# Patient Record
Sex: MALE | Race: AMERICAN INDIAN OR ALASKA NATIVE | NOT HISPANIC OR LATINO | Employment: OTHER | URBAN - METROPOLITAN AREA
[De-identification: names, ages, dates, MRNs, and addresses within clinical notes are randomized per-mention and may not be internally consistent; named-entity substitution may affect disease eponyms.]

---

## 2022-09-05 ENCOUNTER — HOSPITAL ENCOUNTER (OUTPATIENT)
Facility: CLINIC | Age: 32
Setting detail: OBSERVATION
Discharge: NURSING, REHAB OR LONG-TERM CARE | End: 2023-04-07
Attending: EMERGENCY MEDICINE | Admitting: INTERNAL MEDICINE
Payer: MEDICARE

## 2022-09-05 DIAGNOSIS — R05.9 COUGH, UNSPECIFIED TYPE: ICD-10-CM

## 2022-09-05 DIAGNOSIS — E03.9 HYPOTHYROIDISM, UNSPECIFIED TYPE: ICD-10-CM

## 2022-09-05 DIAGNOSIS — B37.2 CANDIDIASIS OF SKIN: ICD-10-CM

## 2022-09-05 DIAGNOSIS — G82.20 PARAPLEGIA (H): Primary | ICD-10-CM

## 2022-09-05 DIAGNOSIS — R68.84 JAW PAIN: ICD-10-CM

## 2022-09-05 DIAGNOSIS — E78.5 HYPERLIPIDEMIA, UNSPECIFIED HYPERLIPIDEMIA TYPE: ICD-10-CM

## 2022-09-05 DIAGNOSIS — J45.909 MILD REACTIVE AIRWAYS DISEASE, UNSPECIFIED WHETHER PERSISTENT: ICD-10-CM

## 2022-09-05 DIAGNOSIS — G47.00 INSOMNIA, UNSPECIFIED TYPE: ICD-10-CM

## 2022-09-05 DIAGNOSIS — K59.00 CONSTIPATION, UNSPECIFIED CONSTIPATION TYPE: ICD-10-CM

## 2022-09-05 DIAGNOSIS — Z00.00 ENCOUNTER FOR PREVENTIVE CARE: ICD-10-CM

## 2022-09-05 DIAGNOSIS — F63.81 INTERMITTENT EXPLOSIVE DISORDER IN ADULT: ICD-10-CM

## 2022-09-05 DIAGNOSIS — E55.9 VITAMIN D DEFICIENCY: ICD-10-CM

## 2022-09-05 DIAGNOSIS — R52 MILD PAIN: ICD-10-CM

## 2022-09-05 DIAGNOSIS — E11.9 TYPE 2 DIABETES MELLITUS WITHOUT COMPLICATION, WITHOUT LONG-TERM CURRENT USE OF INSULIN (H): ICD-10-CM
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
## 2022-09-05 DIAGNOSIS — F41.9 ANXIETY: ICD-10-CM

## 2022-09-05 DIAGNOSIS — I10 BENIGN ESSENTIAL HYPERTENSION: ICD-10-CM

## 2022-09-05 DIAGNOSIS — L21.0 DANDRUFF IN ADULT: ICD-10-CM

## 2022-09-05 DIAGNOSIS — I63.9 CEREBROVASCULAR ACCIDENT (CVA), UNSPECIFIED MECHANISM (H): ICD-10-CM

## 2022-09-05 PROCEDURE — C9803 HOPD COVID-19 SPEC COLLECT: HCPCS

## 2022-09-05 PROCEDURE — G0378 HOSPITAL OBSERVATION PER HR: HCPCS

## 2022-09-05 PROCEDURE — U0005 INFEC AGEN DETEC AMPLI PROBE: HCPCS | Performed by: EMERGENCY MEDICINE

## 2022-09-05 PROCEDURE — 99285 EMERGENCY DEPT VISIT HI MDM: CPT | Mod: 25

## 2022-09-05 PROCEDURE — 99219 PR INITIAL OBSERVATION CARE,LEVEL II: CPT | Performed by: INTERNAL MEDICINE

## 2022-09-05 ASSESSMENT — ENCOUNTER SYMPTOMS
HEADACHES: 0
ARTHRALGIAS: 0
SHORTNESS OF BREATH: 0

## 2022-09-05 ASSESSMENT — ACTIVITIES OF DAILY LIVING (ADL)
ADLS_ACUITY_SCORE: 35
ADLS_ACUITY_SCORE: 35

## 2022-09-05 NOTE — LETTER
March 28, 2023    Re: Chris Gabriel    1. Patient has a diagnosis of pareplegia, ICD 10 G82.2, which requires repositioning of the body to alleviate pain and pressure sores, that cannot be accommodated in an ordinary bed   2. Patient requires frequent body repositioning every 2 hours based on diagnosis of lower body paraplegia and Traumatic Brain Injury, and is able to use the electronic controls     Patient's weight: 220 pounds   DME length of need: lifetime        LUIS Spangler [facility]

## 2022-09-06 LAB
GLUCOSE BLDC GLUCOMTR-MCNC: 143 MG/DL (ref 70–99)
SARS-COV-2 RNA RESP QL NAA+PROBE: NEGATIVE

## 2022-09-06 PROCEDURE — 99224 PR SUBSEQUENT OBSERVATION CARE,LEVEL I: CPT | Performed by: HOSPITALIST

## 2022-09-06 PROCEDURE — 250N000013 HC RX MED GY IP 250 OP 250 PS 637: Performed by: HOSPITALIST

## 2022-09-06 PROCEDURE — 82962 GLUCOSE BLOOD TEST: CPT

## 2022-09-06 PROCEDURE — G0378 HOSPITAL OBSERVATION PER HR: HCPCS

## 2022-09-06 RX ORDER — HYDROXYZINE PAMOATE 50 MG/1
50 CAPSULE ORAL 3 TIMES DAILY
Status: ON HOLD | COMMUNITY
End: 2023-04-06

## 2022-09-06 RX ORDER — QUETIAPINE FUMARATE 400 MG/1
400 TABLET, FILM COATED ORAL AT BEDTIME
Status: ON HOLD | COMMUNITY
End: 2023-04-06

## 2022-09-06 RX ORDER — DIVALPROEX SODIUM 250 MG/1
250 TABLET, DELAYED RELEASE ORAL EVERY MORNING
Status: DISCONTINUED | OUTPATIENT
Start: 2022-09-07 | End: 2022-12-17

## 2022-09-06 RX ORDER — CLONIDINE HYDROCHLORIDE 0.1 MG/1
0.1 TABLET ORAL 2 TIMES DAILY
Status: DISCONTINUED | OUTPATIENT
Start: 2022-09-06 | End: 2022-12-18

## 2022-09-06 RX ORDER — VENLAFAXINE HYDROCHLORIDE 75 MG/1
75 CAPSULE, EXTENDED RELEASE ORAL AT BEDTIME
Status: ON HOLD | COMMUNITY
End: 2023-04-06

## 2022-09-06 RX ORDER — VENLAFAXINE HYDROCHLORIDE 75 MG/1
75 CAPSULE, EXTENDED RELEASE ORAL AT BEDTIME
Status: DISCONTINUED | OUTPATIENT
Start: 2022-09-06 | End: 2023-04-07 | Stop reason: HOSPADM

## 2022-09-06 RX ORDER — METOPROLOL SUCCINATE 25 MG/1
25 TABLET, EXTENDED RELEASE ORAL DAILY
Status: DISCONTINUED | OUTPATIENT
Start: 2022-09-06 | End: 2023-01-24

## 2022-09-06 RX ORDER — DIVALPROEX SODIUM 500 MG/1
500 TABLET, DELAYED RELEASE ORAL EVERY MORNING
Status: ON HOLD | COMMUNITY
End: 2023-04-06

## 2022-09-06 RX ORDER — METOPROLOL SUCCINATE 25 MG/1
25 TABLET, EXTENDED RELEASE ORAL DAILY
Status: ON HOLD | COMMUNITY
End: 2023-04-06

## 2022-09-06 RX ORDER — ACETAMINOPHEN 650 MG/1
650 SUPPOSITORY RECTAL EVERY 6 HOURS PRN
Status: DISCONTINUED | OUTPATIENT
Start: 2022-09-06 | End: 2023-04-07 | Stop reason: HOSPADM

## 2022-09-06 RX ORDER — LORAZEPAM 2 MG/ML
.5-1 INJECTION INTRAMUSCULAR EVERY 4 HOURS PRN
Status: DISCONTINUED | OUTPATIENT
Start: 2022-09-06 | End: 2023-04-07 | Stop reason: HOSPADM

## 2022-09-06 RX ORDER — QUETIAPINE FUMARATE 200 MG/1
200 TABLET, FILM COATED ORAL 2 TIMES DAILY
Status: DISCONTINUED | OUTPATIENT
Start: 2022-09-06 | End: 2023-04-07 | Stop reason: HOSPADM

## 2022-09-06 RX ORDER — DIVALPROEX SODIUM 500 MG/1
500 TABLET, DELAYED RELEASE ORAL EVERY MORNING
Status: DISCONTINUED | OUTPATIENT
Start: 2022-09-07 | End: 2022-12-17

## 2022-09-06 RX ORDER — LEVOTHYROXINE SODIUM 25 UG/1
25 TABLET ORAL DAILY
Status: ON HOLD | COMMUNITY
End: 2023-04-06

## 2022-09-06 RX ORDER — NYSTATIN 100000 [USP'U]/G
POWDER TOPICAL 2 TIMES DAILY PRN
Status: ON HOLD | COMMUNITY
End: 2023-04-06

## 2022-09-06 RX ORDER — AMOXICILLIN 250 MG
1 CAPSULE ORAL DAILY PRN
Status: ON HOLD | COMMUNITY
End: 2023-04-06

## 2022-09-06 RX ORDER — HYDROXYZINE HYDROCHLORIDE 50 MG/1
50 TABLET, FILM COATED ORAL 3 TIMES DAILY
Status: DISCONTINUED | OUTPATIENT
Start: 2022-09-06 | End: 2023-04-07 | Stop reason: HOSPADM

## 2022-09-06 RX ORDER — GUAIFENESIN 600 MG/1
600 TABLET, EXTENDED RELEASE ORAL EVERY 12 HOURS PRN
Status: ON HOLD | COMMUNITY
End: 2023-04-06

## 2022-09-06 RX ORDER — FAMOTIDINE 20 MG
25 TABLET ORAL DAILY
Status: ON HOLD | COMMUNITY
End: 2023-04-06

## 2022-09-06 RX ORDER — VENLAFAXINE HYDROCHLORIDE 150 MG/1
150 CAPSULE, EXTENDED RELEASE ORAL AT BEDTIME
Status: ON HOLD | COMMUNITY
End: 2023-04-06

## 2022-09-06 RX ORDER — QUETIAPINE FUMARATE 200 MG/1
200 TABLET, FILM COATED ORAL 2 TIMES DAILY
Status: ON HOLD | COMMUNITY
End: 2023-04-06

## 2022-09-06 RX ORDER — DIVALPROEX SODIUM 500 MG/1
1000 TABLET, DELAYED RELEASE ORAL AT BEDTIME
Status: DISCONTINUED | OUTPATIENT
Start: 2022-09-06 | End: 2023-04-07 | Stop reason: HOSPADM

## 2022-09-06 RX ORDER — AMOXICILLIN 250 MG
2 CAPSULE ORAL 2 TIMES DAILY PRN
Status: DISCONTINUED | OUTPATIENT
Start: 2022-09-06 | End: 2022-10-03

## 2022-09-06 RX ORDER — ATORVASTATIN CALCIUM 20 MG/1
20 TABLET, FILM COATED ORAL DAILY
Status: ON HOLD | COMMUNITY
End: 2023-04-06

## 2022-09-06 RX ORDER — BACLOFEN 10 MG/1
10 TABLET ORAL 3 TIMES DAILY
Status: ON HOLD | COMMUNITY
End: 2023-04-06

## 2022-09-06 RX ORDER — MIRTAZAPINE 15 MG/1
15 TABLET, FILM COATED ORAL AT BEDTIME
Status: ON HOLD | COMMUNITY
End: 2023-04-06

## 2022-09-06 RX ORDER — IPRATROPIUM BROMIDE AND ALBUTEROL SULFATE 2.5; .5 MG/3ML; MG/3ML
1 SOLUTION RESPIRATORY (INHALATION) EVERY 4 HOURS PRN
Status: ON HOLD | COMMUNITY
End: 2023-04-06

## 2022-09-06 RX ORDER — POLYETHYLENE GLYCOL 3350 17 G/17G
17 POWDER, FOR SOLUTION ORAL DAILY PRN
Status: DISCONTINUED | OUTPATIENT
Start: 2022-09-06 | End: 2022-11-16

## 2022-09-06 RX ORDER — MELATONIN 10 MG
10 CAPSULE ORAL AT BEDTIME
Status: ON HOLD | COMMUNITY
End: 2023-04-06

## 2022-09-06 RX ORDER — DIVALPROEX SODIUM 500 MG/1
1000 TABLET, DELAYED RELEASE ORAL AT BEDTIME
Status: ON HOLD | COMMUNITY
End: 2023-04-06

## 2022-09-06 RX ORDER — AMOXICILLIN 250 MG
1 CAPSULE ORAL DAILY
Status: ON HOLD | COMMUNITY
End: 2023-04-06

## 2022-09-06 RX ORDER — NYSTATIN 100000 [USP'U]/G
POWDER TOPICAL PRN
Status: ON HOLD | COMMUNITY
End: 2023-04-06

## 2022-09-06 RX ORDER — BACLOFEN 10 MG/1
10 TABLET ORAL 3 TIMES DAILY
Status: DISCONTINUED | OUTPATIENT
Start: 2022-09-06 | End: 2023-04-07 | Stop reason: HOSPADM

## 2022-09-06 RX ORDER — GUAIFENESIN 600 MG/1
600 TABLET, EXTENDED RELEASE ORAL EVERY 12 HOURS PRN
Status: DISCONTINUED | OUTPATIENT
Start: 2022-09-06 | End: 2023-04-07 | Stop reason: HOSPADM

## 2022-09-06 RX ORDER — QUETIAPINE FUMARATE 200 MG/1
400 TABLET, FILM COATED ORAL AT BEDTIME
Status: DISCONTINUED | OUTPATIENT
Start: 2022-09-06 | End: 2023-04-07 | Stop reason: HOSPADM

## 2022-09-06 RX ORDER — VENLAFAXINE HYDROCHLORIDE 150 MG/1
150 CAPSULE, EXTENDED RELEASE ORAL AT BEDTIME
Status: DISCONTINUED | OUTPATIENT
Start: 2022-09-06 | End: 2023-04-07 | Stop reason: HOSPADM

## 2022-09-06 RX ORDER — OLANZAPINE 2.5 MG/1
2.5 TABLET, FILM COATED ORAL DAILY
Status: DISCONTINUED | OUTPATIENT
Start: 2022-09-06 | End: 2023-04-07 | Stop reason: HOSPADM

## 2022-09-06 RX ORDER — MIRTAZAPINE 15 MG/1
15 TABLET, FILM COATED ORAL AT BEDTIME
Status: DISCONTINUED | OUTPATIENT
Start: 2022-09-06 | End: 2023-04-07 | Stop reason: HOSPADM

## 2022-09-06 RX ORDER — ATORVASTATIN CALCIUM 20 MG/1
20 TABLET, FILM COATED ORAL DAILY
Status: DISCONTINUED | OUTPATIENT
Start: 2022-09-06 | End: 2023-04-07 | Stop reason: HOSPADM

## 2022-09-06 RX ORDER — ONDANSETRON 4 MG/1
4 TABLET, ORALLY DISINTEGRATING ORAL EVERY 6 HOURS PRN
Status: DISCONTINUED | OUTPATIENT
Start: 2022-09-06 | End: 2023-04-07 | Stop reason: HOSPADM

## 2022-09-06 RX ORDER — VITAMIN B COMPLEX
25 TABLET ORAL DAILY
Status: DISCONTINUED | OUTPATIENT
Start: 2022-09-06 | End: 2023-04-07 | Stop reason: HOSPADM

## 2022-09-06 RX ORDER — ACETAMINOPHEN 325 MG/1
650 TABLET ORAL EVERY 6 HOURS PRN
Status: DISCONTINUED | OUTPATIENT
Start: 2022-09-06 | End: 2023-04-07 | Stop reason: HOSPADM

## 2022-09-06 RX ORDER — DIVALPROEX SODIUM 250 MG/1
250 TABLET, DELAYED RELEASE ORAL EVERY MORNING
Status: ON HOLD | COMMUNITY
End: 2023-04-06

## 2022-09-06 RX ORDER — OLANZAPINE 2.5 MG/1
2.5 TABLET, FILM COATED ORAL
Status: ON HOLD | COMMUNITY
End: 2023-04-06

## 2022-09-06 RX ORDER — LEVOTHYROXINE SODIUM 25 UG/1
25 TABLET ORAL DAILY
Status: DISCONTINUED | OUTPATIENT
Start: 2022-09-06 | End: 2023-04-07 | Stop reason: HOSPADM

## 2022-09-06 RX ORDER — AMOXICILLIN 250 MG
1 CAPSULE ORAL DAILY
Status: DISCONTINUED | OUTPATIENT
Start: 2022-09-06 | End: 2022-12-23

## 2022-09-06 RX ORDER — ALBUTEROL SULFATE 90 UG/1
2 AEROSOL, METERED RESPIRATORY (INHALATION) EVERY 4 HOURS PRN
Status: ON HOLD | COMMUNITY
End: 2023-04-06

## 2022-09-06 RX ORDER — ACETAMINOPHEN 325 MG/1
650 TABLET ORAL EVERY 4 HOURS PRN
Status: DISCONTINUED | OUTPATIENT
Start: 2022-09-06 | End: 2022-09-06

## 2022-09-06 RX ORDER — IPRATROPIUM BROMIDE AND ALBUTEROL SULFATE 2.5; .5 MG/3ML; MG/3ML
1 SOLUTION RESPIRATORY (INHALATION) EVERY 4 HOURS PRN
Status: DISCONTINUED | OUTPATIENT
Start: 2022-09-06 | End: 2023-04-07 | Stop reason: HOSPADM

## 2022-09-06 RX ORDER — ONDANSETRON 2 MG/ML
4 INJECTION INTRAMUSCULAR; INTRAVENOUS EVERY 6 HOURS PRN
Status: DISCONTINUED | OUTPATIENT
Start: 2022-09-06 | End: 2023-04-07 | Stop reason: HOSPADM

## 2022-09-06 RX ORDER — ALBUTEROL SULFATE 90 UG/1
2 AEROSOL, METERED RESPIRATORY (INHALATION) EVERY 4 HOURS PRN
Status: DISCONTINUED | OUTPATIENT
Start: 2022-09-06 | End: 2023-04-07 | Stop reason: HOSPADM

## 2022-09-06 RX ORDER — AMOXICILLIN 250 MG
1 CAPSULE ORAL 2 TIMES DAILY PRN
Status: DISCONTINUED | OUTPATIENT
Start: 2022-09-06 | End: 2022-10-03

## 2022-09-06 RX ORDER — CLONIDINE HYDROCHLORIDE 0.1 MG/1
0.1 TABLET ORAL 2 TIMES DAILY
Status: ON HOLD | COMMUNITY
End: 2023-04-06

## 2022-09-06 RX ORDER — ASPIRIN 81 MG/1
81 TABLET ORAL DAILY
Status: DISCONTINUED | OUTPATIENT
Start: 2022-09-06 | End: 2023-04-07 | Stop reason: HOSPADM

## 2022-09-06 RX ORDER — ASPIRIN 81 MG/1
81 TABLET ORAL DAILY
Status: ON HOLD | COMMUNITY
End: 2023-04-06

## 2022-09-06 RX ORDER — ACETAMINOPHEN 325 MG/1
650 TABLET ORAL EVERY 4 HOURS PRN
Status: ON HOLD | COMMUNITY
End: 2023-04-06

## 2022-09-06 RX ORDER — PYRITHIONE ZINC 0.01 G/ML
SHAMPOO TOPICAL
Status: ON HOLD | COMMUNITY
End: 2023-04-06

## 2022-09-06 RX ADMIN — VENLAFAXINE HYDROCHLORIDE 75 MG: 150 CAPSULE, EXTENDED RELEASE ORAL at 22:06

## 2022-09-06 RX ADMIN — VENLAFAXINE HYDROCHLORIDE 150 MG: 150 CAPSULE, EXTENDED RELEASE ORAL at 22:06

## 2022-09-06 RX ADMIN — METFORMIN HYDROCHLORIDE 1000 MG: 500 TABLET ORAL at 18:17

## 2022-09-06 RX ADMIN — HYDROXYZINE HYDROCHLORIDE 50 MG: 50 TABLET, FILM COATED ORAL at 20:10

## 2022-09-06 RX ADMIN — METOPROLOL SUCCINATE 25 MG: 25 TABLET, EXTENDED RELEASE ORAL at 18:17

## 2022-09-06 RX ADMIN — MIRTAZAPINE 15 MG: 15 TABLET, FILM COATED ORAL at 22:06

## 2022-09-06 RX ADMIN — QUETIAPINE FUMARATE 400 MG: 200 TABLET ORAL at 22:07

## 2022-09-06 RX ADMIN — CLONIDINE HYDROCHLORIDE 0.1 MG: 0.1 TABLET ORAL at 20:10

## 2022-09-06 RX ADMIN — DIVALPROEX SODIUM 1000 MG: 500 TABLET, DELAYED RELEASE ORAL at 22:07

## 2022-09-06 RX ADMIN — SENNOSIDES AND DOCUSATE SODIUM 1 TABLET: 50; 8.6 TABLET ORAL at 18:17

## 2022-09-06 RX ADMIN — ATORVASTATIN CALCIUM 20 MG: 20 TABLET, FILM COATED ORAL at 20:10

## 2022-09-06 RX ADMIN — QUETIAPINE 200 MG: 200 TABLET, FILM COATED ORAL at 18:39

## 2022-09-06 RX ADMIN — LEVOTHYROXINE SODIUM 25 MCG: 0.03 TABLET ORAL at 18:17

## 2022-09-06 RX ADMIN — Medication 25 MCG: at 18:40

## 2022-09-06 RX ADMIN — ASPIRIN 81 MG CHEWABLE TABLET 81 MG: 81 TABLET CHEWABLE at 20:10

## 2022-09-06 RX ADMIN — OLANZAPINE 2.5 MG: 2.5 TABLET, FILM COATED ORAL at 18:39

## 2022-09-06 RX ADMIN — BACLOFEN 10 MG: 10 TABLET ORAL at 20:10

## 2022-09-06 ASSESSMENT — ACTIVITIES OF DAILY LIVING (ADL)
ADLS_ACUITY_SCORE: 35
ADLS_ACUITY_SCORE: 35
ADLS_ACUITY_SCORE: 39
ADLS_ACUITY_SCORE: 35
ADLS_ACUITY_SCORE: 35
ADLS_ACUITY_SCORE: 39
ADLS_ACUITY_SCORE: 35
ADLS_ACUITY_SCORE: 39
ADLS_ACUITY_SCORE: 35
ADLS_ACUITY_SCORE: 35
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
## 2022-09-06 NOTE — ED NOTES
Patient pleasant with this writer while rounding on the patient. Patient was also calm, cooperative, and pleasant per overnight RN.
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
## 2022-09-06 NOTE — PHARMACY-ADMISSION MEDICATION HISTORY
Admission medication history interview status for this patient is complete. See [facility] admission navigator for allergy information, prior to admission medications and immunization status.     Medication history interview done, indicate source(s):  none.  Medication history resources (including written lists, pill bottles, clinic record):List from [facility].  Pharmacy:      Changes made to PTA medication list:  Added: all.      Actions taken by pharmacist (provider contacted, etc):None     Additional medication history information:None    Medication reconciliation/reorder completed by provider prior to medication history?  N   (Y/N)     Prior to Admission medications    Medication Sig Last Dose Taking? Auth Provider Long Term End Date   acetaminophen (TYLENOL) 325 MG tablet Take 650 mg by mouth every 4 hours as needed for mild pain Unknown at Unknown time Yes Unknown, Entered By History     albuterol (PROAIR HFA/PROVENTIL HFA/VENTOLIN HFA) 108 (90 Base) MCG/ACT inhaler Inhale 2 puffs into the lungs every 4 hours as needed for shortness of breath / dyspnea or wheezing Unknown at Unknown time Yes Unknown, Entered By History Yes    aspirin 81 MG EC tablet Take 81 mg by mouth daily 9/5/2022 at AM Yes Unknown, Entered By History     atorvastatin (LIPITOR) 20 MG tablet Take 20 mg by mouth daily 9/5/2022 at PM Yes Unknown, Entered By History Yes    baclofen (LIORESAL) 10 MG tablet Take 10 mg by mouth 3 times daily 9/5/2022 at Unknown time Yes Unknown, Entered By History     cloNIDine (CATAPRES) 0.1 MG tablet Take 0.1 mg by mouth 2 times daily 9/5/2022 at AM Yes Unknown, Entered By History Yes    divalproex sodium delayed-release (DEPAKOTE) 250 MG DR tablet Take 250 mg by mouth every morning Take 250 mg with 500 mg for 750 mg dose. 9/5/2022 at AM Yes Unknown, Entered By History Yes    divalproex sodium delayed-release (DEPAKOTE) 500 MG DR tablet Take 500 mg by mouth every morning Take 250 mg with 500 mg for 750 mg dose.  9/5/2022 at AM Yes Unknown, Entered By History Yes    divalproex sodium delayed-release (DEPAKOTE) 500 MG DR tablet Take 1,000 mg by mouth At Bedtime Unknown at Unknown time Yes Unknown, Entered By History Yes    empagliflozin (JARDIANCE) 10 MG TABS tablet Take 10 mg by mouth daily 9/5/2022 at AM Yes Unknown, Entered By History     guaiFENesin (MUCINEX) 600 MG 12 hr tablet Take 600 mg by mouth every 12 hours as needed for congestion Unknown at Unknown time Yes Unknown, Entered By History     hydrOXYzine (VISTARIL) 50 MG capsule Take 50 mg by mouth 3 times daily Anxiety. 9/5/2022 at Unknown time Yes Unknown, Entered By History     ipratropium - albuterol 0.5 mg/2.5 mg/3 mL (DUONEB) 0.5-2.5 (3) MG/3ML neb solution Take 1 vial by nebulization every 4 hours as needed for shortness of breath / dyspnea or wheezing Unknown at Unknown time Yes Unknown, Entered By History Yes    levothyroxine (SYNTHROID/LEVOTHROID) 25 MCG tablet Take 25 mcg by mouth daily 9/5/2022 at AM Yes Unknown, Entered By History Yes    Melatonin 10 MG CAPS Take 10 mg by mouth At Bedtime 9/4/2022 at HS Yes Unknown, Entered By History     metFORMIN (GLUCOPHAGE) 500 MG tablet Take 1,000 mg by mouth 2 times daily (with meals) 9/5/2022 Yes Unknown, Entered By History Yes    metoprolol succinate ER (TOPROL XL) 25 MG 24 hr tablet Take 25 mg by mouth daily 9/5/2022 at AM Yes Unknown, Entered By History Yes    mirtazapine (REMERON) 15 MG tablet Take 15 mg by mouth At Bedtime 9/4/2022 at Unknown time Yes Unknown, Entered By History Yes    nystatin (MYCOSTATIN) 677595 UNIT/GM external powder Apply topically as needed (to groin area every diaper change.) 9/5/2022 at Unknown time Yes Unknown, Entered By History     nystatin (MYCOSTATIN) 142567 UNIT/GM external powder Apply topically 2 times daily as needed (for skin integrity) Unknown at Unknown time Yes Unknown, Entered By History     OLANZapine (ZYPREXA) 2.5 MG tablet Take 2.5 mg by mouth daily at 2 pm 9/5/2022  at 2 PM Yes Unknown, Entered By History Yes    pyrithione zinc (SELSUN BLUE DRY SCALP) 1 % external shampoo Apply topically twice a week Every Wed and Sat. 9/3/2022 at Unknown time Yes Unknown, Entered By History     QUEtiapine (SEROQUEL) 200 MG tablet Take 200 mg by mouth 2 times daily At 8 AM and 2 PM 9/5/2022 at Unknown time Yes Unknown, Entered By History Yes    QUEtiapine (SEROQUEL) 400 MG tablet Take 400 mg by mouth At Bedtime 9/4/2022 at HS Yes Unknown, Entered By History Yes    senna-docusate (SENOKOT-S/PERICOLACE) 8.6-50 MG tablet Take 1 tablet by mouth daily 9/5/2022 at AM Yes Unknown, Entered By History     senna-docusate (SENOKOT-S/PERICOLACE) 8.6-50 MG tablet Take 1 tablet by mouth daily as needed for constipation Unknown at Unknown time Yes Unknown, Entered By History     venlafaxine (EFFEXOR XR) 150 MG 24 hr capsule Take 150 mg by mouth At Bedtime Take 75 mg with 150 mg for total 225 mg dose.  Yes Unknown, Entered By History Yes    venlafaxine (EFFEXOR XR) 75 MG 24 hr capsule Take 75 mg by mouth At Bedtime Take 75 mg with 150 mg for total 225 mg dose. 9/4/2022 at Unknown time Yes Unknown, Entered By History Yes    Vitamin D, Cholecalciferol, 25 MCG (1000 UT) CAPS Take 25 mcg by mouth daily 9/5/2022 at AM Yes Unknown, Entered By History
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
## 2022-09-06 NOTE — PROGRESS NOTES
ROOM # 221     Living Situation (if not independent, order SW consult): Group Home  Facility name: N/A- here for placement  : Pt states mother, does not know number. No contacts in chart. Pt has  per SW.    Activity level at baseline: Bedrest  Activity level on admit: Bedrest    Who will be transporting you at discharge: TBD    Patient registered to observation; given Patient Bill of Rights; given the opportunity to ask questions about observation status and their plan of care.  Patient has been oriented to the observation room, bathroom and call light is in place.    Discussed discharge goals and expectations with patient/family.

## 2022-09-06 NOTE — ED NOTES
2 staff changed linen of patient. After linen change, set up meal tray for patient. Patient was pleasant and cooperative through the process.

## 2022-09-07 PROCEDURE — G0378 HOSPITAL OBSERVATION PER HR: HCPCS

## 2022-09-07 PROCEDURE — 99224 PR SUBSEQUENT OBSERVATION CARE,LEVEL I: CPT | Performed by: HOSPITALIST

## 2022-09-07 PROCEDURE — 250N000013 HC RX MED GY IP 250 OP 250 PS 637: Performed by: HOSPITALIST

## 2022-09-07 RX ADMIN — QUETIAPINE 200 MG: 200 TABLET, FILM COATED ORAL at 08:36

## 2022-09-07 RX ADMIN — BACLOFEN 10 MG: 10 TABLET ORAL at 21:40

## 2022-09-07 RX ADMIN — OLANZAPINE 2.5 MG: 2.5 TABLET, FILM COATED ORAL at 14:08

## 2022-09-07 RX ADMIN — ATORVASTATIN CALCIUM 20 MG: 20 TABLET, FILM COATED ORAL at 21:40

## 2022-09-07 RX ADMIN — DIVALPROEX SODIUM 1000 MG: 500 TABLET, DELAYED RELEASE ORAL at 21:40

## 2022-09-07 RX ADMIN — HYDROXYZINE HYDROCHLORIDE 50 MG: 50 TABLET, FILM COATED ORAL at 08:37

## 2022-09-07 RX ADMIN — LEVOTHYROXINE SODIUM 25 MCG: 0.03 TABLET ORAL at 08:36

## 2022-09-07 RX ADMIN — DIVALPROEX SODIUM 250 MG: 500 TABLET, DELAYED RELEASE ORAL at 08:36

## 2022-09-07 RX ADMIN — Medication 25 MCG: at 08:36

## 2022-09-07 RX ADMIN — QUETIAPINE 200 MG: 200 TABLET, FILM COATED ORAL at 14:08

## 2022-09-07 RX ADMIN — CLONIDINE HYDROCHLORIDE 0.1 MG: 0.1 TABLET ORAL at 08:37

## 2022-09-07 RX ADMIN — ASPIRIN 81 MG CHEWABLE TABLET 81 MG: 81 TABLET CHEWABLE at 08:37

## 2022-09-07 RX ADMIN — VENLAFAXINE HYDROCHLORIDE 150 MG: 150 CAPSULE, EXTENDED RELEASE ORAL at 21:39

## 2022-09-07 RX ADMIN — METOPROLOL SUCCINATE 25 MG: 25 TABLET, EXTENDED RELEASE ORAL at 08:35

## 2022-09-07 RX ADMIN — HYDROXYZINE HYDROCHLORIDE 50 MG: 50 TABLET, FILM COATED ORAL at 14:08

## 2022-09-07 RX ADMIN — CLONIDINE HYDROCHLORIDE 0.1 MG: 0.1 TABLET ORAL at 21:40

## 2022-09-07 RX ADMIN — METFORMIN HYDROCHLORIDE 1000 MG: 500 TABLET ORAL at 17:28

## 2022-09-07 RX ADMIN — DIVALPROEX SODIUM 500 MG: 500 TABLET, DELAYED RELEASE ORAL at 08:37

## 2022-09-07 RX ADMIN — BACLOFEN 10 MG: 10 TABLET ORAL at 08:35

## 2022-09-07 RX ADMIN — MIRTAZAPINE 15 MG: 15 TABLET, FILM COATED ORAL at 21:39

## 2022-09-07 RX ADMIN — HYDROXYZINE HYDROCHLORIDE 50 MG: 50 TABLET, FILM COATED ORAL at 21:40

## 2022-09-07 RX ADMIN — SENNOSIDES AND DOCUSATE SODIUM 1 TABLET: 50; 8.6 TABLET ORAL at 08:37

## 2022-09-07 RX ADMIN — BACLOFEN 10 MG: 10 TABLET ORAL at 14:08

## 2022-09-07 RX ADMIN — METFORMIN HYDROCHLORIDE 1000 MG: 500 TABLET ORAL at 08:35

## 2022-09-07 RX ADMIN — VENLAFAXINE HYDROCHLORIDE 75 MG: 150 CAPSULE, EXTENDED RELEASE ORAL at 21:40

## 2022-09-07 RX ADMIN — EMPAGLIFLOZIN 10 MG: 10 TABLET, FILM COATED ORAL at 08:36

## 2022-09-07 RX ADMIN — QUETIAPINE FUMARATE 400 MG: 200 TABLET ORAL at 21:40

## 2022-09-07 ASSESSMENT — ACTIVITIES OF DAILY LIVING (ADL)
ADLS_ACUITY_SCORE: 51
ADLS_ACUITY_SCORE: 51
ADLS_ACUITY_SCORE: 39
ADLS_ACUITY_SCORE: 51
ADLS_ACUITY_SCORE: 51
ADLS_ACUITY_SCORE: 39
ADLS_ACUITY_SCORE: 51
ADLS_ACUITY_SCORE: 51
ADLS_ACUITY_SCORE: 39
ADLS_ACUITY_SCORE: 51

## 2022-09-07 NOTE — PROGRESS NOTES
PRIMARY DIAGNOSIS: Placement  OUTPATIENT/OBSERVATION GOALS TO BE MET BEFORE DISCHARGE:  1. ADLs back to baseline: Yes    2. Activity and level of assistance: Bedrest    3. Pain status: Pain free.    4. Return to near baseline physical activity: Yes     Discharge Planner Nurse   Safe discharge environment identified: No  Barriers to discharge: Yes       Entered by: Florian Herman RN 09/06/2022 7:59 PM   VSS. Pt disoriented x 2. No PIV, OK per MD. External catheter patent. BM x 1. Calm, pleasant. Slow to speak at times. Discharge pending.   Please review provider order for any additional goals.   Nurse to notify provider when observation goals have been met and patient is ready for discharge.
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
## 2022-09-07 NOTE — PROGRESS NOTES
PRIMARY DIAGNOSIS: Placement  OUTPATIENT/OBSERVATION GOALS TO BE MET BEFORE DISCHARGE:  1. ADLs back to baseline: Yes    2. Activity and level of assistance: Bedrest    3. Pain status: Pain free.    4. Return to near baseline physical activity: Yes     VSS, on RA, Pt disoriented to time and place. Pt reports baseline numbness at times in lower extremities. No IV, OK with MD. Pt resting comfortably all night. Waiting for new placement at group home.   /79 (BP Location: Left arm)   Pulse 62   Temp 98  F (36.7  C) (Oral)   Resp 17   Wt 99.9 kg (220 lb 4.8 oz)   SpO2 93%
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
## 2022-09-07 NOTE — PROGRESS NOTES
PRIMARY DIAGNOSIS: Placement  OUTPATIENT/OBSERVATION GOALS TO BE MET BEFORE DISCHARGE:  1. ADLs back to baseline: Yes    2. Activity and level of assistance: Bedrest    3. Pain status: Pain free.    4. Return to near baseline physical activity: Yes     VSS, on RA, Pt disoriented to time and place. Pt reports baseline numbness at times in lower extremities. No IV, OK with MD. External catheter changed in evening. Pt resting. Waiting for placement.
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
## 2022-09-07 NOTE — PROGRESS NOTES
PRIMARY DIAGNOSIS: Placement  OUTPATIENT/OBSERVATION GOALS TO BE MET BEFORE DISCHARGE:  1. ADLs back to baseline: Yes    2. Activity and level of assistance: Bedrest    3. Pain status: Pain free.    4. Return to near baseline physical activity: Yes     VSS, on RA, Pt disoriented to time and place. Pt reports baseline numbness at times in lower extremities. No IV, OK with MD. External catheter changed in evening. Pt resting. Waiting for new placement at group home.   BP (!) 130/92 (BP Location: Right arm)   Pulse 88   Temp 98.1  F (36.7  C) (Oral)   Resp 18   Wt 99.9 kg (220 lb 4.8 oz)   SpO2 95%

## 2022-09-08 LAB
ANION GAP SERPL CALCULATED.3IONS-SCNC: 12 MMOL/L (ref 7–15)
BUN SERPL-MCNC: 9.3 MG/DL (ref 6–20)
CALCIUM SERPL-MCNC: 9.1 MG/DL (ref 8.6–10)
CHLORIDE SERPL-SCNC: 102 MMOL/L (ref 98–107)
CREAT SERPL-MCNC: 0.65 MG/DL (ref 0.67–1.17)
DEPRECATED HCO3 PLAS-SCNC: 26 MMOL/L (ref 22–29)
ERYTHROCYTE [DISTWIDTH] IN BLOOD BY AUTOMATED COUNT: 13.8 % (ref 10–15)
GFR SERPL CREATININE-BSD FRML MDRD: >90 ML/MIN/1.73M2
GLUCOSE BLDC GLUCOMTR-MCNC: 129 MG/DL (ref 70–99)
GLUCOSE BLDC GLUCOMTR-MCNC: 148 MG/DL (ref 70–99)
GLUCOSE SERPL-MCNC: 97 MG/DL (ref 70–99)
HBA1C MFR BLD: 6.3 %
HCT VFR BLD AUTO: 51.8 % (ref 40–53)
HGB BLD-MCNC: 16.5 G/DL (ref 13.3–17.7)
MCH RBC QN AUTO: 29.5 PG (ref 26.5–33)
MCHC RBC AUTO-ENTMCNC: 31.9 G/DL (ref 31.5–36.5)
MCV RBC AUTO: 93 FL (ref 78–100)
PLATELET # BLD AUTO: 176 10E3/UL (ref 150–450)
POTASSIUM SERPL-SCNC: 5 MMOL/L (ref 3.4–5.3)
RBC # BLD AUTO: 5.59 10E6/UL (ref 4.4–5.9)
SODIUM SERPL-SCNC: 140 MMOL/L (ref 136–145)
WBC # BLD AUTO: 6.2 10E3/UL (ref 4–11)

## 2022-09-08 PROCEDURE — 83036 HEMOGLOBIN GLYCOSYLATED A1C: CPT | Performed by: PHYSICIAN ASSISTANT

## 2022-09-08 PROCEDURE — 85014 HEMATOCRIT: CPT | Performed by: PHYSICIAN ASSISTANT

## 2022-09-08 PROCEDURE — 99207 PR APP CREDIT; MD BILLING SHARED VISIT: CPT | Performed by: PHYSICIAN ASSISTANT

## 2022-09-08 PROCEDURE — 80048 BASIC METABOLIC PNL TOTAL CA: CPT | Performed by: PHYSICIAN ASSISTANT

## 2022-09-08 PROCEDURE — 99225 PR SUBSEQUENT OBSERVATION CARE,LEVEL II: CPT | Performed by: HOSPITALIST

## 2022-09-08 PROCEDURE — G0378 HOSPITAL OBSERVATION PER HR: HCPCS

## 2022-09-08 PROCEDURE — 36415 COLL VENOUS BLD VENIPUNCTURE: CPT | Performed by: PHYSICIAN ASSISTANT

## 2022-09-08 PROCEDURE — 250N000013 HC RX MED GY IP 250 OP 250 PS 637: Performed by: HOSPITALIST

## 2022-09-08 PROCEDURE — 82962 GLUCOSE BLOOD TEST: CPT

## 2022-09-08 RX ORDER — DEXTROSE MONOHYDRATE 25 G/50ML
25-50 INJECTION, SOLUTION INTRAVENOUS
Status: DISCONTINUED | OUTPATIENT
Start: 2022-09-08 | End: 2022-11-25

## 2022-09-08 RX ORDER — NICOTINE POLACRILEX 4 MG
15-30 LOZENGE BUCCAL
Status: DISCONTINUED | OUTPATIENT
Start: 2022-09-08 | End: 2022-11-25

## 2022-09-08 RX ADMIN — QUETIAPINE 200 MG: 200 TABLET, FILM COATED ORAL at 08:42

## 2022-09-08 RX ADMIN — CLONIDINE HYDROCHLORIDE 0.1 MG: 0.1 TABLET ORAL at 08:42

## 2022-09-08 RX ADMIN — HYDROXYZINE HYDROCHLORIDE 50 MG: 50 TABLET, FILM COATED ORAL at 08:42

## 2022-09-08 RX ADMIN — METFORMIN HYDROCHLORIDE 1000 MG: 500 TABLET ORAL at 08:42

## 2022-09-08 RX ADMIN — HYDROXYZINE HYDROCHLORIDE 50 MG: 50 TABLET, FILM COATED ORAL at 19:52

## 2022-09-08 RX ADMIN — HYDROXYZINE HYDROCHLORIDE 50 MG: 50 TABLET, FILM COATED ORAL at 13:20

## 2022-09-08 RX ADMIN — VENLAFAXINE HYDROCHLORIDE 75 MG: 150 CAPSULE, EXTENDED RELEASE ORAL at 21:34

## 2022-09-08 RX ADMIN — MIRTAZAPINE 15 MG: 15 TABLET, FILM COATED ORAL at 21:27

## 2022-09-08 RX ADMIN — BACLOFEN 10 MG: 10 TABLET ORAL at 13:20

## 2022-09-08 RX ADMIN — BACLOFEN 10 MG: 10 TABLET ORAL at 19:52

## 2022-09-08 RX ADMIN — DIVALPROEX SODIUM 250 MG: 500 TABLET, DELAYED RELEASE ORAL at 08:49

## 2022-09-08 RX ADMIN — DIVALPROEX SODIUM 1000 MG: 500 TABLET, DELAYED RELEASE ORAL at 21:27

## 2022-09-08 RX ADMIN — EMPAGLIFLOZIN 10 MG: 10 TABLET, FILM COATED ORAL at 08:42

## 2022-09-08 RX ADMIN — CLONIDINE HYDROCHLORIDE 0.1 MG: 0.1 TABLET ORAL at 19:52

## 2022-09-08 RX ADMIN — OLANZAPINE 2.5 MG: 2.5 TABLET, FILM COATED ORAL at 13:20

## 2022-09-08 RX ADMIN — METOPROLOL SUCCINATE 25 MG: 25 TABLET, EXTENDED RELEASE ORAL at 08:42

## 2022-09-08 RX ADMIN — Medication 25 MCG: at 08:42

## 2022-09-08 RX ADMIN — METFORMIN HYDROCHLORIDE 1000 MG: 500 TABLET ORAL at 17:02

## 2022-09-08 RX ADMIN — LEVOTHYROXINE SODIUM 25 MCG: 0.03 TABLET ORAL at 08:43

## 2022-09-08 RX ADMIN — VENLAFAXINE HYDROCHLORIDE 150 MG: 150 CAPSULE, EXTENDED RELEASE ORAL at 21:34

## 2022-09-08 RX ADMIN — ATORVASTATIN CALCIUM 20 MG: 20 TABLET, FILM COATED ORAL at 19:52

## 2022-09-08 RX ADMIN — BACLOFEN 10 MG: 10 TABLET ORAL at 08:42

## 2022-09-08 RX ADMIN — ASPIRIN 81 MG CHEWABLE TABLET 81 MG: 81 TABLET CHEWABLE at 08:42

## 2022-09-08 RX ADMIN — QUETIAPINE FUMARATE 400 MG: 200 TABLET ORAL at 21:27

## 2022-09-08 RX ADMIN — QUETIAPINE 200 MG: 200 TABLET, FILM COATED ORAL at 13:20

## 2022-09-08 RX ADMIN — DIVALPROEX SODIUM 500 MG: 500 TABLET, DELAYED RELEASE ORAL at 08:48

## 2022-09-08 ASSESSMENT — ACTIVITIES OF DAILY LIVING (ADL)
ADLS_ACUITY_SCORE: 55
ADLS_ACUITY_SCORE: 53
ADLS_ACUITY_SCORE: 55
ADLS_ACUITY_SCORE: 53
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
## 2022-09-08 NOTE — PROGRESS NOTES
Care Management Follow Up    Expected Discharge Date: 09/16/2022     Concerns to be Addressed: Discharge planning      Patient plan of care discussed at interdisciplinary rounds: Yes    Anticipated Discharge Disposition:  Group Home    Additional Information:  SW sent email to patient's CM Cata ([email or web address]), to inquire about any updates on patient's funding for group home. Awaiting response. ANALY will continue to follow.     DANITA Obrien, [facility]   Inpatient Care Coordination  [facility]   [phone]
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
## 2022-09-08 NOTE — PLAN OF CARE
PRIMARY DIAGNOSIS: PLACEMENT  OUTPATIENT/OBSERVATION GOALS TO BE MET BEFORE DISCHARGE:  1. ADLs back to baseline: Yes    2. Activity and level of assistance: Up with maximum assist, paraplegic at baseline.    3. Pain status: Pain free.    4. Return to near baseline physical activity: Yes     Discharge Planner Nurse   Safe discharge environment identified: No  Barriers to discharge: Yes       Entered by: Chela Glover RN 09/08/2022 2:08 AM     Patient is Aox1 to himself, VSS, paraplegic at baseline, tolerating regular diet and oral medications, no IV access at this time, external catheter is on and patent, incontinent of bowel and bladder, q2 repositioning to prevent pressure injuries, no pain noted at this time, able to make needs known, will continue to monitor and provide cares.      Please review provider order for any additional goals.   Nurse to notify provider when observation goals have been met and patient is ready for discharge.
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
## 2022-09-09 LAB
GLUCOSE BLDC GLUCOMTR-MCNC: 102 MG/DL (ref 70–99)
GLUCOSE BLDC GLUCOMTR-MCNC: 105 MG/DL (ref 70–99)
GLUCOSE BLDC GLUCOMTR-MCNC: 122 MG/DL (ref 70–99)
GLUCOSE BLDC GLUCOMTR-MCNC: 84 MG/DL (ref 70–99)
GLUCOSE BLDC GLUCOMTR-MCNC: 98 MG/DL (ref 70–99)

## 2022-09-09 PROCEDURE — 99225 PR SUBSEQUENT OBSERVATION CARE,LEVEL II: CPT | Performed by: PHYSICIAN ASSISTANT

## 2022-09-09 PROCEDURE — 250N000013 HC RX MED GY IP 250 OP 250 PS 637: Performed by: HOSPITALIST

## 2022-09-09 PROCEDURE — 250N000013 HC RX MED GY IP 250 OP 250 PS 637: Performed by: PHYSICIAN ASSISTANT

## 2022-09-09 PROCEDURE — G0378 HOSPITAL OBSERVATION PER HR: HCPCS

## 2022-09-09 PROCEDURE — 82962 GLUCOSE BLOOD TEST: CPT

## 2022-09-09 RX ADMIN — METOPROLOL SUCCINATE 25 MG: 25 TABLET, EXTENDED RELEASE ORAL at 08:51

## 2022-09-09 RX ADMIN — CLONIDINE HYDROCHLORIDE 0.1 MG: 0.1 TABLET ORAL at 20:46

## 2022-09-09 RX ADMIN — ATORVASTATIN CALCIUM 20 MG: 20 TABLET, FILM COATED ORAL at 20:46

## 2022-09-09 RX ADMIN — METFORMIN HYDROCHLORIDE 1000 MG: 500 TABLET ORAL at 08:49

## 2022-09-09 RX ADMIN — QUETIAPINE 200 MG: 200 TABLET, FILM COATED ORAL at 14:58

## 2022-09-09 RX ADMIN — DIVALPROEX SODIUM 1000 MG: 500 TABLET, DELAYED RELEASE ORAL at 22:28

## 2022-09-09 RX ADMIN — MIRTAZAPINE 15 MG: 15 TABLET, FILM COATED ORAL at 22:29

## 2022-09-09 RX ADMIN — ASPIRIN 81 MG CHEWABLE TABLET 81 MG: 81 TABLET CHEWABLE at 08:51

## 2022-09-09 RX ADMIN — BACLOFEN 10 MG: 10 TABLET ORAL at 08:50

## 2022-09-09 RX ADMIN — HYDROXYZINE HYDROCHLORIDE 50 MG: 50 TABLET, FILM COATED ORAL at 20:46

## 2022-09-09 RX ADMIN — BACLOFEN 10 MG: 10 TABLET ORAL at 14:59

## 2022-09-09 RX ADMIN — BACLOFEN 10 MG: 10 TABLET ORAL at 20:46

## 2022-09-09 RX ADMIN — HYDROXYZINE HYDROCHLORIDE 50 MG: 50 TABLET, FILM COATED ORAL at 08:50

## 2022-09-09 RX ADMIN — Medication 10 MG: at 22:30

## 2022-09-09 RX ADMIN — DIVALPROEX SODIUM 250 MG: 500 TABLET, DELAYED RELEASE ORAL at 08:55

## 2022-09-09 RX ADMIN — OLANZAPINE 2.5 MG: 2.5 TABLET, FILM COATED ORAL at 14:59

## 2022-09-09 RX ADMIN — LEVOTHYROXINE SODIUM 25 MCG: 0.03 TABLET ORAL at 08:50

## 2022-09-09 RX ADMIN — HYDROXYZINE HYDROCHLORIDE 50 MG: 50 TABLET, FILM COATED ORAL at 14:58

## 2022-09-09 RX ADMIN — QUETIAPINE FUMARATE 400 MG: 200 TABLET ORAL at 22:29

## 2022-09-09 RX ADMIN — VENLAFAXINE HYDROCHLORIDE 75 MG: 150 CAPSULE, EXTENDED RELEASE ORAL at 22:32

## 2022-09-09 RX ADMIN — CLONIDINE HYDROCHLORIDE 0.1 MG: 0.1 TABLET ORAL at 08:49

## 2022-09-09 RX ADMIN — SENNOSIDES AND DOCUSATE SODIUM 1 TABLET: 50; 8.6 TABLET ORAL at 08:51

## 2022-09-09 RX ADMIN — QUETIAPINE 200 MG: 200 TABLET, FILM COATED ORAL at 08:51

## 2022-09-09 RX ADMIN — METFORMIN HYDROCHLORIDE 1000 MG: 500 TABLET ORAL at 18:18

## 2022-09-09 RX ADMIN — VENLAFAXINE HYDROCHLORIDE 150 MG: 150 CAPSULE, EXTENDED RELEASE ORAL at 22:28

## 2022-09-09 RX ADMIN — Medication 25 MCG: at 08:54

## 2022-09-09 RX ADMIN — EMPAGLIFLOZIN 10 MG: 10 TABLET, FILM COATED ORAL at 08:50

## 2022-09-09 RX ADMIN — DIVALPROEX SODIUM 500 MG: 500 TABLET, DELAYED RELEASE ORAL at 08:53

## 2022-09-09 ASSESSMENT — ACTIVITIES OF DAILY LIVING (ADL)
ADLS_ACUITY_SCORE: 53
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
## 2022-09-09 NOTE — PLAN OF CARE
PRIMARY DIAGNOSIS: Aggressive Behavior  OUTPATIENT/OBSERVATION GOALS TO BE MET BEFORE DISCHARGE:  1. ADLs back to baseline: Yes    2. Activity and level of assistance: Up with maximum assistance. Consider SW and/or PT evaluation. Paraplegic    3. Pain status: Pain free.    4. Return to near baseline physical activity: Yes     Discharge Planner Nurse   Safe discharge environment identified: No  Barriers to discharge: Yes       Entered by: Susan Teran RN 09/09/2022 3:03 AM     Pt A/O to self and place. Paraplegic. Total care. Assist x2. Turn and reposition. No PIV. Incontinent. Awaiting group home placement. Will continue to monitor.

## 2022-09-09 NOTE — PLAN OF CARE
PRIMARY DIAGNOSIS: Aggressive Behavior  OUTPATIENT/OBSERVATION GOALS TO BE MET BEFORE DISCHARGE:  1. ADLs back to baseline: Yes    2. Activity and level of assistance: Up with maximum assistance. Consider SW and/or PT evaluation. Paraplegic    3. Pain status: Pain free.    4. Return to near baseline physical activity: Yes     Discharge Planner Nurse   Safe discharge environment identified: No  Barriers to discharge: Yes       Entered by: Susan Teran RN 09/09/2022 2:43 AM     Please review provider order for any additional goals.   Nurse to notify provider when observation goals have been met and patient is ready for discharge.
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
## 2022-09-09 NOTE — PROGRESS NOTES
Care Management Follow Up    Expected Discharge Date: 09/16/2022     Concerns to be Addressed:  Discharge planning     Patient plan of care discussed at interdisciplinary rounds: Yes    Anticipated Discharge Disposition:  [facility], once [facility] funding in place.    Referrals Placed by CM/ANALY:  None at this time  Private pay costs discussed: Not applicable    Additional Information:  ANALY received email response from ROSIO Justin, stating that she has not yet received the updated rate sheet from the new provider. She reports that the new  provider is aware that Chris is currently hospitalized and that this should be expedited as soon as possible. She will reach out to the new provider today to follow up and get the completed rate sheet so she can start the process of sending it to the [facility]. No anticipated discharge date at this time. ANALY will continue to follow.     DANITA Obrien, [facility]   Inpatient Care Coordination  [facility]   [phone]

## 2022-09-10 LAB
GLUCOSE BLDC GLUCOMTR-MCNC: 105 MG/DL (ref 70–99)
GLUCOSE BLDC GLUCOMTR-MCNC: 130 MG/DL (ref 70–99)
GLUCOSE BLDC GLUCOMTR-MCNC: 145 MG/DL (ref 70–99)
GLUCOSE BLDC GLUCOMTR-MCNC: 87 MG/DL (ref 70–99)
GLUCOSE BLDC GLUCOMTR-MCNC: 94 MG/DL (ref 70–99)

## 2022-09-10 PROCEDURE — 250N000013 HC RX MED GY IP 250 OP 250 PS 637: Performed by: HOSPITALIST

## 2022-09-10 PROCEDURE — G0378 HOSPITAL OBSERVATION PER HR: HCPCS

## 2022-09-10 PROCEDURE — 99225 PR SUBSEQUENT OBSERVATION CARE,LEVEL II: CPT | Performed by: NURSE PRACTITIONER

## 2022-09-10 PROCEDURE — 82962 GLUCOSE BLOOD TEST: CPT

## 2022-09-10 PROCEDURE — 250N000013 HC RX MED GY IP 250 OP 250 PS 637: Performed by: PHYSICIAN ASSISTANT

## 2022-09-10 RX ADMIN — Medication 25 MCG: at 09:16

## 2022-09-10 RX ADMIN — OLANZAPINE 2.5 MG: 2.5 TABLET, FILM COATED ORAL at 15:18

## 2022-09-10 RX ADMIN — ASPIRIN 81 MG CHEWABLE TABLET 81 MG: 81 TABLET CHEWABLE at 09:14

## 2022-09-10 RX ADMIN — BACLOFEN 10 MG: 10 TABLET ORAL at 15:18

## 2022-09-10 RX ADMIN — ATORVASTATIN CALCIUM 20 MG: 20 TABLET, FILM COATED ORAL at 20:56

## 2022-09-10 RX ADMIN — CLONIDINE HYDROCHLORIDE 0.1 MG: 0.1 TABLET ORAL at 09:13

## 2022-09-10 RX ADMIN — DIVALPROEX SODIUM 500 MG: 500 TABLET, DELAYED RELEASE ORAL at 09:17

## 2022-09-10 RX ADMIN — DIVALPROEX SODIUM 250 MG: 500 TABLET, DELAYED RELEASE ORAL at 09:14

## 2022-09-10 RX ADMIN — QUETIAPINE FUMARATE 400 MG: 200 TABLET ORAL at 21:04

## 2022-09-10 RX ADMIN — EMPAGLIFLOZIN 10 MG: 10 TABLET, FILM COATED ORAL at 09:16

## 2022-09-10 RX ADMIN — VENLAFAXINE HYDROCHLORIDE 150 MG: 150 CAPSULE, EXTENDED RELEASE ORAL at 21:05

## 2022-09-10 RX ADMIN — CLONIDINE HYDROCHLORIDE 0.1 MG: 0.1 TABLET ORAL at 20:57

## 2022-09-10 RX ADMIN — METFORMIN HYDROCHLORIDE 1000 MG: 500 TABLET ORAL at 17:08

## 2022-09-10 RX ADMIN — HYDROXYZINE HYDROCHLORIDE 50 MG: 50 TABLET, FILM COATED ORAL at 09:15

## 2022-09-10 RX ADMIN — METOPROLOL SUCCINATE 25 MG: 25 TABLET, EXTENDED RELEASE ORAL at 09:15

## 2022-09-10 RX ADMIN — QUETIAPINE 200 MG: 200 TABLET, FILM COATED ORAL at 15:18

## 2022-09-10 RX ADMIN — METFORMIN HYDROCHLORIDE 1000 MG: 500 TABLET ORAL at 09:14

## 2022-09-10 RX ADMIN — BACLOFEN 10 MG: 10 TABLET ORAL at 20:56

## 2022-09-10 RX ADMIN — Medication 10 MG: at 21:04

## 2022-09-10 RX ADMIN — SENNOSIDES AND DOCUSATE SODIUM 1 TABLET: 50; 8.6 TABLET ORAL at 09:14

## 2022-09-10 RX ADMIN — BACLOFEN 10 MG: 10 TABLET ORAL at 09:16

## 2022-09-10 RX ADMIN — LEVOTHYROXINE SODIUM 25 MCG: 0.03 TABLET ORAL at 09:15

## 2022-09-10 RX ADMIN — QUETIAPINE 200 MG: 200 TABLET, FILM COATED ORAL at 09:16

## 2022-09-10 RX ADMIN — MIRTAZAPINE 15 MG: 15 TABLET, FILM COATED ORAL at 21:04

## 2022-09-10 RX ADMIN — HYDROXYZINE HYDROCHLORIDE 50 MG: 50 TABLET, FILM COATED ORAL at 20:56

## 2022-09-10 RX ADMIN — DIVALPROEX SODIUM 1000 MG: 500 TABLET, DELAYED RELEASE ORAL at 21:04

## 2022-09-10 RX ADMIN — HYDROXYZINE HYDROCHLORIDE 50 MG: 50 TABLET, FILM COATED ORAL at 15:18

## 2022-09-10 RX ADMIN — VENLAFAXINE HYDROCHLORIDE 75 MG: 150 CAPSULE, EXTENDED RELEASE ORAL at 21:04

## 2022-09-10 ASSESSMENT — ACTIVITIES OF DAILY LIVING (ADL)
ADLS_ACUITY_SCORE: 53
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
## 2022-09-10 NOTE — PROGRESS NOTES
PRIMARY DIAGNOSIS: Placement  OUTPATIENT/OBSERVATION GOALS TO BE MET BEFORE DISCHARGE:  1. ADLs back to baseline: Yes    2. Activity and level of assistance: ax2 w/ lift; baseline    3. Pain status: Pain free.    4. Return to near baseline physical activity: Yes     Discharge Planner Nurse   Safe discharge environment identified: No  Barriers to discharge: Yes       Entered by: Daiana Green RN 09/09/2022   VSS on RA. Pt is AxOx3, disoriented to time. Ax2 w/ lift, repositioned. Tolerating regular diet. No IV access. Incont of bowel and bladder. Pt calm and cooperative with this RN. SW/CM following for placement. Will continue to provide supportive cares.   Please review provider order for any additional goals.   Nurse to notify provider when observation goals have been met and patient is ready for discharge.
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
## 2022-09-11 LAB
GLUCOSE BLDC GLUCOMTR-MCNC: 102 MG/DL (ref 70–99)
GLUCOSE BLDC GLUCOMTR-MCNC: 129 MG/DL (ref 70–99)
GLUCOSE BLDC GLUCOMTR-MCNC: 131 MG/DL (ref 70–99)
GLUCOSE BLDC GLUCOMTR-MCNC: 151 MG/DL (ref 70–99)
GLUCOSE BLDC GLUCOMTR-MCNC: 251 MG/DL (ref 70–99)

## 2022-09-11 PROCEDURE — 82962 GLUCOSE BLOOD TEST: CPT

## 2022-09-11 PROCEDURE — 99225 PR SUBSEQUENT OBSERVATION CARE,LEVEL II: CPT | Performed by: NURSE PRACTITIONER

## 2022-09-11 PROCEDURE — 250N000012 HC RX MED GY IP 250 OP 636 PS 637: Performed by: PHYSICIAN ASSISTANT

## 2022-09-11 PROCEDURE — 96372 THER/PROPH/DIAG INJ SC/IM: CPT | Performed by: PHYSICIAN ASSISTANT

## 2022-09-11 PROCEDURE — G0378 HOSPITAL OBSERVATION PER HR: HCPCS

## 2022-09-11 PROCEDURE — 250N000013 HC RX MED GY IP 250 OP 250 PS 637: Performed by: PHYSICIAN ASSISTANT

## 2022-09-11 PROCEDURE — 250N000013 HC RX MED GY IP 250 OP 250 PS 637: Performed by: HOSPITALIST

## 2022-09-11 RX ADMIN — VENLAFAXINE HYDROCHLORIDE 75 MG: 150 CAPSULE, EXTENDED RELEASE ORAL at 21:06

## 2022-09-11 RX ADMIN — Medication 10 MG: at 21:07

## 2022-09-11 RX ADMIN — LEVOTHYROXINE SODIUM 25 MCG: 0.03 TABLET ORAL at 09:01

## 2022-09-11 RX ADMIN — DIVALPROEX SODIUM 250 MG: 500 TABLET, DELAYED RELEASE ORAL at 09:05

## 2022-09-11 RX ADMIN — BACLOFEN 10 MG: 10 TABLET ORAL at 09:04

## 2022-09-11 RX ADMIN — QUETIAPINE FUMARATE 400 MG: 200 TABLET ORAL at 21:06

## 2022-09-11 RX ADMIN — BACLOFEN 10 MG: 10 TABLET ORAL at 13:14

## 2022-09-11 RX ADMIN — QUETIAPINE 200 MG: 200 TABLET, FILM COATED ORAL at 13:14

## 2022-09-11 RX ADMIN — HYDROXYZINE HYDROCHLORIDE 50 MG: 50 TABLET, FILM COATED ORAL at 13:15

## 2022-09-11 RX ADMIN — Medication 25 MCG: at 09:01

## 2022-09-11 RX ADMIN — ATORVASTATIN CALCIUM 20 MG: 20 TABLET, FILM COATED ORAL at 21:05

## 2022-09-11 RX ADMIN — INSULIN ASPART 3 UNITS: 100 INJECTION, SOLUTION INTRAVENOUS; SUBCUTANEOUS at 09:06

## 2022-09-11 RX ADMIN — VENLAFAXINE HYDROCHLORIDE 150 MG: 150 CAPSULE, EXTENDED RELEASE ORAL at 21:07

## 2022-09-11 RX ADMIN — EMPAGLIFLOZIN 10 MG: 10 TABLET, FILM COATED ORAL at 09:02

## 2022-09-11 RX ADMIN — SENNOSIDES AND DOCUSATE SODIUM 1 TABLET: 50; 8.6 TABLET ORAL at 09:02

## 2022-09-11 RX ADMIN — METFORMIN HYDROCHLORIDE 1000 MG: 500 TABLET ORAL at 18:23

## 2022-09-11 RX ADMIN — METFORMIN HYDROCHLORIDE 1000 MG: 500 TABLET ORAL at 09:01

## 2022-09-11 RX ADMIN — DIVALPROEX SODIUM 1000 MG: 500 TABLET, DELAYED RELEASE ORAL at 21:07

## 2022-09-11 RX ADMIN — CLONIDINE HYDROCHLORIDE 0.1 MG: 0.1 TABLET ORAL at 09:03

## 2022-09-11 RX ADMIN — DIVALPROEX SODIUM 500 MG: 500 TABLET, DELAYED RELEASE ORAL at 09:04

## 2022-09-11 RX ADMIN — QUETIAPINE 200 MG: 200 TABLET, FILM COATED ORAL at 09:01

## 2022-09-11 RX ADMIN — OLANZAPINE 2.5 MG: 2.5 TABLET, FILM COATED ORAL at 13:14

## 2022-09-11 RX ADMIN — CLONIDINE HYDROCHLORIDE 0.1 MG: 0.1 TABLET ORAL at 21:05

## 2022-09-11 RX ADMIN — HYDROXYZINE HYDROCHLORIDE 50 MG: 50 TABLET, FILM COATED ORAL at 09:04

## 2022-09-11 RX ADMIN — METOPROLOL SUCCINATE 25 MG: 25 TABLET, EXTENDED RELEASE ORAL at 09:02

## 2022-09-11 RX ADMIN — HYDROXYZINE HYDROCHLORIDE 50 MG: 50 TABLET, FILM COATED ORAL at 21:06

## 2022-09-11 RX ADMIN — ASPIRIN 81 MG CHEWABLE TABLET 81 MG: 81 TABLET CHEWABLE at 09:00

## 2022-09-11 RX ADMIN — MIRTAZAPINE 15 MG: 15 TABLET, FILM COATED ORAL at 21:05

## 2022-09-11 RX ADMIN — BACLOFEN 10 MG: 10 TABLET ORAL at 21:06

## 2022-09-11 ASSESSMENT — ACTIVITIES OF DAILY LIVING (ADL)
ADLS_ACUITY_SCORE: 53

## 2022-09-11 NOTE — PLAN OF CARE
PRIMARY DIAGNOSIS: PLACEMENT   OUTPATIENT/OBSERVATION GOALS TO BE MET BEFORE DISCHARGE:  1. ADLs back to baseline: Yes    2. Activity and level of assistance: Up with maximum assistance. Consider SW and/or PT evaluation.     3. Pain status: Pain free.    4. Return to near baseline physical activity: Yes    Vitals are Temp: 97.5  F (36.4  C) Temp src: Oral BP: 125/82 Pulse: 77   Resp: 16 SpO2: 95 %.    Patient is Alert and Oriented x4. Denies any pain. Patient is Saline locked. Pt is a Regular diet. He is 2 assist with Lift. Will continue to monitor.     Discharge Planner Nurse   Safe discharge environment identified: Yes  Barriers to discharge: Yes       Entered by: Amber Glover RN 09/11/2022 2:14 AM     Please review provider order for any additional goals.   Nurse to notify provider when observation goals have been met and patient is ready for discharge.

## 2022-09-11 NOTE — CONSULTS
Care Management Follow Up    Expected Discharge Date: 09/19/2022     Concerns to be Addressed:  Discharge planning     Patient plan of care discussed at interdisciplinary rounds: Yes    Anticipated Discharge Disposition:  [facility]    Referrals Placed by CM/SW:  None at this time  Private pay costs discussed: Not applicable    Additional Information:  SW re-consulted for discharge planning. See previous SW notes 9/6, 9/8, 9/9. Awaiting [facility] funding for new GH placement, which is anticipated to be approximately 2 weeks. SW will continue to follow.     DANITA Obrien, [facility]   Inpatient Care Coordination  [facility]   [phone]

## 2022-09-11 NOTE — PLAN OF CARE
PRIMARY DIAGNOSIS: PLACEMENT   OUTPATIENT/OBSERVATION GOALS TO BE MET BEFORE DISCHARGE:  ADLs back to baseline: Yes    Activity and level of assistance: Up with maximum assistance. Consider SW and/or PT evaluation.     Pain status: Pain free.    Return to near baseline physical activity: Yes      Discharge Planner Nurse   Safe discharge environment identified: Yes  Barriers to discharge: Yes       Entered by: Amber Glover RN 09/11/2022 2:12 AM     Please review provider order for any additional goals.   Nurse to notify provider when observation goals have been met and patient is ready for discharge.

## 2022-09-11 NOTE — PLAN OF CARE
PRIMARY DIAGNOSIS: PLACEMENT   OUTPATIENT/OBSERVATION GOALS TO BE MET BEFORE DISCHARGE:  1. ADLs back to baseline: Yes, baseline lift assist     2. Activity and level of assistance: Baseline-chair fast      3. Pain status: Pain free.     4. Return to near baseline physical activity: Yes      /86 (BP Location: Left arm)   Pulse 86   Temp 97.4  F (36.3  C) (Oral)   Resp 20   Wt 99.9 kg (220 lb 4.8 oz)   SpO2 97%     Vitals stable. Pt confused, alert to self only. Bed alarm in place for increased safety. Paraplegic, pt reports he can feel sensation in all extremities. Very pleasant and calm. No agitated or aggressive behavior. Incontinent of bowel and bladder. Pt is Ax1 for turns and cares. Independently moving and shifting weight in bed. SW following for safe discharge plan. Pt very eager to leave. Will continue to provide supportive cares.
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
## 2022-09-11 NOTE — PLAN OF CARE
PRIMARY DIAGNOSIS: PLACEMENT   OUTPATIENT/OBSERVATION GOALS TO BE MET BEFORE DISCHARGE:  1. ADLs back to baseline: Yes    2. Activity and level of assistance: Up with maximum assistance. Consider SW and/or PT evaluation.     3. Pain status: Pain free.    4. Return to near baseline physical activity: Yes    Vitals are Temp: 97.5  F (36.4  C) Temp src: Oral BP: 125/82 Pulse: 77   Resp: 16 SpO2: 95 %.    Patient is Alert and Oriented x4. Denies any pain. Patient is Saline locked. Pt is a Regular diet. He is 2 assist with Lift. Will continue to monitor. Awaiting Placement. SW is following for discharge planing.     Discharge Planner Nurse   Safe discharge environment identified: Yes  Barriers to discharge: Yes       Entered by: Amber Glover RN 09/11/2022 5:31 AM     Please review provider order for any additional goals.   Nurse to notify provider when observation goals have been met and patient is ready for discharge.

## 2022-09-12 LAB
GLUCOSE BLDC GLUCOMTR-MCNC: 116 MG/DL (ref 70–99)
GLUCOSE BLDC GLUCOMTR-MCNC: 85 MG/DL (ref 70–99)
GLUCOSE BLDC GLUCOMTR-MCNC: 94 MG/DL (ref 70–99)
GLUCOSE BLDC GLUCOMTR-MCNC: 98 MG/DL (ref 70–99)

## 2022-09-12 PROCEDURE — 99225 PR SUBSEQUENT OBSERVATION CARE,LEVEL II: CPT | Performed by: PHYSICIAN ASSISTANT

## 2022-09-12 PROCEDURE — G0378 HOSPITAL OBSERVATION PER HR: HCPCS

## 2022-09-12 PROCEDURE — 82962 GLUCOSE BLOOD TEST: CPT

## 2022-09-12 PROCEDURE — 250N000013 HC RX MED GY IP 250 OP 250 PS 637: Performed by: PHYSICIAN ASSISTANT

## 2022-09-12 PROCEDURE — 250N000013 HC RX MED GY IP 250 OP 250 PS 637: Performed by: HOSPITALIST

## 2022-09-12 RX ADMIN — ASPIRIN 81 MG CHEWABLE TABLET 81 MG: 81 TABLET CHEWABLE at 08:16

## 2022-09-12 RX ADMIN — QUETIAPINE 200 MG: 200 TABLET, FILM COATED ORAL at 13:06

## 2022-09-12 RX ADMIN — QUETIAPINE FUMARATE 400 MG: 200 TABLET ORAL at 21:05

## 2022-09-12 RX ADMIN — DIVALPROEX SODIUM 250 MG: 500 TABLET, DELAYED RELEASE ORAL at 08:17

## 2022-09-12 RX ADMIN — DIVALPROEX SODIUM 500 MG: 500 TABLET, DELAYED RELEASE ORAL at 08:16

## 2022-09-12 RX ADMIN — METOPROLOL SUCCINATE 25 MG: 25 TABLET, EXTENDED RELEASE ORAL at 08:16

## 2022-09-12 RX ADMIN — BACLOFEN 10 MG: 10 TABLET ORAL at 13:06

## 2022-09-12 RX ADMIN — HYDROXYZINE HYDROCHLORIDE 50 MG: 50 TABLET, FILM COATED ORAL at 08:16

## 2022-09-12 RX ADMIN — Medication 10 MG: at 21:05

## 2022-09-12 RX ADMIN — BACLOFEN 10 MG: 10 TABLET ORAL at 18:43

## 2022-09-12 RX ADMIN — MIRTAZAPINE 15 MG: 15 TABLET, FILM COATED ORAL at 21:06

## 2022-09-12 RX ADMIN — HYDROXYZINE HYDROCHLORIDE 50 MG: 50 TABLET, FILM COATED ORAL at 13:06

## 2022-09-12 RX ADMIN — METFORMIN HYDROCHLORIDE 1000 MG: 500 TABLET ORAL at 16:30

## 2022-09-12 RX ADMIN — BACLOFEN 10 MG: 10 TABLET ORAL at 08:17

## 2022-09-12 RX ADMIN — ATORVASTATIN CALCIUM 20 MG: 20 TABLET, FILM COATED ORAL at 18:43

## 2022-09-12 RX ADMIN — CLONIDINE HYDROCHLORIDE 0.1 MG: 0.1 TABLET ORAL at 18:43

## 2022-09-12 RX ADMIN — QUETIAPINE 200 MG: 200 TABLET, FILM COATED ORAL at 08:16

## 2022-09-12 RX ADMIN — HYDROXYZINE HYDROCHLORIDE 50 MG: 50 TABLET, FILM COATED ORAL at 18:43

## 2022-09-12 RX ADMIN — VENLAFAXINE HYDROCHLORIDE 150 MG: 150 CAPSULE, EXTENDED RELEASE ORAL at 21:06

## 2022-09-12 RX ADMIN — METFORMIN HYDROCHLORIDE 1000 MG: 500 TABLET ORAL at 08:16

## 2022-09-12 RX ADMIN — SENNOSIDES AND DOCUSATE SODIUM 1 TABLET: 50; 8.6 TABLET ORAL at 08:17

## 2022-09-12 RX ADMIN — Medication 25 MCG: at 08:16

## 2022-09-12 RX ADMIN — CLONIDINE HYDROCHLORIDE 0.1 MG: 0.1 TABLET ORAL at 08:16

## 2022-09-12 RX ADMIN — LEVOTHYROXINE SODIUM 25 MCG: 0.03 TABLET ORAL at 08:17

## 2022-09-12 RX ADMIN — VENLAFAXINE HYDROCHLORIDE 75 MG: 150 CAPSULE, EXTENDED RELEASE ORAL at 21:05

## 2022-09-12 RX ADMIN — OLANZAPINE 2.5 MG: 2.5 TABLET, FILM COATED ORAL at 13:06

## 2022-09-12 RX ADMIN — DIVALPROEX SODIUM 1000 MG: 500 TABLET, DELAYED RELEASE ORAL at 21:05

## 2022-09-12 RX ADMIN — EMPAGLIFLOZIN 10 MG: 10 TABLET, FILM COATED ORAL at 08:16

## 2022-09-12 ASSESSMENT — ACTIVITIES OF DAILY LIVING (ADL)
ADLS_ACUITY_SCORE: 49
ADLS_ACUITY_SCORE: 53
ADLS_ACUITY_SCORE: 49

## 2022-09-12 NOTE — PLAN OF CARE
PRIMARY DIAGNOSIS: Placement   OUTPATIENT/OBSERVATION GOALS TO BE MET BEFORE DISCHARGE:  1. ADLs back to baseline: Yes    2. Activity and level of assistance: Up with maximum assistance. Consider SW and/or PT evaluation.     3. Pain status: Pain free.    4. Return to near baseline physical activity: Yes     Discharge Planner Nurse   Safe discharge environment identified: Yes  Barriers to discharge: Yes       Entered by: Daiana Huston RN 09/12/2022      Please review provider order for any additional goals.   Nurse to notify provider when observation goals have been met and patient is ready for discharge.    A/O x4, forgetful. VSS on RA. Total assist, bedrest. Tolerating regular diet. Lung sounds clear. Bowel sounds active. Incontinent of both bowels and bladder. LBM 9/11. Skin with blanchable redness to groin and heels. Pt on pulsate mattress with heels elevated. Pt often refuses to turn/reposition. Denies pain. Denies nausea. No IV access. Awaiting new GH placement for discharge, SW following.

## 2022-09-12 NOTE — PROGRESS NOTES
Care Management Follow Up    Expected Discharge Date: 09/19/2022     Concerns to be Addressed: Discharge planning    Patient plan of care discussed at interdisciplinary rounds: Yes    Anticipated Discharge Disposition:  [facility]    Patient/Family in Agreement with the Plan:  Yes    Referrals Placed by CM/SW:  None at this time  Private pay costs discussed: Not applicable    Additional Information:  ANALY sent follow up email to patient's county ROS[place] ([email or web address]) to follow up on if they were able to obtain rate sheet from new  provider and submit it to the [facility] for approval for funding. Awaiting response. ANALY will continue to follow.     DANITA Obrien, [facility]   Inpatient Care Coordination  [facility]   [phone]

## 2022-09-12 NOTE — PLAN OF CARE
PRIMARY DIAGNOSIS: Placement   OUTPATIENT/OBSERVATION GOALS TO BE MET BEFORE DISCHARGE:  1. ADLs back to baseline: Yes    2. Activity and level of assistance: Up with maximum assistance. Consider SW and/or PT evaluation.     3. Pain status: Pain free.    4. Return to near baseline physical activity: Yes    Vitals are Temp: 97.5  F (36.4  C) Temp src: Oral BP: 129/84 Pulse: 81   Resp: 18 SpO2: 95 %.    Patient is Alert to, Self, and Situation. Denies any pain with interview. Patient has no IV access. Pt is a Diabetic diet. He is 2 assist with Lift. Incontinent of bowel and bladder. Will Continue to monitor.      Discharge Planner Nurse   Safe discharge environment identified: Yes  Barriers to discharge: Yes       Entered by: Amber Glover RN 09/12/2022 2:57 AM     Please review provider order for any additional goals.   Nurse to notify provider when observation goals have been met and patient is ready for discharge.
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
## 2022-09-12 NOTE — PLAN OF CARE
PRIMARY DIAGNOSIS: Placement   OUTPATIENT/OBSERVATION GOALS TO BE MET BEFORE DISCHARGE:  1. ADLs back to baseline: Yes    2. Activity and level of assistance: Up with maximum assistance. Consider SW and/or PT evaluation.     3. Pain status: Pain free.    4. Return to near baseline physical activity: Yes     Discharge Planner Nurse   Safe discharge environment identified: Yes  Barriers to discharge: Yes       Entered by: Daiana Huston RN 09/12/2022      Please review provider order for any additional goals.   Nurse to notify provider when observation goals have been met and patient is ready for discharge.

## 2022-09-12 NOTE — PLAN OF CARE
PRIMARY DIAGNOSIS: Placement   OUTPATIENT/OBSERVATION GOALS TO BE MET BEFORE DISCHARGE:  ADLs back to baseline: Yes    Activity and level of assistance: Up with maximum assistance. Consider SW and/or PT evaluation.     Pain status: Pain free.    Return to near baseline physical activity: Yes    Vitals are Temp: 97.4  F (36.3  C) Temp src: Oral BP: 129/86 Pulse: 81   Resp: 18 SpO2: 95 %.    Patient is Alert to, Self, and Situation. Denies any pain with interview. Patient has no IV access. Pt is a Diabetic diet. He is 2 assist with Lift. Incontinent of bowel and bladder. Will Continue to monitor.      Discharge Planner Nurse   Safe discharge environment identified: Yes  Barriers to discharge: Yes       Entered by: Amber Glover RN 09/11/2022 9:19 PM     Please review provider order for any additional goals.   Nurse to notify provider when observation goals have been met and patient is ready for discharge.

## 2022-09-12 NOTE — PLAN OF CARE
PRIMARY DIAGNOSIS: Placement   OUTPATIENT/OBSERVATION GOALS TO BE MET BEFORE DISCHARGE:  1. ADLs back to baseline: Yes    2. Activity and level of assistance: Up with maximum assistance. Consider SW and/or PT evaluation.     3. Pain status: Pain free.    4. Return to near baseline physical activity: Yes    Vitals are Temp: 97  F (36.1  C) Temp src: Oral BP: 124/76 Pulse: 82   Resp: 18 SpO2: 96 %.    Patient is Alert to, Self, and Situation. Denies any pain with interview. Patient has no IV access. Pt is a Diabetic diet. He is 2 assist with Lift. Incontinent of bowel and bladder. Plan is to continue to monitor and provide supportive cares. Awaiting Placement and SW is following.      Discharge Planner Nurse   Safe discharge environment identified: Yes  Barriers to discharge: Yes       Entered by: Amber Glover RN 09/12/2022 5:41 AM     Please review provider order for any additional goals.   Nurse to notify provider when observation goals have been met and patient is ready for discharge.

## 2022-09-13 LAB
GLUCOSE BLDC GLUCOMTR-MCNC: 123 MG/DL (ref 70–99)
GLUCOSE BLDC GLUCOMTR-MCNC: 80 MG/DL (ref 70–99)
GLUCOSE BLDC GLUCOMTR-MCNC: 84 MG/DL (ref 70–99)
GLUCOSE BLDC GLUCOMTR-MCNC: 91 MG/DL (ref 70–99)
SARS-COV-2 RNA RESP QL NAA+PROBE: NEGATIVE

## 2022-09-13 PROCEDURE — 250N000013 HC RX MED GY IP 250 OP 250 PS 637: Performed by: PHYSICIAN ASSISTANT

## 2022-09-13 PROCEDURE — 82962 GLUCOSE BLOOD TEST: CPT | Mod: 91

## 2022-09-13 PROCEDURE — U0003 INFECTIOUS AGENT DETECTION BY NUCLEIC ACID (DNA OR RNA); SEVERE ACUTE RESPIRATORY SYNDROME CORONAVIRUS 2 (SARS-COV-2) (CORONAVIRUS DISEASE [COVID-19]), AMPLIFIED PROBE TECHNIQUE, MAKING USE OF HIGH THROUGHPUT TECHNOLOGIES AS DESCRIBED BY CMS-2020-01-R: HCPCS | Performed by: PHYSICIAN ASSISTANT

## 2022-09-13 PROCEDURE — G0378 HOSPITAL OBSERVATION PER HR: HCPCS

## 2022-09-13 PROCEDURE — 99225 PR SUBSEQUENT OBSERVATION CARE,LEVEL II: CPT | Performed by: PHYSICIAN ASSISTANT

## 2022-09-13 PROCEDURE — 250N000013 HC RX MED GY IP 250 OP 250 PS 637: Performed by: HOSPITALIST

## 2022-09-13 RX ADMIN — HYDROXYZINE HYDROCHLORIDE 50 MG: 50 TABLET, FILM COATED ORAL at 20:57

## 2022-09-13 RX ADMIN — VENLAFAXINE HYDROCHLORIDE 75 MG: 150 CAPSULE, EXTENDED RELEASE ORAL at 21:01

## 2022-09-13 RX ADMIN — EMPAGLIFLOZIN 10 MG: 10 TABLET, FILM COATED ORAL at 09:21

## 2022-09-13 RX ADMIN — QUETIAPINE 200 MG: 200 TABLET, FILM COATED ORAL at 15:07

## 2022-09-13 RX ADMIN — SENNOSIDES AND DOCUSATE SODIUM 1 TABLET: 50; 8.6 TABLET ORAL at 09:21

## 2022-09-13 RX ADMIN — METOPROLOL SUCCINATE 25 MG: 25 TABLET, EXTENDED RELEASE ORAL at 09:21

## 2022-09-13 RX ADMIN — BACLOFEN 10 MG: 10 TABLET ORAL at 20:57

## 2022-09-13 RX ADMIN — QUETIAPINE FUMARATE 400 MG: 200 TABLET ORAL at 21:01

## 2022-09-13 RX ADMIN — HYDROXYZINE HYDROCHLORIDE 50 MG: 50 TABLET, FILM COATED ORAL at 15:07

## 2022-09-13 RX ADMIN — BACLOFEN 10 MG: 10 TABLET ORAL at 15:07

## 2022-09-13 RX ADMIN — MIRTAZAPINE 15 MG: 15 TABLET, FILM COATED ORAL at 21:01

## 2022-09-13 RX ADMIN — BACLOFEN 10 MG: 10 TABLET ORAL at 09:21

## 2022-09-13 RX ADMIN — HYDROXYZINE HYDROCHLORIDE 50 MG: 50 TABLET, FILM COATED ORAL at 09:21

## 2022-09-13 RX ADMIN — Medication 10 MG: at 21:01

## 2022-09-13 RX ADMIN — METFORMIN HYDROCHLORIDE 1000 MG: 500 TABLET ORAL at 09:20

## 2022-09-13 RX ADMIN — LEVOTHYROXINE SODIUM 25 MCG: 0.03 TABLET ORAL at 09:21

## 2022-09-13 RX ADMIN — DIVALPROEX SODIUM 500 MG: 500 TABLET, DELAYED RELEASE ORAL at 09:20

## 2022-09-13 RX ADMIN — DIVALPROEX SODIUM 1000 MG: 500 TABLET, DELAYED RELEASE ORAL at 21:00

## 2022-09-13 RX ADMIN — Medication 25 MCG: at 09:20

## 2022-09-13 RX ADMIN — ASPIRIN 81 MG CHEWABLE TABLET 81 MG: 81 TABLET CHEWABLE at 09:20

## 2022-09-13 RX ADMIN — METFORMIN HYDROCHLORIDE 1000 MG: 500 TABLET ORAL at 18:32

## 2022-09-13 RX ADMIN — OLANZAPINE 2.5 MG: 2.5 TABLET, FILM COATED ORAL at 15:07

## 2022-09-13 RX ADMIN — CLONIDINE HYDROCHLORIDE 0.1 MG: 0.1 TABLET ORAL at 20:57

## 2022-09-13 RX ADMIN — DIVALPROEX SODIUM 250 MG: 500 TABLET, DELAYED RELEASE ORAL at 09:25

## 2022-09-13 RX ADMIN — QUETIAPINE 200 MG: 200 TABLET, FILM COATED ORAL at 09:21

## 2022-09-13 RX ADMIN — CLONIDINE HYDROCHLORIDE 0.1 MG: 0.1 TABLET ORAL at 09:21

## 2022-09-13 RX ADMIN — VENLAFAXINE HYDROCHLORIDE 150 MG: 150 CAPSULE, EXTENDED RELEASE ORAL at 21:01

## 2022-09-13 RX ADMIN — ATORVASTATIN CALCIUM 20 MG: 20 TABLET, FILM COATED ORAL at 20:57

## 2022-09-13 ASSESSMENT — ACTIVITIES OF DAILY LIVING (ADL)
ADLS_ACUITY_SCORE: 49
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
## 2022-09-13 NOTE — PLAN OF CARE
PRIMARY DIAGNOSIS: Placement  OUTPATIENT/OBSERVATION GOALS TO BE MET BEFORE DISCHARGE:  1. ADLs back to baseline: Yes    2. Activity and level of assistance: Lift, assist of 2    3. Pain status: Pain free.    4. Return to near baseline physical activity: Yes     Discharge Planner Nurse   Safe discharge environment identified: No  Barriers to discharge: Yes       Entered by: Lola Phillips RN 09/13/2022 6:42 PM     Please review provider order for any additional goals.   Nurse to notify provider when observation goals have been met and patient is ready for discharge.    Pt oriented to self. VSS. Denies pain. Denies nausea. Assist of 2 with lift. Incontinent x3 of urine. BS 84 and 80. COVID test negative today. Waiting for placement.

## 2022-09-13 NOTE — PLAN OF CARE
PRIMARY DIAGNOSIS: Placement  OUTPATIENT/OBSERVATION GOALS TO BE MET BEFORE DISCHARGE:  ADLs back to baseline: Yes    Activity and level of assistance: Lift, assist of 2    Pain status: Pain free.    Return to near baseline physical activity: Yes     Discharge Planner Nurse   Safe discharge environment identified: No  Barriers to discharge: Yes       Entered by: Lola Phillips RN 09/13/2022 3:40 PM     Please review provider order for any additional goals.   Nurse to notify provider when observation goals have been met and patient is ready for discharge.

## 2022-09-13 NOTE — PLAN OF CARE
PRIMARY DIAGNOSIS: Placement  OUTPATIENT/OBSERVATION GOALS TO BE MET BEFORE DISCHARGE:  ADLs back to baseline: Yes    Activity and level of assistance: Lift, assist of 2    Pain status: Pain free.    Return to near baseline physical activity: Yes     Discharge Planner Nurse   Safe discharge environment identified: No  Barriers to discharge: Yes       Entered by: Lola Phillips RN 09/13/2022 3:41 PM     Please review provider order for any additional goals.   Nurse to notify provider when observation goals have been met and patient is ready for discharge.

## 2022-09-13 NOTE — PROGRESS NOTES
Care Management Follow Up    Expected Discharge Date: 09/23/2022     Concerns to be Addressed:  Discharge planning     Patient plan of care discussed at interdisciplinary rounds: Yes    Anticipated Discharge Disposition:  [facility]    Referrals Placed by CM/SW:  None at this time  Private pay costs discussed: Not applicable    Additional Information:  SW received email from pt's [facility] CM [place]ata 9/12 stating that she had not yet received the 6790 rate sheet from 's [facility].     SW placed call to  Princess Pearce at [facility], [phone]. She reports that she had submitted the initial 6790 rate sheet to the [facility] but they had pushed back on the rate requested. She is revising the 6790 rate sheet and will return it to the  today or tomorrow to be resubmitted to the [facility] for review. She did note that she is requesting 1:1 staff and if the [facility] doesn't approve 1:1 staffing, they may not be able to meet pt's needs d/t Hx of behaviors.     ANALY will continue to follow.      DANITA Obrien, [facility]   Inpatient Care Coordination  [facility]   [phone]

## 2022-09-13 NOTE — PLAN OF CARE
PRIMARY DIAGNOSIS: Placement   OUTPATIENT/OBSERVATION GOALS TO BE MET BEFORE DISCHARGE:  1. ADLs back to baseline: Yes    2. Activity and level of assistance: Up with maximum assistance. Consider SW and/or PT evaluation.     3. Pain status: Pain free.    4. Return to near baseline physical activity: Yes     Discharge Planner Nurse   Safe discharge environment identified: Yes  Barriers to discharge: Yes       Entered by: Daiana Huston RN 09/12/2022      Please review provider order for any additional goals.   Nurse to notify provider when observation goals have been met and patient is ready for discharge.    A/O x4, forgetful. VSS on RA. Total assist, bedrest. Tolerating regular diet. Lung sounds clear. Bowel sounds active. Incontinent of both bowels and bladder. LBM on 9/11. Skin with blanchable redness to groin and heels. Pt on pulsate mattress with heels elevated. Pt often refuses to turn/reposition, education provided on importance of weight shifting. Denies pain. Denies nausea. No IV access. Awaiting new GH placement for discharge, SW following.

## 2022-09-13 NOTE — PLAN OF CARE
PRIMARY DIAGNOSIS: Placement   OUTPATIENT/OBSERVATION GOALS TO BE MET BEFORE DISCHARGE:  1. ADLs back to baseline: Yes    2. Activity and level of assistance: Up with maximum assistance.     3. Pain status: Pain free.    4. Return to near baseline physical activity: Yes     Discharge Planner Nurse   Safe discharge environment identified: Yes  Barriers to discharge: Yes       Please review provider order for any additional goals.   Nurse to notify provider when observation goals have been met and patient is ready for discharge.    Pt disoriented to time. VSS on RA. Total assist, bedrest. Tolerating regular diet. Lung sounds clear. Skin with blanchable redness to groin and heels. Pt on pulsate mattress with heels elevated. Pt often refuses to turn/reposition, education provided on importance of weight shifting. Denies pain. No IV access. Awaiting new GH placement for discharge, SW following.

## 2022-09-14 LAB
GLUCOSE BLDC GLUCOMTR-MCNC: 116 MG/DL (ref 70–99)
GLUCOSE BLDC GLUCOMTR-MCNC: 82 MG/DL (ref 70–99)
GLUCOSE BLDC GLUCOMTR-MCNC: 91 MG/DL (ref 70–99)
GLUCOSE BLDC GLUCOMTR-MCNC: 94 MG/DL (ref 70–99)

## 2022-09-14 PROCEDURE — 82962 GLUCOSE BLOOD TEST: CPT

## 2022-09-14 PROCEDURE — 250N000013 HC RX MED GY IP 250 OP 250 PS 637: Performed by: PHYSICIAN ASSISTANT

## 2022-09-14 PROCEDURE — G0378 HOSPITAL OBSERVATION PER HR: HCPCS

## 2022-09-14 PROCEDURE — 99225 PR SUBSEQUENT OBSERVATION CARE,LEVEL II: CPT | Performed by: PHYSICIAN ASSISTANT

## 2022-09-14 PROCEDURE — 250N000013 HC RX MED GY IP 250 OP 250 PS 637: Performed by: HOSPITALIST

## 2022-09-14 RX ADMIN — QUETIAPINE 200 MG: 200 TABLET, FILM COATED ORAL at 08:42

## 2022-09-14 RX ADMIN — MIRTAZAPINE 15 MG: 15 TABLET, FILM COATED ORAL at 21:42

## 2022-09-14 RX ADMIN — DIVALPROEX SODIUM 250 MG: 500 TABLET, DELAYED RELEASE ORAL at 08:43

## 2022-09-14 RX ADMIN — BACLOFEN 10 MG: 10 TABLET ORAL at 21:42

## 2022-09-14 RX ADMIN — HYDROXYZINE HYDROCHLORIDE 50 MG: 50 TABLET, FILM COATED ORAL at 14:01

## 2022-09-14 RX ADMIN — Medication 10 MG: at 21:42

## 2022-09-14 RX ADMIN — SENNOSIDES AND DOCUSATE SODIUM 1 TABLET: 50; 8.6 TABLET ORAL at 08:40

## 2022-09-14 RX ADMIN — BACLOFEN 10 MG: 10 TABLET ORAL at 14:00

## 2022-09-14 RX ADMIN — LEVOTHYROXINE SODIUM 25 MCG: 0.03 TABLET ORAL at 08:41

## 2022-09-14 RX ADMIN — Medication 25 MCG: at 08:43

## 2022-09-14 RX ADMIN — VENLAFAXINE HYDROCHLORIDE 75 MG: 150 CAPSULE, EXTENDED RELEASE ORAL at 21:41

## 2022-09-14 RX ADMIN — METFORMIN HYDROCHLORIDE 1000 MG: 500 TABLET ORAL at 08:40

## 2022-09-14 RX ADMIN — OLANZAPINE 2.5 MG: 2.5 TABLET, FILM COATED ORAL at 14:00

## 2022-09-14 RX ADMIN — VENLAFAXINE HYDROCHLORIDE 150 MG: 150 CAPSULE, EXTENDED RELEASE ORAL at 21:40

## 2022-09-14 RX ADMIN — ATORVASTATIN CALCIUM 20 MG: 20 TABLET, FILM COATED ORAL at 21:42

## 2022-09-14 RX ADMIN — METFORMIN HYDROCHLORIDE 1000 MG: 500 TABLET ORAL at 18:03

## 2022-09-14 RX ADMIN — QUETIAPINE FUMARATE 400 MG: 200 TABLET ORAL at 21:42

## 2022-09-14 RX ADMIN — HYDROXYZINE HYDROCHLORIDE 50 MG: 50 TABLET, FILM COATED ORAL at 21:42

## 2022-09-14 RX ADMIN — DIVALPROEX SODIUM 500 MG: 500 TABLET, DELAYED RELEASE ORAL at 08:40

## 2022-09-14 RX ADMIN — CLONIDINE HYDROCHLORIDE 0.1 MG: 0.1 TABLET ORAL at 21:42

## 2022-09-14 RX ADMIN — METOPROLOL SUCCINATE 25 MG: 25 TABLET, EXTENDED RELEASE ORAL at 08:40

## 2022-09-14 RX ADMIN — ASPIRIN 81 MG CHEWABLE TABLET 81 MG: 81 TABLET CHEWABLE at 08:41

## 2022-09-14 RX ADMIN — DIVALPROEX SODIUM 1000 MG: 500 TABLET, DELAYED RELEASE ORAL at 21:42

## 2022-09-14 RX ADMIN — BACLOFEN 10 MG: 10 TABLET ORAL at 08:40

## 2022-09-14 RX ADMIN — EMPAGLIFLOZIN 10 MG: 10 TABLET, FILM COATED ORAL at 08:43

## 2022-09-14 RX ADMIN — CLONIDINE HYDROCHLORIDE 0.1 MG: 0.1 TABLET ORAL at 08:41

## 2022-09-14 RX ADMIN — HYDROXYZINE HYDROCHLORIDE 50 MG: 50 TABLET, FILM COATED ORAL at 08:43

## 2022-09-14 RX ADMIN — QUETIAPINE 200 MG: 200 TABLET, FILM COATED ORAL at 14:01

## 2022-09-14 ASSESSMENT — ACTIVITIES OF DAILY LIVING (ADL)
ADLS_ACUITY_SCORE: 49
ADLS_ACUITY_SCORE: 53
ADLS_ACUITY_SCORE: 49
ADLS_ACUITY_SCORE: 53
ADLS_ACUITY_SCORE: 49
ADLS_ACUITY_SCORE: 46
ADLS_ACUITY_SCORE: 49
ADLS_ACUITY_SCORE: 49
ADLS_ACUITY_SCORE: 53
ADLS_ACUITY_SCORE: 53
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
## 2022-09-14 NOTE — PROGRESS NOTES
PRIMARY DIAGNOSIS: PLACEMENT   OUTPATIENT/OBSERVATION GOALS TO BE MET BEFORE DISCHARGE:  1. ADLs back to baseline: Yes    2. Activity and level of assistance: Baseline/ chairfast     3. Pain status: Pain free.    4. Return to near baseline physical activity: Yes     Discharge Planner Nurse   Safe discharge environment identified: No  Barriers to discharge: Yes       Entered by: Chantel Spencer RN 09/14/2022   During safety round was awake. BF was ordered for pt. Bed alarm on. Pt is two assist. Pt is incontinent of bladder/bowel. SW following placement.   /85 (BP Location: Right arm)   Pulse 87   Temp 97.8  F (36.6  C) (Oral)   Resp 18   Wt 99.9 kg (220 lb 4.8 oz)   SpO2 95%     Please review provider order for any additional goals.   Nurse to notify provider when observation goals have been met and patient is ready for discharge.
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
## 2022-09-14 NOTE — PROGRESS NOTES
PRIMARY DIAGNOSIS: PLACEMENT   OUTPATIENT/OBSERVATION GOALS TO BE MET BEFORE DISCHARGE:  1. ADLs back to baseline: Yes    2. Activity and level of assistance: Baseline/ chairfast     3. Pain status: Pain free.    4. Return to near baseline physical activity: Yes     Discharge Planner Nurse   Safe discharge environment identified: No  Barriers to discharge: Yes       Entered by: Chantel Spencer RN 09/14/2022   During safety round was awake. BF was ordered for pt. Bed alarm on. Pt is two assist. Pt is incontinent of bladder/bowel. SW following placement.   /87 (BP Location: Right arm)   Pulse 76   Temp 97.1  F (36.2  C) (Oral)   Resp 18   Wt 99.9 kg (220 lb 4.8 oz)   SpO2 94%     Please review provider order for any additional goals.   Nurse to notify provider when observation goals have been met and patient is ready for discharge.
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
## 2022-09-14 NOTE — PROGRESS NOTES
PRIMARY DIAGNOSIS: PLACEMENT   OUTPATIENT/OBSERVATION GOALS TO BE MET BEFORE DISCHARGE:  1. ADLs back to baseline: Yes    2. Activity and level of assistance: Baseline/ chairfast     3. Pain status: Pain free.    4. Return to near baseline physical activity: Yes     Discharge Planner Nurse   Safe discharge environment identified: No  Barriers to discharge: Yes       Entered by: Chantel Spencer RN 09/14/2022   During safety round was awake. BF was ordered for pt. Bed alarm on. Pt is two assist. Pt is incontinent of bladder/bowel. SW following placement.   /78   Pulse 72   Temp 97.4  F (36.3  C) (Oral)   Resp 18   Wt 99.9 kg (220 lb 4.8 oz)   SpO2 96%     Please review provider order for any additional goals.   Nurse to notify provider when observation goals have been met and patient is ready for discharge.

## 2022-09-15 LAB
GLUCOSE BLDC GLUCOMTR-MCNC: 126 MG/DL (ref 70–99)
GLUCOSE BLDC GLUCOMTR-MCNC: 84 MG/DL (ref 70–99)

## 2022-09-15 PROCEDURE — 82962 GLUCOSE BLOOD TEST: CPT

## 2022-09-15 PROCEDURE — 250N000013 HC RX MED GY IP 250 OP 250 PS 637: Performed by: PHYSICIAN ASSISTANT

## 2022-09-15 PROCEDURE — 250N000013 HC RX MED GY IP 250 OP 250 PS 637: Performed by: HOSPITALIST

## 2022-09-15 PROCEDURE — 99225 PR SUBSEQUENT OBSERVATION CARE,LEVEL II: CPT | Performed by: PHYSICIAN ASSISTANT

## 2022-09-15 PROCEDURE — G0378 HOSPITAL OBSERVATION PER HR: HCPCS

## 2022-09-15 RX ADMIN — DIVALPROEX SODIUM 1000 MG: 500 TABLET, DELAYED RELEASE ORAL at 21:09

## 2022-09-15 RX ADMIN — CLONIDINE HYDROCHLORIDE 0.1 MG: 0.1 TABLET ORAL at 21:09

## 2022-09-15 RX ADMIN — HYDROXYZINE HYDROCHLORIDE 50 MG: 50 TABLET, FILM COATED ORAL at 13:30

## 2022-09-15 RX ADMIN — QUETIAPINE 200 MG: 200 TABLET, FILM COATED ORAL at 08:57

## 2022-09-15 RX ADMIN — METOPROLOL SUCCINATE 25 MG: 25 TABLET, EXTENDED RELEASE ORAL at 08:55

## 2022-09-15 RX ADMIN — ATORVASTATIN CALCIUM 20 MG: 20 TABLET, FILM COATED ORAL at 21:08

## 2022-09-15 RX ADMIN — BACLOFEN 10 MG: 10 TABLET ORAL at 08:59

## 2022-09-15 RX ADMIN — VENLAFAXINE HYDROCHLORIDE 150 MG: 150 CAPSULE, EXTENDED RELEASE ORAL at 21:09

## 2022-09-15 RX ADMIN — DIVALPROEX SODIUM 500 MG: 500 TABLET, DELAYED RELEASE ORAL at 08:59

## 2022-09-15 RX ADMIN — SENNOSIDES AND DOCUSATE SODIUM 1 TABLET: 50; 8.6 TABLET ORAL at 08:57

## 2022-09-15 RX ADMIN — CLONIDINE HYDROCHLORIDE 0.1 MG: 0.1 TABLET ORAL at 08:55

## 2022-09-15 RX ADMIN — OLANZAPINE 2.5 MG: 2.5 TABLET, FILM COATED ORAL at 13:29

## 2022-09-15 RX ADMIN — BACLOFEN 10 MG: 10 TABLET ORAL at 13:29

## 2022-09-15 RX ADMIN — LEVOTHYROXINE SODIUM 25 MCG: 0.03 TABLET ORAL at 08:55

## 2022-09-15 RX ADMIN — HYDROXYZINE HYDROCHLORIDE 50 MG: 50 TABLET, FILM COATED ORAL at 08:57

## 2022-09-15 RX ADMIN — Medication 25 MCG: at 08:54

## 2022-09-15 RX ADMIN — METFORMIN HYDROCHLORIDE 1000 MG: 500 TABLET ORAL at 08:55

## 2022-09-15 RX ADMIN — DIVALPROEX SODIUM 250 MG: 500 TABLET, DELAYED RELEASE ORAL at 08:58

## 2022-09-15 RX ADMIN — EMPAGLIFLOZIN 10 MG: 10 TABLET, FILM COATED ORAL at 08:54

## 2022-09-15 RX ADMIN — QUETIAPINE 200 MG: 200 TABLET, FILM COATED ORAL at 13:29

## 2022-09-15 RX ADMIN — VENLAFAXINE HYDROCHLORIDE 75 MG: 150 CAPSULE, EXTENDED RELEASE ORAL at 21:08

## 2022-09-15 RX ADMIN — METFORMIN HYDROCHLORIDE 1000 MG: 500 TABLET ORAL at 18:01

## 2022-09-15 RX ADMIN — Medication 10 MG: at 21:08

## 2022-09-15 RX ADMIN — MIRTAZAPINE 15 MG: 15 TABLET, FILM COATED ORAL at 21:08

## 2022-09-15 RX ADMIN — ASPIRIN 81 MG CHEWABLE TABLET 81 MG: 81 TABLET CHEWABLE at 08:56

## 2022-09-15 RX ADMIN — HYDROXYZINE HYDROCHLORIDE 50 MG: 50 TABLET, FILM COATED ORAL at 21:09

## 2022-09-15 RX ADMIN — BACLOFEN 10 MG: 10 TABLET ORAL at 21:08

## 2022-09-15 RX ADMIN — QUETIAPINE FUMARATE 400 MG: 200 TABLET ORAL at 21:09

## 2022-09-15 ASSESSMENT — ACTIVITIES OF DAILY LIVING (ADL)
ADLS_ACUITY_SCORE: 53
ADLS_ACUITY_SCORE: 46

## 2022-09-15 NOTE — PROGRESS NOTES
PRIMARY DIAGNOSIS: PLACEMENT   OUTPATIENT/OBSERVATION GOALS TO BE MET BEFORE DISCHARGE:  1. ADLs back to baseline: Yes    2. Activity and level of assistance: Baseline/ chairfast     3. Pain status: Pain free.    4. Return to near baseline physical activity: Yes     Discharge Planner Nurse   Safe discharge environment identified: No  Barriers to discharge: Yes       Entered by: Chantel Spencer RN 09/15/2022   During safety round was awake. BF was ordered for pt. Bed alarm on. Pt is two assist. Pt is incontinent of bladder/bowel. SW following placement.   /88 (BP Location: Right arm, Patient Position: Semi-Toro's, Cuff Size: Adult Regular)   Pulse 81   Temp 97.4  F (36.3  C)   Resp 18   Wt 99.9 kg (220 lb 4.8 oz)   SpO2 96%      Please review provider order for any additional goals.   Nurse to notify provider when observation goals have been met and patient is ready for discharge.

## 2022-09-15 NOTE — PLAN OF CARE
PRIMARY DIAGNOSIS: Placement  OUTPATIENT/OBSERVATION GOALS TO BE MET BEFORE DISCHARGE:  ADLs back to baseline: Yes    Activity and level of assistance: Total care    Pain status: Pain free.    Return to near baseline physical activity: Yes     Discharge Planner Nurse   Safe discharge environment identified: No  Barriers to discharge: No       Entered by: Helene Oreilly RN 09/14/2022   Pt AO x4. VSS on RA. LS clear, BS active. Pt up with lift. No IV site. Denied pain. Tolerating regular diet. Pt refused to have compression devices in place, was informed of risks. SW working on securing placement.   BP (!) 132/93 (BP Location: Right arm)   Pulse 85   Temp 97.8  F (36.6  C) (Oral)   Resp 18   Wt 99.9 kg (220 lb 4.8 oz)   SpO2 95%    Please review provider order for any additional goals.   Nurse to notify provider when observation goals have been met and patient is ready for discharge.

## 2022-09-15 NOTE — PROGRESS NOTES
PRIMARY DIAGNOSIS: PLACEMENT   OUTPATIENT/OBSERVATION GOALS TO BE MET BEFORE DISCHARGE:  1. ADLs back to baseline: Yes    2. Activity and level of assistance: Baseline/ chairfast     3. Pain status: Pain free.    4. Return to near baseline physical activity: Yes     Discharge Planner Nurse   Safe discharge environment identified: No  Barriers to discharge: Yes       Entered by: Chantel Spencer RN 09/15/2022   During safety round was awake. Regular diet, BF was ordered for pt. Bed alarm on. Pt is two assist. Pt is incontinent of bladder/bowel. SW following placement.   /85   Pulse 79   Temp 98.1  F (36.7  C) (Oral)   Resp 18   Wt 99.9 kg (220 lb 4.8 oz)   SpO2 96%      Please review provider order for any additional goals.   Nurse to notify provider when observation goals have been met and patient is ready for discharge.

## 2022-09-15 NOTE — PLAN OF CARE
PRIMARY DIAGNOSIS: PLACEMENT  OUTPATIENT/OBSERVATION GOALS TO BE MET BEFORE DISCHARGE:  1. ADLs back to baseline: Yes    2. Activity and level of assistance: Up with maximum assistance/ Total Care. Consider SW     3. Pain status: Pain free.    4. Return to near baseline physical activity: Yes    Vitals are Temp: 98.3  F (36.8  C) Temp src: Oral BP: 138/89 Pulse: 65   Resp: 18 SpO2: 97 %.    Patient is Disorientated to, Time, and Situation. denying pain.  Patient has no IV access. Pt is a Diabetic diet. He is on bedrest. Requires assist of two with bed mobility. Plan is to continue to monitor and provide supportive cares. SW is following for placement.      Discharge Planner Nurse   Safe discharge environment identified: Yes  Barriers to discharge: Yes       Entered by: Amber Glover RN 09/15/2022 5:51 AM     Please review provider order for any additional goals.   Nurse to notify provider when observation goals have been met and patient is ready for discharge.

## 2022-09-15 NOTE — PLAN OF CARE
PRIMARY DIAGNOSIS: PLACEMENT  OUTPATIENT/OBSERVATION GOALS TO BE MET BEFORE DISCHARGE:  ADLs back to baseline: Yes    Activity and level of assistance: Up with maximum assistance/ Total Care. Consider SW     Pain status: Pain free.    Return to near baseline physical activity: Yes    Vitals are Temp: 97.3  F (36.3  C) Temp src: Oral BP: 126/79 Pulse: 76   Resp: 18 SpO2: 95 %.    Patient is Disorientated to, Time, and Situation. denying pain.  Patient has no IV access. Pt is a Diabetic diet. He is on bedrest. Requires assist of two with bed mobility. Plan is to continue to monitor and provide supportive cares.      Discharge Planner Nurse   Safe discharge environment identified: Yes  Barriers to discharge: Yes       Entered by: Amber Glover RN 09/15/2022 1:32 AM     Please review provider order for any additional goals.   Nurse to notify provider when observation goals have been met and patient is ready for discharge.

## 2022-09-15 NOTE — PROGRESS NOTES
PRIMARY DIAGNOSIS: PLACEMENT   OUTPATIENT/OBSERVATION GOALS TO BE MET BEFORE DISCHARGE:  1. ADLs back to baseline: Yes    2. Activity and level of assistance: Baseline/ chairfast     3. Pain status: Pain free.    4. Return to near baseline physical activity: Yes     Discharge Planner Nurse   Safe discharge environment identified: No  Barriers to discharge: Yes       Entered by: Chantel Spencer RN 09/15/2022   Patient is A&O x4. During safety round was awake. Regular diet, BF was ordered for pt. Bed alarm on. Pt is two assist. Pt is incontinent of bladder/bowel. No IV access. SW following placement.   /84 (BP Location: Right arm)   Pulse 79   Temp 97.7  F (36.5  C) (Oral)   Resp 16   Wt 99.9 kg (220 lb 4.8 oz)   SpO2 98%      Please review provider order for any additional goals.   Nurse to notify provider when observation goals have been met and patient is ready for discharge.

## 2022-09-16 LAB
GLUCOSE BLDC GLUCOMTR-MCNC: 111 MG/DL (ref 70–99)
GLUCOSE BLDC GLUCOMTR-MCNC: 165 MG/DL (ref 70–99)
GLUCOSE BLDC GLUCOMTR-MCNC: 77 MG/DL (ref 70–99)

## 2022-09-16 PROCEDURE — G0378 HOSPITAL OBSERVATION PER HR: HCPCS

## 2022-09-16 PROCEDURE — 250N000013 HC RX MED GY IP 250 OP 250 PS 637: Performed by: PHYSICIAN ASSISTANT

## 2022-09-16 PROCEDURE — 250N000013 HC RX MED GY IP 250 OP 250 PS 637: Performed by: HOSPITALIST

## 2022-09-16 PROCEDURE — 82962 GLUCOSE BLOOD TEST: CPT

## 2022-09-16 PROCEDURE — 99225 PR SUBSEQUENT OBSERVATION CARE,LEVEL II: CPT | Performed by: PHYSICIAN ASSISTANT

## 2022-09-16 RX ADMIN — QUETIAPINE 200 MG: 200 TABLET, FILM COATED ORAL at 07:59

## 2022-09-16 RX ADMIN — ATORVASTATIN CALCIUM 20 MG: 20 TABLET, FILM COATED ORAL at 21:12

## 2022-09-16 RX ADMIN — EMPAGLIFLOZIN 10 MG: 10 TABLET, FILM COATED ORAL at 08:00

## 2022-09-16 RX ADMIN — VENLAFAXINE HYDROCHLORIDE 150 MG: 150 CAPSULE, EXTENDED RELEASE ORAL at 21:12

## 2022-09-16 RX ADMIN — ASPIRIN 81 MG CHEWABLE TABLET 81 MG: 81 TABLET CHEWABLE at 08:00

## 2022-09-16 RX ADMIN — Medication 10 MG: at 21:13

## 2022-09-16 RX ADMIN — OLANZAPINE 2.5 MG: 2.5 TABLET, FILM COATED ORAL at 13:26

## 2022-09-16 RX ADMIN — QUETIAPINE FUMARATE 400 MG: 200 TABLET ORAL at 21:13

## 2022-09-16 RX ADMIN — HYDROXYZINE HYDROCHLORIDE 50 MG: 50 TABLET, FILM COATED ORAL at 21:12

## 2022-09-16 RX ADMIN — DIVALPROEX SODIUM 1000 MG: 500 TABLET, DELAYED RELEASE ORAL at 21:12

## 2022-09-16 RX ADMIN — METFORMIN HYDROCHLORIDE 1000 MG: 500 TABLET ORAL at 17:49

## 2022-09-16 RX ADMIN — BACLOFEN 10 MG: 10 TABLET ORAL at 21:12

## 2022-09-16 RX ADMIN — VENLAFAXINE HYDROCHLORIDE 75 MG: 150 CAPSULE, EXTENDED RELEASE ORAL at 21:11

## 2022-09-16 RX ADMIN — HYDROXYZINE HYDROCHLORIDE 50 MG: 50 TABLET, FILM COATED ORAL at 13:26

## 2022-09-16 RX ADMIN — LEVOTHYROXINE SODIUM 25 MCG: 0.03 TABLET ORAL at 07:59

## 2022-09-16 RX ADMIN — BACLOFEN 10 MG: 10 TABLET ORAL at 13:26

## 2022-09-16 RX ADMIN — METFORMIN HYDROCHLORIDE 1000 MG: 500 TABLET ORAL at 07:59

## 2022-09-16 RX ADMIN — MIRTAZAPINE 15 MG: 15 TABLET, FILM COATED ORAL at 21:12

## 2022-09-16 RX ADMIN — QUETIAPINE 200 MG: 200 TABLET, FILM COATED ORAL at 13:26

## 2022-09-16 RX ADMIN — Medication 25 MCG: at 08:00

## 2022-09-16 RX ADMIN — METOPROLOL SUCCINATE 25 MG: 25 TABLET, EXTENDED RELEASE ORAL at 07:59

## 2022-09-16 RX ADMIN — SENNOSIDES AND DOCUSATE SODIUM 1 TABLET: 50; 8.6 TABLET ORAL at 07:59

## 2022-09-16 RX ADMIN — BACLOFEN 10 MG: 10 TABLET ORAL at 08:00

## 2022-09-16 RX ADMIN — DIVALPROEX SODIUM 250 MG: 500 TABLET, DELAYED RELEASE ORAL at 08:00

## 2022-09-16 RX ADMIN — HYDROXYZINE HYDROCHLORIDE 50 MG: 50 TABLET, FILM COATED ORAL at 08:00

## 2022-09-16 RX ADMIN — DIVALPROEX SODIUM 500 MG: 500 TABLET, DELAYED RELEASE ORAL at 07:59

## 2022-09-16 RX ADMIN — CLONIDINE HYDROCHLORIDE 0.1 MG: 0.1 TABLET ORAL at 21:12

## 2022-09-16 RX ADMIN — CLONIDINE HYDROCHLORIDE 0.1 MG: 0.1 TABLET ORAL at 08:00

## 2022-09-16 ASSESSMENT — ACTIVITIES OF DAILY LIVING (ADL)
ADLS_ACUITY_SCORE: 53

## 2022-09-16 NOTE — PLAN OF CARE
PRIMARY DIAGNOSIS: Placement  OUTPATIENT/OBSERVATION GOALS TO BE MET BEFORE DISCHARGE:  ADLs back to baseline: Yes    Activity and level of assistance: Total care    Pain status: Pain free.    Return to near baseline physical activity: Yes     Discharge Planner Nurse   Safe discharge environment identified: No  Barriers to discharge: No       Entered by: Helene Oreilly RN 09/16/2022   Pt AO x4. VSS on RA. LS clear, BS active. Pt up with lift. No IV site. Denied pain. Tolerating regular diet. Pt refused to have boots in place, was informed of risks. SW working on securing placement.   /87 (BP Location: Right arm)   Pulse 75   Temp 97.6  F (36.4  C) (Oral)   Resp 18   Wt 99.9 kg (220 lb 4.8 oz)   SpO2 95%    Please review provider order for any additional goals.   Nurse to notify provider when observation goals have been met and patient is ready for discharge.
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
## 2022-09-16 NOTE — PROGRESS NOTES
Care Management Follow Up    Expected Discharge Date: 09/23/2022     Concerns to be Addressed: Discharge planning       Patient plan of care discussed at interdisciplinary rounds: Yes    Anticipated Discharge Disposition:  [facility]    Patient/Family in Agreement with the Plan:  Yes    Referrals Placed by CM/ANALY: None at this time  Private pay costs discussed: Not applicable    Additional Information:  ANALY emailed ROSIO Ivy and GH Manager Karena at [facility] to request an update on status of funding for GH placement. Awaiting response. ANALY will continue to follow.     1550: ANALY received email from  and ROSIO that GH manager has submitted the updated 6790 rate sheet to the [facility]. ROSIO confirmed that this was received, awaiting approval from [facility]. They are hoping to hear back next week.     DANITA Obrien, [facility]   Inpatient Care Coordination  [facility]   [phone]

## 2022-09-16 NOTE — PLAN OF CARE
PRIMARY DIAGNOSIS: GH Placement   OUTPATIENT/OBSERVATION GOALS TO BE MET BEFORE DISCHARGE:  1. ADLs back to baseline: Yes    2. Activity and level of assistance: Total Care at baseline.     3. Pain status: Pain free.    4. Return to near baseline physical activity: Yes     Discharge Planner Nurse   Safe discharge environment identified: No  Barriers to discharge: No       Entered by: Daiana Huston RN 09/16/2022      Please review provider order for any additional goals.   Nurse to notify provider when observation goals have been met and patient is ready for discharge.    A/O x4, flat affect. VSS on RA. Total assist, bedrest, on pulsate mattress.  Tolerating regular diet. Lung sounds clear. Bowel sounds active. Incontinent of both bowels and bladder. LBM 9/15. Skin with blanchable redness to groin, sacrum, and heels. Pt often refuses to turn/reposition, done as allowed. Denies pain. Denies nausea. No IV access. Awaiting new GH placement for discharge, SW following.

## 2022-09-16 NOTE — PLAN OF CARE
PRIMARY DIAGNOSIS: Placement  OUTPATIENT/OBSERVATION GOALS TO BE MET BEFORE DISCHARGE:  1. ADLs back to baseline: Yes    2. Activity and level of assistance: Total care    3. Pain status: Pain free.    4. Return to near baseline physical activity: Yes     Discharge Planner Nurse   Safe discharge environment identified: No  Barriers to discharge: No       Entered by: Daiana Huston RN 09/16/2022     Please review provider order for any additional goals.   Nurse to notify provider when observation goals have been met and patient is ready for discharge.

## 2022-09-17 LAB
GLUCOSE BLDC GLUCOMTR-MCNC: 102 MG/DL (ref 70–99)
GLUCOSE BLDC GLUCOMTR-MCNC: 106 MG/DL (ref 70–99)
GLUCOSE BLDC GLUCOMTR-MCNC: 85 MG/DL (ref 70–99)

## 2022-09-17 PROCEDURE — 250N000013 HC RX MED GY IP 250 OP 250 PS 637: Performed by: PHYSICIAN ASSISTANT

## 2022-09-17 PROCEDURE — G0378 HOSPITAL OBSERVATION PER HR: HCPCS

## 2022-09-17 PROCEDURE — 99225 PR SUBSEQUENT OBSERVATION CARE,LEVEL II: CPT | Performed by: PHYSICIAN ASSISTANT

## 2022-09-17 PROCEDURE — 82962 GLUCOSE BLOOD TEST: CPT

## 2022-09-17 PROCEDURE — 250N000013 HC RX MED GY IP 250 OP 250 PS 637: Performed by: HOSPITALIST

## 2022-09-17 RX ADMIN — DIVALPROEX SODIUM 1000 MG: 500 TABLET, DELAYED RELEASE ORAL at 21:02

## 2022-09-17 RX ADMIN — QUETIAPINE 200 MG: 200 TABLET, FILM COATED ORAL at 13:16

## 2022-09-17 RX ADMIN — EMPAGLIFLOZIN 10 MG: 10 TABLET, FILM COATED ORAL at 07:47

## 2022-09-17 RX ADMIN — ATORVASTATIN CALCIUM 20 MG: 20 TABLET, FILM COATED ORAL at 20:59

## 2022-09-17 RX ADMIN — SENNOSIDES AND DOCUSATE SODIUM 1 TABLET: 50; 8.6 TABLET ORAL at 07:47

## 2022-09-17 RX ADMIN — HYDROXYZINE HYDROCHLORIDE 50 MG: 50 TABLET, FILM COATED ORAL at 20:59

## 2022-09-17 RX ADMIN — Medication 25 MCG: at 07:47

## 2022-09-17 RX ADMIN — CLONIDINE HYDROCHLORIDE 0.1 MG: 0.1 TABLET ORAL at 07:47

## 2022-09-17 RX ADMIN — QUETIAPINE FUMARATE 400 MG: 200 TABLET ORAL at 21:09

## 2022-09-17 RX ADMIN — Medication 10 MG: at 21:01

## 2022-09-17 RX ADMIN — MIRTAZAPINE 15 MG: 15 TABLET, FILM COATED ORAL at 21:02

## 2022-09-17 RX ADMIN — METFORMIN HYDROCHLORIDE 1000 MG: 500 TABLET ORAL at 07:46

## 2022-09-17 RX ADMIN — METFORMIN HYDROCHLORIDE 1000 MG: 500 TABLET ORAL at 16:59

## 2022-09-17 RX ADMIN — LEVOTHYROXINE SODIUM 25 MCG: 0.03 TABLET ORAL at 07:47

## 2022-09-17 RX ADMIN — QUETIAPINE 200 MG: 200 TABLET, FILM COATED ORAL at 07:47

## 2022-09-17 RX ADMIN — HYDROXYZINE HYDROCHLORIDE 50 MG: 50 TABLET, FILM COATED ORAL at 07:47

## 2022-09-17 RX ADMIN — OLANZAPINE 2.5 MG: 2.5 TABLET, FILM COATED ORAL at 13:16

## 2022-09-17 RX ADMIN — CLONIDINE HYDROCHLORIDE 0.1 MG: 0.1 TABLET ORAL at 20:59

## 2022-09-17 RX ADMIN — ASPIRIN 81 MG CHEWABLE TABLET 81 MG: 81 TABLET CHEWABLE at 07:47

## 2022-09-17 RX ADMIN — DIVALPROEX SODIUM 500 MG: 500 TABLET, DELAYED RELEASE ORAL at 07:47

## 2022-09-17 RX ADMIN — HYDROXYZINE HYDROCHLORIDE 50 MG: 50 TABLET, FILM COATED ORAL at 13:16

## 2022-09-17 RX ADMIN — VENLAFAXINE HYDROCHLORIDE 150 MG: 150 CAPSULE, EXTENDED RELEASE ORAL at 21:01

## 2022-09-17 RX ADMIN — METOPROLOL SUCCINATE 25 MG: 25 TABLET, EXTENDED RELEASE ORAL at 07:47

## 2022-09-17 RX ADMIN — DIVALPROEX SODIUM 250 MG: 500 TABLET, DELAYED RELEASE ORAL at 07:46

## 2022-09-17 RX ADMIN — BACLOFEN 10 MG: 10 TABLET ORAL at 13:16

## 2022-09-17 RX ADMIN — VENLAFAXINE HYDROCHLORIDE 75 MG: 150 CAPSULE, EXTENDED RELEASE ORAL at 21:01

## 2022-09-17 RX ADMIN — BACLOFEN 10 MG: 10 TABLET ORAL at 07:47

## 2022-09-17 RX ADMIN — BACLOFEN 10 MG: 10 TABLET ORAL at 20:59

## 2022-09-17 ASSESSMENT — ACTIVITIES OF DAILY LIVING (ADL)
ADLS_ACUITY_SCORE: 53
ADLS_ACUITY_SCORE: 47
ADLS_ACUITY_SCORE: 53
ADLS_ACUITY_SCORE: 49
ADLS_ACUITY_SCORE: 53
ADLS_ACUITY_SCORE: 47
ADLS_ACUITY_SCORE: 53

## 2022-09-17 NOTE — PROGRESS NOTES
PRIMARY DIAGNOSIS: Placement  OUTPATIENT/OBSERVATION GOALS TO BE MET BEFORE DISCHARGE:  1. ADLs back to baseline: Yes    2. Activity and level of assistance: Total Care at baseline    3. Pain status: Pain free.    4. Return to near baseline physical activity: Yes     Discharge Planner Nurse   Safe discharge environment identified: No  Barriers to discharge: No       Entered by: Adan Cortez RN 09/16/2022      Please review provider order for any additional goals.   Nurse to notify provider when observation goals have been met and patient is ready for discharge.    A/Ox 3, flat affect, VSS on RA. Total cares, bedrest. On a pulsate mattress. Pt has blanchable redness on ankles/heels/groin and sacrum. Incontinent of bowel and bladder. LBM yesterday 9/15. Pt refuses to turn and reposition often. No pain. No IV access. Awaiting placement. SW following.

## 2022-09-17 NOTE — PLAN OF CARE
PRIMARY DIAGNOSIS: GH Placement   OUTPATIENT/OBSERVATION GOALS TO BE MET BEFORE DISCHARGE:  1. ADLs back to baseline: Yes    2. Activity and level of assistance: Total Care at baseline.     3. Pain status: Pain free.    4. Return to near baseline physical activity: Yes     Discharge Planner Nurse   Safe discharge environment identified: No  Barriers to discharge: No       Entered by: Daiana Huston RN 09/17/2022      Please review provider order for any additional goals.   Nurse to notify provider when observation goals have been met and patient is ready for discharge.    A/O x4, flat affect. VSS on RA. Total assist, bedrest, on pulsate mattress.  Tolerating regular diet. Lung sounds clear. Bowel sounds active. Incontinent of both bowels and bladder. LBM 9/15. Skin with blanchable redness to groin, sacrum, and heels. Pt often refuses to turn/reposition, done as allowed. Denies pain. Denies nausea. No IV access. Awaiting GH placement for discharge, SW following.

## 2022-09-17 NOTE — PROGRESS NOTES
PRIMARY DIAGNOSIS: Placement  OUTPATIENT/OBSERVATION GOALS TO BE MET BEFORE DISCHARGE:  1. ADLs back to baseline: Yes    2. Activity and level of assistance: Total Care at baseline    3. Pain status: Pain free.    4. Return to near baseline physical activity: Yes     Discharge Planner Nurse   Safe discharge environment identified: No  Barriers to discharge: No       Entered by: Adan Cortez RN 09/17/2022      Please review provider order for any additional goals.   Nurse to notify provider when observation goals have been met and patient is ready for discharge.    A/Ox 3, flat affect, VSS on RA. Total cares, bedrest. On a pulsate mattress. Pt has blanchable redness on ankles/heels/groin and sacrum, barrier cream applied to groin. Incontinent of bowel and bladder. LBM 9/15. Pt refusing to reposition often, able to reposition twice last night. No pain. No IV access. Awaiting placement. SW following.
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
## 2022-09-17 NOTE — PROGRESS NOTES
PRIMARY DIAGNOSIS: Placement  OUTPATIENT/OBSERVATION GOALS TO BE MET BEFORE DISCHARGE:  1. ADLs back to baseline: Yes    2. Activity and level of assistance: Total Care at baseline    3. Pain status: Pain free.    4. Return to near baseline physical activity: Yes     Discharge Planner Nurse   Safe discharge environment identified: No  Barriers to discharge: No       Entered by: Adan Cortez RN 09/17/2022      Please review provider order for any additional goals.   Nurse to notify provider when observation goals have been met and patient is ready for discharge.    A/Ox 3, flat affect, VSS on RA. Total cares, bedrest. On a pulsate mattress. Pt has blanchable redness on ankles/heels/groin and sacrum. Incontinent of bowel and bladder. LBM 9/15. Pt refusing to reposition often. No pain. No IV access. Awaiting placement. SW following.   BP (!) 133/90 (BP Location: Right arm)   Pulse 78   Temp 97.3  F (36.3  C) (Oral)   Resp 18   Wt 99.9 kg (220 lb 4.8 oz)   SpO2 96%

## 2022-09-18 LAB
GLUCOSE BLDC GLUCOMTR-MCNC: 109 MG/DL (ref 70–99)
GLUCOSE BLDC GLUCOMTR-MCNC: 88 MG/DL (ref 70–99)

## 2022-09-18 PROCEDURE — 250N000013 HC RX MED GY IP 250 OP 250 PS 637: Performed by: HOSPITALIST

## 2022-09-18 PROCEDURE — 82962 GLUCOSE BLOOD TEST: CPT

## 2022-09-18 PROCEDURE — 250N000013 HC RX MED GY IP 250 OP 250 PS 637: Performed by: PHYSICIAN ASSISTANT

## 2022-09-18 PROCEDURE — 99224 PR SUBSEQUENT OBSERVATION CARE,LEVEL I: CPT | Performed by: PHYSICIAN ASSISTANT

## 2022-09-18 PROCEDURE — 250N000013 HC RX MED GY IP 250 OP 250 PS 637: Performed by: INTERNAL MEDICINE

## 2022-09-18 PROCEDURE — G0378 HOSPITAL OBSERVATION PER HR: HCPCS

## 2022-09-18 RX ADMIN — QUETIAPINE 200 MG: 200 TABLET, FILM COATED ORAL at 13:16

## 2022-09-18 RX ADMIN — DIVALPROEX SODIUM 500 MG: 500 TABLET, DELAYED RELEASE ORAL at 08:17

## 2022-09-18 RX ADMIN — EMPAGLIFLOZIN 10 MG: 10 TABLET, FILM COATED ORAL at 08:18

## 2022-09-18 RX ADMIN — QUETIAPINE FUMARATE 400 MG: 200 TABLET ORAL at 21:02

## 2022-09-18 RX ADMIN — SENNOSIDES AND DOCUSATE SODIUM 2 TABLET: 50; 8.6 TABLET ORAL at 08:22

## 2022-09-18 RX ADMIN — LEVOTHYROXINE SODIUM 25 MCG: 0.03 TABLET ORAL at 08:17

## 2022-09-18 RX ADMIN — Medication 10 MG: at 21:02

## 2022-09-18 RX ADMIN — VENLAFAXINE HYDROCHLORIDE 150 MG: 150 CAPSULE, EXTENDED RELEASE ORAL at 21:02

## 2022-09-18 RX ADMIN — METFORMIN HYDROCHLORIDE 1000 MG: 500 TABLET ORAL at 08:17

## 2022-09-18 RX ADMIN — BACLOFEN 10 MG: 10 TABLET ORAL at 21:01

## 2022-09-18 RX ADMIN — VENLAFAXINE HYDROCHLORIDE 75 MG: 150 CAPSULE, EXTENDED RELEASE ORAL at 21:03

## 2022-09-18 RX ADMIN — SENNOSIDES AND DOCUSATE SODIUM 1 TABLET: 50; 8.6 TABLET ORAL at 08:17

## 2022-09-18 RX ADMIN — METFORMIN HYDROCHLORIDE 1000 MG: 500 TABLET ORAL at 16:30

## 2022-09-18 RX ADMIN — DIVALPROEX SODIUM 250 MG: 500 TABLET, DELAYED RELEASE ORAL at 08:18

## 2022-09-18 RX ADMIN — METOPROLOL SUCCINATE 25 MG: 25 TABLET, EXTENDED RELEASE ORAL at 08:17

## 2022-09-18 RX ADMIN — Medication 25 MCG: at 08:18

## 2022-09-18 RX ADMIN — CLONIDINE HYDROCHLORIDE 0.1 MG: 0.1 TABLET ORAL at 08:18

## 2022-09-18 RX ADMIN — CLONIDINE HYDROCHLORIDE 0.1 MG: 0.1 TABLET ORAL at 21:01

## 2022-09-18 RX ADMIN — OLANZAPINE 2.5 MG: 2.5 TABLET, FILM COATED ORAL at 13:16

## 2022-09-18 RX ADMIN — MIRTAZAPINE 15 MG: 15 TABLET, FILM COATED ORAL at 21:03

## 2022-09-18 RX ADMIN — BACLOFEN 10 MG: 10 TABLET ORAL at 08:18

## 2022-09-18 RX ADMIN — BACLOFEN 10 MG: 10 TABLET ORAL at 13:16

## 2022-09-18 RX ADMIN — QUETIAPINE 200 MG: 200 TABLET, FILM COATED ORAL at 08:17

## 2022-09-18 RX ADMIN — ATORVASTATIN CALCIUM 20 MG: 20 TABLET, FILM COATED ORAL at 21:01

## 2022-09-18 RX ADMIN — DIVALPROEX SODIUM 1000 MG: 500 TABLET, DELAYED RELEASE ORAL at 21:02

## 2022-09-18 RX ADMIN — HYDROXYZINE HYDROCHLORIDE 50 MG: 50 TABLET, FILM COATED ORAL at 13:16

## 2022-09-18 RX ADMIN — HYDROXYZINE HYDROCHLORIDE 50 MG: 50 TABLET, FILM COATED ORAL at 21:01

## 2022-09-18 RX ADMIN — HYDROXYZINE HYDROCHLORIDE 50 MG: 50 TABLET, FILM COATED ORAL at 08:17

## 2022-09-18 RX ADMIN — ASPIRIN 81 MG CHEWABLE TABLET 81 MG: 81 TABLET CHEWABLE at 08:18

## 2022-09-18 ASSESSMENT — ACTIVITIES OF DAILY LIVING (ADL)
ADLS_ACUITY_SCORE: 47
ADLS_ACUITY_SCORE: 47
ADLS_ACUITY_SCORE: 51
ADLS_ACUITY_SCORE: 47
ADLS_ACUITY_SCORE: 51
ADLS_ACUITY_SCORE: 47
ADLS_ACUITY_SCORE: 51
ADLS_ACUITY_SCORE: 47
ADLS_ACUITY_SCORE: 51
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
## 2022-09-18 NOTE — PLAN OF CARE
PRIMARY DIAGNOSIS: PLACEMENT   OUTPATIENT/OBSERVATION GOALS TO BE MET BEFORE DISCHARGE:  ADLs back to baseline: Yes    Activity and level of assistance: Up with maximum assistance. Consider SW and/or PT evaluation.     Pain status: Pain free.    Return to near baseline physical activity: Yes    Vitals are Temp: 97.4  F (36.3  C) Temp src: Oral BP: 134/86 Pulse: 83   Resp: 18 SpO2: 96 %.    Patient is Alert and Oriented x4 but forgetful. Flat affect. Calm and cooperative with cares. Denies any pain with interview. Patient has no IV access. Pt is a Regular diet. He is assist of two total care. Incontinent of bladder and bowel. Calls appropriately for all his needs. Will continue to monitor.      Discharge Planner Nurse   Safe discharge environment identified: Yes  Barriers to discharge: Yes       Entered by: Amber Glover RN 09/17/2022 10:29 PM    Please review provider order for any additional goals.   Nurse to notify provider when observation goals have been met and patient is ready for discharge.

## 2022-09-18 NOTE — PLAN OF CARE
PRIMARY DIAGNOSIS: PLACEMENT   OUTPATIENT/OBSERVATION GOALS TO BE MET BEFORE DISCHARGE:  1. ADLs back to baseline: Yes    2. Activity and level of assistance: Up with maximum assistance. Consider SW and/or PT evaluation.     3. Pain status: Pain free.    4. Return to near baseline physical activity: Yes    Vitals are Temp: 97.1  F (36.2  C) Temp src: Oral BP: 120/81 Pulse: 80   Resp: 16 SpO2: 94 %.    Patient is Alert and Oriented x4 but forgetful. Flat affect. Calm and cooperative with cares. Denies any pain with interview. Patient has no IV access. Pt is a Regular diet. He is assist of two total care. Incontinent of bladder and bowel. Calls appropriately for all his needs. Will continue to monitor.      Discharge Planner Nurse   Safe discharge environment identified: Yes  Barriers to discharge: Yes       Entered by: Amber Glover RN 09/18/2022 1:31 AM    Please review provider order for any additional goals.   Nurse to notify provider when observation goals have been met and patient is ready for discharge.

## 2022-09-18 NOTE — PLAN OF CARE
PRIMARY DIAGNOSIS: GH Placement   OUTPATIENT/OBSERVATION GOALS TO BE MET BEFORE DISCHARGE:  1. ADLs back to baseline: Yes    2. Activity and level of assistance: Total Care at baseline.     3. Pain status: Pain free.    4. Return to near baseline physical activity: Yes     Discharge Planner Nurse   Safe discharge environment identified: No  Barriers to discharge: No       Entered by: Daiana Huston RN 09/18/2022       A/O x4, flat affect. VSS on RA. Total assist, bedrest, on pulsate mattress.  Tolerating regular diet. Lung sounds clear. Bowel sounds active. Incontinent of both bowels and bladder. LBM 9/15, prn Senna given. Skin with blanchable redness to groin, sacrum, and ankles, skin barrier applied. Pt often refuses to turn/reposition, done as allowed. Denies pain. Denies nausea. No IV access. Awaiting GH placement for discharge, SW following.     Please review provider order for any additional goals.   Nurse to notify provider when observation goals have been met and patient is ready for discharge.

## 2022-09-18 NOTE — PLAN OF CARE
PRIMARY DIAGNOSIS: GH Placement   OUTPATIENT/OBSERVATION GOALS TO BE MET BEFORE DISCHARGE:  1. ADLs back to baseline: Yes    2. Activity and level of assistance: Total Care at baseline.     3. Pain status: Pain free.    4. Return to near baseline physical activity: Yes     Discharge Planner Nurse   Safe discharge environment identified: No  Barriers to discharge: No       Entered by: Daiana Huston RN 09/18/2022       A/O x4, flat affect. VSS on RA. Total assist, bedrest, on pulsate mattress.  Tolerating regular diet. Lung sounds clear. Bowel sounds active. Incontinent of both bowels and bladder. LBM 9/15, prn Senna given today. Skin with blanchable redness to groin, sacrum, and heels, skin barrier applied. Pt often refuses to turn/reposition, done as allowed. Denies pain. Denies nausea. No IV access. Awaiting GH placement for discharge, SW following.     Please review provider order for any additional goals.   Nurse to notify provider when observation goals have been met and patient is ready for discharge.

## 2022-09-18 NOTE — PLAN OF CARE
PRIMARY DIAGNOSIS: PLACEMENT   OUTPATIENT/OBSERVATION GOALS TO BE MET BEFORE DISCHARGE:  1. ADLs back to baseline: Yes    2. Activity and level of assistance: Up with maximum assistance. Consider SW and/or PT evaluation.     3. Pain status: Pain free.    4. Return to near baseline physical activity: Yes    Vitals are Temp: 97.2  F (36.2  C) Temp src: Oral BP: 123/84 Pulse: 73   Resp: 18 SpO2: 96 %.    Patient is Alert and Oriented x4 but forgetful. Flat affect. Calm and cooperative with cares. Denies any pain with interview. Patient has no IV access. Pt is a Regular diet. He is assist of two total care. Incontinent of bladder and bowel. Calls appropriately for all his needs. SW is following for placement.     Discharge Planner Nurse   Safe discharge environment identified: Yes  Barriers to discharge: Yes       Entered by: Amber Glover RN 09/18/2022 6:41 AM    Please review provider order for any additional goals.   Nurse to notify provider when observation goals have been met and patient is ready for discharge.

## 2022-09-19 LAB
GLUCOSE BLDC GLUCOMTR-MCNC: 115 MG/DL (ref 70–99)
GLUCOSE BLDC GLUCOMTR-MCNC: 90 MG/DL (ref 70–99)

## 2022-09-19 PROCEDURE — G0378 HOSPITAL OBSERVATION PER HR: HCPCS

## 2022-09-19 PROCEDURE — 250N000013 HC RX MED GY IP 250 OP 250 PS 637: Performed by: PHYSICIAN ASSISTANT

## 2022-09-19 PROCEDURE — 250N000013 HC RX MED GY IP 250 OP 250 PS 637: Performed by: INTERNAL MEDICINE

## 2022-09-19 PROCEDURE — 99225 PR SUBSEQUENT OBSERVATION CARE,LEVEL II: CPT | Performed by: PHYSICIAN ASSISTANT

## 2022-09-19 PROCEDURE — 250N000013 HC RX MED GY IP 250 OP 250 PS 637: Performed by: HOSPITALIST

## 2022-09-19 PROCEDURE — 82962 GLUCOSE BLOOD TEST: CPT

## 2022-09-19 RX ADMIN — BACLOFEN 10 MG: 10 TABLET ORAL at 08:13

## 2022-09-19 RX ADMIN — ASPIRIN 81 MG CHEWABLE TABLET 81 MG: 81 TABLET CHEWABLE at 08:13

## 2022-09-19 RX ADMIN — QUETIAPINE 200 MG: 200 TABLET, FILM COATED ORAL at 14:16

## 2022-09-19 RX ADMIN — SENNOSIDES AND DOCUSATE SODIUM 2 TABLET: 50; 8.6 TABLET ORAL at 08:12

## 2022-09-19 RX ADMIN — HYDROXYZINE HYDROCHLORIDE 50 MG: 50 TABLET, FILM COATED ORAL at 14:17

## 2022-09-19 RX ADMIN — DIVALPROEX SODIUM 500 MG: 500 TABLET, DELAYED RELEASE ORAL at 08:13

## 2022-09-19 RX ADMIN — CLONIDINE HYDROCHLORIDE 0.1 MG: 0.1 TABLET ORAL at 21:39

## 2022-09-19 RX ADMIN — METOPROLOL SUCCINATE 25 MG: 25 TABLET, EXTENDED RELEASE ORAL at 08:13

## 2022-09-19 RX ADMIN — HYDROXYZINE HYDROCHLORIDE 50 MG: 50 TABLET, FILM COATED ORAL at 08:13

## 2022-09-19 RX ADMIN — ATORVASTATIN CALCIUM 20 MG: 20 TABLET, FILM COATED ORAL at 21:40

## 2022-09-19 RX ADMIN — MIRTAZAPINE 15 MG: 15 TABLET, FILM COATED ORAL at 21:40

## 2022-09-19 RX ADMIN — QUETIAPINE FUMARATE 400 MG: 200 TABLET ORAL at 21:40

## 2022-09-19 RX ADMIN — Medication 25 MCG: at 08:13

## 2022-09-19 RX ADMIN — HYDROXYZINE HYDROCHLORIDE 50 MG: 50 TABLET, FILM COATED ORAL at 21:40

## 2022-09-19 RX ADMIN — METFORMIN HYDROCHLORIDE 1000 MG: 500 TABLET ORAL at 08:13

## 2022-09-19 RX ADMIN — BACLOFEN 10 MG: 10 TABLET ORAL at 14:17

## 2022-09-19 RX ADMIN — Medication 10 MG: at 21:40

## 2022-09-19 RX ADMIN — VENLAFAXINE HYDROCHLORIDE 150 MG: 150 CAPSULE, EXTENDED RELEASE ORAL at 21:39

## 2022-09-19 RX ADMIN — CLONIDINE HYDROCHLORIDE 0.1 MG: 0.1 TABLET ORAL at 08:13

## 2022-09-19 RX ADMIN — QUETIAPINE 200 MG: 200 TABLET, FILM COATED ORAL at 08:13

## 2022-09-19 RX ADMIN — POLYETHYLENE GLYCOL 3350 17 G: 17 POWDER, FOR SOLUTION ORAL at 08:12

## 2022-09-19 RX ADMIN — DIVALPROEX SODIUM 1000 MG: 500 TABLET, DELAYED RELEASE ORAL at 21:39

## 2022-09-19 RX ADMIN — METFORMIN HYDROCHLORIDE 1000 MG: 500 TABLET ORAL at 17:50

## 2022-09-19 RX ADMIN — LEVOTHYROXINE SODIUM 25 MCG: 0.03 TABLET ORAL at 08:13

## 2022-09-19 RX ADMIN — VENLAFAXINE HYDROCHLORIDE 75 MG: 150 CAPSULE, EXTENDED RELEASE ORAL at 21:42

## 2022-09-19 RX ADMIN — EMPAGLIFLOZIN 10 MG: 10 TABLET, FILM COATED ORAL at 08:13

## 2022-09-19 RX ADMIN — DIVALPROEX SODIUM 250 MG: 500 TABLET, DELAYED RELEASE ORAL at 08:13

## 2022-09-19 RX ADMIN — OLANZAPINE 2.5 MG: 2.5 TABLET, FILM COATED ORAL at 14:16

## 2022-09-19 RX ADMIN — BACLOFEN 10 MG: 10 TABLET ORAL at 21:40

## 2022-09-19 ASSESSMENT — ACTIVITIES OF DAILY LIVING (ADL)
ADLS_ACUITY_SCORE: 49
ADLS_ACUITY_SCORE: 47
ADLS_ACUITY_SCORE: 53
ADLS_ACUITY_SCORE: 47
ADLS_ACUITY_SCORE: 49
ADLS_ACUITY_SCORE: 53
ADLS_ACUITY_SCORE: 47
ADLS_ACUITY_SCORE: 53
ADLS_ACUITY_SCORE: 47

## 2022-09-19 NOTE — PLAN OF CARE
PRIMARY DIAGNOSIS: GH Placement   OUTPATIENT/OBSERVATION GOALS TO BE MET BEFORE DISCHARGE:  1. ADLs back to baseline: Yes    2. Activity and level of assistance: Total Care at baseline.     3. Pain status: Pain free.    4. Return to near baseline physical activity: Yes     Discharge Planner Nurse   Safe discharge environment identified: No  Barriers to discharge: No       Entered by: Daiana Huston RN 09/19/2022       A/O x4, flat affect, pleasant. VSS on RA. Total assist, on pulsate mattress, T/R Q2hrs.  Tolerating regular diet. Lung sounds clear. Bowel sounds active. Incontinent of both bowels and bladder. LBM 9/15, prn Senna and miralax given. Skin with blanchable redness to groin, sacrum, and ankles, skin barrier applied. Face is dry/flaky. Denies pain. Denies nausea. No IV access. Awaiting GH placement for discharge, SW following.     Please review provider order for any additional goals.   Nurse to notify provider when observation goals have been met and patient is ready for discharge.
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
## 2022-09-19 NOTE — PLAN OF CARE
PRIMARY DIAGNOSIS: PLACEMENT  OUTPATIENT/OBSERVATION GOALS TO BE MET BEFORE DISCHARGE:  ADLs back to baseline: Yes    Activity and level of assistance: total care with lift use for transfers.    Pain status: Pain free.    Return to near baseline physical activity: Yes      Vitals are Temp: 97.5  F (36.4  C) Temp src: Oral BP: 127/86 Pulse: 84   Resp: 18 SpO2: 94 %.    Patient is Alert and Oriented x4 but forgetful. Flat affect. Calm and cooperative with cares. Denies any pain with interview. Patient has no IV access. Pt is a Regular diet. He is assist of two total care. Incontinent of bladder and bowel. Calls appropriately for all his needs. SW is following for placement.          Discharge Planner Nurse   Safe discharge environment identified: Yes  Barriers to discharge: Yes       Entered by: Amber Glover RN 09/18/2022 11:02 PM     Please review provider order for any additional goals.   Nurse to notify provider when observation goals have been met and patient is ready for discharge.

## 2022-09-19 NOTE — PLAN OF CARE
PRIMARY DIAGNOSIS: PLACEMENT  OUTPATIENT/OBSERVATION GOALS TO BE MET BEFORE DISCHARGE:  1. ADLs back to baseline: Yes    2. Activity and level of assistance: total care with lift use for transfers.    3. Pain status: Pain free.    4. Return to near baseline physical activity: Yes      Vitals are Temp: 97.8  F (36.6  C) Temp src: Oral BP: 118/84 Pulse: 72   Resp: 16 SpO2: 95 %.    Patient is Alert and Oriented x4 but forgetful. Flat affect. Calm and cooperative with cares. Denies any pain with interview. Patient has no IV access. Pt is a Regular diet. He is assist of two total care, bedrest, on pulsate mattress. Incontinent of bladder and bowel. Calls appropriately for all his needs. SW is following for placement.          Discharge Planner Nurse   Safe discharge environment identified: Yes  Barriers to discharge: Yes       Entered by: Amber Glover RN 09/19/2022 4:38 AM     Please review provider order for any additional goals.   Nurse to notify provider when observation goals have been met and patient is ready for discharge.

## 2022-09-19 NOTE — PLAN OF CARE
PRIMARY DIAGNOSIS: PLACEMENT  OUTPATIENT/OBSERVATION GOALS TO BE MET BEFORE DISCHARGE:  1. ADLs back to baseline: Yes     2. Activity and level of assistance: total care with lift use for transfers.     3. Pain status: Pain free.     4. Return to near baseline physical activity: Yes        /86 (BP Location: Right arm)   Pulse 81   Temp 97.6  F (36.4  C) (Oral)   Resp 18   Wt 99.9 kg (220 lb 4.8 oz)   SpO2 95%      Patient is Alert and Oriented x4 but forgetful. Flat affect. Calm and cooperative with cares. Denies any pain with interview. Patient has no IV access. Pt is a Regular diet. He is assist of two total care, bedrest, on pulsate mattress. Incontinent of bladder and bowel. Calls appropriately for all his needs. SW is following for placement.

## 2022-09-19 NOTE — PLAN OF CARE
PRIMARY DIAGNOSIS: PLACEMENT  OUTPATIENT/OBSERVATION GOALS TO BE MET BEFORE DISCHARGE:  1. ADLs back to baseline: Yes    2. Activity and level of assistance: total care with lift use for transfers.    3. Pain status: Pain free.    4. Return to near baseline physical activity: Yes      Vitals are Temp: 97.8  F (36.6  C) Temp src: Oral BP: 118/84 Pulse: 72   Resp: 16 SpO2: 95 %.    Patient is Alert and Oriented x4 but forgetful. Flat affect. Calm and cooperative with cares. Denies any pain with interview. Patient has no IV access. Pt is a Regular diet. He is assist of two total care, bedrest, on pulsate mattress. Incontinent of bladder and bowel. Calls appropriately for all his needs. SW is following for placement.          Discharge Planner Nurse   Safe discharge environment identified: Yes  Barriers to discharge: Yes       Entered by: Amber Glover RN 09/19/2022 12:42 AM     Please review provider order for any additional goals.   Nurse to notify provider when observation goals have been met and patient is ready for discharge.

## 2022-09-20 LAB
GLUCOSE BLDC GLUCOMTR-MCNC: 100 MG/DL (ref 70–99)
GLUCOSE BLDC GLUCOMTR-MCNC: 120 MG/DL (ref 70–99)
GLUCOSE BLDC GLUCOMTR-MCNC: 97 MG/DL (ref 70–99)
SARS-COV-2 RNA RESP QL NAA+PROBE: NEGATIVE

## 2022-09-20 PROCEDURE — 250N000013 HC RX MED GY IP 250 OP 250 PS 637: Performed by: HOSPITALIST

## 2022-09-20 PROCEDURE — U0005 INFEC AGEN DETEC AMPLI PROBE: HCPCS | Performed by: PHYSICIAN ASSISTANT

## 2022-09-20 PROCEDURE — 99225 PR SUBSEQUENT OBSERVATION CARE,LEVEL II: CPT | Performed by: PHYSICIAN ASSISTANT

## 2022-09-20 PROCEDURE — 250N000013 HC RX MED GY IP 250 OP 250 PS 637: Performed by: PHYSICIAN ASSISTANT

## 2022-09-20 PROCEDURE — G0378 HOSPITAL OBSERVATION PER HR: HCPCS

## 2022-09-20 PROCEDURE — 82962 GLUCOSE BLOOD TEST: CPT | Mod: 91

## 2022-09-20 RX ORDER — CLOTRIMAZOLE 1 %
CREAM (GRAM) TOPICAL 2 TIMES DAILY
Status: DISCONTINUED | OUTPATIENT
Start: 2022-09-20 | End: 2023-04-07 | Stop reason: HOSPADM

## 2022-09-20 RX ADMIN — Medication 25 MCG: at 08:41

## 2022-09-20 RX ADMIN — LEVOTHYROXINE SODIUM 25 MCG: 0.03 TABLET ORAL at 08:41

## 2022-09-20 RX ADMIN — CLONIDINE HYDROCHLORIDE 0.1 MG: 0.1 TABLET ORAL at 08:39

## 2022-09-20 RX ADMIN — CLONIDINE HYDROCHLORIDE 0.1 MG: 0.1 TABLET ORAL at 20:49

## 2022-09-20 RX ADMIN — ATORVASTATIN CALCIUM 20 MG: 20 TABLET, FILM COATED ORAL at 20:49

## 2022-09-20 RX ADMIN — MIRTAZAPINE 15 MG: 15 TABLET, FILM COATED ORAL at 20:52

## 2022-09-20 RX ADMIN — BACLOFEN 10 MG: 10 TABLET ORAL at 13:53

## 2022-09-20 RX ADMIN — VENLAFAXINE HYDROCHLORIDE 75 MG: 150 CAPSULE, EXTENDED RELEASE ORAL at 20:51

## 2022-09-20 RX ADMIN — VENLAFAXINE HYDROCHLORIDE 150 MG: 150 CAPSULE, EXTENDED RELEASE ORAL at 20:52

## 2022-09-20 RX ADMIN — HYDROXYZINE HYDROCHLORIDE 50 MG: 50 TABLET, FILM COATED ORAL at 13:53

## 2022-09-20 RX ADMIN — BACLOFEN 10 MG: 10 TABLET ORAL at 20:49

## 2022-09-20 RX ADMIN — METFORMIN HYDROCHLORIDE 1000 MG: 500 TABLET ORAL at 17:48

## 2022-09-20 RX ADMIN — HYDROXYZINE HYDROCHLORIDE 50 MG: 50 TABLET, FILM COATED ORAL at 20:52

## 2022-09-20 RX ADMIN — QUETIAPINE FUMARATE 400 MG: 200 TABLET ORAL at 20:49

## 2022-09-20 RX ADMIN — EMPAGLIFLOZIN 10 MG: 10 TABLET, FILM COATED ORAL at 08:40

## 2022-09-20 RX ADMIN — CLOTRIMAZOLE: 0.01 CREAM TOPICAL at 20:48

## 2022-09-20 RX ADMIN — QUETIAPINE 200 MG: 200 TABLET, FILM COATED ORAL at 08:39

## 2022-09-20 RX ADMIN — OLANZAPINE 2.5 MG: 2.5 TABLET, FILM COATED ORAL at 13:53

## 2022-09-20 RX ADMIN — DIVALPROEX SODIUM 500 MG: 500 TABLET, DELAYED RELEASE ORAL at 08:42

## 2022-09-20 RX ADMIN — METOPROLOL SUCCINATE 25 MG: 25 TABLET, EXTENDED RELEASE ORAL at 08:41

## 2022-09-20 RX ADMIN — DIVALPROEX SODIUM 250 MG: 500 TABLET, DELAYED RELEASE ORAL at 08:42

## 2022-09-20 RX ADMIN — HYDROXYZINE HYDROCHLORIDE 50 MG: 50 TABLET, FILM COATED ORAL at 08:40

## 2022-09-20 RX ADMIN — DIVALPROEX SODIUM 1000 MG: 500 TABLET, DELAYED RELEASE ORAL at 20:51

## 2022-09-20 RX ADMIN — METFORMIN HYDROCHLORIDE 1000 MG: 500 TABLET ORAL at 08:38

## 2022-09-20 RX ADMIN — SENNOSIDES AND DOCUSATE SODIUM 1 TABLET: 50; 8.6 TABLET ORAL at 08:41

## 2022-09-20 RX ADMIN — QUETIAPINE 200 MG: 200 TABLET, FILM COATED ORAL at 13:53

## 2022-09-20 RX ADMIN — BACLOFEN 10 MG: 10 TABLET ORAL at 08:40

## 2022-09-20 RX ADMIN — ASPIRIN 81 MG CHEWABLE TABLET 81 MG: 81 TABLET CHEWABLE at 08:40

## 2022-09-20 RX ADMIN — Medication 10 MG: at 20:51

## 2022-09-20 ASSESSMENT — ACTIVITIES OF DAILY LIVING (ADL)
ADLS_ACUITY_SCORE: 49
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
## 2022-09-20 NOTE — PLAN OF CARE
PRIMARY DIAGNOSIS: Placement   OUTPATIENT/OBSERVATION GOALS TO BE MET BEFORE DISCHARGE:  1. ADLs back to baseline: Yes    2. Activity and level of assistance: Total dependant X 2 with a lift    3. Pain status: Pain free.    4. Return to near baseline physical activity: Yes     Discharge Planner Nurse   Safe discharge environment identified: No  Barriers to discharge: Yes   A & O X 4 with some forgetfulness. A X1 with cares. On regular diet. Incontinent of bowel and bladder.Call light appropriate.  Redness/ rash noted to face area.   is following . Awaiting for placement.      /77 (BP Location: Right arm)   Pulse 74   Temp 97.4  F (36.3  C) (Oral)   Resp 16    SpO2 93%       Entered by: Monica Elder RN 09/20/2022 04:16     Please review provider order for any additional goals.   Nurse to notify provider when observation goals have been met and patient is ready for discharge.

## 2022-09-20 NOTE — PLAN OF CARE
PRIMARY DIAGNOSIS: PLACEMENT  OUTPATIENT/OBSERVATION GOALS TO BE MET BEFORE DISCHARGE:  1. ADLs back to baseline: Yes     2. Activity and level of assistance: total care with lift use for transfers.     3. Pain status: Pain free.     4. Return to near baseline physical activity: Yes        /79 (BP Location: Right arm)   Pulse 83   Temp 97.6  F (36.4  C) (Oral)   Resp 18   Wt 99.9 kg (220 lb 4.8 oz)   SpO2 94%      Patient is Alert and Oriented x4 but forgetful. Flat affect. Calm and cooperative with cares. Denies any pain with interview. Patient has no IV access. Pt is a Regular diet. He is assist of two total care, bedrest, on pulsate mattress. Incontinent of bladder changed x 3 this shift. Calls appropriately for all his needs. Rash and redness noted on face, pt said it is normal for him, denies itching or discomfort with presence of rash. SW is following for placement. Continue to provide cares

## 2022-09-20 NOTE — PROGRESS NOTES
PRIMARY DIAGNOSIS: PLACEMENT   OUTPATIENT/OBSERVATION GOALS TO BE MET BEFORE DISCHARGE:  1. ADLs back to baseline: Yes    2. Activity and level of assistance: Baseline/ chairfast     3. Pain status: Pain free.    4. Return to near baseline physical activity: Yes     Discharge Planner Nurse   Safe discharge environment identified: No  Barriers to discharge: Yes       Entered by: Chantel Spencer RN 09/20/2022   Patient is A&O x4.VSS. During safety round was awake. Regular diet, BF was ordered for pt. Bed alarm on. Pt is two assist. Pt is incontinent of bladder/bowel. No IV access. SW following placement.   /84 (BP Location: Right arm)   Pulse 80   Temp 97.3  F (36.3  C) (Oral)   Resp 18   Wt 99.9 kg (220 lb 4.8 oz)   SpO2 93%      Please review provider order for any additional goals.   Nurse to notify provider when observation goals have been met and patient is ready for discharge.

## 2022-09-20 NOTE — PLAN OF CARE
PRIMARY DIAGNOSIS: Placement   OUTPATIENT/OBSERVATION GOALS TO BE MET BEFORE DISCHARGE:  ADLs back to baseline: Yes    Activity and level of assistance: Total dependant X 2 with a lift    Pain status: Pain free.    Return to near baseline physical activity: Yes     Discharge Planner Nurse   Safe discharge environment identified: No  Barriers to discharge: Yes       Entered by: Monica Elder RN 09/20/2022 00:50     Please review provider order for any additional goals.   Nurse to notify provider when observation goals have been met and patient is ready for discharge.

## 2022-09-20 NOTE — PROGRESS NOTES
PRIMARY DIAGNOSIS: PLACEMENT   OUTPATIENT/OBSERVATION GOALS TO BE MET BEFORE DISCHARGE:  1. ADLs back to baseline: Yes    2. Activity and level of assistance: Baseline/ chairfast     3. Pain status: Pain free.    4. Return to near baseline physical activity: Yes     Discharge Planner Nurse   Safe discharge environment identified: No  Barriers to discharge: Yes       Entered by: Chantel Spencer RN 09/20/2022   Patient is A&O x4. During safety round was awake. Regular diet, BF was ordered for pt. Bed alarm on. Pt is two assist. Pt is incontinent of bladder/bowel. No IV access. SW following placement.   /82   Pulse 76   Temp 97.3  F (36.3  C) (Oral)   Resp 18   Wt 99.9 kg (220 lb 4.8 oz)   SpO2 96%      Please review provider order for any additional goals.   Nurse to notify provider when observation goals have been met and patient is ready for discharge.

## 2022-09-20 NOTE — PROGRESS NOTES
Care Management Follow Up    Expected Discharge Date: 09/30/2022     Concerns to be Addressed:  Discharge planning     Patient plan of care discussed at interdisciplinary rounds: Yes    Anticipated Discharge Disposition:  [facility]    Referrals Placed by CM/ANALY:  None at this time  Private pay costs discussed: Not applicable    Additional Information:  ANALY received email from patient[place] stating that [facility] has requested that the  provider updated the 6790 rate sheet as it currently exceeds 24 hours of care/day. This has been communicated to  staff with request to revise. They are also requesting DHS form 5820. Pt will need to discharge in less than two weeks for his waiver to stay open. CM will update SW when there are any new updates. ANALY will continue to follow.    DANITA Obrien, [facility]   Inpatient Care Coordination  [facility]   [phone]

## 2022-09-20 NOTE — PROGRESS NOTES
PRIMARY DIAGNOSIS: PLACEMENT   OUTPATIENT/OBSERVATION GOALS TO BE MET BEFORE DISCHARGE:  1. ADLs back to baseline: Yes    2. Activity and level of assistance: Baseline/ chairfast     3. Pain status: Pain free.    4. Return to near baseline physical activity: Yes     Discharge Planner Nurse   Safe discharge environment identified: No  Barriers to discharge: Yes       Entered by: Chantel Spencer RN 09/20/2022   Patient is A&O x4.VSS. During safety round was awake. Regular diet, BF was ordered for pt. Bed alarm on. Pt is two assist. Pt is incontinent of bladder/bowel. Pt had redness/ pain on the groin provider notified.No IV access. SW following placement.   /88 (BP Location: Right arm)   Pulse 76   Temp 97.8  F (36.6  C) (Oral)   Resp 16   Wt 99.9 kg (220 lb 4.8 oz)   SpO2 94%      Please review provider order for any additional goals.   Nurse to notify provider when observation goals have been met and patient is ready for discharge.

## 2022-09-21 LAB — GLUCOSE BLDC GLUCOMTR-MCNC: 105 MG/DL (ref 70–99)

## 2022-09-21 PROCEDURE — 82962 GLUCOSE BLOOD TEST: CPT

## 2022-09-21 PROCEDURE — 99225 PR SUBSEQUENT OBSERVATION CARE,LEVEL II: CPT | Performed by: PHYSICIAN ASSISTANT

## 2022-09-21 PROCEDURE — 250N000013 HC RX MED GY IP 250 OP 250 PS 637: Performed by: PHYSICIAN ASSISTANT

## 2022-09-21 PROCEDURE — 250N000013 HC RX MED GY IP 250 OP 250 PS 637: Performed by: INTERNAL MEDICINE

## 2022-09-21 PROCEDURE — G0378 HOSPITAL OBSERVATION PER HR: HCPCS

## 2022-09-21 PROCEDURE — 250N000013 HC RX MED GY IP 250 OP 250 PS 637: Performed by: HOSPITALIST

## 2022-09-21 RX ADMIN — DIVALPROEX SODIUM 1000 MG: 500 TABLET, DELAYED RELEASE ORAL at 21:48

## 2022-09-21 RX ADMIN — VENLAFAXINE HYDROCHLORIDE 75 MG: 150 CAPSULE, EXTENDED RELEASE ORAL at 21:47

## 2022-09-21 RX ADMIN — VENLAFAXINE HYDROCHLORIDE 150 MG: 150 CAPSULE, EXTENDED RELEASE ORAL at 21:47

## 2022-09-21 RX ADMIN — CLOTRIMAZOLE: 0.01 CREAM TOPICAL at 21:52

## 2022-09-21 RX ADMIN — MIRTAZAPINE 15 MG: 15 TABLET, FILM COATED ORAL at 21:45

## 2022-09-21 RX ADMIN — ASPIRIN 81 MG CHEWABLE TABLET 81 MG: 81 TABLET CHEWABLE at 08:37

## 2022-09-21 RX ADMIN — HYDROXYZINE HYDROCHLORIDE 50 MG: 50 TABLET, FILM COATED ORAL at 13:44

## 2022-09-21 RX ADMIN — QUETIAPINE 200 MG: 200 TABLET, FILM COATED ORAL at 13:45

## 2022-09-21 RX ADMIN — METFORMIN HYDROCHLORIDE 1000 MG: 500 TABLET ORAL at 18:28

## 2022-09-21 RX ADMIN — BACLOFEN 10 MG: 10 TABLET ORAL at 08:38

## 2022-09-21 RX ADMIN — BACLOFEN 10 MG: 10 TABLET ORAL at 21:43

## 2022-09-21 RX ADMIN — BACLOFEN 10 MG: 10 TABLET ORAL at 13:45

## 2022-09-21 RX ADMIN — MIRTAZAPINE 15 MG: 15 TABLET, FILM COATED ORAL at 21:46

## 2022-09-21 RX ADMIN — Medication 10 MG: at 21:49

## 2022-09-21 RX ADMIN — METFORMIN HYDROCHLORIDE 1000 MG: 500 TABLET ORAL at 08:30

## 2022-09-21 RX ADMIN — Medication 25 MCG: at 08:37

## 2022-09-21 RX ADMIN — CLONIDINE HYDROCHLORIDE 0.1 MG: 0.1 TABLET ORAL at 08:37

## 2022-09-21 RX ADMIN — QUETIAPINE 200 MG: 200 TABLET, FILM COATED ORAL at 08:38

## 2022-09-21 RX ADMIN — CLONIDINE HYDROCHLORIDE 0.1 MG: 0.1 TABLET ORAL at 21:48

## 2022-09-21 RX ADMIN — HYDROXYZINE HYDROCHLORIDE 50 MG: 50 TABLET, FILM COATED ORAL at 21:46

## 2022-09-21 RX ADMIN — SENNOSIDES AND DOCUSATE SODIUM 2 TABLET: 50; 8.6 TABLET ORAL at 21:42

## 2022-09-21 RX ADMIN — HYDROXYZINE HYDROCHLORIDE 50 MG: 50 TABLET, FILM COATED ORAL at 08:38

## 2022-09-21 RX ADMIN — SENNOSIDES AND DOCUSATE SODIUM 1 TABLET: 50; 8.6 TABLET ORAL at 08:37

## 2022-09-21 RX ADMIN — METOPROLOL SUCCINATE 25 MG: 25 TABLET, EXTENDED RELEASE ORAL at 08:38

## 2022-09-21 RX ADMIN — ATORVASTATIN CALCIUM 20 MG: 20 TABLET, FILM COATED ORAL at 21:43

## 2022-09-21 RX ADMIN — CLOTRIMAZOLE: 0.01 CREAM TOPICAL at 08:30

## 2022-09-21 RX ADMIN — EMPAGLIFLOZIN 10 MG: 10 TABLET, FILM COATED ORAL at 08:39

## 2022-09-21 RX ADMIN — DIVALPROEX SODIUM 500 MG: 500 TABLET, DELAYED RELEASE ORAL at 08:38

## 2022-09-21 RX ADMIN — LEVOTHYROXINE SODIUM 25 MCG: 0.03 TABLET ORAL at 08:39

## 2022-09-21 RX ADMIN — DIVALPROEX SODIUM 250 MG: 500 TABLET, DELAYED RELEASE ORAL at 08:40

## 2022-09-21 RX ADMIN — OLANZAPINE 2.5 MG: 2.5 TABLET, FILM COATED ORAL at 13:45

## 2022-09-21 RX ADMIN — QUETIAPINE FUMARATE 400 MG: 200 TABLET ORAL at 21:48

## 2022-09-21 ASSESSMENT — ACTIVITIES OF DAILY LIVING (ADL)
ADLS_ACUITY_SCORE: 49
ADLS_ACUITY_SCORE: 53
ADLS_ACUITY_SCORE: 49
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
## 2022-09-21 NOTE — PROGRESS NOTES
PRIMARY DIAGNOSIS: PLACEMENT   OUTPATIENT/OBSERVATION GOALS TO BE MET BEFORE DISCHARGE:  1. ADLs back to baseline: Yes    2. Activity and level of assistance: Baseline/ chairfast     3. Pain status: Pain free.    4. Return to near baseline physical activity: Yes     Discharge Planner Nurse   Safe discharge environment identified: No  Barriers to discharge: Yes       Entered by: Chantel Spencer RN 09/21/2022   Patient is A&O x3.VSS. During safety round was awake. Regular diet, BF was ordered for pt. Bed alarm on. Pt is two assist. Pt is incontinent of bladder/bowel. Blue boot on for skin breakdown. No IV access. SW following placement.   /82 (BP Location: Right arm, Patient Position: Semi-Toro's, Cuff Size: Adult Regular)   Pulse 81   Temp 97.9  F (36.6  C) (Oral)   Resp 16   Wt 99.9 kg (220 lb 4.8 oz)   SpO2 94%      Please review provider order for any additional goals.   Nurse to notify provider when observation goals have been met and patient is ready for discharge.

## 2022-09-21 NOTE — PLAN OF CARE
PRIMARY DIAGNOSIS: Placement     OUTPATIENT/OBSERVATION GOALS TO BE MET BEFORE DISCHARGE:  1. ADLs back to baseline: Yes     2. Activity and level of assistance: Total Care at baseline.      3. Pain status: Pain free.     4. Return to near baseline physical activity: Yes          Discharge Planner Nurse   Safe discharge environment identified: No  Barriers to discharge: No       Entered by: Batool Carrasco RN      A/O x4, pleasant. Total assist. Tolerating regular diet.  Incontinent of both bowels and bladder. Denies pain. Pt resting/sleeping comfortably.    Will continue to monitor.     Please review provider order for any additional goals.   Nurse to notify provider when observation goals have been met and patient is ready for discharge.

## 2022-09-21 NOTE — PLAN OF CARE
PRIMARY DIAGNOSIS: Placement     OUTPATIENT/OBSERVATION GOALS TO BE MET BEFORE DISCHARGE:  ADLs back to baseline: Yes     Activity and level of assistance: Total Care at baseline.      Pain status: Pain free.     Return to near baseline physical activity: Yes          Discharge Planner Nurse   Safe discharge environment identified: No  Barriers to discharge: No       Entered by: Batool Carrasco RN      A/O x4, pleasant. Total assist. Tolerating regular diet.  Incontinent of both bowels and bladder. Skin with blanchable redness to groin, sacrum, and ankles. Anti fungal cream applied to left groin/scrotum area. Pt states the area is tender to touch. No open areas. Face is dry/flaky. Denies pain.      Please review provider order for any additional goals.   Nurse to notify provider when observation goals have been met and patient is ready for discharge.

## 2022-09-21 NOTE — PLAN OF CARE
PRIMARY DIAGNOSIS: Placement     OUTPATIENT/OBSERVATION GOALS TO BE MET BEFORE DISCHARGE:  Vital signs:  Temp: 97.9  F (36.6  C) Temp src: Oral BP: 124/78 Pulse: 75   Resp: 16 SpO2: 94 % O2 Device: None (Room air)     Weight: 99.9 kg (220 lb 4.8 oz)  There is no height or weight on file to calculate BMI.    1. ADLs back to baseline: Yes     2. Activity and level of assistance: Total Care at baseline.      3. Pain status: Pain free.     4. Return to near baseline physical activity: Yes          Discharge Planner Nurse   Safe discharge environment identified: No  Barriers to discharge: No       Entered by: Batool Carrasco RN      A/O x4, pleasant. Total assist. Tolerating regular diet.  Incontinent of both bowels and bladder. Denies pain. Pt resting/sleeping comfortably.    Will continue to monitor.     Please review provider order for any additional goals.   Nurse to notify provider when observation goals have been met and patient is ready for discharge.

## 2022-09-22 LAB
GLUCOSE BLDC GLUCOMTR-MCNC: 150 MG/DL (ref 70–99)
GLUCOSE BLDC GLUCOMTR-MCNC: 91 MG/DL (ref 70–99)

## 2022-09-22 PROCEDURE — 82962 GLUCOSE BLOOD TEST: CPT

## 2022-09-22 PROCEDURE — 250N000013 HC RX MED GY IP 250 OP 250 PS 637: Performed by: PHYSICIAN ASSISTANT

## 2022-09-22 PROCEDURE — 250N000013 HC RX MED GY IP 250 OP 250 PS 637: Performed by: HOSPITALIST

## 2022-09-22 PROCEDURE — G0378 HOSPITAL OBSERVATION PER HR: HCPCS

## 2022-09-22 PROCEDURE — 99225 PR SUBSEQUENT OBSERVATION CARE,LEVEL II: CPT | Performed by: NURSE PRACTITIONER

## 2022-09-22 RX ADMIN — METFORMIN HYDROCHLORIDE 1000 MG: 500 TABLET ORAL at 09:31

## 2022-09-22 RX ADMIN — METOPROLOL SUCCINATE 25 MG: 25 TABLET, EXTENDED RELEASE ORAL at 09:32

## 2022-09-22 RX ADMIN — HYDROXYZINE HYDROCHLORIDE 50 MG: 50 TABLET, FILM COATED ORAL at 20:31

## 2022-09-22 RX ADMIN — DIVALPROEX SODIUM 1000 MG: 500 TABLET, DELAYED RELEASE ORAL at 22:51

## 2022-09-22 RX ADMIN — METFORMIN HYDROCHLORIDE 1000 MG: 500 TABLET ORAL at 18:01

## 2022-09-22 RX ADMIN — QUETIAPINE 200 MG: 200 TABLET, FILM COATED ORAL at 09:29

## 2022-09-22 RX ADMIN — SENNOSIDES AND DOCUSATE SODIUM 1 TABLET: 50; 8.6 TABLET ORAL at 09:31

## 2022-09-22 RX ADMIN — ATORVASTATIN CALCIUM 20 MG: 20 TABLET, FILM COATED ORAL at 20:31

## 2022-09-22 RX ADMIN — BACLOFEN 10 MG: 10 TABLET ORAL at 15:09

## 2022-09-22 RX ADMIN — DIVALPROEX SODIUM 250 MG: 500 TABLET, DELAYED RELEASE ORAL at 09:35

## 2022-09-22 RX ADMIN — ASPIRIN 81 MG CHEWABLE TABLET 81 MG: 81 TABLET CHEWABLE at 09:30

## 2022-09-22 RX ADMIN — Medication 25 MCG: at 09:32

## 2022-09-22 RX ADMIN — VENLAFAXINE HYDROCHLORIDE 75 MG: 150 CAPSULE, EXTENDED RELEASE ORAL at 22:54

## 2022-09-22 RX ADMIN — OLANZAPINE 2.5 MG: 2.5 TABLET, FILM COATED ORAL at 15:09

## 2022-09-22 RX ADMIN — VENLAFAXINE HYDROCHLORIDE 150 MG: 150 CAPSULE, EXTENDED RELEASE ORAL at 22:51

## 2022-09-22 RX ADMIN — QUETIAPINE 200 MG: 200 TABLET, FILM COATED ORAL at 15:09

## 2022-09-22 RX ADMIN — HYDROXYZINE HYDROCHLORIDE 50 MG: 50 TABLET, FILM COATED ORAL at 15:09

## 2022-09-22 RX ADMIN — MIRTAZAPINE 15 MG: 15 TABLET, FILM COATED ORAL at 22:51

## 2022-09-22 RX ADMIN — EMPAGLIFLOZIN 10 MG: 10 TABLET, FILM COATED ORAL at 09:30

## 2022-09-22 RX ADMIN — HYDROXYZINE HYDROCHLORIDE 50 MG: 50 TABLET, FILM COATED ORAL at 09:32

## 2022-09-22 RX ADMIN — CLONIDINE HYDROCHLORIDE 0.1 MG: 0.1 TABLET ORAL at 09:31

## 2022-09-22 RX ADMIN — CLOTRIMAZOLE: 0.01 CREAM TOPICAL at 20:54

## 2022-09-22 RX ADMIN — LEVOTHYROXINE SODIUM 25 MCG: 0.03 TABLET ORAL at 09:29

## 2022-09-22 RX ADMIN — CLOTRIMAZOLE: 0.01 CREAM TOPICAL at 14:00

## 2022-09-22 RX ADMIN — BACLOFEN 10 MG: 10 TABLET ORAL at 20:31

## 2022-09-22 RX ADMIN — Medication 10 MG: at 22:50

## 2022-09-22 RX ADMIN — CLONIDINE HYDROCHLORIDE 0.1 MG: 0.1 TABLET ORAL at 20:31

## 2022-09-22 RX ADMIN — QUETIAPINE FUMARATE 400 MG: 200 TABLET ORAL at 22:50

## 2022-09-22 RX ADMIN — DIVALPROEX SODIUM 500 MG: 500 TABLET, DELAYED RELEASE ORAL at 09:30

## 2022-09-22 RX ADMIN — BACLOFEN 10 MG: 10 TABLET ORAL at 09:31

## 2022-09-22 ASSESSMENT — ACTIVITIES OF DAILY LIVING (ADL)
ADLS_ACUITY_SCORE: 47
ADLS_ACUITY_SCORE: 51
ADLS_ACUITY_SCORE: 47
ADLS_ACUITY_SCORE: 51
ADLS_ACUITY_SCORE: 47
ADLS_ACUITY_SCORE: 51
ADLS_ACUITY_SCORE: 47
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
## 2022-09-22 NOTE — PLAN OF CARE
PRIMARY DIAGNOSIS: Placement  OUTPATIENT/OBSERVATION GOALS TO BE MET BEFORE DISCHARGE:  ADLs back to baseline: Yes    Activity and level of assistance: Baseline lift.    Pain status: Pain free.    Return to near baseline physical activity: Yes     Discharge Planner Nurse   Safe discharge environment identified: No  Barriers to discharge: Yes       Entered by: Lola Phillips RN 09/22/2022 4:08 PM     Please review provider order for any additional goals.   Nurse to notify provider when observation goals have been met and patient is ready for discharge.

## 2022-09-22 NOTE — PLAN OF CARE
PRIMARY DIAGNOSIS: Placement   OUTPATIENT/OBSERVATION GOALS TO BE MET BEFORE DISCHARGE:  ADLs back to baseline: Yes    Activity and level of assistance: Up with maximum assistance. SW Following.     Pain status: Pain free.    Return to near baseline physical activity: Yes    Vitals are Temp: 98.7  F (37.1  C) Temp src: Axillary BP: (!) 132/94 Pulse: 85   Resp: 18 SpO2: 95 %.    Patient is Alert and Oriented x4 but forgetful. Flat affect. Calm and cooperative with cares. Denies any pain with interview. Patient has no IV access. Pt is a Regular diet. He is assist of two total care, bedrest, on pulsate mattress. Incontinent of bladder and bowel. Calls appropriately for all his needs. SW is following for placement.        Discharge Planner Nurse   Safe discharge environment identified: Yes  Barriers to discharge: Yes       Entered by: Amber Glover RN 09/22/2022 12:33 AM    Please review provider order for any additional goals.   Nurse to notify provider when observation goals have been met and patient is ready for discharge.

## 2022-09-22 NOTE — PLAN OF CARE
PRIMARY DIAGNOSIS: Placement   OUTPATIENT/OBSERVATION GOALS TO BE MET BEFORE DISCHARGE:  1. ADLs back to baseline: Yes    2. Activity and level of assistance: Up with maximum assistance. SW Following.     3. Pain status: Pain free.    4. Return to near baseline physical activity: Yes    Vitals are Temp: 98.7  F (37.1  C) Temp src: Axillary BP: (!) 132/94 Pulse: 85   Resp: 18 SpO2: 95 %.    Patient is Alert and Oriented x4 but forgetful. Flat affect. Calm and cooperative with cares. Denies any pain with interview. Patient has no IV access. Pt is a Regular diet. He is assist of two total care, bedrest, on pulsate mattress. Incontinent of bladder and bowel. Calls appropriately for all his needs. SW is following for placement.        Discharge Planner Nurse   Safe discharge environment identified: Yes  Barriers to discharge: Yes       Entered by: Amber Glover RN 09/22/2022 6:44 AM    Please review provider order for any additional goals.   Nurse to notify provider when observation goals have been met and patient is ready for discharge.

## 2022-09-22 NOTE — PLAN OF CARE
PRIMARY DIAGNOSIS: Placement  OUTPATIENT/OBSERVATION GOALS TO BE MET BEFORE DISCHARGE:  ADLs back to baseline: Yes    Activity and level of assistance: Baseline lift    Pain status: Pain free.    Return to near baseline physical activity: Yes     Discharge Planner Nurse   Safe discharge environment identified: No  Barriers to discharge: Yes       Entered by: Lola Phillips RN 09/22/2022 4:09 PM     Please review provider order for any additional goals.   Nurse to notify provider when observation goals have been met and patient is ready for discharge.        End of shift summary: Pt oriented to self. VSS. Denies pain. Bed bath done today. Groin area red. Voided in urinal today. No BM. Waiting for placement.

## 2022-09-22 NOTE — PLAN OF CARE
Goal Outcome Evaluation:      Vitals stable and afebrile. Ate well. Denies pain. Watching TV. Questioning where is his wheelchair and what is his mothers phone number. Not foulnd in chart. Charge nurse notified. Message left for  to follow up tomorrow and patient informed. Assisted with urinary incontinence. Last bm charted 9/15. Took senna 1 tablet this am. Will give 2 tablets tonight and notify next nurse should try next step in stool program, MOM or miralax tomorrow. Awaiting bed placement.

## 2022-09-22 NOTE — PROGRESS NOTES
Care Management Follow Up    Expected Discharge Date: 09/30/2022     Concerns to be Addressed:  Discharge planning     Patient plan of care discussed at interdisciplinary rounds: Yes    Anticipated Discharge Disposition:  prison    Patient/Family in Agreement with the Plan:  Yes    Referrals Placed by CM/SW:  None at this time  Private pay costs discussed: Not applicable    Additional Information:  SW notified by message from RN last evening that pt is wondering where his WC is, and requesting contact info for his mother. SW left  for previous [facility], [phone], inquiring if pt's WC is still at their home, and if they have pt's mothers contact info on file. Awaiting return call.     DANITA Obrien, [facility]   Inpatient Care Coordination  [facility]   [phone]

## 2022-09-23 LAB
GLUCOSE BLDC GLUCOMTR-MCNC: 106 MG/DL (ref 70–99)
GLUCOSE BLDC GLUCOMTR-MCNC: 107 MG/DL (ref 70–99)
GLUCOSE BLDC GLUCOMTR-MCNC: 90 MG/DL (ref 70–99)

## 2022-09-23 PROCEDURE — G0378 HOSPITAL OBSERVATION PER HR: HCPCS

## 2022-09-23 PROCEDURE — 99225 PR SUBSEQUENT OBSERVATION CARE,LEVEL II: CPT | Performed by: NURSE PRACTITIONER

## 2022-09-23 PROCEDURE — 82962 GLUCOSE BLOOD TEST: CPT

## 2022-09-23 PROCEDURE — 250N000013 HC RX MED GY IP 250 OP 250 PS 637: Performed by: PHYSICIAN ASSISTANT

## 2022-09-23 PROCEDURE — 250N000013 HC RX MED GY IP 250 OP 250 PS 637: Performed by: HOSPITALIST

## 2022-09-23 RX ADMIN — DIVALPROEX SODIUM 250 MG: 500 TABLET, DELAYED RELEASE ORAL at 07:39

## 2022-09-23 RX ADMIN — METFORMIN HYDROCHLORIDE 1000 MG: 500 TABLET ORAL at 07:38

## 2022-09-23 RX ADMIN — METOPROLOL SUCCINATE 25 MG: 25 TABLET, EXTENDED RELEASE ORAL at 07:38

## 2022-09-23 RX ADMIN — DIVALPROEX SODIUM 1000 MG: 500 TABLET, DELAYED RELEASE ORAL at 21:57

## 2022-09-23 RX ADMIN — CLOTRIMAZOLE: 0.01 CREAM TOPICAL at 20:51

## 2022-09-23 RX ADMIN — METFORMIN HYDROCHLORIDE 1000 MG: 500 TABLET ORAL at 17:20

## 2022-09-23 RX ADMIN — MIRTAZAPINE 15 MG: 15 TABLET, FILM COATED ORAL at 21:57

## 2022-09-23 RX ADMIN — ATORVASTATIN CALCIUM 20 MG: 20 TABLET, FILM COATED ORAL at 20:48

## 2022-09-23 RX ADMIN — ASPIRIN 81 MG CHEWABLE TABLET 81 MG: 81 TABLET CHEWABLE at 07:38

## 2022-09-23 RX ADMIN — VENLAFAXINE HYDROCHLORIDE 75 MG: 150 CAPSULE, EXTENDED RELEASE ORAL at 21:58

## 2022-09-23 RX ADMIN — BACLOFEN 10 MG: 10 TABLET ORAL at 13:19

## 2022-09-23 RX ADMIN — QUETIAPINE 200 MG: 200 TABLET, FILM COATED ORAL at 13:19

## 2022-09-23 RX ADMIN — HYDROXYZINE HYDROCHLORIDE 50 MG: 50 TABLET, FILM COATED ORAL at 07:38

## 2022-09-23 RX ADMIN — BACLOFEN 10 MG: 10 TABLET ORAL at 07:38

## 2022-09-23 RX ADMIN — VENLAFAXINE HYDROCHLORIDE 150 MG: 150 CAPSULE, EXTENDED RELEASE ORAL at 21:57

## 2022-09-23 RX ADMIN — OLANZAPINE 2.5 MG: 2.5 TABLET, FILM COATED ORAL at 13:18

## 2022-09-23 RX ADMIN — Medication 10 MG: at 21:57

## 2022-09-23 RX ADMIN — LEVOTHYROXINE SODIUM 25 MCG: 0.03 TABLET ORAL at 07:38

## 2022-09-23 RX ADMIN — CLONIDINE HYDROCHLORIDE 0.1 MG: 0.1 TABLET ORAL at 20:48

## 2022-09-23 RX ADMIN — DIVALPROEX SODIUM 500 MG: 500 TABLET, DELAYED RELEASE ORAL at 07:38

## 2022-09-23 RX ADMIN — HYDROXYZINE HYDROCHLORIDE 50 MG: 50 TABLET, FILM COATED ORAL at 20:47

## 2022-09-23 RX ADMIN — SENNOSIDES AND DOCUSATE SODIUM 1 TABLET: 50; 8.6 TABLET ORAL at 07:38

## 2022-09-23 RX ADMIN — Medication 25 MCG: at 07:38

## 2022-09-23 RX ADMIN — EMPAGLIFLOZIN 10 MG: 10 TABLET, FILM COATED ORAL at 07:38

## 2022-09-23 RX ADMIN — QUETIAPINE 200 MG: 200 TABLET, FILM COATED ORAL at 07:38

## 2022-09-23 RX ADMIN — QUETIAPINE FUMARATE 400 MG: 200 TABLET ORAL at 21:57

## 2022-09-23 RX ADMIN — BACLOFEN 10 MG: 10 TABLET ORAL at 20:48

## 2022-09-23 RX ADMIN — CLOTRIMAZOLE: 0.01 CREAM TOPICAL at 07:43

## 2022-09-23 RX ADMIN — HYDROXYZINE HYDROCHLORIDE 50 MG: 50 TABLET, FILM COATED ORAL at 13:18

## 2022-09-23 RX ADMIN — CLONIDINE HYDROCHLORIDE 0.1 MG: 0.1 TABLET ORAL at 07:42

## 2022-09-23 ASSESSMENT — ACTIVITIES OF DAILY LIVING (ADL)
ADLS_ACUITY_SCORE: 51
ADLS_ACUITY_SCORE: 47
ADLS_ACUITY_SCORE: 51
ADLS_ACUITY_SCORE: 47
ADLS_ACUITY_SCORE: 51

## 2022-09-23 NOTE — PLAN OF CARE
Care from [id number]  Inpatient Progress Note    /89 (BP Location: Right arm)   Pulse 66   Temp 97.5  F (36.4  C) (Axillary)   Resp 16   Wt 99.9 kg (220 lb 4.8 oz)   SpO2 100%     ORIENTATION: A&O to self only  VS: WDL  LS: Clear and equal bilaterally  GI: BS audible, abd distended/full. Pt denies tenderness.  : Voids spontaneously. Incontinent of B&B. Regular brief checks and changes. Pt calls appropriately    SKIN: Redness to groin area. Sched ointment. Barrier cream applied as needed.  ACTIVITY: A2 lift  PAIN: Denies at this time   PLAN: Awaiting placement

## 2022-09-23 NOTE — PROGRESS NOTES
PRIMARY DIAGNOSIS: PLACEMENT  OUTPATIENT/OBSERVATION GOALS TO BE MET BEFORE DISCHARGE:  1. ADLs back to baseline: Yes    2. Activity and level of assistance: Up with maximum assistance with a lift.     3. Pain status: Pain free.    4. Return to near baseline physical activity: Yes     Discharge Planner Nurse   Safe discharge environment identified: No  Barriers to discharge: Yes       Entered by: Sharmaine Bridges RN 09/22/2022 11:28 PM  Please review provider order for any additional goals.   Nurse to notify provider when observation goals have been met and patient is ready for discharge.

## 2022-09-23 NOTE — PLAN OF CARE
Care from [id number]  Inpatient Progress Note    BP (!) 128/90 (BP Location: Right arm)   Pulse 72   Temp 97.3  F (36.3  C) (Oral)   Resp 18   Wt 99.9 kg (220 lb 4.8 oz)   SpO2 100%     ORIENTATION: A&O to self only  VS: WDL  LS: Clear and equal bilaterally  GI: BS audible, abd distended/full. Pt denies tenderness.  : Voids spontaneously. Incontinent of B&B. Regular brief checks and changes. Pt calls appropriately    SKIN: Redness to groin area. Sched ointment. Barrier cream applied as needed.  ACTIVITY: A2 lift  PAIN: Denies at this time   PLAN: Awaiting placement
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
## 2022-09-23 NOTE — PROGRESS NOTES
Pt alert and oriented to self, VSS, on RA, denies pain, A x 2 with a lift, hair was cleaned. Pt is on regular diet, needs help ordering, was incontinent of B&B, the groin area there is some redness and has an order for ointment and also barrier cream was applied. A waiting placement.

## 2022-09-24 LAB
GLUCOSE BLDC GLUCOMTR-MCNC: 119 MG/DL (ref 70–99)
GLUCOSE BLDC GLUCOMTR-MCNC: 76 MG/DL (ref 70–99)
GLUCOSE BLDC GLUCOMTR-MCNC: 88 MG/DL (ref 70–99)

## 2022-09-24 PROCEDURE — 250N000013 HC RX MED GY IP 250 OP 250 PS 637: Performed by: PHYSICIAN ASSISTANT

## 2022-09-24 PROCEDURE — 82962 GLUCOSE BLOOD TEST: CPT | Mod: 91

## 2022-09-24 PROCEDURE — 99224 PR SUBSEQUENT OBSERVATION CARE,LEVEL I: CPT | Performed by: NURSE PRACTITIONER

## 2022-09-24 PROCEDURE — 250N000013 HC RX MED GY IP 250 OP 250 PS 637: Performed by: HOSPITALIST

## 2022-09-24 PROCEDURE — G0378 HOSPITAL OBSERVATION PER HR: HCPCS

## 2022-09-24 RX ADMIN — ASPIRIN 81 MG CHEWABLE TABLET 81 MG: 81 TABLET CHEWABLE at 07:55

## 2022-09-24 RX ADMIN — QUETIAPINE FUMARATE 400 MG: 200 TABLET ORAL at 21:04

## 2022-09-24 RX ADMIN — Medication 10 MG: at 21:04

## 2022-09-24 RX ADMIN — METFORMIN HYDROCHLORIDE 1000 MG: 500 TABLET ORAL at 07:54

## 2022-09-24 RX ADMIN — OLANZAPINE 2.5 MG: 2.5 TABLET, FILM COATED ORAL at 13:22

## 2022-09-24 RX ADMIN — BACLOFEN 10 MG: 10 TABLET ORAL at 07:54

## 2022-09-24 RX ADMIN — CLOTRIMAZOLE: 0.01 CREAM TOPICAL at 07:58

## 2022-09-24 RX ADMIN — VENLAFAXINE HYDROCHLORIDE 150 MG: 150 CAPSULE, EXTENDED RELEASE ORAL at 21:07

## 2022-09-24 RX ADMIN — Medication 25 MCG: at 07:56

## 2022-09-24 RX ADMIN — SENNOSIDES AND DOCUSATE SODIUM 1 TABLET: 50; 8.6 TABLET ORAL at 07:57

## 2022-09-24 RX ADMIN — MIRTAZAPINE 15 MG: 15 TABLET, FILM COATED ORAL at 21:06

## 2022-09-24 RX ADMIN — DIVALPROEX SODIUM 500 MG: 500 TABLET, DELAYED RELEASE ORAL at 07:57

## 2022-09-24 RX ADMIN — BACLOFEN 10 MG: 10 TABLET ORAL at 13:21

## 2022-09-24 RX ADMIN — METFORMIN HYDROCHLORIDE 1000 MG: 500 TABLET ORAL at 18:08

## 2022-09-24 RX ADMIN — LEVOTHYROXINE SODIUM 25 MCG: 0.03 TABLET ORAL at 07:56

## 2022-09-24 RX ADMIN — DIVALPROEX SODIUM 250 MG: 500 TABLET, DELAYED RELEASE ORAL at 07:55

## 2022-09-24 RX ADMIN — CLOTRIMAZOLE: 0.01 CREAM TOPICAL at 21:12

## 2022-09-24 RX ADMIN — DIVALPROEX SODIUM 1000 MG: 500 TABLET, DELAYED RELEASE ORAL at 21:06

## 2022-09-24 RX ADMIN — VENLAFAXINE HYDROCHLORIDE 75 MG: 150 CAPSULE, EXTENDED RELEASE ORAL at 21:06

## 2022-09-24 RX ADMIN — QUETIAPINE 200 MG: 200 TABLET, FILM COATED ORAL at 13:22

## 2022-09-24 RX ADMIN — ATORVASTATIN CALCIUM 20 MG: 20 TABLET, FILM COATED ORAL at 21:04

## 2022-09-24 RX ADMIN — METOPROLOL SUCCINATE 25 MG: 25 TABLET, EXTENDED RELEASE ORAL at 07:54

## 2022-09-24 RX ADMIN — EMPAGLIFLOZIN 10 MG: 10 TABLET, FILM COATED ORAL at 07:57

## 2022-09-24 RX ADMIN — HYDROXYZINE HYDROCHLORIDE 50 MG: 50 TABLET, FILM COATED ORAL at 21:05

## 2022-09-24 RX ADMIN — CLONIDINE HYDROCHLORIDE 0.1 MG: 0.1 TABLET ORAL at 07:56

## 2022-09-24 RX ADMIN — QUETIAPINE 200 MG: 200 TABLET, FILM COATED ORAL at 07:56

## 2022-09-24 RX ADMIN — BACLOFEN 10 MG: 10 TABLET ORAL at 21:05

## 2022-09-24 RX ADMIN — HYDROXYZINE HYDROCHLORIDE 50 MG: 50 TABLET, FILM COATED ORAL at 13:21

## 2022-09-24 RX ADMIN — CLONIDINE HYDROCHLORIDE 0.1 MG: 0.1 TABLET ORAL at 21:06

## 2022-09-24 RX ADMIN — HYDROXYZINE HYDROCHLORIDE 50 MG: 50 TABLET, FILM COATED ORAL at 07:57

## 2022-09-24 ASSESSMENT — ACTIVITIES OF DAILY LIVING (ADL)
ADLS_ACUITY_SCORE: 51
ADLS_ACUITY_SCORE: 47
ADLS_ACUITY_SCORE: 51
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
## 2022-09-24 NOTE — PROGRESS NOTES
"Pt called the desk to get changed. Pt cleaned and brief changed. Boosted him self in bed independently. Writer cover pt with blanket , he started repeating \"Fuck\" when asked what was wrong he said \"nothing Fuck\" . Asked him if there is anything I can do to make him more comfortable he said \"Leave the room Fuck\". Writer left the room and RN notified.  "

## 2022-09-25 LAB
GLUCOSE BLDC GLUCOMTR-MCNC: 105 MG/DL (ref 70–99)
GLUCOSE BLDC GLUCOMTR-MCNC: 105 MG/DL (ref 70–99)
GLUCOSE BLDC GLUCOMTR-MCNC: 81 MG/DL (ref 70–99)

## 2022-09-25 PROCEDURE — 82962 GLUCOSE BLOOD TEST: CPT

## 2022-09-25 PROCEDURE — 250N000013 HC RX MED GY IP 250 OP 250 PS 637: Performed by: HOSPITALIST

## 2022-09-25 PROCEDURE — G0378 HOSPITAL OBSERVATION PER HR: HCPCS

## 2022-09-25 PROCEDURE — 99207 PR NO BILLABLE SERVICE THIS VISIT: CPT | Performed by: NURSE PRACTITIONER

## 2022-09-25 PROCEDURE — 250N000013 HC RX MED GY IP 250 OP 250 PS 637: Performed by: PHYSICIAN ASSISTANT

## 2022-09-25 RX ADMIN — QUETIAPINE 200 MG: 200 TABLET, FILM COATED ORAL at 13:07

## 2022-09-25 RX ADMIN — OLANZAPINE 2.5 MG: 2.5 TABLET, FILM COATED ORAL at 13:07

## 2022-09-25 RX ADMIN — MIRTAZAPINE 15 MG: 15 TABLET, FILM COATED ORAL at 21:51

## 2022-09-25 RX ADMIN — VENLAFAXINE HYDROCHLORIDE 75 MG: 150 CAPSULE, EXTENDED RELEASE ORAL at 21:53

## 2022-09-25 RX ADMIN — QUETIAPINE FUMARATE 400 MG: 200 TABLET ORAL at 21:52

## 2022-09-25 RX ADMIN — EMPAGLIFLOZIN 10 MG: 10 TABLET, FILM COATED ORAL at 07:57

## 2022-09-25 RX ADMIN — LEVOTHYROXINE SODIUM 25 MCG: 0.03 TABLET ORAL at 07:57

## 2022-09-25 RX ADMIN — BACLOFEN 10 MG: 10 TABLET ORAL at 07:58

## 2022-09-25 RX ADMIN — DIVALPROEX SODIUM 1000 MG: 500 TABLET, DELAYED RELEASE ORAL at 21:52

## 2022-09-25 RX ADMIN — Medication 10 MG: at 21:52

## 2022-09-25 RX ADMIN — VENLAFAXINE HYDROCHLORIDE 150 MG: 150 CAPSULE, EXTENDED RELEASE ORAL at 21:53

## 2022-09-25 RX ADMIN — CLONIDINE HYDROCHLORIDE 0.1 MG: 0.1 TABLET ORAL at 07:57

## 2022-09-25 RX ADMIN — Medication 25 MCG: at 07:57

## 2022-09-25 RX ADMIN — QUETIAPINE 200 MG: 200 TABLET, FILM COATED ORAL at 07:57

## 2022-09-25 RX ADMIN — HYDROXYZINE HYDROCHLORIDE 50 MG: 50 TABLET, FILM COATED ORAL at 07:57

## 2022-09-25 RX ADMIN — METFORMIN HYDROCHLORIDE 1000 MG: 500 TABLET ORAL at 07:56

## 2022-09-25 RX ADMIN — ASPIRIN 81 MG CHEWABLE TABLET 81 MG: 81 TABLET CHEWABLE at 07:58

## 2022-09-25 RX ADMIN — CLOTRIMAZOLE: 0.01 CREAM TOPICAL at 12:03

## 2022-09-25 RX ADMIN — DIVALPROEX SODIUM 250 MG: 500 TABLET, DELAYED RELEASE ORAL at 07:57

## 2022-09-25 RX ADMIN — BACLOFEN 10 MG: 10 TABLET ORAL at 13:07

## 2022-09-25 RX ADMIN — SENNOSIDES AND DOCUSATE SODIUM 1 TABLET: 50; 8.6 TABLET ORAL at 07:57

## 2022-09-25 RX ADMIN — HYDROXYZINE HYDROCHLORIDE 50 MG: 50 TABLET, FILM COATED ORAL at 19:38

## 2022-09-25 RX ADMIN — METOPROLOL SUCCINATE 25 MG: 25 TABLET, EXTENDED RELEASE ORAL at 07:58

## 2022-09-25 RX ADMIN — METFORMIN HYDROCHLORIDE 1000 MG: 500 TABLET ORAL at 17:40

## 2022-09-25 RX ADMIN — DIVALPROEX SODIUM 500 MG: 500 TABLET, DELAYED RELEASE ORAL at 07:59

## 2022-09-25 RX ADMIN — ATORVASTATIN CALCIUM 20 MG: 20 TABLET, FILM COATED ORAL at 19:38

## 2022-09-25 RX ADMIN — CLOTRIMAZOLE: 0.01 CREAM TOPICAL at 19:40

## 2022-09-25 RX ADMIN — CLONIDINE HYDROCHLORIDE 0.1 MG: 0.1 TABLET ORAL at 19:38

## 2022-09-25 RX ADMIN — HYDROXYZINE HYDROCHLORIDE 50 MG: 50 TABLET, FILM COATED ORAL at 13:07

## 2022-09-25 RX ADMIN — BACLOFEN 10 MG: 10 TABLET ORAL at 19:38

## 2022-09-25 ASSESSMENT — ACTIVITIES OF DAILY LIVING (ADL)
ADLS_ACUITY_SCORE: 47
ADLS_ACUITY_SCORE: 51
ADLS_ACUITY_SCORE: 47
ADLS_ACUITY_SCORE: 51
ADLS_ACUITY_SCORE: 51
ADLS_ACUITY_SCORE: 47
ADLS_ACUITY_SCORE: 47
ADLS_ACUITY_SCORE: 51
ADLS_ACUITY_SCORE: 47
ADLS_ACUITY_SCORE: 51
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
## 2022-09-26 LAB
CREAT SERPL-MCNC: 0.64 MG/DL (ref 0.67–1.17)
GFR SERPL CREATININE-BSD FRML MDRD: >90 ML/MIN/1.73M2
GLUCOSE BLDC GLUCOMTR-MCNC: 137 MG/DL (ref 70–99)
GLUCOSE BLDC GLUCOMTR-MCNC: 148 MG/DL (ref 70–99)
GLUCOSE BLDC GLUCOMTR-MCNC: 84 MG/DL (ref 70–99)

## 2022-09-26 PROCEDURE — 82565 ASSAY OF CREATININE: CPT | Performed by: NURSE PRACTITIONER

## 2022-09-26 PROCEDURE — 82962 GLUCOSE BLOOD TEST: CPT

## 2022-09-26 PROCEDURE — 99225 PR SUBSEQUENT OBSERVATION CARE,LEVEL II: CPT | Performed by: INTERNAL MEDICINE

## 2022-09-26 PROCEDURE — G0378 HOSPITAL OBSERVATION PER HR: HCPCS

## 2022-09-26 PROCEDURE — 250N000013 HC RX MED GY IP 250 OP 250 PS 637: Performed by: HOSPITALIST

## 2022-09-26 PROCEDURE — 250N000013 HC RX MED GY IP 250 OP 250 PS 637: Performed by: PHYSICIAN ASSISTANT

## 2022-09-26 PROCEDURE — 36415 COLL VENOUS BLD VENIPUNCTURE: CPT | Performed by: NURSE PRACTITIONER

## 2022-09-26 PROCEDURE — 250N000013 HC RX MED GY IP 250 OP 250 PS 637: Performed by: INTERNAL MEDICINE

## 2022-09-26 RX ADMIN — VENLAFAXINE HYDROCHLORIDE 150 MG: 150 CAPSULE, EXTENDED RELEASE ORAL at 21:25

## 2022-09-26 RX ADMIN — HYDROXYZINE HYDROCHLORIDE 50 MG: 50 TABLET, FILM COATED ORAL at 14:20

## 2022-09-26 RX ADMIN — HYDROXYZINE HYDROCHLORIDE 50 MG: 50 TABLET, FILM COATED ORAL at 20:02

## 2022-09-26 RX ADMIN — CLONIDINE HYDROCHLORIDE 0.1 MG: 0.1 TABLET ORAL at 08:27

## 2022-09-26 RX ADMIN — QUETIAPINE 200 MG: 200 TABLET, FILM COATED ORAL at 14:20

## 2022-09-26 RX ADMIN — DIVALPROEX SODIUM 1000 MG: 500 TABLET, DELAYED RELEASE ORAL at 21:26

## 2022-09-26 RX ADMIN — EMPAGLIFLOZIN 10 MG: 10 TABLET, FILM COATED ORAL at 08:28

## 2022-09-26 RX ADMIN — QUETIAPINE FUMARATE 400 MG: 200 TABLET ORAL at 21:25

## 2022-09-26 RX ADMIN — METOPROLOL SUCCINATE 25 MG: 25 TABLET, EXTENDED RELEASE ORAL at 08:28

## 2022-09-26 RX ADMIN — METFORMIN HYDROCHLORIDE 1000 MG: 500 TABLET ORAL at 08:27

## 2022-09-26 RX ADMIN — ATORVASTATIN CALCIUM 20 MG: 20 TABLET, FILM COATED ORAL at 20:02

## 2022-09-26 RX ADMIN — OLANZAPINE 2.5 MG: 2.5 TABLET, FILM COATED ORAL at 14:20

## 2022-09-26 RX ADMIN — VENLAFAXINE HYDROCHLORIDE 75 MG: 150 CAPSULE, EXTENDED RELEASE ORAL at 21:27

## 2022-09-26 RX ADMIN — BACLOFEN 10 MG: 10 TABLET ORAL at 14:20

## 2022-09-26 RX ADMIN — SENNOSIDES AND DOCUSATE SODIUM 1 TABLET: 50; 8.6 TABLET ORAL at 08:28

## 2022-09-26 RX ADMIN — CLOTRIMAZOLE: 0.01 CREAM TOPICAL at 21:19

## 2022-09-26 RX ADMIN — MIRTAZAPINE 15 MG: 15 TABLET, FILM COATED ORAL at 21:27

## 2022-09-26 RX ADMIN — QUETIAPINE 200 MG: 200 TABLET, FILM COATED ORAL at 08:27

## 2022-09-26 RX ADMIN — LEVOTHYROXINE SODIUM 25 MCG: 0.03 TABLET ORAL at 08:28

## 2022-09-26 RX ADMIN — DIVALPROEX SODIUM 500 MG: 500 TABLET, DELAYED RELEASE ORAL at 08:28

## 2022-09-26 RX ADMIN — CLOTRIMAZOLE: 0.01 CREAM TOPICAL at 08:29

## 2022-09-26 RX ADMIN — CLONIDINE HYDROCHLORIDE 0.1 MG: 0.1 TABLET ORAL at 20:01

## 2022-09-26 RX ADMIN — Medication 25 MCG: at 08:28

## 2022-09-26 RX ADMIN — BACLOFEN 10 MG: 10 TABLET ORAL at 08:27

## 2022-09-26 RX ADMIN — HYDROXYZINE HYDROCHLORIDE 50 MG: 50 TABLET, FILM COATED ORAL at 08:28

## 2022-09-26 RX ADMIN — ASPIRIN 81 MG CHEWABLE TABLET 81 MG: 81 TABLET CHEWABLE at 08:27

## 2022-09-26 RX ADMIN — DIVALPROEX SODIUM 250 MG: 500 TABLET, DELAYED RELEASE ORAL at 08:28

## 2022-09-26 RX ADMIN — Medication 10 MG: at 21:26

## 2022-09-26 RX ADMIN — BACLOFEN 10 MG: 10 TABLET ORAL at 20:01

## 2022-09-26 RX ADMIN — METFORMIN HYDROCHLORIDE 1000 MG: 500 TABLET ORAL at 17:49

## 2022-09-26 ASSESSMENT — ACTIVITIES OF DAILY LIVING (ADL)
ADLS_ACUITY_SCORE: 49
ADLS_ACUITY_SCORE: 47
ADLS_ACUITY_SCORE: 49
ADLS_ACUITY_SCORE: 47
ADLS_ACUITY_SCORE: 47
ADLS_ACUITY_SCORE: 51
ADLS_ACUITY_SCORE: 47
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
## 2022-09-26 NOTE — PROGRESS NOTES
Care Management Follow Up    Expected Discharge Date: 09/30/2022     Concerns to be Addressed: Discharge planning      Patient plan of care discussed at interdisciplinary rounds: Yes    Anticipated Discharge Disposition:  [facility]     Patient/Family in Agreement with the Plan:  Yes    Additional Information:   emailed [facility] ROSIO Ivy and [facility] [place]  Albert to request update on rate sheet/funding and anticipated discharge date. Awaiting response.     DANITA Obrien, [facility]   Inpatient Care Coordination  [facility]   [phone];
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
## 2022-09-26 NOTE — PROGRESS NOTES
PRIMARY DIAGNOSIS: Placement Need OUTPATIENT/OBSERVATION GOALS TO BE MET BEFORE DISCHARGE:  1. ADLs back to baseline: Yes    2. Activity and level of assistance: Lift    3. Pain status: Pain free.    4. Return to near baseline physical activity: Yes     Discharge Planner Nurse   Safe discharge environment identified: No  Barriers to discharge: Yes    Pt alert and oriented. VSS. Afebrile. On RA. Tolerating diet well. Pending placement. SW following    Please review provider order for any additional goals.   Nurse to notify provider when observation goals have been met and patient is ready for discharge.
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
## 2022-09-27 LAB
GLUCOSE BLDC GLUCOMTR-MCNC: 120 MG/DL (ref 70–99)
GLUCOSE BLDC GLUCOMTR-MCNC: 86 MG/DL (ref 70–99)

## 2022-09-27 PROCEDURE — 82962 GLUCOSE BLOOD TEST: CPT

## 2022-09-27 PROCEDURE — G0378 HOSPITAL OBSERVATION PER HR: HCPCS

## 2022-09-27 PROCEDURE — 250N000013 HC RX MED GY IP 250 OP 250 PS 637: Performed by: PHYSICIAN ASSISTANT

## 2022-09-27 PROCEDURE — 250N000013 HC RX MED GY IP 250 OP 250 PS 637: Performed by: HOSPITALIST

## 2022-09-27 PROCEDURE — 99207 PR NO BILLABLE SERVICE THIS VISIT: CPT | Performed by: NURSE PRACTITIONER

## 2022-09-27 RX ADMIN — BACLOFEN 10 MG: 10 TABLET ORAL at 14:31

## 2022-09-27 RX ADMIN — QUETIAPINE FUMARATE 400 MG: 200 TABLET ORAL at 21:41

## 2022-09-27 RX ADMIN — ASPIRIN 81 MG CHEWABLE TABLET 81 MG: 81 TABLET CHEWABLE at 09:22

## 2022-09-27 RX ADMIN — DIVALPROEX SODIUM 250 MG: 500 TABLET, DELAYED RELEASE ORAL at 09:22

## 2022-09-27 RX ADMIN — BACLOFEN 10 MG: 10 TABLET ORAL at 09:22

## 2022-09-27 RX ADMIN — HYDROXYZINE HYDROCHLORIDE 50 MG: 50 TABLET, FILM COATED ORAL at 14:31

## 2022-09-27 RX ADMIN — EMPAGLIFLOZIN 10 MG: 10 TABLET, FILM COATED ORAL at 09:22

## 2022-09-27 RX ADMIN — MIRTAZAPINE 15 MG: 15 TABLET, FILM COATED ORAL at 21:41

## 2022-09-27 RX ADMIN — QUETIAPINE 200 MG: 200 TABLET, FILM COATED ORAL at 09:23

## 2022-09-27 RX ADMIN — OLANZAPINE 2.5 MG: 2.5 TABLET, FILM COATED ORAL at 14:31

## 2022-09-27 RX ADMIN — Medication 10 MG: at 21:41

## 2022-09-27 RX ADMIN — VENLAFAXINE HYDROCHLORIDE 150 MG: 150 CAPSULE, EXTENDED RELEASE ORAL at 21:44

## 2022-09-27 RX ADMIN — HYDROXYZINE HYDROCHLORIDE 50 MG: 50 TABLET, FILM COATED ORAL at 19:43

## 2022-09-27 RX ADMIN — LEVOTHYROXINE SODIUM 25 MCG: 0.03 TABLET ORAL at 09:23

## 2022-09-27 RX ADMIN — DIVALPROEX SODIUM 1000 MG: 500 TABLET, DELAYED RELEASE ORAL at 21:41

## 2022-09-27 RX ADMIN — ATORVASTATIN CALCIUM 20 MG: 20 TABLET, FILM COATED ORAL at 19:43

## 2022-09-27 RX ADMIN — SENNOSIDES AND DOCUSATE SODIUM 1 TABLET: 50; 8.6 TABLET ORAL at 09:23

## 2022-09-27 RX ADMIN — METOPROLOL SUCCINATE 25 MG: 25 TABLET, EXTENDED RELEASE ORAL at 09:22

## 2022-09-27 RX ADMIN — CLONIDINE HYDROCHLORIDE 0.1 MG: 0.1 TABLET ORAL at 09:22

## 2022-09-27 RX ADMIN — Medication 25 MCG: at 09:22

## 2022-09-27 RX ADMIN — CLONIDINE HYDROCHLORIDE 0.1 MG: 0.1 TABLET ORAL at 19:43

## 2022-09-27 RX ADMIN — CLOTRIMAZOLE: 0.01 CREAM TOPICAL at 19:49

## 2022-09-27 RX ADMIN — QUETIAPINE 200 MG: 200 TABLET, FILM COATED ORAL at 14:32

## 2022-09-27 RX ADMIN — METFORMIN HYDROCHLORIDE 1000 MG: 500 TABLET ORAL at 09:23

## 2022-09-27 RX ADMIN — DIVALPROEX SODIUM 500 MG: 500 TABLET, DELAYED RELEASE ORAL at 09:23

## 2022-09-27 RX ADMIN — VENLAFAXINE HYDROCHLORIDE 75 MG: 150 CAPSULE, EXTENDED RELEASE ORAL at 21:43

## 2022-09-27 RX ADMIN — BACLOFEN 10 MG: 10 TABLET ORAL at 19:43

## 2022-09-27 RX ADMIN — METFORMIN HYDROCHLORIDE 1000 MG: 500 TABLET ORAL at 17:32

## 2022-09-27 RX ADMIN — HYDROXYZINE HYDROCHLORIDE 50 MG: 50 TABLET, FILM COATED ORAL at 09:23

## 2022-09-27 ASSESSMENT — ACTIVITIES OF DAILY LIVING (ADL)
ADLS_ACUITY_SCORE: 49
ADLS_ACUITY_SCORE: 48
ADLS_ACUITY_SCORE: 49
ADLS_ACUITY_SCORE: 48
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
## 2022-09-27 NOTE — PLAN OF CARE
PRIMARY DIAGNOSIS: AWAITING PLACEMENT  OUTPATIENT/OBSERVATION GOALS TO BE MET BEFORE DISCHARGE:  ADLs back to baseline: Yes    Activity and level of assistance: A2, lift    Pain status: Pain free.    Return to near baseline physical activity: Yes     Discharge Planner Nurse   Safe discharge environment identified: No  Barriers to discharge: Yes       Entered by: Vero Franco RN 09/27/2022   A&O x3. VSS and on RA. Pt incontinent of b&b. Brief in place. Pt calls appropriately. Pt denies any pain or discomfort at this time. Awaiting placement, SW following. Will continue to provide supportive cares.   Please review provider order for any additional goals.   Nurse to notify provider when observation goals have been met and patient is ready for discharge.

## 2022-09-27 NOTE — PLAN OF CARE
PRIMARY DIAGNOSIS: MEDICALLY STABLE, PLACEMENT  OUTPATIENT/OBSERVATION GOALS TO BE MET BEFORE DISCHARGE:  1. ADLs back to baseline: Yes    2. Activity and level of assistance: Lift assist, bedfast - can assist with turns and upper body repositioning    3. Pain status: Pain free.    4. Return to near baseline physical activity: Yes     Discharge Planner Nurse   Safe discharge environment identified: No  Barriers to discharge: Yes, placement       Entered by: Abhijeet Hernandez RN 09/27/2022       Blanchable redness on bilateral heels, offloading boots placed back on. Pt refused PCDs at this time. Pt alert and oriented, flat affect. Cooperative with cares. Reports when he has been incontinent and needs changing. No assist needed with meals. Per SW, no discharge date and location determined at this time.   Please review provider order for any additional goals.   Nurse to notify provider when observation goals have been met and patient is ready for discharge.

## 2022-09-27 NOTE — PROGRESS NOTES
Care Management Follow Up    Expected Discharge Date: 09/30/2022     Concerns to be Addressed: Discharge planning       Patient plan of care discussed at interdisciplinary rounds: Yes    Anticipated Discharge Disposition:  [facility]  once funding is in place    Additional Information:  SW followed up with ROSIO Ivy (phone #: [phone]) and  Manager Karena (phone #: [phone]) via email and asked that they update SW via phone this week as this writer will not have access to email the remainder of the week. No updates on discharge date as of yet.     DANITA Obrien, [facility]   Inpatient Care Coordination  [facility]   [phone]

## 2022-09-27 NOTE — PLAN OF CARE
PRIMARY DIAGNOSIS: AWAITING PLACEMENT  OUTPATIENT/OBSERVATION GOALS TO BE MET BEFORE DISCHARGE:  1. ADLs back to baseline: Yes    2. Activity and level of assistance: A2, lift    3. Pain status: Pain free.    4. Return to near baseline physical activity: Yes     Discharge Planner Nurse   Safe discharge environment identified: No  Barriers to discharge: Yes       Entered by: Vero Franco RN 09/27/2022   A&O x3. VSS and on RA. Pt incontinent of b&b. Brief in place. Pt calls appropriately. Pt denies any pain or discomfort at this time. Awaiting placement, SW following. Will continue to provide supportive cares.   Please review provider order for any additional goals.   Nurse to notify provider when observation goals have been met and patient is ready for discharge.

## 2022-09-27 NOTE — PLAN OF CARE
PRIMARY DIAGNOSIS: MEDICALLY STABLE, PLACEMENT  Pt alert and oriented - lethargic/drowsy at times, not OOB - can assist with turns and upper body repositioning, tolerating PO meds and regular diet. Incontinent. No IV access. BG checks and vitals BID. VSS. Pt medically stable and awaiting placement. SW following.  OUTPATIENT/OBSERVATION GOALS TO BE MET BEFORE DISCHARGE:  1. ADLs back to baseline: Yes    2. Activity and level of assistance: Lift assist, bedfast - can assist with turns and upper body repositioning    3. Pain status: Pain free.    4. Return to near baseline physical activity: Yes     Discharge Planner Nurse   Safe discharge environment identified: No  Barriers to discharge: Yes, placement       Entered by: Adilson Alvarez RN 09/27/2022    Please review provider order for any additional goals.   Nurse to notify provider when observation goals have been met and patient is ready for discharge.

## 2022-09-28 LAB — GLUCOSE BLDC GLUCOMTR-MCNC: 110 MG/DL (ref 70–99)

## 2022-09-28 PROCEDURE — 99207 PR NO BILLABLE SERVICE THIS VISIT: CPT | Performed by: NURSE PRACTITIONER

## 2022-09-28 PROCEDURE — 250N000013 HC RX MED GY IP 250 OP 250 PS 637: Performed by: HOSPITALIST

## 2022-09-28 PROCEDURE — 250N000013 HC RX MED GY IP 250 OP 250 PS 637: Performed by: PHYSICIAN ASSISTANT

## 2022-09-28 PROCEDURE — 82962 GLUCOSE BLOOD TEST: CPT

## 2022-09-28 PROCEDURE — G0378 HOSPITAL OBSERVATION PER HR: HCPCS

## 2022-09-28 RX ADMIN — ASPIRIN 81 MG CHEWABLE TABLET 81 MG: 81 TABLET CHEWABLE at 09:49

## 2022-09-28 RX ADMIN — Medication 10 MG: at 21:09

## 2022-09-28 RX ADMIN — METFORMIN HYDROCHLORIDE 1000 MG: 500 TABLET ORAL at 09:47

## 2022-09-28 RX ADMIN — CLONIDINE HYDROCHLORIDE 0.1 MG: 0.1 TABLET ORAL at 21:08

## 2022-09-28 RX ADMIN — CLONIDINE HYDROCHLORIDE 0.1 MG: 0.1 TABLET ORAL at 09:49

## 2022-09-28 RX ADMIN — LEVOTHYROXINE SODIUM 25 MCG: 0.03 TABLET ORAL at 09:49

## 2022-09-28 RX ADMIN — OLANZAPINE 2.5 MG: 2.5 TABLET, FILM COATED ORAL at 15:45

## 2022-09-28 RX ADMIN — EMPAGLIFLOZIN 10 MG: 10 TABLET, FILM COATED ORAL at 09:48

## 2022-09-28 RX ADMIN — HYDROXYZINE HYDROCHLORIDE 50 MG: 50 TABLET, FILM COATED ORAL at 15:44

## 2022-09-28 RX ADMIN — SENNOSIDES AND DOCUSATE SODIUM 1 TABLET: 50; 8.6 TABLET ORAL at 09:48

## 2022-09-28 RX ADMIN — DIVALPROEX SODIUM 1000 MG: 500 TABLET, DELAYED RELEASE ORAL at 21:08

## 2022-09-28 RX ADMIN — DIVALPROEX SODIUM 250 MG: 500 TABLET, DELAYED RELEASE ORAL at 09:49

## 2022-09-28 RX ADMIN — ATORVASTATIN CALCIUM 20 MG: 20 TABLET, FILM COATED ORAL at 21:08

## 2022-09-28 RX ADMIN — Medication 25 MCG: at 09:49

## 2022-09-28 RX ADMIN — QUETIAPINE 200 MG: 200 TABLET, FILM COATED ORAL at 15:44

## 2022-09-28 RX ADMIN — QUETIAPINE FUMARATE 400 MG: 200 TABLET ORAL at 21:08

## 2022-09-28 RX ADMIN — QUETIAPINE 200 MG: 200 TABLET, FILM COATED ORAL at 09:48

## 2022-09-28 RX ADMIN — DIVALPROEX SODIUM 500 MG: 500 TABLET, DELAYED RELEASE ORAL at 09:47

## 2022-09-28 RX ADMIN — BACLOFEN 10 MG: 10 TABLET ORAL at 09:47

## 2022-09-28 RX ADMIN — HYDROXYZINE HYDROCHLORIDE 50 MG: 50 TABLET, FILM COATED ORAL at 21:08

## 2022-09-28 RX ADMIN — BACLOFEN 10 MG: 10 TABLET ORAL at 15:44

## 2022-09-28 RX ADMIN — CLOTRIMAZOLE: 0.01 CREAM TOPICAL at 21:17

## 2022-09-28 RX ADMIN — MIRTAZAPINE 15 MG: 15 TABLET, FILM COATED ORAL at 21:08

## 2022-09-28 RX ADMIN — BACLOFEN 10 MG: 10 TABLET ORAL at 21:08

## 2022-09-28 RX ADMIN — VENLAFAXINE HYDROCHLORIDE 75 MG: 150 CAPSULE, EXTENDED RELEASE ORAL at 21:08

## 2022-09-28 RX ADMIN — HYDROXYZINE HYDROCHLORIDE 50 MG: 50 TABLET, FILM COATED ORAL at 09:49

## 2022-09-28 RX ADMIN — METFORMIN HYDROCHLORIDE 1000 MG: 500 TABLET ORAL at 18:27

## 2022-09-28 RX ADMIN — VENLAFAXINE HYDROCHLORIDE 150 MG: 150 CAPSULE, EXTENDED RELEASE ORAL at 21:10

## 2022-09-28 RX ADMIN — METOPROLOL SUCCINATE 25 MG: 25 TABLET, EXTENDED RELEASE ORAL at 09:48

## 2022-09-28 ASSESSMENT — ACTIVITIES OF DAILY LIVING (ADL)
ADLS_ACUITY_SCORE: 49
ADLS_ACUITY_SCORE: 49
ADLS_ACUITY_SCORE: 53
ADLS_ACUITY_SCORE: 49
ADLS_ACUITY_SCORE: 49
ADLS_ACUITY_SCORE: 53
ADLS_ACUITY_SCORE: 53
ADLS_ACUITY_SCORE: 49
ADLS_ACUITY_SCORE: 53
ADLS_ACUITY_SCORE: 49

## 2022-09-28 NOTE — PLAN OF CARE
PRIMARY DIAGNOSIS: Placement  OUTPATIENT/OBSERVATION GOALS TO BE MET BEFORE DISCHARGE:  ADLs back to baseline: Yes    Activity and level of assistance: Lift with assistance. Able to turn    Pain status: None    Return to near baseline physical activity: Yes     Discharge Planner Nurse   Safe discharge environment identified: Yes  Barriers to discharge:  Yes       Entered by: Alda Victoria RN 09/27/2022 7:55 PM     Please review provider order for any additional goals.   Nurse to notify provider when observation goals have been met and patient is ready for discharge.Goal Outcome Evaluation:

## 2022-09-28 NOTE — PLAN OF CARE
Goal Outcome Evaluation:PRIMARY DIAGNOSIS: PLACEMENT  OUTPATIENT/OBSERVATION GOALS TO BE MET BEFORE DISCHARGE:  1. ADLs back to baseline: Yes    2. Activity and level of assistance: Lift, pt able to turn in bed.     3. Pain status:  None    4. Return to near baseline physical activity: Yes     Discharge Planner Nurse   Safe discharge environment identified: Yes  Barriers to discharge: No       Entered by: Sharmaine Yi RN 09/28/2022 1:05 PM      A&Ox4, has flat affect, VSS on RA. Baseline bed bound, able to assist with rolling well, turn & repo q2h. On pulsate mattress. Foam boots applied to bilateral feet. Incontinent of urine. Tolerating regular diet, denies nausea. Denies any pain during shift. No IV access. Pt medically stable, waiting for placement/[facility] funding. SW following.       Please review provider order for any additional goals.   Nurse to notify provider when observation goals have been met and patient is ready for discharge.

## 2022-09-28 NOTE — PLAN OF CARE
PRIMARY DIAGNOSIS: PLACEMENT  OUTPATIENT/OBSERVATION GOALS TO BE MET BEFORE DISCHARGE:  ADLs back to baseline: Yes    Activity and level of assistance: Able to turn in bed    Pain status: No    Return to near baseline physical activity: Yes     Discharge Planner Nurse   Safe discharge environment identified: Yes  Barriers to discharge: No       Entered by: Alda Victoria RN 09/28/2022 12:28 AM     Please review provider order for any additional goals.   Nurse to notify provider when observation goals have been met and patient is ready for discharge.Goal Outcome Evaluation:
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
## 2022-09-28 NOTE — PLAN OF CARE
Goal Outcome Evaluation:PRIMARY DIAGNOSIS: PLACEMENT  OUTPATIENT/OBSERVATION GOALS TO BE MET BEFORE DISCHARGE:  1. ADLs back to baseline: Yes    2. Activity and level of assistance: Lift, pt able to turn in bed.     3. Pain status:  None    4. Return to near baseline physical activity: Yes     Discharge Planner Nurse   Safe discharge environment identified: Yes  Barriers to discharge: No       Entered by: Alda Victoria RN 09/28/2022 1:48 AM     Pt alert and oriented, flat affect. Cooperative with cares. Incontinent of urine, able to inform staff.  No assist needed with meals. Pt refused heel boots and SCD's.  [facility] once [facility] is in [facility].     Please review provider order for any additional goals.   Nurse to notify provider when observation goals have been met and patient is ready for discharge.

## 2022-09-28 NOTE — PLAN OF CARE
Goal Outcome Evaluation:PRIMARY DIAGNOSIS: PLACEMENT  OUTPATIENT/OBSERVATION GOALS TO BE MET BEFORE DISCHARGE:  1. ADLs back to baseline: Yes    2. Activity and level of assistance: Lift, pt able to turn in bed.     3. Pain status:  None    4. Return to near baseline physical activity: Yes     Discharge Planner Nurse   Safe discharge environment identified: Yes  Barriers to discharge: No       Entered by: Sharmaine Yi RN 09/28/2022 3:06 PM      A&Ox4, has flat affect, VSS on RA. Baseline bed bound, able to assist with rolling well, turn & repo q2h. On pulsate mattress. Foam boots applied to bilateral feet. Incontinent of urine. Tolerating regular diet, denies nausea. Denies any pain during shift. No IV access. Pt medically stable, waiting for placement/[facility] funding. SW following.       Please review provider order for any additional goals.   Nurse to notify provider when observation goals have been met and patient is ready for discharge.

## 2022-09-29 LAB
GLUCOSE BLDC GLUCOMTR-MCNC: 116 MG/DL (ref 70–99)
GLUCOSE BLDC GLUCOMTR-MCNC: 94 MG/DL (ref 70–99)

## 2022-09-29 PROCEDURE — 82962 GLUCOSE BLOOD TEST: CPT

## 2022-09-29 PROCEDURE — 250N000013 HC RX MED GY IP 250 OP 250 PS 637: Performed by: PHYSICIAN ASSISTANT

## 2022-09-29 PROCEDURE — 250N000013 HC RX MED GY IP 250 OP 250 PS 637: Performed by: HOSPITALIST

## 2022-09-29 PROCEDURE — G0378 HOSPITAL OBSERVATION PER HR: HCPCS

## 2022-09-29 PROCEDURE — 99207 PR NO BILLABLE SERVICE THIS VISIT: CPT | Performed by: HOSPITALIST

## 2022-09-29 RX ADMIN — VENLAFAXINE HYDROCHLORIDE 150 MG: 150 CAPSULE, EXTENDED RELEASE ORAL at 21:09

## 2022-09-29 RX ADMIN — BACLOFEN 10 MG: 10 TABLET ORAL at 15:25

## 2022-09-29 RX ADMIN — SENNOSIDES AND DOCUSATE SODIUM 1 TABLET: 50; 8.6 TABLET ORAL at 10:21

## 2022-09-29 RX ADMIN — DIVALPROEX SODIUM 250 MG: 500 TABLET, DELAYED RELEASE ORAL at 10:21

## 2022-09-29 RX ADMIN — Medication 25 MCG: at 10:18

## 2022-09-29 RX ADMIN — METFORMIN HYDROCHLORIDE 1000 MG: 500 TABLET ORAL at 10:18

## 2022-09-29 RX ADMIN — QUETIAPINE 200 MG: 200 TABLET, FILM COATED ORAL at 10:18

## 2022-09-29 RX ADMIN — HYDROXYZINE HYDROCHLORIDE 50 MG: 50 TABLET, FILM COATED ORAL at 21:10

## 2022-09-29 RX ADMIN — QUETIAPINE 200 MG: 200 TABLET, FILM COATED ORAL at 15:25

## 2022-09-29 RX ADMIN — HYDROXYZINE HYDROCHLORIDE 50 MG: 50 TABLET, FILM COATED ORAL at 10:18

## 2022-09-29 RX ADMIN — METFORMIN HYDROCHLORIDE 1000 MG: 500 TABLET ORAL at 18:55

## 2022-09-29 RX ADMIN — CLOTRIMAZOLE: 0.01 CREAM TOPICAL at 10:23

## 2022-09-29 RX ADMIN — OLANZAPINE 2.5 MG: 2.5 TABLET, FILM COATED ORAL at 15:25

## 2022-09-29 RX ADMIN — QUETIAPINE FUMARATE 400 MG: 200 TABLET ORAL at 21:10

## 2022-09-29 RX ADMIN — ATORVASTATIN CALCIUM 20 MG: 20 TABLET, FILM COATED ORAL at 21:10

## 2022-09-29 RX ADMIN — CLONIDINE HYDROCHLORIDE 0.1 MG: 0.1 TABLET ORAL at 21:10

## 2022-09-29 RX ADMIN — DIVALPROEX SODIUM 500 MG: 500 TABLET, DELAYED RELEASE ORAL at 10:18

## 2022-09-29 RX ADMIN — ASPIRIN 81 MG CHEWABLE TABLET 81 MG: 81 TABLET CHEWABLE at 10:18

## 2022-09-29 RX ADMIN — Medication 10 MG: at 21:09

## 2022-09-29 RX ADMIN — DIVALPROEX SODIUM 1000 MG: 500 TABLET, DELAYED RELEASE ORAL at 21:10

## 2022-09-29 RX ADMIN — BACLOFEN 10 MG: 10 TABLET ORAL at 21:10

## 2022-09-29 RX ADMIN — BACLOFEN 10 MG: 10 TABLET ORAL at 10:18

## 2022-09-29 RX ADMIN — CLOTRIMAZOLE: 0.01 CREAM TOPICAL at 21:13

## 2022-09-29 RX ADMIN — HYDROXYZINE HYDROCHLORIDE 50 MG: 50 TABLET, FILM COATED ORAL at 15:25

## 2022-09-29 RX ADMIN — VENLAFAXINE HYDROCHLORIDE 75 MG: 150 CAPSULE, EXTENDED RELEASE ORAL at 21:10

## 2022-09-29 RX ADMIN — EMPAGLIFLOZIN 10 MG: 10 TABLET, FILM COATED ORAL at 10:19

## 2022-09-29 RX ADMIN — MIRTAZAPINE 15 MG: 15 TABLET, FILM COATED ORAL at 21:09

## 2022-09-29 RX ADMIN — CLONIDINE HYDROCHLORIDE 0.1 MG: 0.1 TABLET ORAL at 10:18

## 2022-09-29 RX ADMIN — METOPROLOL SUCCINATE 25 MG: 25 TABLET, EXTENDED RELEASE ORAL at 10:18

## 2022-09-29 RX ADMIN — LEVOTHYROXINE SODIUM 25 MCG: 0.03 TABLET ORAL at 10:18

## 2022-09-29 ASSESSMENT — ACTIVITIES OF DAILY LIVING (ADL)
ADLS_ACUITY_SCORE: 53
ADLS_ACUITY_SCORE: 55
ADLS_ACUITY_SCORE: 53
ADLS_ACUITY_SCORE: 55
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
## 2022-09-29 NOTE — PLAN OF CARE
PRIMARY DIAGNOSIS: SAFE DISCHARGE DISPOSITION   OUTPATIENT/OBSERVATION GOALS TO BE MET BEFORE DISCHARGE:  1. ADLs back to baseline: Yes    2. Activity and level of assistance: Up with maximum assistance. Patient is at baseline.     3. Pain status: Pain free.    4. Return to near baseline physical activity: Yes     Discharge Planner Nurse   Safe discharge environment identified: No  Barriers to discharge: Yes       Entered by: Laya Pedraza RN 09/29/2022      A&O to self, at times unable to answer place/time/situation correctly. Turned and repositioned Q2H as patient allows, patient refuses at times. Redness to perineal/perineal area, area cleansed, continues scheduled clotrimazole topical cream and barrier. BS active x4, tolerating regular diet, good appetite, passing flatus. Continues blood glucose checks. Voiding in adequate amt's. BM x1 today (9/29). Incontinent of bowel and bladder. Discharge pending on placement, SW/CM following.  Please review provider order for any additional goals.   Nurse to notify provider when observation goals have been met and patient is ready for discharge.

## 2022-09-29 NOTE — PLAN OF CARE
PRIMARY DIAGNOSIS: SAFE DISCHARGE DISPOSITION   OUTPATIENT/OBSERVATION GOALS TO BE MET BEFORE DISCHARGE:  ADLs back to baseline: Yes    Activity and level of assistance: Up with maximum assistance. Patient is at baseline.     Pain status: Pain free.    Return to near baseline physical activity: Yes     Discharge Planner Nurse   Safe discharge environment identified: No  Barriers to discharge: Yes       Entered by: Laya Pedraza RN 09/29/2022      A&O to self, at times unable to answer place/time/situation correctly. Turned and repositioned Q2H as patient allows, patient refuses at times. Redness to perineal/perineal area, area cleansed, continues scheduled clotrimazole topical cream and barrier. BS active x4, tolerating regular diet, good appetite, passing flatus. Continues blood glucose checks. Voiding in adequate amt's. Incontinent of bowel and bladder. Discharge pending on placement, SW/CM following.  Please review provider order for any additional goals.   Nurse to notify provider when observation goals have been met and patient is ready for discharge.

## 2022-09-29 NOTE — PROGRESS NOTES
Care Management Follow Up    Length of Stay (days): 0    Expected Discharge Date: 10/04/2022     Concerns to be Addressed:       Patient plan of care discussed at interdisciplinary rounds: Yes    Referrals Placed by CM/ANALY:    Private pay costs discussed: Not applicable    Additional Information:  Attempted to leave voice message with ROSIO Ivy [phone] requesting an update however her voice message box was full.      DANITA Hudson

## 2022-09-29 NOTE — PROGRESS NOTES
PRIMARY DIAGNOSIS: Placement  OUTPATIENT/OBSERVATION GOALS TO BE MET BEFORE DISCHARGE:  1. ADLs back to baseline: Yes    2. Activity and level of assistance: bed bound- lift    3. Pain status: Pain free.    4. Return to near baseline physical activity: Yes     Discharge Planner Nurse   Safe discharge environment identified: Yes  Barriers to discharge: Yes       Entered by: Adan Cortez RN 09/29/2022 Please review provider order for any additional goals.   Nurse to notify provider when observation goals have been met and patient is ready for discharge.    Pt alert, orientation varies, VSS, on RA. Tolerating regular diet. Pt denies pain. No IV in place, OK with provider. Incontinent of bowel and bladder. Repositioned as allowed, turned and shifted. Redness in groin area, lotrimin and barrier cream applied as needed, area kept dry. Plan to discharge to group home when funding is approved.

## 2022-09-29 NOTE — PROGRESS NOTES
PRIMARY DIAGNOSIS: Placement  OUTPATIENT/OBSERVATION GOALS TO BE MET BEFORE DISCHARGE:  1. ADLs back to baseline: Yes    2. Activity and level of assistance: bed bound- lift    3. Pain status: Pain free.    4. Return to near baseline physical activity: Yes     Discharge Planner Nurse   Safe discharge environment identified: Yes  Barriers to discharge: Yes       Entered by: Adan Cortez RN 09/28/2022 Please review provider order for any additional goals.   Nurse to notify provider when observation goals have been met and patient is ready for discharge.    Pt alert, orientation varies, VSS, on RA. Tolerating regular diet. Pt denies pain. No IV in place, OK with provider. Incontinent of bowel and bladder. Repositioned as allowed, refused repositioning this evening. Redness in groin area, lotrimin cream applied, area kept dry. Plan to discharge to group home when funding is approved.

## 2022-09-29 NOTE — PROGRESS NOTES
PRIMARY DIAGNOSIS: Placement  OUTPATIENT/OBSERVATION GOALS TO BE MET BEFORE DISCHARGE:  1. ADLs back to baseline: Yes    2. Activity and level of assistance: bed bound- lift    3. Pain status: Pain free.    4. Return to near baseline physical activity: Yes     Discharge Planner Nurse   Safe discharge environment identified: Yes  Barriers to discharge: Yes       Entered by: Adan Cortez RN 09/29/2022 Please review provider order for any additional goals.   Nurse to notify provider when observation goals have been met and patient is ready for discharge.    Pt alert, orientation varies, VSS, on RA. Tolerating regular diet. Pt denies pain. No IV in place, OK with provider. Incontinent of bowel and bladder. Repositioned as allowed, turned and shifted. Redness in groin area, lotrimin and barrier cream applied, area kept dry. Plan to discharge to group home when funding is approved.

## 2022-09-30 LAB
GLUCOSE BLDC GLUCOMTR-MCNC: 103 MG/DL (ref 70–99)
GLUCOSE BLDC GLUCOMTR-MCNC: 90 MG/DL (ref 70–99)

## 2022-09-30 PROCEDURE — 82962 GLUCOSE BLOOD TEST: CPT

## 2022-09-30 PROCEDURE — 250N000013 HC RX MED GY IP 250 OP 250 PS 637: Performed by: PHYSICIAN ASSISTANT

## 2022-09-30 PROCEDURE — 250N000013 HC RX MED GY IP 250 OP 250 PS 637: Performed by: HOSPITALIST

## 2022-09-30 PROCEDURE — 99225 PR SUBSEQUENT OBSERVATION CARE,LEVEL II: CPT | Performed by: PHYSICIAN ASSISTANT

## 2022-09-30 PROCEDURE — G0378 HOSPITAL OBSERVATION PER HR: HCPCS

## 2022-09-30 RX ADMIN — HYDROXYZINE HYDROCHLORIDE 50 MG: 50 TABLET, FILM COATED ORAL at 08:22

## 2022-09-30 RX ADMIN — ASPIRIN 81 MG CHEWABLE TABLET 81 MG: 81 TABLET CHEWABLE at 08:21

## 2022-09-30 RX ADMIN — EMPAGLIFLOZIN 10 MG: 10 TABLET, FILM COATED ORAL at 08:22

## 2022-09-30 RX ADMIN — METOPROLOL SUCCINATE 25 MG: 25 TABLET, EXTENDED RELEASE ORAL at 08:21

## 2022-09-30 RX ADMIN — BACLOFEN 10 MG: 10 TABLET ORAL at 19:51

## 2022-09-30 RX ADMIN — QUETIAPINE FUMARATE 400 MG: 200 TABLET ORAL at 21:51

## 2022-09-30 RX ADMIN — CLONIDINE HYDROCHLORIDE 0.1 MG: 0.1 TABLET ORAL at 19:51

## 2022-09-30 RX ADMIN — Medication 25 MCG: at 08:22

## 2022-09-30 RX ADMIN — BACLOFEN 10 MG: 10 TABLET ORAL at 13:42

## 2022-09-30 RX ADMIN — CLONIDINE HYDROCHLORIDE 0.1 MG: 0.1 TABLET ORAL at 08:21

## 2022-09-30 RX ADMIN — HYDROXYZINE HYDROCHLORIDE 50 MG: 50 TABLET, FILM COATED ORAL at 19:51

## 2022-09-30 RX ADMIN — CLOTRIMAZOLE: 0.01 CREAM TOPICAL at 19:56

## 2022-09-30 RX ADMIN — SENNOSIDES AND DOCUSATE SODIUM 1 TABLET: 50; 8.6 TABLET ORAL at 08:21

## 2022-09-30 RX ADMIN — METFORMIN HYDROCHLORIDE 1000 MG: 500 TABLET ORAL at 17:34

## 2022-09-30 RX ADMIN — ATORVASTATIN CALCIUM 20 MG: 20 TABLET, FILM COATED ORAL at 19:51

## 2022-09-30 RX ADMIN — QUETIAPINE 200 MG: 200 TABLET, FILM COATED ORAL at 13:41

## 2022-09-30 RX ADMIN — OLANZAPINE 2.5 MG: 2.5 TABLET, FILM COATED ORAL at 13:42

## 2022-09-30 RX ADMIN — METFORMIN HYDROCHLORIDE 1000 MG: 500 TABLET ORAL at 08:36

## 2022-09-30 RX ADMIN — MIRTAZAPINE 15 MG: 15 TABLET, FILM COATED ORAL at 21:51

## 2022-09-30 RX ADMIN — DIVALPROEX SODIUM 500 MG: 500 TABLET, DELAYED RELEASE ORAL at 08:21

## 2022-09-30 RX ADMIN — HYDROXYZINE HYDROCHLORIDE 50 MG: 50 TABLET, FILM COATED ORAL at 13:42

## 2022-09-30 RX ADMIN — LEVOTHYROXINE SODIUM 25 MCG: 0.03 TABLET ORAL at 08:21

## 2022-09-30 RX ADMIN — VENLAFAXINE HYDROCHLORIDE 225 MG: 150 CAPSULE, EXTENDED RELEASE ORAL at 21:51

## 2022-09-30 RX ADMIN — DIVALPROEX SODIUM 1000 MG: 500 TABLET, DELAYED RELEASE ORAL at 21:50

## 2022-09-30 RX ADMIN — Medication 10 MG: at 21:51

## 2022-09-30 RX ADMIN — BACLOFEN 10 MG: 10 TABLET ORAL at 08:21

## 2022-09-30 RX ADMIN — CLOTRIMAZOLE: 0.01 CREAM TOPICAL at 08:32

## 2022-09-30 RX ADMIN — DIVALPROEX SODIUM 250 MG: 500 TABLET, DELAYED RELEASE ORAL at 08:30

## 2022-09-30 RX ADMIN — QUETIAPINE 200 MG: 200 TABLET, FILM COATED ORAL at 08:22

## 2022-09-30 RX ADMIN — VENLAFAXINE HYDROCHLORIDE 150 MG: 150 CAPSULE, EXTENDED RELEASE ORAL at 21:53

## 2022-09-30 ASSESSMENT — ACTIVITIES OF DAILY LIVING (ADL)
ADLS_ACUITY_SCORE: 51
ADLS_ACUITY_SCORE: 55
ADLS_ACUITY_SCORE: 51
ADLS_ACUITY_SCORE: 55

## 2022-09-30 NOTE — PLAN OF CARE
PRIMARY DIAGNOSIS: safe discharge disposition  OUTPATIENT/OBSERVATION GOALS TO BE MET BEFORE DISCHARGE:  ADLs back to baseline: Yes    Activity and level of assistance: Up w/ max assist & lift/mechanical devices, this is patient's baseline.     Pain status: Pain free.    Return to near baseline physical activity: Yes     Discharge Planner Nurse   Safe discharge environment identified: No  Barriers to discharge: Yes       Entered by: Katheryn Duran RN 09/29/2022 10:32 PM  Pt A&O, confused to time & place at times.  Tolerating regular diet. Pt denies pain. No IV access. Turn q2hrs, as pt allows, incontinent bowel & bladder w/ red groin. Antifungal cream applied as ordered. Pt is medically cleared, awaiting funding to be approved.    Please review provider order for any additional goals.   Nurse to notify provider when observation goals have been met and patient is ready for discharge.

## 2022-09-30 NOTE — PLAN OF CARE
PRIMARY DIAGNOSIS: safe discharge disposition  OUTPATIENT/OBSERVATION GOALS TO BE MET BEFORE DISCHARGE:  ADLs back to baseline: Yes     Activity and level of assistance: Up w/ max assist & lift/mechanical devices, this is patient's baseline.      Pain status: Pain free.     Return to near baseline physical activity: Yes          Discharge Planner Nurse   Safe discharge environment identified: No  Barriers to discharge: Yes       Pt A&O, confused to time & place at times.Turn q2hrs, as pt allows, incontinent bowel & bladder w/ red groin. Antifungal cream applied as ordered. Pt is medically cleared, awaiting funding to be approved.    Please review provider order for any additional goals.   Nurse to notify provider when observation goals have been met and patient is ready for discharge.

## 2022-09-30 NOTE — PLAN OF CARE
Goal Outcome Evaluation:        Patient's After Visit Summary was reviewed with patient.   Patient verbalized understanding of After Visit Summary, recommended follow up and was given an opportunity to ask questions.   Discharge medications sent home with patient/family:  No   Discharged with self.

## 2022-09-30 NOTE — CARE PLAN
Pt resting comfortably, calm and appropriate to staff. Denies pain awaiting  Group home placement.

## 2022-09-30 NOTE — PLAN OF CARE
PRIMARY DIAGNOSIS: Placement  OUTPATIENT/OBSERVATION GOALS TO BE MET BEFORE DISCHARGE:  1. ADLs back to baseline: Yes    2. Activity and level of assistance: Bed bound    3. Pain status: Improved-controlled with oral pain medications.    4. Return to near baseline physical activity: Yes     Discharge Planner Nurse   Safe discharge environment identified: Yes  Barriers to discharge: Yes       Entered by: Laura Dover RN 09/30/2022 9:36 AM     Please review provider order for any additional goals.   Nurse to notify provider when observation goals have been met and patient is ready for discharge.Goal Outcome Evaluation:

## 2022-09-30 NOTE — PLAN OF CARE
PRIMARY DIAGNOSIS: safe discharge disposition  OUTPATIENT/OBSERVATION GOALS TO BE MET BEFORE DISCHARGE:  ADLs back to baseline: Yes     Activity and level of assistance: Up w/ max assist & lift/mechanical devices, this is patient's baseline.      Pain status: Pain free.     Return to near baseline physical activity: Yes          Discharge Planner Nurse   Safe discharge environment identified: No  Barriers to discharge: Yes       Turn q2hrs, as pt allows, incontinent bowel & bladder w/ red groin. Pt is medically cleared, awaiting funding to be approved.      Please review provider order for any additional goals.   Nurse to notify provider when observation goals have been met and patient is ready for discharge.
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
## 2022-10-01 LAB
GLUCOSE BLDC GLUCOMTR-MCNC: 105 MG/DL (ref 70–99)
GLUCOSE BLDC GLUCOMTR-MCNC: 124 MG/DL (ref 70–99)
GLUCOSE BLDC GLUCOMTR-MCNC: 97 MG/DL (ref 70–99)

## 2022-10-01 PROCEDURE — 82962 GLUCOSE BLOOD TEST: CPT

## 2022-10-01 PROCEDURE — 99225 PR SUBSEQUENT OBSERVATION CARE,LEVEL II: CPT | Performed by: NURSE PRACTITIONER

## 2022-10-01 PROCEDURE — 250N000013 HC RX MED GY IP 250 OP 250 PS 637: Performed by: HOSPITALIST

## 2022-10-01 PROCEDURE — 250N000013 HC RX MED GY IP 250 OP 250 PS 637: Performed by: PHYSICIAN ASSISTANT

## 2022-10-01 PROCEDURE — G0378 HOSPITAL OBSERVATION PER HR: HCPCS

## 2022-10-01 RX ADMIN — BACLOFEN 10 MG: 10 TABLET ORAL at 13:11

## 2022-10-01 RX ADMIN — BACLOFEN 10 MG: 10 TABLET ORAL at 08:28

## 2022-10-01 RX ADMIN — QUETIAPINE 200 MG: 200 TABLET, FILM COATED ORAL at 13:09

## 2022-10-01 RX ADMIN — HYDROXYZINE HYDROCHLORIDE 50 MG: 50 TABLET, FILM COATED ORAL at 13:09

## 2022-10-01 RX ADMIN — SENNOSIDES AND DOCUSATE SODIUM 1 TABLET: 50; 8.6 TABLET ORAL at 08:27

## 2022-10-01 RX ADMIN — METFORMIN HYDROCHLORIDE 1000 MG: 500 TABLET ORAL at 17:52

## 2022-10-01 RX ADMIN — CLOTRIMAZOLE: 0.01 CREAM TOPICAL at 10:39

## 2022-10-01 RX ADMIN — METFORMIN HYDROCHLORIDE 1000 MG: 500 TABLET ORAL at 08:27

## 2022-10-01 RX ADMIN — DIVALPROEX SODIUM 250 MG: 500 TABLET, DELAYED RELEASE ORAL at 08:29

## 2022-10-01 RX ADMIN — CLOTRIMAZOLE: 0.01 CREAM TOPICAL at 19:52

## 2022-10-01 RX ADMIN — EMPAGLIFLOZIN 10 MG: 10 TABLET, FILM COATED ORAL at 08:28

## 2022-10-01 RX ADMIN — HYDROXYZINE HYDROCHLORIDE 50 MG: 50 TABLET, FILM COATED ORAL at 08:27

## 2022-10-01 RX ADMIN — DIVALPROEX SODIUM 500 MG: 500 TABLET, DELAYED RELEASE ORAL at 08:29

## 2022-10-01 RX ADMIN — CLONIDINE HYDROCHLORIDE 0.1 MG: 0.1 TABLET ORAL at 19:51

## 2022-10-01 RX ADMIN — CLONIDINE HYDROCHLORIDE 0.1 MG: 0.1 TABLET ORAL at 08:27

## 2022-10-01 RX ADMIN — VENLAFAXINE HYDROCHLORIDE 150 MG: 150 CAPSULE, EXTENDED RELEASE ORAL at 21:50

## 2022-10-01 RX ADMIN — ATORVASTATIN CALCIUM 20 MG: 20 TABLET, FILM COATED ORAL at 19:51

## 2022-10-01 RX ADMIN — Medication 10 MG: at 21:51

## 2022-10-01 RX ADMIN — Medication 25 MCG: at 08:27

## 2022-10-01 RX ADMIN — OLANZAPINE 2.5 MG: 2.5 TABLET, FILM COATED ORAL at 13:09

## 2022-10-01 RX ADMIN — HYDROXYZINE HYDROCHLORIDE 50 MG: 50 TABLET, FILM COATED ORAL at 19:52

## 2022-10-01 RX ADMIN — QUETIAPINE 200 MG: 200 TABLET, FILM COATED ORAL at 10:40

## 2022-10-01 RX ADMIN — LEVOTHYROXINE SODIUM 25 MCG: 0.03 TABLET ORAL at 08:28

## 2022-10-01 RX ADMIN — BACLOFEN 10 MG: 10 TABLET ORAL at 19:51

## 2022-10-01 RX ADMIN — ASPIRIN 81 MG CHEWABLE TABLET 81 MG: 81 TABLET CHEWABLE at 08:27

## 2022-10-01 RX ADMIN — DIVALPROEX SODIUM 1000 MG: 500 TABLET, DELAYED RELEASE ORAL at 21:51

## 2022-10-01 RX ADMIN — METOPROLOL SUCCINATE 25 MG: 25 TABLET, EXTENDED RELEASE ORAL at 08:27

## 2022-10-01 RX ADMIN — MIRTAZAPINE 15 MG: 15 TABLET, FILM COATED ORAL at 21:51

## 2022-10-01 RX ADMIN — QUETIAPINE FUMARATE 400 MG: 200 TABLET ORAL at 21:51

## 2022-10-01 RX ADMIN — VENLAFAXINE HYDROCHLORIDE 75 MG: 150 CAPSULE, EXTENDED RELEASE ORAL at 21:50

## 2022-10-01 ASSESSMENT — ACTIVITIES OF DAILY LIVING (ADL)
ADLS_ACUITY_SCORE: 55
ADLS_ACUITY_SCORE: 51

## 2022-10-01 NOTE — PLAN OF CARE
PRIMARY DIAGNOSIS: Placement   OUTPATIENT/OBSERVATION GOALS TO BE MET BEFORE DISCHARGE:  1. ADLs back to baseline: Yes    2. Activity and level of assistance: A X 2 with a lift    3. Pain status: Denies pain    4. Return to near baseline physical activity: Yes     Discharge Planner Nurse   Safe discharge environment identified: Yes  Barriers to discharge: Yes   Patient observed to be sleeping comfortably in bed        Entered by: Monica Elder RN 10/01/2022 00:15     Please review provider order for any additional goals.   Nurse to notify provider when observation goals have been met and patient is ready for discharge.

## 2022-10-01 NOTE — PLAN OF CARE
PRIMARY DIAGNOSIS: Placement   OUTPATIENT/OBSERVATION GOALS TO BE MET BEFORE DISCHARGE:  ADLs back to baseline: Yes    Activity and level of assistance: A X 2 with a lift    Pain status: Denies pain    Return to near baseline physical activity: Yes     Discharge Planner Nurse   Safe discharge environment identified: Yes  Barriers to discharge: Yes       Entered by: Monica Elder RN 09/30/2022 20:50     Please review provider order for any additional goals.   Nurse to notify provider when observation goals have been met and patient is ready for discharge.

## 2022-10-01 NOTE — PLAN OF CARE
PRIMARY DIAGNOSIS: Placement   OUTPATIENT/OBSERVATION GOALS TO BE MET BEFORE DISCHARGE:  1. ADLs back to baseline: Yes    2. Activity and level of assistance: A X 2 with a lift    3. Pain status: Denies pain    4. Return to near baseline physical activity: Yes     Discharge Planner Nurse   Safe discharge environment identified: Yes  Barriers to discharge: Yes   Patient  A & O X 4. VSS-Stable. Patient is medically cleared for discharge. Awaiting for funds approval for group home replacement   /82   Pulse 82   Temp 97.7  F (36.5  C) (Oral)   Resp 16   Wt 99.9 kg (220 lb 4.8 oz)   SpO2 95%   Will continue to provide cares and support.       Entered by: Monica Elder RN 10/01/2022 5972     Please review provider order for any additional goals.   Nurse to notify provider when observation goals have been met and patient is ready for discharge.

## 2022-10-01 NOTE — PLAN OF CARE
PRIMARY DIAGNOSIS: Placement  OUTPATIENT/OBSERVATION GOALS TO BE MET BEFORE DISCHARGE:  1. ADLs back to baseline: Yes    2. Activity and level of assistance: Up with maximum assistance. Consider SW and/or PT evaluation.     3. Pain status: Pain free.    4. Return to near baseline physical activity: Yes     Discharge Planner Nurse   Safe discharge environment identified: No  Barriers to discharge: Yes       Entered by: Laura Dover RN 10/01/2022 9:52 AM     Please review provider order for any additional goals.   Nurse to notify provider when observation goals have been met and patient is ready for discharge.Goal Outcome Evaluation:
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
## 2022-10-01 NOTE — PLAN OF CARE
PRIMARY DIAGNOSIS: Placement  OUTPATIENT/OBSERVATION GOALS TO BE MET BEFORE DISCHARGE:  1. ADLs back to baseline: Yes    2. Activity and level of assistance: Up with maximum assistance. Consider SW and/or PT evaluation.     3. Pain status: Pain free.    4. Return to near baseline physical activity: Yes     Discharge Planner Nurse   Safe discharge environment identified: No  Barriers to discharge: Yes       Entered by: Laura Dover RN 10/01/2022 2:15 PM     Pt awaiting group home placement.

## 2022-10-02 LAB
GLUCOSE BLDC GLUCOMTR-MCNC: 89 MG/DL (ref 70–99)
GLUCOSE BLDC GLUCOMTR-MCNC: 94 MG/DL (ref 70–99)

## 2022-10-02 PROCEDURE — 99224 PR SUBSEQUENT OBSERVATION CARE,LEVEL I: CPT | Performed by: PHYSICIAN ASSISTANT

## 2022-10-02 PROCEDURE — 250N000013 HC RX MED GY IP 250 OP 250 PS 637: Performed by: HOSPITALIST

## 2022-10-02 PROCEDURE — 82962 GLUCOSE BLOOD TEST: CPT

## 2022-10-02 PROCEDURE — 250N000013 HC RX MED GY IP 250 OP 250 PS 637: Performed by: PHYSICIAN ASSISTANT

## 2022-10-02 PROCEDURE — G0378 HOSPITAL OBSERVATION PER HR: HCPCS

## 2022-10-02 RX ADMIN — BACLOFEN 10 MG: 10 TABLET ORAL at 09:14

## 2022-10-02 RX ADMIN — METFORMIN HYDROCHLORIDE 1000 MG: 500 TABLET ORAL at 17:57

## 2022-10-02 RX ADMIN — Medication 25 MCG: at 09:14

## 2022-10-02 RX ADMIN — CLOTRIMAZOLE: 0.01 CREAM TOPICAL at 20:52

## 2022-10-02 RX ADMIN — DIVALPROEX SODIUM 250 MG: 500 TABLET, DELAYED RELEASE ORAL at 09:28

## 2022-10-02 RX ADMIN — QUETIAPINE 200 MG: 200 TABLET, FILM COATED ORAL at 09:14

## 2022-10-02 RX ADMIN — QUETIAPINE 200 MG: 200 TABLET, FILM COATED ORAL at 14:39

## 2022-10-02 RX ADMIN — METOPROLOL SUCCINATE 25 MG: 25 TABLET, EXTENDED RELEASE ORAL at 09:14

## 2022-10-02 RX ADMIN — ASPIRIN 81 MG CHEWABLE TABLET 81 MG: 81 TABLET CHEWABLE at 09:15

## 2022-10-02 RX ADMIN — METFORMIN HYDROCHLORIDE 1000 MG: 500 TABLET ORAL at 09:26

## 2022-10-02 RX ADMIN — CLOTRIMAZOLE: 0.01 CREAM TOPICAL at 09:29

## 2022-10-02 RX ADMIN — Medication 10 MG: at 21:52

## 2022-10-02 RX ADMIN — OLANZAPINE 2.5 MG: 2.5 TABLET, FILM COATED ORAL at 14:39

## 2022-10-02 RX ADMIN — BACLOFEN 10 MG: 10 TABLET ORAL at 14:39

## 2022-10-02 RX ADMIN — DIVALPROEX SODIUM 500 MG: 500 TABLET, DELAYED RELEASE ORAL at 09:14

## 2022-10-02 RX ADMIN — VENLAFAXINE HYDROCHLORIDE 225 MG: 150 CAPSULE, EXTENDED RELEASE ORAL at 21:50

## 2022-10-02 RX ADMIN — HYDROXYZINE HYDROCHLORIDE 50 MG: 50 TABLET, FILM COATED ORAL at 20:53

## 2022-10-02 RX ADMIN — CLONIDINE HYDROCHLORIDE 0.1 MG: 0.1 TABLET ORAL at 09:31

## 2022-10-02 RX ADMIN — CLONIDINE HYDROCHLORIDE 0.1 MG: 0.1 TABLET ORAL at 20:53

## 2022-10-02 RX ADMIN — BACLOFEN 10 MG: 10 TABLET ORAL at 20:53

## 2022-10-02 RX ADMIN — HYDROXYZINE HYDROCHLORIDE 50 MG: 50 TABLET, FILM COATED ORAL at 14:39

## 2022-10-02 RX ADMIN — QUETIAPINE FUMARATE 400 MG: 200 TABLET ORAL at 21:52

## 2022-10-02 RX ADMIN — HYDROXYZINE HYDROCHLORIDE 50 MG: 50 TABLET, FILM COATED ORAL at 09:26

## 2022-10-02 RX ADMIN — SENNOSIDES AND DOCUSATE SODIUM 1 TABLET: 50; 8.6 TABLET ORAL at 09:26

## 2022-10-02 RX ADMIN — DIVALPROEX SODIUM 1000 MG: 500 TABLET, DELAYED RELEASE ORAL at 21:51

## 2022-10-02 RX ADMIN — EMPAGLIFLOZIN 10 MG: 10 TABLET, FILM COATED ORAL at 09:28

## 2022-10-02 RX ADMIN — MIRTAZAPINE 15 MG: 15 TABLET, FILM COATED ORAL at 21:51

## 2022-10-02 RX ADMIN — VENLAFAXINE HYDROCHLORIDE 75 MG: 150 CAPSULE, EXTENDED RELEASE ORAL at 21:56

## 2022-10-02 RX ADMIN — ATORVASTATIN CALCIUM 20 MG: 20 TABLET, FILM COATED ORAL at 20:53

## 2022-10-02 RX ADMIN — LEVOTHYROXINE SODIUM 25 MCG: 0.03 TABLET ORAL at 09:14

## 2022-10-02 ASSESSMENT — ACTIVITIES OF DAILY LIVING (ADL)
ADLS_ACUITY_SCORE: 51
ADLS_ACUITY_SCORE: 49
ADLS_ACUITY_SCORE: 51

## 2022-10-02 NOTE — PLAN OF CARE
PRIMARY DIAGNOSIS: Placement  OUTPATIENT/OBSERVATION GOALS TO BE MET BEFORE DISCHARGE:  1. ADLs back to baseline: Yes    2. Activity and level of assistance: Up with maximum assistance. Consider SW and/or PT evaluation.     3. Pain status: Pain free.    4. Return to near baseline physical activity: Yes     Discharge Planner Nurse   Safe discharge environment identified: No  Barriers to discharge: Yes       Entered by: Laura Dover RN 10/02/2022 2:00 PM     Pt awaiting group home placement.

## 2022-10-02 NOTE — PLAN OF CARE
PRIMARY DIAGNOSIS: Placement   OUTPATIENT/OBSERVATION GOALS TO BE MET BEFORE DISCHARGE:  1. ADLs back to baseline: Yes    2. Activity and level of assistance: A X 2 with a lift    3. Pain status: Denies pain    4. Return to near baseline physical activity: Yes     Discharge Planner Nurse   Safe discharge environment identified: Yes  Barriers to discharge: Yes   Patient  A & O X 4. VSS-Stable. Patient is medically cleared for discharge. Awaiting for funds approval for group home replacement   /80   Pulse 87   Temp 97.9  F (36.6  C) (Oral)   Resp 18    SpO2 94%   Will continue to provide cares and support.       Entered by: Monica Elder RN 10/02/2022 04:35     Please review provider order for any additional goals.   Nurse to notify provider when observation goals have been met and patient is ready for discharge.
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
## 2022-10-02 NOTE — PLAN OF CARE
PRIMARY DIAGNOSIS: Placement  OUTPATIENT/OBSERVATION GOALS TO BE MET BEFORE DISCHARGE:  1. ADLs back to baseline: Yes    2. Activity and level of assistance: Up with maximum assistance. Consider SW and/or PT evaluation.     3. Pain status: Pain free.    4. Return to near baseline physical activity: Yes     Discharge Planner Nurse   Safe discharge environment identified: No  Barriers to discharge: Yes       Entered by: Laura Dover RN 10/02/2022 4:01 PM     Pt awaiting group home placement.

## 2022-10-02 NOTE — PLAN OF CARE
PRIMARY DIAGNOSIS: Placement  OUTPATIENT/OBSERVATION GOALS TO BE MET BEFORE DISCHARGE:  1. ADLs back to baseline: Yes    2. Activity and level of assistance: Up with maximum assistance. Consider SW and/or PT evaluation.     3. Pain status: Pain free.    4. Return to near baseline physical activity: Yes     Discharge Planner Nurse   Safe discharge environment identified: No  Barriers to discharge: Yes       Entered by: Laura Dover RN 10/02/2022 10:13 AM     Pt awaiting group home placement.

## 2022-10-02 NOTE — PLAN OF CARE
PRIMARY DIAGNOSIS: Placement   OUTPATIENT/OBSERVATION GOALS TO BE MET BEFORE DISCHARGE:  1. ADLs back to baseline: Yes    2. Activity and level of assistance: A X 2 with a lift    3. Pain status: Pain free.    4. Return to near baseline physical activity: Yes     Discharge Planner Nurse   Safe discharge environment identified: Yes  Barriers to discharge: Yes   Patient observed sleeping comfortably in bed.       Entered by: Monica Elder RN 10/02/2022 0003     Please review provider order for any additional goals.   Nurse to notify provider when observation goals have been met and patient is ready for discharge.

## 2022-10-02 NOTE — PLAN OF CARE
PRIMARY DIAGNOSIS: Placement   OUTPATIENT/OBSERVATION GOALS TO BE MET BEFORE DISCHARGE:  ADLs back to baseline: Yes    Activity and level of assistance: A X 2 with a lift    Pain status: Pain free.    Return to near baseline physical activity: Yes     Discharge Planner Nurse   Safe discharge environment identified: Yes  Barriers to discharge: Yes       Entered by: Monica Elder RN 10/01/2022 19:55     Please review provider order for any additional goals.   Nurse to notify provider when observation goals have been met and patient is ready for discharge.

## 2022-10-03 LAB
GLUCOSE BLDC GLUCOMTR-MCNC: 102 MG/DL (ref 70–99)
GLUCOSE BLDC GLUCOMTR-MCNC: 143 MG/DL (ref 70–99)

## 2022-10-03 PROCEDURE — 250N000013 HC RX MED GY IP 250 OP 250 PS 637: Performed by: HOSPITALIST

## 2022-10-03 PROCEDURE — 99225 PR SUBSEQUENT OBSERVATION CARE,LEVEL II: CPT | Performed by: PHYSICIAN ASSISTANT

## 2022-10-03 PROCEDURE — G0378 HOSPITAL OBSERVATION PER HR: HCPCS

## 2022-10-03 PROCEDURE — 82962 GLUCOSE BLOOD TEST: CPT

## 2022-10-03 PROCEDURE — 250N000013 HC RX MED GY IP 250 OP 250 PS 637: Performed by: PHYSICIAN ASSISTANT

## 2022-10-03 RX ADMIN — DIVALPROEX SODIUM 500 MG: 500 TABLET, DELAYED RELEASE ORAL at 08:14

## 2022-10-03 RX ADMIN — DIVALPROEX SODIUM 1000 MG: 500 TABLET, DELAYED RELEASE ORAL at 21:26

## 2022-10-03 RX ADMIN — HYDROXYZINE HYDROCHLORIDE 50 MG: 50 TABLET, FILM COATED ORAL at 21:26

## 2022-10-03 RX ADMIN — EMPAGLIFLOZIN 10 MG: 10 TABLET, FILM COATED ORAL at 08:13

## 2022-10-03 RX ADMIN — BACLOFEN 10 MG: 10 TABLET ORAL at 08:13

## 2022-10-03 RX ADMIN — LEVOTHYROXINE SODIUM 25 MCG: 0.03 TABLET ORAL at 08:15

## 2022-10-03 RX ADMIN — CLONIDINE HYDROCHLORIDE 0.1 MG: 0.1 TABLET ORAL at 21:26

## 2022-10-03 RX ADMIN — VENLAFAXINE HYDROCHLORIDE 75 MG: 150 CAPSULE, EXTENDED RELEASE ORAL at 21:27

## 2022-10-03 RX ADMIN — METFORMIN HYDROCHLORIDE 1000 MG: 500 TABLET ORAL at 17:28

## 2022-10-03 RX ADMIN — BACLOFEN 10 MG: 10 TABLET ORAL at 17:30

## 2022-10-03 RX ADMIN — ATORVASTATIN CALCIUM 20 MG: 20 TABLET, FILM COATED ORAL at 21:25

## 2022-10-03 RX ADMIN — OLANZAPINE 2.5 MG: 2.5 TABLET, FILM COATED ORAL at 14:36

## 2022-10-03 RX ADMIN — MIRTAZAPINE 15 MG: 15 TABLET, FILM COATED ORAL at 21:25

## 2022-10-03 RX ADMIN — QUETIAPINE 200 MG: 200 TABLET, FILM COATED ORAL at 14:36

## 2022-10-03 RX ADMIN — CLOTRIMAZOLE: 0.01 CREAM TOPICAL at 21:26

## 2022-10-03 RX ADMIN — METOPROLOL SUCCINATE 25 MG: 25 TABLET, EXTENDED RELEASE ORAL at 08:15

## 2022-10-03 RX ADMIN — VENLAFAXINE HYDROCHLORIDE 150 MG: 150 CAPSULE, EXTENDED RELEASE ORAL at 21:26

## 2022-10-03 RX ADMIN — METFORMIN HYDROCHLORIDE 1000 MG: 500 TABLET ORAL at 08:15

## 2022-10-03 RX ADMIN — DIVALPROEX SODIUM 250 MG: 500 TABLET, DELAYED RELEASE ORAL at 08:14

## 2022-10-03 RX ADMIN — QUETIAPINE 200 MG: 200 TABLET, FILM COATED ORAL at 08:16

## 2022-10-03 RX ADMIN — Medication 10 MG: at 21:26

## 2022-10-03 RX ADMIN — ASPIRIN 81 MG CHEWABLE TABLET 81 MG: 81 TABLET CHEWABLE at 08:12

## 2022-10-03 RX ADMIN — Medication 25 MCG: at 08:17

## 2022-10-03 RX ADMIN — HYDROXYZINE HYDROCHLORIDE 50 MG: 50 TABLET, FILM COATED ORAL at 08:15

## 2022-10-03 RX ADMIN — HYDROXYZINE HYDROCHLORIDE 50 MG: 50 TABLET, FILM COATED ORAL at 14:36

## 2022-10-03 RX ADMIN — BACLOFEN 10 MG: 10 TABLET ORAL at 21:25

## 2022-10-03 RX ADMIN — QUETIAPINE FUMARATE 400 MG: 200 TABLET ORAL at 21:25

## 2022-10-03 RX ADMIN — CLONIDINE HYDROCHLORIDE 0.1 MG: 0.1 TABLET ORAL at 08:14

## 2022-10-03 RX ADMIN — SENNOSIDES AND DOCUSATE SODIUM 1 TABLET: 50; 8.6 TABLET ORAL at 08:13

## 2022-10-03 ASSESSMENT — ACTIVITIES OF DAILY LIVING (ADL)
ADLS_ACUITY_SCORE: 49
ADLS_ACUITY_SCORE: 48
ADLS_ACUITY_SCORE: 53
ADLS_ACUITY_SCORE: 47
ADLS_ACUITY_SCORE: 47
ADLS_ACUITY_SCORE: 49
ADLS_ACUITY_SCORE: 48
ADLS_ACUITY_SCORE: 53
ADLS_ACUITY_SCORE: 48
ADLS_ACUITY_SCORE: 49
ADLS_ACUITY_SCORE: 49
ADLS_ACUITY_SCORE: 46

## 2022-10-03 NOTE — PLAN OF CARE
Goal Outcome Evaluation:  PRIMARY DIAGNOSIS: Placement   OUTPATIENT/OBSERVATION GOALS TO BE MET BEFORE DISCHARGE:  ADLs back to baseline: Yes    Activity and level of assistance: A X 2 with a lift     Pain status: Pain free.    Return to near baseline physical activity: Yes     Discharge Planner Nurse   Safe discharge environment identified: No  Barriers to discharge: Yes       Entered by: Monica Elder RN 10/02/2022 20:39     Please review provider order for any additional goals.   Nurse to notify provider when observation goals have been met and patient is ready for discharge.

## 2022-10-03 NOTE — PLAN OF CARE
PRIMARY DIAGNOSIS:Placement  OUTPATIENT/OBSERVATION GOALS TO BE MET BEFORE DISCHARGE:  1. ADLs back to baseline: Yes    2. Activity and level of assistance: Up with maximum assistance. Consider SW and/or PT evaluation.     3. Pain status: Pain free.    4. Return to near baseline physical activity: Yes     Discharge Planner Nurse   Safe discharge environment identified: Yes  Barriers to discharge: Yes       Entered by: Laura Dover RN 10/03/2022 1:45 PM     Please review provider order for any additional goals.   Nurse to notify provider when observation goals have been met and patient is ready for discharge.Goal Outcome Evaluation:                       SCRIBE #1 NOTE: I, Indy Nunez, am scribing for, and in the presence of, Denis Hawkins MD. I have scribed the entire note.       History     Chief Complaint   Patient presents with    Fall     reports falling out of bed this morning and hitting head and nose on corner of a table. denies taking blood thinners; lac noted to forehead    Laceration     Review of patient's allergies indicates:  No Known Allergies      History of Present Illness     HPI    9/19/2020, 7:18 AM  History obtained from the patient      History of Present Illness: Emani Pearson is a 82 y.o. female patient with a PMHx of GERD, cancer, HLD, HTN, thyroid disease who presents to the Emergency Department for evaluation of laceration to forehead secondary to mechanical fall which onset suddenly just PTA. Pt states she tripped getting out of bed and hitting her forehead and nose on metal table. Pt states she takes baby aspirin. Symptoms are constant and moderate in severity. No mitigating or exacerbating factors reported. Associated sxs include not reported. Patient denies any dizziness, HA, fever, chills, neck pain/stiffness, back pain, extremity weakness/numbness, visual disturbance, lightheadedness, syncope, abd pain, n/v, and all other sxs at this time. No prior Tx included. Pt's last tetanus shot was in 2014. No further complaints or concerns at this time.       Arrival mode: Personal vehicle    PCP: Edith Mooney PA-C        Past Medical History:  Past Medical History:   Diagnosis Date    Cancer     GERD (gastroesophageal reflux disease)     Hyperlipidemia     Hypertension     Thyroid disease        Past Surgical History:  Past Surgical History:   Procedure Laterality Date    BREAST LUMPECTOMY      HYSTERECTOMY      THYROIDECTOMY, PARTIAL           Family History:  History reviewed. No pertinent family history.    Social History:  Social History     Tobacco Use    Smoking status: Never Smoker   Substance and Sexual Activity     Alcohol use: No    Drug use: No    Sexual activity: Unknown        Review of Systems     Review of Systems   Constitutional: Negative for chills and fever.   HENT: Negative for sore throat.    Eyes: Negative for visual disturbance.   Respiratory: Negative for shortness of breath.    Cardiovascular: Negative for chest pain.   Gastrointestinal: Negative for abdominal pain, nausea and vomiting.   Genitourinary: Negative for dysuria.   Musculoskeletal: Negative for back pain, neck pain and neck stiffness.   Skin: Negative for rash.   Neurological: Negative for dizziness, syncope, weakness, light-headedness, numbness and headaches.   Hematological: Does not bruise/bleed easily.   All other systems reviewed and are negative.       Physical Exam     Initial Vitals [09/19/20 0656]   BP Pulse Resp Temp SpO2   (!) 144/104 96 18 97.9 °F (36.6 °C) (!) 93 %      MAP       --          Physical Exam  Nursing Notes and Vital Signs Reviewed.  Constitutional: Patient is in no acute distress.   Head: Normocephalic. 4 cm laceration to forehead and hairline region.  5 cm laceration to forehead region. 2 cm superficial laceration to L eyelid and bridge of nose. 4 cm laceration to bridge of nose.Not actively bleeding. Edema to forehead region. Ecchymosis and edema to R periorbital region.   Eyes: PERRL. EOM intact. Conjunctivae are not pale. No scleral icterus.  ENT: Mucous membranes are moist. Oropharynx is clear and symmetric.    Neck: Supple. Full ROM. No lymphadenopathy.  Cardiovascular: Regular rate. Regular rhythm. No murmurs, rubs, or gallops. Distal pulses are 2+ and symmetric.  Pulmonary/Chest: No respiratory distress. Clear to auscultation bilaterally. No wheezing or rales.  Abdominal: Soft and non-distended.  There is no tenderness.  No rebound, guarding, or rigidity. Good bowel sounds.  Genitourinary: No CVA tenderness  Musculoskeletal: Moves all extremities. No obvious deformities. No edema. No calf tenderness.  Skin: Warm  and dry.  Neurological:  Alert, awake, and appropriate.  Normal speech.  No acute focal neurological deficits are appreciated. Motor strength 5/5 and symmetrical. GCS 15.  Psychiatric: Normal affect. Good eye contact. Appropriate in content.     ED Course   Lac Repair    Date/Time: 2020 9:59 AM  Performed by: Denis Hawkins MD  Authorized by: Denis Hawkins MD   Consent Done: Yes  Consent: Verbal consent obtained.  Risks and benefits: risks, benefits and alternatives were discussed  Consent given by: patient  Patient understanding: patient states understanding of the procedure being performed  Patient consent: the patient's understanding of the procedure matches consent given  Required items: required blood products, implants, devices, and special equipment available  Patient identity confirmed: , verbally with patient and name  Body area: head/neck  Location details: forehead  Laceration length: 4 cm  Foreign bodies: no foreign bodies  Tendon involvement: none  Nerve involvement: none  Vascular damage: no  Anesthesia: local infiltration    Anesthesia:  Local Anesthetic: lidocaine 1% with epinephrine  Patient sedated: no  Preparation: Patient was prepped and draped in the usual sterile fashion.  Irrigation solution: saline  Irrigation method: syringe  Amount of cleaning: standard  Skin closure: 4-0 nylon  Number of sutures: 8  Technique: simple (interrupted)  Patient tolerance: Patient tolerated the procedure well with no immediate complications    Lac Repair    Date/Time: 2020 10:04 AM  Performed by: Denis Hawkins MD  Authorized by: Denis Hawkins MD   Consent Done: Yes  Consent: Verbal consent obtained.  Consent given by: patient  Patient understanding: patient states understanding of the procedure being performed  Patient consent: the patient's understanding of the procedure matches consent given  Required items: required blood products, implants, devices, and special equipment available  Patient  identity confirmed: , verbally with patient and name  Body area: head/neck (bridge of nose)  Laceration length: 5 cm  Foreign bodies: no foreign bodies  Tendon involvement: none  Nerve involvement: none  Vascular damage: no  Anesthesia: local infiltration    Anesthesia:  Local Anesthetic: lidocaine 1% with epinephrine  Patient sedated: no  Preparation: Patient was prepped and draped in the usual sterile fashion.  Irrigation solution: saline  Irrigation method: syringe  Amount of cleaning: standard  Skin closure: 4-0 nylon  Number of sutures: 12  Technique: simple (interrupted)  Patient tolerance: Patient tolerated the procedure well with no immediate complications    Lac Repair    Date/Time: 2020 10:08 AM  Performed by: Denis Hawkins MD  Authorized by: Denis Hawkins MD   Consent Done: Yes  Consent: Verbal consent obtained.  Risks and benefits: risks, benefits and alternatives were discussed  Consent given by: patient  Patient understanding: patient states understanding of the procedure being performed  Required items: required blood products, implants, devices, and special equipment available  Patient identity confirmed: , verbally with patient and name  Body area: head/neck  Location details: left eyelid  Laceration length: 2 cm  Foreign bodies: no foreign bodies  Tendon involvement: none  Nerve involvement: none  Vascular damage: no  Patient sedated: no  Irrigation solution: saline  Irrigation method: syringe  Amount of cleaning: standard  Skin closure: glue  Patient tolerance: Patient tolerated the procedure well with no immediate complications    Lac Repair    Date/Time: 2020 10:10 AM  Performed by: Denis Hawkins MD  Authorized by: Denis Hawkins MD   Consent Done: Yes  Consent: Verbal consent obtained.  Risks and benefits: risks, benefits and alternatives were discussed  Consent given by: patient  Patient understanding: patient states understanding of the procedure being performed  Required  "items: required blood products, implants, devices, and special equipment available  Patient identity confirmed: , verbally with patient and name  Body area: head/neck  Location details: forehead  Laceration length: 4 cm  Foreign bodies: no foreign bodies  Tendon involvement: none  Nerve involvement: none  Vascular damage: no  Anesthesia: local infiltration    Anesthesia:  Local Anesthetic: lidocaine 1% with epinephrine  Patient sedated: no  Preparation: Patient was prepped and draped in the usual sterile fashion.  Irrigation solution: saline  Irrigation method: syringe  Amount of cleaning: standard  Debridement: none  Degree of undermining: none  Skin closure: 4-0 nylon  Number of sutures: 8  Technique: running (continous)  Patient tolerance: Patient tolerated the procedure well with no immediate complications        ED Vital Signs:  Vitals:    20 0656 20 0720 20 0730 20 0900   BP: (!) 144/104  (!) 143/63 (!) 142/67   Pulse: 96  70 63   Resp: 18  18 16   Temp: 97.9 °F (36.6 °C)      TempSrc: Oral      SpO2: (!) 93%  97% 98%   Weight:  70.3 kg (155 lb)     Height: 5' 1" (1.549 m)       20 1030   BP: (!) 121/58   Pulse: 71   Resp: 18   Temp:    TempSrc:    SpO2: 96%   Weight:    Height:          Imaging Results:  Imaging Results          CT Maxillofacial Without Contrast (Final result)  Result time 20 08:10:21    Final result by Feng Sandoval MD (20 08:10:21)                 Impression:      No acute fracture.  Frontal and right supraorbital scalp swelling.      Electronically signed by: Feng Sandoval MD  Date:    2020  Time:    08:10             Narrative:    EXAMINATION:  CT MAXILLOFACIAL WITHOUT CONTRAST    CLINICAL HISTORY:  Facial trauma with pain and swelling after a fall.    TECHNIQUE:  Standard axial and coronal facial bone CT performed.    All CT scans at this facility are performed  using dose modulation techniques as appropriate to performed exam " including the following:  automated exposure control; adjustment of mA and/or kV according to the patients size (this includes techniques or standardized protocols for targeted exams where dose is matched to indication/reason for exam: i.e. extremities or head);  iterative reconstruction technique.    COMPARISON:  None    FINDINGS:  There is prominent soft tissue swelling of the frontal scalp with right supraorbital swelling as well.    A frontal bone is intact.  The nasal septum is deviated.  The orbital floor is normal.    The mandible appears intact without evidence of acute fracture.    There is minor scattered ethmoid mucosal thickening.                               CT Head Without Contrast (Final result)  Result time 09/19/20 08:08:47    Final result by Feng Sandoval MD (09/19/20 08:08:47)                 Impression:      Frontal scalp swelling/hematoma.  No acute intracranial abnormality.      Electronically signed by: Feng Sandoval MD  Date:    09/19/2020  Time:    08:08             Narrative:    EXAMINATION:  CT HEAD WITHOUT CONTRAST    CLINICAL HISTORY:  Head injury after a fall with forehead scalp laceration.    TECHNIQUE:  Standard noncontrast CT of the brain.    All CT scans at this facility are performed  using dose modulation techniques as appropriate to performed exam including the following:  automated exposure control; adjustment of mA and/or kV according to the patients size (this includes techniques or standardized protocols for targeted exams where dose is matched to indication/reason for exam: i.e. extremities or head);  iterative reconstruction technique.    COMPARISON:  None.    FINDINGS:  The ventricles are moderately enlarged.  The extra-axial CSF spaces are prominent as well.  Intracranial vascular calcification is noted.    No acute intracranial abnormality is seen.    Frontal scalp swelling with hematoma is evident.                                        The Emergency  Provider reviewed the vital signs and test results, which are outlined above.     ED Discussion     10:14 AM: Reassessed pt at this time. Discussed with pt all pertinent ED information and results. Discussed pt dx and plan of tx. Gave pt all f/u and return to the ED instructions. All questions and concerns were addressed at this time. Pt expresses understanding of information and instructions, and is comfortable with plan to discharge. Pt is stable for discharge.    I discussed with patient and/or family/caretaker that evaluation in the ED does not suggest any emergent or life threatening medical conditions requiring immediate intervention beyond what was provided in the ED, and I believe patient is safe for discharge.  Regardless, an unremarkable evaluation in the ED does not preclude the development or presence of a serious of life threatening condition. As such, patient was instructed to return immediately for any worsening or change in current symptoms.         Medical Decision Making:   Clinical Tests:   Radiological Study: Ordered and Reviewed           ED Medication(s):  Medications   lidocaine-EPINEPHrine 1%-1:100,000 injection 1 mL (1 mL Subcutaneous Given by Other 9/19/20 0816)   neomycin-bacitracnZn-polymyxnB packet (1 each Topical (Top) Given 9/19/20 0817)   Tdap vaccine injection 0.5 mL (0.5 mLs Intramuscular Given 9/19/20 0816)       Discharge Medication List as of 9/19/2020 10:13 AM          Follow-up Information     Edith Mooney PA-C In 2 days.    Specialty: Cardiology  Contact information:  [address] [place] [address] [phone]             [facility] - .    Specialty: Emergency Medicine  Why: As needed, If symptoms worsen  Contact information:  [address] [address] [phone]                     Scribe Attestation:   Scribe #1: I performed the above scribed service and the documentation accurately describes the  services I performed. I attest to the accuracy of the note.     Attending:   Physician Attestation Statement for Scribe #1: I, Denis Hawkins MD, personally performed the services described in this documentation, as scribed by Indy Nunez, in my presence, and it is both accurate and complete.           Clinical Impression       ICD-10-CM ICD-9-CM   1. Fall, initial encounter  W19.XXXA E888.9   2. Facial laceration, initial encounter  S01.81XA 873.40   3. Closed head injury, initial encounter  S09.90XA 959.01       Disposition:   Disposition: Discharged  Condition: Stable         Denis Hawkins MD  09/19/20 6805

## 2022-10-03 NOTE — PLAN OF CARE
Goal Outcome Evaluation:  PRIMARY DIAGNOSIS: Placement   OUTPATIENT/OBSERVATION GOALS TO BE MET BEFORE DISCHARGE:  1. ADLs back to baseline: Yes    2. Activity and level of assistance: A X 2 with a lift     3. Pain status: Pain free.    4. Return to near baseline physical activity: Yes     Discharge Planner Nurse   Safe discharge environment identified: No  Barriers to discharge: Yes   Pulsate low air mattress on        Entered by: Monica Elder RN 10/03/2022 00:59     Please review provider order for any additional goals.   Nurse to notify provider when observation goals have been met and patient is ready for discharge.

## 2022-10-03 NOTE — PLAN OF CARE
PRIMARY DIAGNOSIS: Placement   OUTPATIENT/OBSERVATION GOALS TO BE MET BEFORE DISCHARGE:  1. ADLs back to baseline: Yes    2. Activity and level of assistance: A X 2 with a lift    3. Pain status: Denies pain    4. Return to near baseline physical activity: Yes     Discharge Planner Nurse   Safe discharge environment identified: Yes  Barriers to discharge: Yes   Patient  A & O X 4. Patient is medically cleared for discharge. Awaiting for funds approval for group home replacement   /79   Pulse 87   Temp 97.8  F (36.6  C) (Oral)   Resp 16    SpO2 95%   Will continue to provide cares and support.       Entered by: Monica Elder RN 10/03/2022 04:22     Please review provider order for any additional goals.   Nurse to notify provider when observation goals have been met and patient is ready for discharge.

## 2022-10-03 NOTE — PROGRESS NOTES
"Care Management Follow Up    Expected Discharge Date: 10/07/2022     Concerns to be Addressed:  Discharge planning     Patient plan of care discussed at interdisciplinary rounds: Yes    Anticipated Discharge Disposition:  [facility], once [facility] funding is in place    Additional Information:  SW received email 9/29 from fam Ivy stating \"The  provider is still working on getting the correct rate. [facility] did not approve the first 6790 because there were a lot of rates that did not make sense to them. I explained the issues to the provider and what corrections to make, still waiting.\" SW will continue to follow.     DANITA Obrien, [facility]   Inpatient Care Coordination  [facility]   [phone]        "
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
## 2022-10-03 NOTE — PLAN OF CARE
PRIMARY DIAGNOSIS:Placement  OUTPATIENT/OBSERVATION GOALS TO BE MET BEFORE DISCHARGE:  1. ADLs back to baseline: Yes    2. Activity and level of assistance: Up with maximum assistance. Consider SW and/or PT evaluation.     3. Pain status: Pain free.    4. Return to near baseline physical activity: Yes     Discharge Planner Nurse   Safe discharge environment identified: Yes  Barriers to discharge: Yes       Entered by: Laura Dover RN 10/03/2022 10:24 AM     Please review provider order for any additional goals.   Nurse to notify provider when observation goals have been met and patient is ready for discharge.Goal Outcome Evaluation:

## 2022-10-04 LAB
GLUCOSE BLDC GLUCOMTR-MCNC: 106 MG/DL (ref 70–99)
GLUCOSE BLDC GLUCOMTR-MCNC: 85 MG/DL (ref 70–99)

## 2022-10-04 PROCEDURE — 250N000013 HC RX MED GY IP 250 OP 250 PS 637: Performed by: PHYSICIAN ASSISTANT

## 2022-10-04 PROCEDURE — 99225 PR SUBSEQUENT OBSERVATION CARE,LEVEL II: CPT | Performed by: PHYSICIAN ASSISTANT

## 2022-10-04 PROCEDURE — 82962 GLUCOSE BLOOD TEST: CPT

## 2022-10-04 PROCEDURE — 250N000013 HC RX MED GY IP 250 OP 250 PS 637: Performed by: HOSPITALIST

## 2022-10-04 PROCEDURE — G0378 HOSPITAL OBSERVATION PER HR: HCPCS

## 2022-10-04 RX ADMIN — DIVALPROEX SODIUM 250 MG: 500 TABLET, DELAYED RELEASE ORAL at 09:45

## 2022-10-04 RX ADMIN — Medication 25 MCG: at 09:42

## 2022-10-04 RX ADMIN — SENNOSIDES AND DOCUSATE SODIUM 1 TABLET: 50; 8.6 TABLET ORAL at 09:45

## 2022-10-04 RX ADMIN — LEVOTHYROXINE SODIUM 25 MCG: 0.03 TABLET ORAL at 09:43

## 2022-10-04 RX ADMIN — QUETIAPINE FUMARATE 400 MG: 200 TABLET ORAL at 21:12

## 2022-10-04 RX ADMIN — BACLOFEN 10 MG: 10 TABLET ORAL at 21:10

## 2022-10-04 RX ADMIN — VENLAFAXINE HYDROCHLORIDE 150 MG: 150 CAPSULE, EXTENDED RELEASE ORAL at 21:13

## 2022-10-04 RX ADMIN — MIRTAZAPINE 15 MG: 15 TABLET, FILM COATED ORAL at 21:12

## 2022-10-04 RX ADMIN — CLONIDINE HYDROCHLORIDE 0.1 MG: 0.1 TABLET ORAL at 21:10

## 2022-10-04 RX ADMIN — METFORMIN HYDROCHLORIDE 1000 MG: 500 TABLET ORAL at 18:09

## 2022-10-04 RX ADMIN — ATORVASTATIN CALCIUM 20 MG: 20 TABLET, FILM COATED ORAL at 21:10

## 2022-10-04 RX ADMIN — QUETIAPINE 200 MG: 200 TABLET, FILM COATED ORAL at 09:43

## 2022-10-04 RX ADMIN — HYDROXYZINE HYDROCHLORIDE 50 MG: 50 TABLET, FILM COATED ORAL at 21:10

## 2022-10-04 RX ADMIN — CLOTRIMAZOLE: 0.01 CREAM TOPICAL at 21:36

## 2022-10-04 RX ADMIN — DIVALPROEX SODIUM 1000 MG: 500 TABLET, DELAYED RELEASE ORAL at 21:12

## 2022-10-04 RX ADMIN — EMPAGLIFLOZIN 10 MG: 10 TABLET, FILM COATED ORAL at 09:42

## 2022-10-04 RX ADMIN — OLANZAPINE 2.5 MG: 2.5 TABLET, FILM COATED ORAL at 13:56

## 2022-10-04 RX ADMIN — VENLAFAXINE HYDROCHLORIDE 75 MG: 150 CAPSULE, EXTENDED RELEASE ORAL at 21:13

## 2022-10-04 RX ADMIN — HYDROXYZINE HYDROCHLORIDE 50 MG: 50 TABLET, FILM COATED ORAL at 09:44

## 2022-10-04 RX ADMIN — BACLOFEN 10 MG: 10 TABLET ORAL at 09:43

## 2022-10-04 RX ADMIN — CLONIDINE HYDROCHLORIDE 0.1 MG: 0.1 TABLET ORAL at 09:43

## 2022-10-04 RX ADMIN — ASPIRIN 81 MG CHEWABLE TABLET 81 MG: 81 TABLET CHEWABLE at 09:42

## 2022-10-04 RX ADMIN — BACLOFEN 10 MG: 10 TABLET ORAL at 13:56

## 2022-10-04 RX ADMIN — HYDROXYZINE HYDROCHLORIDE 50 MG: 50 TABLET, FILM COATED ORAL at 13:56

## 2022-10-04 RX ADMIN — METFORMIN HYDROCHLORIDE 1000 MG: 500 TABLET ORAL at 09:42

## 2022-10-04 RX ADMIN — METOPROLOL SUCCINATE 25 MG: 25 TABLET, EXTENDED RELEASE ORAL at 09:44

## 2022-10-04 RX ADMIN — Medication 10 MG: at 21:12

## 2022-10-04 RX ADMIN — CLOTRIMAZOLE: 0.01 CREAM TOPICAL at 09:46

## 2022-10-04 RX ADMIN — DIVALPROEX SODIUM 500 MG: 500 TABLET, DELAYED RELEASE ORAL at 09:46

## 2022-10-04 RX ADMIN — QUETIAPINE 200 MG: 200 TABLET, FILM COATED ORAL at 13:56

## 2022-10-04 ASSESSMENT — ACTIVITIES OF DAILY LIVING (ADL)
ADLS_ACUITY_SCORE: 46
ADLS_ACUITY_SCORE: 46
ADLS_ACUITY_SCORE: 49
ADLS_ACUITY_SCORE: 50
ADLS_ACUITY_SCORE: 50
ADLS_ACUITY_SCORE: 49
ADLS_ACUITY_SCORE: 46
ADLS_ACUITY_SCORE: 49
ADLS_ACUITY_SCORE: 50

## 2022-10-04 NOTE — PLAN OF CARE
PRIMARY DIAGNOSIS: safe discharge disposition  OUTPATIENT/OBSERVATION GOALS TO BE MET BEFORE DISCHARGE:  ADLs back to baseline: Yes     Activity and level of assistance: Up w/ max assist & lift/mechanical devices, this is patient's baseline.      Pain status: Pain free.     Return to near baseline physical activity: Yes          Discharge Planner Nurse   Safe discharge environment identified: No  Barriers to discharge: Yes       Turn q2hrs, as pt allows, incontinent bowel & bladder w/ red groin. Pt is medically cleared, [facility] is accepting, awaiting funding to be approved.       Please review provider order for any additional goals.   Nurse to notify provider when observation goals have been met and patient is ready for discharge.

## 2022-10-04 NOTE — PROGRESS NOTES
PRIMARY DIAGNOSIS: SAFE DISCHARGE DISPOSITION  OUTPATIENT/OBSERVATION GOALS TO BE MET BEFORE DISCHARGE:  1. Pain Status: Pain free.    2. Return to near baseline physical activity: Yes    3. Cleared for discharge by consultants (if involved): Yes    Discharge Planner Nurse   Safe discharge environment identified: no  Barriers to discharge: Yes       Entered by: Nancy Maurice RN 10/04/2022 08:21am     Pt. A&O, REPO Q2 hours, this is base line for patient, pt. Has redness in groin area clotrimazole was applyed, ate 100% of his meal. Pt, Awaiting for placement.  /81 (BP Location: Right arm)   Pulse 76   Temp 97.8  F (36.6  C) (Oral)   Resp 18   Wt 99.9 kg (220 lb 4.8 oz)   SpO2 92%   Please review provider order for any additional goals.   Nurse to notify provider when observation goals have been met and patient is ready for discharge.

## 2022-10-04 NOTE — PLAN OF CARE
PRIMARY DIAGNOSIS: safe discharge disposition    OUTPATIENT/OBSERVATION GOALS TO BE MET BEFORE DISCHARGE:  ADLs back to baseline: Yes     Activity and level of assistance: turn q 2hrs as pt allows. this is patient's baseline.      Pain status: Pain free.     Return to near baseline physical activity: Yes          Discharge Planner Nurse   Safe discharge environment identified: No  Barriers to discharge: Yes     Incontinent bladder w/ red groin & scrotum. Pt is medically cleared, [facility] is accepting, awaiting funding to be approved.       Please review provider order for any additional goals.   Nurse to notify provider when observation goals have been met and patient is ready for discharge.    Noted pt does have red areas to lateral ankles, pt requests fluffy boots off at this time.

## 2022-10-04 NOTE — PLAN OF CARE
PRIMARY DIAGNOSIS: safe discharge disposition  OUTPATIENT/OBSERVATION GOALS TO BE MET BEFORE DISCHARGE:  1. ADLs back to baseline: Yes     2. Activity and level of assistance: Pt paraplegic- Assixt x2 with lift    3. Pain status: Pain free.    4. Return to near baseline physical activity: Yes     Discharge Planner Nurse   Safe discharge environment identified: Yes  Barriers to discharge: Yes       Entered by: Jackson Wilson RN 10/04/2022 6:51 PM    Please review provider order for any additional goals.   Nurse to notify provider when observation goals have been met and patient is ready for discharge.    Pt A&Ox4. VSS on RA. Denies pain. No IV access. Awaiting placement. Blood sugar checks twice a day. Continuing to monitor and provide cares.

## 2022-10-04 NOTE — PLAN OF CARE
PRIMARY DIAGNOSIS: safe discharge disposition  OUTPATIENT/OBSERVATION GOALS TO BE MET BEFORE DISCHARGE:  ADLs back to baseline: Yes     Activity and level of assistance: Up w/ max assist & lift/mechanical devices, turn q 2hrs as pt allows. this is patient's baseline.      Pain status: Pain free.     Return to near baseline physical activity: Yes          Discharge Planner Nurse   Safe discharge environment identified: No  Barriers to discharge: Yes    Incontinent bladder w/ red groin & scrotum. Pt is medically cleared, [facility] is accepting, awaiting funding to be approved.       Please review provider order for any additional goals.   Nurse to notify provider when observation goals have been met and patient is ready for discharge.
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
## 2022-10-04 NOTE — PLAN OF CARE
PRIMARY DIAGNOSIS: SAFE DISCHARGE DISPOSITION  OUTPATIENT/OBSERVATION GOALS TO BE MET BEFORE DISCHARGE:  1. Pain Status: Pain free.    2. Return to near baseline physical activity: Yes    3. Cleared for discharge by consultants (if involved): Yes    Discharge Planner Nurse   Safe discharge environment identified: No  Barriers to discharge: Yes       Entered by: Nancy Maurice RN 10/04/2022 1:14 PM   Pt. A&O, REPO Q2 hours, this is base line for patient, pt. Has redness in groin area clotrimazole was applyed, ate 100% of his meal. Pt, Awaiting for placement.  /81 (BP Location: Right arm)   Pulse 76   Temp 97.8  F (36.6  C) (Oral)   Resp 18   Wt 99.9 kg (220 lb 4.8 oz)   SpO2 92%    Please review provider order for any additional goals.   Nurse to notify provider when observation goals have been met and patient is ready for discharge.

## 2022-10-05 LAB
GLUCOSE BLDC GLUCOMTR-MCNC: 113 MG/DL (ref 70–99)
GLUCOSE BLDC GLUCOMTR-MCNC: 86 MG/DL (ref 70–99)

## 2022-10-05 PROCEDURE — 99225 PR SUBSEQUENT OBSERVATION CARE,LEVEL II: CPT | Performed by: PHYSICIAN ASSISTANT

## 2022-10-05 PROCEDURE — 250N000013 HC RX MED GY IP 250 OP 250 PS 637: Performed by: HOSPITALIST

## 2022-10-05 PROCEDURE — 82962 GLUCOSE BLOOD TEST: CPT

## 2022-10-05 PROCEDURE — 250N000013 HC RX MED GY IP 250 OP 250 PS 637: Performed by: PHYSICIAN ASSISTANT

## 2022-10-05 PROCEDURE — G0378 HOSPITAL OBSERVATION PER HR: HCPCS

## 2022-10-05 RX ADMIN — DIVALPROEX SODIUM 250 MG: 500 TABLET, DELAYED RELEASE ORAL at 09:32

## 2022-10-05 RX ADMIN — DIVALPROEX SODIUM 500 MG: 500 TABLET, DELAYED RELEASE ORAL at 09:37

## 2022-10-05 RX ADMIN — SENNOSIDES AND DOCUSATE SODIUM 1 TABLET: 50; 8.6 TABLET ORAL at 09:33

## 2022-10-05 RX ADMIN — MIRTAZAPINE 15 MG: 15 TABLET, FILM COATED ORAL at 21:04

## 2022-10-05 RX ADMIN — ASPIRIN 81 MG CHEWABLE TABLET 81 MG: 81 TABLET CHEWABLE at 09:32

## 2022-10-05 RX ADMIN — BACLOFEN 10 MG: 10 TABLET ORAL at 09:33

## 2022-10-05 RX ADMIN — QUETIAPINE FUMARATE 400 MG: 200 TABLET ORAL at 21:04

## 2022-10-05 RX ADMIN — CLOTRIMAZOLE: 0.01 CREAM TOPICAL at 21:05

## 2022-10-05 RX ADMIN — METOPROLOL SUCCINATE 25 MG: 25 TABLET, EXTENDED RELEASE ORAL at 09:32

## 2022-10-05 RX ADMIN — METFORMIN HYDROCHLORIDE 1000 MG: 500 TABLET ORAL at 09:32

## 2022-10-05 RX ADMIN — CLONIDINE HYDROCHLORIDE 0.1 MG: 0.1 TABLET ORAL at 09:33

## 2022-10-05 RX ADMIN — HYDROXYZINE HYDROCHLORIDE 50 MG: 50 TABLET, FILM COATED ORAL at 21:04

## 2022-10-05 RX ADMIN — VENLAFAXINE HYDROCHLORIDE 75 MG: 150 CAPSULE, EXTENDED RELEASE ORAL at 21:05

## 2022-10-05 RX ADMIN — METFORMIN HYDROCHLORIDE 1000 MG: 500 TABLET ORAL at 17:38

## 2022-10-05 RX ADMIN — VENLAFAXINE HYDROCHLORIDE 150 MG: 150 CAPSULE, EXTENDED RELEASE ORAL at 21:05

## 2022-10-05 RX ADMIN — Medication 25 MCG: at 09:32

## 2022-10-05 RX ADMIN — Medication 10 MG: at 21:04

## 2022-10-05 RX ADMIN — EMPAGLIFLOZIN 10 MG: 10 TABLET, FILM COATED ORAL at 09:32

## 2022-10-05 RX ADMIN — LEVOTHYROXINE SODIUM 25 MCG: 0.03 TABLET ORAL at 09:33

## 2022-10-05 RX ADMIN — BACLOFEN 10 MG: 10 TABLET ORAL at 14:02

## 2022-10-05 RX ADMIN — CLOTRIMAZOLE: 0.01 CREAM TOPICAL at 09:37

## 2022-10-05 RX ADMIN — QUETIAPINE 200 MG: 200 TABLET, FILM COATED ORAL at 14:03

## 2022-10-05 RX ADMIN — DIVALPROEX SODIUM 1000 MG: 500 TABLET, DELAYED RELEASE ORAL at 21:04

## 2022-10-05 RX ADMIN — CLONIDINE HYDROCHLORIDE 0.1 MG: 0.1 TABLET ORAL at 21:04

## 2022-10-05 RX ADMIN — HYDROXYZINE HYDROCHLORIDE 50 MG: 50 TABLET, FILM COATED ORAL at 09:33

## 2022-10-05 RX ADMIN — OLANZAPINE 2.5 MG: 2.5 TABLET, FILM COATED ORAL at 14:03

## 2022-10-05 RX ADMIN — BACLOFEN 10 MG: 10 TABLET ORAL at 21:05

## 2022-10-05 RX ADMIN — QUETIAPINE 200 MG: 200 TABLET, FILM COATED ORAL at 09:33

## 2022-10-05 RX ADMIN — HYDROXYZINE HYDROCHLORIDE 50 MG: 50 TABLET, FILM COATED ORAL at 14:02

## 2022-10-05 RX ADMIN — ATORVASTATIN CALCIUM 20 MG: 20 TABLET, FILM COATED ORAL at 21:05

## 2022-10-05 ASSESSMENT — ACTIVITIES OF DAILY LIVING (ADL)
ADLS_ACUITY_SCORE: 56
ADLS_ACUITY_SCORE: 50
ADLS_ACUITY_SCORE: 56
ADLS_ACUITY_SCORE: 50
ADLS_ACUITY_SCORE: 56
ADLS_ACUITY_SCORE: 50
ADLS_ACUITY_SCORE: 56
ADLS_ACUITY_SCORE: 50
ADLS_ACUITY_SCORE: 50
ADLS_ACUITY_SCORE: 56

## 2022-10-05 NOTE — PLAN OF CARE
PRIMARY DIAGNOSIS: PLACEMENT.  OUTPATIENT/OBSERVATION GOALS TO BE MET BEFORE DISCHARGE:  1. ADLs back to baseline: Yes    2. Activity and level of assistance: lift device    3. Pain status: Pain free.    4. Return to near baseline physical activity: Yes    Vitals are Temp: 97.2  F (36.2  C) Temp src: Oral BP: 123/85 Pulse: 72   Resp: 18 SpO2: 96 %.  Patient is Alert and Oriented x4. Pt is assist of 2 with a lift device.  Pt is a Regular diet.  Pt is denying pain.  Patient has no IV access.pt is medically cleared. Pt needs help to order meals.pt is total care. Pt has been accepted to [facility], waiting for [facility].will continue to monitor and provide cares.     Discharge Planner Nurse   Safe discharge environment identified: Yes  Barriers to discharge: Yes       Entered by: Tita Loja RN 10/05/2022      Please review provider order for any additional goals.   Nurse to notify provider when observation goals have been met and patient is ready for discharge.

## 2022-10-05 NOTE — PROGRESS NOTES
"Care Management Follow Up    Expected Discharge Date: 10/14/2022     Concerns to be Addressed: Discharge planning      Patient plan of care discussed at interdisciplinary rounds: Yes    Anticipated Discharge Disposition:  [facility]    Additional Information:  SW emailed pt's waiver CM Cata Justin, to inquire if she has received updated rate sheet from provider to submit to [facility] for review/approval, awaiting response. SW will continue to follow.     1530: Email response from CM:  \"I sent [facility] the rate sheet on Friday and they wanted an additional document from the provider and the [employer] assessment. I informed the [facility] representative that I requested the additional document from the provider and sent the [employer] assessment to them. The provider never sent me the additional document and I never heard back from [facility]. Chris wong waiver services have  as of yesterday (10/4) due to being out of the community for more than 30 days. I have informed [facility] of this again.\"    DANITA Obrien, [facility]   Inpatient Care Coordination  [facility]   [phone]        "

## 2022-10-05 NOTE — PLAN OF CARE
PRIMARY DIAGNOSIS:safe discharge disposition  OUTPATIENT/OBSERVATION GOALS TO BE MET BEFORE DISCHARGE:  1. ADLs back to baseline: Yes    2. Activity and level of assistance: Pt paraplegic- Assixt x2 with lift    3. Pain status: Pain free.    4.  Return to near baseline physical activity: Yes     Discharge Planner Nurse   Safe discharge environment identified: Yes  Barriers to discharge: Yes       Entered by: Joe Lebron RN 10/05/2022 5:19 AM    Blood pressure 111/83, pulse 74, temperature 98  F (36.7  C), temperature source Oral, resp. rate 18, weight 99.9 kg (220 lb 4.8 oz), SpO2 94 %.  Pt is alert to self. Pt denies pain or discomfort. VSS. Pt diaper was changed and repositioned during the shift. Pt has no IV access. Pt denies nausea and vomiting. Bed alarm in place and call light within reach. Will continue to monitor and assess pt.     Please review provider order for any additional goals.   Nurse to notify provider when observation goals have been met and patient is ready for discharge.

## 2022-10-05 NOTE — PLAN OF CARE
PRIMARY DIAGNOSIS: safe discharge disposition  OUTPATIENT/OBSERVATION GOALS TO BE MET BEFORE DISCHARGE:  1. ADLs back to baseline: Yes    2. Activity and level of assistance: Pt paraplegic- Assixt x2 with lift    2. Pain status: Pain free.    3. Return to near baseline physical activity: Yes     Discharge Planner Nurse   Safe discharge environment identified: Yes  Barriers to discharge: Yes       Entered by: Joe Lebron RN 10/05/2022 12:29 AM    /76 (BP Location: Right arm)   Pulse 79   Temp 98.1  F (36.7  C) (Oral)   Resp 16   Wt 99.9 kg (220 lb 4.8 oz)   SpO2 96%     Please review provider order for any additional goals.   Nurse to notify provider when observation goals have been met and patient is ready for discharge.
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
## 2022-10-05 NOTE — PLAN OF CARE
PRIMARY DIAGNOSIS: PLACEMENT.  OUTPATIENT/OBSERVATION GOALS TO BE MET BEFORE DISCHARGE:  ADLs back to baseline: Yes    Activity and level of assistance: lift device    Pain status: Pain free.    Return to near baseline physical activity: Yes    Vitals are Temp: 97.2  F (36.2  C) Temp src: Oral BP: 123/85 Pulse: 72   Resp: 18 SpO2: 96 %.  Patient is Alert and Oriented x4. Pt is assist of 2 with a lift device.  Pt is a Regular diet.  Pt is denying pain.  Patient has no IV access. Pt has been accepted to [facility] once [facility] funding is available.     Discharge Planner Nurse   Safe discharge environment identified: Yes  Barriers to discharge: Yes       Entered by: Tita Loja RN 10/05/2022      Please review provider order for any additional goals.   Nurse to notify provider when observation goals have been met and patient is ready for discharge.

## 2022-10-05 NOTE — PLAN OF CARE
PRIMARY DIAGNOSIS: PLACEMENT.  OUTPATIENT/OBSERVATION GOALS TO BE MET BEFORE DISCHARGE:  1. ADLs back to baseline: Yes    2. Activity and level of assistance: lift device    3. Pain status: Pain free.    4. Return to near baseline physical activity: Yes    Vitals are Temp: 97.2  F (36.2  C) Temp src: Oral BP: 123/85 Pulse: 72   Resp: 18 SpO2: 96 %.  Patient is Alert and Oriented x4. Pt is assist of 2 with a lift device.  Pt is a Regular diet.  Pt is denying pain.  Patient has no IV access.pt is medically cleared. Pt needs help to order meals.pt is total care. Pt has been accepted to [facility], waiting for [facility].will continue to monitor and provide cares.     Discharge Planner Nurse   Safe discharge environment identified: Yes  Barriers to discharge: Yes       Entered by: Tita Loja RN 10/05/2022      Please review provider order for any additional goals.   Nurse to notify provider when observation goals have been met and patient is ready for discharge.

## 2022-10-05 NOTE — UTILIZATION REVIEW
.Concurrent stay review; Secondary Review Determination    [facility]        Under the authority of the Utilization Management Committee, the utilization review process indicated a secondary review on the above patient.  The review outcome is based on review of the medical records, discussions with staff, and applying clinical experience noted on the date of the review.        (x) Observation/outpatient Status Appropriate - Concurrent stay review       RATIONALE FOR DETERMINATION:     Patient delayed discharge is related to disposition, there is no medical necessity for inpatient admission at the time of this review. If there is a change in patient status, please resend for review.    The information on this document is developed by the utilization review team in order for the business office to ensure compliance.  This only denotes the appropriateness of proper admission status and does not reflect the quality of care rendered.       The definitions of Inpatient Status and Observation Status used in making the determination above are those provided in the CMS Coverage Manual, Chapter 1 and Chapter 6, section 70.4.       Sincerely,    Manuel Saini MD

## 2022-10-06 LAB
GLUCOSE BLDC GLUCOMTR-MCNC: 115 MG/DL (ref 70–99)
GLUCOSE BLDC GLUCOMTR-MCNC: 99 MG/DL (ref 70–99)

## 2022-10-06 PROCEDURE — 99225 PR SUBSEQUENT OBSERVATION CARE,LEVEL II: CPT | Performed by: PHYSICIAN ASSISTANT

## 2022-10-06 PROCEDURE — 250N000013 HC RX MED GY IP 250 OP 250 PS 637: Performed by: PHYSICIAN ASSISTANT

## 2022-10-06 PROCEDURE — G0378 HOSPITAL OBSERVATION PER HR: HCPCS

## 2022-10-06 PROCEDURE — 82962 GLUCOSE BLOOD TEST: CPT | Mod: 91

## 2022-10-06 PROCEDURE — 250N000013 HC RX MED GY IP 250 OP 250 PS 637: Performed by: HOSPITALIST

## 2022-10-06 RX ADMIN — OLANZAPINE 2.5 MG: 2.5 TABLET, FILM COATED ORAL at 14:17

## 2022-10-06 RX ADMIN — DIVALPROEX SODIUM 250 MG: 500 TABLET, DELAYED RELEASE ORAL at 09:21

## 2022-10-06 RX ADMIN — QUETIAPINE FUMARATE 400 MG: 200 TABLET ORAL at 21:29

## 2022-10-06 RX ADMIN — HYDROXYZINE HYDROCHLORIDE 50 MG: 50 TABLET, FILM COATED ORAL at 20:08

## 2022-10-06 RX ADMIN — CLOTRIMAZOLE: 0.01 CREAM TOPICAL at 20:11

## 2022-10-06 RX ADMIN — DIVALPROEX SODIUM 1000 MG: 500 TABLET, DELAYED RELEASE ORAL at 21:29

## 2022-10-06 RX ADMIN — Medication 25 MCG: at 09:18

## 2022-10-06 RX ADMIN — SENNOSIDES AND DOCUSATE SODIUM 1 TABLET: 50; 8.6 TABLET ORAL at 09:18

## 2022-10-06 RX ADMIN — METFORMIN HYDROCHLORIDE 1000 MG: 500 TABLET ORAL at 20:07

## 2022-10-06 RX ADMIN — DIVALPROEX SODIUM 500 MG: 500 TABLET, DELAYED RELEASE ORAL at 09:16

## 2022-10-06 RX ADMIN — QUETIAPINE 200 MG: 200 TABLET, FILM COATED ORAL at 09:17

## 2022-10-06 RX ADMIN — VENLAFAXINE HYDROCHLORIDE 150 MG: 150 CAPSULE, EXTENDED RELEASE ORAL at 21:31

## 2022-10-06 RX ADMIN — ATORVASTATIN CALCIUM 20 MG: 20 TABLET, FILM COATED ORAL at 20:08

## 2022-10-06 RX ADMIN — BACLOFEN 10 MG: 10 TABLET ORAL at 14:17

## 2022-10-06 RX ADMIN — BACLOFEN 10 MG: 10 TABLET ORAL at 20:08

## 2022-10-06 RX ADMIN — CLOTRIMAZOLE: 0.01 CREAM TOPICAL at 09:23

## 2022-10-06 RX ADMIN — CLONIDINE HYDROCHLORIDE 0.1 MG: 0.1 TABLET ORAL at 09:17

## 2022-10-06 RX ADMIN — LEVOTHYROXINE SODIUM 25 MCG: 0.03 TABLET ORAL at 09:17

## 2022-10-06 RX ADMIN — VENLAFAXINE HYDROCHLORIDE 75 MG: 150 CAPSULE, EXTENDED RELEASE ORAL at 21:29

## 2022-10-06 RX ADMIN — Medication 10 MG: at 21:29

## 2022-10-06 RX ADMIN — EMPAGLIFLOZIN 10 MG: 10 TABLET, FILM COATED ORAL at 09:18

## 2022-10-06 RX ADMIN — MIRTAZAPINE 15 MG: 15 TABLET, FILM COATED ORAL at 21:30

## 2022-10-06 RX ADMIN — HYDROXYZINE HYDROCHLORIDE 50 MG: 50 TABLET, FILM COATED ORAL at 14:17

## 2022-10-06 RX ADMIN — HYDROXYZINE HYDROCHLORIDE 50 MG: 50 TABLET, FILM COATED ORAL at 09:17

## 2022-10-06 RX ADMIN — METFORMIN HYDROCHLORIDE 1000 MG: 500 TABLET ORAL at 09:17

## 2022-10-06 RX ADMIN — METOPROLOL SUCCINATE 25 MG: 25 TABLET, EXTENDED RELEASE ORAL at 09:17

## 2022-10-06 RX ADMIN — ASPIRIN 81 MG CHEWABLE TABLET 81 MG: 81 TABLET CHEWABLE at 09:18

## 2022-10-06 RX ADMIN — QUETIAPINE 200 MG: 200 TABLET, FILM COATED ORAL at 14:17

## 2022-10-06 RX ADMIN — BACLOFEN 10 MG: 10 TABLET ORAL at 09:18

## 2022-10-06 RX ADMIN — CLONIDINE HYDROCHLORIDE 0.1 MG: 0.1 TABLET ORAL at 20:08

## 2022-10-06 ASSESSMENT — ACTIVITIES OF DAILY LIVING (ADL)
ADLS_ACUITY_SCORE: 56

## 2022-10-06 NOTE — PLAN OF CARE
PRIMARY DIAGNOSIS: AWAITING PLACEMENT  OUTPATIENT/OBSERVATION GOALS TO BE MET BEFORE DISCHARGE:  ADLs back to baseline: Yes    Activity and level of assistance: A2 lift, paraplegic    Pain status: Pain free.    Return to near baseline physical activity: Yes     Discharge Planner Nurse   Safe discharge environment identified: Yes  Barriers to discharge: Yes       Entered by: Vero Franco RN 10/06/2022   A&O x4. VSS. Tolerating a regular diet. Pt denies any pain or discomfort at this time. Pt has been accepted to [facility], waiting for [facility]. Will cont to provide supportive cares.   Please review provider order for any additional goals.   Nurse to notify provider when observation goals have been met and patient is ready for discharge.

## 2022-10-06 NOTE — PLAN OF CARE
PRIMARY DIAGNOSIS: PLACEMENT  OUTPATIENT/OBSERVATION GOALS TO BE MET BEFORE DISCHARGE:  1. ADLs back to baseline: Yes    2. Activity and level of assistance: Lift assist    3. Pain status: Pain free.    4. Return to near baseline physical activity: Yes    Vitals are Temp: 97.4  F (36.3  C) Temp src: Oral BP: 124/85 Pulse: 85   Resp: 16 SpO2: 96 %.  Patient is Alert and Oriented x4. Pt is  2  person assist with Lift.  Pt is a Regular diet.  Pt is denying pain.  Patient is Saline locked. Pt has been medically cleared for discharge. Pt to discharge to [facility] once [facility] funding is available. Will cont monitor and provide cares.     Discharge Planner Nurse   Safe discharge environment identified: Yes  Barriers to discharge: Yes       Entered by: Tita Loja RN 10/06/2022      Please review provider order for any additional goals.   Nurse to notify provider when observation goals have been met and patient is ready for discharge.

## 2022-10-06 NOTE — PLAN OF CARE
PRIMARY DIAGNOSIS: Placement  OUTPATIENT/OBSERVATION GOALS TO BE MET BEFORE DISCHARGE:  ADLs back to baseline: Yes    Activity and level of assistance: Ax2 (paraplegic    Pain status: Pain free.    Return to near baseline physical activity: Yes     Discharge Planner Nurse   Safe discharge environment identified: No  Barriers to discharge: Yes            Please review provider order for any additional goals.   Nurse to notify provider when observation goals have been met and patient is ready for discharge.      Pt alert and oriented x4. Ax2 lift. Denies pain. Awaiting placement. Will continue to monitor.

## 2022-10-06 NOTE — PLAN OF CARE
PRIMARY DIAGNOSIS: PLACEMENT  OUTPATIENT/OBSERVATION GOALS TO BE MET BEFORE DISCHARGE:  1. ADLs back to baseline: Yes    2. Activity and level of assistance: Lift assist    3. Pain status: Pain free.    4. Return to near baseline physical activity: Yes    Vitals are Temp: 97.4  F (36.3  C) Temp src: Oral BP: 124/85 Pulse: 85   Resp: 16 SpO2: 96 %.  Patient is Alert and Oriented x4. Pt is  2  person assist with a  Lift device.  pt is incontinent of both BB. Pt needs repositioning.Pt is a Regular diet.  Pt is denying pain.  Patient is Saline locked. Vitals stable. Pt needs help to order for meals.Pt has been medically cleared for discharge. Pt to discharge to [facility] once [facility] funding is available. Will cont monitor and provide cares.     Discharge Planner Nurse   Safe discharge environment identified: Yes  Barriers to discharge: Yes       Entered by: Tita Loja RN 10/06/2022      Please review provider order for any additional goals.   Nurse to notify provider when observation goals have been met and patient is ready for discharge.

## 2022-10-06 NOTE — PLAN OF CARE
PRIMARY DIAGNOSIS: PLACEMENT  OUTPATIENT/OBSERVATION GOALS TO BE MET BEFORE DISCHARGE:  1. ADLs back to baseline: Yes    2. Activity and level of assistance: Lift assist    3. Pain status: Pain free.    4. Return to near baseline physical activity: Yes    Vitals are Temp: 97.4  F (36.3  C) Temp src: Oral BP: 124/85 Pulse: 85   Resp: 16 SpO2: 96 %.  Patient is Alert and Oriented x4. Pt is  2  person assist with Lift.  Pt is a Regular diet.  Pt is denying pain.  Patient is Saline locked. Pt has been medically cleared for discharge. Pt to discharge to [facility] once [facility] funding is available. Will cont monitor and provide cares.     Discharge Planner Nurse   Safe discharge environment identified: Yes  Barriers to discharge: Yes       Entered by: Tita Loja RN 10/06/2022      Please review provider order for any additional goals.   Nurse to notify provider when observation goals have been met and patient is ready for discharge.

## 2022-10-06 NOTE — PLAN OF CARE
PRIMARY DIAGNOSIS: AWAITING PLACEMENT  OUTPATIENT/OBSERVATION GOALS TO BE MET BEFORE DISCHARGE:  1. ADLs back to baseline: Yes    2. Activity and level of assistance: A2 lift, paraplegic    3. Pain status: Pain free.    4. Return to near baseline physical activity: Yes     Discharge Planner Nurse   Safe discharge environment identified: Yes  Barriers to discharge: Yes       Entered by: Vero Franco RN 10/06/2022   A&O x4. VSS. Tolerating a regular diet. Pt denies any pain or discomfort at this time. Pt has been accepted to [facility], waiting for [facility]. Will cont to provide supportive cares.   Please review provider order for any additional goals.   Nurse to notify provider when observation goals have been met and patient is ready for discharge.
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
## 2022-10-07 LAB
GLUCOSE BLDC GLUCOMTR-MCNC: 111 MG/DL (ref 70–99)
GLUCOSE BLDC GLUCOMTR-MCNC: 81 MG/DL (ref 70–99)

## 2022-10-07 PROCEDURE — 250N000013 HC RX MED GY IP 250 OP 250 PS 637: Performed by: HOSPITALIST

## 2022-10-07 PROCEDURE — 82962 GLUCOSE BLOOD TEST: CPT

## 2022-10-07 PROCEDURE — 99225 PR SUBSEQUENT OBSERVATION CARE,LEVEL II: CPT | Performed by: PHYSICIAN ASSISTANT

## 2022-10-07 PROCEDURE — 250N000013 HC RX MED GY IP 250 OP 250 PS 637: Performed by: PHYSICIAN ASSISTANT

## 2022-10-07 PROCEDURE — G0378 HOSPITAL OBSERVATION PER HR: HCPCS

## 2022-10-07 RX ADMIN — QUETIAPINE 200 MG: 200 TABLET, FILM COATED ORAL at 14:19

## 2022-10-07 RX ADMIN — HYDROXYZINE HYDROCHLORIDE 50 MG: 50 TABLET, FILM COATED ORAL at 08:24

## 2022-10-07 RX ADMIN — DIVALPROEX SODIUM 250 MG: 500 TABLET, DELAYED RELEASE ORAL at 08:23

## 2022-10-07 RX ADMIN — HYDROXYZINE HYDROCHLORIDE 50 MG: 50 TABLET, FILM COATED ORAL at 14:19

## 2022-10-07 RX ADMIN — Medication 10 MG: at 21:36

## 2022-10-07 RX ADMIN — SENNOSIDES AND DOCUSATE SODIUM 1 TABLET: 50; 8.6 TABLET ORAL at 08:24

## 2022-10-07 RX ADMIN — METOPROLOL SUCCINATE 25 MG: 25 TABLET, EXTENDED RELEASE ORAL at 08:24

## 2022-10-07 RX ADMIN — BACLOFEN 10 MG: 10 TABLET ORAL at 14:19

## 2022-10-07 RX ADMIN — METFORMIN HYDROCHLORIDE 1000 MG: 500 TABLET ORAL at 20:24

## 2022-10-07 RX ADMIN — METFORMIN HYDROCHLORIDE 1000 MG: 500 TABLET ORAL at 08:23

## 2022-10-07 RX ADMIN — CLOTRIMAZOLE: 0.01 CREAM TOPICAL at 21:37

## 2022-10-07 RX ADMIN — QUETIAPINE FUMARATE 400 MG: 200 TABLET ORAL at 21:36

## 2022-10-07 RX ADMIN — DIVALPROEX SODIUM 1000 MG: 500 TABLET, DELAYED RELEASE ORAL at 21:37

## 2022-10-07 RX ADMIN — QUETIAPINE 200 MG: 200 TABLET, FILM COATED ORAL at 08:24

## 2022-10-07 RX ADMIN — BACLOFEN 10 MG: 10 TABLET ORAL at 08:24

## 2022-10-07 RX ADMIN — CLONIDINE HYDROCHLORIDE 0.1 MG: 0.1 TABLET ORAL at 08:24

## 2022-10-07 RX ADMIN — VENLAFAXINE HYDROCHLORIDE 75 MG: 150 CAPSULE, EXTENDED RELEASE ORAL at 21:38

## 2022-10-07 RX ADMIN — BACLOFEN 10 MG: 10 TABLET ORAL at 20:24

## 2022-10-07 RX ADMIN — EMPAGLIFLOZIN 10 MG: 10 TABLET, FILM COATED ORAL at 08:23

## 2022-10-07 RX ADMIN — CLOTRIMAZOLE: 0.01 CREAM TOPICAL at 08:26

## 2022-10-07 RX ADMIN — ATORVASTATIN CALCIUM 20 MG: 20 TABLET, FILM COATED ORAL at 20:24

## 2022-10-07 RX ADMIN — ASPIRIN 81 MG CHEWABLE TABLET 81 MG: 81 TABLET CHEWABLE at 08:24

## 2022-10-07 RX ADMIN — Medication 25 MCG: at 08:23

## 2022-10-07 RX ADMIN — DIVALPROEX SODIUM 500 MG: 500 TABLET, DELAYED RELEASE ORAL at 08:23

## 2022-10-07 RX ADMIN — OLANZAPINE 2.5 MG: 2.5 TABLET, FILM COATED ORAL at 14:19

## 2022-10-07 RX ADMIN — VENLAFAXINE HYDROCHLORIDE 150 MG: 150 CAPSULE, EXTENDED RELEASE ORAL at 21:38

## 2022-10-07 RX ADMIN — LEVOTHYROXINE SODIUM 25 MCG: 0.03 TABLET ORAL at 08:24

## 2022-10-07 RX ADMIN — MIRTAZAPINE 15 MG: 15 TABLET, FILM COATED ORAL at 21:37

## 2022-10-07 RX ADMIN — CLONIDINE HYDROCHLORIDE 0.1 MG: 0.1 TABLET ORAL at 20:24

## 2022-10-07 RX ADMIN — HYDROXYZINE HYDROCHLORIDE 50 MG: 50 TABLET, FILM COATED ORAL at 20:24

## 2022-10-07 ASSESSMENT — ACTIVITIES OF DAILY LIVING (ADL)
ADLS_ACUITY_SCORE: 52
ADLS_ACUITY_SCORE: 48
ADLS_ACUITY_SCORE: 48
ADLS_ACUITY_SCORE: 52
ADLS_ACUITY_SCORE: 48
ADLS_ACUITY_SCORE: 48
ADLS_ACUITY_SCORE: 52
ADLS_ACUITY_SCORE: 48
ADLS_ACUITY_SCORE: 48
ADLS_ACUITY_SCORE: 56
ADLS_ACUITY_SCORE: 52
ADLS_ACUITY_SCORE: 52

## 2022-10-07 NOTE — PLAN OF CARE
PRIMARY DIAGNOSIS: Placement  OUTPATIENT/OBSERVATION GOALS TO BE MET BEFORE DISCHARGE:  1. ADLs back to baseline: Yes    2. Activity and level of assistance: Up with maximum assistance 2 with lift    3. Pain status: Pain free.    4. Return to near baseline physical activity: Yes     Discharge Planner Nurse   Safe discharge environment identified: No  Barriers to discharge: Yes       Entered by: Helene Oreilly RN 10/07/2022 4:08 AM  Pt AO x4. VSS on RA, LS clear, BS active. Pt up with A2 with lift device but not OOB this shift. Pt incontinent of bowel and bladder, calls appropriately when needing to be changed. Tolerating regular diet, needs assistance ordering meals. No PIV in place. Plan to discharge to  when place is secured, otherwise medically cleared.   BP (!) 128/91 (BP Location: Right arm)   Pulse 87   Temp 97.4  F (36.3  C) (Oral)   Resp 16   Wt 99.9 kg (220 lb 4.8 oz)   SpO2 96%   Please review provider order for any additional goals.   Nurse to notify provider when observation goals have been met and patient is ready for discharge.

## 2022-10-07 NOTE — PLAN OF CARE
PRIMARY DIAGNOSIS: Placement  OUTPATIENT/OBSERVATION GOALS TO BE MET BEFORE DISCHARGE:  ADLs back to baseline: Yes    Activity and level of assistance: Up with maximum assistance 2 with lift    Pain status: Pain free.    Return to near baseline physical activity: Yes     Discharge Planner Nurse   Safe discharge environment identified: No  Barriers to discharge: Yes       Entered by: Helene Oreilly RN 10/07/2022 4:13 AM  Pt AO x4. VSS on RA, LS clear, BS active. Pt up with A2 with lift device but not OOB. Pt incontinent of bowel and bladder, calls appropriately when needing assistance. Tolerating regular diet, needs assistance ordering meals. No PIV in place. Plan to discharge to  when place is secured, otherwise medically cleared.   BP (!) 128/91 (BP Location: Right arm)   Pulse 87   Temp 97.4  F (36.3  C) (Oral)   Resp 16   Wt 99.9 kg (220 lb 4.8 oz)   SpO2 96%   Please review provider order for any additional goals.   Nurse to notify provider when observation goals have been met and patient is ready for discharge.

## 2022-10-07 NOTE — PLAN OF CARE
PRIMARY DIAGNOSIS: PLACEMENT  OUTPATIENT/OBSERVATION GOALS TO BE MET BEFORE DISCHARGE:  1. ADLs back to baseline: Yes    2. Activity and level of assistance: Lift assist    3. Pain status: Pain free.    4. Return to near baseline physical activity: Yes    Vitals are Temp: 97.4  F (36.3  C) Temp src: Oral BP: (!) 128/91 Pulse: 87   Resp: 16 SpO2: 96 %.  Patient is Alert and Oriented x4. Pt is  2  person assist with a  Lift device.  pt is incontinent of both BB. Pt needs repositioning.Pt is a Regular diet.  Pt is denying pain.  Patient is Saline locked. Vitals stable. Pt needs help to order for meals.Pt has been medically cleared for discharge. Pt to discharge to [facility] once [facility] funding is available. Will cont monitor and provide cares.     Discharge Planner Nurse   Safe discharge environment identified: Yes  Barriers to discharge: Yes       Entered by: Tita Loja RN 10/06/2022      Please review provider order for any additional goals.   Nurse to notify provider when observation goals have been met and patient is ready for discharge.

## 2022-10-07 NOTE — PLAN OF CARE
PRIMARY DIAGNOSIS: Placement  OUTPATIENT/OBSERVATION GOALS TO BE MET BEFORE DISCHARGE:  1. ADLs back to baseline: Yes     2. Activity and level of assistance: Up with maximum assistance. Consider SW and/or PT evaluation.      3. Pain status: Pain free.     4. Return to near baseline physical activity: Yes          Discharge Planner Nurse   Safe discharge environment identified: No  Barriers to discharge: Yes       Entered by: Soraida De Jesus RN 10/07/2022 10:23 AM  Please review provider order for any additional goals.   Nurse to notify provider when observation goals have been met and patient is ready for discharge.
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
## 2022-10-07 NOTE — PLAN OF CARE
Patient's After Visit Summary was reviewed with patient.   Patient verbalized understanding of After Visit Summary, recommended follow up and was given an opportunity to ask questions.   Discharge medications sent home with patient/family: YES - given to  to give to facility  Discharged with transport tech

## 2022-10-08 LAB
GLUCOSE BLDC GLUCOMTR-MCNC: 118 MG/DL (ref 70–99)
GLUCOSE BLDC GLUCOMTR-MCNC: 81 MG/DL (ref 70–99)

## 2022-10-08 PROCEDURE — 99224 PR SUBSEQUENT OBSERVATION CARE,LEVEL I: CPT | Performed by: PHYSICIAN ASSISTANT

## 2022-10-08 PROCEDURE — 82962 GLUCOSE BLOOD TEST: CPT

## 2022-10-08 PROCEDURE — G0378 HOSPITAL OBSERVATION PER HR: HCPCS

## 2022-10-08 PROCEDURE — 250N000013 HC RX MED GY IP 250 OP 250 PS 637: Performed by: HOSPITALIST

## 2022-10-08 PROCEDURE — 250N000013 HC RX MED GY IP 250 OP 250 PS 637: Performed by: PHYSICIAN ASSISTANT

## 2022-10-08 RX ADMIN — DIVALPROEX SODIUM 250 MG: 500 TABLET, DELAYED RELEASE ORAL at 08:29

## 2022-10-08 RX ADMIN — DIVALPROEX SODIUM 1000 MG: 500 TABLET, DELAYED RELEASE ORAL at 20:59

## 2022-10-08 RX ADMIN — QUETIAPINE 200 MG: 200 TABLET, FILM COATED ORAL at 08:28

## 2022-10-08 RX ADMIN — MIRTAZAPINE 15 MG: 15 TABLET, FILM COATED ORAL at 20:57

## 2022-10-08 RX ADMIN — Medication 25 MCG: at 08:29

## 2022-10-08 RX ADMIN — CLONIDINE HYDROCHLORIDE 0.1 MG: 0.1 TABLET ORAL at 20:54

## 2022-10-08 RX ADMIN — METFORMIN HYDROCHLORIDE 1000 MG: 500 TABLET ORAL at 08:28

## 2022-10-08 RX ADMIN — BACLOFEN 10 MG: 10 TABLET ORAL at 13:45

## 2022-10-08 RX ADMIN — CLOTRIMAZOLE: 0.01 CREAM TOPICAL at 20:54

## 2022-10-08 RX ADMIN — ATORVASTATIN CALCIUM 20 MG: 20 TABLET, FILM COATED ORAL at 20:54

## 2022-10-08 RX ADMIN — BACLOFEN 10 MG: 10 TABLET ORAL at 20:54

## 2022-10-08 RX ADMIN — CLONIDINE HYDROCHLORIDE 0.1 MG: 0.1 TABLET ORAL at 08:29

## 2022-10-08 RX ADMIN — BACLOFEN 10 MG: 10 TABLET ORAL at 08:29

## 2022-10-08 RX ADMIN — QUETIAPINE 200 MG: 200 TABLET, FILM COATED ORAL at 13:45

## 2022-10-08 RX ADMIN — ASPIRIN 81 MG CHEWABLE TABLET 81 MG: 81 TABLET CHEWABLE at 08:29

## 2022-10-08 RX ADMIN — VENLAFAXINE HYDROCHLORIDE 150 MG: 150 CAPSULE, EXTENDED RELEASE ORAL at 20:57

## 2022-10-08 RX ADMIN — SENNOSIDES AND DOCUSATE SODIUM 1 TABLET: 50; 8.6 TABLET ORAL at 08:28

## 2022-10-08 RX ADMIN — HYDROXYZINE HYDROCHLORIDE 50 MG: 50 TABLET, FILM COATED ORAL at 20:54

## 2022-10-08 RX ADMIN — QUETIAPINE FUMARATE 400 MG: 200 TABLET ORAL at 20:55

## 2022-10-08 RX ADMIN — EMPAGLIFLOZIN 10 MG: 10 TABLET, FILM COATED ORAL at 08:29

## 2022-10-08 RX ADMIN — METFORMIN HYDROCHLORIDE 1000 MG: 500 TABLET ORAL at 17:06

## 2022-10-08 RX ADMIN — DIVALPROEX SODIUM 500 MG: 500 TABLET, DELAYED RELEASE ORAL at 08:28

## 2022-10-08 RX ADMIN — VENLAFAXINE HYDROCHLORIDE 75 MG: 150 CAPSULE, EXTENDED RELEASE ORAL at 21:03

## 2022-10-08 RX ADMIN — DIVALPROEX SODIUM 1000 MG: 500 TABLET, DELAYED RELEASE ORAL at 20:56

## 2022-10-08 RX ADMIN — METOPROLOL SUCCINATE 25 MG: 25 TABLET, EXTENDED RELEASE ORAL at 08:28

## 2022-10-08 RX ADMIN — CLOTRIMAZOLE: 0.01 CREAM TOPICAL at 08:29

## 2022-10-08 RX ADMIN — HYDROXYZINE HYDROCHLORIDE 50 MG: 50 TABLET, FILM COATED ORAL at 13:46

## 2022-10-08 RX ADMIN — HYDROXYZINE HYDROCHLORIDE 50 MG: 50 TABLET, FILM COATED ORAL at 08:29

## 2022-10-08 RX ADMIN — OLANZAPINE 2.5 MG: 2.5 TABLET, FILM COATED ORAL at 13:45

## 2022-10-08 RX ADMIN — LEVOTHYROXINE SODIUM 25 MCG: 0.03 TABLET ORAL at 08:30

## 2022-10-08 RX ADMIN — Medication 10 MG: at 20:55

## 2022-10-08 ASSESSMENT — ACTIVITIES OF DAILY LIVING (ADL)
ADLS_ACUITY_SCORE: 48
ADLS_ACUITY_SCORE: 50
ADLS_ACUITY_SCORE: 48
ADLS_ACUITY_SCORE: 48
ADLS_ACUITY_SCORE: 50
ADLS_ACUITY_SCORE: 48

## 2022-10-08 NOTE — PLAN OF CARE
PRIMARY DIAGNOSIS: PLACEMENT  OUTPATIENT/OBSERVATION GOALS TO BE MET BEFORE DISCHARGE:  ADLs back to baseline: Yes    Activity and level of assistance: A x 2    Pain status: Pain free.    Return to near baseline physical activity: Yes     Discharge Planner Nurse   Safe discharge environment identified: Yes  Barriers to discharge: No       Entered by: Preston Arango RN 10/08/2022 12:10 AM     Please review provider order for any additional goals.   Nurse to notify provider when observation goals have been met and patient is ready for discharge.Goal Outcome Evaluation:

## 2022-10-08 NOTE — PLAN OF CARE
PRIMARY DIAGNOSIS: PLACEMENT  OUTPATIENT/OBSERVATION GOALS TO BE MET BEFORE DISCHARGE:  1. ADLs back to baseline: Yes    2. Activity and level of assistance: A x 2    3. Pain status: Pain free.    4. Return to near baseline physical activity: Yes     Discharge Planner Nurse   Safe discharge environment identified: Yes  Barriers to discharge: No       Entered by: Preston Arango RN 10/08/2022 3:09 AM  Pt AO x4, A x 2 with lift device but not OOB this shift. Pt incontinent of bowel and bladder, calls appropriately when needing to be changed. Plan is discharge to [facility] once once all required documentation are submitted.  Please review provider order for any additional goals.   Nurse to notify provider when observation goals have been met and patient is ready for discharge.Goal Outcome Evaluation:

## 2022-10-08 NOTE — PLAN OF CARE
PRIMARY DIAGNOSIS: Placement    OUTPATIENT/OBSERVATION GOALS TO BE MET BEFORE DISCHARGE:  1. ADLs back to baseline: Yes     2. Activity and level of assistance: Up with maximum assistance. Consider SW and/or PT evaluation.      3. Pain status: Pain free.     4. Return to near baseline physical activity: Yes          Discharge Planner Nurse   Safe discharge environment identified: No  Barriers to discharge: Yes       Entered by: Susan Teran RN 10/07/2022 17:00 PM
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
## 2022-10-08 NOTE — PLAN OF CARE
PRIMARY DIAGNOSIS: Placement  OUTPATIENT/OBSERVATION GOALS TO BE MET BEFORE DISCHARGE:  ADLs back to baseline: Yes    Activity and level of assistance: Up with maximum assistance. Baseline    Pain status: Pain free.    Return to near baseline physical activity: Yes     Discharge Planner Nurse   Safe discharge environment identified: Yes  Barriers to discharge: Yes       Entered by: Soraida De Jesus RN 10/08/2022 3:13 PM     Please review provider order for any additional goals.   Nurse to notify provider when observation goals have been met and patient is ready for discharge.    Goal Outcome Evaluation:    Plan of Care Reviewed With: patient          A&O x 4. Denies pain. Up to chair for lunch today. No IV in place

## 2022-10-08 NOTE — PLAN OF CARE
PRIMARY DIAGNOSIS: Placement  OUTPATIENT/OBSERVATION GOALS TO BE MET BEFORE DISCHARGE:  1. ADLs back to baseline: Yes     2. Activity and level of assistance: Up with maximum assistance. Baseline. Paraplegia.     3. Pain status: Pain free.    4. Return to near baseline physical activity: Yes     Discharge Planner Nurse   Safe discharge environment identified: Yes  Barriers to discharge: Yes       Entered by: Hira Fraire RN 10/08/2022       Denies pain, SOB, N/V. No IV. A&Ox4.   Please review provider order for any additional goals.   Nurse to notify provider when observation goals have been met and patient is ready for discharge.

## 2022-10-09 LAB
GLUCOSE BLDC GLUCOMTR-MCNC: 124 MG/DL (ref 70–99)
GLUCOSE BLDC GLUCOMTR-MCNC: 82 MG/DL (ref 70–99)

## 2022-10-09 PROCEDURE — 250N000013 HC RX MED GY IP 250 OP 250 PS 637: Performed by: PHYSICIAN ASSISTANT

## 2022-10-09 PROCEDURE — 82962 GLUCOSE BLOOD TEST: CPT | Mod: 91

## 2022-10-09 PROCEDURE — G0378 HOSPITAL OBSERVATION PER HR: HCPCS

## 2022-10-09 PROCEDURE — 99224 PR SUBSEQUENT OBSERVATION CARE,LEVEL I: CPT | Performed by: PHYSICIAN ASSISTANT

## 2022-10-09 PROCEDURE — 250N000013 HC RX MED GY IP 250 OP 250 PS 637: Performed by: HOSPITALIST

## 2022-10-09 RX ADMIN — ASPIRIN 81 MG CHEWABLE TABLET 81 MG: 81 TABLET CHEWABLE at 08:09

## 2022-10-09 RX ADMIN — EMPAGLIFLOZIN 10 MG: 10 TABLET, FILM COATED ORAL at 08:09

## 2022-10-09 RX ADMIN — METOPROLOL SUCCINATE 25 MG: 25 TABLET, EXTENDED RELEASE ORAL at 08:10

## 2022-10-09 RX ADMIN — SENNOSIDES AND DOCUSATE SODIUM 1 TABLET: 50; 8.6 TABLET ORAL at 08:09

## 2022-10-09 RX ADMIN — HYDROXYZINE HYDROCHLORIDE 50 MG: 50 TABLET, FILM COATED ORAL at 13:10

## 2022-10-09 RX ADMIN — Medication 10 MG: at 21:28

## 2022-10-09 RX ADMIN — QUETIAPINE FUMARATE 400 MG: 200 TABLET ORAL at 21:28

## 2022-10-09 RX ADMIN — HYDROXYZINE HYDROCHLORIDE 50 MG: 50 TABLET, FILM COATED ORAL at 08:10

## 2022-10-09 RX ADMIN — OLANZAPINE 2.5 MG: 2.5 TABLET, FILM COATED ORAL at 13:10

## 2022-10-09 RX ADMIN — DIVALPROEX SODIUM 1000 MG: 500 TABLET, DELAYED RELEASE ORAL at 21:28

## 2022-10-09 RX ADMIN — CLOTRIMAZOLE: 0.01 CREAM TOPICAL at 20:16

## 2022-10-09 RX ADMIN — CLOTRIMAZOLE: 0.01 CREAM TOPICAL at 08:15

## 2022-10-09 RX ADMIN — VENLAFAXINE HYDROCHLORIDE 75 MG: 150 CAPSULE, EXTENDED RELEASE ORAL at 21:27

## 2022-10-09 RX ADMIN — Medication 25 MCG: at 08:10

## 2022-10-09 RX ADMIN — LEVOTHYROXINE SODIUM 25 MCG: 0.03 TABLET ORAL at 08:09

## 2022-10-09 RX ADMIN — BACLOFEN 10 MG: 10 TABLET ORAL at 13:10

## 2022-10-09 RX ADMIN — METFORMIN HYDROCHLORIDE 1000 MG: 500 TABLET ORAL at 17:39

## 2022-10-09 RX ADMIN — DIVALPROEX SODIUM 250 MG: 500 TABLET, DELAYED RELEASE ORAL at 08:09

## 2022-10-09 RX ADMIN — CLONIDINE HYDROCHLORIDE 0.1 MG: 0.1 TABLET ORAL at 08:09

## 2022-10-09 RX ADMIN — HYDROXYZINE HYDROCHLORIDE 50 MG: 50 TABLET, FILM COATED ORAL at 20:16

## 2022-10-09 RX ADMIN — DIVALPROEX SODIUM 500 MG: 500 TABLET, DELAYED RELEASE ORAL at 08:11

## 2022-10-09 RX ADMIN — MIRTAZAPINE 15 MG: 15 TABLET, FILM COATED ORAL at 21:28

## 2022-10-09 RX ADMIN — VENLAFAXINE HYDROCHLORIDE 150 MG: 150 CAPSULE, EXTENDED RELEASE ORAL at 21:27

## 2022-10-09 RX ADMIN — CLONIDINE HYDROCHLORIDE 0.1 MG: 0.1 TABLET ORAL at 20:16

## 2022-10-09 RX ADMIN — QUETIAPINE 200 MG: 200 TABLET, FILM COATED ORAL at 13:10

## 2022-10-09 RX ADMIN — BACLOFEN 10 MG: 10 TABLET ORAL at 08:10

## 2022-10-09 RX ADMIN — QUETIAPINE 200 MG: 200 TABLET, FILM COATED ORAL at 08:10

## 2022-10-09 RX ADMIN — METFORMIN HYDROCHLORIDE 1000 MG: 500 TABLET ORAL at 08:10

## 2022-10-09 RX ADMIN — ATORVASTATIN CALCIUM 20 MG: 20 TABLET, FILM COATED ORAL at 20:16

## 2022-10-09 RX ADMIN — BACLOFEN 10 MG: 10 TABLET ORAL at 20:16

## 2022-10-09 ASSESSMENT — ACTIVITIES OF DAILY LIVING (ADL)
ADLS_ACUITY_SCORE: 50
ADLS_ACUITY_SCORE: 51
ADLS_ACUITY_SCORE: 51
ADLS_ACUITY_SCORE: 50
ADLS_ACUITY_SCORE: 51
ADLS_ACUITY_SCORE: 50
ADLS_ACUITY_SCORE: 51
ADLS_ACUITY_SCORE: 51
ADLS_ACUITY_SCORE: 50
ADLS_ACUITY_SCORE: 51

## 2022-10-09 NOTE — PLAN OF CARE
PRIMARY DIAGNOSIS: Placement   OUTPATIENT/OBSERVATION GOALS TO BE MET BEFORE DISCHARGE:  1. ADLs back to baseline: Yes    2. Activity and level of assistance: A X 2 with a lift    3. Pain status: Denies pain    4. Return to near baseline physical activity: Yes     Discharge Planner Nurse   Safe discharge environment identified: Yes  Barriers to discharge: Yes   Patient  A & O X 4. Patient is medically cleared for discharge. Awaiting  for group home replacement    /85 (BP Location: Right arm)   Pulse 88   Temp 97.8  F (36.6  C) (Oral)   Resp 18   Wt 99.9 kg (220 lb 4.8 oz)   SpO2 94%   Will continue to provide cares and support.       Entered by: Monica Elder RN 10/09/2022 04:22     Please review provider order for any additional goals.   Nurse to notify provider when observation goals have been met and patient is ready for discharge.

## 2022-10-09 NOTE — PLAN OF CARE
PRIMARY DIAGNOSIS: Placement  OUTPATIENT/OBSERVATION GOALS TO BE MET BEFORE DISCHARGE:  1. ADLs back to baseline: Yes     2. Activity and level of assistance: Up with maximum assistance. Baseline. Paraplegia.     3. Pain status: Pain free.    4. Return to near baseline physical activity: Yes     Discharge Planner Nurse   Safe discharge environment identified: Yes  Barriers to discharge: Yes, placement       Entered by: Hira Fraire RN 10/09/2022       Denies pain, SOB, N/V. No IV. A&Ox4. Briefs changed x2. No BM this shift.   Please review provider order for any additional goals.   Nurse to notify provider when observation goals have been met and patient is ready for discharge.

## 2022-10-09 NOTE — PLAN OF CARE
PRIMARY DIAGNOSIS: Placement   OUTPATIENT/OBSERVATION GOALS TO BE MET BEFORE DISCHARGE:  ADLs back to baseline: Yes    Activity and level of assistance: A X 2 with lift.     Pain status: Pain free.    Return to near baseline physical activity: Yes     Discharge Planner Nurse   Safe discharge environment identified: No  Barriers to discharge: Yes       Entered by: Monica Elder RN 10/08/2022 20:29     Please review provider order for any additional goals.   Nurse to notify provider when observation goals have been met and patient is ready for discharge.

## 2022-10-09 NOTE — PLAN OF CARE
PRIMARY DIAGNOSIS: Placement  OUTPATIENT/OBSERVATION GOALS TO BE MET BEFORE DISCHARGE:  1. ADLs back to baseline: Yes     2. Activity and level of assistance: Up with maximum assistance. Baseline. Paraplegia.     3. Pain status: Pain free.    4. Return to near baseline physical activity: Yes     Discharge Planner Nurse   Safe discharge environment identified: Yes  Barriers to discharge: Yes       Entered by: Hira Fraire RN 10/09/2022       Denies pain, SOB, N/V. No IV. A&Ox4. Morning meds given.   Please review provider order for any additional goals.   Nurse to notify provider when observation goals have been met and patient is ready for discharge.

## 2022-10-09 NOTE — PLAN OF CARE
PRIMARY DIAGNOSIS: Placement   OUTPATIENT/OBSERVATION GOALS TO BE MET BEFORE DISCHARGE:  1. ADLs back to baseline: Yes    2. Activity and level of assistance: A X 2 with lift.     3. Pain status: Pain free.    4. Return to near baseline physical activity: Yes     Discharge Planner Nurse   Safe discharge environment identified: No  Barriers to discharge: Yes   Sleeping comfortably        Entered by: Monica Elder RN 10/09/2022 20:29     Please review provider order for any additional goals.   Nurse to notify provider when observation goals have been met and patient is ready for discharge.
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
## 2022-10-09 NOTE — PLAN OF CARE
PRIMARY DIAGNOSIS: Placement  OUTPATIENT/OBSERVATION GOALS TO BE MET BEFORE DISCHARGE:  1. ADLs back to baseline: Yes     2. Activity and level of assistance: Up with maximum assistance. Baseline. Paraplegia.     3. Pain status: Pain free.    4. Return to near baseline physical activity: Yes     Discharge Planner Nurse   Safe discharge environment identified: Yes  Barriers to discharge: Yes, placement       Entered by: Hira Fraire RN 10/09/2022       Denies pain, SOB, N/V. No IV. A&Ox4. Briefs changed x2. No BM this am.   Please review provider order for any additional goals.   Nurse to notify provider when observation goals have been met and patient is ready for discharge.

## 2022-10-10 LAB
GLUCOSE BLDC GLUCOMTR-MCNC: 114 MG/DL (ref 70–99)
GLUCOSE BLDC GLUCOMTR-MCNC: 117 MG/DL (ref 70–99)
GLUCOSE BLDC GLUCOMTR-MCNC: 125 MG/DL (ref 70–99)
GLUCOSE BLDC GLUCOMTR-MCNC: 84 MG/DL (ref 70–99)

## 2022-10-10 PROCEDURE — 99224 PR SUBSEQUENT OBSERVATION CARE,LEVEL I: CPT | Performed by: PHYSICIAN ASSISTANT

## 2022-10-10 PROCEDURE — 82962 GLUCOSE BLOOD TEST: CPT

## 2022-10-10 PROCEDURE — G0378 HOSPITAL OBSERVATION PER HR: HCPCS

## 2022-10-10 PROCEDURE — 250N000013 HC RX MED GY IP 250 OP 250 PS 637: Performed by: PHYSICIAN ASSISTANT

## 2022-10-10 PROCEDURE — 250N000013 HC RX MED GY IP 250 OP 250 PS 637: Performed by: HOSPITALIST

## 2022-10-10 RX ADMIN — EMPAGLIFLOZIN 10 MG: 10 TABLET, FILM COATED ORAL at 09:01

## 2022-10-10 RX ADMIN — DIVALPROEX SODIUM 250 MG: 500 TABLET, DELAYED RELEASE ORAL at 09:01

## 2022-10-10 RX ADMIN — DIVALPROEX SODIUM 500 MG: 500 TABLET, DELAYED RELEASE ORAL at 09:03

## 2022-10-10 RX ADMIN — LEVOTHYROXINE SODIUM 25 MCG: 0.03 TABLET ORAL at 09:03

## 2022-10-10 RX ADMIN — CLONIDINE HYDROCHLORIDE 0.1 MG: 0.1 TABLET ORAL at 09:03

## 2022-10-10 RX ADMIN — ASPIRIN 81 MG CHEWABLE TABLET 81 MG: 81 TABLET CHEWABLE at 08:59

## 2022-10-10 RX ADMIN — Medication 10 MG: at 21:17

## 2022-10-10 RX ADMIN — DIVALPROEX SODIUM 1000 MG: 500 TABLET, DELAYED RELEASE ORAL at 21:16

## 2022-10-10 RX ADMIN — HYDROXYZINE HYDROCHLORIDE 50 MG: 50 TABLET, FILM COATED ORAL at 09:01

## 2022-10-10 RX ADMIN — SENNOSIDES AND DOCUSATE SODIUM 1 TABLET: 50; 8.6 TABLET ORAL at 09:01

## 2022-10-10 RX ADMIN — ATORVASTATIN CALCIUM 20 MG: 20 TABLET, FILM COATED ORAL at 20:14

## 2022-10-10 RX ADMIN — QUETIAPINE 200 MG: 200 TABLET, FILM COATED ORAL at 09:00

## 2022-10-10 RX ADMIN — MIRTAZAPINE 15 MG: 15 TABLET, FILM COATED ORAL at 21:17

## 2022-10-10 RX ADMIN — METOPROLOL SUCCINATE 25 MG: 25 TABLET, EXTENDED RELEASE ORAL at 09:01

## 2022-10-10 RX ADMIN — CLOTRIMAZOLE: 0.01 CREAM TOPICAL at 20:13

## 2022-10-10 RX ADMIN — HYDROXYZINE HYDROCHLORIDE 50 MG: 50 TABLET, FILM COATED ORAL at 20:14

## 2022-10-10 RX ADMIN — HYDROXYZINE HYDROCHLORIDE 50 MG: 50 TABLET, FILM COATED ORAL at 15:24

## 2022-10-10 RX ADMIN — CLONIDINE HYDROCHLORIDE 0.1 MG: 0.1 TABLET ORAL at 20:14

## 2022-10-10 RX ADMIN — QUETIAPINE 200 MG: 200 TABLET, FILM COATED ORAL at 15:24

## 2022-10-10 RX ADMIN — BACLOFEN 10 MG: 10 TABLET ORAL at 20:14

## 2022-10-10 RX ADMIN — METFORMIN HYDROCHLORIDE 1000 MG: 500 TABLET ORAL at 18:07

## 2022-10-10 RX ADMIN — OLANZAPINE 2.5 MG: 2.5 TABLET, FILM COATED ORAL at 15:24

## 2022-10-10 RX ADMIN — BACLOFEN 10 MG: 10 TABLET ORAL at 15:23

## 2022-10-10 RX ADMIN — VENLAFAXINE HYDROCHLORIDE 75 MG: 150 CAPSULE, EXTENDED RELEASE ORAL at 21:17

## 2022-10-10 RX ADMIN — BACLOFEN 10 MG: 10 TABLET ORAL at 09:00

## 2022-10-10 RX ADMIN — Medication 25 MCG: at 08:59

## 2022-10-10 RX ADMIN — QUETIAPINE FUMARATE 400 MG: 200 TABLET ORAL at 21:17

## 2022-10-10 RX ADMIN — VENLAFAXINE HYDROCHLORIDE 150 MG: 150 CAPSULE, EXTENDED RELEASE ORAL at 21:15

## 2022-10-10 RX ADMIN — METFORMIN HYDROCHLORIDE 1000 MG: 500 TABLET ORAL at 09:00

## 2022-10-10 ASSESSMENT — ACTIVITIES OF DAILY LIVING (ADL)
ADLS_ACUITY_SCORE: 48
ADLS_ACUITY_SCORE: 50
ADLS_ACUITY_SCORE: 50
ADLS_ACUITY_SCORE: 48
ADLS_ACUITY_SCORE: 50

## 2022-10-10 NOTE — PLAN OF CARE
PRIMARY DIAGNOSIS: Placement   OUTPATIENT/OBSERVATION GOALS TO BE MET BEFORE DISCHARGE:  1. ADLs back to baseline: Yes    2. Activity and level of assistance: A X 2 with a lift    3. Pain status: Denies pain    4. Return to near baseline physical activity: Yes     Discharge Planner Nurse   Safe discharge environment identified: Yes  Barriers to discharge: Yes   Patient  A & O X 4. Patient is medically cleared for discharge.  is following .Awaiting  for group home replacement    BP (!) 130/94 (BP Location: Right arm)   Pulse 78   Temp 97.7  F (36.5  C) (Oral)   Resp 16   SpO2 96%        Entered by: Monica Elder RN 10/10/2022 03:25     Please review provider order for any additional goals.   Nurse to notify provider when observation goals have been met and patient is ready for discharge.
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
## 2022-10-10 NOTE — CARE PLAN
PRIMARY DIAGNOSIS:Placement  OUTPATIENT/OBSERVATION GOALS TO BE MET BEFORE DISCHARGE:  1. ADLs back to baseline: Yes    2. Activity and level of assistance: Up with maximum assistance. Consider SW and/or PT evaluation.     3. Pain status: Pain free.    4. Return to near baseline physical activity: Yes     Discharge Planner Nurse   Safe discharge environment identified: Yes  Barriers to discharge: Yes       Entered by: Laura Dover RN 10/10/2022 3:01 PM     Please review provider order for any additional goals.   Nurse to notify provider when observation goals have been met and patient is ready for discharge.

## 2022-10-10 NOTE — CARE PLAN
PRIMARY DIAGNOSIS:Placement  OUTPATIENT/OBSERVATION GOALS TO BE MET BEFORE DISCHARGE:  1. ADLs back to baseline: Yes    2. Activity and level of assistance: Up with maximum assistance. Consider SW and/or PT evaluation.     3. Pain status: Pain free.    4. Return to near baseline physical activity: Yes     Discharge Planner Nurse   Safe discharge environment identified: Yes  Barriers to discharge: Yes       Entered by: Laura Dover RN 10/10/2022 10:36 AM     Please review provider order for any additional goals.   Nurse to notify provider when observation goals have been met and patient is ready for discharge.

## 2022-10-10 NOTE — PLAN OF CARE
PRIMARY DIAGNOSIS: Placement   OUTPATIENT/OBSERVATION GOALS TO BE MET BEFORE DISCHARGE:  1. ADLs back to baseline: Yes    2. Activity and level of assistance: A X 2 with a lift     3. Pain status: Denies pain    4. Return to near baseline physical activity: Yes     Discharge Planner Nurse   Safe discharge environment identified: No  Barriers to discharge: Yes   Turn and repositioning maintained. . VSS-Stable          Entered by: Monica Elder RN 10/10/2022 00:09     Please review provider order for any additional goals.   Nurse to notify provider when observation goals have been met and patient is ready for discharge.

## 2022-10-10 NOTE — PLAN OF CARE
PRIMARY DIAGNOSIS: Placement   OUTPATIENT/OBSERVATION GOALS TO BE MET BEFORE DISCHARGE:  ADLs back to baseline: Yes    Activity and level of assistance: A X 2 with a lift     Pain status: Denies pain    Return to near baseline physical activity: Yes     Discharge Planner Nurse   Safe discharge environment identified: No  Barriers to discharge: Yes       Entered by: Monica Elder RN 10/09/2022 20:55     Please review provider order for any additional goals.   Nurse to notify provider when observation goals have been met and patient is ready for discharge.

## 2022-10-11 LAB
GLUCOSE BLDC GLUCOMTR-MCNC: 132 MG/DL (ref 70–99)
GLUCOSE BLDC GLUCOMTR-MCNC: 82 MG/DL (ref 70–99)

## 2022-10-11 PROCEDURE — 250N000013 HC RX MED GY IP 250 OP 250 PS 637: Performed by: HOSPITALIST

## 2022-10-11 PROCEDURE — 250N000013 HC RX MED GY IP 250 OP 250 PS 637: Performed by: PHYSICIAN ASSISTANT

## 2022-10-11 PROCEDURE — 99225 PR SUBSEQUENT OBSERVATION CARE,LEVEL II: CPT | Performed by: PHYSICIAN ASSISTANT

## 2022-10-11 PROCEDURE — G0378 HOSPITAL OBSERVATION PER HR: HCPCS

## 2022-10-11 PROCEDURE — 82962 GLUCOSE BLOOD TEST: CPT

## 2022-10-11 RX ORDER — KETOCONAZOLE 20 MG/G
CREAM TOPICAL 2 TIMES DAILY
Status: DISCONTINUED | OUTPATIENT
Start: 2022-10-11 | End: 2022-10-19

## 2022-10-11 RX ORDER — DESONIDE 0.5 MG/G
OINTMENT TOPICAL DAILY
Status: DISCONTINUED | OUTPATIENT
Start: 2022-10-11 | End: 2022-10-20

## 2022-10-11 RX ADMIN — VENLAFAXINE HYDROCHLORIDE 75 MG: 150 CAPSULE, EXTENDED RELEASE ORAL at 21:20

## 2022-10-11 RX ADMIN — QUETIAPINE FUMARATE 400 MG: 200 TABLET ORAL at 21:18

## 2022-10-11 RX ADMIN — OLANZAPINE 2.5 MG: 2.5 TABLET, FILM COATED ORAL at 16:09

## 2022-10-11 RX ADMIN — QUETIAPINE 200 MG: 200 TABLET, FILM COATED ORAL at 16:07

## 2022-10-11 RX ADMIN — DIVALPROEX SODIUM 500 MG: 500 TABLET, DELAYED RELEASE ORAL at 08:48

## 2022-10-11 RX ADMIN — CLOTRIMAZOLE: 0.01 CREAM TOPICAL at 21:29

## 2022-10-11 RX ADMIN — CLOTRIMAZOLE: 0.01 CREAM TOPICAL at 16:07

## 2022-10-11 RX ADMIN — CLONIDINE HYDROCHLORIDE 0.1 MG: 0.1 TABLET ORAL at 21:19

## 2022-10-11 RX ADMIN — KETOCONAZOLE: 20 CREAM TOPICAL at 21:29

## 2022-10-11 RX ADMIN — HYDROXYZINE HYDROCHLORIDE 50 MG: 50 TABLET, FILM COATED ORAL at 08:47

## 2022-10-11 RX ADMIN — METFORMIN HYDROCHLORIDE 1000 MG: 500 TABLET ORAL at 08:46

## 2022-10-11 RX ADMIN — MIRTAZAPINE 15 MG: 15 TABLET, FILM COATED ORAL at 21:20

## 2022-10-11 RX ADMIN — DESONIDE: 0.5 OINTMENT TOPICAL at 16:07

## 2022-10-11 RX ADMIN — Medication 10 MG: at 21:18

## 2022-10-11 RX ADMIN — METOPROLOL SUCCINATE 25 MG: 25 TABLET, EXTENDED RELEASE ORAL at 08:48

## 2022-10-11 RX ADMIN — CLOTRIMAZOLE: 0.01 CREAM TOPICAL at 08:51

## 2022-10-11 RX ADMIN — ATORVASTATIN CALCIUM 20 MG: 20 TABLET, FILM COATED ORAL at 21:19

## 2022-10-11 RX ADMIN — BACLOFEN 10 MG: 10 TABLET ORAL at 08:46

## 2022-10-11 RX ADMIN — BACLOFEN 10 MG: 10 TABLET ORAL at 21:20

## 2022-10-11 RX ADMIN — DIVALPROEX SODIUM 1000 MG: 500 TABLET, DELAYED RELEASE ORAL at 21:19

## 2022-10-11 RX ADMIN — HYDROXYZINE HYDROCHLORIDE 50 MG: 50 TABLET, FILM COATED ORAL at 21:18

## 2022-10-11 RX ADMIN — ASPIRIN 81 MG CHEWABLE TABLET 81 MG: 81 TABLET CHEWABLE at 08:46

## 2022-10-11 RX ADMIN — METFORMIN HYDROCHLORIDE 1000 MG: 500 TABLET ORAL at 17:38

## 2022-10-11 RX ADMIN — EMPAGLIFLOZIN 10 MG: 10 TABLET, FILM COATED ORAL at 08:46

## 2022-10-11 RX ADMIN — DIVALPROEX SODIUM 250 MG: 500 TABLET, DELAYED RELEASE ORAL at 08:47

## 2022-10-11 RX ADMIN — QUETIAPINE 200 MG: 200 TABLET, FILM COATED ORAL at 08:48

## 2022-10-11 RX ADMIN — KETOCONAZOLE: 20 CREAM TOPICAL at 13:22

## 2022-10-11 RX ADMIN — CLONIDINE HYDROCHLORIDE 0.1 MG: 0.1 TABLET ORAL at 08:48

## 2022-10-11 RX ADMIN — VENLAFAXINE HYDROCHLORIDE 150 MG: 150 CAPSULE, EXTENDED RELEASE ORAL at 21:21

## 2022-10-11 RX ADMIN — LEVOTHYROXINE SODIUM 25 MCG: 0.03 TABLET ORAL at 08:47

## 2022-10-11 RX ADMIN — Medication 25 MCG: at 08:47

## 2022-10-11 RX ADMIN — HYDROXYZINE HYDROCHLORIDE 50 MG: 50 TABLET, FILM COATED ORAL at 16:09

## 2022-10-11 RX ADMIN — SENNOSIDES AND DOCUSATE SODIUM 1 TABLET: 50; 8.6 TABLET ORAL at 08:48

## 2022-10-11 RX ADMIN — BACLOFEN 10 MG: 10 TABLET ORAL at 16:07

## 2022-10-11 ASSESSMENT — ACTIVITIES OF DAILY LIVING (ADL)
ADLS_ACUITY_SCORE: 46
ADLS_ACUITY_SCORE: 50
ADLS_ACUITY_SCORE: 46
ADLS_ACUITY_SCORE: 50
ADLS_ACUITY_SCORE: 48
ADLS_ACUITY_SCORE: 46
ADLS_ACUITY_SCORE: 50
ADLS_ACUITY_SCORE: 50
ADLS_ACUITY_SCORE: 46
ADLS_ACUITY_SCORE: 50

## 2022-10-11 NOTE — PLAN OF CARE
PRIMARY DIAGNOSIS: Placement   OUTPATIENT/OBSERVATION GOALS TO BE MET BEFORE DISCHARGE:  1. ADLs back to baseline: Yes    2. Activity and level of assistance: A X 2 with a lift    3. Pain status: Denies pain    4. Return to near baseline physical activity: Yes     Discharge Planner Nurse   Safe discharge environment identified: Yes  Barriers to discharge: Yes   Patient  A & O X 4. Patient is medically cleared for discharge. External catheter removed per patient request. is following .Awaiting  for group home replacement . Will continue to provide care and support.   /85 (BP Location: Right arm)   Pulse 78   Temp 98.2  F (36.8  C) (Oral)   Resp 16   Wt 99.9 kg (220 lb 4.8 oz)   SpO2 95%          Entered by: Monica Elder RN 10/11/2022 04.56     Please review provider order for any additional goals.   Nurse to notify provider when observation goals have been met and patient is ready for discharge.
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
## 2022-10-11 NOTE — PLAN OF CARE
PRIMARY DIAGNOSIS: Paraplegia, Borderline personality disorder, Pending placement.     OUTPATIENT/OBSERVATION GOALS TO BE MET BEFORE DISCHARGE  1. ADLs back to baseline: Ues    2. Tolerating PO medications: Yes    3. Return to near baseline physical activity: Yes, A2 with lift.     4. Cleared for discharge by consultants (if involved): Yes    Discharge Planner Nurse   Safe discharge environment identified: Yes  Barriers to discharge: Yes, just placement. Social work following.        Entered by: Jazmin Madera RN 10/11/2022     A&Ox4, denies pain, rash on face and scalp at the hair line, cream applied. Support staff gave shower this morning.     BP (!) 133/90 (BP Location: Right arm)   Pulse 88   Temp 97.2  F (36.2  C) (Oral)   Resp 16   Wt 99.9 kg (220 lb 4.8 oz)   SpO2 96%

## 2022-10-11 NOTE — PLAN OF CARE
PRIMARY DIAGNOSIS: Paraplegia, Borderline personality disorder, Pending placement.     OUTPATIENT/OBSERVATION GOALS TO BE MET BEFORE DISCHARGE  1. ADLs back to baseline: Ues    2. Tolerating PO medications: Yes    3. Return to near baseline physical activity: Yes, A2 with lift.     4. Cleared for discharge by consultants (if involved): Yes    Discharge Planner Nurse   Safe discharge environment identified: Yes  Barriers to discharge: Yes, just placement. Social work following.        Entered by: Jazmin Madera RN 10/11/2022

## 2022-10-11 NOTE — PROGRESS NOTES
Care Management Follow Up    Length of Stay (days): 0    Expected Discharge Date: 10/21/2022     Concerns to be Addressed:       Patient plan of care discussed at interdisciplinary rounds: Yes    Anticipated Discharge Disposition:       Anticipated Discharge Services:    Anticipated Discharge DME:      Patient/family educated on Medicare website which has current facility and service quality ratings:    Education Provided on the Discharge Plan:    Patient/Family in Agreement with the Plan:      Referrals Placed by CM/SW:    Private pay costs discussed: Not applicable    Additional Information:  Presented paperwork regarding pt's waiver ending. Pt had no question. Paperwork left in pt's room.       DANITA Hudson

## 2022-10-11 NOTE — PLAN OF CARE
PRIMARY DIAGNOSIS: Placement   OUTPATIENT/OBSERVATION GOALS TO BE MET BEFORE DISCHARGE:  1. ADLs back to baseline: Yes    2. Activity and level of assistance: A X 2 with a lift     3. Pain status: Denies pain    4. Return to near baseline physical activity: Yes     Discharge Planner Nurse   Safe discharge environment identified: No  Barriers to discharge: Yes   External catheter removed per patient request.        Entered by: Monica Elder RN 10/11/2022 00:25     Please review provider order for any additional goals.   Nurse to notify provider when observation goals have been met and patient is ready for discharge.

## 2022-10-11 NOTE — PLAN OF CARE
PRIMARY DIAGNOSIS: Paraplegia, Borderline personality disorder, Pending placement.     OUTPATIENT/OBSERVATION GOALS TO BE MET BEFORE DISCHARGE  1. ADLs back to baseline: Ues    2. Tolerating PO medications: Yes    3. Return to near baseline physical activity: Yes, A2 with lift.     4. Cleared for discharge by consultants (if involved): Yes    Discharge Planner Nurse   Safe discharge environment identified: Yes  Barriers to discharge: Yes, just placement. Social work following.        Entered by: Jazmin Madera RN 10/11/2022     A&Ox4, denies pain, rash on face and scalp at the hair line, cream applied. Support staff gave shower this morning.   /88 (BP Location: Right arm)   Pulse 78   Temp 97.9  F (36.6  C) (Oral)   Resp 16   Wt 99.9 kg (220 lb 4.8 oz)   SpO2 96%

## 2022-10-11 NOTE — PLAN OF CARE
PRIMARY DIAGNOSIS: Placement   OUTPATIENT/OBSERVATION GOALS TO BE MET BEFORE DISCHARGE:  1. ADLs back to baseline: Yes    2. Activity and level of assistance: A X 2 with a lift     3. Pain status: Denies pain    4. Return to near baseline physical activity: Yes     Discharge Planner Nurse   Safe discharge environment identified: No  Barriers to discharge: Yes       Entered by: Monica Elder RN 10/10/2022 2000     Please review provider order for any additional goals.   Nurse to notify provider when observation goals have been met and patient is ready for discharge.

## 2022-10-12 LAB
GLUCOSE BLDC GLUCOMTR-MCNC: 82 MG/DL (ref 70–99)
GLUCOSE BLDC GLUCOMTR-MCNC: 91 MG/DL (ref 70–99)

## 2022-10-12 PROCEDURE — 99225 PR SUBSEQUENT OBSERVATION CARE,LEVEL II: CPT | Performed by: PHYSICIAN ASSISTANT

## 2022-10-12 PROCEDURE — 250N000013 HC RX MED GY IP 250 OP 250 PS 637: Performed by: PHYSICIAN ASSISTANT

## 2022-10-12 PROCEDURE — 250N000013 HC RX MED GY IP 250 OP 250 PS 637: Performed by: HOSPITALIST

## 2022-10-12 PROCEDURE — G0378 HOSPITAL OBSERVATION PER HR: HCPCS

## 2022-10-12 PROCEDURE — 82962 GLUCOSE BLOOD TEST: CPT

## 2022-10-12 RX ADMIN — CLOTRIMAZOLE: 0.01 CREAM TOPICAL at 20:32

## 2022-10-12 RX ADMIN — HYDROXYZINE HYDROCHLORIDE 50 MG: 50 TABLET, FILM COATED ORAL at 13:28

## 2022-10-12 RX ADMIN — KETOCONAZOLE: 20 CREAM TOPICAL at 09:58

## 2022-10-12 RX ADMIN — DIVALPROEX SODIUM 500 MG: 500 TABLET, DELAYED RELEASE ORAL at 09:55

## 2022-10-12 RX ADMIN — CLONIDINE HYDROCHLORIDE 0.1 MG: 0.1 TABLET ORAL at 09:56

## 2022-10-12 RX ADMIN — DIVALPROEX SODIUM 250 MG: 500 TABLET, DELAYED RELEASE ORAL at 09:55

## 2022-10-12 RX ADMIN — DIVALPROEX SODIUM 1000 MG: 500 TABLET, DELAYED RELEASE ORAL at 20:28

## 2022-10-12 RX ADMIN — VENLAFAXINE HYDROCHLORIDE 150 MG: 150 CAPSULE, EXTENDED RELEASE ORAL at 20:26

## 2022-10-12 RX ADMIN — VENLAFAXINE HYDROCHLORIDE 75 MG: 150 CAPSULE, EXTENDED RELEASE ORAL at 20:26

## 2022-10-12 RX ADMIN — BACLOFEN 10 MG: 10 TABLET ORAL at 13:28

## 2022-10-12 RX ADMIN — QUETIAPINE 200 MG: 200 TABLET, FILM COATED ORAL at 13:28

## 2022-10-12 RX ADMIN — METFORMIN HYDROCHLORIDE 1000 MG: 500 TABLET ORAL at 17:18

## 2022-10-12 RX ADMIN — MIRTAZAPINE 15 MG: 15 TABLET, FILM COATED ORAL at 20:24

## 2022-10-12 RX ADMIN — Medication 25 MCG: at 09:56

## 2022-10-12 RX ADMIN — METOPROLOL SUCCINATE 25 MG: 25 TABLET, EXTENDED RELEASE ORAL at 09:56

## 2022-10-12 RX ADMIN — QUETIAPINE 200 MG: 200 TABLET, FILM COATED ORAL at 09:55

## 2022-10-12 RX ADMIN — Medication 10 MG: at 20:25

## 2022-10-12 RX ADMIN — HYDROXYZINE HYDROCHLORIDE 50 MG: 50 TABLET, FILM COATED ORAL at 20:25

## 2022-10-12 RX ADMIN — DESONIDE: 0.5 OINTMENT TOPICAL at 13:28

## 2022-10-12 RX ADMIN — ASPIRIN 81 MG CHEWABLE TABLET 81 MG: 81 TABLET CHEWABLE at 09:56

## 2022-10-12 RX ADMIN — BACLOFEN 10 MG: 10 TABLET ORAL at 20:25

## 2022-10-12 RX ADMIN — QUETIAPINE FUMARATE 400 MG: 200 TABLET ORAL at 20:27

## 2022-10-12 RX ADMIN — METFORMIN HYDROCHLORIDE 1000 MG: 500 TABLET ORAL at 09:55

## 2022-10-12 RX ADMIN — CLONIDINE HYDROCHLORIDE 0.1 MG: 0.1 TABLET ORAL at 20:23

## 2022-10-12 RX ADMIN — KETOCONAZOLE: 20 CREAM TOPICAL at 20:23

## 2022-10-12 RX ADMIN — BACLOFEN 10 MG: 10 TABLET ORAL at 09:55

## 2022-10-12 RX ADMIN — EMPAGLIFLOZIN 10 MG: 10 TABLET, FILM COATED ORAL at 09:56

## 2022-10-12 RX ADMIN — ATORVASTATIN CALCIUM 20 MG: 20 TABLET, FILM COATED ORAL at 20:23

## 2022-10-12 RX ADMIN — SENNOSIDES AND DOCUSATE SODIUM 1 TABLET: 50; 8.6 TABLET ORAL at 09:56

## 2022-10-12 RX ADMIN — CLOTRIMAZOLE: 0.01 CREAM TOPICAL at 09:59

## 2022-10-12 RX ADMIN — LEVOTHYROXINE SODIUM 25 MCG: 0.03 TABLET ORAL at 09:56

## 2022-10-12 RX ADMIN — OLANZAPINE 2.5 MG: 2.5 TABLET, FILM COATED ORAL at 13:28

## 2022-10-12 RX ADMIN — HYDROXYZINE HYDROCHLORIDE 50 MG: 50 TABLET, FILM COATED ORAL at 09:55

## 2022-10-12 ASSESSMENT — ACTIVITIES OF DAILY LIVING (ADL)
ADLS_ACUITY_SCORE: 46
ADLS_ACUITY_SCORE: 46
ADLS_ACUITY_SCORE: 52
ADLS_ACUITY_SCORE: 46
ADLS_ACUITY_SCORE: 48
ADLS_ACUITY_SCORE: 48
ADLS_ACUITY_SCORE: 46
ADLS_ACUITY_SCORE: 48
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
## 2022-10-12 NOTE — PLAN OF CARE
PRIMARY DIAGNOSIS: Paraplegia, Borderline personality disorder, Pending placement.     OUTPATIENT/OBSERVATION GOALS TO BE MET BEFORE DISCHARGE  1. ADLs back to baseline: Yes    2. Tolerating PO medications: Yes    3. Return to near baseline physical activity: Yes, A2 with lift.     4. Cleared for discharge by consultants (if involved): Yes    Discharge Planner Nurse   Safe discharge environment identified: Yes  Barriers to discharge: Yes, just placement. Social work following.        Entered by: Naomy Buckley RN 10/12/2022     A&Ox4, denies pain, rash on face and scalp at the hair line, cream applied     /84 (BP Location: Right arm)   Pulse 76   Temp 98  F (36.7  C) (Oral)   Resp 16   Wt 99.9 kg (220 lb 4.8 oz)   SpO2 94%

## 2022-10-12 NOTE — PLAN OF CARE
PRIMARY DIAGNOSIS: Placement  OUTPATIENT/OBSERVATION GOALS TO BE MET BEFORE DISCHARGE:  1. ADLs back to baseline: Yes    2. Activity and level of assistance: A2 with lift    3. Pain status: Pain free.    4. Return to near baseline physical activity: Yes     Discharge Planner Nurse   Safe discharge environment identified: No  Barriers to discharge: Yes, placement       Entered by: Sonia France RN 10/12/2022      Pt. A/O x4. VSS. Waiting for placement. Rash on face and scalp, cream applied. Will continue to provide supportive cares.    Please review provider order for any additional goals.   Nurse to notify provider when observation goals have been met and patient is ready for discharge.

## 2022-10-12 NOTE — PLAN OF CARE
PRIMARY DIAGNOSIS: Paraplegia, Borderline personality disorder, Pending placement.     OUTPATIENT/OBSERVATION GOALS TO BE MET BEFORE DISCHARGE  1. ADLs back to baseline: Yes    2. Tolerating PO medications: Yes    3. Return to near baseline physical activity: Yes, A2 with lift.     4. Cleared for discharge by consultants (if involved): Yes    Discharge Planner Nurse   Safe discharge environment identified: Yes  Barriers to discharge: Yes, just placement. Social work following.        Entered by: Naomy Buckley RN 10/11/2022     A&Ox4, denies pain, rash on face and scalp at the hair line, cream applied

## 2022-10-12 NOTE — PLAN OF CARE
PRIMARY DIAGNOSIS: Paraplegia, Borderline personality disorder, Pending placement.     OUTPATIENT/OBSERVATION GOALS TO BE MET BEFORE DISCHARGE  1. ADLs back to baseline: Yes    2. Tolerating PO medications: Yes    3. Return to near baseline physical activity: Yes, A2 with lift.     4. Cleared for discharge by consultants (if involved): Yes    Discharge Planner Nurse   Safe discharge environment identified: Yes  Barriers to discharge: Yes, just placement. Social work following.        Entered by: Naomy Buckley RN 10/12/2022     A&Ox4, denies pain, rash on face and scalp at the hair line, cream applied. He had a restful night

## 2022-10-12 NOTE — PLAN OF CARE
PRIMARY DIAGNOSIS: Placement  OUTPATIENT/OBSERVATION GOALS TO BE MET BEFORE DISCHARGE:  ADLs back to baseline: Yes    Activity and level of assistance: A2 with lift    Pain status: Pain free.    Return to near baseline physical activity: Yes     Discharge Planner Nurse   Safe discharge environment identified: No  Barriers to discharge: Yes, placement       Entered by: Sonia France RN 10/12/2022     Please review provider order for any additional goals.   Nurse to notify provider when observation goals have been met and patient is ready for discharge.

## 2022-10-13 LAB
GLUCOSE BLDC GLUCOMTR-MCNC: 111 MG/DL (ref 70–99)
GLUCOSE BLDC GLUCOMTR-MCNC: 136 MG/DL (ref 70–99)
GLUCOSE BLDC GLUCOMTR-MCNC: 92 MG/DL (ref 70–99)

## 2022-10-13 PROCEDURE — 82962 GLUCOSE BLOOD TEST: CPT

## 2022-10-13 PROCEDURE — 250N000013 HC RX MED GY IP 250 OP 250 PS 637: Performed by: HOSPITALIST

## 2022-10-13 PROCEDURE — 250N000013 HC RX MED GY IP 250 OP 250 PS 637: Performed by: PHYSICIAN ASSISTANT

## 2022-10-13 PROCEDURE — G0378 HOSPITAL OBSERVATION PER HR: HCPCS

## 2022-10-13 PROCEDURE — 99225 PR SUBSEQUENT OBSERVATION CARE,LEVEL II: CPT | Performed by: PHYSICIAN ASSISTANT

## 2022-10-13 RX ADMIN — DIVALPROEX SODIUM 1000 MG: 500 TABLET, DELAYED RELEASE ORAL at 22:23

## 2022-10-13 RX ADMIN — Medication 10 MG: at 22:29

## 2022-10-13 RX ADMIN — DIVALPROEX SODIUM 500 MG: 500 TABLET, DELAYED RELEASE ORAL at 09:21

## 2022-10-13 RX ADMIN — Medication 25 MCG: at 09:21

## 2022-10-13 RX ADMIN — DIVALPROEX SODIUM 250 MG: 500 TABLET, DELAYED RELEASE ORAL at 09:25

## 2022-10-13 RX ADMIN — SENNOSIDES AND DOCUSATE SODIUM 1 TABLET: 50; 8.6 TABLET ORAL at 09:22

## 2022-10-13 RX ADMIN — QUETIAPINE 200 MG: 200 TABLET, FILM COATED ORAL at 14:34

## 2022-10-13 RX ADMIN — LEVOTHYROXINE SODIUM 25 MCG: 0.03 TABLET ORAL at 09:21

## 2022-10-13 RX ADMIN — HYDROXYZINE HYDROCHLORIDE 50 MG: 50 TABLET, FILM COATED ORAL at 09:21

## 2022-10-13 RX ADMIN — HYDROXYZINE HYDROCHLORIDE 50 MG: 50 TABLET, FILM COATED ORAL at 22:24

## 2022-10-13 RX ADMIN — VENLAFAXINE HYDROCHLORIDE 150 MG: 150 CAPSULE, EXTENDED RELEASE ORAL at 22:24

## 2022-10-13 RX ADMIN — CLOTRIMAZOLE: 0.01 CREAM TOPICAL at 22:29

## 2022-10-13 RX ADMIN — METOPROLOL SUCCINATE 25 MG: 25 TABLET, EXTENDED RELEASE ORAL at 09:22

## 2022-10-13 RX ADMIN — HYDROXYZINE HYDROCHLORIDE 50 MG: 50 TABLET, FILM COATED ORAL at 14:34

## 2022-10-13 RX ADMIN — KETOCONAZOLE: 20 CREAM TOPICAL at 09:37

## 2022-10-13 RX ADMIN — QUETIAPINE FUMARATE 400 MG: 200 TABLET ORAL at 22:23

## 2022-10-13 RX ADMIN — BACLOFEN 10 MG: 10 TABLET ORAL at 09:21

## 2022-10-13 RX ADMIN — KETOCONAZOLE: 20 CREAM TOPICAL at 22:30

## 2022-10-13 RX ADMIN — CLOTRIMAZOLE: 0.01 CREAM TOPICAL at 09:39

## 2022-10-13 RX ADMIN — MIRTAZAPINE 15 MG: 15 TABLET, FILM COATED ORAL at 22:26

## 2022-10-13 RX ADMIN — METFORMIN HYDROCHLORIDE 1000 MG: 500 TABLET ORAL at 18:25

## 2022-10-13 RX ADMIN — METFORMIN HYDROCHLORIDE 1000 MG: 500 TABLET ORAL at 09:20

## 2022-10-13 RX ADMIN — ATORVASTATIN CALCIUM 20 MG: 20 TABLET, FILM COATED ORAL at 22:24

## 2022-10-13 RX ADMIN — DESONIDE: 0.5 OINTMENT TOPICAL at 14:35

## 2022-10-13 RX ADMIN — BACLOFEN 10 MG: 10 TABLET ORAL at 14:34

## 2022-10-13 RX ADMIN — OLANZAPINE 2.5 MG: 2.5 TABLET, FILM COATED ORAL at 14:34

## 2022-10-13 RX ADMIN — ASPIRIN 81 MG CHEWABLE TABLET 81 MG: 81 TABLET CHEWABLE at 09:22

## 2022-10-13 RX ADMIN — QUETIAPINE 200 MG: 200 TABLET, FILM COATED ORAL at 09:20

## 2022-10-13 RX ADMIN — VENLAFAXINE HYDROCHLORIDE 75 MG: 150 CAPSULE, EXTENDED RELEASE ORAL at 22:25

## 2022-10-13 RX ADMIN — CLONIDINE HYDROCHLORIDE 0.1 MG: 0.1 TABLET ORAL at 09:22

## 2022-10-13 RX ADMIN — CLONIDINE HYDROCHLORIDE 0.1 MG: 0.1 TABLET ORAL at 22:25

## 2022-10-13 RX ADMIN — BACLOFEN 10 MG: 10 TABLET ORAL at 22:25

## 2022-10-13 RX ADMIN — EMPAGLIFLOZIN 10 MG: 10 TABLET, FILM COATED ORAL at 09:22

## 2022-10-13 ASSESSMENT — ACTIVITIES OF DAILY LIVING (ADL)
ADLS_ACUITY_SCORE: 48
ADLS_ACUITY_SCORE: 48
ADLS_ACUITY_SCORE: 52
ADLS_ACUITY_SCORE: 48
ADLS_ACUITY_SCORE: 48
ADLS_ACUITY_SCORE: 50
ADLS_ACUITY_SCORE: 48
ADLS_ACUITY_SCORE: 47
ADLS_ACUITY_SCORE: 48
ADLS_ACUITY_SCORE: 50
ADLS_ACUITY_SCORE: 50
ADLS_ACUITY_SCORE: 48

## 2022-10-13 NOTE — PLAN OF CARE
PRIMARY DIAGNOSIS:PLACEMENT   OUTPATIENT/OBSERVATION GOALS TO BE MET BEFORE DISCHARGE:  ADLs back to baseline: Yes    Activity and level of assistance: lift device    Pain status: Pain free.    Return to near baseline physical activity: Yes    Vitals are Temp: 97.4  F (36.3  C) Temp src: Oral BP: 116/85 Pulse: 74   Resp: 18 SpO2: 95 %.  Patient is Alert and Oriented x4. Pt is on bedrest.  Pt is a Regular diet.  Pt is denying pain.  Patient has no IV access. Pt is medically cleared for discharge. SW following for safe disposition. Will cont to monitor and provide cares.     Discharge Planner Nurse   Safe discharge environment identified: Yes  Barriers to discharge: Yes       Entered by: Tita Loja RN 10/13/2022      Please review provider order for any additional goals.   Nurse to notify provider when observation goals have been met and patient is ready for discharge.

## 2022-10-13 NOTE — PLAN OF CARE
PRIMARY DIAGNOSIS:PLACEMENT   OUTPATIENT/OBSERVATION GOALS TO BE MET BEFORE DISCHARGE:  1. ADLs back to baseline: Yes    2. Activity and level of assistance: lift device    3. Pain status: Pain free.    4. Return to near baseline physical activity: Yes    Vitals are Temp: 97.4  F (36.3  C) Temp src: Oral BP: 116/85 Pulse: 74   Resp: 18 SpO2: 95 %.  Patient is Alert and Oriented x4. Pt is on bedrest.  Pt is a Regular diet.  Pt is denying pain.  Patient has no IV access. Pt is medically cleared for discharge. SW following for safe disposition. Will cont to monitor and provide cares.     Discharge Planner Nurse   Safe discharge environment identified: Yes  Barriers to discharge: Yes       Entered by: Tita Loja RN 10/13/2022      Please review provider order for any additional goals.   Nurse to notify provider when observation goals have been met and patient is ready for discharge.

## 2022-10-13 NOTE — PLAN OF CARE
"PRIMARY DIAGNOSIS: \"PLACMENT\"   OUTPATIENT/OBSERVATION GOALS TO BE MET BEFORE DISCHARGE:  1. Pain Status: Pain free.    2. Return to near baseline physical activity: Yes    3. Cleared for discharge by consultants (if involved): Yes    Discharge Planner Nurse   Safe discharge environment identified: No  Barriers to discharge: Yes       Entered by: Genevieve Ordoñez RN 10/13/2022 1:50 AM      Pt alert oriented X 3, lung sounds clear, abdominal sounds active X 4, last BM 10/11/22. Reposition as tolerated. Redness on scrotum, barrier cream applied.   Please review provider order for any additional goals.   Nurse to notify provider when observation goals have been met and patient is ready for discharge.Goal Outcome Evaluation:                        "
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
## 2022-10-13 NOTE — PLAN OF CARE
PRIMARY DIAGNOSIS: Placement  OUTPATIENT/OBSERVATION GOALS TO BE MET BEFORE DISCHARGE:  ADLs back to baseline: Yes    Activity and level of assistance: Ax2 with lift    Pain status: Pain free.    Return to near baseline physical activity: Yes     Discharge Planner Nurse   Safe discharge environment identified: No  Barriers to discharge: Yes       Entered by: Waleska Leon RN 10/12/2022 11:17 PM     Please review provider order for any additional goals.   Nurse to notify provider when observation goals have been met and patient is ready for discharge.    A&O, calm cooperative- pt able to make needs known. VSS, on RA, afebrile. Up Ax2 with lift. Rolls really well in bed. Incontinent, pt refusing male purewick pt calls when he needs to be changed. Good appetite- reason for not getting blood sugar before eating was pt was already eating. Denies pain. Pt up in chair late afternoon. Plan- SW following for placement.

## 2022-10-13 NOTE — PLAN OF CARE
PRIMARY DIAGNOSIS: Placement  OUTPATIENT/OBSERVATION GOALS TO BE MET BEFORE DISCHARGE:  ADLs back to baseline: Yes     Activity and level of assistance: Ax2 with lift     Pain status: Pain free.     Return to near baseline physical activity: Yes          Discharge Planner Nurse   Safe discharge environment identified: No  Barriers to discharge: Yes       Entered by: Waleska Leon RN 10/12/2022 11:17 PM  Please review provider order for any additional goals.   Nurse to notify provider when observation goals have been met and patient is ready for discharge.    Temp: 97.3  F (36.3  C) Temp src: Oral BP: 127/87 Pulse: 79   Resp: 16 SpO2: 94 % O2 Device: None (Room air)       A&O, calm cooperative- pt able to make needs known. Up Ax2 with lift. Rolls really well in bed. Incontinent, pt refusing male purewick pt calls when he needs to be changed. Good appetite- reason for not getting blood sugar before eating was pt was already eating. Denies pain. Pt up in chair late afternoon. Plan- SW following for placement.

## 2022-10-13 NOTE — PLAN OF CARE
"PRIMARY DIAGNOSIS: \"PLACMENT\"   OUTPATIENT/OBSERVATION GOALS TO BE MET BEFORE DISCHARGE:  1. Pain Status: Pain free.    2. Return to near baseline physical activity: Yes    3. Cleared for discharge by consultants (if involved): Yes    Discharge Planner Nurse   Safe discharge environment identified: No  Barriers to discharge: Yes       Entered by: Genevieve Ordoñez RN 10/13/2022 6:46 AM      Pt alert oriented X 3, lung sounds clear, abdominal sounds active X 4, last BM 10/11/22. Reposition per patient requested  as tolerated. Redness on scrotum, barrier cream applied.   Please review provider order for any additional goals.   Nurse to notify provider when observation goals have been met and patient is ready for discharge.Goal Outcome Evaluation:                        "

## 2022-10-14 LAB — GLUCOSE BLDC GLUCOMTR-MCNC: 120 MG/DL (ref 70–99)

## 2022-10-14 PROCEDURE — 82962 GLUCOSE BLOOD TEST: CPT

## 2022-10-14 PROCEDURE — G0378 HOSPITAL OBSERVATION PER HR: HCPCS

## 2022-10-14 PROCEDURE — 250N000013 HC RX MED GY IP 250 OP 250 PS 637: Performed by: HOSPITALIST

## 2022-10-14 PROCEDURE — 250N000013 HC RX MED GY IP 250 OP 250 PS 637: Performed by: PHYSICIAN ASSISTANT

## 2022-10-14 PROCEDURE — 99225 PR SUBSEQUENT OBSERVATION CARE,LEVEL II: CPT | Performed by: PHYSICIAN ASSISTANT

## 2022-10-14 RX ADMIN — EMPAGLIFLOZIN 10 MG: 10 TABLET, FILM COATED ORAL at 09:14

## 2022-10-14 RX ADMIN — METOPROLOL SUCCINATE 25 MG: 25 TABLET, EXTENDED RELEASE ORAL at 09:14

## 2022-10-14 RX ADMIN — CLOTRIMAZOLE: 0.01 CREAM TOPICAL at 09:19

## 2022-10-14 RX ADMIN — ASPIRIN 81 MG CHEWABLE TABLET 81 MG: 81 TABLET CHEWABLE at 09:14

## 2022-10-14 RX ADMIN — CLONIDINE HYDROCHLORIDE 0.1 MG: 0.1 TABLET ORAL at 21:51

## 2022-10-14 RX ADMIN — SENNOSIDES AND DOCUSATE SODIUM 1 TABLET: 50; 8.6 TABLET ORAL at 09:14

## 2022-10-14 RX ADMIN — MIRTAZAPINE 15 MG: 15 TABLET, FILM COATED ORAL at 21:52

## 2022-10-14 RX ADMIN — BACLOFEN 10 MG: 10 TABLET ORAL at 21:51

## 2022-10-14 RX ADMIN — OLANZAPINE 2.5 MG: 2.5 TABLET, FILM COATED ORAL at 14:02

## 2022-10-14 RX ADMIN — KETOCONAZOLE: 20 CREAM TOPICAL at 09:19

## 2022-10-14 RX ADMIN — DIVALPROEX SODIUM 500 MG: 500 TABLET, DELAYED RELEASE ORAL at 09:12

## 2022-10-14 RX ADMIN — HYDROXYZINE HYDROCHLORIDE 50 MG: 50 TABLET, FILM COATED ORAL at 09:12

## 2022-10-14 RX ADMIN — BACLOFEN 10 MG: 10 TABLET ORAL at 14:02

## 2022-10-14 RX ADMIN — Medication 10 MG: at 21:50

## 2022-10-14 RX ADMIN — QUETIAPINE 200 MG: 200 TABLET, FILM COATED ORAL at 14:01

## 2022-10-14 RX ADMIN — METFORMIN HYDROCHLORIDE 1000 MG: 500 TABLET ORAL at 18:19

## 2022-10-14 RX ADMIN — KETOCONAZOLE: 20 CREAM TOPICAL at 22:01

## 2022-10-14 RX ADMIN — METFORMIN HYDROCHLORIDE 1000 MG: 500 TABLET ORAL at 09:13

## 2022-10-14 RX ADMIN — CLOTRIMAZOLE: 0.01 CREAM TOPICAL at 22:03

## 2022-10-14 RX ADMIN — CLONIDINE HYDROCHLORIDE 0.1 MG: 0.1 TABLET ORAL at 09:13

## 2022-10-14 RX ADMIN — Medication 25 MCG: at 09:14

## 2022-10-14 RX ADMIN — DIVALPROEX SODIUM 1000 MG: 500 TABLET, DELAYED RELEASE ORAL at 21:50

## 2022-10-14 RX ADMIN — VENLAFAXINE HYDROCHLORIDE 150 MG: 150 CAPSULE, EXTENDED RELEASE ORAL at 21:50

## 2022-10-14 RX ADMIN — DIVALPROEX SODIUM 250 MG: 500 TABLET, DELAYED RELEASE ORAL at 09:14

## 2022-10-14 RX ADMIN — QUETIAPINE 200 MG: 200 TABLET, FILM COATED ORAL at 09:13

## 2022-10-14 RX ADMIN — VENLAFAXINE HYDROCHLORIDE 75 MG: 150 CAPSULE, EXTENDED RELEASE ORAL at 21:51

## 2022-10-14 RX ADMIN — BACLOFEN 10 MG: 10 TABLET ORAL at 09:14

## 2022-10-14 RX ADMIN — QUETIAPINE FUMARATE 400 MG: 200 TABLET ORAL at 21:51

## 2022-10-14 RX ADMIN — DESONIDE: 0.5 OINTMENT TOPICAL at 11:26

## 2022-10-14 RX ADMIN — LEVOTHYROXINE SODIUM 25 MCG: 0.03 TABLET ORAL at 09:14

## 2022-10-14 RX ADMIN — HYDROXYZINE HYDROCHLORIDE 50 MG: 50 TABLET, FILM COATED ORAL at 14:02

## 2022-10-14 RX ADMIN — ATORVASTATIN CALCIUM 20 MG: 20 TABLET, FILM COATED ORAL at 21:52

## 2022-10-14 RX ADMIN — HYDROXYZINE HYDROCHLORIDE 50 MG: 50 TABLET, FILM COATED ORAL at 21:51

## 2022-10-14 ASSESSMENT — ACTIVITIES OF DAILY LIVING (ADL)
ADLS_ACUITY_SCORE: 52
ADLS_ACUITY_SCORE: 54
ADLS_ACUITY_SCORE: 54
ADLS_ACUITY_SCORE: 52
ADLS_ACUITY_SCORE: 54

## 2022-10-14 NOTE — PLAN OF CARE
PRIMARY DIAGNOSIS: PLACEMENT  OUTPATIENT/OBSERVATION GOALS TO BE MET BEFORE DISCHARGE:  1. ADLs back to baseline: Yes    2. Activity and level of assistance: A X 2    3. Pain status: Pain free.    4. Return to near baseline physical activity: Yes     Discharge Planner Nurse   Safe discharge environment identified: Yes  Barriers to discharge: No       Entered by: Sumaya Mccrary RN 10/14/2022 9:51 AM    Pt A&Ox4. VSS on RA. Pt assist of 2 with lift device. Pt incontinent of bowel and bladder, calls appropriately. Pt medically cleared for discharge. SW following for safe disposition. Will continue to monitor.    BP (!) 147/96 (BP Location: Right arm)   Pulse 86   Temp 97.6  F (36.4  C) (Oral)   Resp 16   Wt 99.9 kg (220 lb 4.8 oz)   SpO2 97%     Please review provider order for any additional goals.   Nurse to notify provider when observation goals have been met and patient is ready for discharge.

## 2022-10-14 NOTE — PLAN OF CARE
PRIMARY DIAGNOSIS: PLACEMENT  OUTPATIENT/OBSERVATION GOALS TO BE MET BEFORE DISCHARGE:  ADLs back to baseline: Yes    Activity and level of assistance: A X 2    Pain status: Pain free.    Return to near baseline physical activity: Yes     Discharge Planner Nurse   Safe discharge environment identified: Yes  Barriers to discharge: No       Entered by: Preston Arango RN 10/13/2022 11:58 PM     Please review provider order for any additional goals.   Nurse to notify provider when observation goals have been met and patient is ready for discharge.Goal Outcome Evaluation:

## 2022-10-14 NOTE — PLAN OF CARE
PRIMARY DIAGNOSIS: Placement  OUTPATIENT/OBSERVATION GOALS TO BE MET BEFORE DISCHARGE:  ADLs back to baseline: Yes     Activity and level of assistance: Ax1-2 with lift     Pain status: Pain free.     Return to near baseline physical activity: Yes          Discharge Planner Nurse   Safe discharge environment identified: No  Barriers to discharge: Yes       Entered by: Waleska Leon RN 10/12/2022 11:17 PM  Please review provider order for any additional goals.   Nurse to notify provider when observation goals have been met and patient is ready for discharge.     Temp: 97.7  F (36.5  C) Temp src: Oral BP: (!) 134/90 Pulse: 89   Resp: 16 SpO2: 94 % O2 Device: None (Room air)       A&O, calm cooperative- pt able to make needs known. Up Ax2 with lift. Rolls really well in bed Ax1. Incontinent, pt refusing male purewick pt calls when he needs to be changed. Good appetite. Denies pain. Writer offered pt to get up in recliner chair for dinner, pt refused. Plan- SW following for placement.

## 2022-10-14 NOTE — PROGRESS NOTES
Care Management Follow Up    Expected Discharge Date: 10/21/2022     Concerns to be Addressed:  Discharge planning     Patient plan of care discussed at interdisciplinary rounds: Yes    Anticipated Discharge Disposition:  [facility] [place]  once funding in place and waiver re-opened    Additional Information:  Pt's waiver has closed due to being hospitalized for greater than 30 days. ROSIO had updated [facility] last Friday and requested an updated [employer] assessment to reopen waiver.     ANALY emailed  [facility] to inquire about re-assessment date, and if  has revised rate sheet and resubmitted to [facility] for approval. Awaiting response. ANALY will continue to follow.     1340: ANALY received email response from  that she has not yet heard back from [facility] re: next steps. She will follow up with them again today.     DANITA Obrien, [facility]   Inpatient Care Coordination  [facility]   [phone]

## 2022-10-14 NOTE — PLAN OF CARE
PRIMARY DIAGNOSIS: PLACEMENT  OUTPATIENT/OBSERVATION GOALS TO BE MET BEFORE DISCHARGE:  1. ADLs back to baseline: Yes    2. Activity and level of assistance: A x 2    3. Pain status: Pain free.    4. Return to near baseline physical activity: Yes     Discharge Planner Nurse   Safe discharge environment identified: Yes  Barriers to discharge: No       Entered by: Sumaya Mccrary RN 10/14/2022 11:47 AM    Pt A&Ox4. VSS on RA. Pt denies any pain at this time. Pt assist of 2 with lift device. Pt incontinent of bowel and bladder, calls appropriately. Pt medically cleared for discharge. SW following for safe disposition. Will continue to monitor.     BP (!) 131/98 (BP Location: Right arm)   Pulse 83   Temp 98.2  F (36.8  C) (Oral)   Resp 16   Wt 99.9 kg (220 lb 4.8 oz)   SpO2 95%      Please review provider order for any additional goals.   Nurse to notify provider when observation goals have been met and patient is ready for discharge.

## 2022-10-14 NOTE — PLAN OF CARE
PRIMARY DIAGNOSIS: PLACEMENT  OUTPATIENT/OBSERVATION GOALS TO BE MET BEFORE DISCHARGE:  1. ADLs back to baseline: Yes    2. Activity and level of assistance: A X 2    3. Pain status: Pain free.    4. Return to near baseline physical activity: Yes     Discharge Planner Nurse   Safe discharge environment identified: Yes  Barriers to discharge: No       Entered by: Preston Arango RN 10/14/2022 2:00 AM  Pt AO x4, A x 2 with lift device but not OOB this shift. Pt incontinent of bowel and bladder, calls appropriately, SW following with  safe disposition.  Please review provider order for any additional goals.   Nurse to notify provider when observation goals have been met and patient is ready for discharge.Goal Outcome Evaluation:
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
## 2022-10-14 NOTE — PLAN OF CARE
Goal Outcome Evaluation:       Vitals stable. Watching tv. Baseline very flat affect. Denies pain. Pleasant. Ate well. Discharge pending placement.

## 2022-10-15 LAB
GLUCOSE BLDC GLUCOMTR-MCNC: 197 MG/DL (ref 70–99)
GLUCOSE BLDC GLUCOMTR-MCNC: 79 MG/DL (ref 70–99)
GLUCOSE BLDC GLUCOMTR-MCNC: 91 MG/DL (ref 70–99)

## 2022-10-15 PROCEDURE — 250N000013 HC RX MED GY IP 250 OP 250 PS 637: Performed by: HOSPITALIST

## 2022-10-15 PROCEDURE — G0378 HOSPITAL OBSERVATION PER HR: HCPCS

## 2022-10-15 PROCEDURE — 250N000013 HC RX MED GY IP 250 OP 250 PS 637: Performed by: PHYSICIAN ASSISTANT

## 2022-10-15 PROCEDURE — 99225 PR SUBSEQUENT OBSERVATION CARE,LEVEL II: CPT | Performed by: PHYSICIAN ASSISTANT

## 2022-10-15 PROCEDURE — 82962 GLUCOSE BLOOD TEST: CPT | Mod: 91

## 2022-10-15 RX ADMIN — BACLOFEN 10 MG: 10 TABLET ORAL at 20:43

## 2022-10-15 RX ADMIN — METOPROLOL SUCCINATE 25 MG: 25 TABLET, EXTENDED RELEASE ORAL at 09:25

## 2022-10-15 RX ADMIN — METFORMIN HYDROCHLORIDE 1000 MG: 500 TABLET ORAL at 09:25

## 2022-10-15 RX ADMIN — DIVALPROEX SODIUM 250 MG: 500 TABLET, DELAYED RELEASE ORAL at 09:35

## 2022-10-15 RX ADMIN — QUETIAPINE 200 MG: 200 TABLET, FILM COATED ORAL at 15:00

## 2022-10-15 RX ADMIN — CLOTRIMAZOLE: 0.01 CREAM TOPICAL at 09:26

## 2022-10-15 RX ADMIN — QUETIAPINE 200 MG: 200 TABLET, FILM COATED ORAL at 09:26

## 2022-10-15 RX ADMIN — ASPIRIN 81 MG CHEWABLE TABLET 81 MG: 81 TABLET CHEWABLE at 09:26

## 2022-10-15 RX ADMIN — BACLOFEN 10 MG: 10 TABLET ORAL at 15:00

## 2022-10-15 RX ADMIN — METFORMIN HYDROCHLORIDE 1000 MG: 500 TABLET ORAL at 17:11

## 2022-10-15 RX ADMIN — QUETIAPINE FUMARATE 400 MG: 200 TABLET ORAL at 21:02

## 2022-10-15 RX ADMIN — DIVALPROEX SODIUM 1000 MG: 500 TABLET, DELAYED RELEASE ORAL at 21:02

## 2022-10-15 RX ADMIN — OLANZAPINE 2.5 MG: 2.5 TABLET, FILM COATED ORAL at 15:00

## 2022-10-15 RX ADMIN — CLONIDINE HYDROCHLORIDE 0.1 MG: 0.1 TABLET ORAL at 09:26

## 2022-10-15 RX ADMIN — LEVOTHYROXINE SODIUM 25 MCG: 0.03 TABLET ORAL at 09:24

## 2022-10-15 RX ADMIN — CLOTRIMAZOLE: 0.01 CREAM TOPICAL at 20:51

## 2022-10-15 RX ADMIN — Medication 25 MCG: at 09:25

## 2022-10-15 RX ADMIN — HYDROXYZINE HYDROCHLORIDE 50 MG: 50 TABLET, FILM COATED ORAL at 15:00

## 2022-10-15 RX ADMIN — VENLAFAXINE HYDROCHLORIDE 75 MG: 150 CAPSULE, EXTENDED RELEASE ORAL at 21:02

## 2022-10-15 RX ADMIN — MIRTAZAPINE 15 MG: 15 TABLET, FILM COATED ORAL at 21:02

## 2022-10-15 RX ADMIN — BACLOFEN 10 MG: 10 TABLET ORAL at 09:26

## 2022-10-15 RX ADMIN — VENLAFAXINE HYDROCHLORIDE 150 MG: 150 CAPSULE, EXTENDED RELEASE ORAL at 21:02

## 2022-10-15 RX ADMIN — KETOCONAZOLE: 20 CREAM TOPICAL at 09:26

## 2022-10-15 RX ADMIN — HYDROXYZINE HYDROCHLORIDE 50 MG: 50 TABLET, FILM COATED ORAL at 20:43

## 2022-10-15 RX ADMIN — KETOCONAZOLE: 20 CREAM TOPICAL at 20:53

## 2022-10-15 RX ADMIN — EMPAGLIFLOZIN 10 MG: 10 TABLET, FILM COATED ORAL at 09:25

## 2022-10-15 RX ADMIN — ATORVASTATIN CALCIUM 20 MG: 20 TABLET, FILM COATED ORAL at 20:43

## 2022-10-15 RX ADMIN — CLONIDINE HYDROCHLORIDE 0.1 MG: 0.1 TABLET ORAL at 20:43

## 2022-10-15 RX ADMIN — DIVALPROEX SODIUM 500 MG: 500 TABLET, DELAYED RELEASE ORAL at 09:26

## 2022-10-15 RX ADMIN — SENNOSIDES AND DOCUSATE SODIUM 1 TABLET: 50; 8.6 TABLET ORAL at 09:24

## 2022-10-15 RX ADMIN — Medication 10 MG: at 21:02

## 2022-10-15 RX ADMIN — HYDROXYZINE HYDROCHLORIDE 50 MG: 50 TABLET, FILM COATED ORAL at 09:24

## 2022-10-15 RX ADMIN — DESONIDE: 0.5 OINTMENT TOPICAL at 12:43

## 2022-10-15 ASSESSMENT — ACTIVITIES OF DAILY LIVING (ADL)
ADLS_ACUITY_SCORE: 50
ADLS_ACUITY_SCORE: 54
ADLS_ACUITY_SCORE: 50
ADLS_ACUITY_SCORE: 54
ADLS_ACUITY_SCORE: 50
ADLS_ACUITY_SCORE: 54

## 2022-10-15 NOTE — PLAN OF CARE
PRIMARY DIAGNOSIS: PLACEMENT   OUTPATIENT/OBSERVATION GOALS TO BE MET BEFORE DISCHARGE:  ADLs back to baseline:  total care     Activity and level of assistance: assist of x1-2. Lift assist    Pain status: Pain free.    Return to near baseline physical activity: Yes     Discharge Planner Nurse   Safe discharge environment identified: Yes  Barriers to discharge: Yes       Entered by: SARA DERAS RN 10/15/2022    Vital signs:  Temp: 97.4  F (36.3  C) Temp src: Oral BP: 123/89 Pulse: 75   Resp: 16 SpO2: 96 % O2 Device: None (Room air) Alert and oriented x4. Calm and cooperative. Makes needs known. Denies pain. Incontinent for bowel and bladder. BG this morning 79. Provider notified. No IV access. On regular diet. Good appetite. SW following with placement. Will continue to monitor and provider supportive care.     Please review provider order for any additional goals.   Nurse to notify provider when observation goals have been met and patient is ready for discharge.

## 2022-10-15 NOTE — PLAN OF CARE
PRIMARY DIAGNOSIS: AWAITING PLACEMENT  OUTPATIENT/OBSERVATION GOALS TO BE MET BEFORE DISCHARGE:  ADLs back to baseline: Yes    Activity and level of assistance: AX2, lift     Pain status: Pain free.    Return to near baseline physical activity: Yes     Discharge Planner Nurse   Safe discharge environment identified: Yes  Barriers to discharge: Yes       Entered by: Vero Franco RN 10/15/2022   A&O x4. VSS. Regular diet. A2 lift. Pt calls appropriately and makes needs known. Medically cleared for discharge, SW following for placement. Will continue to provide supportive cares.   Please review provider order for any additional goals.   Nurse to notify provider when observation goals have been met and patient is ready for discharge.
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
## 2022-10-15 NOTE — PLAN OF CARE
PRIMARY DIAGNOSIS: AWAITING PLACEMENT  OUTPATIENT/OBSERVATION GOALS TO BE MET BEFORE DISCHARGE:  1. ADLs back to baseline: Yes    2. Activity and level of assistance: AX2, lift     3. Pain status: Pain free.    4. Return to near baseline physical activity: Yes     Discharge Planner Nurse   Safe discharge environment identified: Yes  Barriers to discharge: Yes       Entered by: Vero Franco RN 10/15/2022   A&O x4. VSS. Regular diet. A2 lift. Pt calls appropriately and makes needs known. Medically cleared for discharge, SW following for placement. Will continue to provide supportive cares.   Please review provider order for any additional goals.   Nurse to notify provider when observation goals have been met and patient is ready for discharge.

## 2022-10-15 NOTE — PLAN OF CARE
PRIMARY DIAGNOSIS: Placement  OUTPATIENT/OBSERVATION GOALS TO BE MET BEFORE DISCHARGE:  ADLs back to baseline: Yes     Activity and level of assistance: Ax1-2 with lift     Pain status: Pain free.     Return to near baseline physical activity: Yes          Discharge Planner Nurse   Safe discharge environment identified: No  Barriers to discharge: Yes       Entered by: Waleska Leon RN 10/12/2022 11:17 PM  Please review provider order for any additional goals.   Nurse to notify provider when observation goals have been met and patient is ready for discharge.     Temp: 97.7  F (36.5  C) Temp src: Oral BP: (!) 137/92 Pulse: 79   Resp: 16 SpO2: 94 % O2 Device: None (Room air)           A&O, calm cooperative- pt able to make needs known. Up Ax2 with lift. Rolls really well in bed Ax1. Incontinent, pt refusing male purewick pt calls when he needs to be changed. Good appetite. Denies pain. Writer offered pt to get up in recliner chair for dinner, pt refused. x2 bms evening shift. Plan- SW following for placement.

## 2022-10-15 NOTE — PLAN OF CARE
PRIMARY DIAGNOSIS: PLACEMENT   OUTPATIENT/OBSERVATION GOALS TO BE MET BEFORE DISCHARGE:  1. Pain Status: Pain free.    2. Return to near baseline physical activity:  total care    3. Cleared for discharge by consultants (if involved): N/A    Discharge Planner Nurse   Safe discharge environment identified: Yes  Barriers to discharge: Yes       Entered by: SARA DERAS RN 10/15/2022    Vital signs:  Temp: 97.4  F (36.3  C) Temp src: Oral BP: 123/89 Pulse: 75   Resp: 16 SpO2: 96 % O2 Device: None (Room air)  Alert and oriented x4. Calm and cooperative. Flat affect. Makes needs known. Denies pain. Incontinent for bowel and bladder. BG this morning 79. Provider notified. Jardiance held. No IV access. On regular diet. Good appetite. SW following with placement. Will continue to monitor and provider supportive care.   No IV access. On regular diet. Good appetite. SW following with placement. Will continue to monitor and provider supportive care.        Please review provider order for any additional goals.   Nurse to notify provider when observation goals have been met and patient is ready for discharge.

## 2022-10-15 NOTE — PLAN OF CARE
PRIMARY DIAGNOSIS: PLACEMENT   OUTPATIENT/OBSERVATION GOALS TO BE MET BEFORE DISCHARGE:  1. Pain Status: Pain free.    2. Return to near baseline physical activity:  total care    3. Cleared for discharge by consultants (if involved): Yes    Discharge Planner Nurse   Safe discharge environment identified: Yes  Barriers to discharge: Yes       Entered by: SARA DERAS RN 10/15/2022 4:44 PM   Vital signs:  Temp: 97.4  F (36.3  C) Temp src: Oral BP: 123/89 Pulse: 75   Resp: 16 SpO2: 96 % O2 Device: None (Room air) Alert and oriented x4. Calm and cooperative. Flat affect. Makes needs known. Denies pain. Incontinent for bowel and bladder. BG this morning 79. Provider notified. Jardiance held. No IV access. On regular diet. Good appetite. SW following with placement. Will continue to monitor and provider supportive care.   No IV access. On regular diet. Good appetite. SW following with placement. Will continue to monitor and provider supportive care.          Please review provider order for any additional goals.   Nurse to notify provider when observation goals have been met and patient is ready for discharge.

## 2022-10-16 LAB
GLUCOSE BLDC GLUCOMTR-MCNC: 102 MG/DL (ref 70–99)
GLUCOSE BLDC GLUCOMTR-MCNC: 151 MG/DL (ref 70–99)

## 2022-10-16 PROCEDURE — G0378 HOSPITAL OBSERVATION PER HR: HCPCS

## 2022-10-16 PROCEDURE — 250N000013 HC RX MED GY IP 250 OP 250 PS 637: Performed by: PHYSICIAN ASSISTANT

## 2022-10-16 PROCEDURE — 250N000013 HC RX MED GY IP 250 OP 250 PS 637: Performed by: HOSPITALIST

## 2022-10-16 PROCEDURE — 99225 PR SUBSEQUENT OBSERVATION CARE,LEVEL II: CPT | Performed by: PHYSICIAN ASSISTANT

## 2022-10-16 PROCEDURE — 82962 GLUCOSE BLOOD TEST: CPT

## 2022-10-16 RX ADMIN — Medication 25 MCG: at 08:52

## 2022-10-16 RX ADMIN — DIVALPROEX SODIUM 500 MG: 500 TABLET, DELAYED RELEASE ORAL at 08:52

## 2022-10-16 RX ADMIN — DIVALPROEX SODIUM 1000 MG: 500 TABLET, DELAYED RELEASE ORAL at 22:00

## 2022-10-16 RX ADMIN — KETOCONAZOLE: 20 CREAM TOPICAL at 22:01

## 2022-10-16 RX ADMIN — CLOTRIMAZOLE: 0.01 CREAM TOPICAL at 08:51

## 2022-10-16 RX ADMIN — LEVOTHYROXINE SODIUM 25 MCG: 0.03 TABLET ORAL at 08:51

## 2022-10-16 RX ADMIN — DESONIDE: 0.5 OINTMENT TOPICAL at 12:00

## 2022-10-16 RX ADMIN — Medication 10 MG: at 22:01

## 2022-10-16 RX ADMIN — VENLAFAXINE HYDROCHLORIDE 150 MG: 150 CAPSULE, EXTENDED RELEASE ORAL at 22:00

## 2022-10-16 RX ADMIN — CLOTRIMAZOLE: 0.01 CREAM TOPICAL at 22:02

## 2022-10-16 RX ADMIN — METOPROLOL SUCCINATE 25 MG: 25 TABLET, EXTENDED RELEASE ORAL at 08:52

## 2022-10-16 RX ADMIN — BACLOFEN 10 MG: 10 TABLET ORAL at 13:48

## 2022-10-16 RX ADMIN — METFORMIN HYDROCHLORIDE 1000 MG: 500 TABLET ORAL at 08:51

## 2022-10-16 RX ADMIN — ASPIRIN 81 MG CHEWABLE TABLET 81 MG: 81 TABLET CHEWABLE at 08:52

## 2022-10-16 RX ADMIN — QUETIAPINE 200 MG: 200 TABLET, FILM COATED ORAL at 13:48

## 2022-10-16 RX ADMIN — BACLOFEN 10 MG: 10 TABLET ORAL at 08:52

## 2022-10-16 RX ADMIN — CLONIDINE HYDROCHLORIDE 0.1 MG: 0.1 TABLET ORAL at 22:00

## 2022-10-16 RX ADMIN — QUETIAPINE 200 MG: 200 TABLET, FILM COATED ORAL at 08:51

## 2022-10-16 RX ADMIN — CLONIDINE HYDROCHLORIDE 0.1 MG: 0.1 TABLET ORAL at 08:51

## 2022-10-16 RX ADMIN — QUETIAPINE FUMARATE 400 MG: 200 TABLET ORAL at 22:00

## 2022-10-16 RX ADMIN — ATORVASTATIN CALCIUM 20 MG: 20 TABLET, FILM COATED ORAL at 22:00

## 2022-10-16 RX ADMIN — SENNOSIDES AND DOCUSATE SODIUM 1 TABLET: 50; 8.6 TABLET ORAL at 08:52

## 2022-10-16 RX ADMIN — METFORMIN HYDROCHLORIDE 1000 MG: 500 TABLET ORAL at 17:47

## 2022-10-16 RX ADMIN — HYDROXYZINE HYDROCHLORIDE 50 MG: 50 TABLET, FILM COATED ORAL at 08:52

## 2022-10-16 RX ADMIN — OLANZAPINE 2.5 MG: 2.5 TABLET, FILM COATED ORAL at 13:48

## 2022-10-16 RX ADMIN — MIRTAZAPINE 15 MG: 15 TABLET, FILM COATED ORAL at 22:00

## 2022-10-16 RX ADMIN — DIVALPROEX SODIUM 250 MG: 500 TABLET, DELAYED RELEASE ORAL at 08:59

## 2022-10-16 RX ADMIN — KETOCONAZOLE: 20 CREAM TOPICAL at 08:51

## 2022-10-16 RX ADMIN — BACLOFEN 10 MG: 10 TABLET ORAL at 22:01

## 2022-10-16 RX ADMIN — HYDROXYZINE HYDROCHLORIDE 50 MG: 50 TABLET, FILM COATED ORAL at 22:00

## 2022-10-16 RX ADMIN — HYDROXYZINE HYDROCHLORIDE 50 MG: 50 TABLET, FILM COATED ORAL at 13:48

## 2022-10-16 RX ADMIN — VENLAFAXINE HYDROCHLORIDE 75 MG: 150 CAPSULE, EXTENDED RELEASE ORAL at 22:00

## 2022-10-16 ASSESSMENT — ACTIVITIES OF DAILY LIVING (ADL)
ADLS_ACUITY_SCORE: 50

## 2022-10-16 NOTE — PLAN OF CARE
PRIMARY DIAGNOSIS: PLACEMENT     OUTPATIENT/OBSERVATION GOALS TO BE MET BEFORE DISCHARGE:  1. Pain Status: Pain free.    2. Return to near baseline physical activity:  total care    3. Cleared for discharge by consultants (if involved): N/A    Discharge Planner Nurse   Safe discharge environment identified: Yes  Barriers to discharge: Yes       Entered by: Raquel Green 10/16/2022 6:50 PM    /77 (BP Location: Right arm)   Pulse 81   Temp 97.7  F (36.5  C) (Oral)   Resp 16   Wt 99.9 kg (220 lb 4.8 oz)   SpO2 95%       Alert and oriented x4. Calm and cooperative. Flat affect. Makes needs known. Denies pain. Incontinent for bowel and bladder. BG this evening 151. No IV access. On regular diet. Good appetite. SW following with placement. Will continue to monitor and provider supportive care.      Please review provider order for any additional goals.     Please review provider order for any additional goals.   Nurse to notify provider when observation goals have been met and patient is ready for discharge.
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
## 2022-10-16 NOTE — PLAN OF CARE
PRIMARY DIAGNOSIS: PLACEMENT   OUTPATIENT/OBSERVATION GOALS TO BE MET BEFORE DISCHARGE:  1. Pain Status: Pain free.    2. Return to near baseline physical activity:  total care    3. Cleared for discharge by consultants (if involved): N/A    Discharge Planner Nurse   Safe discharge environment identified: Yes  Barriers to discharge: Yes       Entered by: SARA DERAS RN 10/16/2022 12:27 PM   Vital signs:  Temp: 97.1  F (36.2  C) Temp src: Axillary BP: (!) 130/90 Pulse: 74   Resp: 15 SpO2: 95 % O2 Device: None (Room air) Alert and oriented x4. Calm and cooperative. Flat affect. Makes needs known. Denies pain. Incontinent for bowel and bladder. BG this morning 102. No IV access. On regular diet. Good appetite. SW following with placement. Will continue to monitor and provider supportive care.      Please review provider order for any additional goals.     Please review provider order for any additional goals.   Nurse to notify provider when observation goals have been met and patient is ready for discharge.

## 2022-10-16 NOTE — PLAN OF CARE
PRIMARY DIAGNOSIS: PLACEMENT   OUTPATIENT/OBSERVATION GOALS TO BE MET BEFORE DISCHARGE:  1. Pain Status: Pain free.    2. Return to near baseline physical activity:  total care    3. Cleared for discharge by consultants (if involved): N/A    Discharge Planner Nurse   Safe discharge environment identified: Yes  Barriers to discharge: Yes       Entered by: SARA DERAS RN 10/16/2022    Vital signs:  Temp: 97.1  F (36.2  C) Temp src: Axillary BP: (!) 130/90 Pulse: 74   Resp: 15 SpO2: 95 % O2 Device: None (Room air) Alert and oriented x4. Calm and cooperative. Flat affect. Makes needs known. Denies pain. Incontinent for bowel and bladder. BG this morning 102. No IV access. On regular diet. Good appetite. SW following with placement. Will continue to monitor and provider supportive care.      Please review provider order for any additional goals.   Nurse to notify provider when observation goals have been met and patient is ready for discharge.

## 2022-10-16 NOTE — PLAN OF CARE
PRIMARY DIAGNOSIS: PLACEMENT   OUTPATIENT/OBSERVATION GOALS TO BE MET BEFORE DISCHARGE:  1. Pain Status: Pain free.    2. Return to near baseline physical activity:  total care    3. Cleared for discharge by consultants (if involved): Yes    Discharge Planner Nurse   Safe discharge environment identified: Yes  Barriers to discharge: Yes       Entered by: Munira Martinez RN 10/15/2022         Alert and oriented x4. Able to make needs known. Incontinent of bowel and bladder. No IV access. SW following for placement. Will continue to monitor and provide cares.     Please review provider order for any additional goals.   Nurse to notify provider when observation goals have been met and patient is ready for discharge.

## 2022-10-16 NOTE — PLAN OF CARE
PRIMARY DIAGNOSIS: placement   OUTPATIENT/OBSERVATION GOALS TO BE MET BEFORE DISCHARGE:  ADLs back to baseline: Yes    Activity and level of assistance: para    Pain status: Pain free.    Return to near baseline physical activity: Yes     Discharge Planner Nurse   Safe discharge environment identified: No  Barriers to discharge:  placement        Entered by: Liban So RN 10/16/2022 6:31 AM     Please review provider order for any additional goals.   Nurse to notify provider when observation goals have been met and patient is ready for discharge.Goal Outcome Evaluation:       Patient appeared to have rested through NOC, deneis needs, will cont to monitor

## 2022-10-17 LAB
GLUCOSE BLDC GLUCOMTR-MCNC: 123 MG/DL (ref 70–99)
GLUCOSE BLDC GLUCOMTR-MCNC: 95 MG/DL (ref 70–99)

## 2022-10-17 PROCEDURE — G0378 HOSPITAL OBSERVATION PER HR: HCPCS

## 2022-10-17 PROCEDURE — 250N000013 HC RX MED GY IP 250 OP 250 PS 637: Performed by: PHYSICIAN ASSISTANT

## 2022-10-17 PROCEDURE — 99225 PR SUBSEQUENT OBSERVATION CARE,LEVEL II: CPT | Performed by: PHYSICIAN ASSISTANT

## 2022-10-17 PROCEDURE — 250N000013 HC RX MED GY IP 250 OP 250 PS 637: Performed by: HOSPITALIST

## 2022-10-17 PROCEDURE — 82962 GLUCOSE BLOOD TEST: CPT

## 2022-10-17 RX ADMIN — BACLOFEN 10 MG: 10 TABLET ORAL at 21:12

## 2022-10-17 RX ADMIN — CLOTRIMAZOLE: 0.01 CREAM TOPICAL at 09:22

## 2022-10-17 RX ADMIN — Medication 25 MCG: at 09:10

## 2022-10-17 RX ADMIN — METOPROLOL SUCCINATE 25 MG: 25 TABLET, EXTENDED RELEASE ORAL at 09:11

## 2022-10-17 RX ADMIN — CLONIDINE HYDROCHLORIDE 0.1 MG: 0.1 TABLET ORAL at 09:10

## 2022-10-17 RX ADMIN — VENLAFAXINE HYDROCHLORIDE 75 MG: 150 CAPSULE, EXTENDED RELEASE ORAL at 21:12

## 2022-10-17 RX ADMIN — QUETIAPINE 200 MG: 200 TABLET, FILM COATED ORAL at 14:30

## 2022-10-17 RX ADMIN — DIVALPROEX SODIUM 250 MG: 500 TABLET, DELAYED RELEASE ORAL at 09:11

## 2022-10-17 RX ADMIN — HYDROXYZINE HYDROCHLORIDE 50 MG: 50 TABLET, FILM COATED ORAL at 14:30

## 2022-10-17 RX ADMIN — OLANZAPINE 2.5 MG: 2.5 TABLET, FILM COATED ORAL at 14:30

## 2022-10-17 RX ADMIN — BACLOFEN 10 MG: 10 TABLET ORAL at 14:30

## 2022-10-17 RX ADMIN — METFORMIN HYDROCHLORIDE 1000 MG: 500 TABLET ORAL at 09:10

## 2022-10-17 RX ADMIN — CLOTRIMAZOLE: 0.01 CREAM TOPICAL at 21:22

## 2022-10-17 RX ADMIN — ASPIRIN 81 MG CHEWABLE TABLET 81 MG: 81 TABLET CHEWABLE at 09:10

## 2022-10-17 RX ADMIN — QUETIAPINE FUMARATE 400 MG: 200 TABLET ORAL at 21:12

## 2022-10-17 RX ADMIN — BACLOFEN 10 MG: 10 TABLET ORAL at 09:11

## 2022-10-17 RX ADMIN — DIVALPROEX SODIUM 1000 MG: 500 TABLET, DELAYED RELEASE ORAL at 21:10

## 2022-10-17 RX ADMIN — SENNOSIDES AND DOCUSATE SODIUM 1 TABLET: 50; 8.6 TABLET ORAL at 09:11

## 2022-10-17 RX ADMIN — CLONIDINE HYDROCHLORIDE 0.1 MG: 0.1 TABLET ORAL at 21:11

## 2022-10-17 RX ADMIN — QUETIAPINE 200 MG: 200 TABLET, FILM COATED ORAL at 09:11

## 2022-10-17 RX ADMIN — KETOCONAZOLE: 20 CREAM TOPICAL at 21:22

## 2022-10-17 RX ADMIN — HYDROXYZINE HYDROCHLORIDE 50 MG: 50 TABLET, FILM COATED ORAL at 21:12

## 2022-10-17 RX ADMIN — DIVALPROEX SODIUM 500 MG: 500 TABLET, DELAYED RELEASE ORAL at 09:12

## 2022-10-17 RX ADMIN — KETOCONAZOLE: 20 CREAM TOPICAL at 09:22

## 2022-10-17 RX ADMIN — LEVOTHYROXINE SODIUM 25 MCG: 0.03 TABLET ORAL at 09:11

## 2022-10-17 RX ADMIN — MIRTAZAPINE 15 MG: 15 TABLET, FILM COATED ORAL at 21:10

## 2022-10-17 RX ADMIN — METFORMIN HYDROCHLORIDE 1000 MG: 500 TABLET ORAL at 19:17

## 2022-10-17 RX ADMIN — HYDROXYZINE HYDROCHLORIDE 50 MG: 50 TABLET, FILM COATED ORAL at 09:10

## 2022-10-17 RX ADMIN — ATORVASTATIN CALCIUM 20 MG: 20 TABLET, FILM COATED ORAL at 21:10

## 2022-10-17 RX ADMIN — DESONIDE: 0.5 OINTMENT TOPICAL at 12:36

## 2022-10-17 RX ADMIN — VENLAFAXINE HYDROCHLORIDE 150 MG: 150 CAPSULE, EXTENDED RELEASE ORAL at 21:11

## 2022-10-17 RX ADMIN — Medication 10 MG: at 21:11

## 2022-10-17 ASSESSMENT — ACTIVITIES OF DAILY LIVING (ADL)
ADLS_ACUITY_SCORE: 50

## 2022-10-17 NOTE — PLAN OF CARE
PRIMARY DIAGNOSIS: placement  OUTPATIENT/OBSERVATION GOALS TO BE MET BEFORE DISCHARGE:  ADLs back to baseline: Yes    Activity and level of assistance: total care.   Pain status: Pain free.    Return to near baseline physical activity: Yes     Discharge Planner Nurse   Safe discharge environment identified: Yes  Barriers to discharge: Yes       Entered by: Katheryn Duran RN 10/17/2022 1:25 AM   Incontinent for bowel and bladder. No IV access. SW working on placement.       Please review provider order for any additional goals.   Nurse to notify provider when observation goals have been met and patient is ready for discharge.

## 2022-10-17 NOTE — PLAN OF CARE
PRIMARY DIAGNOSIS: Placement    OUTPATIENT/OBSERVATION GOALS TO BE MET BEFORE DISCHARGE:  1. ADLs back to baseline: Yes    2. Activity and level of assistance: Total care    3. Pain status: Pain free.    4. Return to near baseline physical activity: Yes     Discharge Planner Nurse   Safe discharge environment identified: Yes  Barriers to discharge: Yes       Entered by: Laura Velasquez RN 10/17/2022    Please review provider order for any additional goals.   Nurse to notify provider when observation goals have been met and patient is ready for discharge.    Patient alert and oriented x4. Flat effect. Vital signs stable, on room air. On regular diet, good appetite. Tolerates oral medication. Denies pain. Pt repositioned with pillows. Currently resting in bed, able to make needs known.

## 2022-10-17 NOTE — PLAN OF CARE
PRIMARY DIAGNOSIS: placement  OUTPATIENT/OBSERVATION GOALS TO BE MET BEFORE DISCHARGE:  ADLs back to baseline: Yes     Activity and level of assistance: total care.   Pain status: Pain free.     Return to near baseline physical activity: Yes          Discharge Planner Nurse   Safe discharge environment identified: Yes  Barriers to discharge: Yes         Has been resting through the night, incontinent of bladder, makes needs known.  No IV access. SW working on placement.       Please review provider order for any additional goals.   Nurse to notify provider when observation goals have been met and patient is ready for discharge.

## 2022-10-17 NOTE — PLAN OF CARE
PRIMARY DIAGNOSIS: Placement    OUTPATIENT/OBSERVATION GOALS TO BE MET BEFORE DISCHARGE:  1. ADLs back to baseline: Yes    2. Activity and level of assistance: Total care    3. Pain status: Pain free.    4. Return to near baseline physical activity: Yes     Discharge Planner Nurse   Safe discharge environment identified: Yes  Barriers to discharge: Yes       Entered by: Laura Velasquez RN 10/17/2022    Please review provider order for any additional goals.   Nurse to notify provider when observation goals have been met and patient is ready for discharge.    Patient alert and oriented x4. Flat effect. Vital signs stable, on room air. On regular diet, good appetite. Blood sugar- 95 this AM. Tolerates oral medication. Denies pain. Pt repositioned with pillows. Currently resting in bed, able to make needs known.

## 2022-10-17 NOTE — PLAN OF CARE
PRIMARY DIAGNOSIS: PLACEMENT     OUTPATIENT/OBSERVATION GOALS TO BE MET BEFORE DISCHARGE:  1. Pain Status: Pain free.    2. Return to near baseline physical activity:  total care    3. Cleared for discharge by consultants (if involved): N/A    Discharge Planner Nurse   Safe discharge environment identified: Yes  Barriers to discharge: Yes       Entered by: Raquel Green 10/16/2022 10:00 PM    /84 (BP Location: Right arm)   Pulse 81   Temp 97.7  F (36.5  C) (Oral)   Resp 16   Wt 99.9 kg (220 lb 4.8 oz)   SpO2 95%       Alert and oriented x4. Calm and cooperative. Flat affect. Makes needs known. Denies pain. Incontinent for bowel and bladder. BG this evening 151. No IV access. On regular diet. Good appetite. SW following with placement. Will continue to monitor and provider supportive care. No acute needs noted.        Please review provider order for any additional goals.     Please review provider order for any additional goals.   Nurse to notify provider when observation goals have been met and patient is ready for discharge.

## 2022-10-17 NOTE — PLAN OF CARE
PRIMARY DIAGNOSIS: Placement    OUTPATIENT/OBSERVATION GOALS TO BE MET BEFORE DISCHARGE:  1. ADLs back to baseline: Yes    2. Activity and level of assistance: Total care    3. Pain status: Pain free.    4. Return to near baseline physical activity: Yes     Discharge Planner Nurse   Safe discharge environment identified: Yes  Barriers to discharge: Yes       Entered by: Laura Velasquez RN 10/17/2022    Please review provider order for any additional goals.   Nurse to notify provider when observation goals have been met and patient is ready for discharge.    Patient alert and oriented x4. Flat effect. Vital signs stable, on room air. On regular diet, good appetite. Blood sugar- 123 this PM. Jardiance is held. Tolerates oral medication. Denies pain. Pt repositioned with pillows. Total care. Currently resting in bed, able to make needs known. Sw following, plan for placement.
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
## 2022-10-18 LAB — GLUCOSE BLDC GLUCOMTR-MCNC: 144 MG/DL (ref 70–99)

## 2022-10-18 PROCEDURE — 250N000013 HC RX MED GY IP 250 OP 250 PS 637: Performed by: HOSPITALIST

## 2022-10-18 PROCEDURE — G0378 HOSPITAL OBSERVATION PER HR: HCPCS

## 2022-10-18 PROCEDURE — 99225 PR SUBSEQUENT OBSERVATION CARE,LEVEL II: CPT | Performed by: PHYSICIAN ASSISTANT

## 2022-10-18 PROCEDURE — 82962 GLUCOSE BLOOD TEST: CPT

## 2022-10-18 PROCEDURE — 250N000013 HC RX MED GY IP 250 OP 250 PS 637: Performed by: PHYSICIAN ASSISTANT

## 2022-10-18 RX ADMIN — CLONIDINE HYDROCHLORIDE 0.1 MG: 0.1 TABLET ORAL at 19:12

## 2022-10-18 RX ADMIN — DESONIDE: 0.5 OINTMENT TOPICAL at 13:36

## 2022-10-18 RX ADMIN — HYDROXYZINE HYDROCHLORIDE 50 MG: 50 TABLET, FILM COATED ORAL at 13:36

## 2022-10-18 RX ADMIN — ASPIRIN 81 MG CHEWABLE TABLET 81 MG: 81 TABLET CHEWABLE at 08:45

## 2022-10-18 RX ADMIN — VENLAFAXINE HYDROCHLORIDE 75 MG: 150 CAPSULE, EXTENDED RELEASE ORAL at 21:37

## 2022-10-18 RX ADMIN — ATORVASTATIN CALCIUM 20 MG: 20 TABLET, FILM COATED ORAL at 19:12

## 2022-10-18 RX ADMIN — DIVALPROEX SODIUM 1000 MG: 500 TABLET, DELAYED RELEASE ORAL at 21:37

## 2022-10-18 RX ADMIN — HYDROXYZINE HYDROCHLORIDE 50 MG: 50 TABLET, FILM COATED ORAL at 08:44

## 2022-10-18 RX ADMIN — CLOTRIMAZOLE: 0.01 CREAM TOPICAL at 19:17

## 2022-10-18 RX ADMIN — OLANZAPINE 2.5 MG: 2.5 TABLET, FILM COATED ORAL at 13:36

## 2022-10-18 RX ADMIN — KETOCONAZOLE: 20 CREAM TOPICAL at 19:17

## 2022-10-18 RX ADMIN — SENNOSIDES AND DOCUSATE SODIUM 1 TABLET: 50; 8.6 TABLET ORAL at 08:44

## 2022-10-18 RX ADMIN — QUETIAPINE 200 MG: 200 TABLET, FILM COATED ORAL at 13:36

## 2022-10-18 RX ADMIN — VENLAFAXINE HYDROCHLORIDE 150 MG: 150 CAPSULE, EXTENDED RELEASE ORAL at 21:38

## 2022-10-18 RX ADMIN — LEVOTHYROXINE SODIUM 25 MCG: 0.03 TABLET ORAL at 08:44

## 2022-10-18 RX ADMIN — QUETIAPINE FUMARATE 400 MG: 200 TABLET ORAL at 21:37

## 2022-10-18 RX ADMIN — METOPROLOL SUCCINATE 25 MG: 25 TABLET, EXTENDED RELEASE ORAL at 08:44

## 2022-10-18 RX ADMIN — QUETIAPINE 200 MG: 200 TABLET, FILM COATED ORAL at 08:44

## 2022-10-18 RX ADMIN — METFORMIN HYDROCHLORIDE 1000 MG: 500 TABLET ORAL at 08:44

## 2022-10-18 RX ADMIN — BACLOFEN 10 MG: 10 TABLET ORAL at 13:36

## 2022-10-18 RX ADMIN — DIVALPROEX SODIUM 250 MG: 500 TABLET, DELAYED RELEASE ORAL at 08:44

## 2022-10-18 RX ADMIN — Medication 25 MCG: at 08:45

## 2022-10-18 RX ADMIN — KETOCONAZOLE: 20 CREAM TOPICAL at 08:50

## 2022-10-18 RX ADMIN — BACLOFEN 10 MG: 10 TABLET ORAL at 19:12

## 2022-10-18 RX ADMIN — CLONIDINE HYDROCHLORIDE 0.1 MG: 0.1 TABLET ORAL at 08:45

## 2022-10-18 RX ADMIN — MIRTAZAPINE 15 MG: 15 TABLET, FILM COATED ORAL at 21:37

## 2022-10-18 RX ADMIN — BACLOFEN 10 MG: 10 TABLET ORAL at 08:45

## 2022-10-18 RX ADMIN — METFORMIN HYDROCHLORIDE 1000 MG: 500 TABLET ORAL at 18:12

## 2022-10-18 RX ADMIN — Medication 10 MG: at 21:37

## 2022-10-18 RX ADMIN — HYDROXYZINE HYDROCHLORIDE 50 MG: 50 TABLET, FILM COATED ORAL at 19:12

## 2022-10-18 RX ADMIN — DIVALPROEX SODIUM 500 MG: 500 TABLET, DELAYED RELEASE ORAL at 08:45

## 2022-10-18 RX ADMIN — CLOTRIMAZOLE: 0.01 CREAM TOPICAL at 08:50

## 2022-10-18 ASSESSMENT — ACTIVITIES OF DAILY LIVING (ADL)
ADLS_ACUITY_SCORE: 50

## 2022-10-18 NOTE — PLAN OF CARE
PRIMARY DIAGNOSIS: Placement     OUTPATIENT/OBSERVATION GOALS TO BE MET BEFORE DISCHARGE:  ADLs back to baseline: Yes     Activity and level of assistance: Total care     Pain status: Pain free.     Return to near baseline physical activity: Yes          Discharge Planner Nurse   Safe discharge environment identified: Yes  Barriers to discharge: Yes        Please review provider order for any additional goals.   Nurse to notify provider when observation goals have been met and patient is ready for discharge.      pt is awaiting placement. A&O x4, has been resting throughout the night. Reposition prn w/ pillows. Incontinent of bladder, pt makes needs known.                Term birth of male

## 2022-10-18 NOTE — PLAN OF CARE
PRIMARY DIAGNOSIS: PLACEMENT  OUTPATIENT/OBSERVATION GOALS TO BE MET BEFORE DISCHARGE:  ADLs back to baseline: Yes    Activity and level of assistance: Total cares at baseline    Pain status: Pain free.    Return to near baseline physical activity: Yes     Discharge Planner Nurse   Safe discharge environment identified: No  Barriers to discharge: Yes       Entered by: Annalee Palomares RN 10/18/2022 9:53 AM    Pt A/O x4 and makes needs known. Total cares at baseline r/t paraplegic. Denies pain. No PIV in place, providers aware. Pulsate mattress on and functional. Boots on heels for preventative skin breakdown. Regular diet, good appetite. Medically stable, awaiting placement.  Vital signs:  Temp: 97.8  F (36.6  C) Temp src: Oral BP: 133/85 Pulse: 73   Resp: 18 SpO2: 94 % O2 Device: None (Room air)     Weight: 99.9 kg (220 lb 4.8 oz)  There is no height or weight on file to calculate BMI.    Please review provider order for any additional goals.   Nurse to notify provider when observation goals have been met and patient is ready for discharge.

## 2022-10-18 NOTE — PLAN OF CARE
PRIMARY DIAGNOSIS: Placement     OUTPATIENT/OBSERVATION GOALS TO BE MET BEFORE DISCHARGE:  1. ADLs back to baseline: Yes     2. Activity and level of assistance: Total care     3. Pain status: Pain free.     4. Return to near baseline physical activity: Yes          Discharge Planner Nurse   Safe discharge environment identified: Yes  Barriers to discharge: Yes        Please review provider order for any additional goals.   Nurse to notify provider when observation goals have been met and patient is ready for discharge.     Patient A&O x4 Flat affect. Denies pain. Repositioned w/ pillows & changed, large BM.
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
## 2022-10-18 NOTE — PLAN OF CARE
PRIMARY DIAGNOSIS: placement  OUTPATIENT/OBSERVATION GOALS TO BE MET BEFORE DISCHARGE:  1. ADLs back to baseline: Yes     2. Activity and level of assistance: total care.   3. Pain status: Pain free.     4. Return to near baseline physical activity: Yes          Discharge Planner Nurse   Safe discharge environment identified: Yes  Barriers to discharge: Yes    Incontinent for bowel and bladder. No IV access. SW working on placement.

## 2022-10-18 NOTE — PLAN OF CARE
PRIMARY DIAGNOSIS: PLACEMENT  OUTPATIENT/OBSERVATION GOALS TO BE MET BEFORE DISCHARGE:  1. ADLs back to baseline: Yes    2. Activity and level of assistance: Total cares at baseline    3. Pain status: Pain free.    4. Return to near baseline physical activity: Yes     Discharge Planner Nurse   Safe discharge environment identified: No  Barriers to discharge: Yes       Entered by: Annalee Palomares RN 10/18/2022 12:18 PM    Pt A/O x4 and makes needs known. Total cares at baseline r/t paraplegic. Denies pain. No PIV in place, providers aware. Pulsate mattress on and functional. Boots on heels for preventative skin breakdown. Regular diet, good appetite. Medically stable, awaiting placement.  Vital signs:  Temp: 97.8  F (36.6  C) Temp src: Oral BP: 133/85 Pulse: 73   Resp: 18 SpO2: 94 % O2 Device: None (Room air)     Weight: 99.9 kg (220 lb 4.8 oz)  There is no height or weight on file to calculate BMI.    Please review provider order for any additional goals.   Nurse to notify provider when observation goals have been met and patient is ready for discharge.

## 2022-10-19 LAB
GLUCOSE BLDC GLUCOMTR-MCNC: 113 MG/DL (ref 70–99)
GLUCOSE BLDC GLUCOMTR-MCNC: 123 MG/DL (ref 70–99)

## 2022-10-19 PROCEDURE — 250N000013 HC RX MED GY IP 250 OP 250 PS 637: Performed by: PHYSICIAN ASSISTANT

## 2022-10-19 PROCEDURE — G0378 HOSPITAL OBSERVATION PER HR: HCPCS

## 2022-10-19 PROCEDURE — 82962 GLUCOSE BLOOD TEST: CPT

## 2022-10-19 PROCEDURE — 99225 PR SUBSEQUENT OBSERVATION CARE,LEVEL II: CPT | Performed by: PHYSICIAN ASSISTANT

## 2022-10-19 PROCEDURE — 250N000013 HC RX MED GY IP 250 OP 250 PS 637: Performed by: HOSPITALIST

## 2022-10-19 RX ADMIN — CLOTRIMAZOLE: 0.01 CREAM TOPICAL at 08:56

## 2022-10-19 RX ADMIN — CLOTRIMAZOLE: 0.01 CREAM TOPICAL at 19:42

## 2022-10-19 RX ADMIN — Medication 25 MCG: at 08:55

## 2022-10-19 RX ADMIN — BACLOFEN 10 MG: 10 TABLET ORAL at 19:42

## 2022-10-19 RX ADMIN — ASPIRIN 81 MG CHEWABLE TABLET 81 MG: 81 TABLET CHEWABLE at 08:55

## 2022-10-19 RX ADMIN — QUETIAPINE FUMARATE 400 MG: 200 TABLET ORAL at 22:07

## 2022-10-19 RX ADMIN — METFORMIN HYDROCHLORIDE 1000 MG: 500 TABLET ORAL at 18:15

## 2022-10-19 RX ADMIN — SENNOSIDES AND DOCUSATE SODIUM 1 TABLET: 50; 8.6 TABLET ORAL at 08:55

## 2022-10-19 RX ADMIN — MIRTAZAPINE 15 MG: 15 TABLET, FILM COATED ORAL at 22:08

## 2022-10-19 RX ADMIN — CLONIDINE HYDROCHLORIDE 0.1 MG: 0.1 TABLET ORAL at 08:55

## 2022-10-19 RX ADMIN — DIVALPROEX SODIUM 250 MG: 500 TABLET, DELAYED RELEASE ORAL at 08:56

## 2022-10-19 RX ADMIN — QUETIAPINE 200 MG: 200 TABLET, FILM COATED ORAL at 08:55

## 2022-10-19 RX ADMIN — METFORMIN HYDROCHLORIDE 1000 MG: 500 TABLET ORAL at 08:54

## 2022-10-19 RX ADMIN — DIVALPROEX SODIUM 1000 MG: 500 TABLET, DELAYED RELEASE ORAL at 22:07

## 2022-10-19 RX ADMIN — LEVOTHYROXINE SODIUM 25 MCG: 0.03 TABLET ORAL at 08:54

## 2022-10-19 RX ADMIN — CLONIDINE HYDROCHLORIDE 0.1 MG: 0.1 TABLET ORAL at 19:42

## 2022-10-19 RX ADMIN — BACLOFEN 10 MG: 10 TABLET ORAL at 13:35

## 2022-10-19 RX ADMIN — HYDROXYZINE HYDROCHLORIDE 50 MG: 50 TABLET, FILM COATED ORAL at 13:35

## 2022-10-19 RX ADMIN — HYDROXYZINE HYDROCHLORIDE 50 MG: 50 TABLET, FILM COATED ORAL at 19:43

## 2022-10-19 RX ADMIN — VENLAFAXINE HYDROCHLORIDE 75 MG: 150 CAPSULE, EXTENDED RELEASE ORAL at 22:08

## 2022-10-19 RX ADMIN — DIVALPROEX SODIUM 500 MG: 500 TABLET, DELAYED RELEASE ORAL at 08:55

## 2022-10-19 RX ADMIN — DESONIDE: 0.5 OINTMENT TOPICAL at 13:35

## 2022-10-19 RX ADMIN — Medication 10 MG: at 22:07

## 2022-10-19 RX ADMIN — HYDROXYZINE HYDROCHLORIDE 50 MG: 50 TABLET, FILM COATED ORAL at 08:55

## 2022-10-19 RX ADMIN — METOPROLOL SUCCINATE 25 MG: 25 TABLET, EXTENDED RELEASE ORAL at 08:55

## 2022-10-19 RX ADMIN — VENLAFAXINE HYDROCHLORIDE 150 MG: 150 CAPSULE, EXTENDED RELEASE ORAL at 22:08

## 2022-10-19 RX ADMIN — OLANZAPINE 2.5 MG: 2.5 TABLET, FILM COATED ORAL at 13:35

## 2022-10-19 RX ADMIN — ATORVASTATIN CALCIUM 20 MG: 20 TABLET, FILM COATED ORAL at 19:43

## 2022-10-19 RX ADMIN — QUETIAPINE 200 MG: 200 TABLET, FILM COATED ORAL at 13:35

## 2022-10-19 RX ADMIN — BACLOFEN 10 MG: 10 TABLET ORAL at 08:55

## 2022-10-19 RX ADMIN — KETOCONAZOLE: 20 CREAM TOPICAL at 08:56

## 2022-10-19 ASSESSMENT — ACTIVITIES OF DAILY LIVING (ADL)
ADLS_ACUITY_SCORE: 50
ADLS_ACUITY_SCORE: 52
ADLS_ACUITY_SCORE: 50

## 2022-10-19 NOTE — PLAN OF CARE
PRIMARY DIAGNOSIS: PLACEMENT  OUTPATIENT/OBSERVATION GOALS TO BE MET BEFORE DISCHARGE:  ADLs back to baseline: Yes    Activity and level of assistance: Total cares at baseline    Pain status: Pain free.    Return to near baseline physical activity: Yes     Discharge Planner Nurse   Safe discharge environment identified: No, needs placement  Barriers to discharge: Yes       Entered by: Opal Garcia RN 10/19/2022 1:37 AM     Pt sleeping.  Please review provider order for any additional goals.   Nurse to notify provider when observation goals have been met and patient is ready for discharge.

## 2022-10-19 NOTE — PLAN OF CARE
PRIMARY DIAGNOSIS: PLACEMENT  OUTPATIENT/OBSERVATION GOALS TO BE MET BEFORE DISCHARGE:  1. ADLs back to baseline: Yes    2. Activity and level of assistance: Total cares at baseline    3. Pain status: Pain free.    4. Return to near baseline physical activity: Yes     Discharge Planner Nurse   Safe discharge environment identified: No  Barriers to discharge: Yes       Entered by: Annalee Palomares RN 10/18/2022 7:42 PM    Pt A/O x4 and makes needs known. Total cares at baseline r/t paraplegic. Denies pain. No PIV in place, providers aware. Pulsate mattress on and functional. Boots on heels for preventative skin breakdown. Regular diet, good appetite. Medically stable, awaiting placement.  Vital signs:  Temp: 98  F (36.7  C) Temp src: Oral BP: 129/70 Pulse: 79   Resp: 16 SpO2: 96 % O2 Device: None (Room air)     Weight: 99.9 kg (220 lb 4.8 oz)  There is no height or weight on file to calculate BMI.    Please review provider order for any additional goals.   Nurse to notify provider when observation goals have been met and patient is ready for discharge.
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
## 2022-10-19 NOTE — PLAN OF CARE
PRIMARY DIAGNOSIS: PLACEMENT  OUTPATIENT/OBSERVATION GOALS TO BE MET BEFORE DISCHARGE:  1. ADLs back to baseline: Yes    2. Activity and level of assistance: Total cares at baseline    3. Pain status: Pain free.    4. Return to near baseline physical activity: Yes     Discharge Planner Nurse   Safe discharge environment identified: No  Barriers to discharge: Yes       Entered by: Annalee Palomares RN 10/19/2022 12:20 PM    Pt A/O x4 and makes needs known. Total cares at baseline r/t paraplegic. Denies pain. No PIV in place, providers aware. Pulsate mattress on and functional. Boots on heels for preventative skin breakdown. Regular diet, good appetite. Ketoconazole discontinued per orders. Medically stable, awaiting placement.  Vital signs:  Temp: 97.5  F (36.4  C) Temp src: Oral BP: 119/88 Pulse: 71   Resp: 18 SpO2: 94 % O2 Device: None (Room air)     Weight: 99.9 kg (220 lb 4.8 oz)  There is no height or weight on file to calculate BMI.    Please review provider order for any additional goals.   Nurse to notify provider when observation goals have been met and patient is ready for discharge.

## 2022-10-19 NOTE — PLAN OF CARE
PRIMARY DIAGNOSIS: PLACEMENT  Patient alert and oriented, total cares/bed bound - deemed medically stable/at baseline. BG checks before breakfast and dinner, VS BID. Pulsate mattress and repositioning as patient will allow. No IV access, provider aware. Tolerating diet and PO medications. Calls appropriately. SW following, awaiting placement.  OUTPATIENT/OBSERVATION GOALS TO BE MET BEFORE DISCHARGE:  1. ADLs back to baseline: Yes    2. Activity and level of assistance: Total cares at baseline    3. Pain status: Pain free.    4. Return to near baseline physical activity: Yes     Discharge Planner Nurse   Safe discharge environment identified: No  Barriers to discharge: Yes       Entered by: Adilson Alvarez RN 10/19/2022      Please review provider order for any additional goals.   Nurse to notify provider when observation goals have been met and patient is ready for discharge.

## 2022-10-19 NOTE — PLAN OF CARE
PRIMARY DIAGNOSIS: PLACEMENT  OUTPATIENT/OBSERVATION GOALS TO BE MET BEFORE DISCHARGE:  1. ADLs back to baseline: Yes    2. Activity and level of assistance: Total cares at baseline    3. Pain status: Pain free.    4. Return to near baseline physical activity: Yes     Discharge Planner Nurse   Safe discharge environment identified: No  Barriers to discharge: Yes       Entered by: Annalee Palomares RN 10/19/2022 11:26 AM    Pt A/O x4 and makes needs known. Total cares at baseline r/t paraplegic. Denies pain. No PIV in place, providers aware. Pulsate mattress on and functional. Boots on heels for preventative skin breakdown. Regular diet, good appetite. Medically stable, awaiting placement.  Vital signs:  Temp: 97.5  F (36.4  C) Temp src: Oral BP: 119/88 Pulse: 71   Resp: 18 SpO2: 94 % O2 Device: None (Room air)     Weight: 99.9 kg (220 lb 4.8 oz)  There is no height or weight on file to calculate BMI.    Please review provider order for any additional goals.   Nurse to notify provider when observation goals have been met and patient is ready for discharge.

## 2022-10-19 NOTE — PLAN OF CARE
PRIMARY DIAGNOSIS: PLACEMENT  OUTPATIENT/OBSERVATION GOALS TO BE MET BEFORE DISCHARGE:  1. ADLs back to baseline: Yes    2. Activity and level of assistance: Total cares at baseline    3. Pain status: Pain free.    4. Return to near baseline physical activity: Yes     Discharge Planner Nurse   Safe discharge environment identified: No  Barriers to discharge: Yes       Entered by: Annalee Palomares RN 10/18/2022 8:51 PM    Pt A/O x4 and makes needs known. Total cares at baseline r/t paraplegic. Denies pain. No PIV in place, providers aware. Pulsate mattress on and functional. Boots on heels for preventative skin breakdown. Regular diet, good appetite. Medically stable, awaiting placement.  Vital signs:  Temp: 98  F (36.7  C) Temp src: Oral BP: 129/70 Pulse: 79   Resp: 16 SpO2: 96 % O2 Device: None (Room air)     Weight: 99.9 kg (220 lb 4.8 oz)  There is no height or weight on file to calculate BMI.    Please review provider order for any additional goals.   Nurse to notify provider when observation goals have been met and patient is ready for discharge.

## 2022-10-19 NOTE — PLAN OF CARE
PRIMARY DIAGNOSIS: PLACEMENT  OUTPATIENT/OBSERVATION GOALS TO BE MET BEFORE DISCHARGE:  1. ADLs back to baseline: Yes    2. Activity and level of assistance: Total cares at baseline    3. Pain status: Pain free.    4. Return to near baseline physical activity: Yes     Discharge Planner Nurse   Safe discharge environment identified: No, needs placement  Barriers to discharge: Yes       Entered by: Opal Garcia RN 10/19/2022 4:47 AM     Pt sleeping. No physical signs to indicate pt is in pain. Incontinent of urine. SW following for placement. Will monitor.  Please review provider order for any additional goals.   Nurse to notify provider when observation goals have been met and patient is ready for discharge.

## 2022-10-20 LAB
GLUCOSE BLDC GLUCOMTR-MCNC: 102 MG/DL (ref 70–99)
GLUCOSE BLDC GLUCOMTR-MCNC: 200 MG/DL (ref 70–99)

## 2022-10-20 PROCEDURE — 99224 PR SUBSEQUENT OBSERVATION CARE,LEVEL I: CPT | Performed by: PHYSICIAN ASSISTANT

## 2022-10-20 PROCEDURE — 82962 GLUCOSE BLOOD TEST: CPT

## 2022-10-20 PROCEDURE — 250N000013 HC RX MED GY IP 250 OP 250 PS 637: Performed by: HOSPITALIST

## 2022-10-20 PROCEDURE — 250N000013 HC RX MED GY IP 250 OP 250 PS 637: Performed by: PHYSICIAN ASSISTANT

## 2022-10-20 PROCEDURE — G0378 HOSPITAL OBSERVATION PER HR: HCPCS

## 2022-10-20 RX ADMIN — VENLAFAXINE HYDROCHLORIDE 75 MG: 150 CAPSULE, EXTENDED RELEASE ORAL at 21:44

## 2022-10-20 RX ADMIN — QUETIAPINE 200 MG: 200 TABLET, FILM COATED ORAL at 08:37

## 2022-10-20 RX ADMIN — VENLAFAXINE HYDROCHLORIDE 150 MG: 150 CAPSULE, EXTENDED RELEASE ORAL at 21:35

## 2022-10-20 RX ADMIN — ASPIRIN 81 MG CHEWABLE TABLET 81 MG: 81 TABLET CHEWABLE at 08:38

## 2022-10-20 RX ADMIN — HYDROXYZINE HYDROCHLORIDE 50 MG: 50 TABLET, FILM COATED ORAL at 21:35

## 2022-10-20 RX ADMIN — LEVOTHYROXINE SODIUM 25 MCG: 0.03 TABLET ORAL at 08:38

## 2022-10-20 RX ADMIN — Medication 25 MCG: at 08:38

## 2022-10-20 RX ADMIN — METFORMIN HYDROCHLORIDE 1000 MG: 500 TABLET ORAL at 08:37

## 2022-10-20 RX ADMIN — SENNOSIDES AND DOCUSATE SODIUM 1 TABLET: 50; 8.6 TABLET ORAL at 08:38

## 2022-10-20 RX ADMIN — MIRTAZAPINE 15 MG: 15 TABLET, FILM COATED ORAL at 21:37

## 2022-10-20 RX ADMIN — DIVALPROEX SODIUM 1000 MG: 500 TABLET, DELAYED RELEASE ORAL at 21:35

## 2022-10-20 RX ADMIN — BACLOFEN 10 MG: 10 TABLET ORAL at 14:19

## 2022-10-20 RX ADMIN — HYDROXYZINE HYDROCHLORIDE 50 MG: 50 TABLET, FILM COATED ORAL at 14:19

## 2022-10-20 RX ADMIN — BACLOFEN 10 MG: 10 TABLET ORAL at 08:38

## 2022-10-20 RX ADMIN — Medication 10 MG: at 21:37

## 2022-10-20 RX ADMIN — METFORMIN HYDROCHLORIDE 1000 MG: 500 TABLET ORAL at 18:36

## 2022-10-20 RX ADMIN — QUETIAPINE FUMARATE 400 MG: 200 TABLET ORAL at 21:37

## 2022-10-20 RX ADMIN — BACLOFEN 10 MG: 10 TABLET ORAL at 21:37

## 2022-10-20 RX ADMIN — CLONIDINE HYDROCHLORIDE 0.1 MG: 0.1 TABLET ORAL at 21:37

## 2022-10-20 RX ADMIN — CLOTRIMAZOLE: 0.01 CREAM TOPICAL at 21:45

## 2022-10-20 RX ADMIN — HYDROXYZINE HYDROCHLORIDE 50 MG: 50 TABLET, FILM COATED ORAL at 08:38

## 2022-10-20 RX ADMIN — METOPROLOL SUCCINATE 25 MG: 25 TABLET, EXTENDED RELEASE ORAL at 08:38

## 2022-10-20 RX ADMIN — CLOTRIMAZOLE: 0.01 CREAM TOPICAL at 08:37

## 2022-10-20 RX ADMIN — ATORVASTATIN CALCIUM 20 MG: 20 TABLET, FILM COATED ORAL at 21:37

## 2022-10-20 RX ADMIN — DIVALPROEX SODIUM 250 MG: 500 TABLET, DELAYED RELEASE ORAL at 08:38

## 2022-10-20 RX ADMIN — OLANZAPINE 2.5 MG: 2.5 TABLET, FILM COATED ORAL at 14:19

## 2022-10-20 RX ADMIN — CLONIDINE HYDROCHLORIDE 0.1 MG: 0.1 TABLET ORAL at 08:38

## 2022-10-20 RX ADMIN — DIVALPROEX SODIUM 500 MG: 500 TABLET, DELAYED RELEASE ORAL at 08:38

## 2022-10-20 RX ADMIN — QUETIAPINE 200 MG: 200 TABLET, FILM COATED ORAL at 14:19

## 2022-10-20 ASSESSMENT — ACTIVITIES OF DAILY LIVING (ADL)
ADLS_ACUITY_SCORE: 50
ADLS_ACUITY_SCORE: 52
ADLS_ACUITY_SCORE: 52
ADLS_ACUITY_SCORE: 50
ADLS_ACUITY_SCORE: 52
ADLS_ACUITY_SCORE: 50
ADLS_ACUITY_SCORE: 52
ADLS_ACUITY_SCORE: 52
ADLS_ACUITY_SCORE: 50
ADLS_ACUITY_SCORE: 52

## 2022-10-20 NOTE — PLAN OF CARE
PRIMARY DIAGNOSIS: Placement   OUTPATIENT/OBSERVATION GOALS TO BE MET BEFORE DISCHARGE:  ADLs back to baseline: Yes    Activity and level of assistance: Total care    Pain status: Pain free.    Return to near baseline physical activity: Yes     Discharge Planner Nurse   Safe discharge environment identified: No  Barriers to discharge: Yes       Entered by: Monica Elder RN 10/20/2022 1:02 AM     Please review provider order for any additional goals.   Nurse to notify provider when observation goals have been met and patient is ready for discharge.

## 2022-10-20 NOTE — PLAN OF CARE
PRIMARY DIAGNOSIS: PLACEMENT  OUTPATIENT/OBSERVATION GOALS TO BE MET BEFORE DISCHARGE:  1. ADLs back to baseline: Yes    2. Activity and level of assistance: Total cares at baseline    3. Pain status: Pain free.    4. Return to near baseline physical activity: Yes     Discharge Planner Nurse   Safe discharge environment identified: No  Barriers to discharge: Yes       Entered by: Annalee Palomares RN 10/20/2022 5:45 PM    Pt A/O x4 makes needs known. Total cares at baseline r/t paraplegic. Denies pain. No PIV in place, providers aware. Pulsate mattress on and functional. Regular diet, good appetite. Medically stable, awaiting placement.  Vital signs:  Temp: 97.7  F (36.5  C) Temp src: Oral BP: 130/81 Pulse: 63   Resp: 16 SpO2: 96 % O2 Device: None (Room air)     Weight: 99.9 kg (220 lb 4.8 oz)  There is no height or weight on file to calculate BMI.    Please review provider order for any additional goals.   Nurse to notify provider when observation goals have been met and patient is ready for discharge.

## 2022-10-20 NOTE — PLAN OF CARE
PRIMARY DIAGNOSIS: PLACEMENT  OUTPATIENT/OBSERVATION GOALS TO BE MET BEFORE DISCHARGE:  1. ADLs back to baseline: Yes    2. Activity and level of assistance: Total cares at baseline    3. Pain status: Pain free.    4. Return to near baseline physical activity: Yes     Discharge Planner Nurse   Safe discharge environment identified: No  Barriers to discharge: Yes       Entered by: Annalee Palomares RN 10/20/2022 5:44 PM    Pt A/O x3, disoriented to time but has a hx of forgetfulness. Makes needs known. Total cares at baseline r/t paraplegic. Denies pain. No PIV in place, providers aware. Pulsate mattress on and functional. Regular diet, good appetite. Medically stable, awaiting placement.  Vital signs:  Temp: 97.7  F (36.5  C) Temp src: Oral BP: 130/81 Pulse: 63   Resp: 16 SpO2: 96 % O2 Device: None (Room air)     Weight: 99.9 kg (220 lb 4.8 oz)  There is no height or weight on file to calculate BMI.    Please review provider order for any additional goals.   Nurse to notify provider when observation goals have been met and patient is ready for discharge.

## 2022-10-20 NOTE — PLAN OF CARE
PRIMARY DIAGNOSIS: Placement  OUTPATIENT/OBSERVATION GOALS TO BE MET BEFORE DISCHARGE:  ADLs back to baseline: Yes    Activity and level of assistance: total care    Pain status: Pain free.    Return to near baseline physical activity: Yes     Discharge Planner Nurse   Safe discharge environment identified: No  Barriers to discharge: Yes       Entered by: Emily Garcia RN 10/19/2022 8:57 PM    Patient waiting on placement, incontinent of urine, denies pain at this time, will continue to monitor       Please review provider order for any additional goals.   Nurse to notify provider when observation goals have been met and patient is ready for discharge.

## 2022-10-20 NOTE — PLAN OF CARE
PRIMARY DIAGNOSIS: Placement   OUTPATIENT/OBSERVATION GOALS TO BE MET BEFORE DISCHARGE:  1. ADLs back to baseline: Yes    2. Activity and level of assistance: Total care    3. Pain status: Pain free.    4. Return to near baseline physical activity: Yes     Discharge Planner Nurse   Safe discharge environment identified: No  Barriers to discharge: Yes   A & O X 4. Lung sound clear all lobes . Denies SOB, or pain. Bowel sound present X 4. Medically stable to discharge . On Pulsate mattress,  Turn and preposition maintained and per patient request. Calls appropriately. Incontinent of B & B. Awaiting for placement.  following .  Will continue to provide care and support.  /84 (BP Location: Right arm)   Pulse 76   Temp 97.5  F (36.4  C) (Oral)   Resp 18   SpO2 96%            Entered by: Monica Elder RN 10/20/2022 1:20 AM     Please review provider order for any additional goals.   Nurse to notify provider when observation goals have been met and patient is ready for discharge.

## 2022-10-20 NOTE — PLAN OF CARE
PRIMARY DIAGNOSIS: PLACEMENT  OUTPATIENT/OBSERVATION GOALS TO BE MET BEFORE DISCHARGE:  1. ADLs back to baseline: Yes    2. Activity and level of assistance: Total cares at baseline    3. Pain status: Pain free.    4. Return to near baseline physical activity: Yes     Discharge Planner Nurse   Safe discharge environment identified: No  Barriers to discharge: Yes       Entered by: Annalee Palomares RN 10/20/2022 10:45 AM    Pt A/O x3, disoriented to time but has a hx of forgetfulness. Makes needs known. Total cares at baseline r/t paraplegic. Denies pain. No PIV in place, providers aware. Pulsate mattress on and functional. Regular diet, good appetite. Medically stable, awaiting placement.  Vital signs:  Temp: 97.4  F (36.3  C) Temp src: Oral BP: (!) 161/132 Pulse: 73   Resp: 16 SpO2: 95 % O2 Device: None (Room air)     Weight: 99.9 kg (220 lb 4.8 oz)  There is no height or weight on file to calculate BMI.    Please review provider order for any additional goals.   Nurse to notify provider when observation goals have been met and patient is ready for discharge.

## 2022-10-21 LAB
GLUCOSE BLDC GLUCOMTR-MCNC: 117 MG/DL (ref 70–99)
GLUCOSE BLDC GLUCOMTR-MCNC: 160 MG/DL (ref 70–99)

## 2022-10-21 PROCEDURE — 82962 GLUCOSE BLOOD TEST: CPT

## 2022-10-21 PROCEDURE — 250N000013 HC RX MED GY IP 250 OP 250 PS 637: Performed by: PHYSICIAN ASSISTANT

## 2022-10-21 PROCEDURE — 250N000013 HC RX MED GY IP 250 OP 250 PS 637: Performed by: HOSPITALIST

## 2022-10-21 PROCEDURE — G0378 HOSPITAL OBSERVATION PER HR: HCPCS

## 2022-10-21 PROCEDURE — 99225 PR SUBSEQUENT OBSERVATION CARE,LEVEL II: CPT | Performed by: NURSE PRACTITIONER

## 2022-10-21 RX ADMIN — CLONIDINE HYDROCHLORIDE 0.1 MG: 0.1 TABLET ORAL at 08:59

## 2022-10-21 RX ADMIN — QUETIAPINE 200 MG: 200 TABLET, FILM COATED ORAL at 14:07

## 2022-10-21 RX ADMIN — Medication 25 MCG: at 08:59

## 2022-10-21 RX ADMIN — BACLOFEN 10 MG: 10 TABLET ORAL at 09:00

## 2022-10-21 RX ADMIN — METOPROLOL SUCCINATE 25 MG: 25 TABLET, EXTENDED RELEASE ORAL at 08:59

## 2022-10-21 RX ADMIN — METFORMIN HYDROCHLORIDE 1000 MG: 500 TABLET ORAL at 08:59

## 2022-10-21 RX ADMIN — LEVOTHYROXINE SODIUM 25 MCG: 0.03 TABLET ORAL at 09:00

## 2022-10-21 RX ADMIN — MIRTAZAPINE 15 MG: 15 TABLET, FILM COATED ORAL at 21:11

## 2022-10-21 RX ADMIN — ATORVASTATIN CALCIUM 20 MG: 20 TABLET, FILM COATED ORAL at 21:12

## 2022-10-21 RX ADMIN — BACLOFEN 10 MG: 10 TABLET ORAL at 14:07

## 2022-10-21 RX ADMIN — HYDROXYZINE HYDROCHLORIDE 50 MG: 50 TABLET, FILM COATED ORAL at 14:07

## 2022-10-21 RX ADMIN — HYDROXYZINE HYDROCHLORIDE 50 MG: 50 TABLET, FILM COATED ORAL at 21:12

## 2022-10-21 RX ADMIN — VENLAFAXINE HYDROCHLORIDE 150 MG: 150 CAPSULE, EXTENDED RELEASE ORAL at 21:10

## 2022-10-21 RX ADMIN — HYDROXYZINE HYDROCHLORIDE 50 MG: 50 TABLET, FILM COATED ORAL at 08:59

## 2022-10-21 RX ADMIN — DIVALPROEX SODIUM 250 MG: 500 TABLET, DELAYED RELEASE ORAL at 09:00

## 2022-10-21 RX ADMIN — METFORMIN HYDROCHLORIDE 1000 MG: 500 TABLET ORAL at 17:53

## 2022-10-21 RX ADMIN — QUETIAPINE FUMARATE 400 MG: 200 TABLET ORAL at 21:11

## 2022-10-21 RX ADMIN — QUETIAPINE 200 MG: 200 TABLET, FILM COATED ORAL at 09:00

## 2022-10-21 RX ADMIN — ASPIRIN 81 MG CHEWABLE TABLET 81 MG: 81 TABLET CHEWABLE at 08:59

## 2022-10-21 RX ADMIN — DIVALPROEX SODIUM 500 MG: 500 TABLET, DELAYED RELEASE ORAL at 08:59

## 2022-10-21 RX ADMIN — CLONIDINE HYDROCHLORIDE 0.1 MG: 0.1 TABLET ORAL at 21:12

## 2022-10-21 RX ADMIN — Medication 10 MG: at 21:12

## 2022-10-21 RX ADMIN — CLOTRIMAZOLE: 0.01 CREAM TOPICAL at 09:23

## 2022-10-21 RX ADMIN — BACLOFEN 10 MG: 10 TABLET ORAL at 21:11

## 2022-10-21 RX ADMIN — SENNOSIDES AND DOCUSATE SODIUM 1 TABLET: 50; 8.6 TABLET ORAL at 09:00

## 2022-10-21 RX ADMIN — OLANZAPINE 2.5 MG: 2.5 TABLET, FILM COATED ORAL at 14:06

## 2022-10-21 RX ADMIN — VENLAFAXINE HYDROCHLORIDE 75 MG: 150 CAPSULE, EXTENDED RELEASE ORAL at 21:11

## 2022-10-21 RX ADMIN — DIVALPROEX SODIUM 1000 MG: 500 TABLET, DELAYED RELEASE ORAL at 21:11

## 2022-10-21 ASSESSMENT — ACTIVITIES OF DAILY LIVING (ADL)
ADLS_ACUITY_SCORE: 50

## 2022-10-21 NOTE — PLAN OF CARE
PRIMARY DIAGNOSIS: PLACEMENT  OUTPATIENT/OBSERVATION GOALS TO BE MET BEFORE DISCHARGE:  ADLs back to baseline: Yes    Activity and level of assistance: Pt total cares, up with A2 with lift device    Pain status: Pain free.    Return to near baseline physical activity: Yes     Discharge Planner Nurse   Safe discharge environment identified: No  Barriers to discharge: Yes       Entered by: Helene Oreilly RN 10/21/2022   Pt AO x4. VSS on RA, LS clear, BS active. Pt speech slow with flat affect. No PIV in place, Pt tolerating regular diet. Denies pain, N/V. Will continue to monitor. Pt medically cleared for discharge pending placement.   /81 (BP Location: Right arm)   Pulse 63   Temp 97.7  F (36.5  C) (Oral)   Resp 16   Wt 99.9 kg (220 lb 4.8 oz)   SpO2 96%   Please review provider order for any additional goals.   Nurse to notify provider when observation goals have been met and patient is ready for discharge.
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
## 2022-10-21 NOTE — PLAN OF CARE
PRIMARY DIAGNOSIS: PLACEMENT  OUTPATIENT/OBSERVATION GOALS TO BE MET BEFORE DISCHARGE:  1. ADLs back to baseline: Yes    2. Activity and level of assistance: Pt total cares, up with A2 with lift device    3. Pain status: Pain free.    4. Return to near baseline physical activity: Yes     Discharge Planner Nurse   Safe discharge environment identified: No  Barriers to discharge: Yes       Entered by: Abhijeet Hernandez RN 10/21/2022   Pt AO x4. VSS on RA, LS clear, BS active. Pt speech slow with flat affect. Cooperative with cares, no aggressive behavior. No PIV in place, Pt tolerating regular diet. Denies pain. Pt medically cleared for discharge pending placement.     /89 (BP Location: Right arm)   Pulse 73   Temp 97.2  F (36.2  C) (Oral)   Resp 16   Wt 99.9 kg (220 lb 4.8 oz)   SpO2 93%   Please review provider order for any additional goals.   Nurse to notify provider when observation goals have been met and patient is ready for discharge.

## 2022-10-21 NOTE — PLAN OF CARE
PRIMARY DIAGNOSIS: PLACEMENT  OUTPATIENT/OBSERVATION GOALS TO BE MET BEFORE DISCHARGE:  ADLs back to baseline: Yes    Activity and level of assistance: Pt total cares, up with A2 with lift device    Pain status: Pain free.    Return to near baseline physical activity: Yes     Discharge Planner Nurse   Safe discharge environment identified: No  Barriers to discharge: Yes       Entered by: Helene Oreilly RN 10/20/2022 10:08 PM  Pt AO x4. VSS on RA, LS clear, BS active. Pt speech slow with flat affect. No PIV in place, Pt tolerating regular diet. Denies pain, N/V. Will continue to monitor.   /81 (BP Location: Right arm)   Pulse 63   Temp 97.7  F (36.5  C) (Oral)   Resp 16   Wt 99.9 kg (220 lb 4.8 oz)   SpO2 96%   Please review provider order for any additional goals.   Nurse to notify provider when observation goals have been met and patient is ready for discharge.

## 2022-10-21 NOTE — PLAN OF CARE
PRIMARY DIAGNOSIS: PLACEMENT  OUTPATIENT/OBSERVATION GOALS TO BE MET BEFORE DISCHARGE:  1. ADLs back to baseline: Yes    2. Activity and level of assistance: Pt total cares, up with A2 with lift device    3. Pain status: Pain free.    4. Return to near baseline physical activity: Yes     Discharge Planner Nurse   Safe discharge environment identified: No  Barriers to discharge: Yes       Entered by: Abhijeet Hernandez RN 10/21/2022   Pt AO x4. VSS on RA, LS clear, BS active. Pt speech slow with flat affect - some smiling when talking about TV. Cooperative with cares, no aggressive behavior. No PIV in place, Pt tolerating regular diet. Denies pain. Pt medically cleared for discharge pending placement.      /89 (BP Location: Right arm)   Pulse 73   Temp 97.2  F (36.2  C) (Oral)   Resp 16   Wt 99.9 kg (220 lb 4.8 oz)   SpO2 93%   Please review provider order for any additional goals.   Nurse to notify provider when observation goals have been met and patient is ready for discharge.

## 2022-10-22 LAB
GLUCOSE BLDC GLUCOMTR-MCNC: 109 MG/DL (ref 70–99)
GLUCOSE BLDC GLUCOMTR-MCNC: 148 MG/DL (ref 70–99)

## 2022-10-22 PROCEDURE — 99225 PR SUBSEQUENT OBSERVATION CARE,LEVEL II: CPT | Performed by: NURSE PRACTITIONER

## 2022-10-22 PROCEDURE — 82962 GLUCOSE BLOOD TEST: CPT

## 2022-10-22 PROCEDURE — G0378 HOSPITAL OBSERVATION PER HR: HCPCS

## 2022-10-22 PROCEDURE — 250N000013 HC RX MED GY IP 250 OP 250 PS 637: Performed by: HOSPITALIST

## 2022-10-22 PROCEDURE — 250N000013 HC RX MED GY IP 250 OP 250 PS 637: Performed by: PHYSICIAN ASSISTANT

## 2022-10-22 RX ADMIN — HYDROXYZINE HYDROCHLORIDE 50 MG: 50 TABLET, FILM COATED ORAL at 14:47

## 2022-10-22 RX ADMIN — CLONIDINE HYDROCHLORIDE 0.1 MG: 0.1 TABLET ORAL at 08:05

## 2022-10-22 RX ADMIN — BACLOFEN 10 MG: 10 TABLET ORAL at 20:37

## 2022-10-22 RX ADMIN — QUETIAPINE 200 MG: 200 TABLET, FILM COATED ORAL at 08:01

## 2022-10-22 RX ADMIN — BACLOFEN 10 MG: 10 TABLET ORAL at 14:47

## 2022-10-22 RX ADMIN — DIVALPROEX SODIUM 500 MG: 500 TABLET, DELAYED RELEASE ORAL at 08:03

## 2022-10-22 RX ADMIN — ASPIRIN 81 MG CHEWABLE TABLET 81 MG: 81 TABLET CHEWABLE at 08:03

## 2022-10-22 RX ADMIN — METFORMIN HYDROCHLORIDE 1000 MG: 500 TABLET ORAL at 08:03

## 2022-10-22 RX ADMIN — VENLAFAXINE HYDROCHLORIDE 75 MG: 150 CAPSULE, EXTENDED RELEASE ORAL at 21:13

## 2022-10-22 RX ADMIN — BACLOFEN 10 MG: 10 TABLET ORAL at 08:04

## 2022-10-22 RX ADMIN — HYDROXYZINE HYDROCHLORIDE 50 MG: 50 TABLET, FILM COATED ORAL at 08:04

## 2022-10-22 RX ADMIN — DIVALPROEX SODIUM 250 MG: 500 TABLET, DELAYED RELEASE ORAL at 08:04

## 2022-10-22 RX ADMIN — CLONIDINE HYDROCHLORIDE 0.1 MG: 0.1 TABLET ORAL at 20:37

## 2022-10-22 RX ADMIN — CLOTRIMAZOLE: 0.01 CREAM TOPICAL at 08:08

## 2022-10-22 RX ADMIN — METOPROLOL SUCCINATE 25 MG: 25 TABLET, EXTENDED RELEASE ORAL at 08:05

## 2022-10-22 RX ADMIN — VENLAFAXINE HYDROCHLORIDE 150 MG: 150 CAPSULE, EXTENDED RELEASE ORAL at 21:12

## 2022-10-22 RX ADMIN — DIVALPROEX SODIUM 1000 MG: 500 TABLET, DELAYED RELEASE ORAL at 21:11

## 2022-10-22 RX ADMIN — HYDROXYZINE HYDROCHLORIDE 50 MG: 50 TABLET, FILM COATED ORAL at 20:37

## 2022-10-22 RX ADMIN — SENNOSIDES AND DOCUSATE SODIUM 1 TABLET: 50; 8.6 TABLET ORAL at 08:04

## 2022-10-22 RX ADMIN — MIRTAZAPINE 15 MG: 15 TABLET, FILM COATED ORAL at 21:13

## 2022-10-22 RX ADMIN — ATORVASTATIN CALCIUM 20 MG: 20 TABLET, FILM COATED ORAL at 20:37

## 2022-10-22 RX ADMIN — METFORMIN HYDROCHLORIDE 1000 MG: 500 TABLET ORAL at 18:10

## 2022-10-22 RX ADMIN — Medication 25 MCG: at 08:04

## 2022-10-22 RX ADMIN — LEVOTHYROXINE SODIUM 25 MCG: 0.03 TABLET ORAL at 08:04

## 2022-10-22 RX ADMIN — OLANZAPINE 2.5 MG: 2.5 TABLET, FILM COATED ORAL at 14:47

## 2022-10-22 RX ADMIN — Medication 10 MG: at 21:12

## 2022-10-22 RX ADMIN — QUETIAPINE FUMARATE 400 MG: 200 TABLET ORAL at 21:12

## 2022-10-22 RX ADMIN — CLOTRIMAZOLE: 0.01 CREAM TOPICAL at 20:39

## 2022-10-22 RX ADMIN — QUETIAPINE 200 MG: 200 TABLET, FILM COATED ORAL at 14:47

## 2022-10-22 ASSESSMENT — ACTIVITIES OF DAILY LIVING (ADL)
ADLS_ACUITY_SCORE: 50
ADLS_ACUITY_SCORE: 52
ADLS_ACUITY_SCORE: 50
ADLS_ACUITY_SCORE: 52
ADLS_ACUITY_SCORE: 50

## 2022-10-22 NOTE — PLAN OF CARE
PRIMARY DIAGNOSIS: ACUTE PAIN  OUTPATIENT/OBSERVATION GOALS TO BE MET BEFORE DISCHARGE:  1. Pain Status: Pain free.    2. Return to near baseline physical activity: Yes    3. Cleared for discharge by consultants (if involved): No    Discharge Planner Nurse   Safe discharge environment identified: no  Barriers to discharge: yes       Entered by: Amber Villela RN 10/22/2022 3:03 AM     Please review provider order for any additional goals.   Nurse to notify provider when observation goals have been met and patient is ready for discharge.       PT is A&ox4,VSS pt denies any pain or discomfort not distress noted. Pt speech slow with flat affect pt cooperative with cares,  No behavior issue noted. Will cont to monitor and update prn.

## 2022-10-22 NOTE — PLAN OF CARE
PRIMARY DIAGNOSIS: PLACEMENT  OUTPATIENT/OBSERVATION GOALS TO BE MET BEFORE DISCHARGE:  1. ADLs back to baseline: Yes    2. Activity and level of assistance: Pt total cares, up with A2 with lift device - not up today    3. Pain status: Pain free.    4. Return to near baseline physical activity: Yes     Discharge Planner Nurse   Safe discharge environment identified: No  Barriers to discharge: Yes       Entered by: Abhijeet Hernandez RN 10/22/2022   Pt AO x4. VSS on RA, LS clear, BS active. Makes needs known. Pt speech slow with flat affect - some smiling when conversing. Cooperative with cares, no aggressive behavior. No PIV in place, Pt tolerating regular diet. Denies pain. Pt medically cleared for discharge pending placement.      /85 (BP Location: Right arm)   Pulse 72   Temp 97.6  F (36.4  C) (Oral)   Resp 16   Wt 99.9 kg (220 lb 4.8 oz)   SpO2 95%   Please review provider order for any additional goals.   Nurse to notify provider when observation goals have been met and patient is ready for discharge.

## 2022-10-22 NOTE — PLAN OF CARE
PRIMARY DIAGNOSIS: ACUTE PAIN  OUTPATIENT/OBSERVATION GOALS TO BE MET BEFORE DISCHARGE:  1. Pain Status: Pain free.    2. Return to near baseline physical activity: Yes    3. Cleared for discharge by consultants (if involved): No    Discharge Planner Nurse   Safe discharge environment identified: no  Barriers to discharge: yes       Entered by: Amber Villela RN 10/22/2022 4:20 AM     Please review provider order for any additional goals.   Nurse to notify provider when observation goals have been met and patient is ready for discharge.       PT is A&ox4,VSS pt denies any pain or discomfort not distress noted. Pt speech slow with flat affect pt cooperative with cares,  No behavior issue noted.  Pt  Medically clear for discharge, awaiting for replacement. Will cont to monitor and update prn.

## 2022-10-22 NOTE — PLAN OF CARE
PRIMARY DIAGNOSIS: ACUTE PAIN  OUTPATIENT/OBSERVATION GOALS TO BE MET BEFORE DISCHARGE:  1. Pain Status: Pain free.    2. Return to near baseline physical activity: Yes    3. Cleared for discharge by consultants (if involved): No    Discharge Planner Nurse   Safe discharge environment identified: no  Barriers to discharge: yes       Entered by: Amber Villela RN 10/22/2022 3:14 AM     Please review provider order for any additional goals.   Nurse to notify provider when observation goals have been met and patient is ready for discharge.       PT is A&ox4,VSS pt denies any pain or discomfort not distress noted. Pt speech slow with flat affect pt cooperative with cares,  No behavior issue noted.  Pt  Medically clear for discharge, awaiting for replacement. Will cont to monitor and update prn.
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
## 2022-10-23 LAB
GLUCOSE BLDC GLUCOMTR-MCNC: 111 MG/DL (ref 70–99)
GLUCOSE BLDC GLUCOMTR-MCNC: 207 MG/DL (ref 70–99)

## 2022-10-23 PROCEDURE — 250N000013 HC RX MED GY IP 250 OP 250 PS 637: Performed by: HOSPITALIST

## 2022-10-23 PROCEDURE — 82962 GLUCOSE BLOOD TEST: CPT

## 2022-10-23 PROCEDURE — 250N000013 HC RX MED GY IP 250 OP 250 PS 637: Performed by: PHYSICIAN ASSISTANT

## 2022-10-23 PROCEDURE — G0378 HOSPITAL OBSERVATION PER HR: HCPCS

## 2022-10-23 PROCEDURE — 99225 PR SUBSEQUENT OBSERVATION CARE,LEVEL II: CPT | Performed by: NURSE PRACTITIONER

## 2022-10-23 RX ADMIN — BACLOFEN 10 MG: 10 TABLET ORAL at 21:12

## 2022-10-23 RX ADMIN — CLOTRIMAZOLE: 0.01 CREAM TOPICAL at 21:18

## 2022-10-23 RX ADMIN — QUETIAPINE FUMARATE 400 MG: 200 TABLET ORAL at 21:11

## 2022-10-23 RX ADMIN — QUETIAPINE 200 MG: 200 TABLET, FILM COATED ORAL at 14:49

## 2022-10-23 RX ADMIN — Medication 10 MG: at 21:12

## 2022-10-23 RX ADMIN — ATORVASTATIN CALCIUM 20 MG: 20 TABLET, FILM COATED ORAL at 21:11

## 2022-10-23 RX ADMIN — BACLOFEN 10 MG: 10 TABLET ORAL at 14:49

## 2022-10-23 RX ADMIN — VENLAFAXINE HYDROCHLORIDE 75 MG: 150 CAPSULE, EXTENDED RELEASE ORAL at 21:13

## 2022-10-23 RX ADMIN — ASPIRIN 81 MG CHEWABLE TABLET 81 MG: 81 TABLET CHEWABLE at 07:47

## 2022-10-23 RX ADMIN — SENNOSIDES AND DOCUSATE SODIUM 1 TABLET: 50; 8.6 TABLET ORAL at 07:46

## 2022-10-23 RX ADMIN — Medication 25 MCG: at 07:46

## 2022-10-23 RX ADMIN — LEVOTHYROXINE SODIUM 25 MCG: 0.03 TABLET ORAL at 07:46

## 2022-10-23 RX ADMIN — VENLAFAXINE HYDROCHLORIDE 150 MG: 150 CAPSULE, EXTENDED RELEASE ORAL at 21:11

## 2022-10-23 RX ADMIN — CLONIDINE HYDROCHLORIDE 0.1 MG: 0.1 TABLET ORAL at 07:46

## 2022-10-23 RX ADMIN — OLANZAPINE 2.5 MG: 2.5 TABLET, FILM COATED ORAL at 14:49

## 2022-10-23 RX ADMIN — DIVALPROEX SODIUM 250 MG: 500 TABLET, DELAYED RELEASE ORAL at 07:46

## 2022-10-23 RX ADMIN — CLONIDINE HYDROCHLORIDE 0.1 MG: 0.1 TABLET ORAL at 21:12

## 2022-10-23 RX ADMIN — HYDROXYZINE HYDROCHLORIDE 50 MG: 50 TABLET, FILM COATED ORAL at 21:11

## 2022-10-23 RX ADMIN — MIRTAZAPINE 15 MG: 15 TABLET, FILM COATED ORAL at 21:12

## 2022-10-23 RX ADMIN — BACLOFEN 10 MG: 10 TABLET ORAL at 07:46

## 2022-10-23 RX ADMIN — METFORMIN HYDROCHLORIDE 1000 MG: 500 TABLET ORAL at 18:17

## 2022-10-23 RX ADMIN — HYDROXYZINE HYDROCHLORIDE 50 MG: 50 TABLET, FILM COATED ORAL at 07:46

## 2022-10-23 RX ADMIN — CLOTRIMAZOLE: 0.01 CREAM TOPICAL at 07:38

## 2022-10-23 RX ADMIN — QUETIAPINE 200 MG: 200 TABLET, FILM COATED ORAL at 07:45

## 2022-10-23 RX ADMIN — METFORMIN HYDROCHLORIDE 1000 MG: 500 TABLET ORAL at 07:46

## 2022-10-23 RX ADMIN — METOPROLOL SUCCINATE 25 MG: 25 TABLET, EXTENDED RELEASE ORAL at 07:46

## 2022-10-23 RX ADMIN — HYDROXYZINE HYDROCHLORIDE 50 MG: 50 TABLET, FILM COATED ORAL at 14:49

## 2022-10-23 RX ADMIN — DIVALPROEX SODIUM 1000 MG: 500 TABLET, DELAYED RELEASE ORAL at 21:12

## 2022-10-23 RX ADMIN — DIVALPROEX SODIUM 500 MG: 500 TABLET, DELAYED RELEASE ORAL at 07:47

## 2022-10-23 ASSESSMENT — ACTIVITIES OF DAILY LIVING (ADL)
ADLS_ACUITY_SCORE: 48
ADLS_ACUITY_SCORE: 48
ADLS_ACUITY_SCORE: 44
ADLS_ACUITY_SCORE: 50
ADLS_ACUITY_SCORE: 50
ADLS_ACUITY_SCORE: 48
ADLS_ACUITY_SCORE: 48
ADLS_ACUITY_SCORE: 50
ADLS_ACUITY_SCORE: 48
ADLS_ACUITY_SCORE: 44

## 2022-10-23 NOTE — PLAN OF CARE
PRIMARY DIAGNOSIS: PLACEMENT  OUTPATIENT/OBSERVATION GOALS TO BE MET BEFORE DISCHARGE:  1. ADLs back to baseline: Yes    2. Activity and level of assistance: Pt total cares, up with A2 with lift device - not up today    3. Pain status: Pain free.    4. Return to near baseline physical activity: Yes     Discharge Planner Nurse   Safe discharge environment identified: No  Barriers to discharge: Yes       Entered by: Abhijeet Hernandez RN 10/23/2022   Pt AO x4. VSS on RA, LS clear, BS active. LBM 10/19 per chart. Makes needs known. Pt speech slow with flat affect - some smiling when conversing. Cooperative with cares, no aggressive behavior. No PIV in place. Denies pain. Pt medically cleared for discharge pending placement.       BP (!) 130/90 (BP Location: Right arm)   Pulse 72   Temp 97.3  F (36.3  C) (Oral)   Resp 20   Wt 99.9 kg (220 lb 4.8 oz)   SpO2 93%   Please review provider order for any additional goals.   Nurse to notify provider when observation goals have been met and patient is ready for discharge.

## 2022-10-23 NOTE — PROGRESS NOTES
PRIMARY DIAGNOSIS: PLACEMENT  OUTPATIENT/OBSERVATION GOALS TO BE MET BEFORE DISCHARGE:  1. ADLs back to baseline: Yes    2. Activity and level of assistance: Total care, lift    3. Pain status: Pain free.    4. Return to near baseline physical activity: Yes     Discharge Planner Nurse   Safe discharge environment identified: No  Barriers to discharge: Yes       Entered by: Rupinder Rubi RN 10/23/2022 12:31 AM     Please review provider order for any additional goals.   Nurse to notify provider when observation goals have been met and patient is ready for discharge.

## 2022-10-23 NOTE — PROGRESS NOTES
PRIMARY DIAGNOSIS: PLACEMENT  OUTPATIENT/OBSERVATION GOALS TO BE MET BEFORE DISCHARGE:  1. ADLs back to baseline: Yes    2. Activity and level of assistance: Total care, lift    3. Pain status: Pain free.    4. Return to near baseline physical activity: Yes     Discharge Planner Nurse   Safe discharge environment identified: No  Barriers to discharge: Yes       Entered by: Rupinder Rubi RN 10/23/2022 4:17 AM     Pt is A&O, has slow speech and flat affect. Up with Ax2 and lift, in chair for meals. VS and BG BID (morning and at dinner). No PIV access. Pt denies pain. Awaiting placement. Will continue to monitor and provide supportive measures.     Please review provider order for any additional goals.   Nurse to notify provider when observation goals have been met and patient is ready for discharge.
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
## 2022-10-23 NOTE — PROGRESS NOTES
PRIMARY DIAGNOSIS: Placement  OUTPATIENT/OBSERVATION GOALS TO BE MET BEFORE DISCHARGE:  1. ADLs back to baseline: Yes    2. Activity and level of assistance: Total cares, lift transfer.    3. Pain status: Pain free.    4. Return to near baseline physical activity: Yes     Discharge Planner Nurse   Safe discharge environment identified: No  Barriers to discharge: Yes       Entered by: Florian Herman RN 10/22/2022 11:28 PM    VSS. Pt alert and oriented x 4, speech slow. Incontinent of urine x 2. No BM. Up in chair for dinner. Good appetite. Discharge pending.   Please review provider order for any additional goals.   Nurse to notify provider when observation goals have been met and patient is ready for discharge.

## 2022-10-23 NOTE — PLAN OF CARE
PRIMARY DIAGNOSIS: PLACEMENT  OUTPATIENT/OBSERVATION GOALS TO BE MET BEFORE DISCHARGE:  1. ADLs back to baseline: Yes    2. Activity and level of assistance: Pt total cares, up with A2 with lift device - not up today    3. Pain status: Pain free.    4. Return to near baseline physical activity: Yes     Discharge Planner Nurse   Safe discharge environment identified: No  Barriers to discharge: Yes       Entered by: Abhijeet Hernandez RN 10/23/2022   Pt AO x4. VSS on RA, LS clear, BS active. Makes needs known. Pt speech slow with flat affect - some smiling when conversing. Cooperative with cares, no aggressive behavior. No PIV in place. Denies pain. Pt medically cleared for discharge pending placement.       BP (!) 130/90 (BP Location: Right arm)   Pulse 72   Temp 97.3  F (36.3  C) (Oral)   Resp 20   Wt 99.9 kg (220 lb 4.8 oz)   SpO2 93%   Please review provider order for any additional goals.   Nurse to notify provider when observation goals have been met and patient is ready for discharge.

## 2022-10-24 LAB
GLUCOSE BLDC GLUCOMTR-MCNC: 106 MG/DL (ref 70–99)
GLUCOSE BLDC GLUCOMTR-MCNC: 86 MG/DL (ref 70–99)

## 2022-10-24 PROCEDURE — 250N000013 HC RX MED GY IP 250 OP 250 PS 637: Performed by: HOSPITALIST

## 2022-10-24 PROCEDURE — G0378 HOSPITAL OBSERVATION PER HR: HCPCS

## 2022-10-24 PROCEDURE — 82962 GLUCOSE BLOOD TEST: CPT

## 2022-10-24 PROCEDURE — 99224 PR SUBSEQUENT OBSERVATION CARE,LEVEL I: CPT | Performed by: PHYSICIAN ASSISTANT

## 2022-10-24 PROCEDURE — 250N000013 HC RX MED GY IP 250 OP 250 PS 637: Performed by: PHYSICIAN ASSISTANT

## 2022-10-24 RX ADMIN — BACLOFEN 10 MG: 10 TABLET ORAL at 20:42

## 2022-10-24 RX ADMIN — METOPROLOL SUCCINATE 25 MG: 25 TABLET, EXTENDED RELEASE ORAL at 09:15

## 2022-10-24 RX ADMIN — Medication 25 MCG: at 09:18

## 2022-10-24 RX ADMIN — DIVALPROEX SODIUM 500 MG: 500 TABLET, DELAYED RELEASE ORAL at 09:15

## 2022-10-24 RX ADMIN — MIRTAZAPINE 15 MG: 15 TABLET, FILM COATED ORAL at 21:47

## 2022-10-24 RX ADMIN — Medication 10 MG: at 21:46

## 2022-10-24 RX ADMIN — QUETIAPINE 200 MG: 200 TABLET, FILM COATED ORAL at 09:18

## 2022-10-24 RX ADMIN — CLONIDINE HYDROCHLORIDE 0.1 MG: 0.1 TABLET ORAL at 20:42

## 2022-10-24 RX ADMIN — EMPAGLIFLOZIN 10 MG: 10 TABLET, FILM COATED ORAL at 14:20

## 2022-10-24 RX ADMIN — VENLAFAXINE HYDROCHLORIDE 75 MG: 150 CAPSULE, EXTENDED RELEASE ORAL at 21:47

## 2022-10-24 RX ADMIN — DIVALPROEX SODIUM 250 MG: 500 TABLET, DELAYED RELEASE ORAL at 09:17

## 2022-10-24 RX ADMIN — QUETIAPINE FUMARATE 400 MG: 200 TABLET ORAL at 21:46

## 2022-10-24 RX ADMIN — CLONIDINE HYDROCHLORIDE 0.1 MG: 0.1 TABLET ORAL at 09:15

## 2022-10-24 RX ADMIN — CLOTRIMAZOLE: 0.01 CREAM TOPICAL at 20:42

## 2022-10-24 RX ADMIN — HYDROXYZINE HYDROCHLORIDE 50 MG: 50 TABLET, FILM COATED ORAL at 09:17

## 2022-10-24 RX ADMIN — LEVOTHYROXINE SODIUM 25 MCG: 0.03 TABLET ORAL at 09:17

## 2022-10-24 RX ADMIN — SENNOSIDES AND DOCUSATE SODIUM 1 TABLET: 50; 8.6 TABLET ORAL at 09:15

## 2022-10-24 RX ADMIN — HYDROXYZINE HYDROCHLORIDE 50 MG: 50 TABLET, FILM COATED ORAL at 20:42

## 2022-10-24 RX ADMIN — DIVALPROEX SODIUM 1000 MG: 500 TABLET, DELAYED RELEASE ORAL at 21:46

## 2022-10-24 RX ADMIN — METFORMIN HYDROCHLORIDE 1000 MG: 500 TABLET ORAL at 17:26

## 2022-10-24 RX ADMIN — ATORVASTATIN CALCIUM 20 MG: 20 TABLET, FILM COATED ORAL at 20:42

## 2022-10-24 RX ADMIN — METFORMIN HYDROCHLORIDE 1000 MG: 500 TABLET ORAL at 09:17

## 2022-10-24 RX ADMIN — ASPIRIN 81 MG CHEWABLE TABLET 81 MG: 81 TABLET CHEWABLE at 09:16

## 2022-10-24 RX ADMIN — HYDROXYZINE HYDROCHLORIDE 50 MG: 50 TABLET, FILM COATED ORAL at 14:20

## 2022-10-24 RX ADMIN — OLANZAPINE 2.5 MG: 2.5 TABLET, FILM COATED ORAL at 14:20

## 2022-10-24 RX ADMIN — VENLAFAXINE HYDROCHLORIDE 150 MG: 150 CAPSULE, EXTENDED RELEASE ORAL at 21:46

## 2022-10-24 RX ADMIN — BACLOFEN 10 MG: 10 TABLET ORAL at 14:20

## 2022-10-24 RX ADMIN — BACLOFEN 10 MG: 10 TABLET ORAL at 09:16

## 2022-10-24 RX ADMIN — QUETIAPINE 200 MG: 200 TABLET, FILM COATED ORAL at 14:20

## 2022-10-24 ASSESSMENT — ACTIVITIES OF DAILY LIVING (ADL)
ADLS_ACUITY_SCORE: 48
ADLS_ACUITY_SCORE: 48
ADLS_ACUITY_SCORE: 44
ADLS_ACUITY_SCORE: 44
ADLS_ACUITY_SCORE: 48
ADLS_ACUITY_SCORE: 44
ADLS_ACUITY_SCORE: 48
ADLS_ACUITY_SCORE: 48
ADLS_ACUITY_SCORE: 44
ADLS_ACUITY_SCORE: 48

## 2022-10-24 NOTE — PROGRESS NOTES
PRIMARY DIAGNOSIS: PLACEMENT  OUTPATIENT/OBSERVATION GOALS TO BE MET BEFORE DISCHARGE:  1. ADLs back to baseline: Yes    2. Activity and level of assistance: Total care, lift    3. Pain status: Pain free.    4. Return to near baseline physical activity: Yes     Discharge Planner Nurse   Safe discharge environment identified: No  Barriers to discharge: Yes       Entered by: Rupinder Rubi RN 10/24/2022 12:20 AM     Pt is A&O, has slow speech and flat affect. Up with Ax2 and lift, in chair for meals. VS and BG BID (morning and at dinner). No PIV access. Pt denies pain. Awaiting placement. Will continue to monitor and provide supportive measures.     Please review provider order for any additional goals.   Nurse to notify provider when observation goals have been met and patient is ready for discharge.
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
## 2022-10-24 NOTE — PROGRESS NOTES
"Care Management Follow Up      Expected Discharge Date: 11/30/2022     Concerns to be Addressed: Discharge planning      Patient plan of care discussed at interdisciplinary rounds: Yes    Anticipated Discharge Disposition:  [facility] once funding in place     Private pay costs discussed: Not applicable    Additional Information:  Patient's waiver has closed d/t being out of the community for over 30 days.     ANALY received email from [facility] Stating that \"Elzbieta Zarate at [facility] said the hospital  needs to call in a screen to the county where he resides.\" and then emailed saying  \"In the past, it has been the [facility] that handles all of that. But like I might have mentioned before, [place] has a different way of doing things.\"    ANALY emailed C[facility] and Elzbieta together in a email to follow up on [employer] assessment being requested to reopen patient's waiver. [facility] and [facility] are involved and it is unclear which [facility] should be completing this assessment.     In email, ANALY requested that [facility] to request that an expedited [employer] assessment be completed to reopen waiver, awaiting response.     ANALY will continue to follow.    DANITA Obrien, [facility]   Inpatient Care Coordination  [facility]   [phone]        "

## 2022-10-24 NOTE — PLAN OF CARE
PRIMARY DIAGNOSIS: Placement  OUTPATIENT/OBSERVATION GOALS TO BE MET BEFORE DISCHARGE:  1. ADLs back to baseline: Yes    2. Activity and level of assistance: Up with maximum assistance. Consider SW and/or PT evaluation.     3. Pain status: Pain free.    4. Return to near baseline physical activity: No     Discharge Planner Nurse   Safe discharge environment identified: Yes  Barriers to discharge: Yes       Entered by: Laura Dover RN 10/24/2022 9:44 AM     Please review provider order for any additional goals.   Nurse to notify provider when observation goals have been met and patient is ready for discharge.Goal Outcome Evaluation:   Pt still awaiting placement, waiver has  and now sw is working on getting it reopened. Pt had a good day, pts son recently moved to the area and come to visit.

## 2022-10-24 NOTE — PROGRESS NOTES
PRIMARY DIAGNOSIS: PLACEMENT  OUTPATIENT/OBSERVATION GOALS TO BE MET BEFORE DISCHARGE:  1. ADLs back to baseline: Yes    2. Activity and level of assistance: Total care, lift    3. Pain status: Pain free.    4. Return to near baseline physical activity: Yes     Discharge Planner Nurse   Safe discharge environment identified: No  Barriers to discharge: Yes       Entered by: Rupinder Rubi RN 10/24/2022 12:20 AM     BP (!) 130/90 (BP Location: Right arm)   Pulse 72   Temp 97.3  F (36.3  C) (Oral)   Resp 20   Wt 99.9 kg (220 lb 4.8 oz)   SpO2 93%   Please review provider order for any additional goals.   Nurse to notify provider when observation goals have been met and patient is ready for discharge.

## 2022-10-24 NOTE — PROGRESS NOTES
PRIMARY DIAGNOSIS: PLACEMENT  OUTPATIENT/OBSERVATION GOALS TO BE MET BEFORE DISCHARGE:  1. ADLs back to baseline: Yes    2. Activity and level of assistance: Total care, lift    3. Pain status: Pain free.    4. Return to near baseline physical activity: Yes     Discharge Planner Nurse   Safe discharge environment identified: No  Barriers to discharge: Yes       Entered by: Rupinder Rubi RN 10/23/2022 10:48 PM     Please review provider order for any additional goals.   Nurse to notify provider when observation goals have been met and patient is ready for discharge.

## 2022-10-25 LAB
GLUCOSE BLDC GLUCOMTR-MCNC: 113 MG/DL (ref 70–99)
GLUCOSE BLDC GLUCOMTR-MCNC: 94 MG/DL (ref 70–99)

## 2022-10-25 PROCEDURE — 250N000013 HC RX MED GY IP 250 OP 250 PS 637: Performed by: PHYSICIAN ASSISTANT

## 2022-10-25 PROCEDURE — 82962 GLUCOSE BLOOD TEST: CPT

## 2022-10-25 PROCEDURE — 99224 PR SUBSEQUENT OBSERVATION CARE,LEVEL I: CPT | Performed by: PHYSICIAN ASSISTANT

## 2022-10-25 PROCEDURE — G0378 HOSPITAL OBSERVATION PER HR: HCPCS

## 2022-10-25 PROCEDURE — 250N000013 HC RX MED GY IP 250 OP 250 PS 637: Performed by: HOSPITALIST

## 2022-10-25 RX ADMIN — BACLOFEN 10 MG: 10 TABLET ORAL at 21:25

## 2022-10-25 RX ADMIN — SENNOSIDES AND DOCUSATE SODIUM 1 TABLET: 50; 8.6 TABLET ORAL at 08:26

## 2022-10-25 RX ADMIN — LEVOTHYROXINE SODIUM 25 MCG: 0.03 TABLET ORAL at 08:25

## 2022-10-25 RX ADMIN — DIVALPROEX SODIUM 1000 MG: 500 TABLET, DELAYED RELEASE ORAL at 21:25

## 2022-10-25 RX ADMIN — DIVALPROEX SODIUM 250 MG: 500 TABLET, DELAYED RELEASE ORAL at 08:30

## 2022-10-25 RX ADMIN — OLANZAPINE 2.5 MG: 2.5 TABLET, FILM COATED ORAL at 14:56

## 2022-10-25 RX ADMIN — HYDROXYZINE HYDROCHLORIDE 50 MG: 50 TABLET, FILM COATED ORAL at 21:24

## 2022-10-25 RX ADMIN — METFORMIN HYDROCHLORIDE 1000 MG: 500 TABLET ORAL at 08:24

## 2022-10-25 RX ADMIN — CLOTRIMAZOLE: 0.01 CREAM TOPICAL at 21:33

## 2022-10-25 RX ADMIN — EMPAGLIFLOZIN 10 MG: 10 TABLET, FILM COATED ORAL at 08:26

## 2022-10-25 RX ADMIN — METOPROLOL SUCCINATE 25 MG: 25 TABLET, EXTENDED RELEASE ORAL at 08:25

## 2022-10-25 RX ADMIN — BACLOFEN 10 MG: 10 TABLET ORAL at 08:26

## 2022-10-25 RX ADMIN — QUETIAPINE 200 MG: 200 TABLET, FILM COATED ORAL at 14:56

## 2022-10-25 RX ADMIN — VENLAFAXINE HYDROCHLORIDE 150 MG: 150 CAPSULE, EXTENDED RELEASE ORAL at 21:21

## 2022-10-25 RX ADMIN — HYDROXYZINE HYDROCHLORIDE 50 MG: 50 TABLET, FILM COATED ORAL at 08:25

## 2022-10-25 RX ADMIN — Medication 10 MG: at 21:20

## 2022-10-25 RX ADMIN — HYDROXYZINE HYDROCHLORIDE 50 MG: 50 TABLET, FILM COATED ORAL at 14:56

## 2022-10-25 RX ADMIN — ATORVASTATIN CALCIUM 20 MG: 20 TABLET, FILM COATED ORAL at 21:24

## 2022-10-25 RX ADMIN — BACLOFEN 10 MG: 10 TABLET ORAL at 14:56

## 2022-10-25 RX ADMIN — METFORMIN HYDROCHLORIDE 1000 MG: 500 TABLET ORAL at 18:32

## 2022-10-25 RX ADMIN — CLONIDINE HYDROCHLORIDE 0.1 MG: 0.1 TABLET ORAL at 21:24

## 2022-10-25 RX ADMIN — ASPIRIN 81 MG CHEWABLE TABLET 81 MG: 81 TABLET CHEWABLE at 08:26

## 2022-10-25 RX ADMIN — Medication 25 MCG: at 08:25

## 2022-10-25 RX ADMIN — CLONIDINE HYDROCHLORIDE 0.1 MG: 0.1 TABLET ORAL at 08:26

## 2022-10-25 RX ADMIN — QUETIAPINE FUMARATE 400 MG: 200 TABLET ORAL at 21:21

## 2022-10-25 RX ADMIN — DIVALPROEX SODIUM 500 MG: 500 TABLET, DELAYED RELEASE ORAL at 08:25

## 2022-10-25 RX ADMIN — VENLAFAXINE HYDROCHLORIDE 75 MG: 150 CAPSULE, EXTENDED RELEASE ORAL at 21:36

## 2022-10-25 RX ADMIN — MIRTAZAPINE 15 MG: 15 TABLET, FILM COATED ORAL at 21:26

## 2022-10-25 RX ADMIN — QUETIAPINE 200 MG: 200 TABLET, FILM COATED ORAL at 08:26

## 2022-10-25 ASSESSMENT — ACTIVITIES OF DAILY LIVING (ADL)
ADLS_ACUITY_SCORE: 52
ADLS_ACUITY_SCORE: 48
ADLS_ACUITY_SCORE: 52
ADLS_ACUITY_SCORE: 48

## 2022-10-25 NOTE — PLAN OF CARE
"PRIMARY DIAGNOSIS: \"GENERIC\" NURSING  OUTPATIENT/OBSERVATION GOALS TO BE MET BEFORE DISCHARGE:  ADLs back to baseline: Yes    Activity and level of assistance: Up with maximum assistance. Consider SW and/or PT evaluation.     Pain status: Pain free.    Return to near baseline physical activity: Yes     Discharge Planner Nurse   Safe discharge environment identified: No; pending [facility].  Barriers to discharge: No       Entered by: Vanna Patel RN 10/25/2022 8:39 AM     Please review provider order for any additional goals.   Nurse to notify provider when observation goals have been met and patient is ready for discharge.Goal Outcome Evaluation:         Patient is Aox4, VSS, up with maximum assist at baseline, tolerating regular diet and oral medications, no IV access at this time, no pain noted at this time, waiting on placement, waiver has closed and has been requested to be reopened; placement [facility] once [facility] is approved, able to make needs known, will continue to monitor and provide cares.    Please review provider order for any additional goals.                "

## 2022-10-25 NOTE — PLAN OF CARE
PRIMARY DIAGNOSIS: PLACEMENT  OUTPATIENT/OBSERVATION GOALS TO BE MET BEFORE DISCHARGE:  1. ADLs back to baseline: Yes    2. Activity and level of assistance: Up with maximum assistance.Total care at baseline.     3. Pain status: Pain free.    4. Return to near baseline physical activity: Yes     Discharge Planner Nurse   Safe discharge environment identified: Yes  Barriers to discharge: Yes       Entered by: Chela Glover RN 10/25/2022 4:49 AM     Patient is Aox4, VSS, up with maximum assist at baseline, tolerating regular diet and oral medications, no IV access at this time, no pain noted at this time, waiting on placement, waiver has closed and has been requested to be reopened, able to make needs known, will continue to monitor and provide cares.       Please review provider order for any additional goals.   Nurse to notify provider when observation goals have been met and patient is ready for discharge.Goal Outcome Evaluation:

## 2022-10-25 NOTE — PLAN OF CARE
PRIMARY DIAGNOSIS: Placement  OUTPATIENT/OBSERVATION GOALS TO BE MET BEFORE DISCHARGE:  ADLs back to baseline: Yes    Activity and level of assistance: Up with maximum assistance; pts reported  baseline   Pain status: Pain free.    Return to near baseline physical activity: Yes     Discharge Planner Nurse   Safe discharge environment identified: Yes; pending waiver will [facility] to [facility]  Barriers to discharge: No       Entered by: Vanna Patel RN 10/25/2022 11:32 AM     Please review provider order for any additional goals.   Nurse to notify provider when observation goals have been met and patient is ready for discharge.Goal Outcome Evaluation:       Patient is Aox4, VSS, up with maximum assist at baseline, tolerating regular diet and oral medications, no IV access at this time, no pain noted at this time, waiting on placement, waiver has closed and has been requested to be reopened; placement at [facility] once waiver is approved, able to make needs known, will continue to monitor and provide cares.    Please review provider order for any additional goals.

## 2022-10-25 NOTE — PLAN OF CARE
PRIMARY DIAGNOSIS: PLACEMENT  OUTPATIENT/OBSERVATION GOALS TO BE MET BEFORE DISCHARGE:  1. ADLs back to baseline: Yes    2. Activity and level of assistance: Up with maximum assistance.Total care at baseline.     3. Pain status: Pain free.    4. Return to near baseline physical activity: Yes     Discharge Planner Nurse   Safe discharge environment identified: Yes  Barriers to discharge: Yes       Entered by: Chela Glover RN 10/25/2022 1:52 AM     Patient is Aox4, VSS, up with maximum assist at baseline, tolerating regular diet and oral medications, no IV access at this time, no pain noted at this time, waiting on placement, waiver has closed and has been requested to be reopened, able to make needs known, will continue to monitor and provide cares.       Please review provider order for any additional goals.   Nurse to notify provider when observation goals have been met and patient is ready for discharge.Goal Outcome Evaluation:

## 2022-10-25 NOTE — PROGRESS NOTES
PRIMARY DIAGNOSIS: Placement  OUTPATIENT/OBSERVATION GOALS TO BE MET BEFORE DISCHARGE:  1. ADLs back to baseline: Yes    2. Activity and level of assistance: Up with maximum assistance. Consider SW and/or PT evaluation.     3. Pain status: Pain free.    4. Return to near baseline physical activity: Yes     Discharge Planner Nurse   Safe discharge environment identified: Yes  Barriers to discharge: No       Entered by: Vanna Patel RN 10/25/2022 3:00 PM     Please review provider order for any additional goals.   Nurse to notify provider when observation goals have been met and patient is ready for discharge.       Patient is Aox4, VSS, up with maximum assist at baseline, tolerating regular diet and oral medications, no IV access at this time, no pain noted at this time, waiting on placement, waiver has closed and has been requested to be reopened; placement at [facility] once waiver is approved, able to make needs known, will continue to monitor and provide cares.
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
## 2022-10-25 NOTE — PLAN OF CARE
Goal Outcome Evaluation:       PRIMARY DIAGNOSIS: Placement  OUTPATIENT/OBSERVATION GOALS TO BE MET BEFORE DISCHARGE:  ADLs back to baseline: Yes    Activity and level of assistance: Up with maximum assistance. Consider SW and/or PT evaluation.     Pain status: Pain free.    Return to near baseline physical activity: Yes     Discharge Planner Nurse   Safe discharge environment identified: Yes; [facility], pending updated waiver  Barriers to discharge: No       Entered by: Vanna Patel RN 10/25/2022 5:02 PM     Please review provider order for any additional goals.   Nurse to notify provider when observation goals have been met and patient is ready for discharge.      Patient is Aox4, VSS, up with maximum assist at baseline, tolerating regular diet and oral medications, no IV access at this time, no pain noted at this time, waiting on placement, waiver has closed and has been requested to be reopened; placement at serving [facility] once waiver is approved, able to make needs known, will continue to monitor and provide cares.

## 2022-10-26 LAB — GLUCOSE BLDC GLUCOMTR-MCNC: 172 MG/DL (ref 70–99)

## 2022-10-26 PROCEDURE — 250N000013 HC RX MED GY IP 250 OP 250 PS 637: Performed by: PHYSICIAN ASSISTANT

## 2022-10-26 PROCEDURE — 250N000013 HC RX MED GY IP 250 OP 250 PS 637: Performed by: HOSPITALIST

## 2022-10-26 PROCEDURE — 99224 PR SUBSEQUENT OBSERVATION CARE,LEVEL I: CPT | Performed by: PHYSICIAN ASSISTANT

## 2022-10-26 PROCEDURE — G0378 HOSPITAL OBSERVATION PER HR: HCPCS

## 2022-10-26 PROCEDURE — 82962 GLUCOSE BLOOD TEST: CPT

## 2022-10-26 RX ADMIN — Medication 10 MG: at 21:12

## 2022-10-26 RX ADMIN — HYDROXYZINE HYDROCHLORIDE 50 MG: 50 TABLET, FILM COATED ORAL at 14:36

## 2022-10-26 RX ADMIN — VENLAFAXINE HYDROCHLORIDE 75 MG: 150 CAPSULE, EXTENDED RELEASE ORAL at 21:09

## 2022-10-26 RX ADMIN — Medication 25 MCG: at 08:44

## 2022-10-26 RX ADMIN — CLOTRIMAZOLE: 0.01 CREAM TOPICAL at 21:26

## 2022-10-26 RX ADMIN — ATORVASTATIN CALCIUM 20 MG: 20 TABLET, FILM COATED ORAL at 21:13

## 2022-10-26 RX ADMIN — SENNOSIDES AND DOCUSATE SODIUM 1 TABLET: 50; 8.6 TABLET ORAL at 08:44

## 2022-10-26 RX ADMIN — METOPROLOL SUCCINATE 25 MG: 25 TABLET, EXTENDED RELEASE ORAL at 08:44

## 2022-10-26 RX ADMIN — CLONIDINE HYDROCHLORIDE 0.1 MG: 0.1 TABLET ORAL at 21:11

## 2022-10-26 RX ADMIN — BACLOFEN 10 MG: 10 TABLET ORAL at 21:13

## 2022-10-26 RX ADMIN — VENLAFAXINE HYDROCHLORIDE 150 MG: 150 CAPSULE, EXTENDED RELEASE ORAL at 21:40

## 2022-10-26 RX ADMIN — DIVALPROEX SODIUM 1000 MG: 500 TABLET, DELAYED RELEASE ORAL at 21:10

## 2022-10-26 RX ADMIN — DIVALPROEX SODIUM 500 MG: 500 TABLET, DELAYED RELEASE ORAL at 14:38

## 2022-10-26 RX ADMIN — BACLOFEN 10 MG: 10 TABLET ORAL at 08:43

## 2022-10-26 RX ADMIN — CLONIDINE HYDROCHLORIDE 0.1 MG: 0.1 TABLET ORAL at 08:44

## 2022-10-26 RX ADMIN — HYDROXYZINE HYDROCHLORIDE 50 MG: 50 TABLET, FILM COATED ORAL at 08:45

## 2022-10-26 RX ADMIN — QUETIAPINE 200 MG: 200 TABLET, FILM COATED ORAL at 14:36

## 2022-10-26 RX ADMIN — LEVOTHYROXINE SODIUM 25 MCG: 0.03 TABLET ORAL at 08:44

## 2022-10-26 RX ADMIN — DIVALPROEX SODIUM 250 MG: 500 TABLET, DELAYED RELEASE ORAL at 08:43

## 2022-10-26 RX ADMIN — BACLOFEN 10 MG: 10 TABLET ORAL at 14:37

## 2022-10-26 RX ADMIN — QUETIAPINE 200 MG: 200 TABLET, FILM COATED ORAL at 08:45

## 2022-10-26 RX ADMIN — HYDROXYZINE HYDROCHLORIDE 50 MG: 50 TABLET, FILM COATED ORAL at 21:13

## 2022-10-26 RX ADMIN — METFORMIN HYDROCHLORIDE 1000 MG: 500 TABLET ORAL at 08:43

## 2022-10-26 RX ADMIN — QUETIAPINE FUMARATE 400 MG: 200 TABLET ORAL at 21:12

## 2022-10-26 RX ADMIN — MIRTAZAPINE 15 MG: 15 TABLET, FILM COATED ORAL at 21:13

## 2022-10-26 RX ADMIN — METFORMIN HYDROCHLORIDE 1000 MG: 500 TABLET ORAL at 17:15

## 2022-10-26 RX ADMIN — EMPAGLIFLOZIN 10 MG: 10 TABLET, FILM COATED ORAL at 08:45

## 2022-10-26 RX ADMIN — ASPIRIN 81 MG CHEWABLE TABLET 81 MG: 81 TABLET CHEWABLE at 08:43

## 2022-10-26 RX ADMIN — OLANZAPINE 2.5 MG: 2.5 TABLET, FILM COATED ORAL at 14:36

## 2022-10-26 ASSESSMENT — ACTIVITIES OF DAILY LIVING (ADL)
ADLS_ACUITY_SCORE: 56
ADLS_ACUITY_SCORE: 54
ADLS_ACUITY_SCORE: 56
ADLS_ACUITY_SCORE: 54
ADLS_ACUITY_SCORE: 56
ADLS_ACUITY_SCORE: 54
ADLS_ACUITY_SCORE: 56
ADLS_ACUITY_SCORE: 54
ADLS_ACUITY_SCORE: 54
ADLS_ACUITY_SCORE: 56
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
## 2022-10-26 NOTE — PLAN OF CARE
PRIMARY DIAGNOSIS: Placement  OUTPATIENT/OBSERVATION GOALS TO BE MET BEFORE DISCHARGE:  1. ADLs back to baseline: Yes    Activity and level of assistance: Up with maximum assistance.   2. Pain status: Pain free.    3. Return to near baseline physical activity: Yes     Discharge Planner Nurse   Safe discharge environment identified: Yes  Barriers to discharge: No       Entered by: Annalee Echevarria RN 10/26/2022 5:57 AM   Pt. A & O x 4. Denies pain. Awaiting placement.No IV access.Verbalizes no concerns.  Please review provider order for any additional goals.   Nurse to notify provider when observation goals have been met and patient is ready for discharge.Goal Outcome Evaluation:

## 2022-10-26 NOTE — PLAN OF CARE
PRIMARY DIAGNOSIS:PLACEMENT OUTPATIENT/OBSERVATION GOALS TO BE MET BEFORE DISCHARGE:  1. ADLs back to baseline: Yes    2. Activity and level of assistance: Up with maximum assistance. Consider SW and/or PT evaluation.     3. Pain status: Pain free.    4. Return to near baseline physical activity: Yes     Discharge Planner Nurse   Safe discharge environment identified: Yes; Planned discharge to [facility], pending updated waiver.  Barriers to discharge: No       Entered by: Annalee Echevarria RN 10/25/2022 10:23 PM   Pt. Alert & oriented x 4. VSS. Denies pain.Maximum assist for cares.Pt. has no IV access at this time.Verbalizes no concerns.  Please review provider order for any additional goals.   Nurse to notify provider when observation goals have been met and patient is ready for discharge.Goal Outcome Evaluation:

## 2022-10-26 NOTE — PLAN OF CARE
Goal Outcome Evaluation:             PRIMARY DIAGNOSIS: Placement  OUTPATIENT/OBSERVATION GOALS TO BE MET BEFORE DISCHARGE:  ADLs back to baseline: Yes    Activity and level of assistance: Up with maximum assistance. Baseline  Pain status: Pain free.    Return to near baseline physical activity: Yes     Discharge Planner Nurse   Safe discharge environment identified: Yes  Barriers to discharge: Yes; pending waiver from [facility]       Entered by: Vanna Patel RN 10/26/2022 1:51 PM     Please review provider order for any additional goals.   Nurse to notify provider when observation goals have been met and patient is ready for discharge.    Patient is Aox4, VSS, up with maximum assist at baseline, tolerating regular diet and oral medications, no IV access at this time, no pain noted at this time, waiting on placement, waiver has closed and has been requested to be reopened; placement at [facility] once waiver is approved, able to make needs known. Pt up to chair for 2 hours; bedding changed.  will continue to monitor and provide cares.

## 2022-10-26 NOTE — PLAN OF CARE
PRIMARY DIAGNOSIS: PLACEMENT  OUTPATIENT/OBSERVATION GOALS TO BE MET BEFORE DISCHARGE:  1. ADLs back to baseline: Yes    2. Activity and level of assistance: Up with maximum assistance. Consider SW and/or PT evaluation.     3. Pain status: Pain free.    4. Return to near baseline physical activity: Yes     Discharge Planner Nurse   Safe discharge environment identified: Yes  Barriers to discharge: No       Entered by: Annalee Echevarria RN 10/26/2022 2:58 AM   Pt. Currently asleep.Bed in low position. Call light within reach.      Please review provider order for any additional goals.   Nurse to notify provider when observation goals have been met and patient is ready for discharge.Goal Outcome Evaluation:

## 2022-10-26 NOTE — PLAN OF CARE
Goal Outcome Evaluation:         PRIMARY DIAGNOSIS: Placement  OUTPATIENT/OBSERVATION GOALS TO BE MET BEFORE DISCHARGE:  ADLs back to baseline: Yes    Activity and level of assistance: Up with maximum assistance. Consider SW and/or PT evaluation.     Pain status: Pain free.    Return to near baseline physical activity: Yes     Discharge Planner Nurse   Safe discharge environment identified: Yes; waiting for updated waiver.   Barriers to discharge: No       Entered by: Vanna Patel RN 10/26/2022 9:46 AM     Please review provider order for any additional goals.   Nurse to notify provider when observation goals have been met and patient is ready for discharge.         Patient is Aox4, VSS, up with maximum assist at baseline, tolerating regular diet and oral medications, no IV access at this time, no pain noted at this time, waiting on placement, waiver has closed and has been requested to be reopened; placement at [facility] once waiver is approved, able to make needs known, will continue to monitor and provide cares.

## 2022-10-26 NOTE — PLAN OF CARE
Goal Outcome Evaluation:    PRIMARY DIAGNOSIS: Placement  OUTPATIENT/OBSERVATION GOALS TO BE MET BEFORE DISCHARGE:  1. ADLs back to baseline: Yes    2. Activity and level of assistance: Up with maximum assistance. Pts baseline total assist with lift.      3. Pain status: Pain free.    4. Return to near baseline physical activity: Yes     Discharge Planner Nurse   Safe discharge environment identified: Yes  Barriers to discharge: Yes; waiting for updated waiver for placement.       Entered by: Vanna Patel RN 10/26/2022 3:48 PM     Please review provider order for any additional goals.   Nurse to notify provider when observation goals have been met and patient is ready for discharge.       Patient is Aox4, VSS, up with maximum assist at baseline, tolerating regular diet and oral medications, no IV access at this time, no pain noted at this time, waiting on placement, waiver has closed and has been requested to be reopened; [facility] once waiver is approved; Social works note stating 5-6 week wait, but marked as expedited. Able to make need known.Pts son at bedside for most of afternoon.

## 2022-10-26 NOTE — PROGRESS NOTES
"Care Management Follow Up    Expected Discharge Date: 11/30/2022     Concerns to be Addressed: Discharge planning      Patient plan of care discussed at interdisciplinary rounds: Yes    Anticipated Discharge Disposition:  [facility] once waiver reopened and funding is in place    Additional Information:  ANALY received email from :     \"My PM has informed me of a recent [facility] update where an individual does not need to reassessed until 121 days out of the community. So, for Chris wong case, no assessment is needed but a new LTC Screening Document is to reopen services. [facility] is the lead agency. Please view the DHS link updated October 6th AASD and DSD eList: Policy change about temporary admissions to certain facilities for 121 or fewer days (state.[email or web address]) and the following quote:  Lead agencies must close a person s waiver program when they are admitted to a residential treatment setting or institution for more than 29 days. To allow a person to return to or  restart  their previous waiver program within 121 days of an admission without an assessment (per the statutory amendment), DHS created an administrative task for lead agencies to complete in the long-term care (LTC) and developmental disabilities (DD) screening document (SDOC) subsystem in MMIS at discharge.     [facility]   [facility]\"       ANALY received additional follow up email from Elzbieta Zarate,  with [facility] within [facility] stating that the [employer] [place] needs to be called into [facility] as that is where pt currently [facility].    [phone], who confirms that [facility] will be completing the assessment to reopen waiver, and that the above information is not correct, pt will require a whole new [employer] assessment. Current wait time is 5-6 weeks but the case has been flagged as expedited. ANALY will receive call from "  to schedule assessment date/time.    ANALY sent follow up email to fam Ivy and ROSIO Ruff with [facility] to update.    DANITA Obrien, [facility]   Inpatient Care Coordination  [facility]   [phone]

## 2022-10-27 LAB — GLUCOSE BLDC GLUCOMTR-MCNC: 146 MG/DL (ref 70–99)

## 2022-10-27 PROCEDURE — 250N000013 HC RX MED GY IP 250 OP 250 PS 637: Performed by: HOSPITALIST

## 2022-10-27 PROCEDURE — G0378 HOSPITAL OBSERVATION PER HR: HCPCS

## 2022-10-27 PROCEDURE — 99225 PR SUBSEQUENT OBSERVATION CARE,LEVEL II: CPT | Performed by: PHYSICIAN ASSISTANT

## 2022-10-27 PROCEDURE — 250N000013 HC RX MED GY IP 250 OP 250 PS 637: Performed by: PHYSICIAN ASSISTANT

## 2022-10-27 PROCEDURE — 82962 GLUCOSE BLOOD TEST: CPT

## 2022-10-27 RX ADMIN — VENLAFAXINE HYDROCHLORIDE 150 MG: 150 CAPSULE, EXTENDED RELEASE ORAL at 21:43

## 2022-10-27 RX ADMIN — CLOTRIMAZOLE: 0.01 CREAM TOPICAL at 21:17

## 2022-10-27 RX ADMIN — CLONIDINE HYDROCHLORIDE 0.1 MG: 0.1 TABLET ORAL at 20:58

## 2022-10-27 RX ADMIN — EMPAGLIFLOZIN 10 MG: 10 TABLET, FILM COATED ORAL at 09:10

## 2022-10-27 RX ADMIN — BACLOFEN 10 MG: 10 TABLET ORAL at 09:11

## 2022-10-27 RX ADMIN — ASPIRIN 81 MG CHEWABLE TABLET 81 MG: 81 TABLET CHEWABLE at 09:11

## 2022-10-27 RX ADMIN — METFORMIN HYDROCHLORIDE 1000 MG: 500 TABLET ORAL at 09:10

## 2022-10-27 RX ADMIN — DIVALPROEX SODIUM 1000 MG: 500 TABLET, DELAYED RELEASE ORAL at 21:12

## 2022-10-27 RX ADMIN — BACLOFEN 10 MG: 10 TABLET ORAL at 20:58

## 2022-10-27 RX ADMIN — MIRTAZAPINE 15 MG: 15 TABLET, FILM COATED ORAL at 21:12

## 2022-10-27 RX ADMIN — Medication 25 MCG: at 09:10

## 2022-10-27 RX ADMIN — LEVOTHYROXINE SODIUM 25 MCG: 0.03 TABLET ORAL at 09:11

## 2022-10-27 RX ADMIN — HYDROXYZINE HYDROCHLORIDE 50 MG: 50 TABLET, FILM COATED ORAL at 20:58

## 2022-10-27 RX ADMIN — VENLAFAXINE HYDROCHLORIDE 75 MG: 150 CAPSULE, EXTENDED RELEASE ORAL at 21:12

## 2022-10-27 RX ADMIN — METFORMIN HYDROCHLORIDE 1000 MG: 500 TABLET ORAL at 17:09

## 2022-10-27 RX ADMIN — CLOTRIMAZOLE: 0.01 CREAM TOPICAL at 09:16

## 2022-10-27 RX ADMIN — BACLOFEN 10 MG: 10 TABLET ORAL at 13:51

## 2022-10-27 RX ADMIN — HYDROXYZINE HYDROCHLORIDE 50 MG: 50 TABLET, FILM COATED ORAL at 09:11

## 2022-10-27 RX ADMIN — METOPROLOL SUCCINATE 25 MG: 25 TABLET, EXTENDED RELEASE ORAL at 09:11

## 2022-10-27 RX ADMIN — Medication 10 MG: at 21:13

## 2022-10-27 RX ADMIN — DIVALPROEX SODIUM 500 MG: 500 TABLET, DELAYED RELEASE ORAL at 09:11

## 2022-10-27 RX ADMIN — ATORVASTATIN CALCIUM 20 MG: 20 TABLET, FILM COATED ORAL at 20:58

## 2022-10-27 RX ADMIN — QUETIAPINE FUMARATE 400 MG: 200 TABLET ORAL at 21:13

## 2022-10-27 RX ADMIN — DIVALPROEX SODIUM 250 MG: 500 TABLET, DELAYED RELEASE ORAL at 09:10

## 2022-10-27 RX ADMIN — QUETIAPINE 200 MG: 200 TABLET, FILM COATED ORAL at 09:11

## 2022-10-27 RX ADMIN — QUETIAPINE 200 MG: 200 TABLET, FILM COATED ORAL at 13:51

## 2022-10-27 RX ADMIN — SENNOSIDES AND DOCUSATE SODIUM 1 TABLET: 50; 8.6 TABLET ORAL at 09:11

## 2022-10-27 RX ADMIN — CLONIDINE HYDROCHLORIDE 0.1 MG: 0.1 TABLET ORAL at 09:11

## 2022-10-27 RX ADMIN — HYDROXYZINE HYDROCHLORIDE 50 MG: 50 TABLET, FILM COATED ORAL at 13:51

## 2022-10-27 RX ADMIN — OLANZAPINE 2.5 MG: 2.5 TABLET, FILM COATED ORAL at 13:51

## 2022-10-27 ASSESSMENT — ACTIVITIES OF DAILY LIVING (ADL)
ADLS_ACUITY_SCORE: 56
ADLS_ACUITY_SCORE: 60
ADLS_ACUITY_SCORE: 56
ADLS_ACUITY_SCORE: 60
ADLS_ACUITY_SCORE: 56

## 2022-10-27 NOTE — PLAN OF CARE
PRIMARY DIAGNOSIS: Placement  OUTPATIENT/OBSERVATION GOALS TO BE MET BEFORE DISCHARGE:  1. ADLs back to baseline: Yes    2. Activity and level of assistance: Maximum assist with lift.    3. Pain status: Pain free.    4. Return to near baseline physical activity: Yes     Discharge Planner Nurse   Safe discharge environment identified: Yes  Barriers to discharge: Yes       Entered by: Annalee Echevarria RN 10/27/2022 12:34 AM   Pt. Alert & oriented.Currently sound asleep.Plan of care continues.     Please review provider order for any additional goals.   Nurse to notify provider when observation goals have been met and patient is ready for discharge.Goal Outcome Evaluation:

## 2022-10-27 NOTE — PLAN OF CARE
PRIMARY DIAGNOSIS: Placement  OUTPATIENT/OBSERVATION GOALS TO BE MET BEFORE DISCHARGE:  1. ADLs back to baseline: Yes    Activity and level of assistance: Up with maximum assistance using lift  2. Pain status: Pain free.    3. Return to near baseline physical activity: Yes     Discharge Planner Nurse   Safe discharge environment identified: Yes  Barriers to discharge: Yes       Entered by: Annalee Echevarria RN 10/27/2022 4:19 AM   Pt. is A&O x 4, VSS, Denies pain.Up with maximum assistance at baseline with lift. Regular diet. No IV access at this time.Awaiting placement.Will continue with plan of care.  Please review provider order for any additional goals.   Nurse to notify provider when observation goals have been met and patient is ready for discharge.Goal Outcome Evaluation:

## 2022-10-27 NOTE — PLAN OF CARE
PRIMARY DIAGNOSIS: Placement  OUTPATIENT/OBSERVATION GOALS TO BE MET BEFORE DISCHARGE:  1. ADLs back to baseline: Yes    Activity and level of assistance: Up with maximum assistance. Total assist with lift.  2. Pain status: Pain free.    3. Return to near baseline physical activity: Yes     Discharge Planner Nurse   Safe discharge environment identified: Yes  Barriers to discharge: Yes       Entered by: Annalee Echevarria RN 10/26/2022 9:44 PM   Pt. A&O x 4. VSS. Maximum assist with lift. Denies pain.Frequent requests for incontinent cares.Currently listening to music. No concerns.  Please review provider order for any additional goals.   Nurse to notify provider when observation goals have been met and patient is ready for discharge.Goal Outcome Evaluation:

## 2022-10-27 NOTE — PLAN OF CARE
Patient is alert and oriented x4. Patient very pleasant today. No behaviors noted. Regular diet and tolerating. No IV access. Denies pian. Plan: Placement.
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
## 2022-10-27 NOTE — PROGRESS NOTES
"Care Management Follow Up    Expected Discharge Date: 11/30/2022     Concerns to be Addressed: Discharge planning      Patient plan of care discussed at interdisciplinary rounds: Yes    Anticipated Discharge Disposition:  Mariella Hands  once funding approved ([id number])    Additional Information:  ANALY received VM from Veronica with [facility] stating that pt does not need an updated [place]Choices assessment and that  should be able to do form to reopen waiver, [phone].    ANALY received email from Elzbieta from [facility]: \"I spoke with the case aide and she has submitted request to [facility] to reopen the waiver for this client without a new Mnchoices done. Based on the new DHS rules about waiver and being institutionalize, Chris should be eligible to just reopen. Lexy told me that it could take about a week to get this done and will let you know as soon as I hear something back from her. The rate has not been approved and [facility] has asked for a behavior plan to support the hours they are requesting. Cata will be his courtesy . I m just the one who approves the budget.\"    ANALY sent follow up email to  and [facility] to ask that they follow up with  to request updated rate sheet and continue to work towards discharge plan.     DANITA Obrien, [facility]   Inpatient Care Coordination  [facility]   [phone]        "

## 2022-10-28 LAB — GLUCOSE BLDC GLUCOMTR-MCNC: 106 MG/DL (ref 70–99)

## 2022-10-28 PROCEDURE — 99225 PR SUBSEQUENT OBSERVATION CARE,LEVEL II: CPT | Performed by: PHYSICIAN ASSISTANT

## 2022-10-28 PROCEDURE — 82962 GLUCOSE BLOOD TEST: CPT

## 2022-10-28 PROCEDURE — 250N000013 HC RX MED GY IP 250 OP 250 PS 637: Performed by: HOSPITALIST

## 2022-10-28 PROCEDURE — 250N000013 HC RX MED GY IP 250 OP 250 PS 637: Performed by: PHYSICIAN ASSISTANT

## 2022-10-28 PROCEDURE — G0378 HOSPITAL OBSERVATION PER HR: HCPCS

## 2022-10-28 RX ADMIN — VENLAFAXINE HYDROCHLORIDE 75 MG: 150 CAPSULE, EXTENDED RELEASE ORAL at 21:16

## 2022-10-28 RX ADMIN — METOPROLOL SUCCINATE 25 MG: 25 TABLET, EXTENDED RELEASE ORAL at 09:35

## 2022-10-28 RX ADMIN — METFORMIN HYDROCHLORIDE 1000 MG: 500 TABLET ORAL at 17:19

## 2022-10-28 RX ADMIN — BACLOFEN 10 MG: 10 TABLET ORAL at 14:38

## 2022-10-28 RX ADMIN — HYDROXYZINE HYDROCHLORIDE 50 MG: 50 TABLET, FILM COATED ORAL at 21:14

## 2022-10-28 RX ADMIN — ATORVASTATIN CALCIUM 20 MG: 20 TABLET, FILM COATED ORAL at 21:16

## 2022-10-28 RX ADMIN — QUETIAPINE 200 MG: 200 TABLET, FILM COATED ORAL at 09:35

## 2022-10-28 RX ADMIN — DIVALPROEX SODIUM 250 MG: 500 TABLET, DELAYED RELEASE ORAL at 09:34

## 2022-10-28 RX ADMIN — DIVALPROEX SODIUM 500 MG: 500 TABLET, DELAYED RELEASE ORAL at 09:34

## 2022-10-28 RX ADMIN — DIVALPROEX SODIUM 1000 MG: 500 TABLET, DELAYED RELEASE ORAL at 21:14

## 2022-10-28 RX ADMIN — EMPAGLIFLOZIN 10 MG: 10 TABLET, FILM COATED ORAL at 09:35

## 2022-10-28 RX ADMIN — HYDROXYZINE HYDROCHLORIDE 50 MG: 50 TABLET, FILM COATED ORAL at 09:35

## 2022-10-28 RX ADMIN — MIRTAZAPINE 15 MG: 15 TABLET, FILM COATED ORAL at 21:15

## 2022-10-28 RX ADMIN — Medication 10 MG: at 21:15

## 2022-10-28 RX ADMIN — CLONIDINE HYDROCHLORIDE 0.1 MG: 0.1 TABLET ORAL at 09:35

## 2022-10-28 RX ADMIN — VENLAFAXINE HYDROCHLORIDE 150 MG: 150 CAPSULE, EXTENDED RELEASE ORAL at 21:17

## 2022-10-28 RX ADMIN — OLANZAPINE 2.5 MG: 2.5 TABLET, FILM COATED ORAL at 14:38

## 2022-10-28 RX ADMIN — Medication 25 MCG: at 09:35

## 2022-10-28 RX ADMIN — CLOTRIMAZOLE: 0.01 CREAM TOPICAL at 10:38

## 2022-10-28 RX ADMIN — LEVOTHYROXINE SODIUM 25 MCG: 0.03 TABLET ORAL at 09:35

## 2022-10-28 RX ADMIN — CLOTRIMAZOLE: 0.01 CREAM TOPICAL at 21:30

## 2022-10-28 RX ADMIN — BACLOFEN 10 MG: 10 TABLET ORAL at 09:35

## 2022-10-28 RX ADMIN — BACLOFEN 10 MG: 10 TABLET ORAL at 21:15

## 2022-10-28 RX ADMIN — ASPIRIN 81 MG CHEWABLE TABLET 81 MG: 81 TABLET CHEWABLE at 09:35

## 2022-10-28 RX ADMIN — HYDROXYZINE HYDROCHLORIDE 50 MG: 50 TABLET, FILM COATED ORAL at 14:38

## 2022-10-28 RX ADMIN — SENNOSIDES AND DOCUSATE SODIUM 1 TABLET: 50; 8.6 TABLET ORAL at 09:35

## 2022-10-28 RX ADMIN — METFORMIN HYDROCHLORIDE 1000 MG: 500 TABLET ORAL at 09:35

## 2022-10-28 RX ADMIN — QUETIAPINE 200 MG: 200 TABLET, FILM COATED ORAL at 14:38

## 2022-10-28 RX ADMIN — CLONIDINE HYDROCHLORIDE 0.1 MG: 0.1 TABLET ORAL at 21:15

## 2022-10-28 RX ADMIN — QUETIAPINE FUMARATE 400 MG: 200 TABLET ORAL at 21:15

## 2022-10-28 ASSESSMENT — ACTIVITIES OF DAILY LIVING (ADL)
ADLS_ACUITY_SCORE: 60
ADLS_ACUITY_SCORE: 56
ADLS_ACUITY_SCORE: 60

## 2022-10-28 NOTE — PLAN OF CARE
PRIMARY DIAGNOSIS: PLACEMENT  OUTPATIENT/OBSERVATION GOALS TO BE MET BEFORE DISCHARGE:  1. ADLs back to baseline: Yes    2. Activity and level of assistance: Total care at baseline     3. Pain status: Pain free.    4. Return to near baseline physical activity: Yes     Discharge Planner Nurse   Safe discharge environment identified: Yes  Barriers to discharge: Yes       Entered by: Chela Glover RN 10/28/2022 12:57 AM     Patient is Aox4, VSS, total care at baseline, assist of 1 with cares in bed, tolerating regular diet and oral medications, no IV access at this time, no pain noted, waiting on placement and his waiver to be reopened, able to make his needs known, will continue to monitor and provide cares.      Please review provider order for any additional goals.   Nurse to notify provider when observation goals have been met and patient is ready for discharge.Goal Outcome Evaluation:
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
## 2022-10-28 NOTE — PLAN OF CARE
PRIMARY DIAGNOSIS: Social Concerns  OUTPATIENT/OBSERVATION GOALS TO BE MET BEFORE DISCHARGE:  1. ADLs back to baseline: Yes    2. Activity and level of assistance: Ax1 in bed, baseline    3. Pain status: Pain free.    4. Return to near baseline physical activity: Yes     Discharge Planner Nurse   Safe discharge environment identified: Yes - [facility]  Barriers to discharge: Yes - [facility] [place], safe discharge disposition       Entered by: Ceilna Adkins RN 10/28/2022         Please review provider order for any additional goals.   Nurse to notify provider when observation goals have been met and patient is ready for discharge.

## 2022-10-28 NOTE — PLAN OF CARE
PRIMARY DIAGNOSIS: PLACEMENT  OUTPATIENT/OBSERVATION GOALS TO BE MET BEFORE DISCHARGE:  1. ADLs back to baseline: Yes    2. Activity and level of assistance: Total care at baseline     3. Pain status: Pain free.    4. Return to near baseline physical activity: Yes     Discharge Planner Nurse   Safe discharge environment identified: Yes  Barriers to discharge: Yes       Entered by: Chela Glover RN 10/28/2022 4:44 AM     Patient is Aox4, VSS, total care at baseline, assist of 1 with cares in bed, tolerating regular diet and oral medications, no IV access at this time, no pain noted, waiting on placement and his waiver to be reopened, able to make his needs known, will continue to monitor and provide cares.      Please review provider order for any additional goals.   Nurse to notify provider when observation goals have been met and patient is ready for discharge.Goal Outcome Evaluation:

## 2022-10-29 LAB — GLUCOSE BLDC GLUCOMTR-MCNC: 83 MG/DL (ref 70–99)

## 2022-10-29 PROCEDURE — 250N000013 HC RX MED GY IP 250 OP 250 PS 637: Performed by: PHYSICIAN ASSISTANT

## 2022-10-29 PROCEDURE — G0378 HOSPITAL OBSERVATION PER HR: HCPCS

## 2022-10-29 PROCEDURE — 250N000013 HC RX MED GY IP 250 OP 250 PS 637: Performed by: HOSPITALIST

## 2022-10-29 PROCEDURE — 82962 GLUCOSE BLOOD TEST: CPT

## 2022-10-29 PROCEDURE — 99225 PR SUBSEQUENT OBSERVATION CARE,LEVEL II: CPT | Performed by: PHYSICIAN ASSISTANT

## 2022-10-29 RX ADMIN — METFORMIN HYDROCHLORIDE 1000 MG: 500 TABLET ORAL at 17:30

## 2022-10-29 RX ADMIN — QUETIAPINE 200 MG: 200 TABLET, FILM COATED ORAL at 14:03

## 2022-10-29 RX ADMIN — METOPROLOL SUCCINATE 25 MG: 25 TABLET, EXTENDED RELEASE ORAL at 09:05

## 2022-10-29 RX ADMIN — HYDROXYZINE HYDROCHLORIDE 50 MG: 50 TABLET, FILM COATED ORAL at 21:07

## 2022-10-29 RX ADMIN — HYDROXYZINE HYDROCHLORIDE 50 MG: 50 TABLET, FILM COATED ORAL at 14:03

## 2022-10-29 RX ADMIN — VENLAFAXINE HYDROCHLORIDE 150 MG: 150 CAPSULE, EXTENDED RELEASE ORAL at 21:08

## 2022-10-29 RX ADMIN — DIVALPROEX SODIUM 250 MG: 500 TABLET, DELAYED RELEASE ORAL at 09:05

## 2022-10-29 RX ADMIN — BACLOFEN 10 MG: 10 TABLET ORAL at 09:05

## 2022-10-29 RX ADMIN — DIVALPROEX SODIUM 500 MG: 500 TABLET, DELAYED RELEASE ORAL at 09:05

## 2022-10-29 RX ADMIN — MIRTAZAPINE 15 MG: 15 TABLET, FILM COATED ORAL at 21:07

## 2022-10-29 RX ADMIN — QUETIAPINE FUMARATE 400 MG: 200 TABLET ORAL at 21:06

## 2022-10-29 RX ADMIN — QUETIAPINE 200 MG: 200 TABLET, FILM COATED ORAL at 09:05

## 2022-10-29 RX ADMIN — BACLOFEN 10 MG: 10 TABLET ORAL at 21:07

## 2022-10-29 RX ADMIN — LEVOTHYROXINE SODIUM 25 MCG: 0.03 TABLET ORAL at 09:05

## 2022-10-29 RX ADMIN — HYDROXYZINE HYDROCHLORIDE 50 MG: 50 TABLET, FILM COATED ORAL at 09:05

## 2022-10-29 RX ADMIN — DIVALPROEX SODIUM 1000 MG: 500 TABLET, DELAYED RELEASE ORAL at 21:06

## 2022-10-29 RX ADMIN — EMPAGLIFLOZIN 10 MG: 10 TABLET, FILM COATED ORAL at 09:05

## 2022-10-29 RX ADMIN — METFORMIN HYDROCHLORIDE 1000 MG: 500 TABLET ORAL at 09:05

## 2022-10-29 RX ADMIN — Medication 25 MCG: at 09:05

## 2022-10-29 RX ADMIN — BACLOFEN 10 MG: 10 TABLET ORAL at 14:03

## 2022-10-29 RX ADMIN — CLONIDINE HYDROCHLORIDE 0.1 MG: 0.1 TABLET ORAL at 09:05

## 2022-10-29 RX ADMIN — CLOTRIMAZOLE: 0.01 CREAM TOPICAL at 09:06

## 2022-10-29 RX ADMIN — OLANZAPINE 2.5 MG: 2.5 TABLET, FILM COATED ORAL at 14:03

## 2022-10-29 RX ADMIN — ATORVASTATIN CALCIUM 20 MG: 20 TABLET, FILM COATED ORAL at 21:07

## 2022-10-29 RX ADMIN — VENLAFAXINE HYDROCHLORIDE 75 MG: 150 CAPSULE, EXTENDED RELEASE ORAL at 21:08

## 2022-10-29 RX ADMIN — ASPIRIN 81 MG CHEWABLE TABLET 81 MG: 81 TABLET CHEWABLE at 09:05

## 2022-10-29 RX ADMIN — Medication 10 MG: at 21:06

## 2022-10-29 RX ADMIN — CLONIDINE HYDROCHLORIDE 0.1 MG: 0.1 TABLET ORAL at 21:07

## 2022-10-29 ASSESSMENT — ACTIVITIES OF DAILY LIVING (ADL)
ADLS_ACUITY_SCORE: 60
ADLS_ACUITY_SCORE: 56
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
## 2022-10-29 NOTE — PLAN OF CARE
PRIMARY DIAGNOSIS: PLACEMENT  OUTPATIENT/OBSERVATION GOALS TO BE MET BEFORE DISCHARGE:  ADLs back to baseline: Yes    Activity and level of assistance: A X 2    Pain status: Pain free.    Return to near baseline physical activity: Yes     Discharge Planner Nurse   Safe discharge environment identified: Yes  Barriers to discharge: No       Entered by: Preston Arango RN 10/28/2022 9:26 PM     Please review provider order for any additional goals.   Nurse to notify provider when observation goals have been met and patient is ready for discharge.Goal Outcome Evaluation:

## 2022-10-29 NOTE — PLAN OF CARE
PRIMARY DIAGNOSIS: Social Concerns  OUTPATIENT/OBSERVATION GOALS TO BE MET BEFORE DISCHARGE:  1. ADLs back to baseline: Yes    2. Activity and level of assistance: Ax1 in bed, baseline    3. Pain status: Pain free.    4. Return to near baseline physical activity: Yes     Discharge Planner Nurse   Safe discharge environment identified: Yes - [facility]  Barriers to discharge: Yes - [facility], safe discharge disposition       Entered by: Celina Adkins RN 10/29/2022      Please review provider order for any additional goals.   Nurse to notify provider when observation goals have been met and patient is ready for discharge.

## 2022-10-29 NOTE — PLAN OF CARE
PRIMARY DIAGNOSIS: Social Concerns  OUTPATIENT/OBSERVATION GOALS TO BE MET BEFORE DISCHARGE:  1. ADLs back to baseline: Yes    2. Activity and level of assistance: Ax1 in bed, baseline    3. Pain status: Pain free.    4. Return to near baseline physical activity: Yes     Discharge Planner Nurse   Safe discharge environment identified: Yes - [facility]  Barriers to discharge: Yes - [facility], safe discharge disposition       Entered by: Celina Adkins RN 10/29/2022      Please review provider order for any additional goals.   Nurse to notify provider when observation goals have been met and patient is ready for discharge.

## 2022-10-29 NOTE — PLAN OF CARE
PRIMARY DIAGNOSIS: PLACEMENT  OUTPATIENT/OBSERVATION GOALS TO BE MET BEFORE DISCHARGE:  ADLs back to baseline: Yes    Activity and level of assistance: A X 2    Pain status: Pain free.    Return to near baseline physical activity: Yes     Discharge Planner Nurse   Safe discharge environment identified: Yes  Barriers to discharge: No       Entered by: Preston Arango RN 10/29/2022 2:16 AM     Please review provider order for any additional goals.   Nurse to notify provider when observation goals have been met and patient is ready for discharge.Goal Outcome Evaluation:

## 2022-10-29 NOTE — PLAN OF CARE
PRIMARY DIAGNOSIS: Social Concerns  OUTPATIENT/OBSERVATION GOALS TO BE MET BEFORE DISCHARGE:  1. ADLs back to baseline: Yes    2. Activity and level of assistance: Ax1 in bed, baseline    3. Pain status: Pain free.    4. Return to near baseline physical activity: Yes     Discharge Planner Nurse   Safe discharge environment identified: Yes -  [facility]  Barriers to discharge: Yes - [facility], safe discharge disposition       Entered by: Celina Adkins RN 10/29/2022         /83 (BP Location: Right arm)   Pulse 62   Temp 98.4  F (36.9  C) (Oral)   Resp 16   Wt 99.9 kg (220 lb 4.8 oz)   SpO2 96%     Able to make needs known. Denies pain. No new complaints today. Incontinent of bowel/bladder.     Please review provider order for any additional goals.   Nurse to notify provider when observation goals have been met and patient is ready for discharge.

## 2022-10-29 NOTE — PLAN OF CARE
PRIMARY DIAGNOSIS: Social Concerns  OUTPATIENT/OBSERVATION GOALS TO BE MET BEFORE DISCHARGE:  1. ADLs back to baseline: Yes    2. Activity and level of assistance: Ax1 in bed, baseline    3. Pain status: Pain free.    4. Return to near baseline physical activity: Yes     Discharge Planner Nurse   Safe discharge environment identified: Yes -  [facility]  Barriers to discharge: Yes - [facility], safe discharge disposition       Entered by: Celina Adkins RN 10/28/2022         /86 (BP Location: Right arm)   Pulse 83   Temp 98  F (36.7  C) (Oral)   Resp 18   Wt 99.9 kg (220 lb 4.8 oz)   SpO2 94%     Able to make needs known. Denies pain. No new complaints today. BM this shift; incontinent of bowel/bladder.     Please review provider order for any additional goals.   Nurse to notify provider when observation goals have been met and patient is ready for discharge.

## 2022-10-30 LAB
GLUCOSE BLDC GLUCOMTR-MCNC: 105 MG/DL (ref 70–99)
GLUCOSE BLDC GLUCOMTR-MCNC: 174 MG/DL (ref 70–99)

## 2022-10-30 PROCEDURE — 250N000013 HC RX MED GY IP 250 OP 250 PS 637: Performed by: PHYSICIAN ASSISTANT

## 2022-10-30 PROCEDURE — G0378 HOSPITAL OBSERVATION PER HR: HCPCS

## 2022-10-30 PROCEDURE — 82962 GLUCOSE BLOOD TEST: CPT

## 2022-10-30 PROCEDURE — 250N000013 HC RX MED GY IP 250 OP 250 PS 637: Performed by: HOSPITALIST

## 2022-10-30 PROCEDURE — 99225 PR SUBSEQUENT OBSERVATION CARE,LEVEL II: CPT | Performed by: PHYSICIAN ASSISTANT

## 2022-10-30 RX ADMIN — QUETIAPINE 200 MG: 200 TABLET, FILM COATED ORAL at 15:19

## 2022-10-30 RX ADMIN — DIVALPROEX SODIUM 1000 MG: 500 TABLET, DELAYED RELEASE ORAL at 21:13

## 2022-10-30 RX ADMIN — SENNOSIDES AND DOCUSATE SODIUM 1 TABLET: 50; 8.6 TABLET ORAL at 09:53

## 2022-10-30 RX ADMIN — BACLOFEN 10 MG: 10 TABLET ORAL at 21:14

## 2022-10-30 RX ADMIN — ASPIRIN 81 MG CHEWABLE TABLET 81 MG: 81 TABLET CHEWABLE at 09:53

## 2022-10-30 RX ADMIN — CLONIDINE HYDROCHLORIDE 0.1 MG: 0.1 TABLET ORAL at 09:54

## 2022-10-30 RX ADMIN — METFORMIN HYDROCHLORIDE 1000 MG: 500 TABLET ORAL at 09:53

## 2022-10-30 RX ADMIN — Medication 25 MCG: at 09:54

## 2022-10-30 RX ADMIN — HYDROXYZINE HYDROCHLORIDE 50 MG: 50 TABLET, FILM COATED ORAL at 21:13

## 2022-10-30 RX ADMIN — ATORVASTATIN CALCIUM 20 MG: 20 TABLET, FILM COATED ORAL at 21:14

## 2022-10-30 RX ADMIN — CLONIDINE HYDROCHLORIDE 0.1 MG: 0.1 TABLET ORAL at 21:14

## 2022-10-30 RX ADMIN — BACLOFEN 10 MG: 10 TABLET ORAL at 15:19

## 2022-10-30 RX ADMIN — Medication 10 MG: at 21:13

## 2022-10-30 RX ADMIN — VENLAFAXINE HYDROCHLORIDE 150 MG: 150 CAPSULE, EXTENDED RELEASE ORAL at 21:14

## 2022-10-30 RX ADMIN — OLANZAPINE 2.5 MG: 2.5 TABLET, FILM COATED ORAL at 15:19

## 2022-10-30 RX ADMIN — HYDROXYZINE HYDROCHLORIDE 50 MG: 50 TABLET, FILM COATED ORAL at 15:19

## 2022-10-30 RX ADMIN — BACLOFEN 10 MG: 10 TABLET ORAL at 09:54

## 2022-10-30 RX ADMIN — METOPROLOL SUCCINATE 25 MG: 25 TABLET, EXTENDED RELEASE ORAL at 09:53

## 2022-10-30 RX ADMIN — EMPAGLIFLOZIN 10 MG: 10 TABLET, FILM COATED ORAL at 09:54

## 2022-10-30 RX ADMIN — LEVOTHYROXINE SODIUM 25 MCG: 0.03 TABLET ORAL at 09:54

## 2022-10-30 RX ADMIN — METFORMIN HYDROCHLORIDE 1000 MG: 500 TABLET ORAL at 17:17

## 2022-10-30 RX ADMIN — QUETIAPINE FUMARATE 400 MG: 200 TABLET ORAL at 21:14

## 2022-10-30 RX ADMIN — QUETIAPINE 200 MG: 200 TABLET, FILM COATED ORAL at 09:53

## 2022-10-30 RX ADMIN — VENLAFAXINE HYDROCHLORIDE 75 MG: 150 CAPSULE, EXTENDED RELEASE ORAL at 21:14

## 2022-10-30 RX ADMIN — MIRTAZAPINE 15 MG: 15 TABLET, FILM COATED ORAL at 21:13

## 2022-10-30 RX ADMIN — DIVALPROEX SODIUM 250 MG: 500 TABLET, DELAYED RELEASE ORAL at 09:53

## 2022-10-30 RX ADMIN — CLOTRIMAZOLE: 0.01 CREAM TOPICAL at 21:15

## 2022-10-30 RX ADMIN — CLOTRIMAZOLE: 0.01 CREAM TOPICAL at 10:30

## 2022-10-30 RX ADMIN — HYDROXYZINE HYDROCHLORIDE 50 MG: 50 TABLET, FILM COATED ORAL at 09:53

## 2022-10-30 RX ADMIN — DIVALPROEX SODIUM 500 MG: 500 TABLET, DELAYED RELEASE ORAL at 09:55

## 2022-10-30 ASSESSMENT — ACTIVITIES OF DAILY LIVING (ADL)
ADLS_ACUITY_SCORE: 60
ADLS_ACUITY_SCORE: 56
ADLS_ACUITY_SCORE: 60
ADLS_ACUITY_SCORE: 60
ADLS_ACUITY_SCORE: 56
ADLS_ACUITY_SCORE: 60
ADLS_ACUITY_SCORE: 56

## 2022-10-30 NOTE — PLAN OF CARE
PRIMARY DIAGNOSIS: Social Concerns  OUTPATIENT/OBSERVATION GOALS TO BE MET BEFORE DISCHARGE:  1. ADLs back to baseline: Yes    2. Activity and level of assistance: Ax1 in bed, lift assist to chair, baseline    3. Pain status: Pain free.    4. Return to near baseline physical activity: Yes     Discharge Planner Nurse   Safe discharge environment identified: Yes -  [facility]  Barriers to discharge: Yes - [facility], safe discharge disposition       Entered by: Celina Adkins RN 10/30/2022         /86 (BP Location: Right arm)   Pulse 83   Temp 97.6  F (36.4  C) (Oral)   Resp 16   Wt 99.9 kg (220 lb 4.8 oz)   SpO2 100%     Able to make needs known. Denies pain. Redness in perineum appears improved. No new complaints today. Up in chair for lunch.     Please review provider order for any additional goals.   Nurse to notify provider when observation goals have been met and patient is ready for discharge.

## 2022-10-30 NOTE — PLAN OF CARE
PRIMARY DIAGNOSIS: PLACEMENT  OUTPATIENT/OBSERVATION GOALS TO BE MET BEFORE DISCHARGE:  ADLs back to baseline: Yes    Activity and level of assistance: A X 2    Pain status: Pain free.    Return to near baseline physical activity: Yes     Discharge Planner Nurse   Safe discharge environment identified: Yes  Barriers to discharge: No       Entered by: Preston Arango RN 10/29/2022 8:50 PM     Please review provider order for any additional goals.   Nurse to notify provider when observation goals have been met and patient is ready for discharge.Goal Outcome Evaluation:

## 2022-10-30 NOTE — PLAN OF CARE
PRIMARY DIAGNOSIS: PLACEMENT  OUTPATIENT/OBSERVATION GOALS TO BE MET BEFORE DISCHARGE:  1. ADLs back to baseline: Yes    2. Activity and level of assistance: A X 2    3. Pain status: Pain free.    4. Return to near baseline physical activity: Yes     Discharge Planner Nurse   Safe discharge environment identified: Yes  Barriers to discharge: No       Entered by: Preston Arango RN 10/30/2022 3:53 AM  Pt AO x4, A x 2 with lift device but not OOB this shift. Pt incontinent of bowel and bladder, calls appropriately, SW following with  safe disposition  Please review provider order for any additional goals.   Nurse to notify provider when observation goals have been met and patient is ready for discharge.Goal Outcome Evaluation:

## 2022-10-30 NOTE — PLAN OF CARE
PRIMARY DIAGNOSIS: Social Concerns  OUTPATIENT/OBSERVATION GOALS TO BE MET BEFORE DISCHARGE:  1. ADLs back to baseline: Yes    2. Activity and level of assistance: Ax1 in bed, lift assist to chair, baseline    3. Pain status: Pain free.    4. Return to near baseline physical activity: Yes     Discharge Planner Nurse   Safe discharge environment identified: Yes -  [facility]  Barriers to discharge: Yes - [facility], safe discharge disposition       Entered by: Celina Adkins RN 10/30/2022         /86 (BP Location: Right arm)   Pulse 83   Temp 97.6  F (36.4  C) (Oral)   Resp 16   Wt 99.9 kg (220 lb 4.8 oz)   SpO2 100%     Able to make needs known. Denies pain. Redness in perineum appears improved. No new complaints today.     Please review provider order for any additional goals.   Nurse to notify provider when observation goals have been met and patient is ready for discharge.

## 2022-10-30 NOTE — PLAN OF CARE
PRIMARY DIAGNOSIS: PLACEMENT  OUTPATIENT/OBSERVATION GOALS TO BE MET BEFORE DISCHARGE:  ADLs back to baseline: Yes    Activity and level of assistance: A X 2    Pain status: Pain free.    Return to near baseline physical activity: Yes     Discharge Planner Nurse   Safe discharge environment identified: Yes  Barriers to discharge: No       Entered by: Preston Arango RN 10/30/2022 12:53 AM     Please review provider order for any additional goals.   Nurse to notify provider when observation goals have been met and patient is ready for discharge.Goal Outcome Evaluation:

## 2022-10-30 NOTE — PLAN OF CARE
PRIMARY DIAGNOSIS: Social Concerns  OUTPATIENT/OBSERVATION GOALS TO BE MET BEFORE DISCHARGE:  1. ADLs back to baseline: Yes    2. Activity and level of assistance: Ax1 in bed, lift assist to chair, baseline    3. Pain status: Pain free.    4. Return to near baseline physical activity: Yes     Discharge Planner Nurse   Safe discharge environment identified: Yes -  [facility]  Barriers to discharge: Yes - [facility], safe discharge disposition       Entered by: Celina Adkins RN 10/30/2022         /86 (BP Location: Right arm)   Pulse 83   Temp 97.6  F (36.4  C) (Oral)   Resp 16   Wt 99.9 kg (220 lb 4.8 oz)   SpO2 100%     Able to make needs known. Denies pain. Redness in perineum appears improved. No new complaints today.     Please review provider order for any additional goals.   Nurse to notify provider when observation goals have been met and patient is ready for discharge.
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
## 2022-10-31 LAB
GLUCOSE BLDC GLUCOMTR-MCNC: 113 MG/DL (ref 70–99)
SARS-COV-2 RNA RESP QL NAA+PROBE: NEGATIVE

## 2022-10-31 PROCEDURE — 250N000013 HC RX MED GY IP 250 OP 250 PS 637: Performed by: PHYSICIAN ASSISTANT

## 2022-10-31 PROCEDURE — G0378 HOSPITAL OBSERVATION PER HR: HCPCS

## 2022-10-31 PROCEDURE — 99225 PR SUBSEQUENT OBSERVATION CARE,LEVEL II: CPT | Performed by: PHYSICIAN ASSISTANT

## 2022-10-31 PROCEDURE — U0003 INFECTIOUS AGENT DETECTION BY NUCLEIC ACID (DNA OR RNA); SEVERE ACUTE RESPIRATORY SYNDROME CORONAVIRUS 2 (SARS-COV-2) (CORONAVIRUS DISEASE [COVID-19]), AMPLIFIED PROBE TECHNIQUE, MAKING USE OF HIGH THROUGHPUT TECHNOLOGIES AS DESCRIBED BY CMS-2020-01-R: HCPCS | Performed by: PHYSICIAN ASSISTANT

## 2022-10-31 PROCEDURE — 99207 PR NO BILLABLE SERVICE THIS VISIT: CPT | Performed by: HOSPITALIST

## 2022-10-31 PROCEDURE — 82962 GLUCOSE BLOOD TEST: CPT

## 2022-10-31 PROCEDURE — 250N000013 HC RX MED GY IP 250 OP 250 PS 637: Performed by: HOSPITALIST

## 2022-10-31 RX ADMIN — CLOTRIMAZOLE: 0.01 CREAM TOPICAL at 21:11

## 2022-10-31 RX ADMIN — ASPIRIN 81 MG CHEWABLE TABLET 81 MG: 81 TABLET CHEWABLE at 08:28

## 2022-10-31 RX ADMIN — OLANZAPINE 2.5 MG: 2.5 TABLET, FILM COATED ORAL at 14:17

## 2022-10-31 RX ADMIN — Medication 10 MG: at 21:09

## 2022-10-31 RX ADMIN — SENNOSIDES AND DOCUSATE SODIUM 1 TABLET: 50; 8.6 TABLET ORAL at 08:28

## 2022-10-31 RX ADMIN — Medication 25 MCG: at 08:28

## 2022-10-31 RX ADMIN — MIRTAZAPINE 15 MG: 15 TABLET, FILM COATED ORAL at 21:08

## 2022-10-31 RX ADMIN — BACLOFEN 10 MG: 10 TABLET ORAL at 14:09

## 2022-10-31 RX ADMIN — METFORMIN HYDROCHLORIDE 1000 MG: 500 TABLET ORAL at 18:12

## 2022-10-31 RX ADMIN — DIVALPROEX SODIUM 500 MG: 500 TABLET, DELAYED RELEASE ORAL at 08:28

## 2022-10-31 RX ADMIN — QUETIAPINE FUMARATE 400 MG: 200 TABLET ORAL at 21:08

## 2022-10-31 RX ADMIN — ATORVASTATIN CALCIUM 20 MG: 20 TABLET, FILM COATED ORAL at 21:08

## 2022-10-31 RX ADMIN — BACLOFEN 10 MG: 10 TABLET ORAL at 08:28

## 2022-10-31 RX ADMIN — LEVOTHYROXINE SODIUM 25 MCG: 0.03 TABLET ORAL at 08:28

## 2022-10-31 RX ADMIN — BACLOFEN 10 MG: 10 TABLET ORAL at 21:08

## 2022-10-31 RX ADMIN — VENLAFAXINE HYDROCHLORIDE 75 MG: 150 CAPSULE, EXTENDED RELEASE ORAL at 21:09

## 2022-10-31 RX ADMIN — VENLAFAXINE HYDROCHLORIDE 150 MG: 150 CAPSULE, EXTENDED RELEASE ORAL at 21:08

## 2022-10-31 RX ADMIN — HYDROXYZINE HYDROCHLORIDE 50 MG: 50 TABLET, FILM COATED ORAL at 21:08

## 2022-10-31 RX ADMIN — QUETIAPINE 200 MG: 200 TABLET, FILM COATED ORAL at 14:11

## 2022-10-31 RX ADMIN — EMPAGLIFLOZIN 10 MG: 10 TABLET, FILM COATED ORAL at 08:28

## 2022-10-31 RX ADMIN — DIVALPROEX SODIUM 250 MG: 500 TABLET, DELAYED RELEASE ORAL at 08:32

## 2022-10-31 RX ADMIN — METOPROLOL SUCCINATE 25 MG: 25 TABLET, EXTENDED RELEASE ORAL at 08:28

## 2022-10-31 RX ADMIN — DIVALPROEX SODIUM 1000 MG: 500 TABLET, DELAYED RELEASE ORAL at 21:05

## 2022-10-31 RX ADMIN — QUETIAPINE 200 MG: 200 TABLET, FILM COATED ORAL at 08:28

## 2022-10-31 RX ADMIN — CLONIDINE HYDROCHLORIDE 0.1 MG: 0.1 TABLET ORAL at 21:09

## 2022-10-31 RX ADMIN — HYDROXYZINE HYDROCHLORIDE 50 MG: 50 TABLET, FILM COATED ORAL at 14:07

## 2022-10-31 RX ADMIN — METFORMIN HYDROCHLORIDE 1000 MG: 500 TABLET ORAL at 08:28

## 2022-10-31 RX ADMIN — CLOTRIMAZOLE: 0.01 CREAM TOPICAL at 08:31

## 2022-10-31 RX ADMIN — HYDROXYZINE HYDROCHLORIDE 50 MG: 50 TABLET, FILM COATED ORAL at 08:27

## 2022-10-31 RX ADMIN — CLONIDINE HYDROCHLORIDE 0.1 MG: 0.1 TABLET ORAL at 08:28

## 2022-10-31 ASSESSMENT — ACTIVITIES OF DAILY LIVING (ADL)
ADLS_ACUITY_SCORE: 52
ADLS_ACUITY_SCORE: 56
ADLS_ACUITY_SCORE: 52
ADLS_ACUITY_SCORE: 56
ADLS_ACUITY_SCORE: 52
ADLS_ACUITY_SCORE: 56
ADLS_ACUITY_SCORE: 56
ADLS_ACUITY_SCORE: 52

## 2022-10-31 NOTE — PLAN OF CARE
PRIMARY DIAGNOSIS: PLACEMENT  OUTPATIENT/OBSERVATION GOALS TO BE MET BEFORE DISCHARGE:  ADLs back to baseline: Yes    Activity and level of assistance: A X 2    Pain status: Pain free.    Return to near baseline physical activity: Yes     Discharge Planner Nurse   Safe discharge environment identified: Yes  Barriers to discharge: No       Entered by: Pretson Arango RN 10/30/2022 9:19 PM     Please review provider order for any additional goals.   Nurse to notify provider when observation goals have been met and patient is ready for discharge.Goal Outcome Evaluation:

## 2022-10-31 NOTE — PLAN OF CARE
"  PRIMARY DIAGNOSIS: \"Placement\"  OUTPATIENT/OBSERVATION GOALS TO BE MET BEFORE DISCHARGE:  ADLs back to baseline: Yes    Activity and level of assistance: parapalegic    Pain status: Pain free.    Return to near baseline physical activity: Yes     Discharge Planner Nurse   Safe discharge environment identified: No  Barriers to discharge: Yes       Entered by: Sai Estrella RN 10/31/2022 0800    Pt Alert, VSS, waiting on placement   Please review provider order for any additional goals.   Nurse to notify provider when observation goals have been met and patient is ready for discharge.  "

## 2022-10-31 NOTE — PLAN OF CARE
"PRIMARY DIAGNOSIS: \"GENERIC\" NURSING  OUTPATIENT/OBSERVATION GOALS TO BE MET BEFORE DISCHARGE:  ADLs back to baseline: Yes    Activity and level of assistance: parapalegic    Pain status: Pain free.    Return to near baseline physical activity: Yes     Discharge Planner Nurse   Safe discharge environment identified: Yes  Barriers to discharge: Yes       Entered by: Sai Estrella RN 10/31/2022 1200   Pt A/O, VSS, Pt parapalegic, covid swab completed and sent to lab. Waiting for Placement.   Please review provider order for any additional goals.   Nurse to notify provider when observation goals have been met and patient is ready for discharge.    "
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
## 2022-10-31 NOTE — PLAN OF CARE
PRIMARY DIAGNOSIS: PLACEMENT  OUTPATIENT/OBSERVATION GOALS TO BE MET BEFORE DISCHARGE:  1. ADLs back to baseline: Yes    2. Activity and level of assistance: A X2    3. Pain status: Pain free.    4. Return to near baseline physical activity: Yes     Discharge Planner Nurse   Safe discharge environment identified: Yes  Barriers to discharge: No       Entered by: Preston Arango RN 10/31/2022 5:19 AM  Pt AO x4, A x 2 with lift device but not OOB this shift. Pt incontinent of bowel and bladder, calls appropriately, SW following with  safe disposition  Please review provider order for any additional goals.   Nurse to notify provider when observation goals have been met and patient is ready for discharge.Goal Outcome Evaluation:

## 2022-10-31 NOTE — PLAN OF CARE
PRIMARY DIAGNOSIS: PLACEMENT  OUTPATIENT/OBSERVATION GOALS TO BE MET BEFORE DISCHARGE:  ADLs back to baseline: Yes    Activity and level of assistance: A X 1    Pain status: Pain free.    Return to near baseline physical activity: Yes     Discharge Planner Nurse   Safe discharge environment identified: Yes  Barriers to discharge: No       Entered by: Preston Arango RN 10/31/2022 1:56 AM     Please review provider order for any additional goals.   Nurse to notify provider when observation goals have been met and patient is ready for discharge.Goal Outcome Evaluation:

## 2022-11-01 LAB — GLUCOSE BLDC GLUCOMTR-MCNC: 109 MG/DL (ref 70–99)

## 2022-11-01 PROCEDURE — 250N000013 HC RX MED GY IP 250 OP 250 PS 637: Performed by: HOSPITALIST

## 2022-11-01 PROCEDURE — 82962 GLUCOSE BLOOD TEST: CPT

## 2022-11-01 PROCEDURE — G0378 HOSPITAL OBSERVATION PER HR: HCPCS

## 2022-11-01 PROCEDURE — 250N000013 HC RX MED GY IP 250 OP 250 PS 637: Performed by: PHYSICIAN ASSISTANT

## 2022-11-01 PROCEDURE — 99224 PR SUBSEQUENT OBSERVATION CARE,LEVEL I: CPT | Performed by: PHYSICIAN ASSISTANT

## 2022-11-01 RX ADMIN — DIVALPROEX SODIUM 500 MG: 500 TABLET, DELAYED RELEASE ORAL at 08:37

## 2022-11-01 RX ADMIN — ASPIRIN 81 MG CHEWABLE TABLET 81 MG: 81 TABLET CHEWABLE at 08:36

## 2022-11-01 RX ADMIN — CLOTRIMAZOLE: 0.01 CREAM TOPICAL at 08:41

## 2022-11-01 RX ADMIN — HYDROXYZINE HYDROCHLORIDE 50 MG: 50 TABLET, FILM COATED ORAL at 08:37

## 2022-11-01 RX ADMIN — DIVALPROEX SODIUM 1000 MG: 500 TABLET, DELAYED RELEASE ORAL at 21:15

## 2022-11-01 RX ADMIN — QUETIAPINE FUMARATE 400 MG: 200 TABLET ORAL at 21:13

## 2022-11-01 RX ADMIN — QUETIAPINE 200 MG: 200 TABLET, FILM COATED ORAL at 08:37

## 2022-11-01 RX ADMIN — ATORVASTATIN CALCIUM 20 MG: 20 TABLET, FILM COATED ORAL at 21:15

## 2022-11-01 RX ADMIN — METFORMIN HYDROCHLORIDE 1000 MG: 500 TABLET ORAL at 08:36

## 2022-11-01 RX ADMIN — BACLOFEN 10 MG: 10 TABLET ORAL at 21:14

## 2022-11-01 RX ADMIN — LEVOTHYROXINE SODIUM 25 MCG: 0.03 TABLET ORAL at 08:37

## 2022-11-01 RX ADMIN — CLONIDINE HYDROCHLORIDE 0.1 MG: 0.1 TABLET ORAL at 08:37

## 2022-11-01 RX ADMIN — BACLOFEN 10 MG: 10 TABLET ORAL at 08:38

## 2022-11-01 RX ADMIN — VENLAFAXINE HYDROCHLORIDE 150 MG: 150 CAPSULE, EXTENDED RELEASE ORAL at 21:14

## 2022-11-01 RX ADMIN — Medication 10 MG: at 21:13

## 2022-11-01 RX ADMIN — METOPROLOL SUCCINATE 25 MG: 25 TABLET, EXTENDED RELEASE ORAL at 08:37

## 2022-11-01 RX ADMIN — VENLAFAXINE HYDROCHLORIDE 75 MG: 150 CAPSULE, EXTENDED RELEASE ORAL at 21:14

## 2022-11-01 RX ADMIN — HYDROXYZINE HYDROCHLORIDE 50 MG: 50 TABLET, FILM COATED ORAL at 21:15

## 2022-11-01 RX ADMIN — Medication 25 MCG: at 08:37

## 2022-11-01 RX ADMIN — METFORMIN HYDROCHLORIDE 1000 MG: 500 TABLET ORAL at 17:13

## 2022-11-01 RX ADMIN — SENNOSIDES AND DOCUSATE SODIUM 1 TABLET: 50; 8.6 TABLET ORAL at 08:36

## 2022-11-01 RX ADMIN — BACLOFEN 10 MG: 10 TABLET ORAL at 13:05

## 2022-11-01 RX ADMIN — MIRTAZAPINE 15 MG: 15 TABLET, FILM COATED ORAL at 21:15

## 2022-11-01 RX ADMIN — DIVALPROEX SODIUM 250 MG: 500 TABLET, DELAYED RELEASE ORAL at 08:38

## 2022-11-01 RX ADMIN — OLANZAPINE 2.5 MG: 2.5 TABLET, FILM COATED ORAL at 13:05

## 2022-11-01 RX ADMIN — EMPAGLIFLOZIN 10 MG: 10 TABLET, FILM COATED ORAL at 08:37

## 2022-11-01 RX ADMIN — HYDROXYZINE HYDROCHLORIDE 50 MG: 50 TABLET, FILM COATED ORAL at 13:05

## 2022-11-01 RX ADMIN — QUETIAPINE 200 MG: 200 TABLET, FILM COATED ORAL at 13:05

## 2022-11-01 RX ADMIN — CLONIDINE HYDROCHLORIDE 0.1 MG: 0.1 TABLET ORAL at 21:15

## 2022-11-01 ASSESSMENT — ACTIVITIES OF DAILY LIVING (ADL)
ADLS_ACUITY_SCORE: 52
ADLS_ACUITY_SCORE: 56
ADLS_ACUITY_SCORE: 52
ADLS_ACUITY_SCORE: 56
ADLS_ACUITY_SCORE: 52

## 2022-11-01 NOTE — PLAN OF CARE
PRIMARY DIAGNOSIS: PLACEMENT   OUTPATIENT/OBSERVATION GOALS TO BE MET BEFORE DISCHARGE:  1. ADLs back to baseline: Yes    2. Activity and level of assistance: AX1-2 lift    3. Pain status: Pain free.    4. Return to near baseline physical activity: Yes     Discharge Planner Nurse   Safe discharge environment identified: Yes  Barriers to discharge: Yes       Entered by: Jackson Wilson RN 10/31/2022 7:05 PM  Pt A&Ox4. VSS on RA. Denies pain. Pt is medically ready for discharge- SW following for placement.   Please review provider order for any additional goals.   Nurse to notify provider when observation goals have been met and patient is ready for discharge.

## 2022-11-01 NOTE — PLAN OF CARE
PRIMARY DIAGNOSIS: PLACEMENT  OUTPATIENT/OBSERVATION GOALS TO BE MET BEFORE DISCHARGE:  1. ADLs back to baseline: Yes    2. Activity and level of assistance: A X 2    3. Pain status: Pain free.    4. Return to near baseline physical activity: Yes     Discharge Planner Nurse   Safe discharge environment identified: Yes  Barriers to discharge: No       Entered by: Waleska Jin RN 11/01/2022 4:23 PM      Pt is able to call on his own when needing assistance. Patient passed on to support staff today that he knows when he has to pee, urinal was placed at bedside to help support more independence. SW is falling for placement.     BP (!) 144/89 (BP Location: Right arm)   Pulse 102   Temp 97.2  F (36.2  C) (Oral)   Resp 17   Wt 99.9 kg (220 lb 4.8 oz)   SpO2 93%     Please review provider order for any additional goals.   Nurse to notify provider when observation goals have been met and patient is ready for discharge.Goal Outcome Evaluation:

## 2022-11-01 NOTE — PLAN OF CARE
PRIMARY DIAGNOSIS: PLACEMENT  OUTPATIENT/OBSERVATION GOALS TO BE MET BEFORE DISCHARGE:  1. ADLs back to baseline: Yes    2. Activity and level of assistance: A X 2    3. Pain status: Pain free.    4. Return to near baseline physical activity: Yes     Discharge Planner Nurse   Safe discharge environment identified: Yes  Barriers to discharge: No       Entered by: Waleska Jin RN 11/01/2022 9:45 AM      Pt is AOx4, VSS, LCTA, BS active. He is an assist of 2 with a lift device, doesn't want to go into chair until it is around lunch time. Incontienent of bowel and bladder, he is able to call appropriately, SW is following.     BP (!) 133/94 (BP Location: Right arm, Patient Position: Supine, Cuff Size: Adult Regular)   Pulse 78   Temp 98.2  F (36.8  C) (Oral)   Resp 17   Wt 99.9 kg (220 lb 4.8 oz)   SpO2 95%     Please review provider order for any additional goals.   Nurse to notify provider when observation goals have been met and patient is ready for discharge.Goal Outcome Evaluation:

## 2022-11-01 NOTE — PLAN OF CARE
PRIMARY DIAGNOSIS: PLACEMENT  OUTPATIENT/OBSERVATION GOALS TO BE MET BEFORE DISCHARGE:  1. ADLs back to baseline: Yes    2. Activity and level of assistance: A X 2    3. Pain status: Pain free.    4. Return to near baseline physical activity: Yes     Discharge Planner Nurse   Safe discharge environment identified: Yes  Barriers to discharge: No       Entered by: Preston Arango RN 11/01/2022 4:37 AM   Pt AO x4, A x 2 with lift device but not OOB this shift. Pt incontinent of bowel and bladder, calls appropriately, SW following with  safe disposition  Please review provider order for any additional goals.   Nurse to notify provider when observation goals have been met and patient is ready for discharge.Goal Outcome Evaluation:

## 2022-11-01 NOTE — PLAN OF CARE
PRIMARY DIAGNOSIS: PLACEMENT  OUTPATIENT/OBSERVATION GOALS TO BE MET BEFORE DISCHARGE:  ADLs back to baseline: Yes    Activity and level of assistance: A X 2    Pain status: Pain free.    Return to near baseline physical activity: Yes     Discharge Planner Nurse   Safe discharge environment identified: Yes  Barriers to discharge: No       Entered by: Preston Arango RN 11/01/2022 12:58 AM     Please review provider order for any additional goals.   Nurse to notify provider when observation goals have been met and patient is ready for discharge.Goal Outcome Evaluation:
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
## 2022-11-01 NOTE — PLAN OF CARE
PRIMARY DIAGNOSIS: PLACEMENT  OUTPATIENT/OBSERVATION GOALS TO BE MET BEFORE DISCHARGE:  1. ADLs back to baseline: Yes    2. Activity and level of assistance: A X 2    3. Pain status: Pain free.    4. Return to near baseline physical activity: Yes     Discharge Planner Nurse   Safe discharge environment identified: Yes  Barriers to discharge: No       Entered by: Waleska Jin RN 11/01/2022 11:15 AM      Pt is able to call on his own when needing assistance. Patient passed on to support staff today that he knows when he has to pee, urinal was placed at bedside to help support more independence. SW is falling for placement.     BP (!) 133/94 (BP Location: Right arm, Patient Position: Supine, Cuff Size: Adult Regular)   Pulse 78   Temp 98.2  F (36.8  C) (Oral)   Resp 17   Wt 99.9 kg (220 lb 4.8 oz)   SpO2 95%     Please review provider order for any additional goals.   Nurse to notify provider when observation goals have been met and patient is ready for discharge.Goal Outcome Evaluation:

## 2022-11-02 LAB — GLUCOSE BLDC GLUCOMTR-MCNC: 107 MG/DL (ref 70–99)

## 2022-11-02 PROCEDURE — 250N000013 HC RX MED GY IP 250 OP 250 PS 637: Performed by: PHYSICIAN ASSISTANT

## 2022-11-02 PROCEDURE — 82962 GLUCOSE BLOOD TEST: CPT

## 2022-11-02 PROCEDURE — 250N000013 HC RX MED GY IP 250 OP 250 PS 637: Performed by: HOSPITALIST

## 2022-11-02 PROCEDURE — G0378 HOSPITAL OBSERVATION PER HR: HCPCS

## 2022-11-02 PROCEDURE — 99224 PR SUBSEQUENT OBSERVATION CARE,LEVEL I: CPT | Performed by: PHYSICIAN ASSISTANT

## 2022-11-02 RX ADMIN — METFORMIN HYDROCHLORIDE 1000 MG: 500 TABLET ORAL at 17:44

## 2022-11-02 RX ADMIN — CLOTRIMAZOLE: 0.01 CREAM TOPICAL at 08:15

## 2022-11-02 RX ADMIN — EMPAGLIFLOZIN 10 MG: 10 TABLET, FILM COATED ORAL at 08:12

## 2022-11-02 RX ADMIN — MIRTAZAPINE 15 MG: 15 TABLET, FILM COATED ORAL at 21:15

## 2022-11-02 RX ADMIN — LEVOTHYROXINE SODIUM 25 MCG: 0.03 TABLET ORAL at 08:12

## 2022-11-02 RX ADMIN — BACLOFEN 10 MG: 10 TABLET ORAL at 08:11

## 2022-11-02 RX ADMIN — HYDROXYZINE HYDROCHLORIDE 50 MG: 50 TABLET, FILM COATED ORAL at 20:40

## 2022-11-02 RX ADMIN — QUETIAPINE 200 MG: 200 TABLET, FILM COATED ORAL at 13:31

## 2022-11-02 RX ADMIN — HYDROXYZINE HYDROCHLORIDE 50 MG: 50 TABLET, FILM COATED ORAL at 08:12

## 2022-11-02 RX ADMIN — Medication 10 MG: at 21:15

## 2022-11-02 RX ADMIN — CLONIDINE HYDROCHLORIDE 0.1 MG: 0.1 TABLET ORAL at 08:12

## 2022-11-02 RX ADMIN — ATORVASTATIN CALCIUM 20 MG: 20 TABLET, FILM COATED ORAL at 20:40

## 2022-11-02 RX ADMIN — OLANZAPINE 2.5 MG: 2.5 TABLET, FILM COATED ORAL at 13:31

## 2022-11-02 RX ADMIN — VENLAFAXINE HYDROCHLORIDE 75 MG: 150 CAPSULE, EXTENDED RELEASE ORAL at 21:13

## 2022-11-02 RX ADMIN — ASPIRIN 81 MG CHEWABLE TABLET 81 MG: 81 TABLET CHEWABLE at 08:12

## 2022-11-02 RX ADMIN — Medication 25 MCG: at 08:11

## 2022-11-02 RX ADMIN — BACLOFEN 10 MG: 10 TABLET ORAL at 20:40

## 2022-11-02 RX ADMIN — QUETIAPINE FUMARATE 400 MG: 200 TABLET ORAL at 21:15

## 2022-11-02 RX ADMIN — METOPROLOL SUCCINATE 25 MG: 25 TABLET, EXTENDED RELEASE ORAL at 08:12

## 2022-11-02 RX ADMIN — DIVALPROEX SODIUM 250 MG: 500 TABLET, DELAYED RELEASE ORAL at 08:15

## 2022-11-02 RX ADMIN — HYDROXYZINE HYDROCHLORIDE 50 MG: 50 TABLET, FILM COATED ORAL at 13:31

## 2022-11-02 RX ADMIN — CLOTRIMAZOLE: 0.01 CREAM TOPICAL at 20:40

## 2022-11-02 RX ADMIN — DIVALPROEX SODIUM 500 MG: 500 TABLET, DELAYED RELEASE ORAL at 08:11

## 2022-11-02 RX ADMIN — CLONIDINE HYDROCHLORIDE 0.1 MG: 0.1 TABLET ORAL at 20:40

## 2022-11-02 RX ADMIN — DIVALPROEX SODIUM 1000 MG: 500 TABLET, DELAYED RELEASE ORAL at 21:14

## 2022-11-02 RX ADMIN — QUETIAPINE 200 MG: 200 TABLET, FILM COATED ORAL at 08:11

## 2022-11-02 RX ADMIN — SENNOSIDES AND DOCUSATE SODIUM 1 TABLET: 50; 8.6 TABLET ORAL at 08:12

## 2022-11-02 RX ADMIN — METFORMIN HYDROCHLORIDE 1000 MG: 500 TABLET ORAL at 08:11

## 2022-11-02 RX ADMIN — VENLAFAXINE HYDROCHLORIDE 150 MG: 150 CAPSULE, EXTENDED RELEASE ORAL at 21:14

## 2022-11-02 RX ADMIN — BACLOFEN 10 MG: 10 TABLET ORAL at 13:31

## 2022-11-02 ASSESSMENT — ACTIVITIES OF DAILY LIVING (ADL)
ADLS_ACUITY_SCORE: 56
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
## 2022-11-02 NOTE — PLAN OF CARE
Patient alert and oriented x4. Patient has been very friendly and cooperative. Patient will let you know when he needs to be changed (incontinent of urine). Denies pain. Ate 100% of his dinner. Patient calls appropriately. Plan: Placement
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
## 2022-11-02 NOTE — PROGRESS NOTES
Care Management Follow Up    Expected Discharge Date: 11/11/2022     Concerns to be Addressed: Discharge planning      Patient plan of care discussed at interdisciplinary rounds: Yes    Anticipated Discharge Disposition:  [facility] once funding is in place    Referrals Placed by CM/SW:  Skilled nursing facility  Private pay costs discussed: Not applicable    Additional Information:  ANALY emailed ANALY Agarwal care connector/ through [facility] and [facility], to request update on if paperwork was completed for patient's [facility] waiver to stay open, and if all documents have been submitted to her for approval from [facility]. Awaiting response. ANALY will continue to follow.     DANITA Obrien, [facility]   Inpatient Care Coordination  [facility]   [phone]
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
## 2022-11-03 LAB
GLUCOSE BLDC GLUCOMTR-MCNC: 121 MG/DL (ref 70–99)
GLUCOSE BLDC GLUCOMTR-MCNC: 165 MG/DL (ref 70–99)

## 2022-11-03 PROCEDURE — 82962 GLUCOSE BLOOD TEST: CPT

## 2022-11-03 PROCEDURE — 250N000013 HC RX MED GY IP 250 OP 250 PS 637: Performed by: HOSPITALIST

## 2022-11-03 PROCEDURE — 250N000013 HC RX MED GY IP 250 OP 250 PS 637: Performed by: PHYSICIAN ASSISTANT

## 2022-11-03 PROCEDURE — 99225 PR SUBSEQUENT OBSERVATION CARE,LEVEL II: CPT | Performed by: PHYSICIAN ASSISTANT

## 2022-11-03 PROCEDURE — G0378 HOSPITAL OBSERVATION PER HR: HCPCS

## 2022-11-03 RX ADMIN — DIVALPROEX SODIUM 500 MG: 500 TABLET, DELAYED RELEASE ORAL at 08:00

## 2022-11-03 RX ADMIN — HYDROXYZINE HYDROCHLORIDE 50 MG: 50 TABLET, FILM COATED ORAL at 13:23

## 2022-11-03 RX ADMIN — EMPAGLIFLOZIN 10 MG: 10 TABLET, FILM COATED ORAL at 08:01

## 2022-11-03 RX ADMIN — OLANZAPINE 2.5 MG: 2.5 TABLET, FILM COATED ORAL at 13:23

## 2022-11-03 RX ADMIN — Medication 25 MCG: at 08:01

## 2022-11-03 RX ADMIN — ATORVASTATIN CALCIUM 20 MG: 20 TABLET, FILM COATED ORAL at 21:16

## 2022-11-03 RX ADMIN — DIVALPROEX SODIUM 250 MG: 500 TABLET, DELAYED RELEASE ORAL at 08:01

## 2022-11-03 RX ADMIN — QUETIAPINE 200 MG: 200 TABLET, FILM COATED ORAL at 13:23

## 2022-11-03 RX ADMIN — CLOTRIMAZOLE: 0.01 CREAM TOPICAL at 21:24

## 2022-11-03 RX ADMIN — QUETIAPINE 200 MG: 200 TABLET, FILM COATED ORAL at 08:01

## 2022-11-03 RX ADMIN — QUETIAPINE FUMARATE 400 MG: 200 TABLET ORAL at 21:16

## 2022-11-03 RX ADMIN — HYDROXYZINE HYDROCHLORIDE 50 MG: 50 TABLET, FILM COATED ORAL at 08:01

## 2022-11-03 RX ADMIN — LEVOTHYROXINE SODIUM 25 MCG: 0.03 TABLET ORAL at 08:01

## 2022-11-03 RX ADMIN — HYDROXYZINE HYDROCHLORIDE 50 MG: 50 TABLET, FILM COATED ORAL at 21:16

## 2022-11-03 RX ADMIN — BACLOFEN 10 MG: 10 TABLET ORAL at 13:23

## 2022-11-03 RX ADMIN — METFORMIN HYDROCHLORIDE 1000 MG: 500 TABLET ORAL at 08:01

## 2022-11-03 RX ADMIN — BACLOFEN 10 MG: 10 TABLET ORAL at 21:16

## 2022-11-03 RX ADMIN — ASPIRIN 81 MG CHEWABLE TABLET 81 MG: 81 TABLET CHEWABLE at 08:01

## 2022-11-03 RX ADMIN — VENLAFAXINE HYDROCHLORIDE 150 MG: 150 CAPSULE, EXTENDED RELEASE ORAL at 21:17

## 2022-11-03 RX ADMIN — VENLAFAXINE HYDROCHLORIDE 75 MG: 150 CAPSULE, EXTENDED RELEASE ORAL at 21:17

## 2022-11-03 RX ADMIN — METOPROLOL SUCCINATE 25 MG: 25 TABLET, EXTENDED RELEASE ORAL at 08:01

## 2022-11-03 RX ADMIN — CLONIDINE HYDROCHLORIDE 0.1 MG: 0.1 TABLET ORAL at 21:16

## 2022-11-03 RX ADMIN — BACLOFEN 10 MG: 10 TABLET ORAL at 08:01

## 2022-11-03 RX ADMIN — CLONIDINE HYDROCHLORIDE 0.1 MG: 0.1 TABLET ORAL at 08:01

## 2022-11-03 RX ADMIN — Medication 10 MG: at 21:16

## 2022-11-03 RX ADMIN — SENNOSIDES AND DOCUSATE SODIUM 1 TABLET: 50; 8.6 TABLET ORAL at 08:01

## 2022-11-03 RX ADMIN — CLOTRIMAZOLE: 0.01 CREAM TOPICAL at 08:01

## 2022-11-03 RX ADMIN — DIVALPROEX SODIUM 1000 MG: 500 TABLET, DELAYED RELEASE ORAL at 21:16

## 2022-11-03 RX ADMIN — METFORMIN HYDROCHLORIDE 1000 MG: 500 TABLET ORAL at 17:07

## 2022-11-03 RX ADMIN — MIRTAZAPINE 15 MG: 15 TABLET, FILM COATED ORAL at 21:16

## 2022-11-03 ASSESSMENT — ACTIVITIES OF DAILY LIVING (ADL)
ADLS_ACUITY_SCORE: 56

## 2022-11-03 NOTE — PLAN OF CARE
PRIMARY DIAGNOSIS: PLACEMENT  OUTPATIENT/OBSERVATION GOALS TO BE MET BEFORE DISCHARGE:  ADLs back to baseline: Yes    Activity and level of assistance: A X 2    Pain status: Pain free.    Return to near baseline physical activity: Yes     Discharge Planner Nurse   Safe discharge environment identified: Yes  Barriers to discharge: No       Entered by: Preston Arango RN 11/03/2022 12:09 AM     Please review provider order for any additional goals.   Nurse to notify provider when observation goals have been met and patient is ready for discharge.Goal Outcome Evaluation:

## 2022-11-03 NOTE — PLAN OF CARE
PRIMARY DIAGNOSIS: OLACEMENT  OUTPATIENT/OBSERVATION GOALS TO BE MET BEFORE DISCHARGE:  1. ADLs back to baseline: Yes    2. Activity and level of assistance: A X 2    3. Pain status: Pain free.    4. Return to near baseline physical activity: Yes     Discharge Planner Nurse   Safe discharge environment identified: Yes  Barriers to discharge: No       Entered by: Preston Arango RN 11/03/2022 12:38 AM    Pt AO x4, A x 2 with lift device . Pt is  incontinent of bowel and bladder, calls appropriately, SW following with  safe disposition  Please review provider order for any additional goals.   Nurse to notify provider when observation goals have been met and patient is ready for discharge.Goal Outcome Evaluation:

## 2022-11-03 NOTE — PLAN OF CARE
PRIMARY DIAGNOSIS: PLACEMENT  OUTPATIENT/OBSERVATION GOALS TO BE MET BEFORE DISCHARGE:  ADLs back to baseline: Yes    Activity and level of assistance: A X 2    Pain status: Pain free.    Return to near baseline physical activity: Yes     Discharge Planner Nurse   Safe discharge environment identified: Yes  Barriers to discharge: No       Entered by: Preston Arango RN 11/02/2022 8:48 PM     Please review provider order for any additional goals.   Nurse to notify provider when observation goals have been met and patient is ready for discharge.Goal Outcome Evaluation:

## 2022-11-03 NOTE — PROGRESS NOTES
PRIMARY DIAGNOSIS: Placement   OUTPATIENT/OBSERVATION GOALS TO BE MET BEFORE DISCHARGE:  1. ADLs back to baseline: Yes    2. Activity and level of assistance: A2 lift     3. Pain status: Pain free.    4. Return to near baseline physical activity: Yes     Discharge Planner Nurse   Safe discharge environment identified: Yes  Barriers to discharge: Yes       Entered by: Jazmin Madera RN 11/03/2022 Please review provider order for any additional goals.       A&Ox4. No IV access. A2 with lift. Needs assistance ordering, tolerating regular diet. Incontinent of bladder and bowel. Denies pain.

## 2022-11-03 NOTE — PROGRESS NOTES
"Care Management follow up Note    Discharge Date: 11/11/2022     Discharge Disposition: [facility] once funding in place    Additional Information:  ANALY received email from [facility] ROSIO Ruff ([phone]) whose role is to approve the rate sheet on service agreement for group home to be reimbursed. She stated that she is still waiting to hear back from the case aide to see if paperwork has been completed for waiver to stay home. Additionally, she states that she has been going back and forth with the group home as the county is not in agreeance that they need to approve funding for a 24/7 1:1 which is what  is requesting. [facility] is not able to justify approving this funding and [facility] has not provided documentation proving the need for this level of care. If  can prove that he requires this level of care they can approve it. [facility] reports that the [facility] has increased their requirements and limitations on what the [facility] can approve.    Email states \"We have no problem approving a budget for the level of care that his needed to meet his needs.There is just no justification that has been provided that he needs 24/7- 1:1 care. If the behaviors increase and they can provide us the documentation that they need more 1:1 staffing time then we have no problem approving it.\"     ANALY placed call to  at [facility], [place], [phone]. VM left requesting return call to discuss proposed rate sheet she has submitted and reiterate that a 24/7 1:1 is not needed. ANALY asked that she return call to  and also update rate sheet with appropriate staffing level needs and resubmit to [facility] for approval. ANALY will continue to follow.     DANITA Obrien, [facility]   Inpatient Care Coordination  [facility]   [phone]      "
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
## 2022-11-04 LAB
GLUCOSE BLDC GLUCOMTR-MCNC: 110 MG/DL (ref 70–99)
GLUCOSE BLDC GLUCOMTR-MCNC: 138 MG/DL (ref 70–99)

## 2022-11-04 PROCEDURE — G0378 HOSPITAL OBSERVATION PER HR: HCPCS

## 2022-11-04 PROCEDURE — 250N000013 HC RX MED GY IP 250 OP 250 PS 637: Performed by: PHYSICIAN ASSISTANT

## 2022-11-04 PROCEDURE — 82962 GLUCOSE BLOOD TEST: CPT

## 2022-11-04 PROCEDURE — 99225 PR SUBSEQUENT OBSERVATION CARE,LEVEL II: CPT | Performed by: PHYSICIAN ASSISTANT

## 2022-11-04 PROCEDURE — 250N000013 HC RX MED GY IP 250 OP 250 PS 637: Performed by: HOSPITALIST

## 2022-11-04 RX ADMIN — METOPROLOL SUCCINATE 25 MG: 25 TABLET, EXTENDED RELEASE ORAL at 07:50

## 2022-11-04 RX ADMIN — MIRTAZAPINE 15 MG: 15 TABLET, FILM COATED ORAL at 21:25

## 2022-11-04 RX ADMIN — METFORMIN HYDROCHLORIDE 1000 MG: 500 TABLET ORAL at 07:49

## 2022-11-04 RX ADMIN — Medication 25 MCG: at 07:51

## 2022-11-04 RX ADMIN — CLONIDINE HYDROCHLORIDE 0.1 MG: 0.1 TABLET ORAL at 21:26

## 2022-11-04 RX ADMIN — METFORMIN HYDROCHLORIDE 1000 MG: 500 TABLET ORAL at 17:13

## 2022-11-04 RX ADMIN — ATORVASTATIN CALCIUM 20 MG: 20 TABLET, FILM COATED ORAL at 21:26

## 2022-11-04 RX ADMIN — HYDROXYZINE HYDROCHLORIDE 50 MG: 50 TABLET, FILM COATED ORAL at 13:27

## 2022-11-04 RX ADMIN — VENLAFAXINE HYDROCHLORIDE 75 MG: 150 CAPSULE, EXTENDED RELEASE ORAL at 21:36

## 2022-11-04 RX ADMIN — VENLAFAXINE HYDROCHLORIDE 150 MG: 150 CAPSULE, EXTENDED RELEASE ORAL at 21:32

## 2022-11-04 RX ADMIN — DIVALPROEX SODIUM 1000 MG: 500 TABLET, DELAYED RELEASE ORAL at 21:27

## 2022-11-04 RX ADMIN — CLOTRIMAZOLE: 0.01 CREAM TOPICAL at 07:51

## 2022-11-04 RX ADMIN — OLANZAPINE 2.5 MG: 2.5 TABLET, FILM COATED ORAL at 13:28

## 2022-11-04 RX ADMIN — QUETIAPINE 200 MG: 200 TABLET, FILM COATED ORAL at 07:50

## 2022-11-04 RX ADMIN — QUETIAPINE FUMARATE 400 MG: 200 TABLET ORAL at 21:29

## 2022-11-04 RX ADMIN — HYDROXYZINE HYDROCHLORIDE 50 MG: 50 TABLET, FILM COATED ORAL at 07:50

## 2022-11-04 RX ADMIN — EMPAGLIFLOZIN 10 MG: 10 TABLET, FILM COATED ORAL at 07:50

## 2022-11-04 RX ADMIN — Medication 10 MG: at 21:26

## 2022-11-04 RX ADMIN — ASPIRIN 81 MG CHEWABLE TABLET 81 MG: 81 TABLET CHEWABLE at 07:50

## 2022-11-04 RX ADMIN — BACLOFEN 10 MG: 10 TABLET ORAL at 21:25

## 2022-11-04 RX ADMIN — DIVALPROEX SODIUM 250 MG: 500 TABLET, DELAYED RELEASE ORAL at 07:51

## 2022-11-04 RX ADMIN — BACLOFEN 10 MG: 10 TABLET ORAL at 13:28

## 2022-11-04 RX ADMIN — BACLOFEN 10 MG: 10 TABLET ORAL at 07:50

## 2022-11-04 RX ADMIN — LEVOTHYROXINE SODIUM 25 MCG: 0.03 TABLET ORAL at 07:50

## 2022-11-04 RX ADMIN — CLONIDINE HYDROCHLORIDE 0.1 MG: 0.1 TABLET ORAL at 07:50

## 2022-11-04 RX ADMIN — CLOTRIMAZOLE: 0.01 CREAM TOPICAL at 21:38

## 2022-11-04 RX ADMIN — SENNOSIDES AND DOCUSATE SODIUM 1 TABLET: 50; 8.6 TABLET ORAL at 07:50

## 2022-11-04 RX ADMIN — DIVALPROEX SODIUM 500 MG: 500 TABLET, DELAYED RELEASE ORAL at 07:51

## 2022-11-04 RX ADMIN — HYDROXYZINE HYDROCHLORIDE 50 MG: 50 TABLET, FILM COATED ORAL at 21:25

## 2022-11-04 RX ADMIN — QUETIAPINE 200 MG: 200 TABLET, FILM COATED ORAL at 13:28

## 2022-11-04 ASSESSMENT — ACTIVITIES OF DAILY LIVING (ADL)
ADLS_ACUITY_SCORE: 56
ADLS_ACUITY_SCORE: 60
ADLS_ACUITY_SCORE: 56

## 2022-11-04 NOTE — PLAN OF CARE
PRIMARY DIAGNOSIS: FIGHT AT [facility] HOME - WAITING FOR PLACEMENT  OUTPATIENT/OBSERVATION GOALS TO BE MET BEFORE DISCHARGE:  ADLs back to baseline: Yes    Activity and level of assistance: x2 lift     Pain status: Pain free.    Return to near baseline physical activity: Yes     Discharge Planner Nurse   Safe discharge environment identified: No  Barriers to discharge: Yes       Entered by: Dania Galdamez RN 11/04/2022 9:12 AM     Please review provider order for any additional goals. Nurse to notify provider when observation goals have been met and patient is ready for discharge.    Up with a lift. Paraplegic. Glucose checks before breakfast and dinner. Incontinent.

## 2022-11-04 NOTE — PLAN OF CARE
PRIMARY DIAGNOSIS: FIGHT AT [facility] HOME - WAITING FOR PLACEMENT  OUTPATIENT/OBSERVATION GOALS TO BE MET BEFORE DISCHARGE:  1. ADLs back to baseline: Yes    2. Activity and level of assistance: x2 lift     3. Pain status: Pain free.    4. Return to near baseline physical activity: Yes     Discharge Planner Nurse   Safe discharge environment identified: No  Barriers to discharge: Yes       Entered by: Dania Galdamez RN 11/04/2022 12:00 PM     Please review provider order for any additional goals. Nurse to notify provider when observation goals have been met and patient is ready for discharge.    Up with a lift. Paraplegic. Glucose checks before breakfast and dinner. Incontinent.

## 2022-11-04 NOTE — PLAN OF CARE
PRIMARY DIAGNOSIS: FIGHT AT [facility] HOME - WAITING FOR PLACEMENT  OUTPATIENT/OBSERVATION GOALS TO BE MET BEFORE DISCHARGE:  1. ADLs back to baseline: Yes    2. Activity and level of assistance: x2 lift     3. Pain status: Pain free.    4. Return to near baseline physical activity: Yes     Discharge Planner Nurse   Safe discharge environment identified: No  Barriers to discharge: Yes       Entered by: Dania Galdamez RN 11/04/2022 4:20 PM     Please review provider order for any additional goals. Nurse to notify provider when observation goals have been met and patient is ready for discharge.    Up with a lift. Paraplegic. Glucose checks before breakfast and dinner. Incontinent.

## 2022-11-04 NOTE — PLAN OF CARE
PRIMARY DIAGNOSIS: PLACEMENT  OUTPATIENT/OBSERVATION GOALS TO BE MET BEFORE DISCHARGE:  ADLs back to baseline: Yes    Activity and level of assistance: Pt total cares, up with A2 with lift device    Pain status: Pain free.    Return to near baseline physical activity: Yes     Discharge Planner Nurse   Safe discharge environment identified: No  Barriers to discharge: Yes       Entered by: Helene Oreilly RN 11/03/2022   Pt AO x4. VSS on RA, LS clear, BS active. Pt speech slow with flat affect. No PIV in place, Pt tolerating regular diet. Denies pain,.. Will continue to monitor. Pt medically cleared for discharge pending placement.   /85 (BP Location: Right arm)   Pulse 90   Temp 97.7  F (36.5  C) (Oral)   Resp 16   Wt 99.9 kg (220 lb 4.8 oz)   SpO2 94%   Please review provider order for any additional goals.   Nurse to notify provider when observation goals have been met and patient is ready for discharge.
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
## 2022-11-04 NOTE — PLAN OF CARE
PRIMARY DIAGNOSIS: PLACEMENT  OUTPATIENT/OBSERVATION GOALS TO BE MET BEFORE DISCHARGE:  ADLs back to baseline: Yes    Activity and level of assistance: Pt total cares, up with A2 with lift device    Pain status: Pain free.    Return to near baseline physical activity: Yes     Discharge Planner Nurse   Safe discharge environment identified: No  Barriers to discharge: Yes       Entered by: Helene Oreilly RN 11/04/2022   Pt AO x4. VSS on RA, LS clear, BS active. Pt speech slow with flat affect. No PIV in place, Pt tolerating regular diet. Denies pain. Will continue to monitor. Pt medically cleared for discharge pending placement.   Please review provider order for any additional goals.   Nurse to notify provider when observation goals have been met and patient is ready for discharge.

## 2022-11-04 NOTE — PLAN OF CARE
PRIMARY DIAGNOSIS: PLACEMENT  OUTPATIENT/OBSERVATION GOALS TO BE MET BEFORE DISCHARGE:  ADLs back to baseline: Yes    Activity and level of assistance: Pt total cares, up with A2 with lift device    Pain status: Pain free.    Return to near baseline physical activity: Yes     Discharge Planner Nurse   Safe discharge environment identified: No  Barriers to discharge: Yes       Entered by: Helene Oreilly RN 11/04/2022   Pt AO x4. VSS on RA, LS clear, BS active. Pt speech slow with flat affect. No PIV in place, Pt tolerating regular diet. Denies pain. Will continue to monitor. Pt medically cleared for discharge pending placement awaiting funding.   Please review provider order for any additional goals.   Nurse to notify provider when observation goals have been met and patient is ready for discharge.

## 2022-11-05 LAB
GLUCOSE BLDC GLUCOMTR-MCNC: 114 MG/DL (ref 70–99)
GLUCOSE BLDC GLUCOMTR-MCNC: 117 MG/DL (ref 70–99)
GLUCOSE BLDC GLUCOMTR-MCNC: 135 MG/DL (ref 70–99)

## 2022-11-05 PROCEDURE — 250N000013 HC RX MED GY IP 250 OP 250 PS 637: Performed by: HOSPITALIST

## 2022-11-05 PROCEDURE — 250N000013 HC RX MED GY IP 250 OP 250 PS 637: Performed by: PHYSICIAN ASSISTANT

## 2022-11-05 PROCEDURE — 99225 PR SUBSEQUENT OBSERVATION CARE,LEVEL II: CPT | Performed by: INTERNAL MEDICINE

## 2022-11-05 PROCEDURE — G0378 HOSPITAL OBSERVATION PER HR: HCPCS

## 2022-11-05 PROCEDURE — 82962 GLUCOSE BLOOD TEST: CPT

## 2022-11-05 RX ADMIN — OLANZAPINE 2.5 MG: 2.5 TABLET, FILM COATED ORAL at 14:26

## 2022-11-05 RX ADMIN — HYDROXYZINE HYDROCHLORIDE 50 MG: 50 TABLET, FILM COATED ORAL at 21:06

## 2022-11-05 RX ADMIN — CLOTRIMAZOLE: 0.01 CREAM TOPICAL at 21:08

## 2022-11-05 RX ADMIN — QUETIAPINE 200 MG: 200 TABLET, FILM COATED ORAL at 10:00

## 2022-11-05 RX ADMIN — BACLOFEN 10 MG: 10 TABLET ORAL at 14:26

## 2022-11-05 RX ADMIN — VENLAFAXINE HYDROCHLORIDE 150 MG: 150 CAPSULE, EXTENDED RELEASE ORAL at 21:08

## 2022-11-05 RX ADMIN — DIVALPROEX SODIUM 1000 MG: 500 TABLET, DELAYED RELEASE ORAL at 21:06

## 2022-11-05 RX ADMIN — BACLOFEN 10 MG: 10 TABLET ORAL at 21:06

## 2022-11-05 RX ADMIN — METOPROLOL SUCCINATE 25 MG: 25 TABLET, EXTENDED RELEASE ORAL at 10:00

## 2022-11-05 RX ADMIN — LEVOTHYROXINE SODIUM 25 MCG: 0.03 TABLET ORAL at 10:01

## 2022-11-05 RX ADMIN — METFORMIN HYDROCHLORIDE 1000 MG: 500 TABLET ORAL at 09:59

## 2022-11-05 RX ADMIN — BACLOFEN 10 MG: 10 TABLET ORAL at 09:59

## 2022-11-05 RX ADMIN — ASPIRIN 81 MG CHEWABLE TABLET 81 MG: 81 TABLET CHEWABLE at 10:00

## 2022-11-05 RX ADMIN — VENLAFAXINE HYDROCHLORIDE 75 MG: 150 CAPSULE, EXTENDED RELEASE ORAL at 21:07

## 2022-11-05 RX ADMIN — EMPAGLIFLOZIN 10 MG: 10 TABLET, FILM COATED ORAL at 09:59

## 2022-11-05 RX ADMIN — Medication 25 MCG: at 10:01

## 2022-11-05 RX ADMIN — DIVALPROEX SODIUM 500 MG: 500 TABLET, DELAYED RELEASE ORAL at 10:00

## 2022-11-05 RX ADMIN — MIRTAZAPINE 15 MG: 15 TABLET, FILM COATED ORAL at 21:07

## 2022-11-05 RX ADMIN — ATORVASTATIN CALCIUM 20 MG: 20 TABLET, FILM COATED ORAL at 21:07

## 2022-11-05 RX ADMIN — SENNOSIDES AND DOCUSATE SODIUM 1 TABLET: 50; 8.6 TABLET ORAL at 09:59

## 2022-11-05 RX ADMIN — DIVALPROEX SODIUM 250 MG: 500 TABLET, DELAYED RELEASE ORAL at 10:00

## 2022-11-05 RX ADMIN — HYDROXYZINE HYDROCHLORIDE 50 MG: 50 TABLET, FILM COATED ORAL at 14:26

## 2022-11-05 RX ADMIN — CLONIDINE HYDROCHLORIDE 0.1 MG: 0.1 TABLET ORAL at 09:59

## 2022-11-05 RX ADMIN — CLONIDINE HYDROCHLORIDE 0.1 MG: 0.1 TABLET ORAL at 21:06

## 2022-11-05 RX ADMIN — CLOTRIMAZOLE: 0.01 CREAM TOPICAL at 12:43

## 2022-11-05 RX ADMIN — HYDROXYZINE HYDROCHLORIDE 50 MG: 50 TABLET, FILM COATED ORAL at 10:00

## 2022-11-05 RX ADMIN — QUETIAPINE 200 MG: 200 TABLET, FILM COATED ORAL at 14:26

## 2022-11-05 RX ADMIN — QUETIAPINE FUMARATE 400 MG: 200 TABLET ORAL at 21:07

## 2022-11-05 RX ADMIN — METFORMIN HYDROCHLORIDE 1000 MG: 500 TABLET ORAL at 18:12

## 2022-11-05 RX ADMIN — Medication 10 MG: at 21:06

## 2022-11-05 ASSESSMENT — ACTIVITIES OF DAILY LIVING (ADL)
ADLS_ACUITY_SCORE: 54
ADLS_ACUITY_SCORE: 54
ADLS_ACUITY_SCORE: 56
ADLS_ACUITY_SCORE: 56
ADLS_ACUITY_SCORE: 54
ADLS_ACUITY_SCORE: 54
ADLS_ACUITY_SCORE: 56
ADLS_ACUITY_SCORE: 56
ADLS_ACUITY_SCORE: 54
ADLS_ACUITY_SCORE: 56

## 2022-11-05 NOTE — PLAN OF CARE
PRIMARY DIAGNOSIS: Placement   OUTPATIENT/OBSERVATION GOALS TO BE MET BEFORE DISCHARGE:  1. ADLs back to baseline: Yes    2. Activity and level of assistance: Up with maximum assistance.baseline    3. Pain status: Pain free.    4. Return to near baseline physical activity: Yes     Discharge Planner Nurse   Safe discharge environment identified: Yes  Barriers to discharge: Yes, funding per  note        Entered by: Tamiko Goldstein RN 11/05/2022 12:49 PM     Please review provider order for any additional goals.   Nurse to notify provider when observation goals have been met and patient is ready for discharge.    A&Ox4, lift, tolerating diet, heart sounds- WNL, lungs- clear, bowels- active, BM x2, incontinent bowel/bladder, denies pain, at baseline for mobility

## 2022-11-05 NOTE — PLAN OF CARE
PRIMARY DIAGNOSIS: AWAITING PLACEMENT  OUTPATIENT/OBSERVATION GOALS TO BE MET BEFORE DISCHARGE:  1. ADLs back to baseline: Yes    2. Activity and level of assistance: Assist x 2 with Lift    3. Pain status: Pain free.    4. Return to near baseline physical activity: Yes     Discharge Planner Nurse   Safe discharge environment identified: Yes  Barriers to discharge: Yes       Entered by: Annalee Echevarria RN 11/04/2022 10:17 PM   A&Ox4.VSS. Pt. Resting comfortably in bed. Verbalizes no concerns.  Please review provider order for any additional goals.   Nurse to notify provider when observation goals have been met and patient is ready for discharge.Goal Outcome Evaluation:

## 2022-11-05 NOTE — PLAN OF CARE
PRIMARY DIAGNOSIS:AWAITING PLACEMENT  OUTPATIENT/OBSERVATION GOALS TO BE MET BEFORE DISCHARGE:  1. ADLs back to baseline: Yes    2. Activity and level of assistance: Assist x 2 with lift    3. Pain status: Pain free.    4. Return to near baseline physical activity: Yes     Discharge Planner Nurse   Safe discharge environment identified: Yes  Barriers to discharge: Yes       Entered by: Annalee Echevarria RN 11/05/2022 12:41 AM   Alert & oriented x 4. Sound asleep     Please review provider order for any additional goals.   Nurse to notify provider when observation goals have been met and patient is ready for discharge.Goal Outcome Evaluation:

## 2022-11-05 NOTE — PLAN OF CARE
PRIMARY DIAGNOSIS: PLACEMENT   OUTPATIENT/OBSERVATION GOALS TO BE MET BEFORE DISCHARGE:  1. Pain Status: Pain free.    2. Return to near baseline physical activity:  total care    3. Cleared for discharge by consultants (if involved): Yes    Discharge Planner Nurse   Safe discharge environment identified: Yes  Barriers to discharge: Yes       Entered by: SARA DERAS RN 11/05/2022 4:28 PM   Vital signs:  Temp: 97.4  F (36.3  C) Temp src: Oral BP: 122/84 Pulse: 78   Resp: 17 SpO2: 94 % O2 Device: None (Room air) Alert and oriented x4. Able to make needs known. Denies pain/ discomfort. Total care with lift assist at baseline. Awaiting for placement. SW following. Will continue to monitor.    Please review provider order for any additional goals.   Nurse to notify provider when observation goals have been met and patient is ready for discharge.
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
## 2022-11-05 NOTE — PLAN OF CARE
PRIMARY DIAGNOSIS: AWAITING PLACEMENT  OUTPATIENT/OBSERVATION GOALS TO BE MET BEFORE DISCHARGE:  1. ADLs back to baseline: Yes    2. Activity and level of assistance: Assist x2 with lift    3. Pain status: Pain free.    4. Return to near baseline physical activity: Yes     Discharge Planner Nurse   Safe discharge environment identified: Yes  Barriers to discharge: Yes       Entered by: Annalee Echevarria RN 11/05/2022 4:13 AM   A &O x4. Slept through the night with intermittent calls for assistance. Denies pain.. No IV access.Assist of 2 with lift. On BS checks AC & HS.Awaiting placement.  Please review provider order for any additional goals.   Nurse to notify provider when observation goals have been met and patient is ready for discharge.Goal Outcome Evaluation:

## 2022-11-05 NOTE — PLAN OF CARE
PRIMARY DIAGNOSIS: Placement   OUTPATIENT/OBSERVATION GOALS TO BE MET BEFORE DISCHARGE:  ADLs back to baseline: Yes    Activity and level of assistance: Up with maximum assistance.baseline    Pain status: Pain free.    Return to near baseline physical activity: Yes     Discharge Planner Nurse   Safe discharge environment identified: Yes  Barriers to discharge: Yes, funding per  note        Entered by: Tamiko Goldstein RN 11/05/2022 12:34 PM     Please review provider order for any additional goals.   Nurse to notify provider when observation goals have been met and patient is ready for discharge.    A&Ox4, lift, tolerating diet, denies pain, at baseline for mobility

## 2022-11-06 LAB
GLUCOSE BLDC GLUCOMTR-MCNC: 100 MG/DL (ref 70–99)
GLUCOSE BLDC GLUCOMTR-MCNC: 123 MG/DL (ref 70–99)
GLUCOSE BLDC GLUCOMTR-MCNC: 99 MG/DL (ref 70–99)

## 2022-11-06 PROCEDURE — 82962 GLUCOSE BLOOD TEST: CPT

## 2022-11-06 PROCEDURE — 250N000013 HC RX MED GY IP 250 OP 250 PS 637: Performed by: HOSPITALIST

## 2022-11-06 PROCEDURE — 99225 PR SUBSEQUENT OBSERVATION CARE,LEVEL II: CPT | Performed by: INTERNAL MEDICINE

## 2022-11-06 PROCEDURE — 250N000013 HC RX MED GY IP 250 OP 250 PS 637: Performed by: PHYSICIAN ASSISTANT

## 2022-11-06 PROCEDURE — G0378 HOSPITAL OBSERVATION PER HR: HCPCS

## 2022-11-06 RX ADMIN — HYDROXYZINE HYDROCHLORIDE 50 MG: 50 TABLET, FILM COATED ORAL at 14:29

## 2022-11-06 RX ADMIN — Medication 10 MG: at 21:19

## 2022-11-06 RX ADMIN — ASPIRIN 81 MG CHEWABLE TABLET 81 MG: 81 TABLET CHEWABLE at 08:53

## 2022-11-06 RX ADMIN — DIVALPROEX SODIUM 1000 MG: 500 TABLET, DELAYED RELEASE ORAL at 21:18

## 2022-11-06 RX ADMIN — DIVALPROEX SODIUM 250 MG: 500 TABLET, DELAYED RELEASE ORAL at 09:02

## 2022-11-06 RX ADMIN — MIRTAZAPINE 15 MG: 15 TABLET, FILM COATED ORAL at 21:20

## 2022-11-06 RX ADMIN — ATORVASTATIN CALCIUM 20 MG: 20 TABLET, FILM COATED ORAL at 21:19

## 2022-11-06 RX ADMIN — METOPROLOL SUCCINATE 25 MG: 25 TABLET, EXTENDED RELEASE ORAL at 08:53

## 2022-11-06 RX ADMIN — METFORMIN HYDROCHLORIDE 1000 MG: 500 TABLET ORAL at 17:27

## 2022-11-06 RX ADMIN — BACLOFEN 10 MG: 10 TABLET ORAL at 21:19

## 2022-11-06 RX ADMIN — EMPAGLIFLOZIN 10 MG: 10 TABLET, FILM COATED ORAL at 08:54

## 2022-11-06 RX ADMIN — LEVOTHYROXINE SODIUM 25 MCG: 0.03 TABLET ORAL at 08:52

## 2022-11-06 RX ADMIN — HYDROXYZINE HYDROCHLORIDE 50 MG: 50 TABLET, FILM COATED ORAL at 21:19

## 2022-11-06 RX ADMIN — BACLOFEN 10 MG: 10 TABLET ORAL at 14:29

## 2022-11-06 RX ADMIN — QUETIAPINE FUMARATE 400 MG: 200 TABLET ORAL at 21:19

## 2022-11-06 RX ADMIN — BACLOFEN 10 MG: 10 TABLET ORAL at 08:54

## 2022-11-06 RX ADMIN — VENLAFAXINE HYDROCHLORIDE 75 MG: 150 CAPSULE, EXTENDED RELEASE ORAL at 21:19

## 2022-11-06 RX ADMIN — HYDROXYZINE HYDROCHLORIDE 50 MG: 50 TABLET, FILM COATED ORAL at 08:52

## 2022-11-06 RX ADMIN — CLOTRIMAZOLE: 0.01 CREAM TOPICAL at 08:52

## 2022-11-06 RX ADMIN — OLANZAPINE 2.5 MG: 2.5 TABLET, FILM COATED ORAL at 14:29

## 2022-11-06 RX ADMIN — Medication 25 MCG: at 08:53

## 2022-11-06 RX ADMIN — CLONIDINE HYDROCHLORIDE 0.1 MG: 0.1 TABLET ORAL at 21:19

## 2022-11-06 RX ADMIN — SENNOSIDES AND DOCUSATE SODIUM 1 TABLET: 50; 8.6 TABLET ORAL at 08:53

## 2022-11-06 RX ADMIN — METFORMIN HYDROCHLORIDE 1000 MG: 500 TABLET ORAL at 08:53

## 2022-11-06 RX ADMIN — QUETIAPINE 200 MG: 200 TABLET, FILM COATED ORAL at 08:52

## 2022-11-06 RX ADMIN — DIVALPROEX SODIUM 500 MG: 500 TABLET, DELAYED RELEASE ORAL at 08:53

## 2022-11-06 RX ADMIN — VENLAFAXINE HYDROCHLORIDE 150 MG: 150 CAPSULE, EXTENDED RELEASE ORAL at 21:20

## 2022-11-06 RX ADMIN — CLONIDINE HYDROCHLORIDE 0.1 MG: 0.1 TABLET ORAL at 08:54

## 2022-11-06 RX ADMIN — QUETIAPINE 200 MG: 200 TABLET, FILM COATED ORAL at 14:29

## 2022-11-06 ASSESSMENT — ACTIVITIES OF DAILY LIVING (ADL)
ADLS_ACUITY_SCORE: 56

## 2022-11-06 NOTE — PLAN OF CARE
PRIMARY DIAGNOSIS: PLACEMENT   OUTPATIENT/OBSERVATION GOALS TO BE MET BEFORE DISCHARGE:  ADLs back to baseline: Yes    Activity and level of assistance: total care    Pain status: Pain free.    Return to near baseline physical activity:  total care at baseline      Discharge Planner Nurse   Safe discharge environment identified: Yes  Barriers to discharge: Yes       Entered by: SARA DERAS RN 11/06/2022    Vital signs:  Temp: 97.5  F (36.4  C) Temp src: Oral BP: 120/81 Pulse: 73   Resp: 16 SpO2: 94 % O2 Device: None (Room air) Alert and oriented x4. Calm and cooperative. Denies pain. Incontinent for bowel and bladder. Medically clear. Waiting for placement to . SW following with discharge process. Will continue to monitor.         Please review provider order for any additional goals.   Nurse to notify provider when observation goals have been met and patient is ready for discharge.

## 2022-11-06 NOTE — PLAN OF CARE
PRIMARY DIAGNOSIS: PLACEMENT   OUTPATIENT/OBSERVATION GOALS TO BE MET BEFORE DISCHARGE:  ADLs back to baseline: Yes    Activity and level of assistance: total care    Pain status: Pain free.    Return to near baseline physical activity:  total care at baseline     Discharge Planner Nurse   Safe discharge environment identified: Yes  Barriers to discharge: Yes       Entered by: SARA DERAS RN 11/06/2022 12:48 PM    Alert and oriented x4. Calm and cooperative. Denies pain. Incontinent for bowel and bladder. Medically clear. Waiting for placement to . SW following with discharge process. Will continue to monitor.       Please review provider order for any additional goals.   Nurse to notify provider when observation goals have been met and patient is ready for discharge.

## 2022-11-06 NOTE — PLAN OF CARE
PRIMARY DIAGNOSIS: placement  OUTPATIENT/OBSERVATION GOALS TO BE MET BEFORE DISCHARGE:  ADLs back to baseline: Yes     Activity and level of assistance: reposition prn, as pt allows.     Pain status: Pain free.     Return to near baseline physical activity: Yes          Discharge Planner Nurse   Safe discharge environment identified: No  Barriers to discharge: Yes          Pt A&O, makes needs known. Awaiting placement, SW assisting w/ discharge.     Please review provider order for any additional goals.   Nurse to notify provider when observation goals have been met and patient is ready for discharge.

## 2022-11-06 NOTE — PLAN OF CARE
PRIMARY DIAGNOSIS: PLACEMENT   OUTPATIENT/OBSERVATION GOALS TO BE MET BEFORE DISCHARGE:  ADLs back to baseline: Yes    Activity and level of assistance: total care    Pain status: Pain free.    Return to near baseline physical activity:  total care at baseline     Discharge Planner Nurse   Safe discharge environment identified: Yes  Barriers to discharge: Yes       Entered by: SARA DERAS RN 11/06/2022    Alert and oriented x4. Calm and cooperative. Denies pain. Incontinent for bowel and bladder. On regular diet. Good appetite. BG 99 & 100. Medically clear. Waiting for placement to . SW following with discharge process. Will continue to monitor.  Please review provider order for any additional goals.   Nurse to notify provider when observation goals have been met and patient is ready for discharge.

## 2022-11-06 NOTE — PROGRESS NOTES
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     Status: Normal   Result Value Ref Range    GLUCOSE BY METER POCT 99 70 - 99 mg/dL

## 2022-11-06 NOTE — PLAN OF CARE
PRIMARY DIAGNOSIS: placement  OUTPATIENT/OBSERVATION GOALS TO BE MET BEFORE DISCHARGE:  ADLs back to baseline: Yes    Activity and level of assistance: Up with maximum assistance.     Pain status: Pain free.    Return to near baseline physical activity: Yes     Discharge Planner Nurse   Safe discharge environment identified: No  Barriers to discharge: Yes       Entered by: Katheryn Duran RN 11/05/2022 10:09 PM   Pt A&O, makes needs known. Awaiting placement, SW assisting w/ discharge.   Please review provider order for any additional goals.   Nurse to notify provider when observation goals have been met and patient is ready for discharge.

## 2022-11-06 NOTE — PLAN OF CARE
PRIMARY DIAGNOSIS: placement  OUTPATIENT/OBSERVATION GOALS TO BE MET BEFORE DISCHARGE:  ADLs back to baseline: Yes     Activity and level of assistance: reposition prn, as pt allows.     Pain status: Pain free.     Return to near baseline physical activity: Yes          Discharge Planner Nurse   Safe discharge environment identified: No  Barriers to discharge: Yes          Pt A&O, makes needs known. Frequent dry cough overnight. Awaiting placement, SW assisting w/ discharge.      Please review provider order for any additional goals.   Nurse to notify provider when observation goals have been met and patient is ready for discharge.

## 2022-11-07 LAB
GLUCOSE BLDC GLUCOMTR-MCNC: 85 MG/DL (ref 70–99)
GLUCOSE BLDC GLUCOMTR-MCNC: 98 MG/DL (ref 70–99)

## 2022-11-07 PROCEDURE — 250N000013 HC RX MED GY IP 250 OP 250 PS 637: Performed by: HOSPITALIST

## 2022-11-07 PROCEDURE — 250N000013 HC RX MED GY IP 250 OP 250 PS 637: Performed by: PHYSICIAN ASSISTANT

## 2022-11-07 PROCEDURE — G0378 HOSPITAL OBSERVATION PER HR: HCPCS

## 2022-11-07 PROCEDURE — 82962 GLUCOSE BLOOD TEST: CPT

## 2022-11-07 PROCEDURE — 99225 PR SUBSEQUENT OBSERVATION CARE,LEVEL II: CPT | Performed by: HOSPITALIST

## 2022-11-07 RX ADMIN — QUETIAPINE 200 MG: 200 TABLET, FILM COATED ORAL at 13:28

## 2022-11-07 RX ADMIN — HYDROXYZINE HYDROCHLORIDE 50 MG: 50 TABLET, FILM COATED ORAL at 21:12

## 2022-11-07 RX ADMIN — METOPROLOL SUCCINATE 25 MG: 25 TABLET, EXTENDED RELEASE ORAL at 08:46

## 2022-11-07 RX ADMIN — BACLOFEN 10 MG: 10 TABLET ORAL at 08:47

## 2022-11-07 RX ADMIN — DIVALPROEX SODIUM 250 MG: 500 TABLET, DELAYED RELEASE ORAL at 08:46

## 2022-11-07 RX ADMIN — EMPAGLIFLOZIN 10 MG: 10 TABLET, FILM COATED ORAL at 08:46

## 2022-11-07 RX ADMIN — CLOTRIMAZOLE: 0.01 CREAM TOPICAL at 10:32

## 2022-11-07 RX ADMIN — DIVALPROEX SODIUM 1000 MG: 500 TABLET, DELAYED RELEASE ORAL at 21:12

## 2022-11-07 RX ADMIN — QUETIAPINE FUMARATE 400 MG: 200 TABLET ORAL at 21:13

## 2022-11-07 RX ADMIN — Medication 10 MG: at 21:13

## 2022-11-07 RX ADMIN — VENLAFAXINE HYDROCHLORIDE 150 MG: 150 CAPSULE, EXTENDED RELEASE ORAL at 21:13

## 2022-11-07 RX ADMIN — LEVOTHYROXINE SODIUM 25 MCG: 0.03 TABLET ORAL at 08:47

## 2022-11-07 RX ADMIN — HYDROXYZINE HYDROCHLORIDE 50 MG: 50 TABLET, FILM COATED ORAL at 08:48

## 2022-11-07 RX ADMIN — OLANZAPINE 2.5 MG: 2.5 TABLET, FILM COATED ORAL at 13:28

## 2022-11-07 RX ADMIN — Medication 25 MCG: at 08:47

## 2022-11-07 RX ADMIN — BACLOFEN 10 MG: 10 TABLET ORAL at 21:12

## 2022-11-07 RX ADMIN — SENNOSIDES AND DOCUSATE SODIUM 1 TABLET: 50; 8.6 TABLET ORAL at 08:46

## 2022-11-07 RX ADMIN — QUETIAPINE 200 MG: 200 TABLET, FILM COATED ORAL at 08:47

## 2022-11-07 RX ADMIN — ASPIRIN 81 MG CHEWABLE TABLET 81 MG: 81 TABLET CHEWABLE at 08:47

## 2022-11-07 RX ADMIN — DIVALPROEX SODIUM 500 MG: 500 TABLET, DELAYED RELEASE ORAL at 08:46

## 2022-11-07 RX ADMIN — ATORVASTATIN CALCIUM 20 MG: 20 TABLET, FILM COATED ORAL at 21:12

## 2022-11-07 RX ADMIN — CLONIDINE HYDROCHLORIDE 0.1 MG: 0.1 TABLET ORAL at 08:46

## 2022-11-07 RX ADMIN — BACLOFEN 10 MG: 10 TABLET ORAL at 13:28

## 2022-11-07 RX ADMIN — METFORMIN HYDROCHLORIDE 1000 MG: 500 TABLET ORAL at 08:46

## 2022-11-07 RX ADMIN — METFORMIN HYDROCHLORIDE 1000 MG: 500 TABLET ORAL at 17:46

## 2022-11-07 RX ADMIN — CLONIDINE HYDROCHLORIDE 0.1 MG: 0.1 TABLET ORAL at 21:12

## 2022-11-07 RX ADMIN — HYDROXYZINE HYDROCHLORIDE 50 MG: 50 TABLET, FILM COATED ORAL at 13:28

## 2022-11-07 RX ADMIN — CLOTRIMAZOLE: 0.01 CREAM TOPICAL at 21:14

## 2022-11-07 RX ADMIN — MIRTAZAPINE 15 MG: 15 TABLET, FILM COATED ORAL at 21:12

## 2022-11-07 RX ADMIN — VENLAFAXINE HYDROCHLORIDE 75 MG: 150 CAPSULE, EXTENDED RELEASE ORAL at 21:14

## 2022-11-07 ASSESSMENT — ACTIVITIES OF DAILY LIVING (ADL)
ADLS_ACUITY_SCORE: 56

## 2022-11-07 NOTE — PROGRESS NOTES
PRIMARY DIAGNOSIS: Pending placement.  OUTPATIENT/OBSERVATION GOALS TO BE MET BEFORE DISCHARGE:  1. ALS back to baseline: Yes    2. Activity and level of assistance: Up with maximum assistance. Consider SW and/or PT evaluation.     3. Pain status: Pain free.    4. Return to near baseline physical activity: Yes     Discharge Planner Nurse   Safe discharge environment identified: No  Barriers to discharge: Yes       Entered by: Jazmin Madera RN 11/07/2022     A&Ox4, flat affect, cooperative. Paraplegic. Check and change. Redness on bilateral ankles, Mepliex in place. From group home, pending placement.

## 2022-11-07 NOTE — PROGRESS NOTES
PRIMARY DIAGNOSIS: Pending placement.  OUTPATIENT/OBSERVATION GOALS TO BE MET BEFORE DISCHARGE:  1. ALS back to baseline: Yes    2. Activity and level of assistance: Up with maximum assistance. Consider SW and/or PT evaluation.     3. Pain status: Pain free.    4. Return to near baseline physical activity: Yes     Discharge Planner Nurse   Safe discharge environment identified: No  Barriers to discharge: Yes       Entered by: Jazmin Madrea RN 11/07/2022     A&Ox4, flat affect, cooperative. Paraplegic. Check and change. Redness on bilateral ankles, Mepilex in place. From group home, pending placement.

## 2022-11-07 NOTE — PROGRESS NOTES
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 70 - 99 mg/dL   Glucose by meter     Status: Normal   Result Value Ref Range    GLUCOSE BY METER POCT 99 70 - 99 mg/dL   Glucose by meter     Status: Abnormal   Result Value Ref Range    GLUCOSE BY METER POCT 100 (H) 70 - 99 mg/dL   Glucose by meter     Status: Abnormal   Result Value Ref Range    GLUCOSE BY METER POCT 123 (H) 70 - 99 mg/dL   Glucose by meter     Status: Normal   Result Value Ref Range    GLUCOSE BY METER POCT 85 70 - 99 mg/dL

## 2022-11-07 NOTE — PLAN OF CARE
PRIMARY DIAGNOSIS: placement  OUTPATIENT/OBSERVATION GOALS TO BE MET BEFORE DISCHARGE:  1. ADLs back to baseline: Yes     2. Activity and level of assistance: Up with maximum assistance.      3. Pain status: Pain free.     4. Return to near baseline physical activity: Yes          Discharge Planner Nurse   Safe discharge environment identified: No  Barriers to discharge: Yes         Pt A&O, makes needs known. Awaiting placement, SW assisting w/ discharge.     Please review provider order for any additional goals.   Nurse to notify provider when observation goals have been met and patient is ready for discharge.

## 2022-11-07 NOTE — PLAN OF CARE
PRIMARY DIAGNOSIS: placement  OUTPATIENT/OBSERVATION GOALS TO BE MET BEFORE DISCHARGE:  ADLs back to baseline: Yes     Activity and level of assistance: Up with maximum assistance, turn as pt allows.      Pain status: Pain free.     Return to near baseline physical activity: Yes          Discharge Planner Nurse   Safe discharge environment identified: No  Barriers to discharge: Yes         Pt A&O, makes needs known. Awaiting placement, SW assisting w/ discharge.      Please review provider order for any additional goals.   Nurse to notify provider when observation goals have been met and patient is ready for discharge.

## 2022-11-07 NOTE — PLAN OF CARE
PRIMARY DIAGNOSIS: placement  OUTPATIENT/OBSERVATION GOALS TO BE MET BEFORE DISCHARGE:  1. ADLs back to baseline: Yes    2. Activity and level of assistance: Up with maximum assistance.     3. Pain status: Pain free.    4. Return to near baseline physical activity: Yes     Discharge Planner Nurse   Safe discharge environment identified: No  Barriers to discharge: Yes       Entered by: Katheryn Duran RN 11/06/2022 8:14 PM   Pt A&O, makes needs known. Awaiting placement, SW assisting w/ discharge.   Please review provider order for any additional goals.   Nurse to notify provider when observation goals have been met and patient is ready for discharge.

## 2022-11-07 NOTE — PROGRESS NOTES
PRIMARY DIAGNOSIS: Pending placement.  OUTPATIENT/OBSERVATION GOALS TO BE MET BEFORE DISCHARGE:  1. ALS back to baseline: Yes    2. Activity and level of assistance: Up with maximum assistance. Consider SW and/or PT evaluation.     3. Pain status: Pain free.    4. Return to near baseline physical activity: Yes     Discharge Planner Nurse   Safe discharge environment identified: No  Barriers to discharge: Yes       Entered by: Jazmin Madera RN 11/07/2022     A&Ox4, flat affect, cooperative. Paraplegic. Check and change. Redness on bilateral ankles, Mepilex in place. From group home, pending placement.

## 2022-11-08 LAB
GLUCOSE BLDC GLUCOMTR-MCNC: 90 MG/DL (ref 70–99)
GLUCOSE BLDC GLUCOMTR-MCNC: 91 MG/DL (ref 70–99)

## 2022-11-08 PROCEDURE — 250N000013 HC RX MED GY IP 250 OP 250 PS 637: Performed by: HOSPITALIST

## 2022-11-08 PROCEDURE — G0378 HOSPITAL OBSERVATION PER HR: HCPCS

## 2022-11-08 PROCEDURE — 82962 GLUCOSE BLOOD TEST: CPT | Mod: 91

## 2022-11-08 PROCEDURE — 250N000013 HC RX MED GY IP 250 OP 250 PS 637: Performed by: PHYSICIAN ASSISTANT

## 2022-11-08 PROCEDURE — 99225 PR SUBSEQUENT OBSERVATION CARE,LEVEL II: CPT | Performed by: HOSPITALIST

## 2022-11-08 RX ADMIN — BACLOFEN 10 MG: 10 TABLET ORAL at 14:25

## 2022-11-08 RX ADMIN — METFORMIN HYDROCHLORIDE 1000 MG: 500 TABLET ORAL at 08:52

## 2022-11-08 RX ADMIN — VENLAFAXINE HYDROCHLORIDE 75 MG: 150 CAPSULE, EXTENDED RELEASE ORAL at 20:35

## 2022-11-08 RX ADMIN — OLANZAPINE 2.5 MG: 2.5 TABLET, FILM COATED ORAL at 14:25

## 2022-11-08 RX ADMIN — SENNOSIDES AND DOCUSATE SODIUM 1 TABLET: 50; 8.6 TABLET ORAL at 08:53

## 2022-11-08 RX ADMIN — CLONIDINE HYDROCHLORIDE 0.1 MG: 0.1 TABLET ORAL at 08:52

## 2022-11-08 RX ADMIN — BACLOFEN 10 MG: 10 TABLET ORAL at 08:53

## 2022-11-08 RX ADMIN — QUETIAPINE FUMARATE 400 MG: 200 TABLET ORAL at 20:36

## 2022-11-08 RX ADMIN — QUETIAPINE 200 MG: 200 TABLET, FILM COATED ORAL at 08:52

## 2022-11-08 RX ADMIN — HYDROXYZINE HYDROCHLORIDE 50 MG: 50 TABLET, FILM COATED ORAL at 14:25

## 2022-11-08 RX ADMIN — DIVALPROEX SODIUM 250 MG: 500 TABLET, DELAYED RELEASE ORAL at 08:53

## 2022-11-08 RX ADMIN — ASPIRIN 81 MG CHEWABLE TABLET 81 MG: 81 TABLET CHEWABLE at 08:53

## 2022-11-08 RX ADMIN — METOPROLOL SUCCINATE 25 MG: 25 TABLET, EXTENDED RELEASE ORAL at 08:53

## 2022-11-08 RX ADMIN — VENLAFAXINE HYDROCHLORIDE 150 MG: 150 CAPSULE, EXTENDED RELEASE ORAL at 20:37

## 2022-11-08 RX ADMIN — CLOTRIMAZOLE: 0.01 CREAM TOPICAL at 10:33

## 2022-11-08 RX ADMIN — ATORVASTATIN CALCIUM 20 MG: 20 TABLET, FILM COATED ORAL at 20:36

## 2022-11-08 RX ADMIN — DIVALPROEX SODIUM 1000 MG: 500 TABLET, DELAYED RELEASE ORAL at 20:36

## 2022-11-08 RX ADMIN — LEVOTHYROXINE SODIUM 25 MCG: 0.03 TABLET ORAL at 08:53

## 2022-11-08 RX ADMIN — EMPAGLIFLOZIN 10 MG: 10 TABLET, FILM COATED ORAL at 08:53

## 2022-11-08 RX ADMIN — DIVALPROEX SODIUM 500 MG: 500 TABLET, DELAYED RELEASE ORAL at 08:53

## 2022-11-08 RX ADMIN — METFORMIN HYDROCHLORIDE 1000 MG: 500 TABLET ORAL at 17:09

## 2022-11-08 RX ADMIN — HYDROXYZINE HYDROCHLORIDE 50 MG: 50 TABLET, FILM COATED ORAL at 08:53

## 2022-11-08 RX ADMIN — BACLOFEN 10 MG: 10 TABLET ORAL at 20:37

## 2022-11-08 RX ADMIN — HYDROXYZINE HYDROCHLORIDE 50 MG: 50 TABLET, FILM COATED ORAL at 20:36

## 2022-11-08 RX ADMIN — MIRTAZAPINE 15 MG: 15 TABLET, FILM COATED ORAL at 20:36

## 2022-11-08 RX ADMIN — QUETIAPINE 200 MG: 200 TABLET, FILM COATED ORAL at 14:25

## 2022-11-08 RX ADMIN — CLONIDINE HYDROCHLORIDE 0.1 MG: 0.1 TABLET ORAL at 20:36

## 2022-11-08 RX ADMIN — Medication 10 MG: at 20:35

## 2022-11-08 RX ADMIN — Medication 25 MCG: at 08:53

## 2022-11-08 ASSESSMENT — ACTIVITIES OF DAILY LIVING (ADL)
ADLS_ACUITY_SCORE: 56

## 2022-11-08 NOTE — PLAN OF CARE
PRIMARY DIAGNOSIS: PLACEMENT  OUTPATIENT/OBSERVATION GOALS TO BE MET BEFORE DISCHARGE:  1. ADLs back to baseline: Yes    2. Activity and level of assistance: Up with maximum assistance.     3. Pain status: Pain free.    4. Return to near baseline physical activity: Yes     Discharge Planner Nurse   Safe discharge environment identified: No  Barriers to discharge: Yes       Entered by: Opal Garcia RN 11/07/2022 10:27 PM     Vitals are Temp: 97.6  F (36.4  C) Temp src: Oral BP: 118/84 Pulse: 89   Resp: 16 SpO2: 96 %.  Patient is alert self, place, and situation. They are 2 assist with bed mobility.  Pt is a Regular diet.  They are denying pain. Incontinent of urine, changed x1. SW following for placement. Will continue to monitor and provide supportive cares.      Please review provider order for any additional goals.   Nurse to notify provider when observation goals have been met and patient is ready for discharge.

## 2022-11-08 NOTE — PLAN OF CARE
PRIMARY DIAGNOSIS: PLACEMENT  OUTPATIENT/OBSERVATION GOALS TO BE MET BEFORE DISCHARGE:  1. ADLs back to baseline: Yes    2. Activity and level of assistance: Up with maximum assistance.     3. Pain status: Pain free.    4. Return to near baseline physical activity: Yes     Discharge Planner Nurse   Safe discharge environment identified: No  Barriers to discharge: Yes       Entered by: Opal Garcia RN 11/08/2022 4:37 AM     Vitals are Temp: 97.6  F (36.4  C) Temp src: Oral BP: 118/84 Pulse: 89   Resp: 16 SpO2: 96 %.    Pt sleeping.    Please review provider order for any additional goals.   Nurse to notify provider when observation goals have been met and patient is ready for discharge.

## 2022-11-08 NOTE — PROGRESS NOTES
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   Glucose by meter     Status: Normal   Result Value Ref Range    GLUCOSE BY METER POCT 85 70 - 99 mg/dL   Glucose by meter     Status: Normal   Result Value Ref Range    GLUCOSE BY METER POCT 98 70 - 99 mg/dL   Glucose by meter     Status: Normal   Result Value Ref Range    GLUCOSE BY METER POCT 91 70 - 99 mg/dL

## 2022-11-08 NOTE — PROGRESS NOTES
PRIMARY DIAGNOSIS: Pending placement.  OUTPATIENT/OBSERVATION GOALS TO BE MET BEFORE DISCHARGE:  1. ALS back to baseline: Yes    2. Activity and level of assistance: Up with maximum assistance. Consider SW and/or PT evaluation.     3. Pain status: Pain free.    4. Return to near baseline physical activity: Yes     Discharge Planner Nurse   Safe discharge environment identified: No  Barriers to discharge: Yes       A&Ox4, flat affect, cooperative. Paraplegic. Check and change. Tolerating diet. Call light appropriate. From group home, pending placement.

## 2022-11-08 NOTE — PROGRESS NOTES
PRIMARY DIAGNOSIS: Pending placement.  OUTPATIENT/OBSERVATION GOALS TO BE MET BEFORE DISCHARGE:  1. ALS back to baseline: Yes    2. Activity and level of assistance: Up with maximum assistance. Consider SW and/or PT evaluation.     3. Pain status: Pain free.    4. Return to near baseline physical activity: Yes     Discharge Planner Nurse   Safe discharge environment identified: No  Barriers to discharge: Yes       A&Ox4, flat affect, cooperative. Paraplegic. Check and change. From group home, pending placement.

## 2022-11-08 NOTE — PLAN OF CARE
PRIMARY DIAGNOSIS: PLACEMENT  OUTPATIENT/OBSERVATION GOALS TO BE MET BEFORE DISCHARGE:  1. ADLs back to baseline: Yes    2. Activity and level of assistance: Up with maximum assistance.     3. Pain status: Pain free.    4. Return to near baseline physical activity: Yes     Discharge Planner Nurse   Safe discharge environment identified: No  Barriers to discharge: Yes       Entered by: Opal Garcia RN 11/08/2022 4:42 AM     Vitals are Temp: 97.6  F (36.4  C) Temp src: Oral BP: 118/84 Pulse: 89   Resp: 16 SpO2: 96 %.    Pt sleeping. SW following for placement.    Please review provider order for any additional goals.   Nurse to notify provider when observation goals have been met and patient is ready for discharge.

## 2022-11-09 LAB
GLUCOSE BLDC GLUCOMTR-MCNC: 172 MG/DL (ref 70–99)
GLUCOSE BLDC GLUCOMTR-MCNC: 69 MG/DL (ref 70–99)
GLUCOSE BLDC GLUCOMTR-MCNC: 93 MG/DL (ref 70–99)
GLUCOSE BLDC GLUCOMTR-MCNC: 97 MG/DL (ref 70–99)

## 2022-11-09 PROCEDURE — 250N000013 HC RX MED GY IP 250 OP 250 PS 637: Performed by: PHYSICIAN ASSISTANT

## 2022-11-09 PROCEDURE — 250N000013 HC RX MED GY IP 250 OP 250 PS 637: Performed by: HOSPITALIST

## 2022-11-09 PROCEDURE — G0378 HOSPITAL OBSERVATION PER HR: HCPCS

## 2022-11-09 PROCEDURE — 82962 GLUCOSE BLOOD TEST: CPT

## 2022-11-09 PROCEDURE — 99225 PR SUBSEQUENT OBSERVATION CARE,LEVEL II: CPT | Performed by: HOSPITALIST

## 2022-11-09 RX ADMIN — OLANZAPINE 2.5 MG: 2.5 TABLET, FILM COATED ORAL at 14:53

## 2022-11-09 RX ADMIN — HYDROXYZINE HYDROCHLORIDE 50 MG: 50 TABLET, FILM COATED ORAL at 20:18

## 2022-11-09 RX ADMIN — QUETIAPINE FUMARATE 400 MG: 200 TABLET ORAL at 20:17

## 2022-11-09 RX ADMIN — METFORMIN HYDROCHLORIDE 1000 MG: 500 TABLET ORAL at 18:54

## 2022-11-09 RX ADMIN — DIVALPROEX SODIUM 250 MG: 500 TABLET, DELAYED RELEASE ORAL at 09:48

## 2022-11-09 RX ADMIN — Medication 25 MCG: at 09:44

## 2022-11-09 RX ADMIN — BACLOFEN 10 MG: 10 TABLET ORAL at 20:18

## 2022-11-09 RX ADMIN — LEVOTHYROXINE SODIUM 25 MCG: 0.03 TABLET ORAL at 09:43

## 2022-11-09 RX ADMIN — CLOTRIMAZOLE: 0.01 CREAM TOPICAL at 11:09

## 2022-11-09 RX ADMIN — SENNOSIDES AND DOCUSATE SODIUM 1 TABLET: 50; 8.6 TABLET ORAL at 09:44

## 2022-11-09 RX ADMIN — ATORVASTATIN CALCIUM 20 MG: 20 TABLET, FILM COATED ORAL at 20:18

## 2022-11-09 RX ADMIN — EMPAGLIFLOZIN 10 MG: 10 TABLET, FILM COATED ORAL at 09:44

## 2022-11-09 RX ADMIN — CLONIDINE HYDROCHLORIDE 0.1 MG: 0.1 TABLET ORAL at 20:18

## 2022-11-09 RX ADMIN — DIVALPROEX SODIUM 1000 MG: 500 TABLET, DELAYED RELEASE ORAL at 20:18

## 2022-11-09 RX ADMIN — BACLOFEN 10 MG: 10 TABLET ORAL at 14:53

## 2022-11-09 RX ADMIN — METOPROLOL SUCCINATE 25 MG: 25 TABLET, EXTENDED RELEASE ORAL at 09:44

## 2022-11-09 RX ADMIN — QUETIAPINE 200 MG: 200 TABLET, FILM COATED ORAL at 14:54

## 2022-11-09 RX ADMIN — ASPIRIN 81 MG CHEWABLE TABLET 81 MG: 81 TABLET CHEWABLE at 09:44

## 2022-11-09 RX ADMIN — MIRTAZAPINE 15 MG: 15 TABLET, FILM COATED ORAL at 20:16

## 2022-11-09 RX ADMIN — QUETIAPINE 200 MG: 200 TABLET, FILM COATED ORAL at 09:43

## 2022-11-09 RX ADMIN — METFORMIN HYDROCHLORIDE 1000 MG: 500 TABLET ORAL at 09:43

## 2022-11-09 RX ADMIN — CLOTRIMAZOLE: 0.01 CREAM TOPICAL at 20:21

## 2022-11-09 RX ADMIN — BACLOFEN 10 MG: 10 TABLET ORAL at 09:43

## 2022-11-09 RX ADMIN — VENLAFAXINE HYDROCHLORIDE 150 MG: 150 CAPSULE, EXTENDED RELEASE ORAL at 20:17

## 2022-11-09 RX ADMIN — CLONIDINE HYDROCHLORIDE 0.1 MG: 0.1 TABLET ORAL at 09:44

## 2022-11-09 RX ADMIN — HYDROXYZINE HYDROCHLORIDE 50 MG: 50 TABLET, FILM COATED ORAL at 14:53

## 2022-11-09 RX ADMIN — VENLAFAXINE HYDROCHLORIDE 75 MG: 150 CAPSULE, EXTENDED RELEASE ORAL at 20:18

## 2022-11-09 RX ADMIN — Medication 10 MG: at 20:16

## 2022-11-09 RX ADMIN — DIVALPROEX SODIUM 500 MG: 500 TABLET, DELAYED RELEASE ORAL at 09:45

## 2022-11-09 RX ADMIN — HYDROXYZINE HYDROCHLORIDE 50 MG: 50 TABLET, FILM COATED ORAL at 09:43

## 2022-11-09 ASSESSMENT — ACTIVITIES OF DAILY LIVING (ADL)
ADLS_ACUITY_SCORE: 54
ADLS_ACUITY_SCORE: 56
ADLS_ACUITY_SCORE: 54
ADLS_ACUITY_SCORE: 56

## 2022-11-09 NOTE — PROGRESS NOTES
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 POCT 135 (H) 70 - 99 mg/dL   Glucose by meter     Status: Normal   Result Value Ref Range    GLUCOSE BY METER POCT 99 70 - 99 mg/dL   Glucose by meter     Status: Abnormal   Result Value Ref Range    GLUCOSE BY METER POCT 100 (H) 70 - 99 mg/dL   Glucose by meter     Status: Abnormal   Result Value Ref Range    GLUCOSE BY METER POCT 123 (H) 70 - 99 mg/dL   Glucose by meter     Status: Normal   Result Value Ref Range    GLUCOSE BY METER POCT 85 70 - 99 mg/dL   Glucose by meter     Status: Normal   Result Value Ref Range    GLUCOSE BY METER POCT 98 70 - 99 mg/dL   Glucose by meter     Status: Normal   Result Value Ref Range    GLUCOSE BY METER POCT 91 70 - 99 mg/dL   Glucose by meter     Status: Normal   Result Value Ref Range    GLUCOSE BY METER POCT 90 70 - 99 mg/dL   Glucose by meter     Status: Normal   Result Value Ref Range    GLUCOSE BY METER POCT 93 70 - 99 mg/dL

## 2022-11-09 NOTE — PLAN OF CARE
PRIMARY DIAGNOSIS: PLACEMENT  OUTPATIENT/OBSERVATION GOALS TO BE MET BEFORE DISCHARGE:  1. ADLs back to baseline: Yes    2. Activity and level of assistance: Up with maximum assistance.     3. Pain status: Pain free.    4. Return to near baseline physical activity: Yes     Discharge Planner Nurse   Safe discharge environment identified: No  Barriers to discharge: Yes       Entered by: Naomy Buckley RN 11/09/2022 5:00 AM     Vitals are Temp: 97.6  F (36.4  C) Temp src: Oral BP: 129/83 Pulse: 84   Resp: 16 SpO2: 97 %.    Pt is alert and awake. Able to make his needs known. Took his medications without any issues. Denies pain. SW following for placement.    Please review provider order for any additional goals.   Nurse to notify provider when observation goals have been met and patient is ready for discharge.

## 2022-11-09 NOTE — PLAN OF CARE
PRIMARY DIAGNOSIS: PLACEMENT  OUTPATIENT/OBSERVATION GOALS TO BE MET BEFORE DISCHARGE:  1. ADLs back to baseline: Yes    2. Activity and level of assistance: Lift assist.    3. Pain status: Pain free.    4. Return to near baseline physical activity: Yes       Vitals are Temp: 97.3  F (36.3  C) Temp src: Oral BP: 135/89 Pulse: 75   Resp: 18 SpO2: 95 %.  Patient is Alert and Oriented x4. They are on bedrest.  Pt is a Regular diet.  They are denying pain.  Patient has no IV access.pt medically ready for discharge. SW following for safe disposition.     Discharge Planner Nurse   Safe discharge environment identified: Yes  Barriers to discharge: No       Entered by: Tita Loja RN 11/09/2022      Please review provider order for any additional goals.   Nurse to notify provider when observation goals have been met and patient is ready for discharge.

## 2022-11-09 NOTE — PLAN OF CARE
PRIMARY DIAGNOSIS: PLACEMENT  OUTPATIENT/OBSERVATION GOALS TO BE MET BEFORE DISCHARGE:  ADLs back to baseline: Yes    Activity and level of assistance: Lift assist.    Pain status: Pain free.    Return to near baseline physical activity: Yes     Discharge Planner Nurse   Safe discharge environment identified: Yes  Barriers to discharge: No       Entered by: Tita Loja RN 11/09/2022      Please review provider order for any additional goals.   Nurse to notify provider when observation goals have been met and patient is ready for discharge.

## 2022-11-09 NOTE — PLAN OF CARE
PRIMARY DIAGNOSIS: PLACEMENT  OUTPATIENT/OBSERVATION GOALS TO BE MET BEFORE DISCHARGE:  1. ADLs back to baseline: Yes    2. Activity and level of assistance: Up with maximum assistance.     3. Pain status: Pain free.    4. Return to near baseline physical activity: Yes     Discharge Planner Nurse   Safe discharge environment identified: No  Barriers to discharge: Yes       Entered by: Naomy Buckley RN 11/08/2022 9:37 PM     Vitals are Temp: 97.6  F (36.4  C) Temp src: Oral BP: 129/83 Pulse: 84   Resp: 16 SpO2: 97 %.    Pt is alert and awake. Able to make his needs known. Took his medications without any issues. Denies pain. SW following for placement.    Please review provider order for any additional goals.   Nurse to notify provider when observation goals have been met and patient is ready for discharge.

## 2022-11-09 NOTE — PLAN OF CARE
PRIMARY DIAGNOSIS: PLACEMENT  OUTPATIENT/OBSERVATION GOALS TO BE MET BEFORE DISCHARGE:  1. ADLs back to baseline: Yes    2. Activity and level of assistance: Up with maximum assistance.     3. Pain status: Pain free.    4. Return to near baseline physical activity: Yes     Discharge Planner Nurse   Safe discharge environment identified: No  Barriers to discharge: Yes       Entered by: Naomy Buckley RN 11/09/2022 12:22 AM     Vitals are Temp: 97.6  F (36.4  C) Temp src: Oral BP: 129/83 Pulse: 84   Resp: 16 SpO2: 97 %.    Pt is alert and awake. Able to make his needs known. Took his medications without any issues. Denies pain. SW following for placement.    Please review provider order for any additional goals.   Nurse to notify provider when observation goals have been met and patient is ready for discharge.

## 2022-11-10 LAB
GLUCOSE BLDC GLUCOMTR-MCNC: 111 MG/DL (ref 70–99)
GLUCOSE BLDC GLUCOMTR-MCNC: 124 MG/DL (ref 70–99)
GLUCOSE BLDC GLUCOMTR-MCNC: 70 MG/DL (ref 70–99)
GLUCOSE BLDC GLUCOMTR-MCNC: 79 MG/DL (ref 70–99)
GLUCOSE BLDC GLUCOMTR-MCNC: 95 MG/DL (ref 70–99)

## 2022-11-10 PROCEDURE — 99207 PR NO BILLABLE SERVICE THIS VISIT: CPT | Performed by: HOSPITALIST

## 2022-11-10 PROCEDURE — G0378 HOSPITAL OBSERVATION PER HR: HCPCS

## 2022-11-10 PROCEDURE — 250N000013 HC RX MED GY IP 250 OP 250 PS 637: Performed by: HOSPITALIST

## 2022-11-10 PROCEDURE — 82962 GLUCOSE BLOOD TEST: CPT

## 2022-11-10 PROCEDURE — 250N000013 HC RX MED GY IP 250 OP 250 PS 637: Performed by: PHYSICIAN ASSISTANT

## 2022-11-10 RX ADMIN — DIVALPROEX SODIUM 250 MG: 500 TABLET, DELAYED RELEASE ORAL at 08:36

## 2022-11-10 RX ADMIN — SENNOSIDES AND DOCUSATE SODIUM 1 TABLET: 50; 8.6 TABLET ORAL at 08:35

## 2022-11-10 RX ADMIN — METFORMIN HYDROCHLORIDE 1000 MG: 500 TABLET ORAL at 08:35

## 2022-11-10 RX ADMIN — DEXTROSE 15 G: 15 GEL ORAL at 06:34

## 2022-11-10 RX ADMIN — QUETIAPINE 200 MG: 200 TABLET, FILM COATED ORAL at 08:36

## 2022-11-10 RX ADMIN — HYDROXYZINE HYDROCHLORIDE 50 MG: 50 TABLET, FILM COATED ORAL at 14:12

## 2022-11-10 RX ADMIN — HYDROXYZINE HYDROCHLORIDE 50 MG: 50 TABLET, FILM COATED ORAL at 21:08

## 2022-11-10 RX ADMIN — Medication 25 MCG: at 08:35

## 2022-11-10 RX ADMIN — DIVALPROEX SODIUM 500 MG: 500 TABLET, DELAYED RELEASE ORAL at 08:37

## 2022-11-10 RX ADMIN — OLANZAPINE 2.5 MG: 2.5 TABLET, FILM COATED ORAL at 14:12

## 2022-11-10 RX ADMIN — VENLAFAXINE HYDROCHLORIDE 75 MG: 150 CAPSULE, EXTENDED RELEASE ORAL at 21:09

## 2022-11-10 RX ADMIN — BACLOFEN 10 MG: 10 TABLET ORAL at 14:12

## 2022-11-10 RX ADMIN — METOPROLOL SUCCINATE 25 MG: 25 TABLET, EXTENDED RELEASE ORAL at 08:35

## 2022-11-10 RX ADMIN — CLONIDINE HYDROCHLORIDE 0.1 MG: 0.1 TABLET ORAL at 08:35

## 2022-11-10 RX ADMIN — EMPAGLIFLOZIN 10 MG: 10 TABLET, FILM COATED ORAL at 08:37

## 2022-11-10 RX ADMIN — MIRTAZAPINE 15 MG: 15 TABLET, FILM COATED ORAL at 21:09

## 2022-11-10 RX ADMIN — Medication 10 MG: at 21:09

## 2022-11-10 RX ADMIN — CLONIDINE HYDROCHLORIDE 0.1 MG: 0.1 TABLET ORAL at 21:09

## 2022-11-10 RX ADMIN — HYDROXYZINE HYDROCHLORIDE 50 MG: 50 TABLET, FILM COATED ORAL at 08:35

## 2022-11-10 RX ADMIN — BACLOFEN 10 MG: 10 TABLET ORAL at 21:08

## 2022-11-10 RX ADMIN — BACLOFEN 10 MG: 10 TABLET ORAL at 08:36

## 2022-11-10 RX ADMIN — DIVALPROEX SODIUM 1000 MG: 500 TABLET, DELAYED RELEASE ORAL at 21:08

## 2022-11-10 RX ADMIN — ATORVASTATIN CALCIUM 20 MG: 20 TABLET, FILM COATED ORAL at 21:08

## 2022-11-10 RX ADMIN — CLOTRIMAZOLE: 0.01 CREAM TOPICAL at 21:09

## 2022-11-10 RX ADMIN — ASPIRIN 81 MG CHEWABLE TABLET 81 MG: 81 TABLET CHEWABLE at 08:35

## 2022-11-10 RX ADMIN — VENLAFAXINE HYDROCHLORIDE 150 MG: 150 CAPSULE, EXTENDED RELEASE ORAL at 21:08

## 2022-11-10 RX ADMIN — CLOTRIMAZOLE: 0.01 CREAM TOPICAL at 08:41

## 2022-11-10 RX ADMIN — QUETIAPINE FUMARATE 400 MG: 200 TABLET ORAL at 21:08

## 2022-11-10 RX ADMIN — LEVOTHYROXINE SODIUM 25 MCG: 0.03 TABLET ORAL at 08:35

## 2022-11-10 RX ADMIN — METFORMIN HYDROCHLORIDE 1000 MG: 500 TABLET ORAL at 18:13

## 2022-11-10 RX ADMIN — QUETIAPINE 200 MG: 200 TABLET, FILM COATED ORAL at 14:12

## 2022-11-10 ASSESSMENT — ACTIVITIES OF DAILY LIVING (ADL)
ADLS_ACUITY_SCORE: 54

## 2022-11-10 NOTE — PLAN OF CARE
PRIMARY DIAGNOSIS: PLACEMENT  OUTPATIENT/OBSERVATION GOALS TO BE MET BEFORE DISCHARGE:  1. ADLs back to baseline: Yes    2. Activity and level of assistance: Up with maximum assistance.     3. Pain status: Pain free.    4. Return to near baseline physical activity: Yes     Discharge Planner Nurse   Safe discharge environment identified: No  Barriers to discharge: Yes       Entered by: Naomy Buckley RN 11/10/2022 4:39 AM     Vitals are Temp: 98  F (36.7  C) Temp src: Oral BP: (!) 126/90 Pulse: 88   Resp: 20 SpO2: 96 %.    Pt is alert and awake. Able to make his needs known. Took his medications without any issues. Denies pain. Pt agreeable to having incontinence care before HS. Rechecked . Able to change position independent. SW following for placement.    Please review provider order for any additional goals.   Nurse to notify provider when observation goals have been met and patient is ready for discharge.

## 2022-11-10 NOTE — PLAN OF CARE
PRIMARY DIAGNOSIS: PLACEMENT  OUTPATIENT/OBSERVATION GOALS TO BE MET BEFORE DISCHARGE:  1. ADLs back to baseline: Yes    2. Activity and level of assistance: Up with maximum assistance.     3. Pain status: Pain free.    4. Return to near baseline physical activity: Yes     Discharge Planner Nurse   Safe discharge environment identified: No  Barriers to discharge: Yes       Entered by: Naomy Buckley RN 11/10/2022 12:46 AM     Vitals are Temp: 98  F (36.7  C) Temp src: Oral BP: (!) 126/90 Pulse: 88   Resp: 20 SpO2: 96 %.    Pt is alert and awake. Able to make his needs known. Took his medications without any issues. Denies pain. Pt agreeable to having incontinence care before HS. Rechecked . Able to change position independent. SW following for placement.    Please review provider order for any additional goals.   Nurse to notify provider when observation goals have been met and patient is ready for discharge.

## 2022-11-10 NOTE — PROGRESS NOTES
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 - 99 mg/dL   Glucose by meter     Status: Normal   Result Value Ref Range    GLUCOSE BY METER POCT 98 70 - 99 mg/dL   Glucose by meter     Status: Normal   Result Value Ref Range    GLUCOSE BY METER POCT 91 70 - 99 mg/dL   Glucose by meter     Status: Normal   Result Value Ref Range    GLUCOSE BY METER POCT 90 70 - 99 mg/dL   Glucose by meter     Status: Normal   Result Value Ref Range    GLUCOSE BY METER POCT 93 70 - 99 mg/dL   Glucose by meter     Status: Abnormal   Result Value Ref Range    GLUCOSE BY METER POCT 69 (L) 70 - 99 mg/dL   Glucose by meter     Status: Normal   Result Value Ref Range    GLUCOSE BY METER POCT 97 70 - 99 mg/dL   Glucose by meter     Status: Abnormal   Result Value Ref Range    GLUCOSE BY METER POCT 172 (H) 70 - 99 mg/dL   Glucose by meter     Status: Normal   Result Value Ref Range    GLUCOSE BY METER POCT 70 70 - 99 mg/dL   Glucose by meter     Status: Normal   Result Value Ref Range    GLUCOSE BY METER POCT 79 70 - 99 mg/dL   Glucose by meter     Status: Abnormal   Result Value Ref Range    GLUCOSE BY METER POCT 124 (H) 70 -  99 mg/dL   Glucose by meter     Status: Abnormal   Result Value Ref Range    GLUCOSE BY METER POCT 111 (H) 70 - 99 mg/dL

## 2022-11-10 NOTE — PLAN OF CARE
PRIMARY DIAGNOSIS: Placement  OUTPATIENT/OBSERVATION GOALS TO BE MET BEFORE DISCHARGE:  1. ADLs back to baseline: Yes    2. Activity and level of assistance: Up with maximum assistance. Consider SW and/or PT evaluation.     3. Pain status: Pain free.    4. Return to near baseline physical activity: Yes     Discharge Planner Nurse   Safe discharge environment identified: No  Barriers to discharge: Yes       Entered by: Hira Fraire RN 11/10/2022   A/O x4. VSS on RA. Assist of 1, not OOB. Tolerating regular diet. Lung sounds clear. Bowel sounds active. Passing flatus. Incontinent of BM and bladder. Briefs changed x1. Adequate urine output. Skin dry, flaky. Denies pain. Denies nausea.     Please review provider order for any additional goals.   Nurse to notify provider when observation goals have been met and patient is ready for discharge.

## 2022-11-10 NOTE — PROGRESS NOTES
Pt BG was 70 at 0600. He is alert and asymptomatic.Was given 8oz of apple juice and re checked at 0630 was 79. Pt was given 15g of glucose gel at 0630, recheck at 0645 was 124.

## 2022-11-10 NOTE — PLAN OF CARE
PRIMARY DIAGNOSIS: PLACEMENT  OUTPATIENT/OBSERVATION GOALS TO BE MET BEFORE DISCHARGE:  1. ADLs back to baseline: Yes    2. Activity and level of assistance: Up with maximum assistance.     3. Pain status: Pain free.    4. Return to near baseline physical activity: Yes     Discharge Planner Nurse   Safe discharge environment identified: No  Barriers to discharge: Yes       Entered by: Naomy Buckley RN 11/09/2022 10:08 PM     Vitals are Temp: 98  F (36.7  C) Temp src: Oral BP: (!) 126/90 Pulse: 88   Resp: 20 SpO2: 96 %.    Pt is alert and awake. Able to make his needs known. Took his medications without any issues. Denies pain. Pt agreeable to having incontinence care before HS. Rechecked . Able to change position independent. SW following for placement.    Please review provider order for any additional goals.   Nurse to notify provider when observation goals have been met and patient is ready for discharge.

## 2022-11-10 NOTE — PROVIDER NOTIFICATION
Notified by NST that Pt's BG WAS 69. Gave orange juice. Recheck was BG 97. Given more orange juice. Will Recheck in 15 min.

## 2022-11-10 NOTE — PLAN OF CARE
PRIMARY DIAGNOSIS: Placement  OUTPATIENT/OBSERVATION GOALS TO BE MET BEFORE DISCHARGE:  1. ADLs back to baseline: Yes    2. Activity and level of assistance: Up with maximum assistance. Consider SW and/or PT evaluation.     3. Pain status: Pain free.    4. Return to near baseline physical activity: Yes     Discharge Planner Nurse   Safe discharge environment identified: No  Barriers to discharge: Yes       Entered by: Hira Fraire RN 11/10/2022   A/O x4. VSS on RA. Assist of 1, not OOB. Tolerating regular diet. Lung sounds clear. Bowel sounds active. Passing flatus. Incontinent of BM and bladder. Adequate urine output. Skin dry, flaky. Denies pain. Denies nausea.     Please review provider order for any additional goals.   Nurse to notify provider when observation goals have been met and patient is ready for discharge.

## 2022-11-11 LAB
GLUCOSE BLDC GLUCOMTR-MCNC: 80 MG/DL (ref 70–99)
GLUCOSE BLDC GLUCOMTR-MCNC: 82 MG/DL (ref 70–99)

## 2022-11-11 PROCEDURE — G0378 HOSPITAL OBSERVATION PER HR: HCPCS

## 2022-11-11 PROCEDURE — 250N000013 HC RX MED GY IP 250 OP 250 PS 637: Performed by: HOSPITALIST

## 2022-11-11 PROCEDURE — 99224 PR SUBSEQUENT OBSERVATION CARE,LEVEL I: CPT | Performed by: PHYSICIAN ASSISTANT

## 2022-11-11 PROCEDURE — 82962 GLUCOSE BLOOD TEST: CPT | Mod: 91

## 2022-11-11 PROCEDURE — 250N000013 HC RX MED GY IP 250 OP 250 PS 637: Performed by: PHYSICIAN ASSISTANT

## 2022-11-11 RX ADMIN — HYDROXYZINE HYDROCHLORIDE 50 MG: 50 TABLET, FILM COATED ORAL at 19:56

## 2022-11-11 RX ADMIN — QUETIAPINE FUMARATE 400 MG: 200 TABLET ORAL at 21:49

## 2022-11-11 RX ADMIN — HYDROXYZINE HYDROCHLORIDE 50 MG: 50 TABLET, FILM COATED ORAL at 09:27

## 2022-11-11 RX ADMIN — DIVALPROEX SODIUM 1000 MG: 500 TABLET, DELAYED RELEASE ORAL at 21:49

## 2022-11-11 RX ADMIN — Medication 25 MCG: at 09:29

## 2022-11-11 RX ADMIN — QUETIAPINE 200 MG: 200 TABLET, FILM COATED ORAL at 16:03

## 2022-11-11 RX ADMIN — CLONIDINE HYDROCHLORIDE 0.1 MG: 0.1 TABLET ORAL at 19:56

## 2022-11-11 RX ADMIN — METOPROLOL SUCCINATE 25 MG: 25 TABLET, EXTENDED RELEASE ORAL at 09:29

## 2022-11-11 RX ADMIN — BACLOFEN 10 MG: 10 TABLET ORAL at 09:28

## 2022-11-11 RX ADMIN — VENLAFAXINE HYDROCHLORIDE 75 MG: 150 CAPSULE, EXTENDED RELEASE ORAL at 21:48

## 2022-11-11 RX ADMIN — BACLOFEN 10 MG: 10 TABLET ORAL at 19:56

## 2022-11-11 RX ADMIN — ATORVASTATIN CALCIUM 20 MG: 20 TABLET, FILM COATED ORAL at 19:56

## 2022-11-11 RX ADMIN — Medication 10 MG: at 21:48

## 2022-11-11 RX ADMIN — METFORMIN HYDROCHLORIDE 1000 MG: 500 TABLET ORAL at 18:41

## 2022-11-11 RX ADMIN — CLOTRIMAZOLE: 0.01 CREAM TOPICAL at 16:05

## 2022-11-11 RX ADMIN — CLOTRIMAZOLE: 0.01 CREAM TOPICAL at 20:02

## 2022-11-11 RX ADMIN — ASPIRIN 81 MG CHEWABLE TABLET 81 MG: 81 TABLET CHEWABLE at 09:29

## 2022-11-11 RX ADMIN — VENLAFAXINE HYDROCHLORIDE 150 MG: 150 CAPSULE, EXTENDED RELEASE ORAL at 21:48

## 2022-11-11 RX ADMIN — BACLOFEN 10 MG: 10 TABLET ORAL at 16:03

## 2022-11-11 RX ADMIN — SENNOSIDES AND DOCUSATE SODIUM 1 TABLET: 50; 8.6 TABLET ORAL at 09:29

## 2022-11-11 RX ADMIN — DIVALPROEX SODIUM 500 MG: 500 TABLET, DELAYED RELEASE ORAL at 09:49

## 2022-11-11 RX ADMIN — DIVALPROEX SODIUM 750 MG: 500 TABLET, DELAYED RELEASE ORAL at 09:29

## 2022-11-11 RX ADMIN — QUETIAPINE 200 MG: 200 TABLET, FILM COATED ORAL at 09:28

## 2022-11-11 RX ADMIN — CLONIDINE HYDROCHLORIDE 0.1 MG: 0.1 TABLET ORAL at 09:28

## 2022-11-11 RX ADMIN — HYDROXYZINE HYDROCHLORIDE 50 MG: 50 TABLET, FILM COATED ORAL at 16:03

## 2022-11-11 RX ADMIN — OLANZAPINE 2.5 MG: 2.5 TABLET, FILM COATED ORAL at 16:03

## 2022-11-11 RX ADMIN — LEVOTHYROXINE SODIUM 25 MCG: 0.03 TABLET ORAL at 09:28

## 2022-11-11 RX ADMIN — MIRTAZAPINE 15 MG: 15 TABLET, FILM COATED ORAL at 21:50

## 2022-11-11 ASSESSMENT — ACTIVITIES OF DAILY LIVING (ADL)
ADLS_ACUITY_SCORE: 54

## 2022-11-11 NOTE — PLAN OF CARE
PRIMARY DIAGNOSIS: Placement  OUTPATIENT/OBSERVATION GOALS TO BE MET BEFORE DISCHARGE:  1. ADLs back to baseline: Yes    2. Activity and level of assistance: Up with maximum assistance. Lift    3. Pain status: Pain free.    4. Return to near baseline physical activity: Yes     Discharge Planner Nurse   Safe discharge environment identified: No  Barriers to discharge: Yes         Pt is A&O to self and place. Forgetful of time. Ax1 with hygiene cares, turns well on sides. Reposition/turned. Denies pain, SOB, or CP. Pt is total cares, is able to make needs known. Incontinent of B&B. Reg diet, needs assistance with ordering food. No IV access. SW involved, waiting for placement.     Please review provider order for any additional goals.   Nurse to notify provider when observation goals have been met and patient is ready for discharge.

## 2022-11-11 NOTE — PLAN OF CARE
PRIMARY DIAGNOSIS: PLACEMENT  OUTPATIENT/OBSERVATION GOALS TO BE MET BEFORE DISCHARGE:  1. ADLs back to baseline: Yes    2. Activity and level of assistance: Up with maximum assistance. Lift    3. Pain status: Pain free.    4. Return to near baseline physical activity: Yes     Discharge Planner Nurse   Safe discharge environment identified: No  Barriers to discharge: Yes       Entered by: Kerri Mccloud RN 11/11/2022 4:16 AM  A/O disoriented to time; Ax: 1 w/ lift; RA; regular diet; denies pain; pt is total cares will notify staff of needs, no IV access. Pt sleeping/slept through the night. Pt waiting for placement, SW assisting with discharge.  Please review provider order for any additional goals.   Nurse to notify provider when observation goals have been met and patient is ready for discharge.

## 2022-11-11 NOTE — PLAN OF CARE
PRIMARY DIAGNOSIS: Placement  OUTPATIENT/OBSERVATION GOALS TO BE MET BEFORE DISCHARGE:  1. ADLs back to baseline: Yes    2. Activity and level of assistance: Up with maximum assistance. Lift    3. Pain status: Pain free.    4. Return to near baseline physical activity: Yes     Discharge Planner Nurse   Safe discharge environment identified: No  Barriers to discharge: Yes     A&O to self and place. VSS on RA, denying pain. Held antidiabetic medications as BG is 82, informed provider, verbal order to hold. Turn/repo. Call light within reach, able to make needs known. Awaiting placement.     Please review provider order for any additional goals.   Nurse to notify provider when observation goals have been met and patient is ready for discharge.

## 2022-11-11 NOTE — PLAN OF CARE
PRIMARY DIAGNOSIS: Placement  OUTPATIENT/OBSERVATION GOALS TO BE MET BEFORE DISCHARGE:  1. ADLs back to baseline: Yes    2. Activity and level of assistance: Up with maximum assistance. Total cares, lift     3. Pain status: Pain free.    4. Return to near baseline physical activity: Yes     Discharge Planner Nurse   Safe discharge environment identified: No  Barriers to discharge: Yes     Pt alert and orientated. Calm and cooperative. VSS on RA. Denying pain. Incontinent of B&B, reports last BM was yesterday. Ax1 with incontinent cares as pt does well with turns. Tolerating reg diet, needs help ordering food. Encouraging fluids. SW consulted with assisting with placement.       Please review provider order for any additional goals.   Nurse to notify provider when observation goals have been met and patient is ready for discharge.
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
## 2022-11-11 NOTE — PLAN OF CARE
PRIMARY DIAGNOSIS: PLACEMENT  OUTPATIENT/OBSERVATION GOALS TO BE MET BEFORE DISCHARGE:  1. ADLs back to baseline: Yes    2. Activity and level of assistance: Up with maximum assistance. Lift    3. Pain status: Pain free.    4. Return to near baseline physical activity: Yes     Discharge Planner Nurse   Safe discharge environment identified: No  Barriers to discharge: Yes       Entered by: Kerri Mccloud RN 11/11/2022 2:36 AM  PT A/O disoriented to time; Ax: 1 w/ lift; RA; regular diet; denies pain; pt is total cares will notify staff of needs, no IV access. Awaiting placement, SW assisting with discharge.  Please review provider order for any additional goals.   Nurse to notify provider when observation goals have been met and patient is ready for discharge.

## 2022-11-12 LAB
GLUCOSE BLDC GLUCOMTR-MCNC: 106 MG/DL (ref 70–99)
GLUCOSE BLDC GLUCOMTR-MCNC: 86 MG/DL (ref 70–99)

## 2022-11-12 PROCEDURE — 99224 PR SUBSEQUENT OBSERVATION CARE,LEVEL I: CPT | Performed by: PHYSICIAN ASSISTANT

## 2022-11-12 PROCEDURE — 250N000013 HC RX MED GY IP 250 OP 250 PS 637: Performed by: HOSPITALIST

## 2022-11-12 PROCEDURE — 250N000013 HC RX MED GY IP 250 OP 250 PS 637: Performed by: PHYSICIAN ASSISTANT

## 2022-11-12 PROCEDURE — 82962 GLUCOSE BLOOD TEST: CPT

## 2022-11-12 PROCEDURE — G0378 HOSPITAL OBSERVATION PER HR: HCPCS

## 2022-11-12 RX ORDER — KETOCONAZOLE 20 MG/G
CREAM TOPICAL 2 TIMES DAILY
Status: DISCONTINUED | OUTPATIENT
Start: 2022-11-12 | End: 2022-12-19

## 2022-11-12 RX ADMIN — METFORMIN HYDROCHLORIDE 1000 MG: 500 TABLET ORAL at 09:15

## 2022-11-12 RX ADMIN — HYDROXYZINE HYDROCHLORIDE 50 MG: 50 TABLET, FILM COATED ORAL at 09:15

## 2022-11-12 RX ADMIN — SENNOSIDES AND DOCUSATE SODIUM 1 TABLET: 50; 8.6 TABLET ORAL at 09:15

## 2022-11-12 RX ADMIN — METFORMIN HYDROCHLORIDE 1000 MG: 500 TABLET ORAL at 17:25

## 2022-11-12 RX ADMIN — KETOCONAZOLE: 20 CREAM TOPICAL at 13:52

## 2022-11-12 RX ADMIN — QUETIAPINE 200 MG: 200 TABLET, FILM COATED ORAL at 13:52

## 2022-11-12 RX ADMIN — MIRTAZAPINE 15 MG: 15 TABLET, FILM COATED ORAL at 21:50

## 2022-11-12 RX ADMIN — QUETIAPINE FUMARATE 400 MG: 200 TABLET ORAL at 21:49

## 2022-11-12 RX ADMIN — QUETIAPINE 200 MG: 200 TABLET, FILM COATED ORAL at 09:15

## 2022-11-12 RX ADMIN — Medication 10 MG: at 21:49

## 2022-11-12 RX ADMIN — VENLAFAXINE HYDROCHLORIDE 150 MG: 150 CAPSULE, EXTENDED RELEASE ORAL at 21:50

## 2022-11-12 RX ADMIN — BACLOFEN 10 MG: 10 TABLET ORAL at 13:52

## 2022-11-12 RX ADMIN — HYDROXYZINE HYDROCHLORIDE 50 MG: 50 TABLET, FILM COATED ORAL at 19:38

## 2022-11-12 RX ADMIN — BACLOFEN 10 MG: 10 TABLET ORAL at 19:38

## 2022-11-12 RX ADMIN — DIVALPROEX SODIUM 1000 MG: 500 TABLET, DELAYED RELEASE ORAL at 21:49

## 2022-11-12 RX ADMIN — LEVOTHYROXINE SODIUM 25 MCG: 0.03 TABLET ORAL at 09:15

## 2022-11-12 RX ADMIN — CLOTRIMAZOLE: 0.01 CREAM TOPICAL at 15:31

## 2022-11-12 RX ADMIN — HYDROXYZINE HYDROCHLORIDE 50 MG: 50 TABLET, FILM COATED ORAL at 13:52

## 2022-11-12 RX ADMIN — VENLAFAXINE HYDROCHLORIDE 75 MG: 150 CAPSULE, EXTENDED RELEASE ORAL at 21:50

## 2022-11-12 RX ADMIN — ASPIRIN 81 MG CHEWABLE TABLET 81 MG: 81 TABLET CHEWABLE at 09:14

## 2022-11-12 RX ADMIN — Medication 25 MCG: at 09:15

## 2022-11-12 RX ADMIN — DIVALPROEX SODIUM 750 MG: 500 TABLET, DELAYED RELEASE ORAL at 09:16

## 2022-11-12 RX ADMIN — ATORVASTATIN CALCIUM 20 MG: 20 TABLET, FILM COATED ORAL at 19:38

## 2022-11-12 RX ADMIN — CLOTRIMAZOLE: 0.01 CREAM TOPICAL at 19:39

## 2022-11-12 RX ADMIN — DIVALPROEX SODIUM 250 MG: 500 TABLET, DELAYED RELEASE ORAL at 09:21

## 2022-11-12 RX ADMIN — OLANZAPINE 2.5 MG: 2.5 TABLET, FILM COATED ORAL at 13:52

## 2022-11-12 RX ADMIN — CLONIDINE HYDROCHLORIDE 0.1 MG: 0.1 TABLET ORAL at 19:38

## 2022-11-12 RX ADMIN — BACLOFEN 10 MG: 10 TABLET ORAL at 09:15

## 2022-11-12 RX ADMIN — KETOCONAZOLE: 20 CREAM TOPICAL at 19:39

## 2022-11-12 RX ADMIN — CLONIDINE HYDROCHLORIDE 0.1 MG: 0.1 TABLET ORAL at 09:15

## 2022-11-12 RX ADMIN — METOPROLOL SUCCINATE 25 MG: 25 TABLET, EXTENDED RELEASE ORAL at 09:14

## 2022-11-12 ASSESSMENT — ACTIVITIES OF DAILY LIVING (ADL)
ADLS_ACUITY_SCORE: 54
ADLS_ACUITY_SCORE: 56
ADLS_ACUITY_SCORE: 54
ADLS_ACUITY_SCORE: 58
ADLS_ACUITY_SCORE: 54
ADLS_ACUITY_SCORE: 54
ADLS_ACUITY_SCORE: 58

## 2022-11-12 NOTE — PROGRESS NOTES
PRIMARY DIAGNOSIS:Placement  OUTPATIENT/OBSERVATION GOALS TO BE MET BEFORE DISCHARGE:  1. ADLs back to baseline: Yes    Activity and level of assistance: Up with maximum assistance. Turn as pt allows  2. Pain status: Pain free.    3. Return to near baseline physical activity: Yes     Discharge Planner Nurse   Safe discharge environment identified: No  Barriers to discharge: Yes       Entered by: Alda Victoria RN 11/12/2022       Pt is A&O, able to make needs known. Pt is total care Ax1 with hygiene, able to turn on sides. Reg diet. No IV access. Awaiting placement, SW involved.   Please review provider order for any additional goals.   Nurse to notify provider when observation goals have been met and patient is ready for discharge.

## 2022-11-12 NOTE — PLAN OF CARE
PRIMARY DIAGNOSIS: Placement  OUTPATIENT/OBSERVATION GOALS TO BE MET BEFORE DISCHARGE:  1. ADLs back to baseline: Yes    Activity and level of assistance: Up with maximum assistance. Lift    2. Pain status: Pain free.    3. Return to near baseline physical activity: Yes     Discharge Planner Nurse   Safe discharge environment identified: No  Barriers to discharge: Yes       Entered by: Shasha Sutton RN 2022    Pt A&O, calm and pleasant, on RA, BP elevated, given scheduled BP med. B, gave Metformin held Jardiance. Denying pain, tolerating a reg diet, needs helps ordering food. Incontinent of B&B. Total care, Ax1 with hygiene care, able to turn on sides. No IV access. Awaiting placement, SW involved.     Please review provider order for any additional goals.   Nurse to notify provider when observation goals have been met and patient is ready for discharge.
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
## 2022-11-12 NOTE — PLAN OF CARE
PRIMARY DIAGNOSIS: Placement  OUTPATIENT/OBSERVATION GOALS TO BE MET BEFORE DISCHARGE:  1. ADLs back to baseline: Yes    2. Activity and level of assistance: Up with maximum assistance. Lift     3. Pain status: Pain free.    4. Return to near baseline physical activity: Yes     Discharge Planner Nurse   Safe discharge environment identified: No  Barriers to discharge: Yes       Entered by: Shasha Sutton RN 11/12/2022    Pt is A&O, calm and pleasant, on RA. Denying pain or SOB. Total cares, Ax2 with transfers, Ax1 with hygiene cares; Hygiene care completed. Was able to sit in chair with bed alarm on. Tolerating reg diet. Incontinent of B&B. New orders for cream for facial rash. SW involved for placement.       Please review provider order for any additional goals.   Nurse to notify provider when observation goals have been met and patient is ready for discharge.

## 2022-11-13 LAB
GLUCOSE BLDC GLUCOMTR-MCNC: 120 MG/DL (ref 70–99)
GLUCOSE BLDC GLUCOMTR-MCNC: 125 MG/DL (ref 70–99)
GLUCOSE BLDC GLUCOMTR-MCNC: 201 MG/DL (ref 70–99)
GLUCOSE BLDC GLUCOMTR-MCNC: 66 MG/DL (ref 70–99)

## 2022-11-13 PROCEDURE — 250N000013 HC RX MED GY IP 250 OP 250 PS 637: Performed by: PHYSICIAN ASSISTANT

## 2022-11-13 PROCEDURE — 250N000013 HC RX MED GY IP 250 OP 250 PS 637: Performed by: NURSE PRACTITIONER

## 2022-11-13 PROCEDURE — 99226 PR SUBSEQUENT OBSERVATION CARE,LEVEL III: CPT | Performed by: NURSE PRACTITIONER

## 2022-11-13 PROCEDURE — 250N000013 HC RX MED GY IP 250 OP 250 PS 637: Performed by: HOSPITALIST

## 2022-11-13 PROCEDURE — 82962 GLUCOSE BLOOD TEST: CPT

## 2022-11-13 PROCEDURE — G0378 HOSPITAL OBSERVATION PER HR: HCPCS

## 2022-11-13 RX ADMIN — CLONIDINE HYDROCHLORIDE 0.1 MG: 0.1 TABLET ORAL at 20:21

## 2022-11-13 RX ADMIN — QUETIAPINE 200 MG: 200 TABLET, FILM COATED ORAL at 15:29

## 2022-11-13 RX ADMIN — CLONIDINE HYDROCHLORIDE 0.1 MG: 0.1 TABLET ORAL at 09:54

## 2022-11-13 RX ADMIN — BACLOFEN 10 MG: 10 TABLET ORAL at 09:55

## 2022-11-13 RX ADMIN — METOPROLOL SUCCINATE 25 MG: 25 TABLET, EXTENDED RELEASE ORAL at 09:54

## 2022-11-13 RX ADMIN — BACLOFEN 10 MG: 10 TABLET ORAL at 20:21

## 2022-11-13 RX ADMIN — DIVALPROEX SODIUM 1000 MG: 500 TABLET, DELAYED RELEASE ORAL at 22:17

## 2022-11-13 RX ADMIN — HYDROXYZINE HYDROCHLORIDE 50 MG: 50 TABLET, FILM COATED ORAL at 15:29

## 2022-11-13 RX ADMIN — VENLAFAXINE HYDROCHLORIDE 150 MG: 150 CAPSULE, EXTENDED RELEASE ORAL at 22:18

## 2022-11-13 RX ADMIN — DEXTROSE 15 G: 15 GEL ORAL at 08:45

## 2022-11-13 RX ADMIN — KETOCONAZOLE: 20 CREAM TOPICAL at 20:22

## 2022-11-13 RX ADMIN — HYDROXYZINE HYDROCHLORIDE 50 MG: 50 TABLET, FILM COATED ORAL at 09:55

## 2022-11-13 RX ADMIN — MIRTAZAPINE 15 MG: 15 TABLET, FILM COATED ORAL at 22:17

## 2022-11-13 RX ADMIN — SENNOSIDES AND DOCUSATE SODIUM 1 TABLET: 50; 8.6 TABLET ORAL at 09:55

## 2022-11-13 RX ADMIN — HYDROXYZINE HYDROCHLORIDE 50 MG: 50 TABLET, FILM COATED ORAL at 20:21

## 2022-11-13 RX ADMIN — CLOTRIMAZOLE: 0.01 CREAM TOPICAL at 10:03

## 2022-11-13 RX ADMIN — DIVALPROEX SODIUM 500 MG: 500 TABLET, DELAYED RELEASE ORAL at 10:02

## 2022-11-13 RX ADMIN — Medication 10 MG: at 22:17

## 2022-11-13 RX ADMIN — QUETIAPINE FUMARATE 400 MG: 200 TABLET ORAL at 22:17

## 2022-11-13 RX ADMIN — OLANZAPINE 2.5 MG: 2.5 TABLET, FILM COATED ORAL at 15:29

## 2022-11-13 RX ADMIN — Medication 25 MCG: at 09:55

## 2022-11-13 RX ADMIN — QUETIAPINE 200 MG: 200 TABLET, FILM COATED ORAL at 09:55

## 2022-11-13 RX ADMIN — DIVALPROEX SODIUM 250 MG: 500 TABLET, DELAYED RELEASE ORAL at 09:55

## 2022-11-13 RX ADMIN — LEVOTHYROXINE SODIUM 25 MCG: 0.03 TABLET ORAL at 09:55

## 2022-11-13 RX ADMIN — BACLOFEN 10 MG: 10 TABLET ORAL at 15:29

## 2022-11-13 RX ADMIN — KETOCONAZOLE: 20 CREAM TOPICAL at 10:03

## 2022-11-13 RX ADMIN — CLOTRIMAZOLE: 0.01 CREAM TOPICAL at 20:22

## 2022-11-13 RX ADMIN — ATORVASTATIN CALCIUM 20 MG: 20 TABLET, FILM COATED ORAL at 20:21

## 2022-11-13 RX ADMIN — ASPIRIN 81 MG CHEWABLE TABLET 81 MG: 81 TABLET CHEWABLE at 09:55

## 2022-11-13 RX ADMIN — VENLAFAXINE HYDROCHLORIDE 75 MG: 150 CAPSULE, EXTENDED RELEASE ORAL at 22:17

## 2022-11-13 ASSESSMENT — ACTIVITIES OF DAILY LIVING (ADL)
ADLS_ACUITY_SCORE: 54

## 2022-11-13 NOTE — PLAN OF CARE
PRIMARY DIAGNOSIS: Placement  OUTPATIENT/OBSERVATION GOALS TO BE MET BEFORE DISCHARGE:  1. ADLs back to baseline: Yes    Activity and level of assistance: Up with maximum assistance. Lift    2. Pain status: Pain free.    3. Return to near baseline physical activity: Yes     Discharge Planner Nurse   Safe discharge environment identified: No  Barriers to discharge: Yes     Entered by: Shasha Sutton RN 2022    BP (!) 142/92 (BP Location: Right arm)   Pulse 79   Temp 97.7  F (36.5  C) (Oral)   Resp 17   Wt 99.9 kg (220 lb 4.8 oz)   SpO2 95%     Pt is A&O, VSS on RA. B. Calm and pleasant. Denying pain, tolerating reg diet, needs assistance ordering foods. Turned/repo, pillows in use. Incontinent of B&B. Total cares, Ax1 with hygiene cares, able to turn to sides. Cream applied to rash on face. No IV Access, SW involved, awaiting placement. Able to make needs known, call light within reach.    Please review provider order for any additional goals. Nurse to notify provider when observation goals have been met and patient is ready for discharge.
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
## 2022-11-13 NOTE — PROGRESS NOTES
PRIMARY DIAGNOSIS: Placement  OUTPATIENT/OBSERVATION GOALS TO BE MET BEFORE DISCHARGE:  1. ADLs back to baseline: Yes    2. Activity and level of assistance: Up with maximum assistance. Consider SW and/or PT evaluation.     3. Pain status: Pain free.    4. Return to near baseline physical activity: Yes     Discharge Planner Nurse   Safe discharge environment identified: No  Barriers to discharge: Yes       Entered by: Alda Victoria RN 11/12/2022      /80 (BP Location: Right arm)   Pulse 85   Temp 98  F (36.7  C) (Oral)   Resp 16   Wt 99.9 kg (220 lb 4.8 oz)   SpO2 95%   Pt A&0.  Pt calm and resting comfortably in bed. Denying pain. Pt repositioned.  No IV access. Ketoconazole Cream applied to rash on face.  Clotrimazole cream applied to groin. Call light within reach.  Pt resting comfortably in bed. Awaiting placement.   Please review provider order for any additional goals.   Nurse to notify provider when observation goals have been met and patient is ready for discharge.

## 2022-11-13 NOTE — PROGRESS NOTES
PRIMARY DIAGNOSIS: Placement  OUTPATIENT/OBSERVATION GOALS TO BE MET BEFORE DISCHARGE:  1. ADLs back to baseline: Yes    2. Activity and level of assistance: Up with maximum assistance. Consider SW and/or PT evaluation.     3. Pain status: Pain free.    4. Return to near baseline physical activity: Yes     Discharge Planner Nurse   Safe discharge environment identified: No  Barriers to discharge: Yes       Entered by: Alda Victoria RN 11/12/2022     /80 (BP Location: Right arm)   Pulse 85   Temp 98  F (36.7  C) (Oral)   Resp 16   Wt 99.9 kg (220 lb 4.8 oz)   SpO2 95%   Pt A&0.  Pt calm and resting comfortably in bed. Denying pain. Pt repositioned.  No IV access. Ketoconazole Cream applied to rash on face.  Clotrimazole cream applied to groin. Call light within reach.  Awaiting placement.   Please review provider order for any additional goals.   Nurse to notify provider when observation goals have been met and patient is ready for discharge.

## 2022-11-13 NOTE — PROGRESS NOTES
PRIMARY DIAGNOSIS: Placement  OUTPATIENT/OBSERVATION GOALS TO BE MET BEFORE DISCHARGE:  1. ADLs back to baseline: Yes    Activity and level of assistance: Up with maximum assistance.     2. Pain status: Pain free.    3. Return to near baseline physical activity: Yes     Discharge Planner Nurse   Safe discharge environment identified: No  Barriers to discharge: Yes       Entered by: Alda Victoria RN 11/13/2022      /80 (BP Location: Right arm)   Pulse 85   Temp 98  F (36.7  C) (Oral)   Resp 16   Wt 99.9 kg (220 lb 4.8 oz)   SpO2 95%   Pt A&0. VSS on RA. Denying pain.  No IV access. Ketoconazole Cream applied to rash on face.  Clotrimazole cream applied to groin. Call light within reach.  Pt resting comfortably in bed. Awaiting placement.   Please review provider order for any additional goals.   Nurse to notify provider when observation goals have been met and patient is ready for discharge.

## 2022-11-14 LAB
GLUCOSE BLDC GLUCOMTR-MCNC: 148 MG/DL (ref 70–99)
GLUCOSE BLDC GLUCOMTR-MCNC: 222 MG/DL (ref 70–99)

## 2022-11-14 PROCEDURE — 250N000013 HC RX MED GY IP 250 OP 250 PS 637: Performed by: NURSE PRACTITIONER

## 2022-11-14 PROCEDURE — 250N000013 HC RX MED GY IP 250 OP 250 PS 637: Performed by: PHYSICIAN ASSISTANT

## 2022-11-14 PROCEDURE — 250N000013 HC RX MED GY IP 250 OP 250 PS 637: Performed by: HOSPITALIST

## 2022-11-14 PROCEDURE — 82962 GLUCOSE BLOOD TEST: CPT

## 2022-11-14 PROCEDURE — G0378 HOSPITAL OBSERVATION PER HR: HCPCS

## 2022-11-14 PROCEDURE — 99224 PR SUBSEQUENT OBSERVATION CARE,LEVEL I: CPT | Performed by: PHYSICIAN ASSISTANT

## 2022-11-14 RX ADMIN — CLONIDINE HYDROCHLORIDE 0.1 MG: 0.1 TABLET ORAL at 09:45

## 2022-11-14 RX ADMIN — MIRTAZAPINE 15 MG: 15 TABLET, FILM COATED ORAL at 21:37

## 2022-11-14 RX ADMIN — CLONIDINE HYDROCHLORIDE 0.1 MG: 0.1 TABLET ORAL at 21:37

## 2022-11-14 RX ADMIN — Medication 10 MG: at 21:37

## 2022-11-14 RX ADMIN — BACLOFEN 10 MG: 10 TABLET ORAL at 09:46

## 2022-11-14 RX ADMIN — KETOCONAZOLE: 20 CREAM TOPICAL at 09:51

## 2022-11-14 RX ADMIN — BACLOFEN 10 MG: 10 TABLET ORAL at 14:26

## 2022-11-14 RX ADMIN — BACLOFEN 10 MG: 10 TABLET ORAL at 21:35

## 2022-11-14 RX ADMIN — ATORVASTATIN CALCIUM 20 MG: 20 TABLET, FILM COATED ORAL at 21:38

## 2022-11-14 RX ADMIN — ASPIRIN 81 MG CHEWABLE TABLET 81 MG: 81 TABLET CHEWABLE at 09:45

## 2022-11-14 RX ADMIN — METOPROLOL SUCCINATE 25 MG: 25 TABLET, EXTENDED RELEASE ORAL at 09:46

## 2022-11-14 RX ADMIN — QUETIAPINE 200 MG: 200 TABLET, FILM COATED ORAL at 09:44

## 2022-11-14 RX ADMIN — CLOTRIMAZOLE: 0.01 CREAM TOPICAL at 09:49

## 2022-11-14 RX ADMIN — Medication 25 MCG: at 09:45

## 2022-11-14 RX ADMIN — QUETIAPINE 200 MG: 200 TABLET, FILM COATED ORAL at 14:27

## 2022-11-14 RX ADMIN — VENLAFAXINE HYDROCHLORIDE 150 MG: 150 CAPSULE, EXTENDED RELEASE ORAL at 21:35

## 2022-11-14 RX ADMIN — OLANZAPINE 2.5 MG: 2.5 TABLET, FILM COATED ORAL at 14:27

## 2022-11-14 RX ADMIN — DIVALPROEX SODIUM 250 MG: 500 TABLET, DELAYED RELEASE ORAL at 09:47

## 2022-11-14 RX ADMIN — SENNOSIDES AND DOCUSATE SODIUM 1 TABLET: 50; 8.6 TABLET ORAL at 09:46

## 2022-11-14 RX ADMIN — HYDROXYZINE HYDROCHLORIDE 50 MG: 50 TABLET, FILM COATED ORAL at 09:46

## 2022-11-14 RX ADMIN — HYDROXYZINE HYDROCHLORIDE 50 MG: 50 TABLET, FILM COATED ORAL at 21:37

## 2022-11-14 RX ADMIN — QUETIAPINE FUMARATE 400 MG: 200 TABLET ORAL at 21:37

## 2022-11-14 RX ADMIN — HYDROXYZINE HYDROCHLORIDE 50 MG: 50 TABLET, FILM COATED ORAL at 14:27

## 2022-11-14 RX ADMIN — DIVALPROEX SODIUM 500 MG: 500 TABLET, DELAYED RELEASE ORAL at 09:46

## 2022-11-14 RX ADMIN — LEVOTHYROXINE SODIUM 25 MCG: 0.03 TABLET ORAL at 09:45

## 2022-11-14 RX ADMIN — VENLAFAXINE HYDROCHLORIDE 75 MG: 150 CAPSULE, EXTENDED RELEASE ORAL at 21:39

## 2022-11-14 RX ADMIN — DIVALPROEX SODIUM 1000 MG: 500 TABLET, DELAYED RELEASE ORAL at 21:35

## 2022-11-14 ASSESSMENT — ACTIVITIES OF DAILY LIVING (ADL)
ADLS_ACUITY_SCORE: 54

## 2022-11-14 NOTE — PROGRESS NOTES
PRIMARY DIAGNOSIS: Placement  OUTPATIENT/OBSERVATION GOALS TO BE MET BEFORE DISCHARGE:  1. ADLs back to baseline: Yes    2. Activity and level of assistance: Up with maximum assistance. Lift  3. Pain status: Pain free.    4. Return to near baseline physical activity: Yes     Discharge Planner Nurse   Safe discharge environment identified: No  Barriers to discharge: Yes       Entered by: Alda Victoria RN 11/13/2022     BP (!) 148/98 (BP Location: Right arm)   Pulse 84   Temp 97.6  F (36.4  C) (Oral)   Resp 16   Wt 99.9 kg (220 lb 4.8 oz)   SpO2 96%      Pt A&Ox 3. Disoriented to time. Pt is calm and pleasant.  VSS on RA. Pt denies any pain. No IV access. Reg diet. Pt incontinent of bladder, changed and repositioned. Teeth brushed.  Pt resting comfortably in bed. SW following placement. Will continue to monitor.   Please review provider order for any additional goals.   Nurse to notify provider when observation goals have been met and patient is ready for discharge.

## 2022-11-14 NOTE — PLAN OF CARE
"PRIMARY DIAGNOSIS: \"Placment\"   OUTPATIENT/OBSERVATION GOALS TO BE MET BEFORE DISCHARGE:  1. ADLs back to baseline: Yes, bed rest per baseline.     2. Activity and level of assistance: Total care with all ADL's    3. Pain status: Pain free.    4. Return to near baseline physical activity: Yes     Discharge Planner Nurse   Safe discharge environment identified: No  Barriers to discharge: No       Entered by: Genevieve Ordoñez RN 11/14/2022 12:07 PM      Pt resting in bed, check and change per Pt request and as needed. Waiting for placement. Pt BS reading after eating lunch is 222. Pt ate 100 % for  Lunch. Assist with reposition per Pt request.    Please review provider order for any additional goals.   Nurse to notify provider when observation goals have been met and patient is ready for discharge.    "

## 2022-11-14 NOTE — PROGRESS NOTES
PRIMARY DIAGNOSIS: Placement  OUTPATIENT/OBSERVATION GOALS TO BE MET BEFORE DISCHARGE:  1. ADLs back to baseline: Yes    2. Activity and level of assistance: Up with maximum assistance. Lift  3. Pain status: Pain free.    4. Return to near baseline physical activity: Yes     Discharge Planner Nurse   Safe discharge environment identified: No  Barriers to discharge: Yes       Entered by: Alda Victoria RN 11/14/2022     BP (!) 148/98 (BP Location: Right arm)   Pulse 84   Temp 97.6  F (36.4  C) (Oral)   Resp 16   Wt 99.9 kg (220 lb 4.8 oz)   SpO2 96%     Pt A&Ox 3. Disoriented to time. Pt is calm and pleasant.  VSS on RA. Pt denies any pain. No IV access. Reg diet. Pt resting peacefully. SW following placement. Will continue to monitor.   Please review provider order for any additional goals.   Nurse to notify provider when observation goals have been met and patient is ready for discharge.

## 2022-11-14 NOTE — PLAN OF CARE
"PRIMARY DIAGNOSIS: \"Placment\"   OUTPATIENT/OBSERVATION GOALS TO BE MET BEFORE DISCHARGE:  1. ADLs back to baseline: Yes, bed rest per baseline.     2. Activity and level of assistance: Total care with all ADL's    3. Pain status: Pain free.    4. Return to near baseline physical activity: Yes     Discharge Planner Nurse   Safe discharge environment identified: No  Barriers to discharge: No       Entered by: Genevieve Ordoñez RN 11/14/2022 10:35 AM      Pt A&O X 3, Pt cooperative with all needs. Pt denies pain. No access. Regular diet. VSS in RA. Pt incontinent with bowel and bladder. Placement pending.   Please review provider order for any additional goals.   Nurse to notify provider when observation goals have been met and patient is ready for discharge.    "
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
## 2022-11-14 NOTE — PLAN OF CARE
PRIMARY DIAGNOSIS: Placement  OUTPATIENT/OBSERVATION GOALS TO BE MET BEFORE DISCHARGE:  1. ADLs back to baseline: Yes    2. Activity and level of assistance: Up with maximum assistance. Lift    3. Pain status: Pain free.    4. Return to near baseline physical activity: Yes     Discharge Planner Nurse   Safe discharge environment identified: No  Barriers to discharge: Yes       Entered by: Shasha Sutton RN 2022     Pt A&Ox3, disoriented to time. VSS on RA. B, given Glucose gel 15 mg per order, additional juice given. Rechecked B. Informed provider. Denying pain, repo/turned. Incontinent of bladder, no BM. No IV access. SW following for placement.     Please review provider order for any additional goals. Nurse to notify provider when observation goals have been met and patient is ready for discharge.

## 2022-11-14 NOTE — PLAN OF CARE
PRIMARY DIAGNOSIS: Placement    OUTPATIENT/OBSERVATION GOALS TO BE MET BEFORE DISCHARGE:  1. ADLs back to baseline: Yes     2. Activity and level of assistance: Up with maximum assistance. Lift     3. Pain status: Pain free.     4. Return to near baseline physical activity: Yes          Discharge Planner Nurse   Safe discharge environment identified: No  Barriers to discharge: Yes    Pt A&Ox3, disoriented to time. VSS on RA. Denying pain, repo/turned. Incontinent of bladder, no BM. No IV access. Tolerating a reg diet, needs assistance with ordering. Resting comfortably in bed. Ax1 with hygiene cares, turns well. SW following for placement.      Please review provider order for any additional goals. Nurse to notify provider when observation goals have been met and patient is ready for discharge.

## 2022-11-14 NOTE — PLAN OF CARE
PRIMARY DIAGNOSIS: Placement    OUTPATIENT/OBSERVATION GOALS TO BE MET BEFORE DISCHARGE:  1. ADLs back to baseline: Yes     2. Activity and level of assistance: Up with maximum assistance. Lift     3. Pain status: Pain free.     4. Return to near baseline physical activity: Yes          Discharge Planner Nurse   Safe discharge environment identified: No  Barriers to discharge: Yes    Pt A&Ox3, disoriented to time. VSS on RA. Denying pain, repo/turned. Incontinent of bladder, no BM. No IV access. Tolerating a reg diet, needs assistance with ordering. Resting comfortably in bed. SW following for placement.      Please review provider order for any additional goals. Nurse to notify provider when observation goals have been met and patient is ready for discharge.

## 2022-11-14 NOTE — PROGRESS NOTES
PRIMARY DIAGNOSIS: Placement  OUTPATIENT/OBSERVATION GOALS TO BE MET BEFORE DISCHARGE:  1. ADLs back to baseline: Yes    2. Activity and level of assistance: Up with maximum assistance. Lift  3. Pain status: Pain free.    4. Return to near baseline physical activity: Yes     Discharge Planner Nurse   Safe discharge environment identified: No  Barriers to discharge: Yes       Entered by: Alda Victoria RN 11/14/2022     BP (!) 148/98 (BP Location: Right arm)   Pulse 84   Temp 97.6  F (36.4  C) (Oral)   Resp 16   Wt 99.9 kg (220 lb 4.8 oz)   SpO2 96%     Pt A&Ox 3. Disoriented to time. Pt is calm and pleasant.  VSS on RA. Pt denies any pain. No IV access. Reg diet. Pt resting peacefully. Pt incontinent of B/B. Pt repositioned.  SW following placement. Will continue to monitor.   Please review provider order for any additional goals.   Nurse to notify provider when observation goals have been met and patient is ready for discharge.

## 2022-11-15 LAB
GLUCOSE BLDC GLUCOMTR-MCNC: 111 MG/DL (ref 70–99)
GLUCOSE BLDC GLUCOMTR-MCNC: 144 MG/DL (ref 70–99)
HBA1C MFR BLD: 5.7 %

## 2022-11-15 PROCEDURE — G0378 HOSPITAL OBSERVATION PER HR: HCPCS

## 2022-11-15 PROCEDURE — 36415 COLL VENOUS BLD VENIPUNCTURE: CPT | Performed by: PHYSICIAN ASSISTANT

## 2022-11-15 PROCEDURE — 250N000013 HC RX MED GY IP 250 OP 250 PS 637: Performed by: PHYSICIAN ASSISTANT

## 2022-11-15 PROCEDURE — 250N000013 HC RX MED GY IP 250 OP 250 PS 637: Performed by: HOSPITALIST

## 2022-11-15 PROCEDURE — 250N000013 HC RX MED GY IP 250 OP 250 PS 637: Performed by: NURSE PRACTITIONER

## 2022-11-15 PROCEDURE — 83036 HEMOGLOBIN GLYCOSYLATED A1C: CPT | Performed by: PHYSICIAN ASSISTANT

## 2022-11-15 PROCEDURE — 99225 PR SUBSEQUENT OBSERVATION CARE,LEVEL II: CPT | Performed by: PHYSICIAN ASSISTANT

## 2022-11-15 PROCEDURE — 82962 GLUCOSE BLOOD TEST: CPT | Mod: 91

## 2022-11-15 RX ADMIN — DIVALPROEX SODIUM 1000 MG: 500 TABLET, DELAYED RELEASE ORAL at 21:45

## 2022-11-15 RX ADMIN — HYDROXYZINE HYDROCHLORIDE 50 MG: 50 TABLET, FILM COATED ORAL at 21:48

## 2022-11-15 RX ADMIN — HYDROXYZINE HYDROCHLORIDE 50 MG: 50 TABLET, FILM COATED ORAL at 08:21

## 2022-11-15 RX ADMIN — KETOCONAZOLE: 20 CREAM TOPICAL at 08:25

## 2022-11-15 RX ADMIN — CLOTRIMAZOLE: 0.01 CREAM TOPICAL at 08:25

## 2022-11-15 RX ADMIN — METOPROLOL SUCCINATE 25 MG: 25 TABLET, EXTENDED RELEASE ORAL at 08:20

## 2022-11-15 RX ADMIN — Medication 10 MG: at 21:45

## 2022-11-15 RX ADMIN — LEVOTHYROXINE SODIUM 25 MCG: 0.03 TABLET ORAL at 08:21

## 2022-11-15 RX ADMIN — Medication 25 MCG: at 08:21

## 2022-11-15 RX ADMIN — HYDROXYZINE HYDROCHLORIDE 50 MG: 50 TABLET, FILM COATED ORAL at 15:12

## 2022-11-15 RX ADMIN — SENNOSIDES AND DOCUSATE SODIUM 1 TABLET: 50; 8.6 TABLET ORAL at 08:21

## 2022-11-15 RX ADMIN — QUETIAPINE 200 MG: 200 TABLET, FILM COATED ORAL at 08:21

## 2022-11-15 RX ADMIN — OLANZAPINE 2.5 MG: 2.5 TABLET, FILM COATED ORAL at 15:12

## 2022-11-15 RX ADMIN — DIVALPROEX SODIUM 250 MG: 500 TABLET, DELAYED RELEASE ORAL at 08:21

## 2022-11-15 RX ADMIN — VENLAFAXINE HYDROCHLORIDE 150 MG: 150 CAPSULE, EXTENDED RELEASE ORAL at 21:46

## 2022-11-15 RX ADMIN — ASPIRIN 81 MG CHEWABLE TABLET 81 MG: 81 TABLET CHEWABLE at 08:21

## 2022-11-15 RX ADMIN — VENLAFAXINE HYDROCHLORIDE 75 MG: 150 CAPSULE, EXTENDED RELEASE ORAL at 21:47

## 2022-11-15 RX ADMIN — BACLOFEN 10 MG: 10 TABLET ORAL at 15:12

## 2022-11-15 RX ADMIN — QUETIAPINE 200 MG: 200 TABLET, FILM COATED ORAL at 15:12

## 2022-11-15 RX ADMIN — METFORMIN HYDROCHLORIDE 500 MG: 500 TABLET ORAL at 18:06

## 2022-11-15 RX ADMIN — QUETIAPINE FUMARATE 400 MG: 200 TABLET ORAL at 21:46

## 2022-11-15 RX ADMIN — BACLOFEN 10 MG: 10 TABLET ORAL at 21:47

## 2022-11-15 RX ADMIN — CLONIDINE HYDROCHLORIDE 0.1 MG: 0.1 TABLET ORAL at 08:20

## 2022-11-15 RX ADMIN — ATORVASTATIN CALCIUM 20 MG: 20 TABLET, FILM COATED ORAL at 21:47

## 2022-11-15 RX ADMIN — CLONIDINE HYDROCHLORIDE 0.1 MG: 0.1 TABLET ORAL at 21:47

## 2022-11-15 RX ADMIN — BACLOFEN 10 MG: 10 TABLET ORAL at 08:21

## 2022-11-15 RX ADMIN — DIVALPROEX SODIUM 500 MG: 500 TABLET, DELAYED RELEASE ORAL at 08:21

## 2022-11-15 RX ADMIN — MIRTAZAPINE 15 MG: 15 TABLET, FILM COATED ORAL at 21:47

## 2022-11-15 ASSESSMENT — ACTIVITIES OF DAILY LIVING (ADL)
ADLS_ACUITY_SCORE: 56
ADLS_ACUITY_SCORE: 54
ADLS_ACUITY_SCORE: 54
ADLS_ACUITY_SCORE: 56
ADLS_ACUITY_SCORE: 56
ADLS_ACUITY_SCORE: 54
ADLS_ACUITY_SCORE: 56
ADLS_ACUITY_SCORE: 56
ADLS_ACUITY_SCORE: 54
ADLS_ACUITY_SCORE: 56
ADLS_ACUITY_SCORE: 54
ADLS_ACUITY_SCORE: 54

## 2022-11-15 NOTE — PLAN OF CARE
PRIMARY DIAGNOSIS: PLACEMENT  OUTPATIENT/OBSERVATION GOALS TO BE MET BEFORE DISCHARGE:  1. ADLs back to baseline: Yes    2. Activity and level of assistance: Bedbound at baseline    3. Pain status: Pain free.    4. Return to near baseline physical activity: Yes     Discharge Planner Nurse   Safe discharge environment identified: No  Barriers to discharge: Yes       Entered by: Annalee Palomares RN 11/15/2022 2:33 PM    Pt A/O x3, disoriented to time. Bed bound at baseline. Denies pain. Cream applied to face and groin per orders. Pt medically stable, awaiting placement.  Vital signs:  Temp: 97.7  F (36.5  C) Temp src: Oral BP: (!) 142/99 Pulse: 79   Resp: 18 SpO2: 95 % O2 Device: None (Room air)     Weight: 99.9 kg (220 lb 4.8 oz)  There is no height or weight on file to calculate BMI.    Please review provider order for any additional goals.   Nurse to notify provider when observation goals have been met and patient is ready for discharge.
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
## 2022-11-15 NOTE — PROGRESS NOTES
"Care Management Follow Up    Expected Discharge Date: 12/30/2022     Concerns to be Addressed:  Discharge planning     Patient plan of care discussed at interdisciplinary rounds: Yes    Anticipated Discharge Disposition:  [facility]    Patient/Family in Agreement with the Plan:  Yes    Referrals Placed by CM/SW:  Group Home  Private pay costs discussed: Not applicable    Additional Information:  ANALY received email from Elzbieta with [facility] stating that \"It seems that the provider that was found is not going to work due what they are claiming in staff time. So should probably be looking at other options for Chris and relocation services could do this for him.\"    ANALY placed call to  Kristi NOE through [facility] for [place], [phone], to inquire about relocation referral process. She states that information is needed on when/where/who completed pt's last [employer] assessment as they have to complete the referral.     ANALY emailed Elzbieta and cc'ed LTC ROSIO Ivy through [facility] to see if they have information on most recent [employer] was completed and 's contact information so ANALY can follow up with them to make referral. Elzbieta replied stating she believes it was 3/5/22 by [facility]. ANALY left  for Cata AVELAR, [phone], to get more specific information from most recent [employer] assessment.     ANALY will continue to follow.     DANITA Obrien, [facility]   Inpatient Care Coordination  [facility]   [phone]        "

## 2022-11-15 NOTE — PLAN OF CARE
"PRIMARY DIAGNOSIS: \"Placment\"   OUTPATIENT/OBSERVATION GOALS TO BE MET BEFORE DISCHARGE:  1. ADLs back to baseline: Yes, bed rest per baseline.     2. Activity and level of assistance: Total care with all ADL's    3. Pain status: Pain free.    4. Return to near baseline physical activity: Yes     Discharge Planner Nurse   Safe discharge environment identified: No  Barriers to discharge: No       Entered by: Genevieve Ordoñez RN 11/14/2022 7:00 PM      Pt resting in bed, check and change per Pt request and as needed. Waiting for placement. Pt BS reading after eating lunch is 222. Pt ate 100 % for  Lunch. Assist with reposition per Pt request. No concern at this time.     Please review provider order for any additional goals.   Nurse to notify provider when observation goals have been met and patient is ready for discharge.    "

## 2022-11-15 NOTE — PLAN OF CARE
PRIMARY DIAGNOSIS: PLACEMENT  OUTPATIENT/OBSERVATION GOALS TO BE MET BEFORE DISCHARGE:  1. ADLs back to baseline: Yes    2. Activity and level of assistance: Bedbound at baseline    3. Pain status: Pain free.    4. Return to near baseline physical activity: Yes     Discharge Planner Nurse   Safe discharge environment identified: No  Barriers to discharge: Yes       Entered by: Annalee Palomares RN 11/15/2022 2:32 PM    Pt A/O x3, disoriented to time. Bed bound at baseline. Denies pain. Cream applied to face and groin per orders. Pt medically stable, awaiting placement.  Vital signs:  Temp: 97.7  F (36.5  C) Temp src: Oral BP: (!) 142/99 Pulse: 79   Resp: 18 SpO2: 95 % O2 Device: None (Room air)     Weight: 99.9 kg (220 lb 4.8 oz)  There is no height or weight on file to calculate BMI.    Please review provider order for any additional goals.   Nurse to notify provider when observation goals have been met and patient is ready for discharge.

## 2022-11-15 NOTE — PLAN OF CARE
PRIMARY DIAGNOSIS: Placement  OUTPATIENT/OBSERVATION GOALS TO BE MET BEFORE DISCHARGE:  1. ADLs back to baseline: Yes, bed rest is baseline    2. Activity and level of assistance: total cares.     3. Pain status: Pain free.    4. Return to near baseline physical activity: Yes     Discharge Planner Nurse   Safe discharge environment identified: No  Barriers to discharge: No       Entered by: Kerri Mccloud RN 11/15/2022 6:43 AM   Pt A/Ox person and time, Ax: total care, No IV access, RA, Regular diet, BS checks. Provided pt incontinent care: New brief applied. Pt slept through the night. Pt waiting for placement.  Please review provider order for any additional goals.   Nurse to notify provider when observation goals have been met and patient is ready for discharge.

## 2022-11-15 NOTE — PLAN OF CARE
"PRIMARY DIAGNOSIS: \"Placment\"   OUTPATIENT/OBSERVATION GOALS TO BE MET BEFORE DISCHARGE:  1. ADLs back to baseline: Yes, bed rest per baseline.     2. Activity and level of assistance: Total care with all ADL's    3. Pain status: Pain free.    4. Return to near baseline physical activity: Yes     Discharge Planner Nurse   Safe discharge environment identified: No  Barriers to discharge: No       Entered by: Genevieve Ordoñez RN 11/14/2022 8:17 PM      Pt resting in bed, check and change per Pt request and as needed. Waiting for placement. Pt BS reading for supper is 148. Pt ate 100 % supper. No IV, Pt requested HS, provide snack per request. Continue to be incontinent with bowel and bladder. No BM at this shift.  Abdominals soft and non distended.     Please review provider order for any additional goals.   Nurse to notify provider when observation goals have been met and patient is ready for discharge.    "

## 2022-11-15 NOTE — PLAN OF CARE
PRIMARY DIAGNOSIS: Placement  OUTPATIENT/OBSERVATION GOALS TO BE MET BEFORE DISCHARGE:  1. ADLs back to baseline: Yes, bed rest is baseline    2. Activity and level of assistance: total cares.     3. Pain status: Pain free.    4. Return to near baseline physical activity: Yes     Discharge Planner Nurse   Safe discharge environment identified: No  Barriers to discharge: No       Entered by: Kerri Mccloud RN 11/15/2022 4:03 AM   Pt A/Ox person and time, Ax: total care, No IV access, RA, Regular diet, BS checks. Snack was provided per pt's request. Pt waiting for placement.  Please review provider order for any additional goals.   Nurse to notify provider when observation goals have been met and patient is ready for discharge.

## 2022-11-16 LAB
GLUCOSE BLDC GLUCOMTR-MCNC: 116 MG/DL (ref 70–99)
GLUCOSE BLDC GLUCOMTR-MCNC: 127 MG/DL (ref 70–99)
GLUCOSE BLDC GLUCOMTR-MCNC: 177 MG/DL (ref 70–99)

## 2022-11-16 PROCEDURE — 250N000013 HC RX MED GY IP 250 OP 250 PS 637: Performed by: PHYSICIAN ASSISTANT

## 2022-11-16 PROCEDURE — 250N000013 HC RX MED GY IP 250 OP 250 PS 637: Performed by: HOSPITALIST

## 2022-11-16 PROCEDURE — 82962 GLUCOSE BLOOD TEST: CPT | Mod: 91

## 2022-11-16 PROCEDURE — G0378 HOSPITAL OBSERVATION PER HR: HCPCS

## 2022-11-16 PROCEDURE — 99224 PR SUBSEQUENT OBSERVATION CARE,LEVEL I: CPT | Performed by: PHYSICIAN ASSISTANT

## 2022-11-16 PROCEDURE — 250N000013 HC RX MED GY IP 250 OP 250 PS 637: Performed by: NURSE PRACTITIONER

## 2022-11-16 RX ORDER — BENZOCAINE/MENTHOL 6 MG-10 MG
LOZENGE MUCOUS MEMBRANE 2 TIMES DAILY
Status: DISCONTINUED | OUTPATIENT
Start: 2022-11-16 | End: 2023-04-07 | Stop reason: HOSPADM

## 2022-11-16 RX ORDER — POLYETHYLENE GLYCOL 3350 17 G/17G
17 POWDER, FOR SOLUTION ORAL 2 TIMES DAILY
Status: DISCONTINUED | OUTPATIENT
Start: 2022-11-16 | End: 2022-12-23

## 2022-11-16 RX ADMIN — KETOCONAZOLE: 20 CREAM TOPICAL at 09:35

## 2022-11-16 RX ADMIN — CLOTRIMAZOLE: 0.01 CREAM TOPICAL at 09:34

## 2022-11-16 RX ADMIN — METFORMIN HYDROCHLORIDE 500 MG: 500 TABLET ORAL at 17:37

## 2022-11-16 RX ADMIN — CLONIDINE HYDROCHLORIDE 0.1 MG: 0.1 TABLET ORAL at 22:11

## 2022-11-16 RX ADMIN — POLYETHYLENE GLYCOL 3350 17 G: 17 POWDER, FOR SOLUTION ORAL at 15:53

## 2022-11-16 RX ADMIN — METOPROLOL SUCCINATE 25 MG: 25 TABLET, EXTENDED RELEASE ORAL at 09:28

## 2022-11-16 RX ADMIN — DIVALPROEX SODIUM 250 MG: 500 TABLET, DELAYED RELEASE ORAL at 09:29

## 2022-11-16 RX ADMIN — DIVALPROEX SODIUM 1000 MG: 500 TABLET, DELAYED RELEASE ORAL at 22:10

## 2022-11-16 RX ADMIN — DIVALPROEX SODIUM 500 MG: 500 TABLET, DELAYED RELEASE ORAL at 09:30

## 2022-11-16 RX ADMIN — QUETIAPINE FUMARATE 400 MG: 200 TABLET ORAL at 22:11

## 2022-11-16 RX ADMIN — HYDROXYZINE HYDROCHLORIDE 50 MG: 50 TABLET, FILM COATED ORAL at 09:28

## 2022-11-16 RX ADMIN — ASPIRIN 81 MG CHEWABLE TABLET 81 MG: 81 TABLET CHEWABLE at 09:28

## 2022-11-16 RX ADMIN — ATORVASTATIN CALCIUM 20 MG: 20 TABLET, FILM COATED ORAL at 22:11

## 2022-11-16 RX ADMIN — HYDROXYZINE HYDROCHLORIDE 50 MG: 50 TABLET, FILM COATED ORAL at 14:05

## 2022-11-16 RX ADMIN — HYDROXYZINE HYDROCHLORIDE 50 MG: 50 TABLET, FILM COATED ORAL at 22:11

## 2022-11-16 RX ADMIN — VENLAFAXINE HYDROCHLORIDE 75 MG: 150 CAPSULE, EXTENDED RELEASE ORAL at 22:10

## 2022-11-16 RX ADMIN — QUETIAPINE 200 MG: 200 TABLET, FILM COATED ORAL at 14:05

## 2022-11-16 RX ADMIN — Medication 10 MG: at 22:11

## 2022-11-16 RX ADMIN — LEVOTHYROXINE SODIUM 25 MCG: 0.03 TABLET ORAL at 09:28

## 2022-11-16 RX ADMIN — BACLOFEN 10 MG: 10 TABLET ORAL at 09:28

## 2022-11-16 RX ADMIN — VENLAFAXINE HYDROCHLORIDE 150 MG: 150 CAPSULE, EXTENDED RELEASE ORAL at 22:11

## 2022-11-16 RX ADMIN — POLYETHYLENE GLYCOL 3350 17 G: 17 POWDER, FOR SOLUTION ORAL at 22:10

## 2022-11-16 RX ADMIN — SENNOSIDES AND DOCUSATE SODIUM 1 TABLET: 50; 8.6 TABLET ORAL at 09:28

## 2022-11-16 RX ADMIN — OLANZAPINE 2.5 MG: 2.5 TABLET, FILM COATED ORAL at 14:05

## 2022-11-16 RX ADMIN — Medication 25 MCG: at 09:28

## 2022-11-16 RX ADMIN — QUETIAPINE 200 MG: 200 TABLET, FILM COATED ORAL at 09:28

## 2022-11-16 RX ADMIN — BACLOFEN 10 MG: 10 TABLET ORAL at 22:11

## 2022-11-16 RX ADMIN — KETOCONAZOLE: 20 CREAM TOPICAL at 22:16

## 2022-11-16 RX ADMIN — BACLOFEN 10 MG: 10 TABLET ORAL at 14:05

## 2022-11-16 RX ADMIN — CLONIDINE HYDROCHLORIDE 0.1 MG: 0.1 TABLET ORAL at 09:42

## 2022-11-16 RX ADMIN — MIRTAZAPINE 15 MG: 15 TABLET, FILM COATED ORAL at 22:11

## 2022-11-16 RX ADMIN — CLOTRIMAZOLE: 0.01 CREAM TOPICAL at 22:17

## 2022-11-16 ASSESSMENT — ACTIVITIES OF DAILY LIVING (ADL)
ADLS_ACUITY_SCORE: 56

## 2022-11-16 NOTE — PLAN OF CARE
"PRIMARY DIAGNOSIS: \"Placement\"   OUTPATIENT/OBSERVATION GOALS TO BE MET BEFORE DISCHARGE:  1. ADLs back to baseline: Yes    2. Activity and level of assistance:  Assist X 2 with lift.     3. Pain status: Pain free.    4. Return to near baseline physical activity: Yes     Discharge Planner Nurse   Safe discharge environment identified: No  Barriers to discharge: No       Entered by: Genevieve Orodñez RN 11/16/2022 3:06 PM      Pt had very hard stool ( hard lumps) that resulted slight rectal bleeding.       Please review provider order for any additional goals.   Nurse to notify provider when observation goals have been met and patient is ready for discharge.    "

## 2022-11-16 NOTE — PLAN OF CARE
PRIMARY DIAGNOSIS: PLACEMENT  OUTPATIENT/OBSERVATION GOALS TO BE MET BEFORE DISCHARGE:  1. ADLs back to baseline: Yes    2. Activity and level of assistance: Bedbound at baseline    3. Pain status: Pain free.    4. Return to near baseline physical activity: Yes     Discharge Planner Nurse   Safe discharge environment identified: No  Barriers to discharge: Yes       Entered by: Annalee Palomares RN 11/15/2022 6:52 PM    Pt A/O x3, disoriented to time. Bed bound at baseline. Denies pain. Cream applied to face and groin per orders. Pt medically stable, awaiting placement.  Vital signs:  Temp: 97.7  F (36.5  C) Temp src: Oral BP: (!) 142/99 Pulse: 79   Resp: 18 SpO2: 95 % O2 Device: None (Room air)     Weight: 99.9 kg (220 lb 4.8 oz)  There is no height or weight on file to calculate BMI.    Please review provider order for any additional goals.   Nurse to notify provider when observation goals have been met and patient is ready for discharge.

## 2022-11-16 NOTE — PLAN OF CARE
PRIMARY DIAGNOSIS: Placement  OUTPATIENT/OBSERVATION GOALS TO BE MET BEFORE DISCHARGE:  1. ADLs back to baseline: Yes    2. Activity and level of assistance: Bedbound at baseline. Ax2 lift.     3. Pain status: Pain free.    4. Return to near baseline physical activity: Yes     Discharge Planner Nurse   Safe discharge environment identified: No  Barriers to discharge: Yes       Entered by: Opal Jarrell RN 11/16/2022 5:34 AM      Vitals are Temp: 97.8  F (36.6  C) Temp src: Oral BP: (!) 130/91 Pulse: 81   Resp: 16 SpO2: 96 %.  Patient is Alert and Oriented x4. Bedbound at baseline.  Pt is a Regular diet.  Denying pain.  Patient has no IV access. Medically stable. Awaiting placement.    Please review provider order for any additional goals.   Nurse to notify provider when observation goals have been met and patient is ready for discharge.

## 2022-11-16 NOTE — PLAN OF CARE
"PRIMARY DIAGNOSIS: \"Placement\"   OUTPATIENT/OBSERVATION GOALS TO BE MET BEFORE DISCHARGE:  1. ADLs back to baseline: Yes    2. Activity and level of assistance:  Assist X 2 with lift.     3. Pain status: Pain free.    4. Return to near baseline physical activity: Yes     Discharge Planner Nurse   Safe discharge environment identified: No  Barriers to discharge: No       Entered by: Genevieve Ordoñez RN 11/16/2022 5:11 PM      Pt continue to be reposition and check in change in bed. Pt had large second BM at this shift. Schedule stool softer in use.        Please review provider order for any additional goals.   Nurse to notify provider when observation goals have been met and patient is ready for discharge.    "

## 2022-11-16 NOTE — PROGRESS NOTES
Care Management Follow Up    Expected Discharge Date: 12/30/2022     Concerns to be Addressed: Discharge planning      Patient plan of care discussed at interdisciplinary rounds: Yes    Anticipated Discharge Disposition: Group Home once placement found     Patient/family educated on Medicare website which has current facility and service quality ratings:  Yes  Education Provided on the Discharge Plan:  Yes  Patient/Family in Agreement with the Plan:  Yes    Referrals Placed by CM/SW:  Group Home  Private pay costs discussed: Not applicable    Additional Information:  SW placed call to [facility] to request [employer] assessment, [phone], spoke with intake. SW will receive a call back to schedule this assessment. Once completed, a referral can be made for relocation services.     SW received a call from Sue at [facility] (pt's previous [facility]), [phone]. They will follow up with pt re: the belongings that they have been storing for him.     SW will continue to follow.     1145: SW received call from supervisor Veronica NUNEZ at [facility], [phone]. She is going to look for next steps needed for [employer] assessment and relocation services referral and return call to .    DANITA Obrien, [facility]   Inpatient Care Coordination  [facility]   [phone]

## 2022-11-16 NOTE — PLAN OF CARE
PRIMARY DIAGNOSIS: Placement  OUTPATIENT/OBSERVATION GOALS TO BE MET BEFORE DISCHARGE:  1. ADLs back to baseline: Yes    2. Activity and level of assistance: Bedbound at baseline. Ax2 lift.     3. Pain status: Pain free.    4. Return to near baseline physical activity: Yes     Discharge Planner Nurse   Safe discharge environment identified: No  Barriers to discharge: Yes       Entered by: Opal Jarrell RN 11/16/2022 12:35 AM      Vitals are Temp: 97.8  F (36.6  C) Temp src: Oral BP: (!) 130/91 Pulse: 81   Resp: 16 SpO2: 96 %.  Patient is Alert and Oriented x4. Bedbound at baseline.  Pt is a Regular diet.  Denying pain.  Patient has no IV access. Medically stable. Awaiting placement.    Please review provider order for any additional goals.   Nurse to notify provider when observation goals have been met and patient is ready for discharge.

## 2022-11-16 NOTE — PLAN OF CARE
PRIMARY DIAGNOSIS: Placement  OUTPATIENT/OBSERVATION GOALS TO BE MET BEFORE DISCHARGE:  1. ADLs back to baseline: Yes    2. Activity and level of assistance: Bedbound at baseline. Ax2 lift.     3. Pain status: Pain free.    4. Return to near baseline physical activity: Yes     Discharge Planner Nurse   Safe discharge environment identified: No  Barriers to discharge: Yes       Entered by: Opal Jarrell RN 11/15/2022 8:36 PM      Vitals are Temp: 97.8  F (36.6  C) Temp src: Oral BP: (!) 130/91 Pulse: 81   Resp: 16 SpO2: 96 %.  Patient is Alert and Oriented x4. Bedbound at baseline.  Pt is a Regular diet.  They are denying pain.  Patient has no IV access. Medically stable. Awaiting placement.    Please review provider order for any additional goals.   Nurse to notify provider when observation goals have been met and patient is ready for discharge.

## 2022-11-16 NOTE — PLAN OF CARE
"PRIMARY DIAGNOSIS: \"Placement\"   OUTPATIENT/OBSERVATION GOALS TO BE MET BEFORE DISCHARGE:  1. ADLs back to baseline: Yes    2. Activity and level of assistance:  Assist X 2 with lift.     3. Pain status: Pain free.    4. Return to near baseline physical activity: Yes     Discharge Planner Nurse   Safe discharge environment identified: No  Barriers to discharge: No       Entered by: Genevieve Ordoñez RN 11/16/2022 10:04 AM      Pt alert and oriented X 3, able to communicate clearly. Pt ate breakfast two tray this morning. . B&B incontinent, check and change as needed. Reposition per Pt request.     Please review provider order for any additional goals.   Nurse to notify provider when observation goals have been met and patient is ready for discharge.    "
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
## 2022-11-17 LAB
GLUCOSE BLDC GLUCOMTR-MCNC: 120 MG/DL (ref 70–99)
GLUCOSE BLDC GLUCOMTR-MCNC: 143 MG/DL (ref 70–99)

## 2022-11-17 PROCEDURE — G0378 HOSPITAL OBSERVATION PER HR: HCPCS

## 2022-11-17 PROCEDURE — 250N000013 HC RX MED GY IP 250 OP 250 PS 637: Performed by: HOSPITALIST

## 2022-11-17 PROCEDURE — 99207 PR NO CHARGE LOS: CPT | Performed by: NURSE PRACTITIONER

## 2022-11-17 PROCEDURE — 250N000013 HC RX MED GY IP 250 OP 250 PS 637: Performed by: NURSE PRACTITIONER

## 2022-11-17 PROCEDURE — 250N000013 HC RX MED GY IP 250 OP 250 PS 637: Performed by: PHYSICIAN ASSISTANT

## 2022-11-17 PROCEDURE — 82962 GLUCOSE BLOOD TEST: CPT

## 2022-11-17 RX ADMIN — DIVALPROEX SODIUM 1000 MG: 500 TABLET, DELAYED RELEASE ORAL at 21:26

## 2022-11-17 RX ADMIN — QUETIAPINE 200 MG: 200 TABLET, FILM COATED ORAL at 08:57

## 2022-11-17 RX ADMIN — QUETIAPINE FUMARATE 400 MG: 200 TABLET ORAL at 21:26

## 2022-11-17 RX ADMIN — DIVALPROEX SODIUM 500 MG: 500 TABLET, DELAYED RELEASE ORAL at 08:57

## 2022-11-17 RX ADMIN — LEVOTHYROXINE SODIUM 25 MCG: 0.03 TABLET ORAL at 08:57

## 2022-11-17 RX ADMIN — POLYETHYLENE GLYCOL 3350 17 G: 17 POWDER, FOR SOLUTION ORAL at 21:26

## 2022-11-17 RX ADMIN — VENLAFAXINE HYDROCHLORIDE 75 MG: 150 CAPSULE, EXTENDED RELEASE ORAL at 21:26

## 2022-11-17 RX ADMIN — METOPROLOL SUCCINATE 25 MG: 25 TABLET, EXTENDED RELEASE ORAL at 08:56

## 2022-11-17 RX ADMIN — OLANZAPINE 2.5 MG: 2.5 TABLET, FILM COATED ORAL at 14:10

## 2022-11-17 RX ADMIN — DIVALPROEX SODIUM 250 MG: 500 TABLET, DELAYED RELEASE ORAL at 08:56

## 2022-11-17 RX ADMIN — CLOTRIMAZOLE: 0.01 CREAM TOPICAL at 21:28

## 2022-11-17 RX ADMIN — ATORVASTATIN CALCIUM 20 MG: 20 TABLET, FILM COATED ORAL at 21:26

## 2022-11-17 RX ADMIN — HYDROXYZINE HYDROCHLORIDE 50 MG: 50 TABLET, FILM COATED ORAL at 21:26

## 2022-11-17 RX ADMIN — Medication 10 MG: at 21:26

## 2022-11-17 RX ADMIN — KETOCONAZOLE: 20 CREAM TOPICAL at 08:57

## 2022-11-17 RX ADMIN — QUETIAPINE 200 MG: 200 TABLET, FILM COATED ORAL at 14:10

## 2022-11-17 RX ADMIN — POLYETHYLENE GLYCOL 3350 17 G: 17 POWDER, FOR SOLUTION ORAL at 08:56

## 2022-11-17 RX ADMIN — SENNOSIDES AND DOCUSATE SODIUM 1 TABLET: 50; 8.6 TABLET ORAL at 08:57

## 2022-11-17 RX ADMIN — MIRTAZAPINE 15 MG: 15 TABLET, FILM COATED ORAL at 21:27

## 2022-11-17 RX ADMIN — KETOCONAZOLE: 20 CREAM TOPICAL at 21:32

## 2022-11-17 RX ADMIN — CLOTRIMAZOLE: 0.01 CREAM TOPICAL at 08:57

## 2022-11-17 RX ADMIN — ASPIRIN 81 MG CHEWABLE TABLET 81 MG: 81 TABLET CHEWABLE at 08:57

## 2022-11-17 RX ADMIN — CLONIDINE HYDROCHLORIDE 0.1 MG: 0.1 TABLET ORAL at 21:26

## 2022-11-17 RX ADMIN — CLONIDINE HYDROCHLORIDE 0.1 MG: 0.1 TABLET ORAL at 08:56

## 2022-11-17 RX ADMIN — BACLOFEN 10 MG: 10 TABLET ORAL at 14:10

## 2022-11-17 RX ADMIN — BACLOFEN 10 MG: 10 TABLET ORAL at 21:27

## 2022-11-17 RX ADMIN — Medication 25 MCG: at 08:57

## 2022-11-17 RX ADMIN — VENLAFAXINE HYDROCHLORIDE 150 MG: 150 CAPSULE, EXTENDED RELEASE ORAL at 21:27

## 2022-11-17 RX ADMIN — METFORMIN HYDROCHLORIDE 500 MG: 500 TABLET ORAL at 16:56

## 2022-11-17 RX ADMIN — HYDROXYZINE HYDROCHLORIDE 50 MG: 50 TABLET, FILM COATED ORAL at 14:10

## 2022-11-17 RX ADMIN — BACLOFEN 10 MG: 10 TABLET ORAL at 08:57

## 2022-11-17 RX ADMIN — HYDROXYZINE HYDROCHLORIDE 50 MG: 50 TABLET, FILM COATED ORAL at 08:57

## 2022-11-17 ASSESSMENT — ACTIVITIES OF DAILY LIVING (ADL)
ADLS_ACUITY_SCORE: 54

## 2022-11-17 NOTE — PLAN OF CARE
PRIMARY DIAGNOSIS: PLACEMENT  OUTPATIENT/OBSERVATION GOALS TO BE MET BEFORE DISCHARGE:  1. ADLs back to baseline: Yes    2. Activity and level of assistance: Bedbound at baseline    3. Pain status: Pain free.    4. Return to near baseline physical activity: Yes     Discharge Planner Nurse   Safe discharge environment identified: No  Barriers to discharge: Yes       Entered by: Annalee Palomares RN 11/17/2022 5:36 PM    Pt A/O x3, disoriented to time. Bed bound at baseline. Denies pain. Cream applied to face and groin per orders. Pt medically stable, awaiting placement.  Vital signs:  Temp: 97.4  F (36.3  C) Temp src: Oral BP: 120/84 Pulse: 74   Resp: 17 SpO2: 95 % O2 Device: None (Room air)     Weight: 99.9 kg (220 lb 4.8 oz)  There is no height or weight on file to calculate BMI.    Please review provider order for any additional goals.   Nurse to notify provider when observation goals have been met and patient is ready for discharge.

## 2022-11-17 NOTE — PLAN OF CARE
PRIMARY DIAGNOSIS: placement  OUTPATIENT/OBSERVATION GOALS TO BE MET BEFORE DISCHARGE:  ADLs back to baseline: Yes    Activity and level of assistance: assist x1-2 w/ lift     Pain status: Pain free.    Return to near baseline physical activity: Yes     Discharge Planner Nurse   Safe discharge environment identified: No  Barriers to discharge: Yes       Entered by: Katheryn Duran RN 11/17/2022 3:43 AM   Pt medically cleared, needing placement, CSW assisting w/ discharge.   Please review provider order for any additional goals.   Nurse to notify provider when observation goals have been met and patient is ready for discharge.
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
## 2022-11-17 NOTE — PLAN OF CARE
PRIMARY DIAGNOSIS: PLACEMENT  OUTPATIENT/OBSERVATION GOALS TO BE MET BEFORE DISCHARGE:  1. ADLs back to baseline: Yes    2. Activity and level of assistance: Bedbound at baseline    3. Pain status: Pain free.    4. Return to near baseline physical activity: Yes     Discharge Planner Nurse   Safe discharge environment identified: No  Barriers to discharge: Yes       Entered by: Annalee Palomares RN 11/17/2022 10:05 AM    Pt A/O x3, disoriented to time. Bed bound at baseline. Denies pain. Cream applied to face and groin per orders. Pt medically stable, awaiting placement.  Vital signs:  Temp: 98.2  F (36.8  C) Temp src: Oral BP: (!) 139/96 Pulse: 73   Resp: 18 SpO2: 93 % O2 Device: None (Room air)     Weight: 99.9 kg (220 lb 4.8 oz)  There is no height or weight on file to calculate BMI.    Please review provider order for any additional goals.   Nurse to notify provider when observation goals have been met and patient is ready for discharge.

## 2022-11-17 NOTE — PLAN OF CARE
PRIMARY DIAGNOSIS: PLACEMENT  OUTPATIENT/OBSERVATION GOALS TO BE MET BEFORE DISCHARGE:  1. ADLs back to baseline: Yes    2. Activity and level of assistance: Bedbound at baseline    3. Pain status: Pain free.    4. Return to near baseline physical activity: Yes     Discharge Planner Nurse   Safe discharge environment identified: No  Barriers to discharge: Yes       Entered by: Annalee Palomares RN 11/17/2022 12:50 PM    Pt A/O x3, disoriented to time. Bed bound at baseline. Denies pain. Cream applied to face and groin per orders. Pt medically stable, awaiting placement.  Vital signs:  Temp: 98.3  F (36.8  C) Temp src: Oral BP: (!) 127/91 Pulse: 81   Resp: 18 SpO2: 95 % O2 Device: None (Room air)     Weight: 99.9 kg (220 lb 4.8 oz)  There is no height or weight on file to calculate BMI.    Please review provider order for any additional goals.   Nurse to notify provider when observation goals have been met and patient is ready for discharge.

## 2022-11-17 NOTE — PROGRESS NOTES
Care Management Follow Up    Expected Discharge Date: 12/30/2022     Concerns to be Addressed: Discharge planning      Patient plan of care discussed at interdisciplinary rounds: Yes    Anticipated Discharge Disposition:  Group Home    Additional Information:  SW left  for [facility] Veronica NUNEZ, [phone], to follow up on [employer] assessment update and referral for relocation services. Requested return call. SW will continue to follow.     DANITA Obrien, [facility]   Inpatient Care Coordination  [facility]   [phone]

## 2022-11-17 NOTE — PLAN OF CARE
PRIMARY DIAGNOSIS: placement  OUTPATIENT/OBSERVATION GOALS TO BE MET BEFORE DISCHARGE:  ADLs back to baseline: Yes     Activity and level of assistance: assist x1-2 w/ lift      Pain status: Pain free.     Return to near baseline physical activity: Yes          Discharge Planner Nurse   Safe discharge environment identified: No  Barriers to discharge: Yes         Pt medically cleared, needing placement, CSW assisting w/ discharge. pt is able to make needs known overnight, turn & change/incontinence care prn.     Please review provider order for any additional goals.   Nurse to notify provider when observation goals have been met and patient is ready for discharge.

## 2022-11-18 LAB — GLUCOSE BLDC GLUCOMTR-MCNC: 122 MG/DL (ref 70–99)

## 2022-11-18 PROCEDURE — 250N000013 HC RX MED GY IP 250 OP 250 PS 637: Performed by: PHYSICIAN ASSISTANT

## 2022-11-18 PROCEDURE — 250N000013 HC RX MED GY IP 250 OP 250 PS 637: Performed by: HOSPITALIST

## 2022-11-18 PROCEDURE — 250N000013 HC RX MED GY IP 250 OP 250 PS 637: Performed by: NURSE PRACTITIONER

## 2022-11-18 PROCEDURE — G0378 HOSPITAL OBSERVATION PER HR: HCPCS

## 2022-11-18 PROCEDURE — 82962 GLUCOSE BLOOD TEST: CPT

## 2022-11-18 RX ADMIN — CLONIDINE HYDROCHLORIDE 0.1 MG: 0.1 TABLET ORAL at 09:25

## 2022-11-18 RX ADMIN — BACLOFEN 10 MG: 10 TABLET ORAL at 14:43

## 2022-11-18 RX ADMIN — POLYETHYLENE GLYCOL 3350 17 G: 17 POWDER, FOR SOLUTION ORAL at 09:28

## 2022-11-18 RX ADMIN — BACLOFEN 10 MG: 10 TABLET ORAL at 21:07

## 2022-11-18 RX ADMIN — DIVALPROEX SODIUM 250 MG: 500 TABLET, DELAYED RELEASE ORAL at 09:25

## 2022-11-18 RX ADMIN — Medication 10 MG: at 21:07

## 2022-11-18 RX ADMIN — Medication 25 MCG: at 09:24

## 2022-11-18 RX ADMIN — ASPIRIN 81 MG CHEWABLE TABLET 81 MG: 81 TABLET CHEWABLE at 09:24

## 2022-11-18 RX ADMIN — DIVALPROEX SODIUM 500 MG: 500 TABLET, DELAYED RELEASE ORAL at 09:24

## 2022-11-18 RX ADMIN — CLONIDINE HYDROCHLORIDE 0.1 MG: 0.1 TABLET ORAL at 21:08

## 2022-11-18 RX ADMIN — CLOTRIMAZOLE: 0.01 CREAM TOPICAL at 21:14

## 2022-11-18 RX ADMIN — HYDROXYZINE HYDROCHLORIDE 50 MG: 50 TABLET, FILM COATED ORAL at 09:24

## 2022-11-18 RX ADMIN — ATORVASTATIN CALCIUM 20 MG: 20 TABLET, FILM COATED ORAL at 21:07

## 2022-11-18 RX ADMIN — POLYETHYLENE GLYCOL 3350 17 G: 17 POWDER, FOR SOLUTION ORAL at 21:08

## 2022-11-18 RX ADMIN — BACLOFEN 10 MG: 10 TABLET ORAL at 09:25

## 2022-11-18 RX ADMIN — DIVALPROEX SODIUM 1000 MG: 500 TABLET, DELAYED RELEASE ORAL at 21:06

## 2022-11-18 RX ADMIN — KETOCONAZOLE: 20 CREAM TOPICAL at 21:15

## 2022-11-18 RX ADMIN — SENNOSIDES AND DOCUSATE SODIUM 1 TABLET: 50; 8.6 TABLET ORAL at 09:24

## 2022-11-18 RX ADMIN — METFORMIN HYDROCHLORIDE 500 MG: 500 TABLET ORAL at 16:47

## 2022-11-18 RX ADMIN — HYDROXYZINE HYDROCHLORIDE 50 MG: 50 TABLET, FILM COATED ORAL at 14:43

## 2022-11-18 RX ADMIN — QUETIAPINE 200 MG: 200 TABLET, FILM COATED ORAL at 14:43

## 2022-11-18 RX ADMIN — QUETIAPINE FUMARATE 400 MG: 200 TABLET ORAL at 21:06

## 2022-11-18 RX ADMIN — QUETIAPINE 200 MG: 200 TABLET, FILM COATED ORAL at 09:24

## 2022-11-18 RX ADMIN — METOPROLOL SUCCINATE 25 MG: 25 TABLET, EXTENDED RELEASE ORAL at 09:24

## 2022-11-18 RX ADMIN — LEVOTHYROXINE SODIUM 25 MCG: 0.03 TABLET ORAL at 09:24

## 2022-11-18 RX ADMIN — HYDROXYZINE HYDROCHLORIDE 50 MG: 50 TABLET, FILM COATED ORAL at 21:07

## 2022-11-18 RX ADMIN — VENLAFAXINE HYDROCHLORIDE 75 MG: 150 CAPSULE, EXTENDED RELEASE ORAL at 21:07

## 2022-11-18 RX ADMIN — MIRTAZAPINE 15 MG: 15 TABLET, FILM COATED ORAL at 21:07

## 2022-11-18 RX ADMIN — OLANZAPINE 2.5 MG: 2.5 TABLET, FILM COATED ORAL at 14:43

## 2022-11-18 RX ADMIN — VENLAFAXINE HYDROCHLORIDE 150 MG: 150 CAPSULE, EXTENDED RELEASE ORAL at 21:08

## 2022-11-18 ASSESSMENT — ACTIVITIES OF DAILY LIVING (ADL)
ADLS_ACUITY_SCORE: 54
ADLS_ACUITY_SCORE: 52
ADLS_ACUITY_SCORE: 54
ADLS_ACUITY_SCORE: 52
ADLS_ACUITY_SCORE: 54
ADLS_ACUITY_SCORE: 52

## 2022-11-18 NOTE — PLAN OF CARE
PRIMARY DIAGNOSIS: placement  OUTPATIENT/OBSERVATION GOALS TO BE MET BEFORE DISCHARGE:  ADLs back to baseline: Yes    Activity and level of assistance: assist x1-2/lift    Pain status: Pain free.    Return to near baseline physical activity: Yes     Discharge Planner Nurse   Safe discharge environment identified: No  Barriers to discharge: Yes       Entered by: Katheryn Duran RN 11/18/2022 12:30 AM   Pt medically cleared, needing placement, CSW assisting w/ discharge. pt is able to make needs known, turn & change/incontinence care prn  Please review provider order for any additional goals.   Nurse to notify provider when observation goals have been met and patient is ready for discharge.
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
## 2022-11-18 NOTE — PLAN OF CARE
PRIMARY DIAGNOSIS: placement  OUTPATIENT/OBSERVATION GOALS TO BE MET BEFORE DISCHARGE:  1. ADLs back to baseline: Yes     2. Activity and level of assistance: assist x1-2/lift     3. Pain status: Pain free.     4. Return to near baseline physical activity: Yes          Discharge Planner Nurse   Safe discharge environment identified: No  Barriers to discharge: Yes           Medically cleared. ANALY following for safe dispo planning.

## 2022-11-18 NOTE — PROGRESS NOTES
PRIMARY DIAGNOSIS: Placement   OUTPATIENT/OBSERVATION GOALS TO BE MET BEFORE DISCHARGE:  1. ADLs back to baseline: Yes    2. Activity and level of assistance: Assist 2 with lift     3. Pain status: Pain free.    4. Return to near baseline physical activity: Yes     Discharge Planner Nurse   Safe discharge environment identified: No  Barriers to discharge: Yes       Entered by: Jazmin Madera RN 11/18/2022 5:13 PM     Denies pain, call light appropriate. Tolerating diet. No IV access. Turn and repositioned.

## 2022-11-18 NOTE — PLAN OF CARE
PRIMARY DIAGNOSIS: placement  OUTPATIENT/OBSERVATION GOALS TO BE MET BEFORE DISCHARGE:  ADLs back to baseline: Yes     Activity and level of assistance: assist x1-2/lift     Pain status: Pain free.     Return to near baseline physical activity: Yes          Discharge Planner Nurse   Safe discharge environment identified: No  Barriers to discharge: Yes            Pt medically cleared, needing placement, CSW assisting w/ discharge. pt is able to make needs known, turn & change/incontinence care prn  Please review provider order for any additional goals.   Nurse to notify provider when observation goals have been met and patient is ready for discharge.

## 2022-11-18 NOTE — CARE PLAN
PRIMARY DIAGNOSIS: placement  OUTPATIENT/OBSERVATION GOALS TO BE MET BEFORE DISCHARGE:  1. ADLs back to baseline: Yes     2. Activity and level of assistance: assist x1-2/lift     3. Pain status: Pain free.     4. Return to near baseline physical activity: Yes          Discharge Planner Nurse   Safe discharge environment identified: No  Barriers to discharge: Yes           Pt medically cleared, needing placement, CSW assisting w/ discharge. pt is able to make needs known, turn & change/incontinence care prn  Please review provider order for any additional goals.   Nurse to notify provider when observation goals have been met and patient is ready for discharge.

## 2022-11-19 LAB — GLUCOSE BLDC GLUCOMTR-MCNC: 208 MG/DL (ref 70–99)

## 2022-11-19 PROCEDURE — 250N000013 HC RX MED GY IP 250 OP 250 PS 637: Performed by: PHYSICIAN ASSISTANT

## 2022-11-19 PROCEDURE — 250N000013 HC RX MED GY IP 250 OP 250 PS 637: Performed by: HOSPITALIST

## 2022-11-19 PROCEDURE — 250N000013 HC RX MED GY IP 250 OP 250 PS 637: Performed by: NURSE PRACTITIONER

## 2022-11-19 PROCEDURE — 99226 PR SUBSEQUENT OBSERVATION CARE,LEVEL III: CPT | Performed by: NURSE PRACTITIONER

## 2022-11-19 PROCEDURE — G0378 HOSPITAL OBSERVATION PER HR: HCPCS

## 2022-11-19 PROCEDURE — 82962 GLUCOSE BLOOD TEST: CPT

## 2022-11-19 RX ADMIN — CLONIDINE HYDROCHLORIDE 0.1 MG: 0.1 TABLET ORAL at 20:49

## 2022-11-19 RX ADMIN — ATORVASTATIN CALCIUM 20 MG: 20 TABLET, FILM COATED ORAL at 20:49

## 2022-11-19 RX ADMIN — HYDROXYZINE HYDROCHLORIDE 50 MG: 50 TABLET, FILM COATED ORAL at 20:49

## 2022-11-19 RX ADMIN — DIVALPROEX SODIUM 500 MG: 500 TABLET, DELAYED RELEASE ORAL at 10:10

## 2022-11-19 RX ADMIN — METFORMIN HYDROCHLORIDE 500 MG: 500 TABLET ORAL at 16:46

## 2022-11-19 RX ADMIN — BACLOFEN 10 MG: 10 TABLET ORAL at 20:48

## 2022-11-19 RX ADMIN — VENLAFAXINE HYDROCHLORIDE 150 MG: 150 CAPSULE, EXTENDED RELEASE ORAL at 20:48

## 2022-11-19 RX ADMIN — DIVALPROEX SODIUM 250 MG: 500 TABLET, DELAYED RELEASE ORAL at 10:11

## 2022-11-19 RX ADMIN — MIRTAZAPINE 15 MG: 15 TABLET, FILM COATED ORAL at 20:48

## 2022-11-19 RX ADMIN — ASPIRIN 81 MG CHEWABLE TABLET 81 MG: 81 TABLET CHEWABLE at 10:11

## 2022-11-19 RX ADMIN — LEVOTHYROXINE SODIUM 25 MCG: 0.03 TABLET ORAL at 10:10

## 2022-11-19 RX ADMIN — CLOTRIMAZOLE: 0.01 CREAM TOPICAL at 20:50

## 2022-11-19 RX ADMIN — HYDROCORTISONE: 1 CREAM TOPICAL at 20:50

## 2022-11-19 RX ADMIN — POLYETHYLENE GLYCOL 3350 17 G: 17 POWDER, FOR SOLUTION ORAL at 20:48

## 2022-11-19 RX ADMIN — POLYETHYLENE GLYCOL 3350 17 G: 17 POWDER, FOR SOLUTION ORAL at 10:10

## 2022-11-19 RX ADMIN — KETOCONAZOLE: 20 CREAM TOPICAL at 10:11

## 2022-11-19 RX ADMIN — HYDROXYZINE HYDROCHLORIDE 50 MG: 50 TABLET, FILM COATED ORAL at 10:10

## 2022-11-19 RX ADMIN — SENNOSIDES AND DOCUSATE SODIUM 1 TABLET: 50; 8.6 TABLET ORAL at 10:10

## 2022-11-19 RX ADMIN — HYDROCORTISONE: 1 CREAM TOPICAL at 10:11

## 2022-11-19 RX ADMIN — KETOCONAZOLE: 20 CREAM TOPICAL at 20:50

## 2022-11-19 RX ADMIN — HYDROXYZINE HYDROCHLORIDE 50 MG: 50 TABLET, FILM COATED ORAL at 15:16

## 2022-11-19 RX ADMIN — Medication 10 MG: at 20:48

## 2022-11-19 RX ADMIN — VENLAFAXINE HYDROCHLORIDE 75 MG: 150 CAPSULE, EXTENDED RELEASE ORAL at 20:50

## 2022-11-19 RX ADMIN — QUETIAPINE 200 MG: 200 TABLET, FILM COATED ORAL at 10:11

## 2022-11-19 RX ADMIN — BACLOFEN 10 MG: 10 TABLET ORAL at 15:16

## 2022-11-19 RX ADMIN — OLANZAPINE 2.5 MG: 2.5 TABLET, FILM COATED ORAL at 15:16

## 2022-11-19 RX ADMIN — BACLOFEN 10 MG: 10 TABLET ORAL at 10:11

## 2022-11-19 RX ADMIN — Medication 25 MCG: at 10:10

## 2022-11-19 RX ADMIN — DIVALPROEX SODIUM 1000 MG: 500 TABLET, DELAYED RELEASE ORAL at 20:49

## 2022-11-19 RX ADMIN — METOPROLOL SUCCINATE 25 MG: 25 TABLET, EXTENDED RELEASE ORAL at 10:10

## 2022-11-19 RX ADMIN — CLOTRIMAZOLE: 0.01 CREAM TOPICAL at 10:11

## 2022-11-19 RX ADMIN — QUETIAPINE FUMARATE 400 MG: 200 TABLET ORAL at 20:48

## 2022-11-19 RX ADMIN — CLONIDINE HYDROCHLORIDE 0.1 MG: 0.1 TABLET ORAL at 10:11

## 2022-11-19 RX ADMIN — QUETIAPINE 200 MG: 200 TABLET, FILM COATED ORAL at 15:16

## 2022-11-19 ASSESSMENT — ACTIVITIES OF DAILY LIVING (ADL)
ADLS_ACUITY_SCORE: 52
ADLS_ACUITY_SCORE: 52
ADLS_ACUITY_SCORE: 51
ADLS_ACUITY_SCORE: 52
ADLS_ACUITY_SCORE: 51
ADLS_ACUITY_SCORE: 52
ADLS_ACUITY_SCORE: 51
ADLS_ACUITY_SCORE: 52
ADLS_ACUITY_SCORE: 51

## 2022-11-19 NOTE — PLAN OF CARE
PRIMARY DIAGNOSIS: placement  OUTPATIENT/OBSERVATION GOALS TO BE MET BEFORE DISCHARGE:  1. ADLs back to baseline: Yes     2. Activity and level of assistance: assist x1-2/lift     3. Pain status: Pain free.     4. Return to near baseline physical activity: Yes          Discharge Planner Nurse   Safe discharge environment identified: No  Barriers to discharge: Yes           Medically cleared for discharge. SW following for safe dispo planning. Pt calm and cooperative this shift. Turn/repo q2. Incontinence cares. Tolerating PO-appetite good. Hair cut this shift and shampooed. Up to chair in afternoon.

## 2022-11-19 NOTE — PLAN OF CARE
PRIMARY DIAGNOSIS: placement  OUTPATIENT/OBSERVATION GOALS TO BE MET BEFORE DISCHARGE:  1. ADLs back to baseline: Yes     2. Activity and level of assistance: assist x1-2/lift, can reposition himself  independently in bed     3. Pain status: Pain free.     4. Return to near baseline physical activity: Yes          Discharge Planner Nurse   Safe discharge environment identified: No  Barriers to discharge: Yes        Medically cleared for discharge. SW following for safe dispo planning. Pt calm and cooperative this shift. Turn/repo q2. Incontinence cares. Tolerating PO-appetite good.
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
## 2022-11-19 NOTE — PLAN OF CARE
PRIMARY DIAGNOSIS: placement  OUTPATIENT/OBSERVATION GOALS TO BE MET BEFORE DISCHARGE:  1. ADLs back to baseline: Yes     2. Activity and level of assistance: assist x1-2/lift     3. Pain status: Pain free.     4. Return to near baseline physical activity: Yes          Discharge Planner Nurse   Safe discharge environment identified: No  Barriers to discharge: Yes           Medically cleared for discharge. SW following for safe dispo planning. Pt calm and cooperative this shift.

## 2022-11-20 LAB — GLUCOSE BLDC GLUCOMTR-MCNC: 202 MG/DL (ref 70–99)

## 2022-11-20 PROCEDURE — 250N000013 HC RX MED GY IP 250 OP 250 PS 637: Performed by: PHYSICIAN ASSISTANT

## 2022-11-20 PROCEDURE — 250N000013 HC RX MED GY IP 250 OP 250 PS 637: Performed by: NURSE PRACTITIONER

## 2022-11-20 PROCEDURE — G0378 HOSPITAL OBSERVATION PER HR: HCPCS

## 2022-11-20 PROCEDURE — 99225 PR SUBSEQUENT OBSERVATION CARE,LEVEL II: CPT | Performed by: NURSE PRACTITIONER

## 2022-11-20 PROCEDURE — 82962 GLUCOSE BLOOD TEST: CPT

## 2022-11-20 PROCEDURE — 250N000013 HC RX MED GY IP 250 OP 250 PS 637: Performed by: HOSPITALIST

## 2022-11-20 RX ADMIN — CLOTRIMAZOLE: 0.01 CREAM TOPICAL at 09:42

## 2022-11-20 RX ADMIN — METOPROLOL SUCCINATE 25 MG: 25 TABLET, EXTENDED RELEASE ORAL at 09:41

## 2022-11-20 RX ADMIN — QUETIAPINE 200 MG: 200 TABLET, FILM COATED ORAL at 14:22

## 2022-11-20 RX ADMIN — QUETIAPINE FUMARATE 400 MG: 200 TABLET ORAL at 21:05

## 2022-11-20 RX ADMIN — KETOCONAZOLE: 20 CREAM TOPICAL at 09:43

## 2022-11-20 RX ADMIN — KETOCONAZOLE: 20 CREAM TOPICAL at 21:07

## 2022-11-20 RX ADMIN — METFORMIN HYDROCHLORIDE 500 MG: 500 TABLET ORAL at 17:15

## 2022-11-20 RX ADMIN — CLONIDINE HYDROCHLORIDE 0.1 MG: 0.1 TABLET ORAL at 21:06

## 2022-11-20 RX ADMIN — POLYETHYLENE GLYCOL 3350 17 G: 17 POWDER, FOR SOLUTION ORAL at 21:02

## 2022-11-20 RX ADMIN — HYDROXYZINE HYDROCHLORIDE 50 MG: 50 TABLET, FILM COATED ORAL at 14:22

## 2022-11-20 RX ADMIN — Medication 25 MCG: at 09:41

## 2022-11-20 RX ADMIN — BACLOFEN 10 MG: 10 TABLET ORAL at 21:06

## 2022-11-20 RX ADMIN — POLYETHYLENE GLYCOL 3350 17 G: 17 POWDER, FOR SOLUTION ORAL at 09:41

## 2022-11-20 RX ADMIN — VENLAFAXINE HYDROCHLORIDE 75 MG: 150 CAPSULE, EXTENDED RELEASE ORAL at 21:05

## 2022-11-20 RX ADMIN — DIVALPROEX SODIUM 500 MG: 500 TABLET, DELAYED RELEASE ORAL at 09:42

## 2022-11-20 RX ADMIN — ASPIRIN 81 MG CHEWABLE TABLET 81 MG: 81 TABLET CHEWABLE at 09:42

## 2022-11-20 RX ADMIN — Medication 10 MG: at 21:06

## 2022-11-20 RX ADMIN — HYDROXYZINE HYDROCHLORIDE 50 MG: 50 TABLET, FILM COATED ORAL at 09:42

## 2022-11-20 RX ADMIN — CLOTRIMAZOLE: 0.01 CREAM TOPICAL at 21:07

## 2022-11-20 RX ADMIN — DIVALPROEX SODIUM 1000 MG: 500 TABLET, DELAYED RELEASE ORAL at 21:03

## 2022-11-20 RX ADMIN — SENNOSIDES AND DOCUSATE SODIUM 1 TABLET: 50; 8.6 TABLET ORAL at 09:42

## 2022-11-20 RX ADMIN — LEVOTHYROXINE SODIUM 25 MCG: 0.03 TABLET ORAL at 09:41

## 2022-11-20 RX ADMIN — ATORVASTATIN CALCIUM 20 MG: 20 TABLET, FILM COATED ORAL at 21:06

## 2022-11-20 RX ADMIN — HYDROCORTISONE: 1 CREAM TOPICAL at 21:07

## 2022-11-20 RX ADMIN — QUETIAPINE 200 MG: 200 TABLET, FILM COATED ORAL at 09:41

## 2022-11-20 RX ADMIN — VENLAFAXINE HYDROCHLORIDE 150 MG: 150 CAPSULE, EXTENDED RELEASE ORAL at 21:05

## 2022-11-20 RX ADMIN — MIRTAZAPINE 15 MG: 15 TABLET, FILM COATED ORAL at 21:05

## 2022-11-20 RX ADMIN — DIVALPROEX SODIUM 250 MG: 500 TABLET, DELAYED RELEASE ORAL at 09:42

## 2022-11-20 RX ADMIN — BACLOFEN 10 MG: 10 TABLET ORAL at 09:41

## 2022-11-20 RX ADMIN — CLONIDINE HYDROCHLORIDE 0.1 MG: 0.1 TABLET ORAL at 09:42

## 2022-11-20 RX ADMIN — BACLOFEN 10 MG: 10 TABLET ORAL at 14:22

## 2022-11-20 RX ADMIN — HYDROXYZINE HYDROCHLORIDE 50 MG: 50 TABLET, FILM COATED ORAL at 21:06

## 2022-11-20 RX ADMIN — OLANZAPINE 2.5 MG: 2.5 TABLET, FILM COATED ORAL at 14:22

## 2022-11-20 ASSESSMENT — ACTIVITIES OF DAILY LIVING (ADL)
ADLS_ACUITY_SCORE: 51
ADLS_ACUITY_SCORE: 55
ADLS_ACUITY_SCORE: 51
ADLS_ACUITY_SCORE: 55
ADLS_ACUITY_SCORE: 51
ADLS_ACUITY_SCORE: 55
ADLS_ACUITY_SCORE: 55
ADLS_ACUITY_SCORE: 51
ADLS_ACUITY_SCORE: 55
ADLS_ACUITY_SCORE: 55
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
## 2022-11-20 NOTE — PLAN OF CARE
PRIMARY DIAGNOSIS: placement  OUTPATIENT/OBSERVATION GOALS TO BE MET BEFORE DISCHARGE:  1. ADLs back to baseline: Yes     2. Activity and level of assistance: assist x1-2/lift     3. Pain status: Pain free.     4. Return to near baseline physical activity: Yes          Discharge Planner Nurse   Safe discharge environment identified: No  Barriers to discharge: Yes           Medically cleared for discharge. SW following for safe dispo planning. Pt calm and cooperative this shift. Turn/repo q2. Incontinence cares. Tolerating PO-appetite good.

## 2022-11-20 NOTE — PLAN OF CARE
PRIMARY DIAGNOSIS: placement  OUTPATIENT/OBSERVATION GOALS TO BE MET BEFORE DISCHARGE:  1. ADLs back to baseline: Yes     2. Activity and level of assistance: assist x1-2/lift     3. Pain status: Pain free.     4. Return to near baseline physical activity: Yes          Discharge Planner Nurse   Safe discharge environment identified: No  Barriers to discharge: Yes           Medically cleared for discharge. SW following for safe dispo planning.

## 2022-11-20 NOTE — PLAN OF CARE
PRIMARY DIAGNOSIS: placement  OUTPATIENT/OBSERVATION GOALS TO BE MET BEFORE DISCHARGE:  1. ADLs back to baseline: Yes     2. Activity and level of assistance: assist x1-2/lift, can reposition himself  independently in bed     3. Pain status: Pain free.     4. Return to near baseline physical activity: Yes          Discharge Planner Nurse   Safe discharge environment identified: No  Barriers to discharge: Yes           Medically cleared for discharge. SW following for safe dispo planning. Pt calm and cooperative this shift. Incontinence cares.

## 2022-11-21 LAB — GLUCOSE BLDC GLUCOMTR-MCNC: 239 MG/DL (ref 70–99)

## 2022-11-21 PROCEDURE — 250N000013 HC RX MED GY IP 250 OP 250 PS 637: Performed by: HOSPITALIST

## 2022-11-21 PROCEDURE — G0378 HOSPITAL OBSERVATION PER HR: HCPCS

## 2022-11-21 PROCEDURE — 82962 GLUCOSE BLOOD TEST: CPT

## 2022-11-21 PROCEDURE — 250N000013 HC RX MED GY IP 250 OP 250 PS 637: Performed by: PHYSICIAN ASSISTANT

## 2022-11-21 PROCEDURE — 99225 PR SUBSEQUENT OBSERVATION CARE,LEVEL II: CPT

## 2022-11-21 PROCEDURE — 250N000013 HC RX MED GY IP 250 OP 250 PS 637: Performed by: NURSE PRACTITIONER

## 2022-11-21 RX ADMIN — VENLAFAXINE HYDROCHLORIDE 75 MG: 150 CAPSULE, EXTENDED RELEASE ORAL at 21:08

## 2022-11-21 RX ADMIN — BACLOFEN 10 MG: 10 TABLET ORAL at 21:08

## 2022-11-21 RX ADMIN — ASPIRIN 81 MG CHEWABLE TABLET 81 MG: 81 TABLET CHEWABLE at 08:29

## 2022-11-21 RX ADMIN — Medication 10 MG: at 21:07

## 2022-11-21 RX ADMIN — LEVOTHYROXINE SODIUM 25 MCG: 0.03 TABLET ORAL at 08:29

## 2022-11-21 RX ADMIN — CLONIDINE HYDROCHLORIDE 0.1 MG: 0.1 TABLET ORAL at 21:08

## 2022-11-21 RX ADMIN — OLANZAPINE 2.5 MG: 2.5 TABLET, FILM COATED ORAL at 15:05

## 2022-11-21 RX ADMIN — ATORVASTATIN CALCIUM 20 MG: 20 TABLET, FILM COATED ORAL at 21:08

## 2022-11-21 RX ADMIN — HYDROXYZINE HYDROCHLORIDE 50 MG: 50 TABLET, FILM COATED ORAL at 08:29

## 2022-11-21 RX ADMIN — POLYETHYLENE GLYCOL 3350 17 G: 17 POWDER, FOR SOLUTION ORAL at 08:30

## 2022-11-21 RX ADMIN — VENLAFAXINE HYDROCHLORIDE 150 MG: 150 CAPSULE, EXTENDED RELEASE ORAL at 21:09

## 2022-11-21 RX ADMIN — KETOCONAZOLE: 20 CREAM TOPICAL at 21:16

## 2022-11-21 RX ADMIN — POLYETHYLENE GLYCOL 3350 17 G: 17 POWDER, FOR SOLUTION ORAL at 21:07

## 2022-11-21 RX ADMIN — Medication 25 MCG: at 08:29

## 2022-11-21 RX ADMIN — DIVALPROEX SODIUM 500 MG: 500 TABLET, DELAYED RELEASE ORAL at 08:29

## 2022-11-21 RX ADMIN — DIVALPROEX SODIUM 1000 MG: 500 TABLET, DELAYED RELEASE ORAL at 21:08

## 2022-11-21 RX ADMIN — CLOTRIMAZOLE: 0.01 CREAM TOPICAL at 21:16

## 2022-11-21 RX ADMIN — MIRTAZAPINE 15 MG: 15 TABLET, FILM COATED ORAL at 21:08

## 2022-11-21 RX ADMIN — METFORMIN HYDROCHLORIDE 500 MG: 500 TABLET ORAL at 18:01

## 2022-11-21 RX ADMIN — CLOTRIMAZOLE: 0.01 CREAM TOPICAL at 08:30

## 2022-11-21 RX ADMIN — QUETIAPINE FUMARATE 400 MG: 200 TABLET ORAL at 21:08

## 2022-11-21 RX ADMIN — HYDROXYZINE HYDROCHLORIDE 50 MG: 50 TABLET, FILM COATED ORAL at 15:05

## 2022-11-21 RX ADMIN — CLONIDINE HYDROCHLORIDE 0.1 MG: 0.1 TABLET ORAL at 08:29

## 2022-11-21 RX ADMIN — QUETIAPINE 200 MG: 200 TABLET, FILM COATED ORAL at 08:28

## 2022-11-21 RX ADMIN — BACLOFEN 10 MG: 10 TABLET ORAL at 08:29

## 2022-11-21 RX ADMIN — METOPROLOL SUCCINATE 25 MG: 25 TABLET, EXTENDED RELEASE ORAL at 08:29

## 2022-11-21 RX ADMIN — HYDROXYZINE HYDROCHLORIDE 50 MG: 50 TABLET, FILM COATED ORAL at 21:13

## 2022-11-21 RX ADMIN — BACLOFEN 10 MG: 10 TABLET ORAL at 15:05

## 2022-11-21 RX ADMIN — SENNOSIDES AND DOCUSATE SODIUM 1 TABLET: 50; 8.6 TABLET ORAL at 08:29

## 2022-11-21 RX ADMIN — KETOCONAZOLE: 20 CREAM TOPICAL at 08:30

## 2022-11-21 RX ADMIN — QUETIAPINE 200 MG: 200 TABLET, FILM COATED ORAL at 15:05

## 2022-11-21 RX ADMIN — DIVALPROEX SODIUM 250 MG: 500 TABLET, DELAYED RELEASE ORAL at 08:29

## 2022-11-21 ASSESSMENT — ACTIVITIES OF DAILY LIVING (ADL)
ADLS_ACUITY_SCORE: 51
ADLS_ACUITY_SCORE: 55
ADLS_ACUITY_SCORE: 51
ADLS_ACUITY_SCORE: 51
ADLS_ACUITY_SCORE: 55
ADLS_ACUITY_SCORE: 51
ADLS_ACUITY_SCORE: 55
ADLS_ACUITY_SCORE: 51
ADLS_ACUITY_SCORE: 55
ADLS_ACUITY_SCORE: 51

## 2022-11-21 NOTE — PLAN OF CARE
Goal Outcome Evaluation:  Denies pain. Watching tv and playing game on electronic device. Calm at this time. Took meds whole at hs. Calls for assistance when incontinent.
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
## 2022-11-21 NOTE — PLAN OF CARE
PRIMARY DIAGNOSIS: placement  OUTPATIENT/OBSERVATION GOALS TO BE MET BEFORE DISCHARGE:  1. ADLs back to baseline: Yes     2. Activity and level of assistance: assist x1-2/lift     3. Pain status: Pain free.     4. Return to near baseline physical activity: Yes          Discharge Planner Nurse   Safe discharge environment identified: No  Barriers to discharge: Yes           Medically cleared for discharge. SW following for safe dispo planning. Tolerating PO. Calm and cooperative. Turn/repo q 2. Calls when needs to be changed. Continue to monitor and provide supportive cares.

## 2022-11-22 LAB
GLUCOSE BLDC GLUCOMTR-MCNC: 138 MG/DL (ref 70–99)
GLUCOSE BLDC GLUCOMTR-MCNC: 166 MG/DL (ref 70–99)

## 2022-11-22 PROCEDURE — 250N000013 HC RX MED GY IP 250 OP 250 PS 637: Performed by: NURSE PRACTITIONER

## 2022-11-22 PROCEDURE — 250N000013 HC RX MED GY IP 250 OP 250 PS 637: Performed by: HOSPITALIST

## 2022-11-22 PROCEDURE — G0378 HOSPITAL OBSERVATION PER HR: HCPCS

## 2022-11-22 PROCEDURE — 99225 PR SUBSEQUENT OBSERVATION CARE,LEVEL II: CPT

## 2022-11-22 PROCEDURE — 82962 GLUCOSE BLOOD TEST: CPT

## 2022-11-22 PROCEDURE — 250N000013 HC RX MED GY IP 250 OP 250 PS 637: Performed by: PHYSICIAN ASSISTANT

## 2022-11-22 RX ADMIN — POLYETHYLENE GLYCOL 3350 17 G: 17 POWDER, FOR SOLUTION ORAL at 09:51

## 2022-11-22 RX ADMIN — KETOCONAZOLE: 20 CREAM TOPICAL at 10:55

## 2022-11-22 RX ADMIN — METOPROLOL SUCCINATE 25 MG: 25 TABLET, EXTENDED RELEASE ORAL at 09:52

## 2022-11-22 RX ADMIN — METFORMIN HYDROCHLORIDE 500 MG: 500 TABLET ORAL at 16:56

## 2022-11-22 RX ADMIN — DIVALPROEX SODIUM 250 MG: 500 TABLET, DELAYED RELEASE ORAL at 09:52

## 2022-11-22 RX ADMIN — BACLOFEN 10 MG: 10 TABLET ORAL at 09:52

## 2022-11-22 RX ADMIN — OLANZAPINE 2.5 MG: 2.5 TABLET, FILM COATED ORAL at 13:59

## 2022-11-22 RX ADMIN — HYDROCORTISONE: 1 CREAM TOPICAL at 20:20

## 2022-11-22 RX ADMIN — VENLAFAXINE HYDROCHLORIDE 150 MG: 150 CAPSULE, EXTENDED RELEASE ORAL at 22:11

## 2022-11-22 RX ADMIN — HYDROCORTISONE: 1 CREAM TOPICAL at 10:55

## 2022-11-22 RX ADMIN — CLONIDINE HYDROCHLORIDE 0.1 MG: 0.1 TABLET ORAL at 20:15

## 2022-11-22 RX ADMIN — DIVALPROEX SODIUM 500 MG: 500 TABLET, DELAYED RELEASE ORAL at 09:56

## 2022-11-22 RX ADMIN — QUETIAPINE 200 MG: 200 TABLET, FILM COATED ORAL at 09:53

## 2022-11-22 RX ADMIN — HYDROXYZINE HYDROCHLORIDE 50 MG: 50 TABLET, FILM COATED ORAL at 20:15

## 2022-11-22 RX ADMIN — DIVALPROEX SODIUM 1000 MG: 500 TABLET, DELAYED RELEASE ORAL at 22:12

## 2022-11-22 RX ADMIN — QUETIAPINE 200 MG: 200 TABLET, FILM COATED ORAL at 13:59

## 2022-11-22 RX ADMIN — HYDROXYZINE HYDROCHLORIDE 50 MG: 50 TABLET, FILM COATED ORAL at 13:59

## 2022-11-22 RX ADMIN — MIRTAZAPINE 15 MG: 15 TABLET, FILM COATED ORAL at 22:03

## 2022-11-22 RX ADMIN — ASPIRIN 81 MG CHEWABLE TABLET 81 MG: 81 TABLET CHEWABLE at 09:53

## 2022-11-22 RX ADMIN — CLOTRIMAZOLE: 0.01 CREAM TOPICAL at 10:56

## 2022-11-22 RX ADMIN — Medication 10 MG: at 22:11

## 2022-11-22 RX ADMIN — KETOCONAZOLE: 20 CREAM TOPICAL at 20:20

## 2022-11-22 RX ADMIN — SENNOSIDES AND DOCUSATE SODIUM 1 TABLET: 50; 8.6 TABLET ORAL at 09:53

## 2022-11-22 RX ADMIN — QUETIAPINE FUMARATE 400 MG: 200 TABLET ORAL at 22:06

## 2022-11-22 RX ADMIN — VENLAFAXINE HYDROCHLORIDE 75 MG: 150 CAPSULE, EXTENDED RELEASE ORAL at 22:03

## 2022-11-22 RX ADMIN — POLYETHYLENE GLYCOL 3350 17 G: 17 POWDER, FOR SOLUTION ORAL at 20:15

## 2022-11-22 RX ADMIN — BACLOFEN 10 MG: 10 TABLET ORAL at 13:59

## 2022-11-22 RX ADMIN — ATORVASTATIN CALCIUM 20 MG: 20 TABLET, FILM COATED ORAL at 20:15

## 2022-11-22 RX ADMIN — CLONIDINE HYDROCHLORIDE 0.1 MG: 0.1 TABLET ORAL at 09:52

## 2022-11-22 RX ADMIN — HYDROXYZINE HYDROCHLORIDE 50 MG: 50 TABLET, FILM COATED ORAL at 09:52

## 2022-11-22 RX ADMIN — Medication 25 MCG: at 09:53

## 2022-11-22 RX ADMIN — BACLOFEN 10 MG: 10 TABLET ORAL at 20:15

## 2022-11-22 RX ADMIN — CLOTRIMAZOLE: 0.01 CREAM TOPICAL at 20:20

## 2022-11-22 RX ADMIN — LEVOTHYROXINE SODIUM 25 MCG: 0.03 TABLET ORAL at 09:52

## 2022-11-22 ASSESSMENT — ACTIVITIES OF DAILY LIVING (ADL)
ADLS_ACUITY_SCORE: 55
ADLS_ACUITY_SCORE: 55
ADLS_ACUITY_SCORE: 54
ADLS_ACUITY_SCORE: 54
ADLS_ACUITY_SCORE: 58
ADLS_ACUITY_SCORE: 55
ADLS_ACUITY_SCORE: 54

## 2022-11-22 NOTE — PLAN OF CARE
PRIMARY DIAGNOSIS: Placement  OUTPATIENT/OBSERVATION GOALS TO BE MET BEFORE DISCHARGE:  ADLs back to baseline: Yes    Activity and level of assistance: Up with maximum assistance.    Pain status: Pain free.    Return to near baseline physical activity: Yes     Discharge Planner Nurse   Safe discharge environment identified: No  Barriers to discharge: Yes       Entered by: Emily Garcia RN 11/22/2022 12:11 AM    Patient is resting comfortably in bed, VSS, waiting on placement, SW following          Please review provider order for any additional goals.   Nurse to notify provider when observation goals have been met and patient is ready for discharge.

## 2022-11-22 NOTE — PLAN OF CARE
PRIMARY DIAGNOSIS: PLACEMENT   OUTPATIENT/OBSERVATION GOALS TO BE MET BEFORE DISCHARGE:  ADLs back to baseline: Yes    Activity and level of assistance: Lift assist    Pain status: Pain free.    Return to near baseline physical activity: No     Discharge Planner Nurse   Safe discharge environment identified: Yes  Barriers to discharge: Yes       Entered by: Tita Loja RN 11/22/2022      Please review provider order for any additional goals.   Nurse to notify provider when observation goals have been met and patient is ready for discharge.

## 2022-11-22 NOTE — PROGRESS NOTES
Care Management Follow Up    Expected Discharge Date: 12/30/2022     Concerns to be Addressed: Discharge planning      Patient plan of care discussed at interdisciplinary rounds: Yes    Anticipated Discharge Disposition:  Group Home once placement found    Additional Information:  Analy emailed pt's ROSIO Ivy through Jose Wu requesting confirmation and follow up that she is working on needed paperwork for relocation services to be started. 'ed SW supervisor as well as [facility].     ANALY also called Jose Novoa, [phone], to request a call back from supervisor.     ANALY will continue to follow.     1520: ANALY received email from CM that she has received paperwork needing to be completed, and is working with [facility] to submit this so relocation services can be started.     DANITA Obrien, [facility]   Inpatient Care Coordination  [facility]   [phone]

## 2022-11-22 NOTE — PLAN OF CARE
PRIMARY DIAGNOSIS: PLACEMENT   OUTPATIENT/OBSERVATION GOALS TO BE MET BEFORE DISCHARGE:  1. ADLs back to baseline: Yes    2. Activity and level of assistance: Lift assist    3. Pain status: Pain free.    4. Return to near baseline physical   activity: Yes       Vitals are     BP: 126/84 Pulse: 75   Resp: 18 SpO2: 94 %.  Patient is Alert and Oriented x4. Pt is  2 person  assist with Lift device.  Pt is on a Regular diet.  Pt is  denying pain.  Patient has no IV access. Will cont to monitor.  Discharge Planner Nurse   Safe discharge environment identified: Yes  Barriers to discharge: Yes       Entered by: Tita Loja RN 11/22/2022      Please review provider order for any additional goals.   Nurse to notify provider when observation goals have been met and patient is ready for discharge.

## 2022-11-22 NOTE — PLAN OF CARE
PRIMARY DIAGNOSIS: Placement  OUTPATIENT/OBSERVATION GOALS TO BE MET BEFORE DISCHARGE:  ADLs back to baseline: Yes    Activity and level of assistance: assist x 2    Pain status: Pain free.    Return to near baseline physical activity: Yes     Discharge Planner Nurse   Safe discharge environment identified: No  Barriers to discharge: Yes       Entered by: Emily Gracia RN 11/22/2022 4:27 AM      Patient is resting comfortably in bed, denies pain, VSS, SW following for placement            Please review provider order for any additional goals.   Nurse to notify provider when observation goals have been met and patient is ready for discharge.
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
## 2022-11-22 NOTE — PLAN OF CARE
PRIMARY DIAGNOSIS: PLACEMENT   OUTPATIENT/OBSERVATION GOALS TO BE MET BEFORE DISCHARGE:  1. ADLs back to baseline: Yes    2. Activity and level of assistance: Lift assist    3. Pain status: Pain free.    4. Return to near baseline physical   activity: Yes       Vitals are     BP: 126/84 Pulse: 75   Resp: 18 SpO2: 94 %.  Patient is Alert and Oriented x4. Pt is  2 person  assist with Lift device.  Pt is on a Regular diet.  Pt is  denying pain.  Patient has no IV access. SW following for safe disposition.Will cont to monitor and provide cares.  Discharge Planner Nurse   Safe discharge environment identified: Yes  Barriers to discharge: Yes       Entered by: Tita Loja RN 11/22/2022      Please review provider order for any additional goals.   Nurse to notify provider when observation goals have been met and patient is ready for discharge.

## 2022-11-22 NOTE — PLAN OF CARE
PRIMARY DIAGNOSIS: PLACEMENT  OUTPATIENT/OBSERVATION GOALS TO BE MET BEFORE DISCHARGE:  1. ADLs back to baseline: Yes    2. Activity and level of assistance: AX1-2 with lift    3. Pain status: Pain free.    4. Return to near baseline physical activity: Yes     Discharge Planner Nurse   Safe discharge environment identified: No  Barriers to discharge: Yes       Entered by: Jackson Wilson RN 11/21/2022 11:46 PM  A&Ox4. Stable on RA. Denies pain. Waiting placement   Please review provider order for any additional goals.   Nurse to notify provider when observation goals have been met and patient is ready for discharge.

## 2022-11-23 LAB
GLUCOSE BLDC GLUCOMTR-MCNC: 237 MG/DL (ref 70–99)
GLUCOSE BLDC GLUCOMTR-MCNC: 274 MG/DL (ref 70–99)

## 2022-11-23 PROCEDURE — 250N000013 HC RX MED GY IP 250 OP 250 PS 637: Performed by: NURSE PRACTITIONER

## 2022-11-23 PROCEDURE — 250N000013 HC RX MED GY IP 250 OP 250 PS 637

## 2022-11-23 PROCEDURE — 250N000013 HC RX MED GY IP 250 OP 250 PS 637: Performed by: HOSPITALIST

## 2022-11-23 PROCEDURE — G0378 HOSPITAL OBSERVATION PER HR: HCPCS

## 2022-11-23 PROCEDURE — 99225 PR SUBSEQUENT OBSERVATION CARE,LEVEL II: CPT

## 2022-11-23 PROCEDURE — 250N000013 HC RX MED GY IP 250 OP 250 PS 637: Performed by: PHYSICIAN ASSISTANT

## 2022-11-23 PROCEDURE — 82962 GLUCOSE BLOOD TEST: CPT

## 2022-11-23 RX ADMIN — CLONIDINE HYDROCHLORIDE 0.1 MG: 0.1 TABLET ORAL at 09:22

## 2022-11-23 RX ADMIN — ATORVASTATIN CALCIUM 20 MG: 20 TABLET, FILM COATED ORAL at 20:07

## 2022-11-23 RX ADMIN — HYDROXYZINE HYDROCHLORIDE 50 MG: 50 TABLET, FILM COATED ORAL at 20:07

## 2022-11-23 RX ADMIN — KETOCONAZOLE: 20 CREAM TOPICAL at 20:08

## 2022-11-23 RX ADMIN — BACLOFEN 10 MG: 10 TABLET ORAL at 13:40

## 2022-11-23 RX ADMIN — VENLAFAXINE HYDROCHLORIDE 150 MG: 150 CAPSULE, EXTENDED RELEASE ORAL at 22:04

## 2022-11-23 RX ADMIN — Medication 10 MG: at 22:02

## 2022-11-23 RX ADMIN — DIVALPROEX SODIUM 1000 MG: 500 TABLET, DELAYED RELEASE ORAL at 22:02

## 2022-11-23 RX ADMIN — Medication 25 MCG: at 09:22

## 2022-11-23 RX ADMIN — KETOCONAZOLE: 20 CREAM TOPICAL at 11:49

## 2022-11-23 RX ADMIN — METOPROLOL SUCCINATE 25 MG: 25 TABLET, EXTENDED RELEASE ORAL at 09:23

## 2022-11-23 RX ADMIN — VENLAFAXINE HYDROCHLORIDE 75 MG: 150 CAPSULE, EXTENDED RELEASE ORAL at 22:04

## 2022-11-23 RX ADMIN — HYDROXYZINE HYDROCHLORIDE 50 MG: 50 TABLET, FILM COATED ORAL at 13:40

## 2022-11-23 RX ADMIN — POLYETHYLENE GLYCOL 3350 17 G: 17 POWDER, FOR SOLUTION ORAL at 20:07

## 2022-11-23 RX ADMIN — POLYETHYLENE GLYCOL 3350 17 G: 17 POWDER, FOR SOLUTION ORAL at 09:23

## 2022-11-23 RX ADMIN — OLANZAPINE 2.5 MG: 2.5 TABLET, FILM COATED ORAL at 13:40

## 2022-11-23 RX ADMIN — MIRTAZAPINE 15 MG: 15 TABLET, FILM COATED ORAL at 22:02

## 2022-11-23 RX ADMIN — SENNOSIDES AND DOCUSATE SODIUM 1 TABLET: 50; 8.6 TABLET ORAL at 09:22

## 2022-11-23 RX ADMIN — DIVALPROEX SODIUM 250 MG: 500 TABLET, DELAYED RELEASE ORAL at 09:23

## 2022-11-23 RX ADMIN — BACLOFEN 10 MG: 10 TABLET ORAL at 09:22

## 2022-11-23 RX ADMIN — DIVALPROEX SODIUM 500 MG: 500 TABLET, DELAYED RELEASE ORAL at 09:22

## 2022-11-23 RX ADMIN — LEVOTHYROXINE SODIUM 25 MCG: 0.03 TABLET ORAL at 09:22

## 2022-11-23 RX ADMIN — METFORMIN HYDROCHLORIDE 850 MG: 850 TABLET, FILM COATED ORAL at 16:15

## 2022-11-23 RX ADMIN — BACLOFEN 10 MG: 10 TABLET ORAL at 20:07

## 2022-11-23 RX ADMIN — QUETIAPINE FUMARATE 400 MG: 200 TABLET ORAL at 22:02

## 2022-11-23 RX ADMIN — QUETIAPINE 200 MG: 200 TABLET, FILM COATED ORAL at 13:39

## 2022-11-23 RX ADMIN — QUETIAPINE 200 MG: 200 TABLET, FILM COATED ORAL at 09:22

## 2022-11-23 RX ADMIN — CLONIDINE HYDROCHLORIDE 0.1 MG: 0.1 TABLET ORAL at 20:06

## 2022-11-23 RX ADMIN — HYDROXYZINE HYDROCHLORIDE 50 MG: 50 TABLET, FILM COATED ORAL at 09:21

## 2022-11-23 RX ADMIN — CLOTRIMAZOLE: 0.01 CREAM TOPICAL at 20:09

## 2022-11-23 RX ADMIN — CLOTRIMAZOLE: 0.01 CREAM TOPICAL at 11:49

## 2022-11-23 RX ADMIN — HYDROCORTISONE: 1 CREAM TOPICAL at 11:48

## 2022-11-23 RX ADMIN — HYDROCORTISONE: 1 CREAM TOPICAL at 20:09

## 2022-11-23 RX ADMIN — ASPIRIN 81 MG CHEWABLE TABLET 81 MG: 81 TABLET CHEWABLE at 09:22

## 2022-11-23 ASSESSMENT — ACTIVITIES OF DAILY LIVING (ADL)
ADLS_ACUITY_SCORE: 54

## 2022-11-23 NOTE — PLAN OF CARE
PRIMARY DIAGNOSIS: PLACEMENT   OBSERVATION GOALS TO BE MET BEFORE DISCHARGE:  1. ADLs back to baseline: Yes    2. Activity and level of assistance: Lift assist    3. Pain status: Pain free.    4. Return to near baseline physical   activity: Yes          5.  Safe discharge environment identified: Yes         Barriers to discharge: Yes       /84 (BP Location: Right arm)   Pulse 75   Temp 97.5  F (36.4  C) (Axillary)   Resp 18   Wt 99.9 kg (220 lb 4.8 oz)   SpO2 94%     Patient is alert and oriented x4 with slow speech. Lung sounds CTA, Bowel sounds active x4.  Pt is assist of  2 person with lift device.Pt is on a Regular diet with great appetite.  Patient denies pain,took all his meds with no difficulty.  Pt. Voiding spontaneously. No bowel movement this evening. Pt. Awaiting for placement. Will continue to monitor.     Entered by: Sendy Paz RN 11/22/2022   Please review provider order for any additional goals.   Nurse to notify provider when observation goals have been met and patient is ready for discharge.

## 2022-11-23 NOTE — PLAN OF CARE
PRIMARY DIAGNOSIS: PLACEMENT   OBSERVATION GOALS TO BE MET BEFORE DISCHARGE:  1. ADLs back to baseline: Yes    2. Activity and level of assistance: Lift assist    3. Pain status: Pain free.    4. Return to near baseline physical   activity: Yes       /84 (BP Location: Right arm)   Pulse 75   Temp 97.5  F (36.4  C) (Axillary)   Resp 18   Wt 99.9 kg (220 lb 4.8 oz)   SpO2 94%     Patient is alert and oriented x4. Pt is  2 person  assist with Lift device.Pt is on a Regular diet.  Pt is  denies pain.    Discharge Planner Nurse   Safe discharge environment identified: Yes  Barriers to discharge: Yes       Entered by: Sendy Paz RN 11/22/2022      Please review provider order for any additional goals.   Nurse to notify provider when observation goals have been met and patient is ready for discharge.

## 2022-11-23 NOTE — PROGRESS NOTES
PRIMARY DIAGNOSIS: Placement   OUTPATIENT/OBSERVATION GOALS TO BE MET BEFORE DISCHARGE:  1. ADLs back to baseline: Yes    2. Activity and level of assistance: Up with standby assistance.    3. Pain status: Pain free.    4. Return to near baseline physical activity: Yes     Discharge Planner Nurse   Safe discharge environment identified: No  Barriers to discharge: Yes, placement     BP (!) 124/90 (BP Location: Right arm)   Pulse 76   Temp 98  F (36.7  C) (Axillary)   Resp 19   Wt 99.9 kg (220 lb 4.8 oz)   SpO2 97%       A&Ox4, denies pain. Check/change and reposition q2h.  237, MD aware.

## 2022-11-23 NOTE — PLAN OF CARE
Goal Outcome Evaluation:    PRIMARY DIAGNOSIS: Placement   OUTPATIENT/OBSERVATION GOALS TO BE MET BEFORE DISCHARGE:  1. ADLs back to baseline: Yes    2. Activity and level of assistance: Up with maximum assistance.     3. Pain status: Denies pain    4. Return to near baseline physical activity: Yes     Discharge Planner Nurse   Safe discharge environment identified: No  Barriers to discharge: Yes   A & O X 4. Lung sound clear all lobes . Denies SOB, or pain. Bowel sound present X 4. Medically stable to discharge . On Pulsate mattress,  Turn and reposition maintained and per patient request. Calls appropriately. Incontinent of B & B. Awaiting for placement.  following  /84 (BP Location: Right arm)   Pulse 75   Temp 97.5  F (36.4  C) (Axillary)   Resp 18   Wt 99.9 kg (220 lb 4.8 oz)   SpO2 94%        Entered by: Monica Elder RN 11/23/2022 03:30     Please review provider order for any additional goals.   Nurse to notify provider when observation goals have been met and patient is ready for discharge.
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
## 2022-11-23 NOTE — PROGRESS NOTES
PRIMARY DIAGNOSIS: Placement   OUTPATIENT/OBSERVATION GOALS TO BE MET BEFORE DISCHARGE:  1. ADLs back to baseline: Yes    2. Activity and level of assistance: Up with standby assistance.    3. Pain status: Pain free.    4. Return to near baseline physical activity: Yes     Discharge Planner Nurse   Safe discharge environment identified: No  Barriers to discharge: Yes, placement     BP (!) 124/90 (BP Location: Right arm)   Pulse 76   Temp 98  F (36.7  C) (Axillary)   Resp 19   Wt 99.9 kg (220 lb 4.8 oz)   SpO2 97%       A&Ox4, denies pain. Check/change and reposition q2h.

## 2022-11-23 NOTE — PLAN OF CARE
Goal Outcome Evaluation:    PRIMARY DIAGNOSIS: Placement   OUTPATIENT/OBSERVATION GOALS TO BE MET BEFORE DISCHARGE:  ADLs back to baseline: Yes    Activity and level of assistance: Up with maximum assistance.     Pain status: Denies pain    Return to near baseline physical activity: Yes     Discharge Planner Nurse   Safe discharge environment identified: No  Barriers to discharge: Yes       Entered by: Monica Elder RN 11/23/2022 00:00     Please review provider order for any additional goals.   Nurse to notify provider when observation goals have been met and patient is ready for discharge.

## 2022-11-24 LAB
GLUCOSE BLDC GLUCOMTR-MCNC: 250 MG/DL (ref 70–99)
GLUCOSE BLDC GLUCOMTR-MCNC: 280 MG/DL (ref 70–99)
GLUCOSE BLDC GLUCOMTR-MCNC: 318 MG/DL (ref 70–99)

## 2022-11-24 PROCEDURE — 82962 GLUCOSE BLOOD TEST: CPT

## 2022-11-24 PROCEDURE — 250N000013 HC RX MED GY IP 250 OP 250 PS 637: Performed by: NURSE PRACTITIONER

## 2022-11-24 PROCEDURE — 250N000013 HC RX MED GY IP 250 OP 250 PS 637: Performed by: HOSPITALIST

## 2022-11-24 PROCEDURE — 99207 PR NO CHARGE LOS: CPT | Performed by: NURSE PRACTITIONER

## 2022-11-24 PROCEDURE — 250N000013 HC RX MED GY IP 250 OP 250 PS 637: Performed by: INTERNAL MEDICINE

## 2022-11-24 PROCEDURE — 250N000012 HC RX MED GY IP 250 OP 636 PS 637: Performed by: NURSE PRACTITIONER

## 2022-11-24 PROCEDURE — 250N000013 HC RX MED GY IP 250 OP 250 PS 637: Performed by: PHYSICIAN ASSISTANT

## 2022-11-24 PROCEDURE — 250N000013 HC RX MED GY IP 250 OP 250 PS 637

## 2022-11-24 PROCEDURE — 96372 THER/PROPH/DIAG INJ SC/IM: CPT | Performed by: NURSE PRACTITIONER

## 2022-11-24 PROCEDURE — G0378 HOSPITAL OBSERVATION PER HR: HCPCS

## 2022-11-24 RX ORDER — DEXTROSE MONOHYDRATE 25 G/50ML
25-50 INJECTION, SOLUTION INTRAVENOUS
Status: DISCONTINUED | OUTPATIENT
Start: 2022-11-24 | End: 2023-01-16

## 2022-11-24 RX ORDER — NICOTINE POLACRILEX 4 MG
15-30 LOZENGE BUCCAL
Status: DISCONTINUED | OUTPATIENT
Start: 2022-11-24 | End: 2023-01-16

## 2022-11-24 RX ADMIN — INSULIN ASPART 3 UNITS: 100 INJECTION, SOLUTION INTRAVENOUS; SUBCUTANEOUS at 17:30

## 2022-11-24 RX ADMIN — KETOCONAZOLE: 20 CREAM TOPICAL at 09:27

## 2022-11-24 RX ADMIN — HYDROXYZINE HYDROCHLORIDE 50 MG: 50 TABLET, FILM COATED ORAL at 21:02

## 2022-11-24 RX ADMIN — HYDROCORTISONE: 1 CREAM TOPICAL at 21:07

## 2022-11-24 RX ADMIN — HYDROXYZINE HYDROCHLORIDE 50 MG: 50 TABLET, FILM COATED ORAL at 09:15

## 2022-11-24 RX ADMIN — OLANZAPINE 2.5 MG: 2.5 TABLET, FILM COATED ORAL at 14:45

## 2022-11-24 RX ADMIN — POLYETHYLENE GLYCOL 3350 17 G: 17 POWDER, FOR SOLUTION ORAL at 09:15

## 2022-11-24 RX ADMIN — VENLAFAXINE HYDROCHLORIDE 150 MG: 150 CAPSULE, EXTENDED RELEASE ORAL at 21:03

## 2022-11-24 RX ADMIN — Medication 25 MCG: at 09:14

## 2022-11-24 RX ADMIN — METFORMIN HYDROCHLORIDE 850 MG: 850 TABLET, FILM COATED ORAL at 16:22

## 2022-11-24 RX ADMIN — POLYETHYLENE GLYCOL 3350 17 G: 17 POWDER, FOR SOLUTION ORAL at 21:06

## 2022-11-24 RX ADMIN — HYDROCORTISONE: 1 CREAM TOPICAL at 09:26

## 2022-11-24 RX ADMIN — MIRTAZAPINE 15 MG: 15 TABLET, FILM COATED ORAL at 21:02

## 2022-11-24 RX ADMIN — CLONIDINE HYDROCHLORIDE 0.1 MG: 0.1 TABLET ORAL at 09:14

## 2022-11-24 RX ADMIN — BACLOFEN 10 MG: 10 TABLET ORAL at 21:01

## 2022-11-24 RX ADMIN — ASPIRIN 81 MG CHEWABLE TABLET 81 MG: 81 TABLET CHEWABLE at 09:26

## 2022-11-24 RX ADMIN — SENNOSIDES AND DOCUSATE SODIUM 1 TABLET: 50; 8.6 TABLET ORAL at 09:15

## 2022-11-24 RX ADMIN — CLONIDINE HYDROCHLORIDE 0.1 MG: 0.1 TABLET ORAL at 21:02

## 2022-11-24 RX ADMIN — BACLOFEN 10 MG: 10 TABLET ORAL at 14:45

## 2022-11-24 RX ADMIN — CLOTRIMAZOLE: 0.01 CREAM TOPICAL at 21:06

## 2022-11-24 RX ADMIN — QUETIAPINE FUMARATE 400 MG: 200 TABLET ORAL at 21:01

## 2022-11-24 RX ADMIN — DIVALPROEX SODIUM 1000 MG: 500 TABLET, DELAYED RELEASE ORAL at 21:02

## 2022-11-24 RX ADMIN — KETOCONAZOLE: 20 CREAM TOPICAL at 21:06

## 2022-11-24 RX ADMIN — METOPROLOL SUCCINATE 25 MG: 25 TABLET, EXTENDED RELEASE ORAL at 09:15

## 2022-11-24 RX ADMIN — DIVALPROEX SODIUM 250 MG: 500 TABLET, DELAYED RELEASE ORAL at 09:17

## 2022-11-24 RX ADMIN — HYDROXYZINE HYDROCHLORIDE 50 MG: 50 TABLET, FILM COATED ORAL at 14:45

## 2022-11-24 RX ADMIN — VENLAFAXINE HYDROCHLORIDE 75 MG: 150 CAPSULE, EXTENDED RELEASE ORAL at 21:03

## 2022-11-24 RX ADMIN — LEVOTHYROXINE SODIUM 25 MCG: 0.03 TABLET ORAL at 09:15

## 2022-11-24 RX ADMIN — CLOTRIMAZOLE: 0.01 CREAM TOPICAL at 09:19

## 2022-11-24 RX ADMIN — ACETAMINOPHEN 650 MG: 325 TABLET, FILM COATED ORAL at 09:14

## 2022-11-24 RX ADMIN — QUETIAPINE 200 MG: 200 TABLET, FILM COATED ORAL at 09:15

## 2022-11-24 RX ADMIN — ATORVASTATIN CALCIUM 20 MG: 20 TABLET, FILM COATED ORAL at 21:02

## 2022-11-24 RX ADMIN — QUETIAPINE 200 MG: 200 TABLET, FILM COATED ORAL at 14:46

## 2022-11-24 RX ADMIN — BACLOFEN 10 MG: 10 TABLET ORAL at 09:15

## 2022-11-24 RX ADMIN — DIVALPROEX SODIUM 500 MG: 500 TABLET, DELAYED RELEASE ORAL at 09:16

## 2022-11-24 RX ADMIN — Medication 10 MG: at 21:02

## 2022-11-24 ASSESSMENT — ACTIVITIES OF DAILY LIVING (ADL)
ADLS_ACUITY_SCORE: 54
ADLS_ACUITY_SCORE: 56
ADLS_ACUITY_SCORE: 54
ADLS_ACUITY_SCORE: 56
ADLS_ACUITY_SCORE: 54
ADLS_ACUITY_SCORE: 56
ADLS_ACUITY_SCORE: 54
ADLS_ACUITY_SCORE: 56
ADLS_ACUITY_SCORE: 56
ADLS_ACUITY_SCORE: 54

## 2022-11-24 NOTE — PLAN OF CARE
"PRIMARY DIAGNOSIS: \"Placment\"  OUTPATIENT/OBSERVATION GOALS TO BE MET BEFORE DISCHARGE:  1. ADLs back to baseline: Yes    2. Activity and level of assistance: Total care per baseline     3. Pain status: Pain free.    4. Return to near baseline physical activity: Yes     Discharge Planner Nurse   Safe discharge environment identified: No  Barriers to discharge: No       Entered by: Genevieve Ordoñez RN 11/24/2022 4:28 PM      Pt alert and oriented X 4. Denies pain. Able to communicate needs clearly. Check and change as tolerated and per Pt request. Redness on check cleared. VSS on RA. SW following for placement. No concern noted at this time.   Please review provider order for any additional goals.   Nurse to notify provider when observation goals have been met and patient is ready for discharge.    "
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
## 2022-11-24 NOTE — PLAN OF CARE
"PRIMARY DIAGNOSIS: \"Placment\"  OUTPATIENT/OBSERVATION GOALS TO BE MET BEFORE DISCHARGE:  1. ADLs back to baseline: Yes    2. Activity and level of assistance: Total care per baseline     3. Pain status: Pain free.    4. Return to near baseline physical activity: Yes     Discharge Planner Nurse   Safe discharge environment identified: No  Barriers to discharge: No       Entered by: Genevieve Ordoñez RN 11/24/2022 9:30 AM      Pt alert and oriented X 4. Denies pain. Able to communicate needs clearly. Check and chance as tolerated and per Pt request. Redness on check cleared. VSS on RA. SW following for placement. BS this morning 280.   Please review provider order for any additional goals.   Nurse to notify provider when observation goals have been met and patient is ready for discharge.    "

## 2022-11-24 NOTE — PROGRESS NOTES
PRIMARY DIAGNOSIS: Placement  OUTPATIENT/OBSERVATION GOALS TO BE MET BEFORE DISCHARGE:  1. ADLs back to baseline: Yes    Activity and level of assistance: Up with maximum assistance/ lift.  2. Pain status: Pain free.    3. Return to near baseline physical activity: Yes     Discharge Planner Nurse   Safe discharge environment identified: No  Barriers to discharge: Yes       Entered by: Alda Victoria RN 11/24/2022       /78 (BP Location: Right arm)   Pulse 77   Temp 98.4  F (36.9  C) (Axillary)   Resp 20   Wt 99.9 kg (220 lb 4.8 oz)   SpO2 96%     Pt A&O. Calm and cooperative. VSS on RA.  Pt resting comfortably in bed. Will continue to monitor.  SW following for placement.      Please review provider order for any additional goals.   Nurse to notify provider when observation goals have been met and patient is ready for discharge.

## 2022-11-24 NOTE — PLAN OF CARE
"PRIMARY DIAGNOSIS: \"Placment\"  OUTPATIENT/OBSERVATION GOALS TO BE MET BEFORE DISCHARGE:  1. ADLs back to baseline: Yes    2. Activity and level of assistance: Total care per baseline     3. Pain status: Pain free.    4. Return to near baseline physical activity: Yes     Discharge Planner Nurse   Safe discharge environment identified: No  Barriers to discharge: No       Entered by: Genevieve Ordoñez RN 11/24/2022 2:21 PM      Pt alert and oriented X 4. Denies pain. Able to communicate needs clearly. Check and chance as tolerated and per Pt request. Redness on check cleared. VSS on RA. SW following for placement.   Please review provider order for any additional goals.   Nurse to notify provider when observation goals have been met and patient is ready for discharge.    "

## 2022-11-24 NOTE — PROGRESS NOTES
PRIMARY DIAGNOSIS: Placement  OUTPATIENT/OBSERVATION GOALS TO BE MET BEFORE DISCHARGE:  1. ADLs back to baseline: Yes    Activity and level of assistance: Up with maximum assistance/ lift.  2. Pain status: Pain free.    3. Return to near baseline physical activity: Yes     Discharge Planner Nurse   Safe discharge environment identified: No  Barriers to discharge: Yes       Entered by: Alda Victoria RN 11/24/2022     Pt A&O. Calm and cooperative. Pt resting comfortably in bed. Will continue to monitor.   Please review provider order for any additional goals.   Nurse to notify provider when observation goals have been met and patient is ready for discharge.

## 2022-11-25 LAB
GLUCOSE BLDC GLUCOMTR-MCNC: 166 MG/DL (ref 70–99)
GLUCOSE BLDC GLUCOMTR-MCNC: 199 MG/DL (ref 70–99)
GLUCOSE BLDC GLUCOMTR-MCNC: 251 MG/DL (ref 70–99)
GLUCOSE BLDC GLUCOMTR-MCNC: 329 MG/DL (ref 70–99)

## 2022-11-25 PROCEDURE — 250N000013 HC RX MED GY IP 250 OP 250 PS 637: Performed by: PHYSICIAN ASSISTANT

## 2022-11-25 PROCEDURE — 250N000013 HC RX MED GY IP 250 OP 250 PS 637: Performed by: NURSE PRACTITIONER

## 2022-11-25 PROCEDURE — 99207 PR NO CHARGE LOS: CPT | Performed by: NURSE PRACTITIONER

## 2022-11-25 PROCEDURE — 250N000013 HC RX MED GY IP 250 OP 250 PS 637: Performed by: HOSPITALIST

## 2022-11-25 PROCEDURE — 250N000013 HC RX MED GY IP 250 OP 250 PS 637

## 2022-11-25 PROCEDURE — G0378 HOSPITAL OBSERVATION PER HR: HCPCS

## 2022-11-25 PROCEDURE — 82962 GLUCOSE BLOOD TEST: CPT

## 2022-11-25 RX ADMIN — QUETIAPINE 200 MG: 200 TABLET, FILM COATED ORAL at 14:33

## 2022-11-25 RX ADMIN — QUETIAPINE FUMARATE 400 MG: 200 TABLET ORAL at 21:30

## 2022-11-25 RX ADMIN — LEVOTHYROXINE SODIUM 25 MCG: 0.03 TABLET ORAL at 07:49

## 2022-11-25 RX ADMIN — HYDROCORTISONE: 1 CREAM TOPICAL at 07:52

## 2022-11-25 RX ADMIN — METOPROLOL SUCCINATE 25 MG: 25 TABLET, EXTENDED RELEASE ORAL at 07:49

## 2022-11-25 RX ADMIN — HYDROXYZINE HYDROCHLORIDE 50 MG: 50 TABLET, FILM COATED ORAL at 14:33

## 2022-11-25 RX ADMIN — CLOTRIMAZOLE: 0.01 CREAM TOPICAL at 21:34

## 2022-11-25 RX ADMIN — DIVALPROEX SODIUM 500 MG: 500 TABLET, DELAYED RELEASE ORAL at 07:48

## 2022-11-25 RX ADMIN — POLYETHYLENE GLYCOL 3350 17 G: 17 POWDER, FOR SOLUTION ORAL at 07:49

## 2022-11-25 RX ADMIN — ATORVASTATIN CALCIUM 20 MG: 20 TABLET, FILM COATED ORAL at 21:32

## 2022-11-25 RX ADMIN — DIVALPROEX SODIUM 250 MG: 500 TABLET, DELAYED RELEASE ORAL at 07:49

## 2022-11-25 RX ADMIN — KETOCONAZOLE: 20 CREAM TOPICAL at 21:34

## 2022-11-25 RX ADMIN — KETOCONAZOLE: 20 CREAM TOPICAL at 07:52

## 2022-11-25 RX ADMIN — INSULIN ASPART 2 UNITS: 100 INJECTION, SOLUTION INTRAVENOUS; SUBCUTANEOUS at 17:16

## 2022-11-25 RX ADMIN — HYDROXYZINE HYDROCHLORIDE 50 MG: 50 TABLET, FILM COATED ORAL at 07:49

## 2022-11-25 RX ADMIN — VENLAFAXINE HYDROCHLORIDE 75 MG: 150 CAPSULE, EXTENDED RELEASE ORAL at 21:32

## 2022-11-25 RX ADMIN — DIVALPROEX SODIUM 1000 MG: 500 TABLET, DELAYED RELEASE ORAL at 21:32

## 2022-11-25 RX ADMIN — MIRTAZAPINE 15 MG: 15 TABLET, FILM COATED ORAL at 21:31

## 2022-11-25 RX ADMIN — HYDROXYZINE HYDROCHLORIDE 50 MG: 50 TABLET, FILM COATED ORAL at 21:32

## 2022-11-25 RX ADMIN — INSULIN ASPART 3 UNITS: 100 INJECTION, SOLUTION INTRAVENOUS; SUBCUTANEOUS at 07:57

## 2022-11-25 RX ADMIN — POLYETHYLENE GLYCOL 3350 17 G: 17 POWDER, FOR SOLUTION ORAL at 21:29

## 2022-11-25 RX ADMIN — CLOTRIMAZOLE: 0.01 CREAM TOPICAL at 07:52

## 2022-11-25 RX ADMIN — CLONIDINE HYDROCHLORIDE 0.1 MG: 0.1 TABLET ORAL at 21:31

## 2022-11-25 RX ADMIN — Medication 25 MCG: at 07:49

## 2022-11-25 RX ADMIN — ASPIRIN 81 MG CHEWABLE TABLET 81 MG: 81 TABLET CHEWABLE at 07:49

## 2022-11-25 RX ADMIN — OLANZAPINE 2.5 MG: 2.5 TABLET, FILM COATED ORAL at 14:33

## 2022-11-25 RX ADMIN — BACLOFEN 10 MG: 10 TABLET ORAL at 14:33

## 2022-11-25 RX ADMIN — BACLOFEN 10 MG: 10 TABLET ORAL at 21:32

## 2022-11-25 RX ADMIN — CLONIDINE HYDROCHLORIDE 0.1 MG: 0.1 TABLET ORAL at 07:49

## 2022-11-25 RX ADMIN — SENNOSIDES AND DOCUSATE SODIUM 1 TABLET: 50; 8.6 TABLET ORAL at 07:49

## 2022-11-25 RX ADMIN — EMPAGLIFLOZIN 10 MG: 10 TABLET, FILM COATED ORAL at 12:47

## 2022-11-25 RX ADMIN — VENLAFAXINE HYDROCHLORIDE 150 MG: 150 CAPSULE, EXTENDED RELEASE ORAL at 21:31

## 2022-11-25 RX ADMIN — Medication 10 MG: at 21:32

## 2022-11-25 RX ADMIN — METFORMIN HYDROCHLORIDE 850 MG: 850 TABLET, FILM COATED ORAL at 18:51

## 2022-11-25 RX ADMIN — INSULIN ASPART 5 UNITS: 100 INJECTION, SOLUTION INTRAVENOUS; SUBCUTANEOUS at 12:50

## 2022-11-25 RX ADMIN — BACLOFEN 10 MG: 10 TABLET ORAL at 07:49

## 2022-11-25 RX ADMIN — QUETIAPINE 200 MG: 200 TABLET, FILM COATED ORAL at 07:49

## 2022-11-25 RX ADMIN — HYDROCORTISONE: 1 CREAM TOPICAL at 21:33

## 2022-11-25 ASSESSMENT — ACTIVITIES OF DAILY LIVING (ADL)
ADLS_ACUITY_SCORE: 56

## 2022-11-25 NOTE — PLAN OF CARE
"PRIMARY DIAGNOSIS: \"Placment\"  OUTPATIENT/OBSERVATION GOALS TO BE MET BEFORE DISCHARGE:  1. ADLs back to baseline: Yes    2. Activity and level of assistance: Total care per baseline     3. Pain status: Pain free.    4. Return to near baseline physical activity: Yes     Discharge Planner Nurse   Safe discharge environment identified: No  Barriers to discharge: No       Entered by: Genevieve Ordoñez RN 11/25/2022 7:58 AM      Pt alert and oriented X 4. Denies pain. BS this morning 251. Insuline given per order. Able to communicate needs clearly. Check and change per Pt request. Redness on check cleared. VSS on RA. SW following for placement.   Please review provider order for any additional goals.   Nurse to notify provider when observation goals have been met and patient is ready for discharge.    "

## 2022-11-25 NOTE — PROGRESS NOTES
PRIMARY DIAGNOSIS: Placement   OUTPATIENT/OBSERVATION GOALS TO BE MET BEFORE DISCHARGE:  1. ADLs back to baseline: Yes    2. Activity and level of assistance:Total care   3. Pain status: Pain free.     4. Return to near baseline physical activity: Yes     Discharge Planner Nurse   Safe discharge environment identified: No  Barriers to discharge: No       Entered by: Alda Victoria RN 11/24/2022     Pt is alert and cooperative. VSS on RA. Pt resting in bed comfortably.  Will continue to monitor.   Please review provider order for any additional goals.   Nurse to notify provider when observation goals have been met and patient is ready for discharge.

## 2022-11-25 NOTE — PROGRESS NOTES
PRIMARY DIAGNOSIS: Placement   OUTPATIENT/OBSERVATION GOALS TO BE MET BEFORE DISCHARGE:  1. ADLs back to baseline: Yes    2. Activity and level of assistance:Total care   3. Pain status: Pain free.     4. Return to near baseline physical activity: Yes     Discharge Planner Nurse   Safe discharge environment identified: No  Barriers to discharge: No       Entered by: Alda Victoria RN 11/24/2022     BP (!) 140/85 (BP Location: Right arm)   Pulse 84   Temp 98.2  F (36.8  C) (Oral)   Resp 16   Wt 99.9 kg (220 lb 4.8 oz)   SpO2 94%   Pt is alert and cooperative. VSS on RA. Pt resting in bed comfortably.  Will continue to monitor. Pt tolerated PO medications.  . 3 units of Insulin given.  Please review provider order for any additional goals.   Nurse to notify provider when observation goals have been met and patient is ready for discharge.

## 2022-11-25 NOTE — PLAN OF CARE
"PRIMARY DIAGNOSIS: \"Placment\"  OUTPATIENT/OBSERVATION GOALS TO BE MET BEFORE DISCHARGE:  1. ADLs back to baseline: Yes    2. Activity and level of assistance: Total care per baseline     3. Pain status: Pain free.    4. Return to near baseline physical activity: Yes     Discharge Planner Nurse   Safe discharge environment identified: No  Barriers to discharge: No       Entered by: Genevieve Ordoñez RN 11/25/2022 3:25 PM    Pt alert and oriented X 4, bowel sounds present. Pt incontinent with bowel and bladder. Check and check in bed as needed and per patient request. Blood sugar check before meal and  Pm, 10pm and 0200. Pt denies pain. VSS on RA. Insuline sliding scale in use. Pt had XX LG  BM at this shift. Pt  before supper sliding scale insuline given.     Please review provider order for any additional goals.   Nurse to notify provider when observation goals have been met and patient is ready for discharge.    "

## 2022-11-25 NOTE — PROGRESS NOTES
PRIMARY DIAGNOSIS: Placement   OUTPATIENT/OBSERVATION GOALS TO BE MET BEFORE DISCHARGE:  1. ADLs back to baseline: Yes    2. Activity and level of assistance:Total care   3. Pain status: Pain free.     4. Return to near baseline physical activity: Yes     Discharge Planner Nurse   Safe discharge environment identified: No  Barriers to discharge: No       Entered by: Alda Victoria RN 11/25/2022     BP (!) 140/85 (BP Location: Right arm)   Pulse 84   Temp 98.2  F (36.8  C) (Oral)   Resp 16   Wt 99.9 kg (220 lb 4.8 oz)   SpO2 94%   Pt is alert and cooperative. VSS on RA. Pt resting in bed comfortably.  Will continue to monitor. Pt tolerated PO medications.  . 3 units of Insulin given.  Pt repositioned and sergei cares done.  Please review provider order for any additional goals.   Nurse to notify provider when observation goals have been met and patient is ready for discharge.

## 2022-11-25 NOTE — PLAN OF CARE
"PRIMARY DIAGNOSIS: \"Placment\"  OUTPATIENT/OBSERVATION GOALS TO BE MET BEFORE DISCHARGE:  1. ADLs back to baseline: Yes    2. Activity and level of assistance: Total care per baseline     3. Pain status: Pain free.    4. Return to near baseline physical activity: Yes     Discharge Planner Nurse   Safe discharge environment identified: No  Barriers to discharge: No       Entered by: Genevieve Ordoñez RN 11/25/2022 11:12 AM    Please review provider order for any additional goals.   Nurse to notify provider when observation goals have been met and patient is ready for discharge.    "
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
## 2022-11-26 LAB
GLUCOSE BLDC GLUCOMTR-MCNC: 134 MG/DL (ref 70–99)
GLUCOSE BLDC GLUCOMTR-MCNC: 169 MG/DL (ref 70–99)
GLUCOSE BLDC GLUCOMTR-MCNC: 173 MG/DL (ref 70–99)
GLUCOSE BLDC GLUCOMTR-MCNC: 177 MG/DL (ref 70–99)
GLUCOSE BLDC GLUCOMTR-MCNC: 220 MG/DL (ref 70–99)

## 2022-11-26 PROCEDURE — 82962 GLUCOSE BLOOD TEST: CPT

## 2022-11-26 PROCEDURE — 99207 PR NO CHARGE LOS: CPT | Performed by: NURSE PRACTITIONER

## 2022-11-26 PROCEDURE — 250N000013 HC RX MED GY IP 250 OP 250 PS 637: Performed by: NURSE PRACTITIONER

## 2022-11-26 PROCEDURE — 250N000013 HC RX MED GY IP 250 OP 250 PS 637

## 2022-11-26 PROCEDURE — 250N000013 HC RX MED GY IP 250 OP 250 PS 637: Performed by: HOSPITALIST

## 2022-11-26 PROCEDURE — G0378 HOSPITAL OBSERVATION PER HR: HCPCS

## 2022-11-26 PROCEDURE — 250N000013 HC RX MED GY IP 250 OP 250 PS 637: Performed by: PHYSICIAN ASSISTANT

## 2022-11-26 RX ADMIN — METOPROLOL SUCCINATE 25 MG: 25 TABLET, EXTENDED RELEASE ORAL at 09:01

## 2022-11-26 RX ADMIN — OLANZAPINE 2.5 MG: 2.5 TABLET, FILM COATED ORAL at 15:37

## 2022-11-26 RX ADMIN — BACLOFEN 10 MG: 10 TABLET ORAL at 19:36

## 2022-11-26 RX ADMIN — CLONIDINE HYDROCHLORIDE 0.1 MG: 0.1 TABLET ORAL at 19:36

## 2022-11-26 RX ADMIN — BACLOFEN 10 MG: 10 TABLET ORAL at 09:01

## 2022-11-26 RX ADMIN — VENLAFAXINE HYDROCHLORIDE 75 MG: 150 CAPSULE, EXTENDED RELEASE ORAL at 21:56

## 2022-11-26 RX ADMIN — HYDROXYZINE HYDROCHLORIDE 50 MG: 50 TABLET, FILM COATED ORAL at 19:36

## 2022-11-26 RX ADMIN — METFORMIN HYDROCHLORIDE 850 MG: 850 TABLET, FILM COATED ORAL at 17:27

## 2022-11-26 RX ADMIN — BACLOFEN 10 MG: 10 TABLET ORAL at 15:37

## 2022-11-26 RX ADMIN — QUETIAPINE FUMARATE 400 MG: 200 TABLET ORAL at 21:56

## 2022-11-26 RX ADMIN — EMPAGLIFLOZIN 10 MG: 10 TABLET, FILM COATED ORAL at 09:01

## 2022-11-26 RX ADMIN — ASPIRIN 81 MG CHEWABLE TABLET 81 MG: 81 TABLET CHEWABLE at 09:01

## 2022-11-26 RX ADMIN — DIVALPROEX SODIUM 1000 MG: 500 TABLET, DELAYED RELEASE ORAL at 21:56

## 2022-11-26 RX ADMIN — HYDROXYZINE HYDROCHLORIDE 50 MG: 50 TABLET, FILM COATED ORAL at 15:37

## 2022-11-26 RX ADMIN — DIVALPROEX SODIUM 250 MG: 500 TABLET, DELAYED RELEASE ORAL at 09:01

## 2022-11-26 RX ADMIN — VENLAFAXINE HYDROCHLORIDE 150 MG: 150 CAPSULE, EXTENDED RELEASE ORAL at 21:56

## 2022-11-26 RX ADMIN — CLONIDINE HYDROCHLORIDE 0.1 MG: 0.1 TABLET ORAL at 09:01

## 2022-11-26 RX ADMIN — Medication 25 MCG: at 09:01

## 2022-11-26 RX ADMIN — INSULIN ASPART 1 UNITS: 100 INJECTION, SOLUTION INTRAVENOUS; SUBCUTANEOUS at 12:32

## 2022-11-26 RX ADMIN — HYDROXYZINE HYDROCHLORIDE 50 MG: 50 TABLET, FILM COATED ORAL at 09:01

## 2022-11-26 RX ADMIN — SENNOSIDES AND DOCUSATE SODIUM 1 TABLET: 50; 8.6 TABLET ORAL at 09:01

## 2022-11-26 RX ADMIN — ATORVASTATIN CALCIUM 20 MG: 20 TABLET, FILM COATED ORAL at 19:36

## 2022-11-26 RX ADMIN — MIRTAZAPINE 15 MG: 15 TABLET, FILM COATED ORAL at 21:56

## 2022-11-26 RX ADMIN — QUETIAPINE 200 MG: 200 TABLET, FILM COATED ORAL at 09:10

## 2022-11-26 RX ADMIN — Medication 10 MG: at 21:55

## 2022-11-26 RX ADMIN — DIVALPROEX SODIUM 500 MG: 500 TABLET, DELAYED RELEASE ORAL at 09:02

## 2022-11-26 RX ADMIN — POLYETHYLENE GLYCOL 3350 17 G: 17 POWDER, FOR SOLUTION ORAL at 09:01

## 2022-11-26 RX ADMIN — QUETIAPINE 200 MG: 200 TABLET, FILM COATED ORAL at 15:37

## 2022-11-26 RX ADMIN — INSULIN ASPART 1 UNITS: 100 INJECTION, SOLUTION INTRAVENOUS; SUBCUTANEOUS at 09:02

## 2022-11-26 RX ADMIN — LEVOTHYROXINE SODIUM 25 MCG: 0.03 TABLET ORAL at 09:01

## 2022-11-26 ASSESSMENT — ACTIVITIES OF DAILY LIVING (ADL)
ADLS_ACUITY_SCORE: 56
ADLS_ACUITY_SCORE: 54
ADLS_ACUITY_SCORE: 56
ADLS_ACUITY_SCORE: 52
ADLS_ACUITY_SCORE: 56
ADLS_ACUITY_SCORE: 56

## 2022-11-26 NOTE — PLAN OF CARE
PRIMARY DIAGNOSIS: Placement   OUTPATIENT/OBSERVATION GOALS TO BE MET BEFORE DISCHARGE:  ADLs back to baseline: Yes    Activity and level of assistance: A X 2 with a lift     Pain status: Pain free.    Return to near baseline physical activity: Yes     Discharge Planner Nurse   Safe discharge environment identified: No  Barriers to discharge: Yes       Entered by: Monica Elder RN 11/26/2022 20:42     Please review provider order for any additional goals.   Nurse to notify provider when observation goals have been met and patient is ready for discharge.

## 2022-11-26 NOTE — PLAN OF CARE
PRIMARY DIAGNOSIS: Placement   OUTPATIENT/OBSERVATION GOALS TO BE MET BEFORE DISCHARGE:  1. ADLs back to baseline: Yes    2. Activity and level of assistance: A X 2 with a lift     3. Pain status: Pain free.    4. Return to near baseline physical activity: Yes     Discharge Planner Nurse   Safe discharge environment identified: No  Barriers to discharge: Yes     Called to be changed and T & R . Sleeping comfortably in bed .         Entered by: Monica Elder RN 11/26/2022 01:32     Please review provider order for any additional goals.   Nurse to notify provider when observation goals have been met and patient is ready for discharge.

## 2022-11-26 NOTE — PLAN OF CARE
PRIMARY DIAGNOSIS: Placement   OUTPATIENT/OBSERVATION GOALS TO BE MET BEFORE DISCHARGE:  1. ADLs back to baseline: Yes    2. Activity and level of assistance: A X 2 with a lift     3. Pain status: Pain free.    4. Return to near baseline physical activity: Yes     Discharge Planner Nurse   Safe discharge environment identified: No  Barriers to discharge: Yes   A & O X 4. Lung sound clear all lobes . Denies SOB, or pain. Bowel sound present X 4. Medically stable to discharge .Patient on blood glucose monitoring and sliding scale insulin for DM2 management.  On Pulsate mattress,  Turn and reposition maintained and per patient request. Calls appropriately. Incontinent of B & B. Awaiting for placement.  following .    /88 (BP Location: Right arm)   Pulse 81   Temp 97.9  F (36.6  C) (Oral)   Resp 16   Wt 99.9 kg (220 lb 4.8 oz)   SpO2 95%        Entered by: Monica Elder RN 11/26/2022 05:04     Please review provider order for any additional goals.   Nurse to notify provider when observation goals have been met and patient is ready for discharge.

## 2022-11-26 NOTE — PLAN OF CARE
PRIMARY DIAGNOSIS: Placement   OUTPATIENT/OBSERVATION GOALS TO BE MET BEFORE DISCHARGE:  1. ADLs back to baseline: Yes    2. Activity and level of assistance: A X 2 with a lift     3. Pain status: Pain free.    4. Return to near baseline physical activity: Yes     Discharge Planner Nurse   Safe discharge environment identified: No  Barriers to discharge: Yes       AOx4, VSS, LCTA, BS active, denies pain, or SOB. Currently on a sliding scale and blood sugar checks 4x a day. On a pulsate mattress, patient calls appropriately. Waiting on placement      BP (!) 125/90   Pulse 80   Temp 97.9  F (36.6  C) (Oral)   Resp 16   Wt 99.9 kg (220 lb 4.8 oz)   SpO2 95%        Entered by: Waleska Jin RN 11/26/2022 05:04     Please review provider order for any additional goals.   Nurse to notify provider when observation goals have been met and patient is ready for discharge.
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
## 2022-11-27 LAB
GLUCOSE BLDC GLUCOMTR-MCNC: 129 MG/DL (ref 70–99)
GLUCOSE BLDC GLUCOMTR-MCNC: 141 MG/DL (ref 70–99)
GLUCOSE BLDC GLUCOMTR-MCNC: 153 MG/DL (ref 70–99)
GLUCOSE BLDC GLUCOMTR-MCNC: 225 MG/DL (ref 70–99)

## 2022-11-27 PROCEDURE — 250N000013 HC RX MED GY IP 250 OP 250 PS 637: Performed by: HOSPITALIST

## 2022-11-27 PROCEDURE — 250N000013 HC RX MED GY IP 250 OP 250 PS 637: Performed by: NURSE PRACTITIONER

## 2022-11-27 PROCEDURE — 99207 PR NO CHARGE LOS: CPT | Performed by: NURSE PRACTITIONER

## 2022-11-27 PROCEDURE — 82962 GLUCOSE BLOOD TEST: CPT | Mod: 91

## 2022-11-27 PROCEDURE — G0378 HOSPITAL OBSERVATION PER HR: HCPCS

## 2022-11-27 PROCEDURE — 250N000013 HC RX MED GY IP 250 OP 250 PS 637: Performed by: PHYSICIAN ASSISTANT

## 2022-11-27 PROCEDURE — 250N000013 HC RX MED GY IP 250 OP 250 PS 637

## 2022-11-27 RX ADMIN — VENLAFAXINE HYDROCHLORIDE 150 MG: 150 CAPSULE, EXTENDED RELEASE ORAL at 22:55

## 2022-11-27 RX ADMIN — CLONIDINE HYDROCHLORIDE 0.1 MG: 0.1 TABLET ORAL at 20:07

## 2022-11-27 RX ADMIN — HYDROXYZINE HYDROCHLORIDE 50 MG: 50 TABLET, FILM COATED ORAL at 20:07

## 2022-11-27 RX ADMIN — MIRTAZAPINE 15 MG: 15 TABLET, FILM COATED ORAL at 22:57

## 2022-11-27 RX ADMIN — INSULIN ASPART 2 UNITS: 100 INJECTION, SOLUTION INTRAVENOUS; SUBCUTANEOUS at 11:35

## 2022-11-27 RX ADMIN — INSULIN ASPART 1 UNITS: 100 INJECTION, SOLUTION INTRAVENOUS; SUBCUTANEOUS at 09:40

## 2022-11-27 RX ADMIN — SENNOSIDES AND DOCUSATE SODIUM 1 TABLET: 50; 8.6 TABLET ORAL at 09:30

## 2022-11-27 RX ADMIN — QUETIAPINE 200 MG: 200 TABLET, FILM COATED ORAL at 09:30

## 2022-11-27 RX ADMIN — HYDROXYZINE HYDROCHLORIDE 50 MG: 50 TABLET, FILM COATED ORAL at 15:40

## 2022-11-27 RX ADMIN — KETOCONAZOLE: 20 CREAM TOPICAL at 20:13

## 2022-11-27 RX ADMIN — DIVALPROEX SODIUM 500 MG: 500 TABLET, DELAYED RELEASE ORAL at 09:32

## 2022-11-27 RX ADMIN — QUETIAPINE 200 MG: 200 TABLET, FILM COATED ORAL at 15:39

## 2022-11-27 RX ADMIN — BACLOFEN 10 MG: 10 TABLET ORAL at 15:40

## 2022-11-27 RX ADMIN — OLANZAPINE 2.5 MG: 2.5 TABLET, FILM COATED ORAL at 15:40

## 2022-11-27 RX ADMIN — LEVOTHYROXINE SODIUM 25 MCG: 0.03 TABLET ORAL at 09:31

## 2022-11-27 RX ADMIN — BACLOFEN 10 MG: 10 TABLET ORAL at 09:31

## 2022-11-27 RX ADMIN — Medication 25 MCG: at 09:30

## 2022-11-27 RX ADMIN — ASPIRIN 81 MG CHEWABLE TABLET 81 MG: 81 TABLET CHEWABLE at 09:30

## 2022-11-27 RX ADMIN — DIVALPROEX SODIUM 250 MG: 500 TABLET, DELAYED RELEASE ORAL at 09:30

## 2022-11-27 RX ADMIN — METFORMIN HYDROCHLORIDE 850 MG: 850 TABLET, FILM COATED ORAL at 18:12

## 2022-11-27 RX ADMIN — EMPAGLIFLOZIN 10 MG: 10 TABLET, FILM COATED ORAL at 09:30

## 2022-11-27 RX ADMIN — HYDROCORTISONE: 1 CREAM TOPICAL at 20:12

## 2022-11-27 RX ADMIN — DIVALPROEX SODIUM 1000 MG: 500 TABLET, DELAYED RELEASE ORAL at 22:57

## 2022-11-27 RX ADMIN — POLYETHYLENE GLYCOL 3350 17 G: 17 POWDER, FOR SOLUTION ORAL at 09:32

## 2022-11-27 RX ADMIN — BACLOFEN 10 MG: 10 TABLET ORAL at 20:07

## 2022-11-27 RX ADMIN — ATORVASTATIN CALCIUM 20 MG: 20 TABLET, FILM COATED ORAL at 20:07

## 2022-11-27 RX ADMIN — POLYETHYLENE GLYCOL 3350 17 G: 17 POWDER, FOR SOLUTION ORAL at 20:09

## 2022-11-27 RX ADMIN — VENLAFAXINE HYDROCHLORIDE 75 MG: 150 CAPSULE, EXTENDED RELEASE ORAL at 22:55

## 2022-11-27 RX ADMIN — CLOTRIMAZOLE: 0.01 CREAM TOPICAL at 20:10

## 2022-11-27 RX ADMIN — METOPROLOL SUCCINATE 25 MG: 25 TABLET, EXTENDED RELEASE ORAL at 09:32

## 2022-11-27 RX ADMIN — CLONIDINE HYDROCHLORIDE 0.1 MG: 0.1 TABLET ORAL at 09:32

## 2022-11-27 RX ADMIN — Medication 10 MG: at 22:56

## 2022-11-27 RX ADMIN — HYDROXYZINE HYDROCHLORIDE 50 MG: 50 TABLET, FILM COATED ORAL at 09:29

## 2022-11-27 RX ADMIN — QUETIAPINE FUMARATE 400 MG: 200 TABLET ORAL at 22:56

## 2022-11-27 ASSESSMENT — ACTIVITIES OF DAILY LIVING (ADL)
ADLS_ACUITY_SCORE: 56
ADLS_ACUITY_SCORE: 56
ADLS_ACUITY_SCORE: 58
ADLS_ACUITY_SCORE: 58
ADLS_ACUITY_SCORE: 56
ADLS_ACUITY_SCORE: 58
ADLS_ACUITY_SCORE: 56
ADLS_ACUITY_SCORE: 54

## 2022-11-27 NOTE — PLAN OF CARE
PRIMARY DIAGNOSIS: Placement   OUTPATIENT/OBSERVATION GOALS TO BE MET BEFORE DISCHARGE:  1. ADLs back to baseline: Yes    2. Activity and level of assistance: A X 2 with a lift     3. Pain status: Pain free.    4. Return to near baseline physical activity: Yes     Discharge Planner Nurse   Safe discharge environment identified: No  Barriers to discharge: Yes       AOx4, VSS, LCTA, BS active, denies pain, or SOB. Currently on a sliding scale and blood sugar checks 4x a day with a mod carb diet. Patient still has snacks and son brings fast food into the room, provider is aware. On a pulsate mattress, calls appropriately. Provider now wants us to get patient up into chair 3x a day instead of waiting for patient to call to get into chair.         BP (!) 134/92 (BP Location: Right arm, Cuff Size: Adult Regular)   Pulse 94   Temp 97.8  F (36.6  C) (Oral)   Resp 16   Wt 99.9 kg (220 lb 4.8 oz)   SpO2 (!) 86%        Entered by: Waleska Jin RN 11/26/2022 05:04     Please review provider order for any additional goals.   Nurse to notify provider when observation goals have been met and patient is ready for discharge.
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
## 2022-11-27 NOTE — PLAN OF CARE
PRIMARY DIAGNOSIS: PLACEMENT   OUTPATIENT/OBSERVATION GOALS TO BE MET BEFORE DISCHARGE:  1. ADLs back to baseline: Yes     2. Activity and level of assistance: Assist of 2, lift     3. Pain status: Pain free.     4. Return to near baseline physical activity: Yes          Discharge Planner Nurse   Safe discharge environment identified: No  Barriers to discharge: Yes       Entered by: Aimee Brown RN 11/27/2022 12:00PM  Please review provider order for any additional goals.   Nurse to notify provider when observation goals have been met and patient is ready for discharge.     Up with 2/lift, denies pain, regular diet, up in chair, incontinent, had BM, BS A&Ax4, A&Ox4, pleasant/calm, SW following, POC reviewed, will continue to provide supportive cares.

## 2022-11-27 NOTE — PLAN OF CARE
PRIMARY DIAGNOSIS: PLACEMENT  OUTPATIENT/OBSERVATION GOALS TO BE MET BEFORE DISCHARGE:  1. ADLs back to baseline: Yes    2. Activity and level of assistance: Assist x 2 with lift and Assist of 1 rolling in bed.    3. Pain status: Pain free.    4. Return to near baseline physical activity: Yes     Discharge Planner Nurse   Safe discharge environment identified: No  Barriers to discharge: Yes       Entered by: Chela Glover RN 11/27/2022 5:04 AM     Patient is Aox4, VSS, assist of 2 with lift, tolerating regular diet and oral medications, incontinent of bowel and bladder, no IV site at this time, patient is to be up in the chair three times a day everyday, SW following, sleeping well, able to make needs known, will continue to monitor and provide cares.      Please review provider order for any additional goals.   Nurse to notify provider when observation goals have been met and patient is ready for discharge.Goal Outcome Evaluation:
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
## 2022-11-27 NOTE — PLAN OF CARE
PRIMARY DIAGNOSIS: PLACEMENT   OUTPATIENT/OBSERVATION GOALS TO BE MET BEFORE DISCHARGE:  1. ADLs back to baseline: Yes    2. Activity and level of assistance: Assist of 2, lift    3. Pain status: Pain free.    4. Return to near baseline physical activity: Yes     Discharge Planner Nurse   Safe discharge environment identified: No  Barriers to discharge: Yes       Entered by: Aimee Brown RN 11/27/2022 8:30AM     Please review provider order for any additional goals.   Nurse to notify provider when observation goals have been met and patient is ready for discharge.    Up with 2 lift, denies pain, regular diet, incontinent, A&Ox4, pleasant/calm, SW following, POC reviewed - agreeable to getting up in chair TID, will continue to provide supportive cares.

## 2022-11-27 NOTE — PLAN OF CARE
PRIMARY DIAGNOSIS: PLACEMENT  OUTPATIENT/OBSERVATION GOALS TO BE MET BEFORE DISCHARGE:  1. ADLs back to baseline: Yes    2. Activity and level of assistance: Assist x 2 with lift and Assist of 1 rolling in bed.    3. Pain status: Pain free.    4. Return to near baseline physical activity: Yes     Discharge Planner Nurse   Safe discharge environment identified: No  Barriers to discharge: Yes       Entered by: Chela lGover RN 11/27/2022 2:28 AM     Patient is Aox4, VSS, assist of 2 with lift, tolerating regular diet and oral medications, incontinent of bowel and bladder, no IV site at this time, patient is to be up in the chair three times a day everyday, SW following, able to make needs known, will continue to monitor and provide cares.      Please review provider order for any additional goals.   Nurse to notify provider when observation goals have been met and patient is ready for discharge.Goal Outcome Evaluation:

## 2022-11-27 NOTE — PLAN OF CARE
PRIMARY DIAGNOSIS: Placement   OUTPATIENT/OBSERVATION GOALS TO BE MET BEFORE DISCHARGE:  1. ADLs back to baseline: Yes    2. Activity and level of assistance: Up with maximum assistance. Consider SW and/or PT evaluation.     3. Pain status: Pain free.    4. Return to near baseline physical activity: Yes     Discharge Planner Nurse   Safe discharge environment identified: No  Barriers to discharge: Yes       Entered by: Chantel Spencer RN 11/27/2022       Patient is alert and oriented x3. Two staff assist. Pt is up in chair. Pt makes his needs known. Incontinent of bladder and bowel. SW is following for placement.   /83   Pulse 81   Temp 97.5  F (36.4  C) (Axillary)   Resp 18   Wt 99.9 kg (220 lb 4.8 oz)   SpO2 96%     Please review provider order for any additional goals.   Nurse to notify provider when observation goals have been met and patient is ready for discharge.

## 2022-11-28 LAB
GLUCOSE BLDC GLUCOMTR-MCNC: 128 MG/DL (ref 70–99)
GLUCOSE BLDC GLUCOMTR-MCNC: 165 MG/DL (ref 70–99)
GLUCOSE BLDC GLUCOMTR-MCNC: 186 MG/DL (ref 70–99)
GLUCOSE BLDC GLUCOMTR-MCNC: 232 MG/DL (ref 70–99)

## 2022-11-28 PROCEDURE — 250N000013 HC RX MED GY IP 250 OP 250 PS 637: Performed by: PHYSICIAN ASSISTANT

## 2022-11-28 PROCEDURE — 250N000013 HC RX MED GY IP 250 OP 250 PS 637: Performed by: NURSE PRACTITIONER

## 2022-11-28 PROCEDURE — 250N000013 HC RX MED GY IP 250 OP 250 PS 637

## 2022-11-28 PROCEDURE — 82962 GLUCOSE BLOOD TEST: CPT | Mod: 91

## 2022-11-28 PROCEDURE — 250N000013 HC RX MED GY IP 250 OP 250 PS 637: Performed by: HOSPITALIST

## 2022-11-28 PROCEDURE — 99224 PR SUBSEQUENT OBSERVATION CARE,LEVEL I: CPT | Performed by: PHYSICIAN ASSISTANT

## 2022-11-28 PROCEDURE — G0378 HOSPITAL OBSERVATION PER HR: HCPCS

## 2022-11-28 RX ADMIN — CLONIDINE HYDROCHLORIDE 0.1 MG: 0.1 TABLET ORAL at 21:28

## 2022-11-28 RX ADMIN — SENNOSIDES AND DOCUSATE SODIUM 1 TABLET: 50; 8.6 TABLET ORAL at 08:26

## 2022-11-28 RX ADMIN — BACLOFEN 10 MG: 10 TABLET ORAL at 14:20

## 2022-11-28 RX ADMIN — INSULIN ASPART 1 UNITS: 100 INJECTION, SOLUTION INTRAVENOUS; SUBCUTANEOUS at 12:05

## 2022-11-28 RX ADMIN — HYDROXYZINE HYDROCHLORIDE 50 MG: 50 TABLET, FILM COATED ORAL at 21:27

## 2022-11-28 RX ADMIN — LEVOTHYROXINE SODIUM 25 MCG: 0.03 TABLET ORAL at 08:24

## 2022-11-28 RX ADMIN — ASPIRIN 81 MG CHEWABLE TABLET 81 MG: 81 TABLET CHEWABLE at 08:25

## 2022-11-28 RX ADMIN — CLONIDINE HYDROCHLORIDE 0.1 MG: 0.1 TABLET ORAL at 08:24

## 2022-11-28 RX ADMIN — METFORMIN HYDROCHLORIDE 850 MG: 850 TABLET, FILM COATED ORAL at 17:30

## 2022-11-28 RX ADMIN — INSULIN ASPART 2 UNITS: 100 INJECTION, SOLUTION INTRAVENOUS; SUBCUTANEOUS at 17:30

## 2022-11-28 RX ADMIN — KETOCONAZOLE: 20 CREAM TOPICAL at 08:29

## 2022-11-28 RX ADMIN — HYDROXYZINE HYDROCHLORIDE 50 MG: 50 TABLET, FILM COATED ORAL at 08:24

## 2022-11-28 RX ADMIN — DIVALPROEX SODIUM 250 MG: 500 TABLET, DELAYED RELEASE ORAL at 08:26

## 2022-11-28 RX ADMIN — KETOCONAZOLE: 20 CREAM TOPICAL at 21:26

## 2022-11-28 RX ADMIN — MIRTAZAPINE 15 MG: 15 TABLET, FILM COATED ORAL at 21:28

## 2022-11-28 RX ADMIN — Medication 10 MG: at 21:27

## 2022-11-28 RX ADMIN — POLYETHYLENE GLYCOL 3350 17 G: 17 POWDER, FOR SOLUTION ORAL at 21:25

## 2022-11-28 RX ADMIN — OLANZAPINE 2.5 MG: 2.5 TABLET, FILM COATED ORAL at 14:20

## 2022-11-28 RX ADMIN — QUETIAPINE 200 MG: 200 TABLET, FILM COATED ORAL at 08:24

## 2022-11-28 RX ADMIN — HYDROXYZINE HYDROCHLORIDE 50 MG: 50 TABLET, FILM COATED ORAL at 14:20

## 2022-11-28 RX ADMIN — CLOTRIMAZOLE: 0.01 CREAM TOPICAL at 21:26

## 2022-11-28 RX ADMIN — VENLAFAXINE HYDROCHLORIDE 75 MG: 150 CAPSULE, EXTENDED RELEASE ORAL at 21:27

## 2022-11-28 RX ADMIN — METOPROLOL SUCCINATE 25 MG: 25 TABLET, EXTENDED RELEASE ORAL at 08:25

## 2022-11-28 RX ADMIN — QUETIAPINE 200 MG: 200 TABLET, FILM COATED ORAL at 14:20

## 2022-11-28 RX ADMIN — Medication 25 MCG: at 08:25

## 2022-11-28 RX ADMIN — BACLOFEN 10 MG: 10 TABLET ORAL at 08:25

## 2022-11-28 RX ADMIN — DIVALPROEX SODIUM 500 MG: 500 TABLET, DELAYED RELEASE ORAL at 08:24

## 2022-11-28 RX ADMIN — ATORVASTATIN CALCIUM 20 MG: 20 TABLET, FILM COATED ORAL at 21:28

## 2022-11-28 RX ADMIN — QUETIAPINE FUMARATE 400 MG: 200 TABLET ORAL at 21:26

## 2022-11-28 RX ADMIN — VENLAFAXINE HYDROCHLORIDE 150 MG: 150 CAPSULE, EXTENDED RELEASE ORAL at 21:28

## 2022-11-28 RX ADMIN — BACLOFEN 10 MG: 10 TABLET ORAL at 21:28

## 2022-11-28 RX ADMIN — HYDROCORTISONE: 1 CREAM TOPICAL at 21:26

## 2022-11-28 RX ADMIN — DIVALPROEX SODIUM 1000 MG: 500 TABLET, DELAYED RELEASE ORAL at 21:27

## 2022-11-28 RX ADMIN — EMPAGLIFLOZIN 10 MG: 10 TABLET, FILM COATED ORAL at 08:25

## 2022-11-28 ASSESSMENT — ACTIVITIES OF DAILY LIVING (ADL)
ADLS_ACUITY_SCORE: 56
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
## 2022-11-28 NOTE — PLAN OF CARE
PRIMARY DIAGNOSIS: Placement  OUTPATIENT/OBSERVATION GOALS TO BE MET BEFORE DISCHARGE:  ADLs back to baseline: Yes     Activity and level of assistance: Up with maximum assistance.   Pain status: Pain free.     Return to near baseline physical activity: Yes          Discharge Planner Nurse   Safe discharge environment identified: No  Barriers to discharge: Yes       Entered by: Diane Du RN 11/28/2022 4PM   Pt A&Ox4. Flat affect. VSS, on RA. Denies pain and nausea. Incontinent of bowel and bladder. Repositioned as tolerated. No PIV, team aware. Carb consistent diet. Waiting for placement, SW following.      Please review provider order for any additional goals.   Nurse to notify provider when observation goals have been met and patient is ready for discharge
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
## 2022-11-28 NOTE — PLAN OF CARE
PRIMARY DIAGNOSIS: Placement   OUTPATIENT/OBSERVATION GOALS TO BE MET BEFORE DISCHARGE:  1. ADLs back to baseline: Yes    2. Activity and level of assistance: Up with maximum assistance. Consider SW and/or PT evaluation.     3. Pain status: Pain free.    4. Return to near baseline physical activity: Yes     Discharge Planner Nurse   Safe discharge environment identified: No  Barriers to discharge: Yes       Entered by: Chantel Spencer RN 11/27/2022       Patient is alert and oriented x3. Two staff assist. Pt is up in chair. Pt makes his needs known. Incontinent of bladder and bowel. SW is following for placement.   /79   Pulse 76   Temp 98  F (36.7  C) (Oral)   Resp 16   Wt 99.9 kg (220 lb 4.8 oz)   SpO2 95%     Please review provider order for any additional goals.   Nurse to notify provider when observation goals have been met and patient is ready for discharge.

## 2022-11-28 NOTE — PLAN OF CARE
PRIMARY DIAGNOSIS: Placement  OUTPATIENT/OBSERVATION GOALS TO BE MET BEFORE DISCHARGE:  1. ADLs back to baseline: Yes    2. Activity and level of assistance: Up with maximum assistance.   3. Pain status: Pain free.    4. Return to near baseline physical activity: Yes     Discharge Planner Nurse   Safe discharge environment identified: No  Barriers to discharge: Yes       Entered by: Diane Du RN 11/28/2022 8:52 AM   Pt A&Ox4. Flat affect. VSS, on RA. Denies pain and nausea. Incontinent of bowel and bladder. Repositioned as tolerated. No PIV, team aware. Carb consistent diet. Waiting for placement, SW following.   Please review provider order for any additional goals.   Nurse to notify provider when observation goals have been met and patient is ready for discharge.
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
## 2022-11-28 NOTE — PROGRESS NOTES
Care Management Follow Up    Expected Discharge Date: 12/29/2022     Concerns to be Addressed: Discharge planning      Patient plan of care discussed at interdisciplinary rounds: Yes    Anticipated Discharge Disposition:  Group Home once placement found    Patient/Family in Agreement with the Plan:  Yes    Additional Information:  SW left  for [facility] Veronica, [phone], requesting a call back to discuss next steps for getting needed paperwork completed for a referral for relocation services to obtain new placement for patient.     DANITA Obrien, [facility]   Inpatient Care Coordination  [facility]   [phone]

## 2022-11-28 NOTE — PLAN OF CARE
PRIMARY DIAGNOSIS: Placement  OUTPATIENT/OBSERVATION GOALS TO BE MET BEFORE DISCHARGE:  ADLs back to baseline: Yes     Activity and level of assistance: Up with maximum assistance.   Pain status: Pain free.     Return to near baseline physical activity: Yes          Discharge Planner Nurse   Safe discharge environment identified: No  Barriers to discharge: Yes       Entered by: Diane Du RN 11/28/2022 12PM   Pt A&Ox4. Flat affect. VSS, on RA. Denies pain and nausea. Incontinent of bowel and bladder. BM x1. Repositioned as tolerated. No PIV, team aware. Carb consistent diet. Waiting for placement, SW following.     Please review provider order for any additional goals.   Nurse to notify provider when observation goals have been met and patient is ready for discharge

## 2022-11-29 LAB
GLUCOSE BLDC GLUCOMTR-MCNC: 132 MG/DL (ref 70–99)
GLUCOSE BLDC GLUCOMTR-MCNC: 147 MG/DL (ref 70–99)
GLUCOSE BLDC GLUCOMTR-MCNC: 197 MG/DL (ref 70–99)
GLUCOSE BLDC GLUCOMTR-MCNC: 217 MG/DL (ref 70–99)

## 2022-11-29 PROCEDURE — 250N000013 HC RX MED GY IP 250 OP 250 PS 637: Performed by: PHYSICIAN ASSISTANT

## 2022-11-29 PROCEDURE — G0378 HOSPITAL OBSERVATION PER HR: HCPCS

## 2022-11-29 PROCEDURE — 82962 GLUCOSE BLOOD TEST: CPT | Mod: 91

## 2022-11-29 PROCEDURE — 99207 PR NO CHARGE LOS: CPT | Performed by: PHYSICIAN ASSISTANT

## 2022-11-29 PROCEDURE — 250N000013 HC RX MED GY IP 250 OP 250 PS 637: Performed by: HOSPITALIST

## 2022-11-29 PROCEDURE — 250N000013 HC RX MED GY IP 250 OP 250 PS 637: Performed by: NURSE PRACTITIONER

## 2022-11-29 PROCEDURE — 250N000013 HC RX MED GY IP 250 OP 250 PS 637

## 2022-11-29 RX ADMIN — METFORMIN HYDROCHLORIDE 850 MG: 850 TABLET, FILM COATED ORAL at 17:41

## 2022-11-29 RX ADMIN — METOPROLOL SUCCINATE 25 MG: 25 TABLET, EXTENDED RELEASE ORAL at 09:18

## 2022-11-29 RX ADMIN — HYDROXYZINE HYDROCHLORIDE 50 MG: 50 TABLET, FILM COATED ORAL at 09:18

## 2022-11-29 RX ADMIN — POLYETHYLENE GLYCOL 3350 17 G: 17 POWDER, FOR SOLUTION ORAL at 09:24

## 2022-11-29 RX ADMIN — VENLAFAXINE HYDROCHLORIDE 75 MG: 150 CAPSULE, EXTENDED RELEASE ORAL at 21:35

## 2022-11-29 RX ADMIN — HYDROCORTISONE: 1 CREAM TOPICAL at 21:40

## 2022-11-29 RX ADMIN — KETOCONAZOLE: 20 CREAM TOPICAL at 21:40

## 2022-11-29 RX ADMIN — HYDROXYZINE HYDROCHLORIDE 50 MG: 50 TABLET, FILM COATED ORAL at 21:35

## 2022-11-29 RX ADMIN — LEVOTHYROXINE SODIUM 25 MCG: 0.03 TABLET ORAL at 09:19

## 2022-11-29 RX ADMIN — HYDROCORTISONE: 1 CREAM TOPICAL at 09:23

## 2022-11-29 RX ADMIN — DIVALPROEX SODIUM 500 MG: 500 TABLET, DELAYED RELEASE ORAL at 09:19

## 2022-11-29 RX ADMIN — BACLOFEN 10 MG: 10 TABLET ORAL at 09:19

## 2022-11-29 RX ADMIN — BACLOFEN 10 MG: 10 TABLET ORAL at 14:32

## 2022-11-29 RX ADMIN — HYDROXYZINE HYDROCHLORIDE 50 MG: 50 TABLET, FILM COATED ORAL at 14:32

## 2022-11-29 RX ADMIN — QUETIAPINE FUMARATE 400 MG: 200 TABLET ORAL at 21:33

## 2022-11-29 RX ADMIN — ASPIRIN 81 MG CHEWABLE TABLET 81 MG: 81 TABLET CHEWABLE at 09:19

## 2022-11-29 RX ADMIN — OLANZAPINE 2.5 MG: 2.5 TABLET, FILM COATED ORAL at 14:32

## 2022-11-29 RX ADMIN — BACLOFEN 10 MG: 10 TABLET ORAL at 21:34

## 2022-11-29 RX ADMIN — ATORVASTATIN CALCIUM 20 MG: 20 TABLET, FILM COATED ORAL at 21:34

## 2022-11-29 RX ADMIN — DIVALPROEX SODIUM 1000 MG: 500 TABLET, DELAYED RELEASE ORAL at 21:33

## 2022-11-29 RX ADMIN — INSULIN ASPART 1 UNITS: 100 INJECTION, SOLUTION INTRAVENOUS; SUBCUTANEOUS at 09:18

## 2022-11-29 RX ADMIN — EMPAGLIFLOZIN 10 MG: 10 TABLET, FILM COATED ORAL at 09:19

## 2022-11-29 RX ADMIN — CLOTRIMAZOLE: 0.01 CREAM TOPICAL at 09:24

## 2022-11-29 RX ADMIN — CLOTRIMAZOLE: 0.01 CREAM TOPICAL at 21:41

## 2022-11-29 RX ADMIN — QUETIAPINE 200 MG: 200 TABLET, FILM COATED ORAL at 14:32

## 2022-11-29 RX ADMIN — INSULIN ASPART 2 UNITS: 100 INJECTION, SOLUTION INTRAVENOUS; SUBCUTANEOUS at 12:33

## 2022-11-29 RX ADMIN — MIRTAZAPINE 15 MG: 15 TABLET, FILM COATED ORAL at 21:36

## 2022-11-29 RX ADMIN — DIVALPROEX SODIUM 250 MG: 500 TABLET, DELAYED RELEASE ORAL at 09:20

## 2022-11-29 RX ADMIN — QUETIAPINE 200 MG: 200 TABLET, FILM COATED ORAL at 09:19

## 2022-11-29 RX ADMIN — VENLAFAXINE HYDROCHLORIDE 150 MG: 150 CAPSULE, EXTENDED RELEASE ORAL at 21:35

## 2022-11-29 RX ADMIN — Medication 10 MG: at 21:33

## 2022-11-29 RX ADMIN — CLONIDINE HYDROCHLORIDE 0.1 MG: 0.1 TABLET ORAL at 09:19

## 2022-11-29 RX ADMIN — CLONIDINE HYDROCHLORIDE 0.1 MG: 0.1 TABLET ORAL at 21:34

## 2022-11-29 RX ADMIN — POLYETHYLENE GLYCOL 3350 17 G: 17 POWDER, FOR SOLUTION ORAL at 21:37

## 2022-11-29 RX ADMIN — SENNOSIDES AND DOCUSATE SODIUM 1 TABLET: 50; 8.6 TABLET ORAL at 09:19

## 2022-11-29 RX ADMIN — Medication 25 MCG: at 09:19

## 2022-11-29 RX ADMIN — KETOCONAZOLE: 20 CREAM TOPICAL at 09:23

## 2022-11-29 ASSESSMENT — ACTIVITIES OF DAILY LIVING (ADL)
ADLS_ACUITY_SCORE: 56

## 2022-11-29 NOTE — PLAN OF CARE
PRIMARY DIAGNOSIS: PLACEMENT  OUTPATIENT/OBSERVATION GOALS TO BE MET BEFORE DISCHARGE:  1. ADLs back to baseline: Yes    2. Activity and level of assistance: Bedbound at baseline    3. Pain status: Pain free.    4. Return to near baseline physical activity: Yes     Discharge Planner Nurse   Safe discharge environment identified: No  Barriers to discharge: Yes       Entered by: Annalee Palomares RN 11/29/2022 5:57 PM    Pt A/O x3, disoriented to time. Bed bound at baseline. Denies pain. Cream applied to face and groin per orders. Insulin given per orders. Pt medically stable, awaiting placement.  Vital signs:  Temp: 97.8  F (36.6  C) Temp src: Oral BP: 139/81 Pulse: 79   Resp: 16 SpO2: 96 % O2 Device: None (Room air)     Weight: 99.9 kg (220 lb 4.8 oz)  There is no height or weight on file to calculate BMI.    Please review provider order for any additional goals.   Nurse to notify provider when observation goals have been met and patient is ready for discharge.

## 2022-11-29 NOTE — PLAN OF CARE
PRIMARY DIAGNOSIS: PLACEMENT  OUTPATIENT/OBSERVATION GOALS TO BE MET BEFORE DISCHARGE:  ADLs back to baseline: Yes    Activity and level of assistance: Pt total cares, up with A2 with lift device    Pain status: Pain free.    Return to near baseline physical activity: Yes     Discharge Planner Nurse   Safe discharge environment identified: No  Barriers to discharge: Yes       Entered by: Helene Oreilly RN 11/29/2022   Pt AO x4. VSS on RA, LS clear, BS active. Pt speech slow with flat affect. No PIV in place, Pt tolerating regular diet. Denies pain. Will continue to monitor. Pt medically cleared for discharge pending placement.  Please review provider order for any additional goals.   Nurse to notify provider when observation goals have been met and patient is ready for discharge.

## 2022-11-29 NOTE — PLAN OF CARE
PRIMARY DIAGNOSIS: PLACEMENT  OUTPATIENT/OBSERVATION GOALS TO BE MET BEFORE DISCHARGE:  1. ADLs back to baseline: Yes    2. Activity and level of assistance: Pt total cares, up with A2 with lift device    3. Pain status: Pain free.    4. Return to near baseline physical activity: Yes     Discharge Planner Nurse   Safe discharge environment identified: No  Barriers to discharge: Yes       Entered by: Helene Oreilly RN 11/29/2022   Pt AO x4. VSS on RA, LS clear, BS active. Pt speech slow with flat affect. No PIV in place, Pt tolerating regular diet. Denies pain. Will continue to monitor. Pt medically cleared for discharge pending placement awaiting funding.   Please review provider order for any additional goals.   Nurse to notify provider when observation goals have been met and patient is ready for discharge.

## 2022-11-29 NOTE — PROGRESS NOTES
Care Management Follow Up    Expected Discharge Date: 12/31/2022     Concerns to be Addressed: Group Home once placement found      Patient plan of care discussed at interdisciplinary rounds: Yes    Anticipated Discharge Disposition:  Group Home    Referrals Placed by CM/SW:  Skilled Nursing facility  Private pay costs discussed: Not applicable    Additional Information:  ANALY received email from [facility]  stating that they received confirmation that they will provide relocation services for patient. Misty Juárez will be pt's CM for relocation services. [facility] is working on the documentation to officially start relocation services so once they receive approval from [facility] Misty can start services. Misty will follow up with ANALY once services are approved to start. [facility] will continue to follow.     DANITA Obrien, LGSW   Inpatient Care Coordination  [facility]   [phone]

## 2022-11-29 NOTE — PLAN OF CARE
PRIMARY DIAGNOSIS: PLACEMENT  OUTPATIENT/OBSERVATION GOALS TO BE MET BEFORE DISCHARGE:  1. ADLs back to baseline: Yes    2. Activity and level of assistance: Bedbound at baseline    3. Pain status: Pain free.    4. Return to near baseline physical activity: Yes     Discharge Planner Nurse   Safe discharge environment identified: No  Barriers to discharge: Yes       Entered by: Annalee Palomares RN 11/29/2022 5:58 PM    Pt A/O x3, disoriented to time. Bed bound at baseline. Denies pain. Cream applied to face and groin per orders. Insulin given per orders. Pt medically stable, awaiting placement.  Vital signs:  Temp: 97.8  F (36.6  C) Temp src: Oral BP: 139/81 Pulse: 79   Resp: 16 SpO2: 96 % O2 Device: None (Room air)     Weight: 99.9 kg (220 lb 4.8 oz)  There is no height or weight on file to calculate BMI.    Please review provider order for any additional goals.   Nurse to notify provider when observation goals have been met and patient is ready for discharge.
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
## 2022-11-29 NOTE — PLAN OF CARE
PRIMARY DIAGNOSIS: PLACEMENT  OUTPATIENT/OBSERVATION GOALS TO BE MET BEFORE DISCHARGE:  1. ADLs back to baseline: Yes    2. Activity and level of assistance: Bedbound at baseline    3. Pain status: Pain free.    4. Return to near baseline physical activity: Yes     Discharge Planner Nurse   Safe discharge environment identified: No  Barriers to discharge: Yes       Entered by: Annalee Palomares RN 11/29/2022 5:59 PM    Pt A/O x3, disoriented to time. Bed bound at baseline. Denies pain. Cream applied to face and groin per orders. Insulin given per orders. Pt medically stable, awaiting placement.  Vital signs:  Temp: 97.8  F (36.6  C) Temp src: Oral BP: 139/81 Pulse: 79   Resp: 16 SpO2: 96 % O2 Device: None (Room air)     Weight: 99.9 kg (220 lb 4.8 oz)  There is no height or weight on file to calculate BMI.    Please review provider order for any additional goals.   Nurse to notify provider when observation goals have been met and patient is ready for discharge.

## 2022-11-29 NOTE — PLAN OF CARE
PRIMARY DIAGNOSIS: Placement   OUTPATIENT/OBSERVATION GOALS TO BE MET BEFORE DISCHARGE:  ADLs back to baseline: Yes    Activity and level of assistance: A X 2 with lift     Pain status: Pain free.    Return to near baseline physical activity: Yes     Discharge Planner Nurse   Safe discharge environment identified: No  Barriers to discharge: Yes       Entered by: Monica Elder RN 11/28/2022 20:00     Please review provider order for any additional goals.   Nurse to notify provider when observation goals have been met and patient is ready for discharge.

## 2022-11-30 LAB
GLUCOSE BLDC GLUCOMTR-MCNC: 135 MG/DL (ref 70–99)
GLUCOSE BLDC GLUCOMTR-MCNC: 138 MG/DL (ref 70–99)
GLUCOSE BLDC GLUCOMTR-MCNC: 154 MG/DL (ref 70–99)
GLUCOSE BLDC GLUCOMTR-MCNC: 201 MG/DL (ref 70–99)

## 2022-11-30 PROCEDURE — 250N000013 HC RX MED GY IP 250 OP 250 PS 637: Performed by: NURSE PRACTITIONER

## 2022-11-30 PROCEDURE — 250N000013 HC RX MED GY IP 250 OP 250 PS 637

## 2022-11-30 PROCEDURE — G0378 HOSPITAL OBSERVATION PER HR: HCPCS

## 2022-11-30 PROCEDURE — 250N000013 HC RX MED GY IP 250 OP 250 PS 637: Performed by: HOSPITALIST

## 2022-11-30 PROCEDURE — 99207 PR NO CHARGE LOS: CPT | Performed by: PHYSICIAN ASSISTANT

## 2022-11-30 PROCEDURE — 250N000013 HC RX MED GY IP 250 OP 250 PS 637: Performed by: PHYSICIAN ASSISTANT

## 2022-11-30 PROCEDURE — 82962 GLUCOSE BLOOD TEST: CPT | Mod: 91

## 2022-11-30 RX ADMIN — METFORMIN HYDROCHLORIDE 850 MG: 850 TABLET, FILM COATED ORAL at 16:35

## 2022-11-30 RX ADMIN — VENLAFAXINE HYDROCHLORIDE 75 MG: 150 CAPSULE, EXTENDED RELEASE ORAL at 22:00

## 2022-11-30 RX ADMIN — CLONIDINE HYDROCHLORIDE 0.1 MG: 0.1 TABLET ORAL at 08:54

## 2022-11-30 RX ADMIN — BACLOFEN 10 MG: 10 TABLET ORAL at 08:54

## 2022-11-30 RX ADMIN — INSULIN ASPART 2 UNITS: 100 INJECTION, SOLUTION INTRAVENOUS; SUBCUTANEOUS at 08:55

## 2022-11-30 RX ADMIN — LEVOTHYROXINE SODIUM 25 MCG: 0.03 TABLET ORAL at 08:54

## 2022-11-30 RX ADMIN — HYDROCORTISONE: 1 CREAM TOPICAL at 22:03

## 2022-11-30 RX ADMIN — KETOCONAZOLE: 20 CREAM TOPICAL at 08:56

## 2022-11-30 RX ADMIN — DIVALPROEX SODIUM 500 MG: 500 TABLET, DELAYED RELEASE ORAL at 08:53

## 2022-11-30 RX ADMIN — QUETIAPINE 200 MG: 200 TABLET, FILM COATED ORAL at 13:10

## 2022-11-30 RX ADMIN — POLYETHYLENE GLYCOL 3350 17 G: 17 POWDER, FOR SOLUTION ORAL at 21:56

## 2022-11-30 RX ADMIN — Medication 25 MCG: at 08:54

## 2022-11-30 RX ADMIN — CLONIDINE HYDROCHLORIDE 0.1 MG: 0.1 TABLET ORAL at 21:57

## 2022-11-30 RX ADMIN — HYDROXYZINE HYDROCHLORIDE 50 MG: 50 TABLET, FILM COATED ORAL at 21:57

## 2022-11-30 RX ADMIN — DIVALPROEX SODIUM 1000 MG: 500 TABLET, DELAYED RELEASE ORAL at 21:57

## 2022-11-30 RX ADMIN — HYDROXYZINE HYDROCHLORIDE 50 MG: 50 TABLET, FILM COATED ORAL at 08:54

## 2022-11-30 RX ADMIN — HYDROCORTISONE: 1 CREAM TOPICAL at 08:56

## 2022-11-30 RX ADMIN — MIRTAZAPINE 15 MG: 15 TABLET, FILM COATED ORAL at 21:57

## 2022-11-30 RX ADMIN — DIVALPROEX SODIUM 250 MG: 500 TABLET, DELAYED RELEASE ORAL at 08:56

## 2022-11-30 RX ADMIN — OLANZAPINE 2.5 MG: 2.5 TABLET, FILM COATED ORAL at 13:10

## 2022-11-30 RX ADMIN — Medication 10 MG: at 21:57

## 2022-11-30 RX ADMIN — ATORVASTATIN CALCIUM 20 MG: 20 TABLET, FILM COATED ORAL at 21:57

## 2022-11-30 RX ADMIN — VENLAFAXINE HYDROCHLORIDE 150 MG: 150 CAPSULE, EXTENDED RELEASE ORAL at 21:57

## 2022-11-30 RX ADMIN — SENNOSIDES AND DOCUSATE SODIUM 1 TABLET: 50; 8.6 TABLET ORAL at 08:54

## 2022-11-30 RX ADMIN — EMPAGLIFLOZIN 10 MG: 10 TABLET, FILM COATED ORAL at 08:54

## 2022-11-30 RX ADMIN — CLOTRIMAZOLE: 0.01 CREAM TOPICAL at 08:56

## 2022-11-30 RX ADMIN — METOPROLOL SUCCINATE 25 MG: 25 TABLET, EXTENDED RELEASE ORAL at 08:54

## 2022-11-30 RX ADMIN — QUETIAPINE 200 MG: 200 TABLET, FILM COATED ORAL at 08:54

## 2022-11-30 RX ADMIN — CLOTRIMAZOLE: 0.01 CREAM TOPICAL at 22:04

## 2022-11-30 RX ADMIN — ASPIRIN 81 MG CHEWABLE TABLET 81 MG: 81 TABLET CHEWABLE at 08:54

## 2022-11-30 RX ADMIN — QUETIAPINE FUMARATE 400 MG: 200 TABLET ORAL at 21:56

## 2022-11-30 RX ADMIN — POLYETHYLENE GLYCOL 3350 17 G: 17 POWDER, FOR SOLUTION ORAL at 08:54

## 2022-11-30 RX ADMIN — BACLOFEN 10 MG: 10 TABLET ORAL at 13:10

## 2022-11-30 RX ADMIN — KETOCONAZOLE: 20 CREAM TOPICAL at 22:04

## 2022-11-30 RX ADMIN — BACLOFEN 10 MG: 10 TABLET ORAL at 21:57

## 2022-11-30 RX ADMIN — HYDROXYZINE HYDROCHLORIDE 50 MG: 50 TABLET, FILM COATED ORAL at 13:10

## 2022-11-30 ASSESSMENT — ACTIVITIES OF DAILY LIVING (ADL)
ADLS_ACUITY_SCORE: 56

## 2022-11-30 NOTE — PLAN OF CARE
PRIMARY DIAGNOSIS: PLACEMENT   OUTPATIENT/OBSERVATION GOALS TO BE MET BEFORE DISCHARGE:  1. ADLs back to baseline: Yes    2. Activity and level of assistance: Up with maximum assistance - total care/lift (baseline)    3. Pain status: Pain free.    4. Return to near baseline physical activity: Yes     Discharge Planner Nurse   Safe discharge environment identified: No  Barriers to discharge: Yes       Entered by: Aimee Brown RN 11/30/2022 4:30PM     Please review provider order for any additional goals.   Nurse to notify provider when observation goals have been met and patient is ready for discharge.    A&Ox4, pleasant and cooperative, denies pain, up in chair for dinner, agreeable to changing from gown to street clothes, no PIV access, incontinent of bowel and bladder, changed and repositioned q 2 and as needed, bed alarm in place, SW following for placement, POC reviewed with pt, will continue to provide supportive cares.
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
## 2022-11-30 NOTE — PROGRESS NOTES
Pt AO x3/4 disorientated to date. VSS on RA, LS clear, BS active, large loose BM this shift. Pt speech slow with flat affect. No PIV in place, Pt tolerating regular diet, oral meds. Denies pain. Will continue to monitor. Pt medically cleared for discharge pending placement.
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
## 2022-12-01 LAB
GLUCOSE BLDC GLUCOMTR-MCNC: 119 MG/DL (ref 70–99)
GLUCOSE BLDC GLUCOMTR-MCNC: 132 MG/DL (ref 70–99)
GLUCOSE BLDC GLUCOMTR-MCNC: 156 MG/DL (ref 70–99)
GLUCOSE BLDC GLUCOMTR-MCNC: 208 MG/DL (ref 70–99)
GLUCOSE BLDC GLUCOMTR-MCNC: 295 MG/DL (ref 70–99)

## 2022-12-01 PROCEDURE — 250N000013 HC RX MED GY IP 250 OP 250 PS 637: Performed by: PHYSICIAN ASSISTANT

## 2022-12-01 PROCEDURE — 250N000013 HC RX MED GY IP 250 OP 250 PS 637: Performed by: NURSE PRACTITIONER

## 2022-12-01 PROCEDURE — 82962 GLUCOSE BLOOD TEST: CPT

## 2022-12-01 PROCEDURE — 250N000013 HC RX MED GY IP 250 OP 250 PS 637: Performed by: HOSPITALIST

## 2022-12-01 PROCEDURE — 99224 PR SUBSEQUENT OBSERVATION CARE,LEVEL I: CPT | Performed by: NURSE PRACTITIONER

## 2022-12-01 PROCEDURE — G0378 HOSPITAL OBSERVATION PER HR: HCPCS

## 2022-12-01 PROCEDURE — 250N000013 HC RX MED GY IP 250 OP 250 PS 637

## 2022-12-01 RX ADMIN — QUETIAPINE 200 MG: 200 TABLET, FILM COATED ORAL at 09:41

## 2022-12-01 RX ADMIN — QUETIAPINE 200 MG: 200 TABLET, FILM COATED ORAL at 15:43

## 2022-12-01 RX ADMIN — POLYETHYLENE GLYCOL 3350 17 G: 17 POWDER, FOR SOLUTION ORAL at 09:44

## 2022-12-01 RX ADMIN — METOPROLOL SUCCINATE 25 MG: 25 TABLET, EXTENDED RELEASE ORAL at 09:43

## 2022-12-01 RX ADMIN — QUETIAPINE FUMARATE 400 MG: 200 TABLET ORAL at 21:12

## 2022-12-01 RX ADMIN — SENNOSIDES AND DOCUSATE SODIUM 1 TABLET: 50; 8.6 TABLET ORAL at 09:44

## 2022-12-01 RX ADMIN — METFORMIN HYDROCHLORIDE 850 MG: 850 TABLET, FILM COATED ORAL at 18:45

## 2022-12-01 RX ADMIN — BACLOFEN 10 MG: 10 TABLET ORAL at 20:15

## 2022-12-01 RX ADMIN — MIRTAZAPINE 15 MG: 15 TABLET, FILM COATED ORAL at 21:12

## 2022-12-01 RX ADMIN — HYDROXYZINE HYDROCHLORIDE 50 MG: 50 TABLET, FILM COATED ORAL at 20:15

## 2022-12-01 RX ADMIN — Medication 25 MCG: at 09:43

## 2022-12-01 RX ADMIN — DIVALPROEX SODIUM 250 MG: 500 TABLET, DELAYED RELEASE ORAL at 09:40

## 2022-12-01 RX ADMIN — LEVOTHYROXINE SODIUM 25 MCG: 0.03 TABLET ORAL at 09:44

## 2022-12-01 RX ADMIN — HYDROXYZINE HYDROCHLORIDE 50 MG: 50 TABLET, FILM COATED ORAL at 09:41

## 2022-12-01 RX ADMIN — CLONIDINE HYDROCHLORIDE 0.1 MG: 0.1 TABLET ORAL at 20:13

## 2022-12-01 RX ADMIN — CLONIDINE HYDROCHLORIDE 0.1 MG: 0.1 TABLET ORAL at 09:41

## 2022-12-01 RX ADMIN — CLOTRIMAZOLE: 0.01 CREAM TOPICAL at 20:16

## 2022-12-01 RX ADMIN — KETOCONAZOLE: 20 CREAM TOPICAL at 09:50

## 2022-12-01 RX ADMIN — Medication 10 MG: at 21:12

## 2022-12-01 RX ADMIN — BACLOFEN 10 MG: 10 TABLET ORAL at 15:44

## 2022-12-01 RX ADMIN — OLANZAPINE 2.5 MG: 2.5 TABLET, FILM COATED ORAL at 14:38

## 2022-12-01 RX ADMIN — CLOTRIMAZOLE: 0.01 CREAM TOPICAL at 09:51

## 2022-12-01 RX ADMIN — POLYETHYLENE GLYCOL 3350 17 G: 17 POWDER, FOR SOLUTION ORAL at 20:15

## 2022-12-01 RX ADMIN — KETOCONAZOLE: 20 CREAM TOPICAL at 20:16

## 2022-12-01 RX ADMIN — ASPIRIN 81 MG CHEWABLE TABLET 81 MG: 81 TABLET CHEWABLE at 09:41

## 2022-12-01 RX ADMIN — HYDROXYZINE HYDROCHLORIDE 50 MG: 50 TABLET, FILM COATED ORAL at 15:44

## 2022-12-01 RX ADMIN — DIVALPROEX SODIUM 1000 MG: 500 TABLET, DELAYED RELEASE ORAL at 21:11

## 2022-12-01 RX ADMIN — VENLAFAXINE HYDROCHLORIDE 150 MG: 150 CAPSULE, EXTENDED RELEASE ORAL at 21:12

## 2022-12-01 RX ADMIN — VENLAFAXINE HYDROCHLORIDE 75 MG: 150 CAPSULE, EXTENDED RELEASE ORAL at 21:13

## 2022-12-01 RX ADMIN — BACLOFEN 10 MG: 10 TABLET ORAL at 09:41

## 2022-12-01 RX ADMIN — HYDROCORTISONE: 1 CREAM TOPICAL at 20:19

## 2022-12-01 RX ADMIN — DIVALPROEX SODIUM 500 MG: 500 TABLET, DELAYED RELEASE ORAL at 09:40

## 2022-12-01 RX ADMIN — ATORVASTATIN CALCIUM 20 MG: 20 TABLET, FILM COATED ORAL at 20:15

## 2022-12-01 RX ADMIN — EMPAGLIFLOZIN 10 MG: 10 TABLET, FILM COATED ORAL at 09:40

## 2022-12-01 ASSESSMENT — ACTIVITIES OF DAILY LIVING (ADL)
ADLS_ACUITY_SCORE: 56
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
## 2022-12-01 NOTE — PROGRESS NOTES
PRIMARY DIAGNOSIS: MEDICALLY STABLE, PLACEMENT  OUTPATIENT/OBSERVATION GOALS TO BE MET BEFORE DISCHARGE:  1. ADLs back to baseline: Yes    2. Activity and level of assistance: Up with a lift, bedfast, can assist with turns and repositioning    3. Pain status: Pain free.    4. Return to near baseline physical activity: Yes     Discharge Planner Nurse   Safe discharge environment identified: No  Barriers to discharge: Yes, placement       Entered by: Adan Cortez RN 12/01/2022    Please review provider order for any additional goals.   Nurse to notify provider when observation goals have been met and patient is ready for discharge.    Pt A/Ox4, VSS on RA. Up with a lift, turns and repositions self as needed. Tolerating PO meds and regular diet. Incontinent and changed as needed. BG checks ACHS and sliding scale insulin. Awaiting placement. SW following.

## 2022-12-01 NOTE — PROGRESS NOTES
PRIMARY DIAGNOSIS: MEDICALLY STABLE, PLACEMENT  OUTPATIENT/OBSERVATION GOALS TO BE MET BEFORE DISCHARGE:  1. ADLs back to baseline: Yes    2. Activity and level of assistance: Up with a lift, bedfast, can assist with turns and repositioning    3. Pain status: Pain free.    4. Return to near baseline physical activity: Yes     Discharge Planner Nurse   Safe discharge environment identified: No  Barriers to discharge: Yes, placement       Entered by: Adan Cortez RN 11/30/2022    Please review provider order for any additional goals.   Nurse to notify provider when observation goals have been met and patient is ready for discharge.    Pt A/Ox4, VSS on RA. Up with a lift, turns and repositions self as needed. Tolerating PO meds and regular diet. Incontinent and calls when he needs changed. BG checks and sliding scale insulin. Awaiting placement. SW following.

## 2022-12-01 NOTE — PLAN OF CARE
PRIMARY DIAGNOSIS: MEDICALLY STABLE, PLACEMENT  OUTPATIENT/OBSERVATION GOALS TO BE MET BEFORE DISCHARGE:  ADLs back to baseline: Yes     Activity and level of assistance: Lift assist at baseline. Turns well, Ax1 for incontinence care. Able to independently shift weight on own     Pain status: Pain free.     Return to near baseline physical activity: Yes             Discharge Planner Nurse      Safe discharge environment identified: No  Barriers to discharge: Yes, placement       Entered by: Tayla Olguin     Please review provider order for any additional goals.   Nurse to notify provider when observation goals have been met and patient is ready for discharge.     Vitals stable. Pt alert and oriented x4. Lift assist at baseline due to hx of paraplegia. Independently repositioning self in bed. Ax1 for incontinence care. Incontinent of bowel and bladder, able to notify staff when brief change needed. BG stable. SW following for safe discharge plan. Pt resting comfortably. Will continue to provide supportive cares.

## 2022-12-01 NOTE — PLAN OF CARE
PRIMARY DIAGNOSIS: MEDICALLY STABLE, PLACEMENT  Pt alert and oriented, lift assist - up in chair at present - can assist with turns and upper body repositioning, tolerating PO meds and regular diet. Incontinent. No IV access. BG checks and sliding scale insulin. VSS. Pt medically stable and awaiting placement. SW following.  OUTPATIENT/OBSERVATION GOALS TO BE MET BEFORE DISCHARGE:  1. ADLs back to baseline: Yes    2. Activity and level of assistance: Lift assist, bedfast - can assist with turns and upper body repositioning    3. Pain status: Pain free.    4. Return to near baseline physical activity: Yes     Discharge Planner Nurse   Safe discharge environment identified: No  Barriers to discharge: Yes, placement       Entered by: Adilson Alvarez RN 11/30/2022    Please review provider order for any additional goals.   Nurse to notify provider when observation goals have been met and patient is ready for discharge.

## 2022-12-01 NOTE — PROGRESS NOTES
PRIMARY DIAGNOSIS: MEDICALLY STABLE, PLACEMENT  OUTPATIENT/OBSERVATION GOALS TO BE MET BEFORE DISCHARGE:  1. ADLs back to baseline: Yes    2. Activity and level of assistance: Up with a lift, bedfast, can assist with turns and repositioning    3. Pain status: Pain free.    4. Return to near baseline physical activity: Yes     Discharge Planner Nurse   Safe discharge environment identified: No  Barriers to discharge: Yes, placement       Entered by: Adan Cortez RN 12/01/2022    Please review provider order for any additional goals.   Nurse to notify provider when observation goals have been met and patient is ready for discharge.    Pt A/Ox4, VSS on RA. Up with a lift, turns and repositions self as needed. Tolerating PO meds and regular diet. Incontinent and changed as needed. BG checks ACHS and sliding scale insulin, BG stable. Awaiting placement. SW following.

## 2022-12-01 NOTE — PLAN OF CARE
PRIMARY DIAGNOSIS: PLACEMENT  OUTPATIENT/OBSERVATION GOALS TO BE MET BEFORE DISCHARGE:  ADLs back to baseline: Yes     Activity and level of assistance: Lift assist at baseline. Turns well, Ax1 for incontinence care. Able to independently shift weight on own     Pain status: Pain free.     Return to near baseline physical activity: Yes             Discharge Planner Nurse      Safe discharge environment identified: No  Barriers to discharge: Yes, placement       Entered by: Tayla Olguin     Please review provider order for any additional goals.   Nurse to notify provider when observation goals have been met and patient is ready for discharge.     Vitals stable. Pt alert and oriented x4. Lift assist at baseline due to hx of paraplegia. Independently repositioning self in bed. Ax1 for incontinence care. Incontinent of bowel and bladder, able to notify staff when brief change needed. BG stable. SW following for safe discharge plan. Pt resting comfortably. Will continue to provide supportive cares.

## 2022-12-02 LAB
GLUCOSE BLDC GLUCOMTR-MCNC: 123 MG/DL (ref 70–99)
GLUCOSE BLDC GLUCOMTR-MCNC: 130 MG/DL (ref 70–99)
GLUCOSE BLDC GLUCOMTR-MCNC: 143 MG/DL (ref 70–99)
GLUCOSE BLDC GLUCOMTR-MCNC: 175 MG/DL (ref 70–99)

## 2022-12-02 PROCEDURE — 250N000013 HC RX MED GY IP 250 OP 250 PS 637: Performed by: NURSE PRACTITIONER

## 2022-12-02 PROCEDURE — 250N000013 HC RX MED GY IP 250 OP 250 PS 637: Performed by: PHYSICIAN ASSISTANT

## 2022-12-02 PROCEDURE — 250N000013 HC RX MED GY IP 250 OP 250 PS 637: Performed by: HOSPITALIST

## 2022-12-02 PROCEDURE — 250N000013 HC RX MED GY IP 250 OP 250 PS 637

## 2022-12-02 PROCEDURE — G0378 HOSPITAL OBSERVATION PER HR: HCPCS

## 2022-12-02 PROCEDURE — 99225 PR SUBSEQUENT OBSERVATION CARE,LEVEL II: CPT

## 2022-12-02 PROCEDURE — 82962 GLUCOSE BLOOD TEST: CPT

## 2022-12-02 RX ADMIN — Medication 10 MG: at 21:01

## 2022-12-02 RX ADMIN — KETOCONAZOLE: 20 CREAM TOPICAL at 21:04

## 2022-12-02 RX ADMIN — MIRTAZAPINE 15 MG: 15 TABLET, FILM COATED ORAL at 21:01

## 2022-12-02 RX ADMIN — DIVALPROEX SODIUM 500 MG: 500 TABLET, DELAYED RELEASE ORAL at 08:31

## 2022-12-02 RX ADMIN — QUETIAPINE 200 MG: 200 TABLET, FILM COATED ORAL at 14:48

## 2022-12-02 RX ADMIN — CLONIDINE HYDROCHLORIDE 0.1 MG: 0.1 TABLET ORAL at 08:30

## 2022-12-02 RX ADMIN — METFORMIN HYDROCHLORIDE 850 MG: 850 TABLET, FILM COATED ORAL at 17:11

## 2022-12-02 RX ADMIN — ASPIRIN 81 MG CHEWABLE TABLET 81 MG: 81 TABLET CHEWABLE at 08:30

## 2022-12-02 RX ADMIN — CLOTRIMAZOLE: 0.01 CREAM TOPICAL at 08:30

## 2022-12-02 RX ADMIN — SENNOSIDES AND DOCUSATE SODIUM 1 TABLET: 50; 8.6 TABLET ORAL at 08:30

## 2022-12-02 RX ADMIN — BACLOFEN 10 MG: 10 TABLET ORAL at 08:30

## 2022-12-02 RX ADMIN — METOPROLOL SUCCINATE 25 MG: 25 TABLET, EXTENDED RELEASE ORAL at 08:30

## 2022-12-02 RX ADMIN — DIVALPROEX SODIUM 250 MG: 500 TABLET, DELAYED RELEASE ORAL at 08:29

## 2022-12-02 RX ADMIN — ATORVASTATIN CALCIUM 20 MG: 20 TABLET, FILM COATED ORAL at 21:01

## 2022-12-02 RX ADMIN — Medication 25 MCG: at 08:30

## 2022-12-02 RX ADMIN — QUETIAPINE 200 MG: 200 TABLET, FILM COATED ORAL at 08:29

## 2022-12-02 RX ADMIN — VENLAFAXINE HYDROCHLORIDE 150 MG: 150 CAPSULE, EXTENDED RELEASE ORAL at 21:02

## 2022-12-02 RX ADMIN — HYDROXYZINE HYDROCHLORIDE 50 MG: 50 TABLET, FILM COATED ORAL at 14:48

## 2022-12-02 RX ADMIN — POLYETHYLENE GLYCOL 3350 17 G: 17 POWDER, FOR SOLUTION ORAL at 21:04

## 2022-12-02 RX ADMIN — HYDROXYZINE HYDROCHLORIDE 50 MG: 50 TABLET, FILM COATED ORAL at 08:30

## 2022-12-02 RX ADMIN — CLOTRIMAZOLE: 0.01 CREAM TOPICAL at 21:05

## 2022-12-02 RX ADMIN — EMPAGLIFLOZIN 10 MG: 10 TABLET, FILM COATED ORAL at 08:30

## 2022-12-02 RX ADMIN — QUETIAPINE FUMARATE 400 MG: 200 TABLET ORAL at 21:01

## 2022-12-02 RX ADMIN — BACLOFEN 10 MG: 10 TABLET ORAL at 21:01

## 2022-12-02 RX ADMIN — HYDROCORTISONE: 1 CREAM TOPICAL at 08:30

## 2022-12-02 RX ADMIN — DIVALPROEX SODIUM 1000 MG: 500 TABLET, DELAYED RELEASE ORAL at 21:01

## 2022-12-02 RX ADMIN — KETOCONAZOLE: 20 CREAM TOPICAL at 08:30

## 2022-12-02 RX ADMIN — HYDROXYZINE HYDROCHLORIDE 50 MG: 50 TABLET, FILM COATED ORAL at 21:01

## 2022-12-02 RX ADMIN — VENLAFAXINE HYDROCHLORIDE 75 MG: 150 CAPSULE, EXTENDED RELEASE ORAL at 21:02

## 2022-12-02 RX ADMIN — CLONIDINE HYDROCHLORIDE 0.1 MG: 0.1 TABLET ORAL at 21:02

## 2022-12-02 RX ADMIN — LEVOTHYROXINE SODIUM 25 MCG: 0.03 TABLET ORAL at 08:30

## 2022-12-02 RX ADMIN — BACLOFEN 10 MG: 10 TABLET ORAL at 14:48

## 2022-12-02 RX ADMIN — POLYETHYLENE GLYCOL 3350 17 G: 17 POWDER, FOR SOLUTION ORAL at 08:31

## 2022-12-02 RX ADMIN — OLANZAPINE 2.5 MG: 2.5 TABLET, FILM COATED ORAL at 14:48

## 2022-12-02 RX ADMIN — INSULIN ASPART 1 UNITS: 100 INJECTION, SOLUTION INTRAVENOUS; SUBCUTANEOUS at 11:34

## 2022-12-02 ASSESSMENT — ACTIVITIES OF DAILY LIVING (ADL)
ADLS_ACUITY_SCORE: 56

## 2022-12-02 NOTE — PLAN OF CARE
PRIMARY DIAGNOSIS: PLACEMENT  OUTPATIENT/OBSERVATION GOALS TO BE MET BEFORE DISCHARGE:  1. ADLs back to baseline: Yes    2. Activity and level of assistance: A X 2    3. Pain status: Pain free.    4. Return to near baseline physical activity: Yes     Discharge Planner Nurse   Safe discharge environment identified: Yes  Barriers to discharge: No       Entered by: Waleska Jin RN 12/02/2022 10:33 AM      Pt is AOx4, VSS, LCTA, BS active, tolerating his mod carb diet. We are checking his BS and giving him sliding scale insulin. SW is following, awaiting placement.             Please review provider order for any additional goals.   Nurse to notify provider when observation goals have been met and patient is ready for discharge.Goal Outcome Evaluation:

## 2022-12-02 NOTE — PLAN OF CARE
PRIMARY DIAGNOSIS: MEDICALLY STABLE/PLACEMENT  OUTPATIENT/OBSERVATION GOALS TO BE MET BEFORE DISCHARGE:  1. ADLs back to baseline: Yes    2. Activity and level of assistance: Lift Assistance.     3. Pain status: Pain free.    4. Return to near baseline physical activity: Yes     Discharge Planner Nurse   Safe discharge environment identified:   Barriers to discharge: Yes       Entered by: Ana Castro RN 12/01/2022     BP (!) 129/90 (BP Location: Right arm)   Pulse 78   Temp 97.1  F (36.2  C) (Oral)   Resp 16   Wt 99.9 kg (220 lb 4.8 oz)   SpO2 96%    Pt A & O x4. Lift assist due to paraplegia.Repositions self independently in bed. One assist with incontinence care. Incontinent of bowel and bladder, able to verbalize when he needs changing. BG monitoring per orders. SW following for safe discharge plan. Will continue to monitor.  Please review provider order for any additional goals.   Nurse to notify provider when observation goals have been met and patient is ready for discharge.

## 2022-12-02 NOTE — PLAN OF CARE
PRIMARY DIAGNOSIS: PLACEMENT  OUTPATIENT/OBSERVATION GOALS TO BE MET BEFORE DISCHARGE:  ADLs back to baseline: Yes    Activity and level of assistance: A X 2    Pain status: Pain free.    Return to near baseline physical activity: Yes     Discharge Planner Nurse   Safe discharge environment identified: Yes  Barriers to discharge: No       Entered by: Preston Arango RN 12/02/2022 12:22 AM     Please review provider order for any additional goals.   Nurse to notify provider when observation goals have been met and patient is ready for discharge.Goal Outcome Evaluation:
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
## 2022-12-02 NOTE — PLAN OF CARE
"PRIMARY DIAGNOSIS: \"GENERIC\" NURSING  OUTPATIENT/OBSERVATION GOALS TO BE MET BEFORE DISCHARGE:  1. ADLs back to baseline: Yes    2. Activity and level of assistance: Total cares    3. Pain status: Pain free.    4. Return to near baseline physical activity: Yes     Discharge Planner Nurse   Safe discharge environment identified: No  Barriers to discharge: No, medically stable to discharge       Entered by: Hira Fraire RN 12/02/2022 4:53 PM  A/O x4. VSS on RA. Assist of 1 for incontinence care. Tolerating regular diet. Lung sounds clear. Bowel sounds active. 12/2 LBM. Adequate urine output. Pain free. Denies nausea. Able to make needs known.        Please review provider order for any additional goals.   Nurse to notify provider when observation goals have been met and patient is ready for discharge.    "

## 2022-12-02 NOTE — PLAN OF CARE
PRIMARY DIAGNOSIS: PLACEMENT  OUTPATIENT/OBSERVATION GOALS TO BE MET BEFORE DISCHARGE:  1. ADLs back to baseline: Yes    2. Activity and level of assistance: A X 2    3. Pain status: Pain free.    4. Return to near baseline physical activity: Yes     Discharge Planner Nurse   Safe discharge environment identified: Yes  Barriers to discharge: No       Entered by: Preston Arango RN 12/02/2022 3:19 AM   A & O x 4, regular diet, PO diet, incontinent of B & B, BG check & sliding scale insulin, SW following, awaiting placement.          Please review provider order for any additional goals.   Nurse to notify provider when observation goals have been met and patient is ready for discharge.Goal Outcome Evaluation:

## 2022-12-02 NOTE — PLAN OF CARE
"PRIMARY DIAGNOSIS: PLACEMENT  OUTPATIENT/OBSERVATION GOALS TO BE MET BEFORE DISCHARGE:  1. ADLs back to baseline: Yes    2. Activity and level of assistance: A X 2    3. Pain status: Pain free.    4. Return to near baseline physical activity: Yes     Discharge Planner Nurse   Safe discharge environment identified: Yes  Barriers to discharge: No       Entered by: Waleska Jin RN 12/02/2022 12:33 PM      Pt is AOx4, VSS, LCTA, BS active, still tolerating his mod carb diet. Patient was incontinent of urine. Patient told me \"I am going to pee, so you will need to change me\" Writer asked patient to wait for a minute while she went to go get a urinal for the patient to use, when writer got back patient had already gone in his brief. Leaving urinal within patient's reach so that next time he has to pee the urinal will be available.             Please review provider order for any additional goals.   Nurse to notify provider when observation goals have been met and patient is ready for discharge.Goal Outcome Evaluation:                        "

## 2022-12-02 NOTE — PROGRESS NOTES
Care Management Follow Up    Expected Discharge Date: 12/31/2022     Concerns to be Addressed: Discharge planning      Patient plan of care discussed at interdisciplinary rounds: Yes    Anticipated Discharge Disposition:  Group Home once new placement found     Additional Information:  ANALY received email from Misty Juárez ([phone]) with [employer]. She stated that she received approval to initiate relocation services for patient as of 12/1. She inquired about pt's cares, behaviors, etc. Consulted with bedside nurse and provided this information to relocation worker Misty. She will initiate search/referrals to Group Homes that may be a good fit for patient. She will update ANALY on placement. ANALY will continue to follow.     DANITA Obrien, [facility]   Inpatient Care Coordination  [facility]   [phone]

## 2022-12-03 LAB
GLUCOSE BLDC GLUCOMTR-MCNC: 107 MG/DL (ref 70–99)
GLUCOSE BLDC GLUCOMTR-MCNC: 134 MG/DL (ref 70–99)
GLUCOSE BLDC GLUCOMTR-MCNC: 157 MG/DL (ref 70–99)
GLUCOSE BLDC GLUCOMTR-MCNC: 171 MG/DL (ref 70–99)

## 2022-12-03 PROCEDURE — 250N000013 HC RX MED GY IP 250 OP 250 PS 637: Performed by: PHYSICIAN ASSISTANT

## 2022-12-03 PROCEDURE — 99226 PR SUBSEQUENT OBSERVATION CARE,LEVEL III: CPT | Performed by: NURSE PRACTITIONER

## 2022-12-03 PROCEDURE — 99207 PR NO BILLABLE SERVICE THIS VISIT: CPT | Performed by: NURSE PRACTITIONER

## 2022-12-03 PROCEDURE — 250N000013 HC RX MED GY IP 250 OP 250 PS 637: Performed by: HOSPITALIST

## 2022-12-03 PROCEDURE — G0378 HOSPITAL OBSERVATION PER HR: HCPCS

## 2022-12-03 PROCEDURE — 250N000013 HC RX MED GY IP 250 OP 250 PS 637: Performed by: NURSE PRACTITIONER

## 2022-12-03 PROCEDURE — 250N000013 HC RX MED GY IP 250 OP 250 PS 637

## 2022-12-03 PROCEDURE — 82962 GLUCOSE BLOOD TEST: CPT

## 2022-12-03 RX ADMIN — METFORMIN HYDROCHLORIDE 850 MG: 850 TABLET, FILM COATED ORAL at 16:42

## 2022-12-03 RX ADMIN — MIRTAZAPINE 15 MG: 15 TABLET, FILM COATED ORAL at 21:00

## 2022-12-03 RX ADMIN — BACLOFEN 10 MG: 10 TABLET ORAL at 09:00

## 2022-12-03 RX ADMIN — DIVALPROEX SODIUM 500 MG: 500 TABLET, DELAYED RELEASE ORAL at 09:01

## 2022-12-03 RX ADMIN — ATORVASTATIN CALCIUM 20 MG: 20 TABLET, FILM COATED ORAL at 21:00

## 2022-12-03 RX ADMIN — BACLOFEN 10 MG: 10 TABLET ORAL at 14:47

## 2022-12-03 RX ADMIN — METOPROLOL SUCCINATE 25 MG: 25 TABLET, EXTENDED RELEASE ORAL at 09:01

## 2022-12-03 RX ADMIN — CLOTRIMAZOLE: 0.01 CREAM TOPICAL at 21:01

## 2022-12-03 RX ADMIN — VENLAFAXINE HYDROCHLORIDE 75 MG: 150 CAPSULE, EXTENDED RELEASE ORAL at 21:01

## 2022-12-03 RX ADMIN — CLONIDINE HYDROCHLORIDE 0.1 MG: 0.1 TABLET ORAL at 09:00

## 2022-12-03 RX ADMIN — QUETIAPINE FUMARATE 400 MG: 200 TABLET ORAL at 21:00

## 2022-12-03 RX ADMIN — INSULIN ASPART 1 UNITS: 100 INJECTION, SOLUTION INTRAVENOUS; SUBCUTANEOUS at 12:00

## 2022-12-03 RX ADMIN — POLYETHYLENE GLYCOL 3350 17 G: 17 POWDER, FOR SOLUTION ORAL at 21:02

## 2022-12-03 RX ADMIN — DIVALPROEX SODIUM 250 MG: 500 TABLET, DELAYED RELEASE ORAL at 09:02

## 2022-12-03 RX ADMIN — QUETIAPINE 200 MG: 200 TABLET, FILM COATED ORAL at 14:47

## 2022-12-03 RX ADMIN — VENLAFAXINE HYDROCHLORIDE 150 MG: 150 CAPSULE, EXTENDED RELEASE ORAL at 21:01

## 2022-12-03 RX ADMIN — SENNOSIDES AND DOCUSATE SODIUM 1 TABLET: 50; 8.6 TABLET ORAL at 09:01

## 2022-12-03 RX ADMIN — HYDROXYZINE HYDROCHLORIDE 50 MG: 50 TABLET, FILM COATED ORAL at 09:00

## 2022-12-03 RX ADMIN — OLANZAPINE 2.5 MG: 2.5 TABLET, FILM COATED ORAL at 14:47

## 2022-12-03 RX ADMIN — HYDROXYZINE HYDROCHLORIDE 50 MG: 50 TABLET, FILM COATED ORAL at 14:47

## 2022-12-03 RX ADMIN — HYDROXYZINE HYDROCHLORIDE 50 MG: 50 TABLET, FILM COATED ORAL at 21:01

## 2022-12-03 RX ADMIN — QUETIAPINE 200 MG: 200 TABLET, FILM COATED ORAL at 09:01

## 2022-12-03 RX ADMIN — BACLOFEN 10 MG: 10 TABLET ORAL at 21:00

## 2022-12-03 RX ADMIN — CLONIDINE HYDROCHLORIDE 0.1 MG: 0.1 TABLET ORAL at 21:01

## 2022-12-03 RX ADMIN — EMPAGLIFLOZIN 10 MG: 10 TABLET, FILM COATED ORAL at 09:01

## 2022-12-03 RX ADMIN — LEVOTHYROXINE SODIUM 25 MCG: 0.03 TABLET ORAL at 09:01

## 2022-12-03 RX ADMIN — ASPIRIN 81 MG CHEWABLE TABLET 81 MG: 81 TABLET CHEWABLE at 09:00

## 2022-12-03 RX ADMIN — Medication 10 MG: at 21:00

## 2022-12-03 RX ADMIN — KETOCONAZOLE: 20 CREAM TOPICAL at 21:02

## 2022-12-03 RX ADMIN — DIVALPROEX SODIUM 1000 MG: 500 TABLET, DELAYED RELEASE ORAL at 21:00

## 2022-12-03 RX ADMIN — Medication 25 MCG: at 09:01

## 2022-12-03 RX ADMIN — KETOCONAZOLE: 20 CREAM TOPICAL at 09:05

## 2022-12-03 ASSESSMENT — ACTIVITIES OF DAILY LIVING (ADL)
ADLS_ACUITY_SCORE: 56

## 2022-12-03 NOTE — PROGRESS NOTES
PRIMARY DIAGNOSIS:Placement  OUTPATIENT/OBSERVATION GOALS TO BE MET BEFORE DISCHARGE:  1. ADLs back to baseline: Yes    Activity and level of assistance: Ax2  2. Pain status: Pain free.    3. Return to near baseline physical activity: Yes     Discharge Planner Nurse   Safe discharge environment identified: No  Barriers to discharge: No       Entered by: Alda Victoria RN 12/02/2022      Pt is A&Ox4. Calm and cooperative. VSS on RA. Pt is resting comfortably in bed.   Please review provider order for any additional goals.   Nurse to notify provider when observation goals have been met and patient is ready for discharge.

## 2022-12-03 NOTE — PLAN OF CARE
"PRIMARY DIAGNOSIS: \"GENERIC\" NURSING  OUTPATIENT/OBSERVATION GOALS TO BE MET BEFORE DISCHARGE:  1. ADLs back to baseline: Yes    2. Activity and level of assistance: Total cares    3. Pain status: Pain free.    4. Return to near baseline physical activity: Yes     Discharge Planner Nurse   Safe discharge environment identified: No  Barriers to discharge: No, medically stable to discharge       Entered by: Hira Fraire RN 12/03/2022     A/O x4. VSS on RA. Assist of 1 for incontinence care. Tolerating regular diet. Lung sounds clear. Bowel sounds active. 12/2 LBM. Adequate urine output. Pain free. Denies nausea. Able to make needs known.      Please review provider order for any additional goals.   Nurse to notify provider when observation goals have been met and patient is ready for discharge.    "

## 2022-12-03 NOTE — PROGRESS NOTES
PRIMARY DIAGNOSIS:Placement  OUTPATIENT/OBSERVATION GOALS TO BE MET BEFORE DISCHARGE:  1. ADLs back to baseline: Yes    Activity and level of assistance: Ax2  2. Pain status: Pain free.    3. Return to near baseline physical activity: Yes     Discharge Planner Nurse   Safe discharge environment identified: No  Barriers to discharge: No       Entered by: Alda Victoria RN 12/03/2022      /82 (BP Location: Right arm)   Pulse 82   Temp 97.8  F (36.6  C) (Oral)   Resp 17   Wt 99.9 kg (220 lb 4.8 oz)   SpO2 95%   Pt is A&Ox4. Calm and cooperative. VSS on RA. Incontinent of bladder. Pt changed and repositioned. Pt is resting comfortably in bed.   Please review provider order for any additional goals.   Nurse to notify provider when observation goals have been met and patient is ready for discharge.

## 2022-12-03 NOTE — PROGRESS NOTES
PRIMARY DIAGNOSIS:Placement  OUTPATIENT/OBSERVATION GOALS TO BE MET BEFORE DISCHARGE:  1. ADLs back to baseline: Yes    Activity and level of assistance: Ax2  2. Pain status: Pain free.    3. Return to near baseline physical activity: Yes     Discharge Planner Nurse   Safe discharge environment identified: No  Barriers to discharge: No       Entered by: Alda Victoria RN 12/03/2022        Pt is A&Ox4. Calm and cooperative. VSS on RA. Bedtime glucose 143. Insulin not given.  Incontinent of bladder. Pt changed and repositioned. Pt is resting comfortably in bed.   Please review provider order for any additional goals.   Nurse to notify provider when observation goals have been met and patient is ready for discharge.
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
## 2022-12-04 LAB
GLUCOSE BLDC GLUCOMTR-MCNC: 129 MG/DL (ref 70–99)
GLUCOSE BLDC GLUCOMTR-MCNC: 154 MG/DL (ref 70–99)
GLUCOSE BLDC GLUCOMTR-MCNC: 162 MG/DL (ref 70–99)
GLUCOSE BLDC GLUCOMTR-MCNC: 175 MG/DL (ref 70–99)
GLUCOSE BLDC GLUCOMTR-MCNC: 261 MG/DL (ref 70–99)

## 2022-12-04 PROCEDURE — G0378 HOSPITAL OBSERVATION PER HR: HCPCS

## 2022-12-04 PROCEDURE — 250N000013 HC RX MED GY IP 250 OP 250 PS 637

## 2022-12-04 PROCEDURE — 250N000013 HC RX MED GY IP 250 OP 250 PS 637: Performed by: NURSE PRACTITIONER

## 2022-12-04 PROCEDURE — 82962 GLUCOSE BLOOD TEST: CPT

## 2022-12-04 PROCEDURE — 250N000013 HC RX MED GY IP 250 OP 250 PS 637: Performed by: PHYSICIAN ASSISTANT

## 2022-12-04 PROCEDURE — 99225 PR SUBSEQUENT OBSERVATION CARE,LEVEL II: CPT | Performed by: NURSE PRACTITIONER

## 2022-12-04 PROCEDURE — 250N000013 HC RX MED GY IP 250 OP 250 PS 637: Performed by: HOSPITALIST

## 2022-12-04 RX ADMIN — SENNOSIDES AND DOCUSATE SODIUM 1 TABLET: 50; 8.6 TABLET ORAL at 07:55

## 2022-12-04 RX ADMIN — HYDROXYZINE HYDROCHLORIDE 50 MG: 50 TABLET, FILM COATED ORAL at 13:10

## 2022-12-04 RX ADMIN — MIRTAZAPINE 15 MG: 15 TABLET, FILM COATED ORAL at 21:02

## 2022-12-04 RX ADMIN — HYDROXYZINE HYDROCHLORIDE 50 MG: 50 TABLET, FILM COATED ORAL at 07:55

## 2022-12-04 RX ADMIN — KETOCONAZOLE: 20 CREAM TOPICAL at 21:01

## 2022-12-04 RX ADMIN — EMPAGLIFLOZIN 10 MG: 10 TABLET, FILM COATED ORAL at 07:56

## 2022-12-04 RX ADMIN — QUETIAPINE 200 MG: 200 TABLET, FILM COATED ORAL at 13:10

## 2022-12-04 RX ADMIN — Medication 25 MCG: at 07:56

## 2022-12-04 RX ADMIN — POLYETHYLENE GLYCOL 3350 17 G: 17 POWDER, FOR SOLUTION ORAL at 07:56

## 2022-12-04 RX ADMIN — POLYETHYLENE GLYCOL 3350 17 G: 17 POWDER, FOR SOLUTION ORAL at 21:01

## 2022-12-04 RX ADMIN — QUETIAPINE FUMARATE 400 MG: 200 TABLET ORAL at 21:03

## 2022-12-04 RX ADMIN — LEVOTHYROXINE SODIUM 25 MCG: 0.03 TABLET ORAL at 07:55

## 2022-12-04 RX ADMIN — OLANZAPINE 2.5 MG: 2.5 TABLET, FILM COATED ORAL at 13:10

## 2022-12-04 RX ADMIN — CLONIDINE HYDROCHLORIDE 0.1 MG: 0.1 TABLET ORAL at 07:55

## 2022-12-04 RX ADMIN — DIVALPROEX SODIUM 1000 MG: 500 TABLET, DELAYED RELEASE ORAL at 21:02

## 2022-12-04 RX ADMIN — HYDROXYZINE HYDROCHLORIDE 50 MG: 50 TABLET, FILM COATED ORAL at 21:02

## 2022-12-04 RX ADMIN — VENLAFAXINE HYDROCHLORIDE 75 MG: 150 CAPSULE, EXTENDED RELEASE ORAL at 21:03

## 2022-12-04 RX ADMIN — METOPROLOL SUCCINATE 25 MG: 25 TABLET, EXTENDED RELEASE ORAL at 07:56

## 2022-12-04 RX ADMIN — CLOTRIMAZOLE: 0.01 CREAM TOPICAL at 21:01

## 2022-12-04 RX ADMIN — CLONIDINE HYDROCHLORIDE 0.1 MG: 0.1 TABLET ORAL at 21:02

## 2022-12-04 RX ADMIN — QUETIAPINE 200 MG: 200 TABLET, FILM COATED ORAL at 07:55

## 2022-12-04 RX ADMIN — CLOTRIMAZOLE: 0.01 CREAM TOPICAL at 07:59

## 2022-12-04 RX ADMIN — BACLOFEN 10 MG: 10 TABLET ORAL at 13:10

## 2022-12-04 RX ADMIN — KETOCONAZOLE: 20 CREAM TOPICAL at 07:59

## 2022-12-04 RX ADMIN — BACLOFEN 10 MG: 10 TABLET ORAL at 21:02

## 2022-12-04 RX ADMIN — DIVALPROEX SODIUM 250 MG: 500 TABLET, DELAYED RELEASE ORAL at 07:58

## 2022-12-04 RX ADMIN — VENLAFAXINE HYDROCHLORIDE 150 MG: 150 CAPSULE, EXTENDED RELEASE ORAL at 21:03

## 2022-12-04 RX ADMIN — INSULIN ASPART 1 UNITS: 100 INJECTION, SOLUTION INTRAVENOUS; SUBCUTANEOUS at 18:45

## 2022-12-04 RX ADMIN — DIVALPROEX SODIUM 500 MG: 500 TABLET, DELAYED RELEASE ORAL at 07:55

## 2022-12-04 RX ADMIN — INSULIN ASPART 1 UNITS: 100 INJECTION, SOLUTION INTRAVENOUS; SUBCUTANEOUS at 13:09

## 2022-12-04 RX ADMIN — Medication 10 MG: at 21:02

## 2022-12-04 RX ADMIN — ASPIRIN 81 MG CHEWABLE TABLET 81 MG: 81 TABLET CHEWABLE at 07:55

## 2022-12-04 RX ADMIN — METFORMIN HYDROCHLORIDE 850 MG: 850 TABLET, FILM COATED ORAL at 18:46

## 2022-12-04 RX ADMIN — ATORVASTATIN CALCIUM 20 MG: 20 TABLET, FILM COATED ORAL at 21:02

## 2022-12-04 RX ADMIN — BACLOFEN 10 MG: 10 TABLET ORAL at 07:56

## 2022-12-04 ASSESSMENT — ACTIVITIES OF DAILY LIVING (ADL)
ADLS_ACUITY_SCORE: 56

## 2022-12-04 NOTE — PLAN OF CARE
PRIMARY DIAGNOSIS:Placement  OUTPATIENT/OBSERVATION GOALS TO BE MET BEFORE DISCHARGE:  ADLs back to baseline: Yes     Activity and level of assistance: lift assist at baseline    Pain status: Pain free.     Return to near baseline physical activity: Yes             Discharge Planner Nurse      Safe discharge environment identified: No  Barriers to discharge: safe discharge plan        Entered by: moncho osorio RN      BP (!) 130/94 (BP Location: Right arm)   Pulse 85   Temp 97.8  F (36.6  C) (Oral)   Resp 18   Wt 99.9 kg (220 lb 4.8 oz)   SpO2 95%     Vitals stable. Pt alert and oriented x4. Calm and cooperative this am. Denies pain. Ax1 turn assist in bed, lift assist otherwise. Pt resting comfortably. SW following for safe discharge plan. Will continue to monitor and provide supportive cares.       Please review provider order for any additional goals.   Nurse to notify provider when observation goals have been met and patient is ready for discharge.
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
## 2022-12-04 NOTE — PLAN OF CARE
PRIMARY DIAGNOSIS: Placement  OUTPATIENT/OBSERVATION GOALS TO BE MET BEFORE DISCHARGE:  1. ADLs back to baseline: Yes    2. Activity and level of assistance: lift assist at baseline    3. Pain status: Pain free.    4. Return to near baseline physical activity: Yes     Discharge Planner Nurse   Safe discharge environment identified: No  Barriers to discharge: Yes, safe discharge plan       Entered by: Wanda Akins RN 12/04/2022 5:48 PM   VSS. Pt a&ox4. Cooperative throughout afternoon. Denies pain. A1 assist with turns and brief changes in bed. SW following. /175 insulin given as indicated. Continue with supportive cares.   BP (!) 130/94 (BP Location: Right arm)   Pulse 85   Temp 97.8  F (36.6  C) (Oral)   Resp 18   Wt 99.9 kg (220 lb 4.8 oz)   SpO2 95%   Please review provider order for any additional goals.   Nurse to notify provider when observation goals have been met and patient is ready for discharge.

## 2022-12-04 NOTE — PROGRESS NOTES
PRIMARY DIAGNOSIS:Placement  OUTPATIENT/OBSERVATION GOALS TO BE MET BEFORE DISCHARGE:  1. ADLs back to baseline: Yes    Activity and level of assistance: Ax2  2. Pain status: Pain free.    3. Return to near baseline physical activity: Yes     Discharge Planner Nurse   Safe discharge environment identified: No  Barriers to discharge: No       Entered by: Alda Victoria RN 12/04/2022      /85 (BP Location: Right arm)   Pulse 77   Temp 97.8  F (36.6  C) (Oral)   Resp 17   Wt 99.9 kg (220 lb 4.8 oz)   SpO2 94%   Pt is A&Ox4. Calm and cooperative. VSS on RA. Pt is resting comfortably in bed. Blood sugar of 171 at bedtime.  Pt did not need insulin.   Please review provider order for any additional goals.   Nurse to notify provider when observation goals have been met and patient is ready for discharge.

## 2022-12-04 NOTE — PROGRESS NOTES
PRIMARY DIAGNOSIS:Placement  OUTPATIENT/OBSERVATION GOALS TO BE MET BEFORE DISCHARGE:  1. ADLs back to baseline: Yes    Activity and level of assistance: Ax2  2. Pain status: Pain free.    3. Return to near baseline physical activity: Yes     Discharge Planner Nurse   Safe discharge environment identified: No  Barriers to discharge: No       Entered by: Alda Victoria RN 12/04/2022      Pt is A&Ox4. Calm and cooperative. VSS on RA. Pt is resting comfortably in bed.   Please review provider order for any additional goals.   Nurse to notify provider when observation goals have been met and patient is ready for discharge.

## 2022-12-05 LAB
GLUCOSE BLDC GLUCOMTR-MCNC: 148 MG/DL (ref 70–99)
GLUCOSE BLDC GLUCOMTR-MCNC: 152 MG/DL (ref 70–99)
GLUCOSE BLDC GLUCOMTR-MCNC: 157 MG/DL (ref 70–99)

## 2022-12-05 PROCEDURE — 250N000013 HC RX MED GY IP 250 OP 250 PS 637

## 2022-12-05 PROCEDURE — 250N000013 HC RX MED GY IP 250 OP 250 PS 637: Performed by: HOSPITALIST

## 2022-12-05 PROCEDURE — 250N000013 HC RX MED GY IP 250 OP 250 PS 637: Performed by: NURSE PRACTITIONER

## 2022-12-05 PROCEDURE — 82962 GLUCOSE BLOOD TEST: CPT

## 2022-12-05 PROCEDURE — G0378 HOSPITAL OBSERVATION PER HR: HCPCS

## 2022-12-05 PROCEDURE — 250N000013 HC RX MED GY IP 250 OP 250 PS 637: Performed by: PHYSICIAN ASSISTANT

## 2022-12-05 PROCEDURE — 99225 PR SUBSEQUENT OBSERVATION CARE,LEVEL II: CPT | Performed by: PHYSICIAN ASSISTANT

## 2022-12-05 RX ADMIN — Medication 25 MCG: at 09:17

## 2022-12-05 RX ADMIN — INSULIN ASPART 1 UNITS: 100 INJECTION, SOLUTION INTRAVENOUS; SUBCUTANEOUS at 17:32

## 2022-12-05 RX ADMIN — HYDROXYZINE HYDROCHLORIDE 50 MG: 50 TABLET, FILM COATED ORAL at 14:30

## 2022-12-05 RX ADMIN — DIVALPROEX SODIUM 1000 MG: 500 TABLET, DELAYED RELEASE ORAL at 22:29

## 2022-12-05 RX ADMIN — POLYETHYLENE GLYCOL 3350 17 G: 17 POWDER, FOR SOLUTION ORAL at 09:17

## 2022-12-05 RX ADMIN — SENNOSIDES AND DOCUSATE SODIUM 1 TABLET: 50; 8.6 TABLET ORAL at 09:18

## 2022-12-05 RX ADMIN — BACLOFEN 10 MG: 10 TABLET ORAL at 09:18

## 2022-12-05 RX ADMIN — POLYETHYLENE GLYCOL 3350 17 G: 17 POWDER, FOR SOLUTION ORAL at 20:34

## 2022-12-05 RX ADMIN — METOPROLOL SUCCINATE 25 MG: 25 TABLET, EXTENDED RELEASE ORAL at 09:17

## 2022-12-05 RX ADMIN — Medication 10 MG: at 22:29

## 2022-12-05 RX ADMIN — KETOCONAZOLE: 20 CREAM TOPICAL at 20:35

## 2022-12-05 RX ADMIN — METFORMIN HYDROCHLORIDE 850 MG: 850 TABLET, FILM COATED ORAL at 17:33

## 2022-12-05 RX ADMIN — MIRTAZAPINE 15 MG: 15 TABLET, FILM COATED ORAL at 22:29

## 2022-12-05 RX ADMIN — HYDROCORTISONE: 1 CREAM TOPICAL at 20:35

## 2022-12-05 RX ADMIN — BACLOFEN 10 MG: 10 TABLET ORAL at 20:33

## 2022-12-05 RX ADMIN — DIVALPROEX SODIUM 500 MG: 500 TABLET, DELAYED RELEASE ORAL at 09:17

## 2022-12-05 RX ADMIN — QUETIAPINE FUMARATE 400 MG: 200 TABLET ORAL at 22:30

## 2022-12-05 RX ADMIN — VENLAFAXINE HYDROCHLORIDE 150 MG: 150 CAPSULE, EXTENDED RELEASE ORAL at 22:28

## 2022-12-05 RX ADMIN — OLANZAPINE 2.5 MG: 2.5 TABLET, FILM COATED ORAL at 14:30

## 2022-12-05 RX ADMIN — CLONIDINE HYDROCHLORIDE 0.1 MG: 0.1 TABLET ORAL at 20:33

## 2022-12-05 RX ADMIN — EMPAGLIFLOZIN 10 MG: 10 TABLET, FILM COATED ORAL at 09:18

## 2022-12-05 RX ADMIN — LEVOTHYROXINE SODIUM 25 MCG: 0.03 TABLET ORAL at 09:17

## 2022-12-05 RX ADMIN — BACLOFEN 10 MG: 10 TABLET ORAL at 14:30

## 2022-12-05 RX ADMIN — ASPIRIN 81 MG CHEWABLE TABLET 81 MG: 81 TABLET CHEWABLE at 09:17

## 2022-12-05 RX ADMIN — INSULIN ASPART 1 UNITS: 100 INJECTION, SOLUTION INTRAVENOUS; SUBCUTANEOUS at 09:18

## 2022-12-05 RX ADMIN — HYDROXYZINE HYDROCHLORIDE 50 MG: 50 TABLET, FILM COATED ORAL at 09:17

## 2022-12-05 RX ADMIN — QUETIAPINE 200 MG: 200 TABLET, FILM COATED ORAL at 14:30

## 2022-12-05 RX ADMIN — CLOTRIMAZOLE: 0.01 CREAM TOPICAL at 09:18

## 2022-12-05 RX ADMIN — VENLAFAXINE HYDROCHLORIDE 75 MG: 150 CAPSULE, EXTENDED RELEASE ORAL at 22:29

## 2022-12-05 RX ADMIN — HYDROXYZINE HYDROCHLORIDE 50 MG: 50 TABLET, FILM COATED ORAL at 20:33

## 2022-12-05 RX ADMIN — CLOTRIMAZOLE: 0.01 CREAM TOPICAL at 20:35

## 2022-12-05 RX ADMIN — QUETIAPINE 200 MG: 200 TABLET, FILM COATED ORAL at 09:17

## 2022-12-05 RX ADMIN — DIVALPROEX SODIUM 250 MG: 500 TABLET, DELAYED RELEASE ORAL at 09:18

## 2022-12-05 RX ADMIN — ATORVASTATIN CALCIUM 20 MG: 20 TABLET, FILM COATED ORAL at 20:33

## 2022-12-05 RX ADMIN — KETOCONAZOLE: 20 CREAM TOPICAL at 09:19

## 2022-12-05 RX ADMIN — CLONIDINE HYDROCHLORIDE 0.1 MG: 0.1 TABLET ORAL at 09:17

## 2022-12-05 ASSESSMENT — ACTIVITIES OF DAILY LIVING (ADL)
ADLS_ACUITY_SCORE: 56
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
## 2022-12-05 NOTE — PROGRESS NOTES
PRIMARY DIAGNOSIS:Placement  OUTPATIENT/OBSERVATION GOALS TO BE MET BEFORE DISCHARGE:  1. ADLs back to baseline: Yes    Activity and level of assistance: Ax2  2. Pain status: Pain free.    3. Return to near baseline physical activity: Yes     Discharge Planner Nurse   Safe discharge environment identified: No  Barriers to discharge: No       Entered by: Alda Victoria RN 12/05/2022        /81 (BP Location: Right arm)   Pulse 78   Temp 97.6  F (36.4  C) (Oral)   Resp 18   Wt 99.9 kg (220 lb 4.8 oz)   SpO2 96%     Pt is A&Ox4. Calm and cooperative. VSS on RA. Pt is resting comfortably in bed. Will continue to monitor.  Please review provider order for any additional goals.   Nurse to notify provider when observation goals have been met and patient is ready for discharge.

## 2022-12-05 NOTE — PROGRESS NOTES
PRIMARY DIAGNOSIS:Placement  OUTPATIENT/OBSERVATION GOALS TO BE MET BEFORE DISCHARGE:  1. ADLs back to baseline: Yes    Activity and level of assistance: Ax2  2. Pain status: Pain free.    3. Return to near baseline physical activity: Yes     Discharge Planner Nurse   Safe discharge environment identified: No  Barriers to discharge: No       Entered by: Alda Victoria RN 12/05/2022      Pt is A&Ox4. Calm and cooperative. VSS on RA. Bedtime . 2 Units of insulin given. Pt is resting comfortably in bed.   Please review provider order for any additional goals.   Nurse to notify provider when observation goals have been met and patient is ready for discharge.

## 2022-12-05 NOTE — PROGRESS NOTES
PRIMARY DIAGNOSIS:Placement  OUTPATIENT/OBSERVATION GOALS TO BE MET BEFORE DISCHARGE:  1. ADLs back to baseline: Yes    Activity and level of assistance: A1 for incontinence cares- A2 lift up to chair  2. Pain status: Pain free.    3. Return to near baseline physical activity: Yes     Discharge Planner Nurse   Safe discharge environment identified: No  Barriers to discharge:Yes       Entered by: Taniya Brumfield RN 12/05/2022        Please review provider order for any additional goals.   Nurse to notify provider when observation goals have been met and patient is ready for discharge.    Pt continues to be calm and cooperative. Calls appropriately. SW following for safe dispo planning.

## 2022-12-06 LAB
GLUCOSE BLDC GLUCOMTR-MCNC: 105 MG/DL (ref 70–99)
GLUCOSE BLDC GLUCOMTR-MCNC: 155 MG/DL (ref 70–99)
GLUCOSE BLDC GLUCOMTR-MCNC: 170 MG/DL (ref 70–99)
GLUCOSE BLDC GLUCOMTR-MCNC: 180 MG/DL (ref 70–99)

## 2022-12-06 PROCEDURE — 250N000013 HC RX MED GY IP 250 OP 250 PS 637: Performed by: PHYSICIAN ASSISTANT

## 2022-12-06 PROCEDURE — 250N000013 HC RX MED GY IP 250 OP 250 PS 637: Performed by: NURSE PRACTITIONER

## 2022-12-06 PROCEDURE — 250N000013 HC RX MED GY IP 250 OP 250 PS 637

## 2022-12-06 PROCEDURE — 99207 PR NO CHARGE LOS: CPT | Performed by: PHYSICIAN ASSISTANT

## 2022-12-06 PROCEDURE — G0378 HOSPITAL OBSERVATION PER HR: HCPCS

## 2022-12-06 PROCEDURE — 250N000013 HC RX MED GY IP 250 OP 250 PS 637: Performed by: HOSPITALIST

## 2022-12-06 PROCEDURE — 82962 GLUCOSE BLOOD TEST: CPT | Mod: 91

## 2022-12-06 RX ADMIN — DIVALPROEX SODIUM 500 MG: 500 TABLET, DELAYED RELEASE ORAL at 09:11

## 2022-12-06 RX ADMIN — POLYETHYLENE GLYCOL 3350 17 G: 17 POWDER, FOR SOLUTION ORAL at 20:02

## 2022-12-06 RX ADMIN — HYDROCORTISONE: 1 CREAM TOPICAL at 20:05

## 2022-12-06 RX ADMIN — KETOCONAZOLE: 20 CREAM TOPICAL at 20:06

## 2022-12-06 RX ADMIN — MIRTAZAPINE 15 MG: 15 TABLET, FILM COATED ORAL at 22:08

## 2022-12-06 RX ADMIN — INSULIN ASPART 1 UNITS: 100 INJECTION, SOLUTION INTRAVENOUS; SUBCUTANEOUS at 18:09

## 2022-12-06 RX ADMIN — HYDROXYZINE HYDROCHLORIDE 50 MG: 50 TABLET, FILM COATED ORAL at 09:07

## 2022-12-06 RX ADMIN — BACLOFEN 10 MG: 10 TABLET ORAL at 09:08

## 2022-12-06 RX ADMIN — VENLAFAXINE HYDROCHLORIDE 75 MG: 150 CAPSULE, EXTENDED RELEASE ORAL at 22:09

## 2022-12-06 RX ADMIN — METOPROLOL SUCCINATE 25 MG: 25 TABLET, EXTENDED RELEASE ORAL at 09:07

## 2022-12-06 RX ADMIN — ASPIRIN 81 MG CHEWABLE TABLET 81 MG: 81 TABLET CHEWABLE at 09:08

## 2022-12-06 RX ADMIN — Medication 10 MG: at 22:08

## 2022-12-06 RX ADMIN — HYDROXYZINE HYDROCHLORIDE 50 MG: 50 TABLET, FILM COATED ORAL at 20:03

## 2022-12-06 RX ADMIN — HYDROXYZINE HYDROCHLORIDE 50 MG: 50 TABLET, FILM COATED ORAL at 13:42

## 2022-12-06 RX ADMIN — BACLOFEN 10 MG: 10 TABLET ORAL at 13:42

## 2022-12-06 RX ADMIN — Medication 25 MCG: at 09:08

## 2022-12-06 RX ADMIN — QUETIAPINE 200 MG: 200 TABLET, FILM COATED ORAL at 09:06

## 2022-12-06 RX ADMIN — OLANZAPINE 2.5 MG: 2.5 TABLET, FILM COATED ORAL at 13:43

## 2022-12-06 RX ADMIN — KETOCONAZOLE: 20 CREAM TOPICAL at 09:15

## 2022-12-06 RX ADMIN — POLYETHYLENE GLYCOL 3350 17 G: 17 POWDER, FOR SOLUTION ORAL at 09:05

## 2022-12-06 RX ADMIN — INSULIN ASPART 1 UNITS: 100 INJECTION, SOLUTION INTRAVENOUS; SUBCUTANEOUS at 13:40

## 2022-12-06 RX ADMIN — METFORMIN HYDROCHLORIDE 850 MG: 850 TABLET, FILM COATED ORAL at 18:07

## 2022-12-06 RX ADMIN — CLOTRIMAZOLE: 0.01 CREAM TOPICAL at 20:05

## 2022-12-06 RX ADMIN — DIVALPROEX SODIUM 1000 MG: 500 TABLET, DELAYED RELEASE ORAL at 22:07

## 2022-12-06 RX ADMIN — BACLOFEN 10 MG: 10 TABLET ORAL at 20:03

## 2022-12-06 RX ADMIN — LEVOTHYROXINE SODIUM 25 MCG: 0.03 TABLET ORAL at 09:06

## 2022-12-06 RX ADMIN — QUETIAPINE 200 MG: 200 TABLET, FILM COATED ORAL at 13:42

## 2022-12-06 RX ADMIN — QUETIAPINE FUMARATE 400 MG: 200 TABLET ORAL at 22:08

## 2022-12-06 RX ADMIN — CLONIDINE HYDROCHLORIDE 0.1 MG: 0.1 TABLET ORAL at 20:03

## 2022-12-06 RX ADMIN — HYDROCORTISONE: 1 CREAM TOPICAL at 09:16

## 2022-12-06 RX ADMIN — EMPAGLIFLOZIN 10 MG: 10 TABLET, FILM COATED ORAL at 09:08

## 2022-12-06 RX ADMIN — CLOTRIMAZOLE: 0.01 CREAM TOPICAL at 09:16

## 2022-12-06 RX ADMIN — CLONIDINE HYDROCHLORIDE 0.1 MG: 0.1 TABLET ORAL at 09:06

## 2022-12-06 RX ADMIN — SENNOSIDES AND DOCUSATE SODIUM 1 TABLET: 50; 8.6 TABLET ORAL at 09:05

## 2022-12-06 RX ADMIN — ATORVASTATIN CALCIUM 20 MG: 20 TABLET, FILM COATED ORAL at 20:03

## 2022-12-06 RX ADMIN — VENLAFAXINE HYDROCHLORIDE 150 MG: 150 CAPSULE, EXTENDED RELEASE ORAL at 22:10

## 2022-12-06 RX ADMIN — DIVALPROEX SODIUM 250 MG: 500 TABLET, DELAYED RELEASE ORAL at 09:11

## 2022-12-06 ASSESSMENT — ACTIVITIES OF DAILY LIVING (ADL)
ADLS_ACUITY_SCORE: 56

## 2022-12-06 NOTE — PLAN OF CARE
PRIMARY DIAGNOSIS: PLACEMENT  OUTPATIENT/OBSERVATION GOALS TO BE MET BEFORE DISCHARGE:  ADLs back to baseline: Yes     Activity and level of assistance: Assist on one with cares, requires lift transfers.     Pain status: Pain free.     Return to near baseline physical activity: Yes          Discharge Planner Nurse   Safe discharge environment identified: No  Barriers to discharge: No  Entered by: Ana Castro RN 12/05/2022     Pt a & 0 x 4. One assist with ADLS, lift for transfers. Regular diet with adequate consumption of meals. He calm and cooperative. Able to verbalize needs. BG monitoring before meals and at bedtime. Awaiting placement for discharge goals. Will monitor.     Please review provider order for any additional goals.   Nurse to notify provider when observation goals have been met and patient is ready for discharge.

## 2022-12-06 NOTE — PLAN OF CARE
PRIMARY DIAGNOSIS: PLACEMENT  OUTPATIENT/OBSERVATION GOALS TO BE MET BEFORE DISCHARGE:  1. ADLs back to baseline: Yes    2. Activity and level of assistance: Ax1 w/ cares, Ax2 w/ lift for transfers    3. Pain status: Pain free.    4. Return to near baseline physical activity: Yes     Discharge Planner Nurse   Safe discharge environment identified: No  Barriers to discharge: Yes       Entered by: Yvrose Abdi RN 12/06/2022 12:36 AM     Please review provider order for any additional goals.   Nurse to notify provider when observation goals have been met and patient is ready for discharge.      Pt is A&Ox4, stable, able to make needs known. Ax1 w/ cares, Lift for transfers. Makes needs known & calls appropriately. Bedtime , no sliding scale insulin needed. Resting between cares. Will continue to monitor.

## 2022-12-06 NOTE — PLAN OF CARE
PRIMARY DIAGNOSIS: PLACEMENT  OUTPATIENT/OBSERVATION GOALS TO BE MET BEFORE DISCHARGE:  1. ADLs back to baseline: Yes    2. Activity and level of assistance: Assist on one with cares, requires lift transfers.    3. Pain status: Pain free.    4. Return to near baseline physical activity: Yes     Discharge Planner Nurse   Safe discharge environment identified: No  Barriers to discharge: No       Entered by: Ana Castro RN 12/05/2022   /81 (BP Location: Right arm)   Pulse 79   Temp 97.6  F (36.4  C) (Oral)   Resp 18   Wt 99.9 kg (220 lb 4.8 oz)   SpO2 97%    Pt a & 0 x 4. One assist with ADLS, lift for transfers. Regular diet with adequate consumption of meals. He calm and cooperative. Awaiting placement for discharge goals. Will monitor.   Please review provider order for any additional goals.   Nurse to notify provider when observation goals have been met and patient is ready for discharge.
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
## 2022-12-06 NOTE — PLAN OF CARE
PRIMARY DIAGNOSIS: PLACEMENT  OUTPATIENT/OBSERVATION GOALS TO BE MET BEFORE DISCHARGE:  1. ADLs back to baseline: Yes    2. Activity and level of assistance: Ax1 w/ cares, Ax2 w/ lift for transfers    3. Pain status: Pain free.    4. Return to near baseline physical activity: Yes     Discharge Planner Nurse   Safe discharge environment identified: No  Barriers to discharge: Yes       Entered by: Yvrose Abdi RN 12/06/2022 4:46 AM     Please review provider order for any additional goals.   Nurse to notify provider when observation goals have been met and patient is ready for discharge.      Pt is A&Ox4, stable, able to make needs known. Ax1 w/ cares, Lift for transfers. Makes needs known & calls appropriately. Bedtime , no sliding scale insulin needed. Incontinent of bowel & bladder. Slept through night. Pending placement, needs new group home, [facility].

## 2022-12-07 LAB
GLUCOSE BLDC GLUCOMTR-MCNC: 120 MG/DL (ref 70–99)
GLUCOSE BLDC GLUCOMTR-MCNC: 134 MG/DL (ref 70–99)
GLUCOSE BLDC GLUCOMTR-MCNC: 143 MG/DL (ref 70–99)
GLUCOSE BLDC GLUCOMTR-MCNC: 214 MG/DL (ref 70–99)

## 2022-12-07 PROCEDURE — 250N000013 HC RX MED GY IP 250 OP 250 PS 637: Performed by: PHYSICIAN ASSISTANT

## 2022-12-07 PROCEDURE — 250N000013 HC RX MED GY IP 250 OP 250 PS 637: Performed by: NURSE PRACTITIONER

## 2022-12-07 PROCEDURE — 250N000013 HC RX MED GY IP 250 OP 250 PS 637: Performed by: HOSPITALIST

## 2022-12-07 PROCEDURE — 99207 PR NO CHARGE LOS: CPT | Performed by: NURSE PRACTITIONER

## 2022-12-07 PROCEDURE — 82962 GLUCOSE BLOOD TEST: CPT

## 2022-12-07 PROCEDURE — G0378 HOSPITAL OBSERVATION PER HR: HCPCS

## 2022-12-07 PROCEDURE — 250N000013 HC RX MED GY IP 250 OP 250 PS 637

## 2022-12-07 RX ADMIN — HYDROXYZINE HYDROCHLORIDE 50 MG: 50 TABLET, FILM COATED ORAL at 14:18

## 2022-12-07 RX ADMIN — INSULIN ASPART 1 UNITS: 100 INJECTION, SOLUTION INTRAVENOUS; SUBCUTANEOUS at 12:49

## 2022-12-07 RX ADMIN — Medication 25 MCG: at 07:59

## 2022-12-07 RX ADMIN — DIVALPROEX SODIUM 500 MG: 500 TABLET, DELAYED RELEASE ORAL at 08:00

## 2022-12-07 RX ADMIN — ASPIRIN 81 MG CHEWABLE TABLET 81 MG: 81 TABLET CHEWABLE at 08:00

## 2022-12-07 RX ADMIN — DIVALPROEX SODIUM 250 MG: 500 TABLET, DELAYED RELEASE ORAL at 08:02

## 2022-12-07 RX ADMIN — VENLAFAXINE HYDROCHLORIDE 150 MG: 150 CAPSULE, EXTENDED RELEASE ORAL at 21:58

## 2022-12-07 RX ADMIN — INSULIN ASPART 2 UNITS: 100 INJECTION, SOLUTION INTRAVENOUS; SUBCUTANEOUS at 18:20

## 2022-12-07 RX ADMIN — BACLOFEN 10 MG: 10 TABLET ORAL at 21:59

## 2022-12-07 RX ADMIN — MIRTAZAPINE 15 MG: 15 TABLET, FILM COATED ORAL at 21:58

## 2022-12-07 RX ADMIN — QUETIAPINE 200 MG: 200 TABLET, FILM COATED ORAL at 14:18

## 2022-12-07 RX ADMIN — VENLAFAXINE HYDROCHLORIDE 75 MG: 150 CAPSULE, EXTENDED RELEASE ORAL at 21:58

## 2022-12-07 RX ADMIN — QUETIAPINE 200 MG: 200 TABLET, FILM COATED ORAL at 08:01

## 2022-12-07 RX ADMIN — LEVOTHYROXINE SODIUM 25 MCG: 0.03 TABLET ORAL at 08:02

## 2022-12-07 RX ADMIN — CLOTRIMAZOLE: 0.01 CREAM TOPICAL at 08:13

## 2022-12-07 RX ADMIN — QUETIAPINE FUMARATE 400 MG: 200 TABLET ORAL at 21:59

## 2022-12-07 RX ADMIN — CLONIDINE HYDROCHLORIDE 0.1 MG: 0.1 TABLET ORAL at 21:58

## 2022-12-07 RX ADMIN — EMPAGLIFLOZIN 10 MG: 10 TABLET, FILM COATED ORAL at 08:01

## 2022-12-07 RX ADMIN — METOPROLOL SUCCINATE 25 MG: 25 TABLET, EXTENDED RELEASE ORAL at 08:01

## 2022-12-07 RX ADMIN — BACLOFEN 10 MG: 10 TABLET ORAL at 14:18

## 2022-12-07 RX ADMIN — HYDROXYZINE HYDROCHLORIDE 50 MG: 50 TABLET, FILM COATED ORAL at 08:02

## 2022-12-07 RX ADMIN — HYDROCORTISONE: 1 CREAM TOPICAL at 08:03

## 2022-12-07 RX ADMIN — KETOCONAZOLE: 20 CREAM TOPICAL at 21:59

## 2022-12-07 RX ADMIN — CLONIDINE HYDROCHLORIDE 0.1 MG: 0.1 TABLET ORAL at 08:01

## 2022-12-07 RX ADMIN — OLANZAPINE 2.5 MG: 2.5 TABLET, FILM COATED ORAL at 14:18

## 2022-12-07 RX ADMIN — ATORVASTATIN CALCIUM 20 MG: 20 TABLET, FILM COATED ORAL at 21:59

## 2022-12-07 RX ADMIN — BACLOFEN 10 MG: 10 TABLET ORAL at 07:59

## 2022-12-07 RX ADMIN — CLOTRIMAZOLE: 0.01 CREAM TOPICAL at 22:00

## 2022-12-07 RX ADMIN — DIVALPROEX SODIUM 1000 MG: 500 TABLET, DELAYED RELEASE ORAL at 21:58

## 2022-12-07 RX ADMIN — HYDROCORTISONE: 1 CREAM TOPICAL at 22:06

## 2022-12-07 RX ADMIN — KETOCONAZOLE: 20 CREAM TOPICAL at 08:13

## 2022-12-07 RX ADMIN — HYDROXYZINE HYDROCHLORIDE 50 MG: 50 TABLET, FILM COATED ORAL at 21:58

## 2022-12-07 RX ADMIN — Medication 10 MG: at 21:58

## 2022-12-07 RX ADMIN — METFORMIN HYDROCHLORIDE 850 MG: 850 TABLET, FILM COATED ORAL at 18:20

## 2022-12-07 RX ADMIN — SENNOSIDES AND DOCUSATE SODIUM 1 TABLET: 50; 8.6 TABLET ORAL at 08:00

## 2022-12-07 RX ADMIN — POLYETHYLENE GLYCOL 3350 17 G: 17 POWDER, FOR SOLUTION ORAL at 08:02

## 2022-12-07 ASSESSMENT — ACTIVITIES OF DAILY LIVING (ADL)
ADLS_ACUITY_SCORE: 56

## 2022-12-07 NOTE — PROGRESS NOTES
PRIMARY DIAGNOSIS: PLACEMENT  OUTPATIENT/OBSERVATION GOALS TO BE MET BEFORE DISCHARGE:  1. ADLs back to baseline: Yes    2. Activity and level of assistance: Assist x 2 with life device. Assist x 1 with cares    3. Pain status: Pain free.    4. Return to near baseline physical activity: Yes     Discharge Planner Nurse   Safe discharge environment identified: No  Barriers to discharge: Yes       Entered by: Mari Gupta RN 12/06/2022 7:25 PM     Please review provider order for any additional goals.   Nurse to notify provider when observation goals have been met and patient is ready for discharge.    Pt A&O x 3. Disoriented to situation. VSS. Incontinent of B&B. Paraplegic. Had aggressive behavior. Tolerating reg diet    BP (!) 137/91 (BP Location: Right arm)   Pulse 79   Temp 97.9  F (36.6  C) (Oral)   Resp 20   Wt 99.9 kg (220 lb 4.8 oz)   SpO2 97%
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
## 2022-12-07 NOTE — PROGRESS NOTES
PLACEMENT     1.  Pain controlled with oral analgesia: Yes      2.  Vital signs stable: Yes      3.  Diagnotic testing complete: Yes      4.  Cleared from consultants (if applicable): Yes      5. Return to near baseline physical activity: Yes     Pt. A&O, got B.M. today, ate 100% of his meals, repo@2, pt. Awaiting for placement bed.

## 2022-12-07 NOTE — PROGRESS NOTES
PRIMARY DIAGNOSIS: PLACEMENT  OUTPATIENT/OBSERVATION GOALS TO BE MET BEFORE DISCHARGE:  1. ADLs back to baseline: Yes    2. Activity and level of assistance: Ax2    3. Pain status: Pain free.    4. Return to near baseline physical activity: Yes     Discharge Planner Nurse   Safe discharge environment identified: No  Barriers to discharge: Yes       Entered by: Marcin Dent RN 12/07/2022 3:19 AM    Please review provider order for any additional goals.   Nurse to notify provider when observation goals have been met and patient is ready for discharge.

## 2022-12-07 NOTE — PROGRESS NOTES
PRIMARY DIAGNOSIS: PLACEMENT  OUTPATIENT/OBSERVATION GOALS TO BE MET BEFORE DISCHARGE:  1. ADLs back to baseline: Yes    2. Activity and level of assistance: Ax2    3. Pain status: Pain free.    4. Return to near baseline physical activity: Yes     Discharge Planner Nurse   Safe discharge environment identified: No  Barriers to discharge: Yes       Entered by: Marcin Dent RN 12/07/2022 1:13 AM    Please review provider order for any additional goals.   Nurse to notify provider when observation goals have been met and patient is ready for discharge.

## 2022-12-07 NOTE — PLAN OF CARE
PLACEMENT     1.  Pain controlled with oral analgesia: Yes      2.  Vital signs stable: Yes      3.  Diagnotic testing complete: Yes      4.  Cleared from consultants (if applicable): Yes      5. Return to near baseline physical activity: Yes    BP (!) 136/97 (BP Location: Right arm)   Pulse 76   Temp 97.7  F (36.5  C) (Oral)   Resp 18   Wt 99.9 kg (220 lb 4.8 oz)   SpO2 96%

## 2022-12-07 NOTE — PROGRESS NOTES
PRIMARY DIAGNOSIS: PLACEMENT  OUTPATIENT/OBSERVATION GOALS TO BE MET BEFORE DISCHARGE:  1. ADLs back to baseline: Yes    2. Activity and level of assistance: Assist x 2 with life device. Assist x 1 with cares    3. Pain status: Pain free.    4. Return to near baseline physical activity: Yes     Discharge Planner Nurse   Safe discharge environment identified: No  Barriers to discharge: Yes       Entered by: Mari Gupta RN 12/06/2022 7:22 PM     Please review provider order for any additional goals.   Nurse to notify provider when observation goals have been met and patient is ready for discharge.    Pt A&O x 3. Disoriented to situation. VSS. Incontinent of B&B. Paraplegic. Had aggressive behavior. Tolerating reg diet

## 2022-12-07 NOTE — PROGRESS NOTES
PRIMARY DIAGNOSIS: PLACEMENT  OUTPATIENT/OBSERVATION GOALS TO BE MET BEFORE DISCHARGE:  1. ADLs back to baseline: Yes    2. Activity and level of assistance: Ax2    3. Pain status: Pain free.    4. Return to near baseline physical activity: Yes     Discharge Planner Nurse   Safe discharge environment identified: No  Barriers to discharge: Yes       Entered by: Marcin Dent RN 12/06/2022 9:27 PM    Please review provider order for any additional goals.   Nurse to notify provider when observation goals have been met and patient is ready for discharge.

## 2022-12-07 NOTE — PROGRESS NOTES
Care Management Follow Up    Expected Discharge Date: 01/31/2023     Concerns to be Addressed: Discharge planning    Patient plan of care discussed at interdisciplinary rounds: Yes    Anticipated Discharge Disposition:  Group Home    Additional Information:  SW emailed pt's relocation worker Misty through [employer] to inquire if there were any updates on pending referrals, etc. For new group home placement for patient. Awaiting response. SW will continue to follow.     DANITA Obrien, [facility]   Inpatient Care Coordination  [facility]   [phone]

## 2022-12-08 LAB — GLUCOSE BLDC GLUCOMTR-MCNC: 100 MG/DL (ref 70–99)

## 2022-12-08 PROCEDURE — 82962 GLUCOSE BLOOD TEST: CPT

## 2022-12-08 PROCEDURE — 250N000013 HC RX MED GY IP 250 OP 250 PS 637: Performed by: HOSPITALIST

## 2022-12-08 PROCEDURE — G0378 HOSPITAL OBSERVATION PER HR: HCPCS

## 2022-12-08 PROCEDURE — 250N000013 HC RX MED GY IP 250 OP 250 PS 637: Performed by: NURSE PRACTITIONER

## 2022-12-08 PROCEDURE — 99207 PR NO CHARGE LOS: CPT | Performed by: NURSE PRACTITIONER

## 2022-12-08 PROCEDURE — 250N000013 HC RX MED GY IP 250 OP 250 PS 637: Performed by: PHYSICIAN ASSISTANT

## 2022-12-08 PROCEDURE — 250N000013 HC RX MED GY IP 250 OP 250 PS 637

## 2022-12-08 RX ADMIN — VENLAFAXINE HYDROCHLORIDE 75 MG: 150 CAPSULE, EXTENDED RELEASE ORAL at 20:54

## 2022-12-08 RX ADMIN — DIVALPROEX SODIUM 250 MG: 500 TABLET, DELAYED RELEASE ORAL at 09:15

## 2022-12-08 RX ADMIN — BACLOFEN 10 MG: 10 TABLET ORAL at 09:14

## 2022-12-08 RX ADMIN — METFORMIN HYDROCHLORIDE 850 MG: 850 TABLET, FILM COATED ORAL at 17:40

## 2022-12-08 RX ADMIN — Medication 25 MCG: at 09:13

## 2022-12-08 RX ADMIN — ASPIRIN 81 MG CHEWABLE TABLET 81 MG: 81 TABLET CHEWABLE at 09:13

## 2022-12-08 RX ADMIN — KETOCONAZOLE: 20 CREAM TOPICAL at 09:19

## 2022-12-08 RX ADMIN — METOPROLOL SUCCINATE 25 MG: 25 TABLET, EXTENDED RELEASE ORAL at 09:14

## 2022-12-08 RX ADMIN — Medication 10 MG: at 20:54

## 2022-12-08 RX ADMIN — ATORVASTATIN CALCIUM 20 MG: 20 TABLET, FILM COATED ORAL at 20:51

## 2022-12-08 RX ADMIN — BACLOFEN 10 MG: 10 TABLET ORAL at 14:34

## 2022-12-08 RX ADMIN — BACLOFEN 10 MG: 10 TABLET ORAL at 20:51

## 2022-12-08 RX ADMIN — HYDROXYZINE HYDROCHLORIDE 50 MG: 50 TABLET, FILM COATED ORAL at 09:13

## 2022-12-08 RX ADMIN — QUETIAPINE 200 MG: 200 TABLET, FILM COATED ORAL at 09:14

## 2022-12-08 RX ADMIN — SENNOSIDES AND DOCUSATE SODIUM 1 TABLET: 50; 8.6 TABLET ORAL at 09:13

## 2022-12-08 RX ADMIN — CLOTRIMAZOLE: 0.01 CREAM TOPICAL at 20:52

## 2022-12-08 RX ADMIN — DIVALPROEX SODIUM 1000 MG: 500 TABLET, DELAYED RELEASE ORAL at 20:54

## 2022-12-08 RX ADMIN — HYDROCORTISONE: 1 CREAM TOPICAL at 09:20

## 2022-12-08 RX ADMIN — LEVOTHYROXINE SODIUM 25 MCG: 0.03 TABLET ORAL at 09:13

## 2022-12-08 RX ADMIN — CLOTRIMAZOLE: 0.01 CREAM TOPICAL at 09:19

## 2022-12-08 RX ADMIN — POLYETHYLENE GLYCOL 3350 17 G: 17 POWDER, FOR SOLUTION ORAL at 09:13

## 2022-12-08 RX ADMIN — CLONIDINE HYDROCHLORIDE 0.1 MG: 0.1 TABLET ORAL at 09:13

## 2022-12-08 RX ADMIN — HYDROXYZINE HYDROCHLORIDE 50 MG: 50 TABLET, FILM COATED ORAL at 14:34

## 2022-12-08 RX ADMIN — QUETIAPINE FUMARATE 400 MG: 200 TABLET ORAL at 20:54

## 2022-12-08 RX ADMIN — QUETIAPINE 200 MG: 200 TABLET, FILM COATED ORAL at 14:34

## 2022-12-08 RX ADMIN — DIVALPROEX SODIUM 500 MG: 500 TABLET, DELAYED RELEASE ORAL at 09:16

## 2022-12-08 RX ADMIN — EMPAGLIFLOZIN 10 MG: 10 TABLET, FILM COATED ORAL at 09:14

## 2022-12-08 RX ADMIN — VENLAFAXINE HYDROCHLORIDE 150 MG: 150 CAPSULE, EXTENDED RELEASE ORAL at 20:55

## 2022-12-08 RX ADMIN — KETOCONAZOLE: 20 CREAM TOPICAL at 20:52

## 2022-12-08 RX ADMIN — POLYETHYLENE GLYCOL 3350 17 G: 17 POWDER, FOR SOLUTION ORAL at 20:51

## 2022-12-08 RX ADMIN — OLANZAPINE 2.5 MG: 2.5 TABLET, FILM COATED ORAL at 14:34

## 2022-12-08 RX ADMIN — CLONIDINE HYDROCHLORIDE 0.1 MG: 0.1 TABLET ORAL at 20:52

## 2022-12-08 RX ADMIN — MIRTAZAPINE 15 MG: 15 TABLET, FILM COATED ORAL at 20:54

## 2022-12-08 RX ADMIN — HYDROCORTISONE: 1 CREAM TOPICAL at 20:52

## 2022-12-08 RX ADMIN — HYDROXYZINE HYDROCHLORIDE 50 MG: 50 TABLET, FILM COATED ORAL at 20:52

## 2022-12-08 ASSESSMENT — ACTIVITIES OF DAILY LIVING (ADL)
ADLS_ACUITY_SCORE: 56

## 2022-12-08 NOTE — PLAN OF CARE
PRIMARY DIAGNOSIS: PLACEMENT  OUTPATIENT/OBSERVATION GOALS TO BE MET BEFORE DISCHARGE:  ADLs back to baseline: Yes     Activity and level of assistance: Assist on one with cares, requires lift transfers.     Pain status: Pain free.     Return to near baseline physical activity: Yes          Discharge Planner Nurse   Safe discharge environment identified: No  Barriers to discharge: No  Entered by: Ana Castro RN 12/07/22     Pt a & 0 x 4. One assist with ADLS, lift for transfers. Regular diet with adequate consumption of meals. He calm and cooperative. Able to verbalize needs. BG monitoring before meals and at bedtime. Awaiting placement for discharge goals. Will monitor.      Please review provider order for any additional goals.   Nurse to notify provider when observation goals have been met and patient is ready for discharge.

## 2022-12-08 NOTE — PLAN OF CARE
PRIMARY DIAGNOSIS: PLACEMENT  OUTPATIENT/OBSERVATION GOALS TO BE MET BEFORE DISCHARGE:  1. ADLs back to baseline: Yes    2. Activity and level of assistance: Assist of one with cares, does require lift for all transfers.    3. Pain status: Pain free.    4. Return to near baseline physical activity: Yes     Discharge Planner Nurse   Safe discharge environment identified: No  Barriers to discharge: Yes       Entered by: Chela Glover RN 12/08/2022 5:28 AM     Patient is Aox4, VSS, assist of one with cares, lift device for transfers, tolerating regular diet and oral medications, no IV site at this time, waiting on placement, sleeping well, able to make needs known, will continue to monitor and provide cares.      Please review provider order for any additional goals.   Nurse to notify provider when observation goals have been met and patient is ready for discharge.Goal Outcome Evaluation:

## 2022-12-08 NOTE — PLAN OF CARE
PRIMARY DIAGNOSIS: PLACEMENT  OUTPATIENT/OBSERVATION GOALS TO BE MET BEFORE DISCHARGE:  ADLs back to baseline: Yes     Activity and level of assistance: Assist on one with cares, requires lift transfers.     Pain status: Pain free.     Return to near baseline physical activity: Yes          Discharge Planner Nurse   Safe discharge environment identified: No  Barriers to discharge: No  Entered by: Ana Castro RN 12/07/22     Pt a & 0 x 4. One assist with ADLS, lift for transfers. Regular diet with adequate consumption of meals. He calm and cooperative. Able to verbalize needs. BG monitoring before meals and at bedtime. Awaiting placement for discharge goals. Will monitor.      Please review provider order for any additional goals.   Nurse to notify provider when observation goals have been met and patient is ready for discharge.   None

## 2022-12-08 NOTE — PLAN OF CARE
PRIMARY DIAGNOSIS: Placement  OUTPATIENT/OBSERVATION GOALS TO BE MET BEFORE DISCHARGE:  1. ADLs back to baseline: Yes    2. Activity and level of assistance: Lift transfers; Assist x 1 with cares    3. Pain status: Pain free.    4. Return to near baseline physical activity: Yes     Discharge Planner Nurse   Safe discharge environment identified: No  Barriers to discharge: Yes       Entered by: Todd Hardwick RN 12/08/2022 12:30 PM   Pt is a/O x 4. Denies pain, SOB, dizziness. Afebrile; VSS; RA. Tolerating oral intake. Pt is waiting for placement.  Please review provider order for any additional goals.   Nurse to notify provider when observation goals have been met and patient is ready for discharge.

## 2022-12-08 NOTE — PLAN OF CARE
PRIMARY DIAGNOSIS: PLACEMENT  OUTPATIENT/OBSERVATION GOALS TO BE MET BEFORE DISCHARGE:  1. ADLs back to baseline: Yes    2. Activity and level of assistance: Assist of one with cares, does require lift for all transfers.    3. Pain status: Pain free.    4. Return to near baseline physical activity: Yes     Discharge Planner Nurse   Safe discharge environment identified: No  Barriers to discharge: Yes       Entered by: Chela Glover RN 12/08/2022 5:42 AM     Patient is Aox4, VSS, assist of one with cares, lift device for transfers, tolerating regular diet and oral medications, incontinent of bowel and bladder, no IV site at this time, waiting on placement, sleeping well, able to make needs known, will continue to monitor and provide cares.      Please review provider order for any additional goals.   Nurse to notify provider when observation goals have been met and patient is ready for discharge.Goal Outcome Evaluation:

## 2022-12-08 NOTE — PLAN OF CARE
PRIMARY DIAGNOSIS: Placement  OUTPATIENT/OBSERVATION GOALS TO BE MET BEFORE DISCHARGE:  1. ADLs back to baseline: Yes     2. Activity and level of assistance: Lift transfers; Assist x 1 with cares  3. Pain status: Pain free.    4. Return to near baseline physical activity: Yes     Discharge Planner Nurse   Safe discharge environment identified: No  Barriers to discharge: Yes       Entered by: Todd Hardwick RN 12/08/2022 12:33 PM       Blood pressure 121/84, pulse 77, temperature 97.9  F (36.6  C), temperature source Oral, resp. rate 16, weight 99.9 kg (220 lb 4.8 oz), SpO2 95 %.    Please review provider order for any additional goals.   Nurse to notify provider when observation goals have been met and patient is ready for discharge.
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
## 2022-12-09 LAB
GLUCOSE BLDC GLUCOMTR-MCNC: 135 MG/DL (ref 70–99)
GLUCOSE BLDC GLUCOMTR-MCNC: 162 MG/DL (ref 70–99)

## 2022-12-09 PROCEDURE — 250N000013 HC RX MED GY IP 250 OP 250 PS 637: Performed by: PHYSICIAN ASSISTANT

## 2022-12-09 PROCEDURE — G0378 HOSPITAL OBSERVATION PER HR: HCPCS

## 2022-12-09 PROCEDURE — 82962 GLUCOSE BLOOD TEST: CPT

## 2022-12-09 PROCEDURE — 250N000013 HC RX MED GY IP 250 OP 250 PS 637: Performed by: HOSPITALIST

## 2022-12-09 PROCEDURE — 250N000013 HC RX MED GY IP 250 OP 250 PS 637: Performed by: NURSE PRACTITIONER

## 2022-12-09 PROCEDURE — 99207 PR NO CHARGE LOS: CPT | Performed by: NURSE PRACTITIONER

## 2022-12-09 PROCEDURE — 250N000013 HC RX MED GY IP 250 OP 250 PS 637

## 2022-12-09 RX ADMIN — QUETIAPINE FUMARATE 400 MG: 200 TABLET ORAL at 22:08

## 2022-12-09 RX ADMIN — CLOTRIMAZOLE: 0.01 CREAM TOPICAL at 08:28

## 2022-12-09 RX ADMIN — OLANZAPINE 2.5 MG: 2.5 TABLET, FILM COATED ORAL at 14:04

## 2022-12-09 RX ADMIN — HYDROXYZINE HYDROCHLORIDE 50 MG: 50 TABLET, FILM COATED ORAL at 22:08

## 2022-12-09 RX ADMIN — CLOTRIMAZOLE: 0.01 CREAM TOPICAL at 22:11

## 2022-12-09 RX ADMIN — DIVALPROEX SODIUM 500 MG: 500 TABLET, DELAYED RELEASE ORAL at 08:22

## 2022-12-09 RX ADMIN — BACLOFEN 10 MG: 10 TABLET ORAL at 14:05

## 2022-12-09 RX ADMIN — DIVALPROEX SODIUM 250 MG: 500 TABLET, DELAYED RELEASE ORAL at 08:23

## 2022-12-09 RX ADMIN — CLONIDINE HYDROCHLORIDE 0.1 MG: 0.1 TABLET ORAL at 08:23

## 2022-12-09 RX ADMIN — EMPAGLIFLOZIN 10 MG: 10 TABLET, FILM COATED ORAL at 08:23

## 2022-12-09 RX ADMIN — KETOCONAZOLE: 20 CREAM TOPICAL at 22:11

## 2022-12-09 RX ADMIN — METOPROLOL SUCCINATE 25 MG: 25 TABLET, EXTENDED RELEASE ORAL at 08:22

## 2022-12-09 RX ADMIN — HYDROXYZINE HYDROCHLORIDE 50 MG: 50 TABLET, FILM COATED ORAL at 08:23

## 2022-12-09 RX ADMIN — KETOCONAZOLE: 20 CREAM TOPICAL at 08:28

## 2022-12-09 RX ADMIN — BACLOFEN 10 MG: 10 TABLET ORAL at 08:23

## 2022-12-09 RX ADMIN — VENLAFAXINE HYDROCHLORIDE 150 MG: 150 CAPSULE, EXTENDED RELEASE ORAL at 22:09

## 2022-12-09 RX ADMIN — Medication 10 MG: at 22:07

## 2022-12-09 RX ADMIN — Medication 25 MCG: at 08:23

## 2022-12-09 RX ADMIN — CLONIDINE HYDROCHLORIDE 0.1 MG: 0.1 TABLET ORAL at 22:08

## 2022-12-09 RX ADMIN — VENLAFAXINE HYDROCHLORIDE 75 MG: 150 CAPSULE, EXTENDED RELEASE ORAL at 22:11

## 2022-12-09 RX ADMIN — SENNOSIDES AND DOCUSATE SODIUM 1 TABLET: 50; 8.6 TABLET ORAL at 08:22

## 2022-12-09 RX ADMIN — HYDROXYZINE HYDROCHLORIDE 50 MG: 50 TABLET, FILM COATED ORAL at 14:05

## 2022-12-09 RX ADMIN — HYDROCORTISONE: 1 CREAM TOPICAL at 22:11

## 2022-12-09 RX ADMIN — LEVOTHYROXINE SODIUM 25 MCG: 0.03 TABLET ORAL at 08:24

## 2022-12-09 RX ADMIN — POLYETHYLENE GLYCOL 3350 17 G: 17 POWDER, FOR SOLUTION ORAL at 08:26

## 2022-12-09 RX ADMIN — POLYETHYLENE GLYCOL 3350 17 G: 17 POWDER, FOR SOLUTION ORAL at 22:12

## 2022-12-09 RX ADMIN — QUETIAPINE 200 MG: 200 TABLET, FILM COATED ORAL at 08:23

## 2022-12-09 RX ADMIN — MIRTAZAPINE 15 MG: 15 TABLET, FILM COATED ORAL at 22:09

## 2022-12-09 RX ADMIN — BACLOFEN 10 MG: 10 TABLET ORAL at 22:07

## 2022-12-09 RX ADMIN — ATORVASTATIN CALCIUM 20 MG: 20 TABLET, FILM COATED ORAL at 22:07

## 2022-12-09 RX ADMIN — ASPIRIN 81 MG CHEWABLE TABLET 81 MG: 81 TABLET CHEWABLE at 08:24

## 2022-12-09 RX ADMIN — METFORMIN HYDROCHLORIDE 850 MG: 850 TABLET, FILM COATED ORAL at 17:50

## 2022-12-09 RX ADMIN — DIVALPROEX SODIUM 1000 MG: 500 TABLET, DELAYED RELEASE ORAL at 22:07

## 2022-12-09 RX ADMIN — QUETIAPINE 200 MG: 200 TABLET, FILM COATED ORAL at 14:04

## 2022-12-09 ASSESSMENT — ACTIVITIES OF DAILY LIVING (ADL)
ADLS_ACUITY_SCORE: 56
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
## 2022-12-09 NOTE — PLAN OF CARE
PRIMARY DIAGNOSIS: Placement  OUTPATIENT/OBSERVATION GOALS TO BE MET BEFORE DISCHARGE:  1. ADLs back to baseline: Yes    2. Activity and level of assistance: paraplegic, lift, Ax1 with cares and bed mobility.     3. Pain status: Pain free.    4. Return to near baseline physical activity: Yes     Discharge Planner Nurse   Safe discharge environment identified: No  Barriers to discharge: Yes       Entered by: Diane Du RN 12/09/2022 9:30 AM   Pt A&Ox4. VSS, on RA. Denies pain, nausea, SOB, and chest pain. Tolerating oral intake. Able to communicate needs. Waiting for placement.   Please review provider order for any additional goals.   Nurse to notify provider when observation goals have been met and patient is ready for discharge.

## 2022-12-09 NOTE — PLAN OF CARE
PRIMARY DIAGNOSIS: Placement  OUTPATIENT/OBSERVATION GOALS TO BE MET BEFORE DISCHARGE:  1. ADLs back to baseline: Yes    2. Activity and level of assistance: Paraplegic, Lift, Assist x1 with cares    3. Pain status: Pain free.    4. Return to near baseline physical activity: Yes     Discharge Planner Nurse   Safe discharge environment identified: No  Barriers to discharge: Yes       Entered by: Alba Mejía RN 12/08/2022 7:39 PM      A&Ox4, VS WNL on RA. Denied pain at this time. Educated patient to call with needs. Pt verbalized understanding. Will continue to monitor.   Please review provider order for any additional goals.   Nurse to notify provider when observation goals have been met and patient is ready for discharge.

## 2022-12-09 NOTE — PLAN OF CARE
PRIMARY DIAGNOSIS: Placement  OUTPATIENT/OBSERVATION GOALS TO BE MET BEFORE DISCHARGE:  1. ADLs back to baseline: Yes    2. Activity and level of assistance:Lift for transfers; Assist x 1 with cares    3. Pain status: Pain free.    4. Return to near baseline physical activity: Yes     Discharge Planner Nurse   Safe discharge environment identified: No  Barriers to discharge: Yes       Entered by: Todd Hardwick RN 12/08/2022 6:32 PM   Pt is A/O x 4. Denies pain. Tolerating oral intake. Afebrile; VSS; RA. Denies SOB, dizziness. Pt sat up in recliner and watched tv and was put back in bed after dinner. Pt able to communicate his needs. Continue monitoring. Placement pending.   Please review provider order for any additional goals.   Nurse to notify provider when observation goals have been met and patient is ready for discharge.

## 2022-12-09 NOTE — PLAN OF CARE
PRIMARY DIAGNOSIS: Placement  OUTPATIENT/OBSERVATION GOALS TO BE MET BEFORE DISCHARGE:  1. ADLs back to baseline: Yes    2. Activity and level of assistance: Paraplegic, Lift, Assist x1 with cares    3. Pain status: Pain free.    4. Return to near baseline physical activity: Yes     Discharge Planner Nurse   Safe discharge environment identified: No  Barriers to discharge: Yes       Entered by: Alba Mejía RN 12/09/2022 4:20 AM      Pt slept well. Denied pain at this time. No PRN given over night.   Please review provider order for any additional goals.   Nurse to notify provider when observation goals have been met and patient is ready for discharge.

## 2022-12-09 NOTE — PLAN OF CARE
PRIMARY DIAGNOSIS: Placement  OUTPATIENT/OBSERVATION GOALS TO BE MET BEFORE DISCHARGE:  1. ADLs back to baseline: Yes     2. Activity and level of assistance: paraplegic, lift, Ax1 with cares and bed mobility.      3. Pain status: Pain free.     4. Return to near baseline physical activity: Yes          Discharge Planner Nurse   Safe discharge environment identified: No  Barriers to discharge: Yes       Entered by: Diane Du RN 12/09/2022 12PM   Pt A&Ox4. VSS, on RA. Denies pain, nausea, SOB, and chest pain. Tolerating oral intake. Incontinence care. Able to communicate needs. Waiting for placement.     Please review provider order for any additional goals.   Nurse to notify provider when observation goals have been met and patient is ready for discharge

## 2022-12-09 NOTE — PLAN OF CARE
PRIMARY DIAGNOSIS: Placement  OUTPATIENT/OBSERVATION GOALS TO BE MET BEFORE DISCHARGE:  1. ADLs back to baseline: Yes    2. Activity and level of assistance: Paraplegic, Lift, Assist x1 with cares    3. Pain status: Pain free.    4. Return to near baseline physical activity: Yes     Discharge Planner Nurse   Safe discharge environment identified: No  Barriers to discharge: Yes       Entered by: Alba Mejía RN 12/08/2022 11:21 PM      Pt resting in room. Denied pain at this time. Educated patient to call with needs. Pt verbalized understanding. Will continue to monitor.   Please review provider order for any additional goals.   Nurse to notify provider when observation goals have been met and patient is ready for discharge.

## 2022-12-10 LAB — GLUCOSE BLDC GLUCOMTR-MCNC: 165 MG/DL (ref 70–99)

## 2022-12-10 PROCEDURE — 250N000013 HC RX MED GY IP 250 OP 250 PS 637: Performed by: NURSE PRACTITIONER

## 2022-12-10 PROCEDURE — 250N000013 HC RX MED GY IP 250 OP 250 PS 637: Performed by: PHYSICIAN ASSISTANT

## 2022-12-10 PROCEDURE — 250N000013 HC RX MED GY IP 250 OP 250 PS 637: Performed by: HOSPITALIST

## 2022-12-10 PROCEDURE — 99207 PR NO CHARGE LOS: CPT | Performed by: NURSE PRACTITIONER

## 2022-12-10 PROCEDURE — 250N000013 HC RX MED GY IP 250 OP 250 PS 637

## 2022-12-10 PROCEDURE — G0378 HOSPITAL OBSERVATION PER HR: HCPCS

## 2022-12-10 PROCEDURE — 82962 GLUCOSE BLOOD TEST: CPT

## 2022-12-10 RX ADMIN — HYDROCORTISONE: 1 CREAM TOPICAL at 21:22

## 2022-12-10 RX ADMIN — KETOCONAZOLE: 20 CREAM TOPICAL at 21:22

## 2022-12-10 RX ADMIN — OLANZAPINE 2.5 MG: 2.5 TABLET, FILM COATED ORAL at 14:22

## 2022-12-10 RX ADMIN — VENLAFAXINE HYDROCHLORIDE 150 MG: 150 CAPSULE, EXTENDED RELEASE ORAL at 21:18

## 2022-12-10 RX ADMIN — HYDROXYZINE HYDROCHLORIDE 50 MG: 50 TABLET, FILM COATED ORAL at 14:22

## 2022-12-10 RX ADMIN — HYDROXYZINE HYDROCHLORIDE 50 MG: 50 TABLET, FILM COATED ORAL at 08:30

## 2022-12-10 RX ADMIN — HYDROXYZINE HYDROCHLORIDE 50 MG: 50 TABLET, FILM COATED ORAL at 21:18

## 2022-12-10 RX ADMIN — VENLAFAXINE HYDROCHLORIDE 75 MG: 150 CAPSULE, EXTENDED RELEASE ORAL at 21:18

## 2022-12-10 RX ADMIN — CLOTRIMAZOLE: 0.01 CREAM TOPICAL at 08:38

## 2022-12-10 RX ADMIN — LEVOTHYROXINE SODIUM 25 MCG: 0.03 TABLET ORAL at 08:30

## 2022-12-10 RX ADMIN — EMPAGLIFLOZIN 10 MG: 10 TABLET, FILM COATED ORAL at 08:31

## 2022-12-10 RX ADMIN — BACLOFEN 10 MG: 10 TABLET ORAL at 08:31

## 2022-12-10 RX ADMIN — ATORVASTATIN CALCIUM 20 MG: 20 TABLET, FILM COATED ORAL at 21:18

## 2022-12-10 RX ADMIN — SENNOSIDES AND DOCUSATE SODIUM 1 TABLET: 50; 8.6 TABLET ORAL at 08:31

## 2022-12-10 RX ADMIN — CLONIDINE HYDROCHLORIDE 0.1 MG: 0.1 TABLET ORAL at 08:31

## 2022-12-10 RX ADMIN — CLONIDINE HYDROCHLORIDE 0.1 MG: 0.1 TABLET ORAL at 21:20

## 2022-12-10 RX ADMIN — QUETIAPINE 200 MG: 200 TABLET, FILM COATED ORAL at 08:30

## 2022-12-10 RX ADMIN — BACLOFEN 10 MG: 10 TABLET ORAL at 21:19

## 2022-12-10 RX ADMIN — MIRTAZAPINE 15 MG: 15 TABLET, FILM COATED ORAL at 21:19

## 2022-12-10 RX ADMIN — KETOCONAZOLE: 20 CREAM TOPICAL at 08:38

## 2022-12-10 RX ADMIN — ASPIRIN 81 MG CHEWABLE TABLET 81 MG: 81 TABLET CHEWABLE at 08:31

## 2022-12-10 RX ADMIN — METOPROLOL SUCCINATE 25 MG: 25 TABLET, EXTENDED RELEASE ORAL at 08:31

## 2022-12-10 RX ADMIN — DIVALPROEX SODIUM 1000 MG: 500 TABLET, DELAYED RELEASE ORAL at 21:19

## 2022-12-10 RX ADMIN — HYDROCORTISONE: 1 CREAM TOPICAL at 08:38

## 2022-12-10 RX ADMIN — QUETIAPINE FUMARATE 400 MG: 200 TABLET ORAL at 21:17

## 2022-12-10 RX ADMIN — DIVALPROEX SODIUM 250 MG: 500 TABLET, DELAYED RELEASE ORAL at 08:31

## 2022-12-10 RX ADMIN — METFORMIN HYDROCHLORIDE 850 MG: 850 TABLET, FILM COATED ORAL at 18:23

## 2022-12-10 RX ADMIN — CLOTRIMAZOLE: 0.01 CREAM TOPICAL at 21:22

## 2022-12-10 RX ADMIN — Medication 25 MCG: at 08:31

## 2022-12-10 RX ADMIN — POLYETHYLENE GLYCOL 3350 17 G: 17 POWDER, FOR SOLUTION ORAL at 08:34

## 2022-12-10 RX ADMIN — Medication 10 MG: at 21:19

## 2022-12-10 RX ADMIN — QUETIAPINE 200 MG: 200 TABLET, FILM COATED ORAL at 14:22

## 2022-12-10 RX ADMIN — DIVALPROEX SODIUM 500 MG: 500 TABLET, DELAYED RELEASE ORAL at 08:31

## 2022-12-10 RX ADMIN — BACLOFEN 10 MG: 10 TABLET ORAL at 14:22

## 2022-12-10 RX ADMIN — POLYETHYLENE GLYCOL 3350 17 G: 17 POWDER, FOR SOLUTION ORAL at 21:21

## 2022-12-10 ASSESSMENT — ACTIVITIES OF DAILY LIVING (ADL)
ADLS_ACUITY_SCORE: 56

## 2022-12-10 NOTE — PLAN OF CARE
PRIMARY DIAGNOSIS: Placement  OUTPATIENT/OBSERVATION GOALS TO BE MET BEFORE DISCHARGE:  1. ADLs back to baseline: Yes    2. Activity and level of assistance: A X 2    3. Pain status: Pain free.    4. Return to near baseline physical activity: Yes     Discharge Planner Nurse   Safe discharge environment identified: Yes  Barriers to discharge: No       Entered by: Preston Arango RN 12/10/2022 4:07 AM    A & O x 4, regular diet, PO diet, incontinent of B & B, SW following, awaiting placement.  Please review provider order for any additional goals.   Nurse to notify provider when observation goals have been met and patient is ready for discharge.Goal Outcome Evaluation:

## 2022-12-10 NOTE — PLAN OF CARE
PRIMARY DIAGNOSIS: PLACEMENT  OUTPATIENT/OBSERVATION GOALS TO BE MET BEFORE DISCHARGE:  ADLs back to baseline: Yes    Activity and level of assistance: A X 2    Pain status: Pain free.    Return to near baseline physical activity: Yes     Discharge Planner Nurse   Safe discharge environment identified: Yes  Barriers to discharge: No       Entered by: Preston Arango RN 12/10/2022 12:26 AM     Please review provider order for any additional goals.   Nurse to notify provider when observation goals have been met and patient is ready for discharge.Goal Outcome Evaluation:

## 2022-12-10 NOTE — PLAN OF CARE
PRIMARY DIAGNOSIS: PLACEMENT  OUTPATIENT/OBSERVATION GOALS TO BE MET BEFORE DISCHARGE:  ADLs back to baseline: Yes     Activity and level of assistance: Assist on one with cares, requires lift transfers.     Pain status: Pain free.     Return to near baseline physical activity: Yes          Discharge Planner Nurse   Safe discharge environment identified: No  Barriers to discharge: No  Entered by: Ana Castro RN 12/09/22.     /86 (BP Location: Right arm, Cuff Size: Adult Regular)   Pulse 65   Temp 97.7  F (36.5  C) (Oral)   Resp 17   Wt 99.9 kg (220 lb 4.8 oz)   SpO2 96%      Pt a & 0 x 4. Assist of one with ADLS, lift device for transfers. Regular diet. Able to verbalize needs. He is calm and cooperative. BG monitoring before meals and at bedtime. Awaiting placement for discharge goals. Will monitor.      Please review provider order for any additional goals.   Nurse to notify provider when observation goals have been met and patient is ready for discharge.

## 2022-12-10 NOTE — PLAN OF CARE
PRIMARY DIAGNOSIS: PLACEMENT  OUTPATIENT/OBSERVATION GOALS TO BE MET BEFORE DISCHARGE:  ADLs back to baseline: Yes     Activity and level of assistance: Assist on one with cares, requires lift transfers.     Pain status: Pain free.     Return to near baseline physical activity: Yes          Discharge Planner Nurse   Safe discharge environment identified: No  Barriers to discharge: No  Entered by: Ana Castro RN 12/09/22.     Pt a & 0 x 4. Assist of one with ADLS, lift device for transfers. Regular diet. He is calm and cooperative. BG monitoring before meals and at bedtime. Awaiting placement for discharge goals. Will monitor.      Please review provider order for any additional goals.   Nurse to notify provider when observation goals have been met and patient is ready for discharge.

## 2022-12-10 NOTE — PLAN OF CARE
PRIMARY DIAGNOSIS: Placement  OUTPATIENT/OBSERVATION GOALS TO BE MET BEFORE DISCHARGE:  1. ADLs back to baseline: Yes     2. Activity and level of assistance: paraplegic, lift, Ax1 with cares and bed mobility.      3. Pain status: Pain free.     4. Return to near baseline physical activity: Yes          Discharge Planner Nurse   Safe discharge environment identified: No  Barriers to discharge: Yes       Entered by: Diane Du RN 12/10/2022 12PM   Pt A&Ox4. VSS, on RA. Denies pain, nausea, SOB, and chest pain. Tolerating oral intake. Incontinence care. BMx1.  Able to communicate needs. Waiting for placement.      Please review provider order for any additional goals.   Nurse to notify provider when observation goals have been met and patient is ready for discharge

## 2022-12-10 NOTE — PLAN OF CARE
PRIMARY DIAGNOSIS: Placement  OUTPATIENT/OBSERVATION GOALS TO BE MET BEFORE DISCHARGE:  ADLs back to baseline: Yes     Activity and level of assistance: paraplegic, lift, Ax1 with cares and bed mobility.      Pain status: Pain free.     Return to near baseline physical activity: Yes          Discharge Planner Nurse   Safe discharge environment identified: No  Barriers to discharge: Yes       Entered by: Diane Du RN 12/10/2022 8AM   Pt A&Ox4. VSS, on RA. Denies pain, nausea, SOB, and chest pain. Tolerating oral intake. Incontinence care. Able to communicate needs. Waiting for placement.      Please review provider order for any additional goals.   Nurse to notify provider when observation goals have been met and patient is ready for discharge
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
## 2022-12-11 PROCEDURE — 250N000013 HC RX MED GY IP 250 OP 250 PS 637: Performed by: PHYSICIAN ASSISTANT

## 2022-12-11 PROCEDURE — 250N000013 HC RX MED GY IP 250 OP 250 PS 637: Performed by: HOSPITALIST

## 2022-12-11 PROCEDURE — 250N000013 HC RX MED GY IP 250 OP 250 PS 637

## 2022-12-11 PROCEDURE — 250N000013 HC RX MED GY IP 250 OP 250 PS 637: Performed by: NURSE PRACTITIONER

## 2022-12-11 PROCEDURE — G0378 HOSPITAL OBSERVATION PER HR: HCPCS

## 2022-12-11 PROCEDURE — 99207 PR NO CHARGE LOS: CPT | Performed by: NURSE PRACTITIONER

## 2022-12-11 RX ADMIN — ATORVASTATIN CALCIUM 20 MG: 20 TABLET, FILM COATED ORAL at 20:17

## 2022-12-11 RX ADMIN — CLOTRIMAZOLE: 0.01 CREAM TOPICAL at 08:29

## 2022-12-11 RX ADMIN — KETOCONAZOLE: 20 CREAM TOPICAL at 20:18

## 2022-12-11 RX ADMIN — HYDROXYZINE HYDROCHLORIDE 50 MG: 50 TABLET, FILM COATED ORAL at 08:26

## 2022-12-11 RX ADMIN — HYDROXYZINE HYDROCHLORIDE 50 MG: 50 TABLET, FILM COATED ORAL at 20:18

## 2022-12-11 RX ADMIN — EMPAGLIFLOZIN 10 MG: 10 TABLET, FILM COATED ORAL at 08:25

## 2022-12-11 RX ADMIN — POLYETHYLENE GLYCOL 3350 17 G: 17 POWDER, FOR SOLUTION ORAL at 20:18

## 2022-12-11 RX ADMIN — MIRTAZAPINE 15 MG: 15 TABLET, FILM COATED ORAL at 21:29

## 2022-12-11 RX ADMIN — CLONIDINE HYDROCHLORIDE 0.1 MG: 0.1 TABLET ORAL at 20:17

## 2022-12-11 RX ADMIN — Medication 25 MCG: at 08:26

## 2022-12-11 RX ADMIN — KETOCONAZOLE: 20 CREAM TOPICAL at 08:29

## 2022-12-11 RX ADMIN — QUETIAPINE FUMARATE 400 MG: 200 TABLET ORAL at 21:28

## 2022-12-11 RX ADMIN — CLONIDINE HYDROCHLORIDE 0.1 MG: 0.1 TABLET ORAL at 08:26

## 2022-12-11 RX ADMIN — OLANZAPINE 2.5 MG: 2.5 TABLET, FILM COATED ORAL at 13:55

## 2022-12-11 RX ADMIN — BACLOFEN 10 MG: 10 TABLET ORAL at 20:17

## 2022-12-11 RX ADMIN — HYDROCORTISONE: 1 CREAM TOPICAL at 20:18

## 2022-12-11 RX ADMIN — QUETIAPINE 200 MG: 200 TABLET, FILM COATED ORAL at 08:26

## 2022-12-11 RX ADMIN — VENLAFAXINE HYDROCHLORIDE 150 MG: 150 CAPSULE, EXTENDED RELEASE ORAL at 21:28

## 2022-12-11 RX ADMIN — Medication 10 MG: at 21:28

## 2022-12-11 RX ADMIN — LEVOTHYROXINE SODIUM 25 MCG: 0.03 TABLET ORAL at 08:25

## 2022-12-11 RX ADMIN — VENLAFAXINE HYDROCHLORIDE 75 MG: 150 CAPSULE, EXTENDED RELEASE ORAL at 21:29

## 2022-12-11 RX ADMIN — HYDROXYZINE HYDROCHLORIDE 50 MG: 50 TABLET, FILM COATED ORAL at 13:56

## 2022-12-11 RX ADMIN — BACLOFEN 10 MG: 10 TABLET ORAL at 13:55

## 2022-12-11 RX ADMIN — DIVALPROEX SODIUM 250 MG: 500 TABLET, DELAYED RELEASE ORAL at 08:25

## 2022-12-11 RX ADMIN — QUETIAPINE 200 MG: 200 TABLET, FILM COATED ORAL at 13:56

## 2022-12-11 RX ADMIN — HYDROCORTISONE: 1 CREAM TOPICAL at 08:30

## 2022-12-11 RX ADMIN — METFORMIN HYDROCHLORIDE 850 MG: 850 TABLET, FILM COATED ORAL at 17:22

## 2022-12-11 RX ADMIN — BACLOFEN 10 MG: 10 TABLET ORAL at 08:26

## 2022-12-11 RX ADMIN — DIVALPROEX SODIUM 1000 MG: 500 TABLET, DELAYED RELEASE ORAL at 21:28

## 2022-12-11 RX ADMIN — METOPROLOL SUCCINATE 25 MG: 25 TABLET, EXTENDED RELEASE ORAL at 08:25

## 2022-12-11 RX ADMIN — DIVALPROEX SODIUM 500 MG: 500 TABLET, DELAYED RELEASE ORAL at 08:26

## 2022-12-11 RX ADMIN — ASPIRIN 81 MG CHEWABLE TABLET 81 MG: 81 TABLET CHEWABLE at 08:26

## 2022-12-11 RX ADMIN — CLOTRIMAZOLE: 0.01 CREAM TOPICAL at 20:18

## 2022-12-11 ASSESSMENT — ACTIVITIES OF DAILY LIVING (ADL)
ADLS_ACUITY_SCORE: 56

## 2022-12-11 NOTE — PLAN OF CARE
PRIMARY DIAGNOSIS: PLACEMENT  OUTPATIENT/OBSERVATION GOALS TO BE MET BEFORE DISCHARGE:  1. ADLs back to baseline: Yes    2. Activity and level of assistance: A X 2    3. Pain status: Pain free.    4. Return to near baseline physical activity: Yes     Discharge Planner Nurse   Safe discharge environment identified: Yes  Barriers to discharge: No       Entered by: Preston Arango RN 12/11/2022 3:54 AM   A & O x 4, regular diet, PO diet, incontinent of B & B, SW following, awaiting placement  Please review provider order for any additional goals.   Nurse to notify provider when observation goals have been met and patient is ready for discharge.Goal Outcome Evaluation:

## 2022-12-11 NOTE — PLAN OF CARE
PRIMARY DIAGNOSIS: Placement  OUTPATIENT/OBSERVATION GOALS TO BE MET BEFORE DISCHARGE:  ADLs back to baseline: Yes     Activity and level of assistance: paraplegic, lift, Ax1 with cares and bed mobility.      Pain status: Pain free.     Return to near baseline physical activity: Yes          Discharge Planner Nurse   Safe discharge environment identified: No  Barriers to discharge: Yes       Entered by: Diane Du RN 12/11/2022 12PM   Pt A&Ox4. VSS, on RA. Denies pain, nausea, SOB, and chest pain. Tolerating oral intake. Incontinence care.  Able to communicate needs. Waiting for placement.      Please review provider order for any additional goals.   Nurse to notify provider when observation goals have been met and patient is ready for discharge

## 2022-12-11 NOTE — PLAN OF CARE
PRIMARY DIAGNOSIS: PLACEMENT  OUTPATIENT/OBSERVATION GOALS TO BE MET BEFORE DISCHARGE:  ADLs back to baseline: Yes     Activity and level of assistance: Assist on one with cares, requires lift transfers.     Pain status: Pain free.     Return to near baseline physical activity: Yes          Discharge Planner Nurse   Safe discharge environment identified: No  Barriers to discharge: No  Entered by: Ana Castro RN 12/10/22.     Pt a & 0 x 4. Assist of one with ADLS, lift device for transfers. Regular diet. Able to verbalize needs. He is calm and cooperative. Awaiting placement for discharge goals. Will monitor.      Please review provider order for any additional goals.   Nurse to notify provider when observation goals have been met and patient is ready for discharge
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
## 2022-12-11 NOTE — PLAN OF CARE
PRIMARY DIAGNOSIS: PLACEMENT  OUTPATIENT/OBSERVATION GOALS TO BE MET BEFORE DISCHARGE:  ADLs back to baseline: Yes    Activity and level of assistance  A X 2    Pain status: Pain free.    Return to near baseline physical activity: Yes     Discharge Planner Nurse   Safe discharge environment identified: Yes  Barriers to discharge: No       Entered by: Preston Arango RN 12/11/2022 12:02 AM     Please review provider order for any additional goals.   Nurse to notify provider when observation goals have been met and patient is ready for discharge.Goal Outcome Evaluation:

## 2022-12-11 NOTE — PLAN OF CARE
PRIMARY DIAGNOSIS: Placement  OUTPATIENT/OBSERVATION GOALS TO BE MET BEFORE DISCHARGE:  ADLs back to baseline: Yes     Activity and level of assistance: paraplegic, lift, Ax1 with cares and bed mobility.      Pain status: Pain free.     Return to near baseline physical activity: Yes          Discharge Planner Nurse   Safe discharge environment identified: No  Barriers to discharge: Yes       Entered by: Diane Du RN 12/11/2022 4PM   Pt A&Ox4. VSS, on RA. Denies pain, nausea, SOB, and chest pain. Tolerating oral intake. Incontinence care.  Able to communicate needs. Waiting for placement.      Please review provider order for any additional goals.   Nurse to notify provider when observation goals have been met and patient is ready for discharge

## 2022-12-11 NOTE — PLAN OF CARE
PRIMARY DIAGNOSIS: Placement  OUTPATIENT/OBSERVATION GOALS TO BE MET BEFORE DISCHARGE:  ADLs back to baseline: Yes     Activity and level of assistance: paraplegic, lift, Ax1 with cares and bed mobility.      Pain status: Pain free.     Return to near baseline physical activity: Yes          Discharge Planner Nurse   Safe discharge environment identified: No  Barriers to discharge: Yes       Entered by: Diane Du RN 12/11/2022 8AM   Pt A&Ox4. VSS, on RA. Denies pain, nausea, SOB, and chest pain. Tolerating oral intake. Incontinence care.  Able to communicate needs. Waiting for placement.      Please review provider order for any additional goals.   Nurse to notify provider when observation goals have been met and patient is ready for discharge

## 2022-12-11 NOTE — PLAN OF CARE
PRIMARY DIAGNOSIS: PLACEMENT  OUTPATIENT/OBSERVATION GOALS TO BE MET BEFORE DISCHARGE:  1. ADLs back to baseline: Yes     2. Activity and level of assistance: Assist on one with cares, requires lift transfers.     3. Pain status: Pain free.     4. Return to near baseline physical activity: Yes          Discharge Planner Nurse   Safe discharge environment identified: No  Barriers to discharge: No  Entered by: Ana Castro RN 12/10/22.     Pt a & 0 x 4. Assist of one with ADLS, lift device for transfers. Regular diet. Able to verbalize needs. He is calm and cooperative. Awaiting placement for discharge.  Will monitor.     Please review provider order for any additional goals.   Nurse to notify provider when observation goals have been met and patient is ready for discharge

## 2022-12-12 LAB
GLUCOSE BLDC GLUCOMTR-MCNC: 108 MG/DL (ref 70–99)
GLUCOSE BLDC GLUCOMTR-MCNC: 158 MG/DL (ref 70–99)

## 2022-12-12 PROCEDURE — 250N000013 HC RX MED GY IP 250 OP 250 PS 637: Performed by: HOSPITALIST

## 2022-12-12 PROCEDURE — 250N000013 HC RX MED GY IP 250 OP 250 PS 637: Performed by: NURSE PRACTITIONER

## 2022-12-12 PROCEDURE — G0378 HOSPITAL OBSERVATION PER HR: HCPCS

## 2022-12-12 PROCEDURE — 99224 PR SUBSEQUENT OBSERVATION CARE,LEVEL I: CPT | Performed by: PHYSICIAN ASSISTANT

## 2022-12-12 PROCEDURE — 250N000013 HC RX MED GY IP 250 OP 250 PS 637: Performed by: PHYSICIAN ASSISTANT

## 2022-12-12 PROCEDURE — 250N000013 HC RX MED GY IP 250 OP 250 PS 637

## 2022-12-12 PROCEDURE — 82962 GLUCOSE BLOOD TEST: CPT

## 2022-12-12 RX ADMIN — ATORVASTATIN CALCIUM 20 MG: 20 TABLET, FILM COATED ORAL at 21:02

## 2022-12-12 RX ADMIN — QUETIAPINE FUMARATE 400 MG: 200 TABLET ORAL at 21:03

## 2022-12-12 RX ADMIN — OLANZAPINE 2.5 MG: 2.5 TABLET, FILM COATED ORAL at 14:29

## 2022-12-12 RX ADMIN — HYDROXYZINE HYDROCHLORIDE 50 MG: 50 TABLET, FILM COATED ORAL at 14:29

## 2022-12-12 RX ADMIN — DIVALPROEX SODIUM 500 MG: 500 TABLET, DELAYED RELEASE ORAL at 08:58

## 2022-12-12 RX ADMIN — DIVALPROEX SODIUM 1000 MG: 500 TABLET, DELAYED RELEASE ORAL at 21:03

## 2022-12-12 RX ADMIN — Medication 25 MCG: at 08:57

## 2022-12-12 RX ADMIN — HYDROXYZINE HYDROCHLORIDE 50 MG: 50 TABLET, FILM COATED ORAL at 21:03

## 2022-12-12 RX ADMIN — CLONIDINE HYDROCHLORIDE 0.1 MG: 0.1 TABLET ORAL at 08:56

## 2022-12-12 RX ADMIN — DIVALPROEX SODIUM 250 MG: 500 TABLET, DELAYED RELEASE ORAL at 08:58

## 2022-12-12 RX ADMIN — POLYETHYLENE GLYCOL 3350 17 G: 17 POWDER, FOR SOLUTION ORAL at 21:02

## 2022-12-12 RX ADMIN — CLONIDINE HYDROCHLORIDE 0.1 MG: 0.1 TABLET ORAL at 21:02

## 2022-12-12 RX ADMIN — ASPIRIN 81 MG CHEWABLE TABLET 81 MG: 81 TABLET CHEWABLE at 08:57

## 2022-12-12 RX ADMIN — HYDROXYZINE HYDROCHLORIDE 50 MG: 50 TABLET, FILM COATED ORAL at 08:58

## 2022-12-12 RX ADMIN — VENLAFAXINE HYDROCHLORIDE 75 MG: 150 CAPSULE, EXTENDED RELEASE ORAL at 21:04

## 2022-12-12 RX ADMIN — VENLAFAXINE HYDROCHLORIDE 150 MG: 150 CAPSULE, EXTENDED RELEASE ORAL at 21:04

## 2022-12-12 RX ADMIN — MIRTAZAPINE 15 MG: 15 TABLET, FILM COATED ORAL at 21:03

## 2022-12-12 RX ADMIN — EMPAGLIFLOZIN 10 MG: 10 TABLET, FILM COATED ORAL at 08:57

## 2022-12-12 RX ADMIN — METFORMIN HYDROCHLORIDE 850 MG: 850 TABLET, FILM COATED ORAL at 16:24

## 2022-12-12 RX ADMIN — BACLOFEN 10 MG: 10 TABLET ORAL at 21:02

## 2022-12-12 RX ADMIN — SENNOSIDES AND DOCUSATE SODIUM 1 TABLET: 50; 8.6 TABLET ORAL at 08:55

## 2022-12-12 RX ADMIN — LEVOTHYROXINE SODIUM 25 MCG: 0.03 TABLET ORAL at 08:55

## 2022-12-12 RX ADMIN — KETOCONAZOLE: 20 CREAM TOPICAL at 21:09

## 2022-12-12 RX ADMIN — QUETIAPINE 200 MG: 200 TABLET, FILM COATED ORAL at 14:29

## 2022-12-12 RX ADMIN — HYDROCORTISONE: 1 CREAM TOPICAL at 08:55

## 2022-12-12 RX ADMIN — POLYETHYLENE GLYCOL 3350 17 G: 17 POWDER, FOR SOLUTION ORAL at 08:54

## 2022-12-12 RX ADMIN — Medication 10 MG: at 21:03

## 2022-12-12 RX ADMIN — BACLOFEN 10 MG: 10 TABLET ORAL at 14:29

## 2022-12-12 RX ADMIN — CLOTRIMAZOLE: 0.01 CREAM TOPICAL at 08:55

## 2022-12-12 RX ADMIN — METOPROLOL SUCCINATE 25 MG: 25 TABLET, EXTENDED RELEASE ORAL at 08:55

## 2022-12-12 RX ADMIN — BACLOFEN 10 MG: 10 TABLET ORAL at 08:57

## 2022-12-12 RX ADMIN — QUETIAPINE 200 MG: 200 TABLET, FILM COATED ORAL at 08:56

## 2022-12-12 RX ADMIN — CLOTRIMAZOLE: 0.01 CREAM TOPICAL at 21:08

## 2022-12-12 RX ADMIN — KETOCONAZOLE: 20 CREAM TOPICAL at 08:54

## 2022-12-12 ASSESSMENT — ACTIVITIES OF DAILY LIVING (ADL)
ADLS_ACUITY_SCORE: 56

## 2022-12-12 NOTE — PLAN OF CARE
PRIMARY DIAGNOSIS: Placement  OUTPATIENT/OBSERVATION GOALS TO BE MET BEFORE DISCHARGE:  1. ADLs back to baseline: Yes    2. Activity and level of assistance: Ax2 w/lift device for transfers & Ax1 w/cares    3. Pain status: Pain free.    4. Return to near baseline physical activity: Yes     Discharge Planner Nurse   Safe discharge environment identified: No  Barriers to discharge: Yes       Entered by: Todd Hardwick RN 12/12/2022 2:42 PM   A/O x 4. Afebrile; VSS. Tolerating oral intake. Denies pain, SOB, dizziness. Pt is able to make his needs known. Plan is to continue monitoring until placement is found for Pt.  Please review provider order for any additional goals.   Nurse to notify provider when observation goals have been met and patient is ready for discharge.

## 2022-12-12 NOTE — PLAN OF CARE
PRIMARY DIAGNOSIS: Placement  OUTPATIENT/OBSERVATION GOALS TO BE MET BEFORE DISCHARGE:  1. ADLs back to baseline: Yes    2. Activity and level of assistance: Ax2 with lift device for transfers. Ax1 with cares    3. Pain status: Pain free.    4. Return to near baseline physical activity: Yes     Discharge Planner Nurse   Safe discharge environment identified: No  Barriers to discharge: Yes       Entered by: Todd Hardwick RN 12/12/2022 9:11 AM   Pt is A/O x 4. Denies pain, SOB, dizziness this morning. Afebrile; VSS with slightly high BP of 149/91 before BP meds were administered. Ordered breakfast. Incontinent of bowel and bladder. Cares provided. Plan is to continue monitoring until placement is found.  Please review provider order for any additional goals.   Nurse to notify provider when observation goals have been met and patient is ready for discharge.

## 2022-12-12 NOTE — PLAN OF CARE
PRIMARY DIAGNOSIS: PLACEMENT  OUTPATIENT/OBSERVATION GOALS TO BE MET BEFORE DISCHARGE:  1. ADLs back to baseline: Yes    2. Activity and level of assistance: A X 2    3. Pain status: Pain free.    4. Return to near baseline physical activity: Yes     Discharge Planner Nurse   Safe discharge environment identified: Yes  Barriers to discharge: No       Entered by: Preston Arango RN 12/12/2022 1:45 AM    A & O x 4, regular diet, PO diet, incontinent of B & B, SW following, awaiting placement  Please review provider order for any additional goals.   Nurse to notify provider when observation goals have been met and patient is ready for discharge.Goal Outcome Evaluation:

## 2022-12-12 NOTE — PLAN OF CARE
PRIMARY DIAGNOSIS: PLACEMENT  OUTPATIENT/OBSERVATION GOALS TO BE MET BEFORE DISCHARGE:  ADLs back to baseline: Yes    Activity and level of assistance: A X 2    Pain status: Pain free.    Return to near baseline physical activity: Yes     Discharge Planner Nurse   Safe discharge environment identified: Yes  Barriers to discharge: No       Entered by: Preston Arango RN 12/11/2022 8:26 PM     Please review provider order for any additional goals.   Nurse to notify provider when observation goals have been met and patient is ready for discharge.Goal Outcome Evaluation:
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
## 2022-12-12 NOTE — PLAN OF CARE
PRIMARY DIAGNOSIS: Placement  OUTPATIENT/OBSERVATION GOALS TO BE MET BEFORE DISCHARGE:  1. ADLs back to baseline: Yes    2. Activity and level of assistance: Ax2 w/lift device for transfers. Ax1 w/cares    3. Pain status: Pain free.    4. Return to near baseline physical activity: Yes     Discharge Planner Nurse   Safe discharge environment identified: No  Barriers to discharge: Yes       Entered by: Todd Hardwick RN 12/12/2022 11:45 AM   Pt is A/O x 4. Denies pain. Tolerating oral intake. Plan is to continue monitoring until placement is found.  Please review provider order for any additional goals.   Nurse to notify provider when observation goals have been met and patient is ready for discharge.

## 2022-12-13 PROCEDURE — 250N000013 HC RX MED GY IP 250 OP 250 PS 637: Performed by: PHYSICIAN ASSISTANT

## 2022-12-13 PROCEDURE — 250N000013 HC RX MED GY IP 250 OP 250 PS 637: Performed by: HOSPITALIST

## 2022-12-13 PROCEDURE — G0378 HOSPITAL OBSERVATION PER HR: HCPCS

## 2022-12-13 PROCEDURE — 250N000013 HC RX MED GY IP 250 OP 250 PS 637: Performed by: NURSE PRACTITIONER

## 2022-12-13 PROCEDURE — 99207 PR NO CHARGE LOS: CPT | Performed by: PHYSICIAN ASSISTANT

## 2022-12-13 PROCEDURE — 250N000013 HC RX MED GY IP 250 OP 250 PS 637

## 2022-12-13 RX ADMIN — ATORVASTATIN CALCIUM 20 MG: 20 TABLET, FILM COATED ORAL at 21:13

## 2022-12-13 RX ADMIN — HYDROCORTISONE: 1 CREAM TOPICAL at 11:55

## 2022-12-13 RX ADMIN — Medication 10 MG: at 21:12

## 2022-12-13 RX ADMIN — METOPROLOL SUCCINATE 25 MG: 25 TABLET, EXTENDED RELEASE ORAL at 09:57

## 2022-12-13 RX ADMIN — DIVALPROEX SODIUM 1000 MG: 500 TABLET, DELAYED RELEASE ORAL at 21:12

## 2022-12-13 RX ADMIN — POLYETHYLENE GLYCOL 3350 17 G: 17 POWDER, FOR SOLUTION ORAL at 21:13

## 2022-12-13 RX ADMIN — DIVALPROEX SODIUM 500 MG: 500 TABLET, DELAYED RELEASE ORAL at 10:03

## 2022-12-13 RX ADMIN — CLOTRIMAZOLE: 0.01 CREAM TOPICAL at 21:17

## 2022-12-13 RX ADMIN — OLANZAPINE 2.5 MG: 2.5 TABLET, FILM COATED ORAL at 13:39

## 2022-12-13 RX ADMIN — BACLOFEN 10 MG: 10 TABLET ORAL at 13:39

## 2022-12-13 RX ADMIN — CLONIDINE HYDROCHLORIDE 0.1 MG: 0.1 TABLET ORAL at 21:13

## 2022-12-13 RX ADMIN — METFORMIN HYDROCHLORIDE 850 MG: 850 TABLET, FILM COATED ORAL at 18:20

## 2022-12-13 RX ADMIN — CLONIDINE HYDROCHLORIDE 0.1 MG: 0.1 TABLET ORAL at 09:56

## 2022-12-13 RX ADMIN — CLOTRIMAZOLE: 0.01 CREAM TOPICAL at 11:55

## 2022-12-13 RX ADMIN — QUETIAPINE 200 MG: 200 TABLET, FILM COATED ORAL at 13:39

## 2022-12-13 RX ADMIN — KETOCONAZOLE: 20 CREAM TOPICAL at 21:17

## 2022-12-13 RX ADMIN — SENNOSIDES AND DOCUSATE SODIUM 1 TABLET: 50; 8.6 TABLET ORAL at 09:56

## 2022-12-13 RX ADMIN — DIVALPROEX SODIUM 250 MG: 500 TABLET, DELAYED RELEASE ORAL at 10:02

## 2022-12-13 RX ADMIN — BACLOFEN 10 MG: 10 TABLET ORAL at 21:12

## 2022-12-13 RX ADMIN — QUETIAPINE FUMARATE 400 MG: 200 TABLET ORAL at 21:12

## 2022-12-13 RX ADMIN — ASPIRIN 81 MG CHEWABLE TABLET 81 MG: 81 TABLET CHEWABLE at 09:56

## 2022-12-13 RX ADMIN — QUETIAPINE 200 MG: 200 TABLET, FILM COATED ORAL at 09:57

## 2022-12-13 RX ADMIN — HYDROXYZINE HYDROCHLORIDE 50 MG: 50 TABLET, FILM COATED ORAL at 09:56

## 2022-12-13 RX ADMIN — VENLAFAXINE HYDROCHLORIDE 75 MG: 150 CAPSULE, EXTENDED RELEASE ORAL at 21:13

## 2022-12-13 RX ADMIN — POLYETHYLENE GLYCOL 3350 17 G: 17 POWDER, FOR SOLUTION ORAL at 09:55

## 2022-12-13 RX ADMIN — KETOCONAZOLE: 20 CREAM TOPICAL at 11:56

## 2022-12-13 RX ADMIN — EMPAGLIFLOZIN 10 MG: 10 TABLET, FILM COATED ORAL at 09:56

## 2022-12-13 RX ADMIN — Medication 25 MCG: at 09:56

## 2022-12-13 RX ADMIN — HYDROXYZINE HYDROCHLORIDE 50 MG: 50 TABLET, FILM COATED ORAL at 13:39

## 2022-12-13 RX ADMIN — BACLOFEN 10 MG: 10 TABLET ORAL at 09:57

## 2022-12-13 RX ADMIN — VENLAFAXINE HYDROCHLORIDE 150 MG: 150 CAPSULE, EXTENDED RELEASE ORAL at 21:13

## 2022-12-13 RX ADMIN — MIRTAZAPINE 15 MG: 15 TABLET, FILM COATED ORAL at 21:12

## 2022-12-13 RX ADMIN — LEVOTHYROXINE SODIUM 25 MCG: 0.03 TABLET ORAL at 09:57

## 2022-12-13 RX ADMIN — HYDROXYZINE HYDROCHLORIDE 50 MG: 50 TABLET, FILM COATED ORAL at 21:13

## 2022-12-13 ASSESSMENT — ACTIVITIES OF DAILY LIVING (ADL)
ADLS_ACUITY_SCORE: 56

## 2022-12-13 NOTE — PLAN OF CARE
PRIMARY DIAGNOSIS: Placement  OUTPATIENT/OBSERVATION GOALS TO BE MET BEFORE DISCHARGE:  1. ADLs back to baseline: Yes    2. Activity and level of assistance: A2 with lift device, A1 for cares in bed    3. Pain status: Pain free.    4. Return to near baseline physical activity: Yes     Discharge Planner Nurse   Safe discharge environment identified: No  Barriers to discharge: Yes       Entered by: Heather Browning RN 12/13/2022    PTt resting in bed. VSS, calm and cooperative. A&Ox4. Awaiting placement.  Will continue to monitor and care for.   Please review provider order for any additional goals.   Nurse to notify provider when observation goals have been met and patient is ready for discharge.
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
## 2022-12-13 NOTE — PROGRESS NOTES
PRIMARY DIAGNOSIS: Placement OUTPATIENT/OBSERVATION GOALS TO BE MET BEFORE DISCHARGE:  1. ADLs back to baseline: Yes    2. Activity and level of assistance: Up with maximum assistance with lift device for transfers. Ax1 with cares.  3.Pain status: Pain free.    4. Return to near baseline physical activity: Yes     Discharge Planner Nurse   Safe discharge environment identified: No  Barriers to discharge: Yes       Entered by: Alda Victoria RN 12/13/2022       /82 (BP Location: Right arm)   Pulse 72   Temp 97.7  F (36.5  C) (Oral)   Resp 16   Wt 99.9 kg (220 lb 4.8 oz)   SpO2 93%   Pt is A&Ox 4.  VSS on RA. Pt is calm and cooperative.  Resting in bed comfortably. Waiting on placement.   Please review provider order for any additional goals.   Nurse to notify provider when observation goals have been met and patient is ready for discharge.

## 2022-12-13 NOTE — PROGRESS NOTES
PRIMARY DIAGNOSIS: Placement OUTPATIENT/OBSERVATION GOALS TO BE MET BEFORE DISCHARGE:  1. ADLs back to baseline: Yes    2. Activity and level of assistance: Up with maximum assistance with lift device for transfers. Ax1 with cares.  3.Pain status: Pain free.    4. Return to near baseline physical activity: Yes     Discharge Planner Nurse   Safe discharge environment identified: No  Barriers to discharge: Yes       Entered by: Alda Victoria RN 12/12/2022     Pt is A&Ox 4.  VSS on RA. Pt is resting in bed comfortably. Waiting on placement.   Please review provider order for any additional goals.   Nurse to notify provider when observation goals have been met and patient is ready for discharge.

## 2022-12-13 NOTE — PLAN OF CARE
PRIMARY DIAGNOSIS: Placement  OUTPATIENT/OBSERVATION GOALS TO BE MET BEFORE DISCHARGE:  1. ADLs back to baseline: Yes    2. Activity and level of assistance: A2 with lift device, A1 for cares in bed    3. Pain status: Pain free.    4. Return to near baseline physical activity: Yes     Discharge Planner Nurse   Safe discharge environment identified: No  Barriers to discharge: Yes       Entered by: Heather Browning RN 12/13/2022    Pt asleep in bed.  Safety check completed.  Please review provider order for any additional goals.   Nurse to notify provider when observation goals have been met and patient is ready for discharge.

## 2022-12-13 NOTE — PROGRESS NOTES
PRIMARY DIAGNOSIS: Placement OUTPATIENT/OBSERVATION GOALS TO BE MET BEFORE DISCHARGE:  1. ADLs back to baseline: Yes    2. Activity and level of assistance: Up with maximum assistance with lift device for transfers. Ax1 with cares.  3.Pain status: Pain free.    4. Return to near baseline physical activity: Yes     Discharge Planner Nurse   Safe discharge environment identified: No  Barriers to discharge: Yes       Entered by: Alda Victoria RN 12/12/2022     /82 (BP Location: Right arm)   Pulse 72   Temp 97.7  F (36.5  C) (Oral)   Resp 16   Wt 99.9 kg (220 lb 4.8 oz)   SpO2 93%     Pt is A&Ox 4.  VSS on RA. Pt is calm and cooperative.  Resting in bed comfortably. Waiting on placement.   Please review provider order for any additional goals.   Nurse to notify provider when observation goals have been met and patient is ready for discharge.

## 2022-12-13 NOTE — PLAN OF CARE
PRIMARY DIAGNOSIS: Placement  OUTPATIENT/OBSERVATION GOALS TO BE MET BEFORE DISCHARGE:  1. ADLs back to baseline: Yes    2. Activity and level of assistance: A2 with lift device, A1 for cares in bed    3. Pain status: Pain free.    4. Return to near baseline physical activity: Yes     Discharge Planner Nurse   Safe discharge environment identified: No  Barriers to discharge: Yes       Entered by: Heather Browning RN 12/13/2022    PTt resting in bed. VSS, calm and cooperative. A&Ox4. Awaiting placement. Pt up to chair this afternoon, bed sheets changed.  Will continue to monitor and care for.   Please review provider order for any additional goals.   Nurse to notify provider when observation goals have been met and patient is ready for discharge.

## 2022-12-14 LAB — GLUCOSE BLDC GLUCOMTR-MCNC: 157 MG/DL (ref 70–99)

## 2022-12-14 PROCEDURE — 82962 GLUCOSE BLOOD TEST: CPT

## 2022-12-14 PROCEDURE — 250N000013 HC RX MED GY IP 250 OP 250 PS 637: Performed by: HOSPITALIST

## 2022-12-14 PROCEDURE — 250N000013 HC RX MED GY IP 250 OP 250 PS 637: Performed by: NURSE PRACTITIONER

## 2022-12-14 PROCEDURE — 250N000013 HC RX MED GY IP 250 OP 250 PS 637: Performed by: PHYSICIAN ASSISTANT

## 2022-12-14 PROCEDURE — G0378 HOSPITAL OBSERVATION PER HR: HCPCS

## 2022-12-14 PROCEDURE — 99207 PR NO CHARGE LOS: CPT | Performed by: PHYSICIAN ASSISTANT

## 2022-12-14 PROCEDURE — 250N000013 HC RX MED GY IP 250 OP 250 PS 637

## 2022-12-14 RX ADMIN — METFORMIN HYDROCHLORIDE 850 MG: 850 TABLET, FILM COATED ORAL at 17:21

## 2022-12-14 RX ADMIN — POLYETHYLENE GLYCOL 3350 17 G: 17 POWDER, FOR SOLUTION ORAL at 20:26

## 2022-12-14 RX ADMIN — DIVALPROEX SODIUM 250 MG: 500 TABLET, DELAYED RELEASE ORAL at 08:49

## 2022-12-14 RX ADMIN — HYDROXYZINE HYDROCHLORIDE 50 MG: 50 TABLET, FILM COATED ORAL at 20:19

## 2022-12-14 RX ADMIN — SENNOSIDES AND DOCUSATE SODIUM 1 TABLET: 50; 8.6 TABLET ORAL at 08:49

## 2022-12-14 RX ADMIN — KETOCONAZOLE: 20 CREAM TOPICAL at 20:20

## 2022-12-14 RX ADMIN — Medication 10 MG: at 22:12

## 2022-12-14 RX ADMIN — METOPROLOL SUCCINATE 25 MG: 25 TABLET, EXTENDED RELEASE ORAL at 08:49

## 2022-12-14 RX ADMIN — HYDROXYZINE HYDROCHLORIDE 50 MG: 50 TABLET, FILM COATED ORAL at 08:48

## 2022-12-14 RX ADMIN — POLYETHYLENE GLYCOL 3350 17 G: 17 POWDER, FOR SOLUTION ORAL at 08:50

## 2022-12-14 RX ADMIN — BACLOFEN 10 MG: 10 TABLET ORAL at 13:53

## 2022-12-14 RX ADMIN — QUETIAPINE 200 MG: 200 TABLET, FILM COATED ORAL at 08:48

## 2022-12-14 RX ADMIN — BACLOFEN 10 MG: 10 TABLET ORAL at 20:19

## 2022-12-14 RX ADMIN — VENLAFAXINE HYDROCHLORIDE 150 MG: 150 CAPSULE, EXTENDED RELEASE ORAL at 22:14

## 2022-12-14 RX ADMIN — ASPIRIN 81 MG CHEWABLE TABLET 81 MG: 81 TABLET CHEWABLE at 08:50

## 2022-12-14 RX ADMIN — QUETIAPINE FUMARATE 400 MG: 200 TABLET ORAL at 22:11

## 2022-12-14 RX ADMIN — HYDROCORTISONE: 1 CREAM TOPICAL at 20:20

## 2022-12-14 RX ADMIN — ATORVASTATIN CALCIUM 20 MG: 20 TABLET, FILM COATED ORAL at 20:19

## 2022-12-14 RX ADMIN — DIVALPROEX SODIUM 500 MG: 500 TABLET, DELAYED RELEASE ORAL at 08:49

## 2022-12-14 RX ADMIN — OLANZAPINE 2.5 MG: 2.5 TABLET, FILM COATED ORAL at 13:53

## 2022-12-14 RX ADMIN — VENLAFAXINE HYDROCHLORIDE 75 MG: 150 CAPSULE, EXTENDED RELEASE ORAL at 22:12

## 2022-12-14 RX ADMIN — MIRTAZAPINE 15 MG: 15 TABLET, FILM COATED ORAL at 22:13

## 2022-12-14 RX ADMIN — CLONIDINE HYDROCHLORIDE 0.1 MG: 0.1 TABLET ORAL at 20:19

## 2022-12-14 RX ADMIN — KETOCONAZOLE: 20 CREAM TOPICAL at 08:50

## 2022-12-14 RX ADMIN — CLOTRIMAZOLE: 0.01 CREAM TOPICAL at 08:50

## 2022-12-14 RX ADMIN — Medication 25 MCG: at 08:49

## 2022-12-14 RX ADMIN — DIVALPROEX SODIUM 1000 MG: 500 TABLET, DELAYED RELEASE ORAL at 22:10

## 2022-12-14 RX ADMIN — HYDROXYZINE HYDROCHLORIDE 50 MG: 50 TABLET, FILM COATED ORAL at 13:53

## 2022-12-14 RX ADMIN — EMPAGLIFLOZIN 10 MG: 10 TABLET, FILM COATED ORAL at 08:50

## 2022-12-14 RX ADMIN — BACLOFEN 10 MG: 10 TABLET ORAL at 08:50

## 2022-12-14 RX ADMIN — QUETIAPINE 200 MG: 200 TABLET, FILM COATED ORAL at 13:53

## 2022-12-14 RX ADMIN — LEVOTHYROXINE SODIUM 25 MCG: 0.03 TABLET ORAL at 08:49

## 2022-12-14 RX ADMIN — CLONIDINE HYDROCHLORIDE 0.1 MG: 0.1 TABLET ORAL at 08:49

## 2022-12-14 RX ADMIN — HYDROCORTISONE: 1 CREAM TOPICAL at 08:50

## 2022-12-14 ASSESSMENT — ACTIVITIES OF DAILY LIVING (ADL)
ADLS_ACUITY_SCORE: 52
ADLS_ACUITY_SCORE: 56
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
## 2022-12-14 NOTE — PROGRESS NOTES
PRIMARY DIAGNOSIS: Placement   OUTPATIENT/OBSERVATION GOALS TO BE MET BEFORE DISCHARGE:  1. ADLs back to baseline: Yes    2. Activity and level of assistance: Up with maximum assistance with lift device for transfers. Ax1 with cares.  3.Pain status: Pain free.    4. Return to near baseline physical activity: Yes     Discharge Planner Nurse   Safe discharge environment identified: No  Barriers to discharge: Yes       Entered by: Alda Victoria RN 12/14/2022     BP (!) 132/90 (BP Location: Right arm)   Pulse 80   Temp 96.9  F (36.1  C) (Axillary)   Resp 18   Wt 99.9 kg (220 lb 4.8 oz)   SpO2 97%     Pt is A&Ox 4.  VSS on RA. Pt is calm and cooperative.  Resting in bed comfortably. Waiting on placement.   Please review provider order for any additional goals.   Nurse to notify provider when observation goals have been met and patient is ready for discharge.

## 2022-12-14 NOTE — PROGRESS NOTES
PRIMARY DIAGNOSIS: Placement OUTPATIENT/OBSERVATION GOALS TO BE MET BEFORE DISCHARGE:  1. ADLs back to baseline: Yes    2. Activity and level of assistance: Up with maximum assistance with lift device for transfers. Ax1 with cares.  3.Pain status: Pain free.    4. Return to near baseline physical activity: Yes     Discharge Planner Nurse   Safe discharge environment identified: No  Barriers to discharge: Yes       Entered by: Alda Victoria RN 12/13/2022       Pt is A&Ox 4.  VSS on RA. Pt is calm and cooperative.  Resting in bed comfortably. Waiting on placement.   Please review provider order for any additional goals.   Nurse to notify provider when observation goals have been met and patient is ready for discharge.

## 2022-12-14 NOTE — PROGRESS NOTES
PRIMARY DIAGNOSIS: Placement   OUTPATIENT/OBSERVATION GOALS TO BE MET BEFORE DISCHARGE:  1. ADLs back to baseline: Yes    2. Activity and level of assistance: Up with maximum assistance with lift device for transfers. Ax1 with cares.  3.Pain status: Pain free.    4. Return to near baseline physical activity: Yes     Discharge Planner Nurse   Safe discharge environment identified: No  Barriers to discharge: Yes       Entered by: Alda Victoria RN 12/14/2022     BP (!) 132/90 (BP Location: Right arm)   Pulse 80   Temp 96.9  F (36.1  C) (Axillary)   Resp 18   Wt 99.9 kg (220 lb 4.8 oz)   SpO2 97%     Pt is A&Ox 4.  VSS on RA. Resting in bed comfortably. Waiting on placement.   Please review provider order for any additional goals.   Nurse to notify provider when observation goals have been met and patient is ready for discharge.

## 2022-12-14 NOTE — PLAN OF CARE
PRIMARY DIAGNOSIS: Placement  OUTPATIENT/OBSERVATION GOALS TO BE MET BEFORE DISCHARGE:  ADLs back to baseline: Yes    Activity and level of assistance: Up with maximum assistance.     Pain status: Pain free.    Return to near baseline physical activity: Yes     Discharge Planner Nurse   Safe discharge environment identified: No  Barriers to discharge: Yes       Entered by: Diane Du RN 12/14/2022 9:53 AM   Pt A&Ox4. VSS, on RA. Denies pain, nausea, SOB, and chest pain. Ax2, lift. No PIV. Incontinence care. Regular diet. Waiting for placement.   Please review provider order for any additional goals.   Nurse to notify provider when observation goals have been met and patient is ready for discharge.

## 2022-12-14 NOTE — PLAN OF CARE
"PRIMARY DIAGNOSIS: \"GENERIC\" NURSING  OUTPATIENT/OBSERVATION GOALS TO BE MET BEFORE DISCHARGE:  1. ADLs back to baseline: Yes    2. Activity and level of assistance: Up with maximum assistance.     3. Pain status: Pain free.    4. Return to near baseline physical activity: Yes     Discharge Planner Nurse   Safe discharge environment identified: No  Barriers to discharge: Yes       Entered by: Hira Fraire RN 12/14/2022     A/O x4. VSS on RA. Assist of 2, lift total cares. Tolerating regular diet. Lung sounds clear. Bowel sounds active. Incontinent of bowel and bladder. Adequate urine output. Skin intact. Denies pain. Denies nausea. Awaiting placement.     Please review provider order for any additional goals.   Nurse to notify provider when observation goals have been met and patient is ready for discharge.    "

## 2022-12-14 NOTE — PLAN OF CARE
PRIMARY DIAGNOSIS: Placement  OUTPATIENT/OBSERVATION GOALS TO BE MET BEFORE DISCHARGE:  ADLs back to baseline: Yes     Activity and level of assistance: Up with maximum assistance.      Pain status: Pain free.     Return to near baseline physical activity: Yes          Discharge Planner Nurse   Safe discharge environment identified: No  Barriers to discharge: Yes       Entered by: Diane Du RN 12/14/2022 12PM   Pt A&Ox4. VSS, on RA. Denies pain, nausea, SOB, and chest pain. Ax2, lift. No PIV. Incontinence care. Regular diet. Waiting for placement.   Please review provider order for any additional goals.   Nurse to notify provider when observation goals have been met and patient is ready for discharge

## 2022-12-15 PROCEDURE — 99207 PR NO CHARGE LOS: CPT | Performed by: PHYSICIAN ASSISTANT

## 2022-12-15 PROCEDURE — 250N000013 HC RX MED GY IP 250 OP 250 PS 637: Performed by: NURSE PRACTITIONER

## 2022-12-15 PROCEDURE — 250N000013 HC RX MED GY IP 250 OP 250 PS 637: Performed by: PHYSICIAN ASSISTANT

## 2022-12-15 PROCEDURE — G0378 HOSPITAL OBSERVATION PER HR: HCPCS

## 2022-12-15 PROCEDURE — 250N000013 HC RX MED GY IP 250 OP 250 PS 637: Performed by: HOSPITALIST

## 2022-12-15 PROCEDURE — 250N000013 HC RX MED GY IP 250 OP 250 PS 637

## 2022-12-15 RX ADMIN — QUETIAPINE 200 MG: 200 TABLET, FILM COATED ORAL at 08:31

## 2022-12-15 RX ADMIN — ASPIRIN 81 MG CHEWABLE TABLET 81 MG: 81 TABLET CHEWABLE at 08:30

## 2022-12-15 RX ADMIN — KETOCONAZOLE: 20 CREAM TOPICAL at 21:12

## 2022-12-15 RX ADMIN — METOPROLOL SUCCINATE 25 MG: 25 TABLET, EXTENDED RELEASE ORAL at 08:31

## 2022-12-15 RX ADMIN — LEVOTHYROXINE SODIUM 25 MCG: 0.03 TABLET ORAL at 08:31

## 2022-12-15 RX ADMIN — DIVALPROEX SODIUM 250 MG: 500 TABLET, DELAYED RELEASE ORAL at 08:31

## 2022-12-15 RX ADMIN — DIVALPROEX SODIUM 1000 MG: 500 TABLET, DELAYED RELEASE ORAL at 21:13

## 2022-12-15 RX ADMIN — OLANZAPINE 2.5 MG: 2.5 TABLET, FILM COATED ORAL at 14:31

## 2022-12-15 RX ADMIN — BACLOFEN 10 MG: 10 TABLET ORAL at 21:15

## 2022-12-15 RX ADMIN — DIVALPROEX SODIUM 500 MG: 500 TABLET, DELAYED RELEASE ORAL at 08:30

## 2022-12-15 RX ADMIN — QUETIAPINE 200 MG: 200 TABLET, FILM COATED ORAL at 14:31

## 2022-12-15 RX ADMIN — CLONIDINE HYDROCHLORIDE 0.1 MG: 0.1 TABLET ORAL at 08:30

## 2022-12-15 RX ADMIN — VENLAFAXINE HYDROCHLORIDE 150 MG: 150 CAPSULE, EXTENDED RELEASE ORAL at 21:17

## 2022-12-15 RX ADMIN — BACLOFEN 10 MG: 10 TABLET ORAL at 14:31

## 2022-12-15 RX ADMIN — HYDROXYZINE HYDROCHLORIDE 50 MG: 50 TABLET, FILM COATED ORAL at 14:31

## 2022-12-15 RX ADMIN — HYDROXYZINE HYDROCHLORIDE 50 MG: 50 TABLET, FILM COATED ORAL at 08:31

## 2022-12-15 RX ADMIN — QUETIAPINE FUMARATE 400 MG: 200 TABLET ORAL at 21:13

## 2022-12-15 RX ADMIN — ATORVASTATIN CALCIUM 20 MG: 20 TABLET, FILM COATED ORAL at 21:15

## 2022-12-15 RX ADMIN — HYDROCORTISONE: 1 CREAM TOPICAL at 21:12

## 2022-12-15 RX ADMIN — MIRTAZAPINE 15 MG: 15 TABLET, FILM COATED ORAL at 21:15

## 2022-12-15 RX ADMIN — CLOTRIMAZOLE: 0.01 CREAM TOPICAL at 08:36

## 2022-12-15 RX ADMIN — EMPAGLIFLOZIN 10 MG: 10 TABLET, FILM COATED ORAL at 08:30

## 2022-12-15 RX ADMIN — METFORMIN HYDROCHLORIDE 850 MG: 850 TABLET, FILM COATED ORAL at 17:08

## 2022-12-15 RX ADMIN — POLYETHYLENE GLYCOL 3350 17 G: 17 POWDER, FOR SOLUTION ORAL at 21:12

## 2022-12-15 RX ADMIN — Medication 10 MG: at 21:13

## 2022-12-15 RX ADMIN — SENNOSIDES AND DOCUSATE SODIUM 1 TABLET: 50; 8.6 TABLET ORAL at 08:31

## 2022-12-15 RX ADMIN — Medication 25 MCG: at 08:30

## 2022-12-15 RX ADMIN — HYDROXYZINE HYDROCHLORIDE 50 MG: 50 TABLET, FILM COATED ORAL at 21:14

## 2022-12-15 RX ADMIN — POLYETHYLENE GLYCOL 3350 17 G: 17 POWDER, FOR SOLUTION ORAL at 08:34

## 2022-12-15 RX ADMIN — VENLAFAXINE HYDROCHLORIDE 75 MG: 150 CAPSULE, EXTENDED RELEASE ORAL at 21:17

## 2022-12-15 RX ADMIN — CLOTRIMAZOLE: 0.01 CREAM TOPICAL at 21:13

## 2022-12-15 RX ADMIN — BACLOFEN 10 MG: 10 TABLET ORAL at 08:30

## 2022-12-15 RX ADMIN — CLONIDINE HYDROCHLORIDE 0.1 MG: 0.1 TABLET ORAL at 21:15

## 2022-12-15 ASSESSMENT — ACTIVITIES OF DAILY LIVING (ADL)
ADLS_ACUITY_SCORE: 56
ADLS_ACUITY_SCORE: 54
ADLS_ACUITY_SCORE: 56
ADLS_ACUITY_SCORE: 52
ADLS_ACUITY_SCORE: 56
ADLS_ACUITY_SCORE: 52
ADLS_ACUITY_SCORE: 52
ADLS_ACUITY_SCORE: 56

## 2022-12-15 NOTE — PLAN OF CARE
"PRIMARY DIAGNOSIS: \"GENERIC\" NURSING  OUTPATIENT/OBSERVATION GOALS TO BE MET BEFORE DISCHARGE:  1. ADLs back to baseline: Yes    2. Activity and level of assistance: Up with maximum assistance.     3. Pain status: Pain free.    4. Return to near baseline physical activity: Yes     Discharge Planner Nurse   Safe discharge environment identified: No  Barriers to discharge: Yes       Entered by: Hira Fraire RN 12/15/2022     A/O x4. VSS on RA. Assist of 2, lift total cares. Tolerating regular diet. Lung sounds clear. Bowel sounds active. Incontinent of bowel and bladder, LBM 12/13 per chart. Adequate urine output. Skin intact. Denies pain. Denies nausea. Awaiting placement.     Please review provider order for any additional goals.   Nurse to notify provider when observation goals have been met and patient is ready for discharge.    "
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
## 2022-12-15 NOTE — PROGRESS NOTES
Care Management Follow Up    Expected Discharge Date: 01/31/2023     Concerns to be Addressed: Discharge planning      Patient plan of care discussed at interdisciplinary rounds: Yes    Anticipated Discharge Disposition:  Group Home once placement found    Additional Information:  ANALY emailed [employer] worker from [facility] again today requesting an update on discharge planning. ANALY had emailed  12/7 with no response. ANALY cc'ed supervisor on email. Awaiting response. ANALY will continue to follow.     DANITA Obrien, [facility]   Inpatient Care Coordination  [facility]   [phone]

## 2022-12-15 NOTE — PLAN OF CARE
"PRIMARY DIAGNOSIS: \"GENERIC\" NURSING  OUTPATIENT/OBSERVATION GOALS TO BE MET BEFORE DISCHARGE:  1. ADLs back to baseline: Yes    2. Activity and level of assistance: total care    3. Pain status: Pain free.    4. Return to near baseline physical activity: Yes     Vitals are Temp: 97.3  F (36.3  C) Temp src: Oral BP: (!) 137/90 Pulse: 86   Resp: 16 SpO2: 98 %.    Patient is Alert and orintedx4.  is Pt is assistx2. Patient   regular diet.  Pt denies any Pain or discomfort. Lung sound clear, bowel sound active. Pt is incontinent. Will cont to monitor and update prn     Safe discharge environment identified: No    Barriers to discharge: Yes   Entered by: Amber Villela RN 12/15/2022 6:09 AM     Please review provider order for any additional goals.   Nurse to notify provider when observation goals have been met and patient is ready for discharge.  "

## 2022-12-15 NOTE — PLAN OF CARE
"PRIMARY DIAGNOSIS: \"GENERIC\" NURSING  OUTPATIENT/OBSERVATION GOALS TO BE MET BEFORE DISCHARGE:  ADLs back to baseline: Yes    Activity and level of assistance: total care    Pain status: Pain free.    Return to near baseline physical activity: Yes     Discharge Planner Nurse   Safe discharge environment identified: No  Barriers to discharge: Yes       Entered by: Amber Villela RN 12/15/2022 2:04 AM     Please review provider order for any additional goals.   Nurse to notify provider when observation goals have been met and patient is ready for discharge.  "

## 2022-12-15 NOTE — PLAN OF CARE
"PRIMARY DIAGNOSIS: \"GENERIC\" NURSING  OUTPATIENT/OBSERVATION GOALS TO BE MET BEFORE DISCHARGE:  1. ADLs back to baseline: Yes    2. Activity and level of assistance: Up with maximum assistance.     3. Pain status: Pain free.    4. Return to near baseline physical activity: Yes     Discharge Planner Nurse   Safe discharge environment identified: No  Barriers to discharge: Yes       Entered by: Hira Fraire RN 12/15/2022     A/O x4. VSS on RA. Assist of 2, lift total cares. Tolerating regular diet. Lung sounds clear. Bowel sounds active. Incontinent of bowel and bladder, LBM 12/15. Adequate urine output. Skin intact. Denies pain. Denies nausea. Awaiting placement.   Multiple BMs this shift. Recommend holding stool softeners tonight and tomorrow.    Please review provider order for any additional goals.   Nurse to notify provider when observation goals have been met and patient is ready for discharge.    "

## 2022-12-16 LAB
GLUCOSE BLDC GLUCOMTR-MCNC: 108 MG/DL (ref 70–99)
GLUCOSE BLDC GLUCOMTR-MCNC: 136 MG/DL (ref 70–99)
GLUCOSE BLDC GLUCOMTR-MCNC: 140 MG/DL (ref 70–99)

## 2022-12-16 PROCEDURE — 250N000013 HC RX MED GY IP 250 OP 250 PS 637: Performed by: PHYSICIAN ASSISTANT

## 2022-12-16 PROCEDURE — 99226 PR SUBSEQUENT OBSERVATION CARE,LEVEL III: CPT | Performed by: PHYSICIAN ASSISTANT

## 2022-12-16 PROCEDURE — 250N000013 HC RX MED GY IP 250 OP 250 PS 637

## 2022-12-16 PROCEDURE — G0378 HOSPITAL OBSERVATION PER HR: HCPCS

## 2022-12-16 PROCEDURE — 82962 GLUCOSE BLOOD TEST: CPT

## 2022-12-16 PROCEDURE — 250N000013 HC RX MED GY IP 250 OP 250 PS 637: Performed by: HOSPITALIST

## 2022-12-16 PROCEDURE — 250N000013 HC RX MED GY IP 250 OP 250 PS 637: Performed by: NURSE PRACTITIONER

## 2022-12-16 RX ADMIN — KETOCONAZOLE: 20 CREAM TOPICAL at 08:42

## 2022-12-16 RX ADMIN — LEVOTHYROXINE SODIUM 25 MCG: 0.03 TABLET ORAL at 08:41

## 2022-12-16 RX ADMIN — HYDROXYZINE HYDROCHLORIDE 50 MG: 50 TABLET, FILM COATED ORAL at 14:18

## 2022-12-16 RX ADMIN — CLONIDINE HYDROCHLORIDE 0.1 MG: 0.1 TABLET ORAL at 08:41

## 2022-12-16 RX ADMIN — VENLAFAXINE HYDROCHLORIDE 150 MG: 150 CAPSULE, EXTENDED RELEASE ORAL at 21:11

## 2022-12-16 RX ADMIN — HYDROCORTISONE: 1 CREAM TOPICAL at 08:42

## 2022-12-16 RX ADMIN — HYDROCORTISONE: 1 CREAM TOPICAL at 21:12

## 2022-12-16 RX ADMIN — EMPAGLIFLOZIN 10 MG: 10 TABLET, FILM COATED ORAL at 08:41

## 2022-12-16 RX ADMIN — CLONIDINE HYDROCHLORIDE 0.1 MG: 0.1 TABLET ORAL at 21:11

## 2022-12-16 RX ADMIN — QUETIAPINE 200 MG: 200 TABLET, FILM COATED ORAL at 14:18

## 2022-12-16 RX ADMIN — BACLOFEN 10 MG: 10 TABLET ORAL at 08:41

## 2022-12-16 RX ADMIN — ASPIRIN 81 MG CHEWABLE TABLET 81 MG: 81 TABLET CHEWABLE at 08:40

## 2022-12-16 RX ADMIN — DIVALPROEX SODIUM 500 MG: 500 TABLET, DELAYED RELEASE ORAL at 08:41

## 2022-12-16 RX ADMIN — METOPROLOL SUCCINATE 25 MG: 25 TABLET, EXTENDED RELEASE ORAL at 08:41

## 2022-12-16 RX ADMIN — VENLAFAXINE HYDROCHLORIDE 75 MG: 150 CAPSULE, EXTENDED RELEASE ORAL at 21:12

## 2022-12-16 RX ADMIN — CLOTRIMAZOLE: 0.01 CREAM TOPICAL at 21:12

## 2022-12-16 RX ADMIN — BACLOFEN 10 MG: 10 TABLET ORAL at 14:18

## 2022-12-16 RX ADMIN — HYDROXYZINE HYDROCHLORIDE 50 MG: 50 TABLET, FILM COATED ORAL at 21:12

## 2022-12-16 RX ADMIN — ATORVASTATIN CALCIUM 20 MG: 20 TABLET, FILM COATED ORAL at 21:10

## 2022-12-16 RX ADMIN — POLYETHYLENE GLYCOL 3350 17 G: 17 POWDER, FOR SOLUTION ORAL at 21:10

## 2022-12-16 RX ADMIN — METFORMIN HYDROCHLORIDE 850 MG: 850 TABLET, FILM COATED ORAL at 16:53

## 2022-12-16 RX ADMIN — OLANZAPINE 2.5 MG: 2.5 TABLET, FILM COATED ORAL at 14:18

## 2022-12-16 RX ADMIN — MIRTAZAPINE 15 MG: 15 TABLET, FILM COATED ORAL at 21:11

## 2022-12-16 RX ADMIN — KETOCONAZOLE: 20 CREAM TOPICAL at 21:12

## 2022-12-16 RX ADMIN — QUETIAPINE FUMARATE 400 MG: 200 TABLET ORAL at 21:11

## 2022-12-16 RX ADMIN — QUETIAPINE 200 MG: 200 TABLET, FILM COATED ORAL at 08:40

## 2022-12-16 RX ADMIN — HYDROXYZINE HYDROCHLORIDE 50 MG: 50 TABLET, FILM COATED ORAL at 08:41

## 2022-12-16 RX ADMIN — POLYETHYLENE GLYCOL 3350 17 G: 17 POWDER, FOR SOLUTION ORAL at 08:40

## 2022-12-16 RX ADMIN — CLOTRIMAZOLE: 0.01 CREAM TOPICAL at 08:42

## 2022-12-16 RX ADMIN — DIVALPROEX SODIUM 250 MG: 500 TABLET, DELAYED RELEASE ORAL at 08:41

## 2022-12-16 RX ADMIN — SENNOSIDES AND DOCUSATE SODIUM 1 TABLET: 50; 8.6 TABLET ORAL at 08:41

## 2022-12-16 RX ADMIN — DIVALPROEX SODIUM 1000 MG: 500 TABLET, DELAYED RELEASE ORAL at 21:10

## 2022-12-16 RX ADMIN — Medication 25 MCG: at 08:40

## 2022-12-16 RX ADMIN — Medication 10 MG: at 21:11

## 2022-12-16 RX ADMIN — BACLOFEN 10 MG: 10 TABLET ORAL at 21:11

## 2022-12-16 ASSESSMENT — ACTIVITIES OF DAILY LIVING (ADL)
ADLS_ACUITY_SCORE: 56

## 2022-12-16 NOTE — PLAN OF CARE
PRIMARY DIAGNOSIS: Placement   OUTPATIENT/OBSERVATION GOALS TO BE MET BEFORE DISCHARGE:  1. Pain Status: Pain free.    2. Return to near baseline physical activity: Yes    3. Cleared for discharge by consultants (if involved): Yes    Discharge Planner Nurse   Safe discharge environment identified: No  Barriers to discharge: No       Entered by: Genevieve Ordoñez RN 12/16/2022 4:24 PM      Pt denies pain. No behavior noted.  before breakfast. Tolerated oral intake. Waiting for placement.   Please review provider order for any additional goals.   Nurse to notify provider when observation goals have been met and patient is ready for discharge.

## 2022-12-16 NOTE — PLAN OF CARE
PRIMARY DIAGNOSIS: Placement   OUTPATIENT/OBSERVATION GOALS TO BE MET BEFORE DISCHARGE:  1. ADLs back to baseline: Yes    2. Activity and level of assistance: A X 2 with lift    3. Pain status: Denies pain    4. Return to near baseline physical activity: Yes     Discharge Planner Nurse   Safe discharge environment identified: No  Barriers to discharge: Yes   Sleeping comfortably in bed        Entered by: Monica Elder RN 12/16/2022 00:23     Please review provider order for any additional goals.   Nurse to notify provider when observation goals have been met and patient is ready for discharge.

## 2022-12-16 NOTE — PLAN OF CARE
PRIMARY DIAGNOSIS: Placement   OUTPATIENT/OBSERVATION GOALS TO BE MET BEFORE DISCHARGE:  1. Pain Status: Pain free.    2. Return to near baseline physical activity: Yes    3. Cleared for discharge by consultants (if involved): Yes    Discharge Planner Nurse   Safe discharge environment identified: No  Barriers to discharge: No       Entered by: Genevieve Ordoñez RN 12/16/2022 10:06 AM      Pt denies pain. No behavior noted.  before breakfast. Tolerated oral intake. Waiting for placement.   Please review provider order for any additional goals.   Nurse to notify provider when observation goals have been met and patient is ready for discharge.

## 2022-12-16 NOTE — PLAN OF CARE
PRIMARY DIAGNOSIS: Placement   OUTPATIENT/OBSERVATION GOALS TO BE MET BEFORE DISCHARGE:  1. ADLs back to baseline: Yes    2. Activity and level of assistance: A X 2 with lift    3. Pain status: Denies pain    4. Return to near baseline physical activity: Yes     Discharge Planner Nurse   Safe discharge environment identified: No  Barriers to discharge: Yes   A & O X 4. Lung sound clear all lobes . Denies SOB, or pain. Bowel sound present X 4. Large bowel movement reported this shift. Medically stable to discharge . On Pulsate mattress,  Turn and reposition maintained and per patient request. Calls appropriately. Incontinent of B & B. Awaiting for placement.  following  /84 (BP Location: Right arm)   Pulse 80   Temp 97.8  F (36.6  C) (Oral)   Resp 16   Wt 99.9 kg (220 lb 4.8 oz)   SpO2 95%        Entered by: Monica Elder RN 12/16/2022 03:34     Please review provider order for any additional goals.   Nurse to notify provider when observation goals have been met and patient is ready for discharge.
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
## 2022-12-16 NOTE — PLAN OF CARE
PRIMARY DIAGNOSIS: Placement   OUTPATIENT/OBSERVATION GOALS TO BE MET BEFORE DISCHARGE:  1. Pain Status: Pain free.    2. Return to near baseline physical activity: Yes    3. Cleared for discharge by consultants (if involved): Yes    Discharge Planner Nurse   Safe discharge environment identified: No  Barriers to discharge: No       Entered by: Genevieve Ordoñez RN 12/16/2022 12:34 PM      Pt denies pain. No behavior noted.  before lunch. Tolerated oral intake. Waiting for placement.   Please review provider order for any additional goals.   Nurse to notify provider when observation goals have been met and patient is ready for discharge.

## 2022-12-16 NOTE — PLAN OF CARE
PRIMARY DIAGNOSIS: Placement   OUTPATIENT/OBSERVATION GOALS TO BE MET BEFORE DISCHARGE:  ADLs back to baseline: Yes    Activity and level of assistance: A X 2 with lift    Pain status: Denies pain    Return to near baseline physical activity: Yes     Discharge Planner Nurse   Safe discharge environment identified: No  Barriers to discharge: Yes       Entered by: Monica Elder RN 12/15/2022 20:51     Please review provider order for any additional goals.   Nurse to notify provider when observation goals have been met and patient is ready for discharge.

## 2022-12-16 NOTE — PROGRESS NOTES
Care Management Follow Up    Expected Discharge Date: 01/31/2023     Concerns to be Addressed: Discharge planning      Patient plan of care discussed at interdisciplinary rounds: Yes    Anticipated Discharge Disposition:  Group Home once placement found, relocation worker assigned     Additional Information:  SW received email response from pt's relocation worker Misty stating that she is waiting to hear back from a few places for availability and what level of behaviors they are willing to take. So far most of the places I have contacted with openings report not having the level of care needed for Chris so there has not been any positive updates for me to send. [place] and I am thinking I am going to have to expand my search area. She reports that she will send updates every couple days or when she has updates on any pending referrals. SW will continue to follow.     DANITA Obrien, [facility]   Inpatient Care Coordination  [facility]   [phone]

## 2022-12-17 PROCEDURE — 250N000013 HC RX MED GY IP 250 OP 250 PS 637: Performed by: PHYSICIAN ASSISTANT

## 2022-12-17 PROCEDURE — G0378 HOSPITAL OBSERVATION PER HR: HCPCS

## 2022-12-17 PROCEDURE — 250N000013 HC RX MED GY IP 250 OP 250 PS 637: Performed by: NURSE PRACTITIONER

## 2022-12-17 PROCEDURE — 99207 PR NO CHARGE LOS: CPT | Performed by: NURSE PRACTITIONER

## 2022-12-17 PROCEDURE — 250N000013 HC RX MED GY IP 250 OP 250 PS 637: Performed by: HOSPITALIST

## 2022-12-17 PROCEDURE — 250N000013 HC RX MED GY IP 250 OP 250 PS 637

## 2022-12-17 RX ADMIN — HYDROXYZINE HYDROCHLORIDE 50 MG: 50 TABLET, FILM COATED ORAL at 21:29

## 2022-12-17 RX ADMIN — METFORMIN HYDROCHLORIDE 850 MG: 850 TABLET, FILM COATED ORAL at 16:21

## 2022-12-17 RX ADMIN — DIVALPROEX SODIUM 1000 MG: 500 TABLET, DELAYED RELEASE ORAL at 21:27

## 2022-12-17 RX ADMIN — CLOTRIMAZOLE: 0.01 CREAM TOPICAL at 08:50

## 2022-12-17 RX ADMIN — METOPROLOL SUCCINATE 25 MG: 25 TABLET, EXTENDED RELEASE ORAL at 08:45

## 2022-12-17 RX ADMIN — BACLOFEN 10 MG: 10 TABLET ORAL at 13:37

## 2022-12-17 RX ADMIN — DIVALPROEX SODIUM 250 MG: 500 TABLET, DELAYED RELEASE ORAL at 08:45

## 2022-12-17 RX ADMIN — KETOCONAZOLE: 20 CREAM TOPICAL at 08:50

## 2022-12-17 RX ADMIN — QUETIAPINE FUMARATE 400 MG: 200 TABLET ORAL at 21:27

## 2022-12-17 RX ADMIN — CLONIDINE HYDROCHLORIDE 0.1 MG: 0.1 TABLET ORAL at 21:28

## 2022-12-17 RX ADMIN — LEVOTHYROXINE SODIUM 25 MCG: 0.03 TABLET ORAL at 08:45

## 2022-12-17 RX ADMIN — EMPAGLIFLOZIN 10 MG: 10 TABLET, FILM COATED ORAL at 08:45

## 2022-12-17 RX ADMIN — OLANZAPINE 2.5 MG: 2.5 TABLET, FILM COATED ORAL at 13:37

## 2022-12-17 RX ADMIN — ASPIRIN 81 MG CHEWABLE TABLET 81 MG: 81 TABLET CHEWABLE at 08:45

## 2022-12-17 RX ADMIN — BACLOFEN 10 MG: 10 TABLET ORAL at 08:47

## 2022-12-17 RX ADMIN — POLYETHYLENE GLYCOL 3350 17 G: 17 POWDER, FOR SOLUTION ORAL at 21:29

## 2022-12-17 RX ADMIN — QUETIAPINE 200 MG: 200 TABLET, FILM COATED ORAL at 08:45

## 2022-12-17 RX ADMIN — HYDROCORTISONE: 1 CREAM TOPICAL at 21:29

## 2022-12-17 RX ADMIN — BACLOFEN 10 MG: 10 TABLET ORAL at 21:30

## 2022-12-17 RX ADMIN — POLYETHYLENE GLYCOL 3350 17 G: 17 POWDER, FOR SOLUTION ORAL at 08:46

## 2022-12-17 RX ADMIN — ATORVASTATIN CALCIUM 20 MG: 20 TABLET, FILM COATED ORAL at 21:29

## 2022-12-17 RX ADMIN — VENLAFAXINE HYDROCHLORIDE 150 MG: 150 CAPSULE, EXTENDED RELEASE ORAL at 21:28

## 2022-12-17 RX ADMIN — HYDROXYZINE HYDROCHLORIDE 50 MG: 50 TABLET, FILM COATED ORAL at 13:37

## 2022-12-17 RX ADMIN — CLOTRIMAZOLE: 0.01 CREAM TOPICAL at 21:29

## 2022-12-17 RX ADMIN — SENNOSIDES AND DOCUSATE SODIUM 1 TABLET: 50; 8.6 TABLET ORAL at 08:45

## 2022-12-17 RX ADMIN — DIVALPROEX SODIUM 500 MG: 500 TABLET, DELAYED RELEASE ORAL at 08:45

## 2022-12-17 RX ADMIN — VENLAFAXINE HYDROCHLORIDE 75 MG: 150 CAPSULE, EXTENDED RELEASE ORAL at 21:28

## 2022-12-17 RX ADMIN — CLONIDINE HYDROCHLORIDE 0.1 MG: 0.1 TABLET ORAL at 08:45

## 2022-12-17 RX ADMIN — Medication 10 MG: at 21:28

## 2022-12-17 RX ADMIN — KETOCONAZOLE: 20 CREAM TOPICAL at 21:29

## 2022-12-17 RX ADMIN — MIRTAZAPINE 15 MG: 15 TABLET, FILM COATED ORAL at 21:28

## 2022-12-17 RX ADMIN — Medication 25 MCG: at 08:45

## 2022-12-17 RX ADMIN — HYDROXYZINE HYDROCHLORIDE 50 MG: 50 TABLET, FILM COATED ORAL at 08:46

## 2022-12-17 RX ADMIN — HYDROCORTISONE: 1 CREAM TOPICAL at 08:50

## 2022-12-17 RX ADMIN — QUETIAPINE 200 MG: 200 TABLET, FILM COATED ORAL at 13:37

## 2022-12-17 ASSESSMENT — ACTIVITIES OF DAILY LIVING (ADL)
ADLS_ACUITY_SCORE: 56
ADLS_ACUITY_SCORE: 60
ADLS_ACUITY_SCORE: 56
ADLS_ACUITY_SCORE: 60
ADLS_ACUITY_SCORE: 56
ADLS_ACUITY_SCORE: 56

## 2022-12-17 NOTE — PLAN OF CARE
PRIMARY DIAGNOSIS: placement   OUTPATIENT/OBSERVATION GOALS TO BE MET BEFORE DISCHARGE:  ADLs back to baseline: Yes    Activity and level of assistance: Up with maximum assistance. Consider SW and/or PT evaluation.     Pain status: Pain free.    Return to near baseline physical activity: Yes     Discharge Planner Nurse   Safe discharge environment identified: No  Barriers to discharge: Yes, placement. Cooperative with cares, good appetite. Will keep monitoring.        Entered by: Jose James RN 12/17/2022 10:34 AM     Please review provider order for any additional goals.   Nurse to notify provider when observation goals have been met and patient is ready for discharge.Goal Outcome Evaluation:

## 2022-12-17 NOTE — PLAN OF CARE
PRIMARY DIAGNOSIS: Placement   OUTPATIENT/OBSERVATION GOALS TO BE MET BEFORE DISCHARGE:  1. Pain Status: Pain free.    2. Return to near baseline physical activity: Yes    3. Cleared for discharge by consultants (if involved): Yes    Discharge Planner Nurse   Safe discharge environment identified: No  Barriers to discharge: No       Entered by: Naomy Buckley RN 12/17/2022 5:03 AM      Pt denies pain. No behavior noted. Tolerated oral intake. Waiting for placement.   Please review provider order for any additional goals.   Nurse to notify provider when observation goals have been met and patient is ready for discharge.

## 2022-12-17 NOTE — PLAN OF CARE
PRIMARY DIAGNOSIS: Placement   OUTPATIENT/OBSERVATION GOALS TO BE MET BEFORE DISCHARGE:  1. Pain Status: Pain free.    2. Return to near baseline physical activity: Yes    3. Cleared for discharge by consultants (if involved): Yes    Discharge Planner Nurse   Safe discharge environment identified: No  Barriers to discharge: No       Entered by: Naomy Buckley RN 12/17/2022 12:51 AM      Pt denies pain. No behavior noted. Tolerated oral intake. Waiting for placement.   Please review provider order for any additional goals.   Nurse to notify provider when observation goals have been met and patient is ready for discharge.

## 2022-12-17 NOTE — PLAN OF CARE
PRIMARY DIAGNOSIS: Placement   OUTPATIENT/OBSERVATION GOALS TO BE MET BEFORE DISCHARGE:  ADLs back to baseline: Yes    Activity and level of assistance: Up with maximum assistance. Consider SW and/or PT evaluation.     Pain status: Pain free.    Return to near baseline physical activity: Yes     Discharge Planner Nurse   Safe discharge environment identified: No  Barriers to discharge: Yes, still waiting for placement. Cooperative with cares, calling appropriately. Will keep monitoring.        Entered by: Jose James RN 12/17/2022 5:16 PM     Please review provider order for any additional goals.   Nurse to notify provider when observation goals have been met and patient is ready for discharge.

## 2022-12-17 NOTE — PLAN OF CARE
PRIMARY DIAGNOSIS: Placement   OUTPATIENT/OBSERVATION GOALS TO BE MET BEFORE DISCHARGE:  1. Pain Status: Pain free.    2. Return to near baseline physical activity: Yes    3. Cleared for discharge by consultants (if involved): Yes    Discharge Planner Nurse   Safe discharge environment identified: No  Barriers to discharge: No       Entered by: Naomy Buckley RN 12/16/2022 11:02 PM      Pt denies pain. No behavior noted. Tolerated oral intake. Waiting for placement.   Please review provider order for any additional goals.   Nurse to notify provider when observation goals have been met and patient is ready for discharge.
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
## 2022-12-17 NOTE — PLAN OF CARE
PRIMARY DIAGNOSIS: Placement  OUTPATIENT/OBSERVATION GOALS TO BE MET BEFORE DISCHARGE:  ADLs back to baseline: Yes    Activity and level of assistance: Up with maximum assistance. Consider SW and/or PT evaluation.     Pain status: Pain free.    Return to near baseline physical activity: Yes     Discharge Planner Nurse   Safe discharge environment identified: No  Barriers to discharge: Yes, placement.        Entered by: Jose James RN 12/17/2022 1:51 PM     Please review provider order for any additional goals.   Nurse to notify provider when observation goals have been met and patient is ready for discharge.

## 2022-12-18 PROCEDURE — 250N000013 HC RX MED GY IP 250 OP 250 PS 637: Performed by: PHYSICIAN ASSISTANT

## 2022-12-18 PROCEDURE — 99207 PR NO CHARGE LOS: CPT | Performed by: NURSE PRACTITIONER

## 2022-12-18 PROCEDURE — 250N000013 HC RX MED GY IP 250 OP 250 PS 637

## 2022-12-18 PROCEDURE — 250N000013 HC RX MED GY IP 250 OP 250 PS 637: Performed by: NURSE PRACTITIONER

## 2022-12-18 PROCEDURE — G0378 HOSPITAL OBSERVATION PER HR: HCPCS

## 2022-12-18 PROCEDURE — 250N000013 HC RX MED GY IP 250 OP 250 PS 637: Performed by: HOSPITALIST

## 2022-12-18 RX ORDER — CLONIDINE HYDROCHLORIDE 0.1 MG/1
0.1 TABLET ORAL 3 TIMES DAILY
Status: DISCONTINUED | OUTPATIENT
Start: 2022-12-18 | End: 2023-04-07 | Stop reason: HOSPADM

## 2022-12-18 RX ADMIN — VENLAFAXINE HYDROCHLORIDE 75 MG: 150 CAPSULE, EXTENDED RELEASE ORAL at 21:23

## 2022-12-18 RX ADMIN — EMPAGLIFLOZIN 10 MG: 10 TABLET, FILM COATED ORAL at 09:23

## 2022-12-18 RX ADMIN — BACLOFEN 10 MG: 10 TABLET ORAL at 09:23

## 2022-12-18 RX ADMIN — CLONIDINE HYDROCHLORIDE 0.1 MG: 0.1 TABLET ORAL at 09:23

## 2022-12-18 RX ADMIN — CLOTRIMAZOLE: 0.01 CREAM TOPICAL at 10:45

## 2022-12-18 RX ADMIN — CLOTRIMAZOLE: 0.01 CREAM TOPICAL at 21:25

## 2022-12-18 RX ADMIN — Medication 25 MCG: at 09:23

## 2022-12-18 RX ADMIN — SENNOSIDES AND DOCUSATE SODIUM 1 TABLET: 50; 8.6 TABLET ORAL at 09:23

## 2022-12-18 RX ADMIN — QUETIAPINE FUMARATE 400 MG: 200 TABLET ORAL at 21:25

## 2022-12-18 RX ADMIN — HYDROXYZINE HYDROCHLORIDE 50 MG: 50 TABLET, FILM COATED ORAL at 21:24

## 2022-12-18 RX ADMIN — DIVALPROEX SODIUM 1000 MG: 500 TABLET, DELAYED RELEASE ORAL at 21:25

## 2022-12-18 RX ADMIN — BACLOFEN 10 MG: 10 TABLET ORAL at 21:24

## 2022-12-18 RX ADMIN — KETOCONAZOLE: 20 CREAM TOPICAL at 10:45

## 2022-12-18 RX ADMIN — QUETIAPINE 200 MG: 200 TABLET, FILM COATED ORAL at 13:43

## 2022-12-18 RX ADMIN — LEVOTHYROXINE SODIUM 25 MCG: 0.03 TABLET ORAL at 09:23

## 2022-12-18 RX ADMIN — METOPROLOL SUCCINATE 25 MG: 25 TABLET, EXTENDED RELEASE ORAL at 09:23

## 2022-12-18 RX ADMIN — VENLAFAXINE HYDROCHLORIDE 150 MG: 150 CAPSULE, EXTENDED RELEASE ORAL at 21:24

## 2022-12-18 RX ADMIN — HYDROXYZINE HYDROCHLORIDE 50 MG: 50 TABLET, FILM COATED ORAL at 09:23

## 2022-12-18 RX ADMIN — QUETIAPINE 200 MG: 200 TABLET, FILM COATED ORAL at 09:23

## 2022-12-18 RX ADMIN — KETOCONAZOLE: 20 CREAM TOPICAL at 21:25

## 2022-12-18 RX ADMIN — ATORVASTATIN CALCIUM 20 MG: 20 TABLET, FILM COATED ORAL at 21:23

## 2022-12-18 RX ADMIN — DIVALPROEX SODIUM 750 MG: 500 TABLET, DELAYED RELEASE ORAL at 09:23

## 2022-12-18 RX ADMIN — POLYETHYLENE GLYCOL 3350 17 G: 17 POWDER, FOR SOLUTION ORAL at 09:23

## 2022-12-18 RX ADMIN — HYDROXYZINE HYDROCHLORIDE 50 MG: 50 TABLET, FILM COATED ORAL at 13:43

## 2022-12-18 RX ADMIN — MIRTAZAPINE 15 MG: 15 TABLET, FILM COATED ORAL at 21:24

## 2022-12-18 RX ADMIN — Medication 10 MG: at 21:25

## 2022-12-18 RX ADMIN — BACLOFEN 10 MG: 10 TABLET ORAL at 13:43

## 2022-12-18 RX ADMIN — ASPIRIN 81 MG CHEWABLE TABLET 81 MG: 81 TABLET CHEWABLE at 09:23

## 2022-12-18 RX ADMIN — HYDROCORTISONE: 1 CREAM TOPICAL at 21:25

## 2022-12-18 RX ADMIN — OLANZAPINE 2.5 MG: 2.5 TABLET, FILM COATED ORAL at 13:43

## 2022-12-18 RX ADMIN — METFORMIN HYDROCHLORIDE 850 MG: 850 TABLET, FILM COATED ORAL at 16:30

## 2022-12-18 RX ADMIN — CLONIDINE HYDROCHLORIDE 0.1 MG: 0.1 TABLET ORAL at 21:24

## 2022-12-18 RX ADMIN — POLYETHYLENE GLYCOL 3350 17 G: 17 POWDER, FOR SOLUTION ORAL at 21:26

## 2022-12-18 ASSESSMENT — ACTIVITIES OF DAILY LIVING (ADL)
ADLS_ACUITY_SCORE: 60
ADLS_ACUITY_SCORE: 60
ADLS_ACUITY_SCORE: 56
ADLS_ACUITY_SCORE: 60
ADLS_ACUITY_SCORE: 56
ADLS_ACUITY_SCORE: 60
ADLS_ACUITY_SCORE: 56

## 2022-12-18 NOTE — PLAN OF CARE
PRIMARY DIAGNOSIS: SOCIAL CONCERN  OUTPATIENT/OBSERVATION GOALS TO BE MET BEFORE DISCHARGE:  1. ADLs back to baseline: Yes    2. Activity and level of assistance: Ax1-2 w lift (baseline)    3. Pain status: Pain free.    4. Return to near baseline physical activity: Yes     Discharge Planner Nurse   Safe discharge environment identified: No  Barriers to discharge: Yes - placement       Entered by: Celina Adkins RN 12/18/2022        A&Ox4. Calm, cooperative. Speech garbled - baseline. Able to make needs known & patient dictates cares; did not wish to get up to chair for breakfast or lunch. Tolerating diet. Incontinent bowel/bladder. No rash noted to face.     Please review provider order for any additional goals.   Nurse to notify provider when observation goals have been met and patient is ready for discharge.
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
## 2022-12-18 NOTE — PLAN OF CARE
PRIMARY DIAGNOSIS: SOCIAL CONCERN  OUTPATIENT/OBSERVATION GOALS TO BE MET BEFORE DISCHARGE:  ADLs back to baseline: Yes    Activity and level of assistance: Ax1-2 w lift (baseline)    Pain status: Pain free.    Return to near baseline physical activity: Yes     Discharge Planner Nurse   Safe discharge environment identified: No  Barriers to discharge: Yes - placement       Entered by: Celina Adkins RN 12/18/2022        /91  Pulse 72  Temp 98.2  F (Oral)  Resp 16  SpO2 95% on RA     Patient sleeping, safety check completed.     Please review provider order for any additional goals.   Nurse to notify provider when observation goals have been met and patient is ready for discharge.

## 2022-12-18 NOTE — PLAN OF CARE
PRIMARY DIAGNOSIS: SOCIAL CONCERN  OUTPATIENT/OBSERVATION GOALS TO BE MET BEFORE DISCHARGE:  1. ADLs back to baseline: Yes    2. Activity and level of assistance: Ax1-2 w lift (baseline)    3. Pain status: Pain free.    4. Return to near baseline physical activity: Yes     Discharge Planner Nurse   Safe discharge environment identified: No  Barriers to discharge: Yes - placement       Entered by: Celina Adkins RN 12/18/2022        A&Ox4. Calm, cooperative. Speech garbled - baseline. Able to make needs known & patient dictates cares. Tolerating diet. Incontinent bowel/bladder. No rash noted to face. Son visiting this evening.    Please review provider order for any additional goals.   Nurse to notify provider when observation goals have been met and patient is ready for discharge.

## 2022-12-19 PROCEDURE — 250N000013 HC RX MED GY IP 250 OP 250 PS 637: Performed by: NURSE PRACTITIONER

## 2022-12-19 PROCEDURE — G0378 HOSPITAL OBSERVATION PER HR: HCPCS

## 2022-12-19 PROCEDURE — 250N000013 HC RX MED GY IP 250 OP 250 PS 637: Performed by: PHYSICIAN ASSISTANT

## 2022-12-19 PROCEDURE — 99224 PR SUBSEQUENT OBSERVATION CARE,LEVEL I: CPT | Performed by: PHYSICIAN ASSISTANT

## 2022-12-19 PROCEDURE — 250N000013 HC RX MED GY IP 250 OP 250 PS 637

## 2022-12-19 PROCEDURE — 250N000013 HC RX MED GY IP 250 OP 250 PS 637: Performed by: HOSPITALIST

## 2022-12-19 RX ORDER — KETOCONAZOLE 20 MG/G
CREAM TOPICAL 2 TIMES DAILY PRN
Status: DISCONTINUED | OUTPATIENT
Start: 2022-12-19 | End: 2023-02-15

## 2022-12-19 RX ADMIN — KETOCONAZOLE: 20 CREAM TOPICAL at 21:01

## 2022-12-19 RX ADMIN — HYDROCORTISONE: 1 CREAM TOPICAL at 21:01

## 2022-12-19 RX ADMIN — BACLOFEN 10 MG: 10 TABLET ORAL at 08:44

## 2022-12-19 RX ADMIN — CLONIDINE HYDROCHLORIDE 0.1 MG: 0.1 TABLET ORAL at 08:43

## 2022-12-19 RX ADMIN — DIVALPROEX SODIUM 1000 MG: 500 TABLET, DELAYED RELEASE ORAL at 21:00

## 2022-12-19 RX ADMIN — CLOTRIMAZOLE: 0.01 CREAM TOPICAL at 21:01

## 2022-12-19 RX ADMIN — QUETIAPINE FUMARATE 400 MG: 200 TABLET ORAL at 21:00

## 2022-12-19 RX ADMIN — HYDROXYZINE HYDROCHLORIDE 50 MG: 50 TABLET, FILM COATED ORAL at 08:44

## 2022-12-19 RX ADMIN — POLYETHYLENE GLYCOL 3350 17 G: 17 POWDER, FOR SOLUTION ORAL at 21:00

## 2022-12-19 RX ADMIN — CLONIDINE HYDROCHLORIDE 0.1 MG: 0.1 TABLET ORAL at 13:40

## 2022-12-19 RX ADMIN — POLYETHYLENE GLYCOL 3350 17 G: 17 POWDER, FOR SOLUTION ORAL at 08:44

## 2022-12-19 RX ADMIN — CLOTRIMAZOLE: 0.01 CREAM TOPICAL at 08:44

## 2022-12-19 RX ADMIN — LEVOTHYROXINE SODIUM 25 MCG: 0.03 TABLET ORAL at 08:44

## 2022-12-19 RX ADMIN — METFORMIN HYDROCHLORIDE 850 MG: 850 TABLET, FILM COATED ORAL at 16:52

## 2022-12-19 RX ADMIN — CLONIDINE HYDROCHLORIDE 0.1 MG: 0.1 TABLET ORAL at 21:01

## 2022-12-19 RX ADMIN — SENNOSIDES AND DOCUSATE SODIUM 1 TABLET: 50; 8.6 TABLET ORAL at 08:43

## 2022-12-19 RX ADMIN — DIVALPROEX SODIUM 750 MG: 500 TABLET, DELAYED RELEASE ORAL at 08:43

## 2022-12-19 RX ADMIN — ASPIRIN 81 MG CHEWABLE TABLET 81 MG: 81 TABLET CHEWABLE at 08:44

## 2022-12-19 RX ADMIN — HYDROXYZINE HYDROCHLORIDE 50 MG: 50 TABLET, FILM COATED ORAL at 13:41

## 2022-12-19 RX ADMIN — QUETIAPINE 200 MG: 200 TABLET, FILM COATED ORAL at 08:45

## 2022-12-19 RX ADMIN — Medication 25 MCG: at 08:44

## 2022-12-19 RX ADMIN — BACLOFEN 10 MG: 10 TABLET ORAL at 13:40

## 2022-12-19 RX ADMIN — METOPROLOL SUCCINATE 25 MG: 25 TABLET, EXTENDED RELEASE ORAL at 08:44

## 2022-12-19 RX ADMIN — VENLAFAXINE HYDROCHLORIDE 75 MG: 150 CAPSULE, EXTENDED RELEASE ORAL at 21:01

## 2022-12-19 RX ADMIN — MIRTAZAPINE 15 MG: 15 TABLET, FILM COATED ORAL at 21:01

## 2022-12-19 RX ADMIN — ATORVASTATIN CALCIUM 20 MG: 20 TABLET, FILM COATED ORAL at 21:00

## 2022-12-19 RX ADMIN — Medication 10 MG: at 21:00

## 2022-12-19 RX ADMIN — HYDROCORTISONE: 1 CREAM TOPICAL at 08:44

## 2022-12-19 RX ADMIN — KETOCONAZOLE: 20 CREAM TOPICAL at 08:44

## 2022-12-19 RX ADMIN — VENLAFAXINE HYDROCHLORIDE 150 MG: 150 CAPSULE, EXTENDED RELEASE ORAL at 21:01

## 2022-12-19 RX ADMIN — BACLOFEN 10 MG: 10 TABLET ORAL at 21:00

## 2022-12-19 RX ADMIN — EMPAGLIFLOZIN 10 MG: 10 TABLET, FILM COATED ORAL at 08:44

## 2022-12-19 RX ADMIN — HYDROXYZINE HYDROCHLORIDE 50 MG: 50 TABLET, FILM COATED ORAL at 21:01

## 2022-12-19 RX ADMIN — OLANZAPINE 2.5 MG: 2.5 TABLET, FILM COATED ORAL at 13:40

## 2022-12-19 RX ADMIN — QUETIAPINE 200 MG: 200 TABLET, FILM COATED ORAL at 13:40

## 2022-12-19 ASSESSMENT — ACTIVITIES OF DAILY LIVING (ADL)
ADLS_ACUITY_SCORE: 56
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
## 2022-12-20 PROCEDURE — 250N000013 HC RX MED GY IP 250 OP 250 PS 637: Performed by: NURSE PRACTITIONER

## 2022-12-20 PROCEDURE — 250N000013 HC RX MED GY IP 250 OP 250 PS 637: Performed by: PHYSICIAN ASSISTANT

## 2022-12-20 PROCEDURE — 250N000013 HC RX MED GY IP 250 OP 250 PS 637

## 2022-12-20 PROCEDURE — 99224 PR SUBSEQUENT OBSERVATION CARE,LEVEL I: CPT | Performed by: PHYSICIAN ASSISTANT

## 2022-12-20 PROCEDURE — 250N000013 HC RX MED GY IP 250 OP 250 PS 637: Performed by: HOSPITALIST

## 2022-12-20 PROCEDURE — G0378 HOSPITAL OBSERVATION PER HR: HCPCS

## 2022-12-20 RX ADMIN — METFORMIN HYDROCHLORIDE 850 MG: 850 TABLET, FILM COATED ORAL at 16:15

## 2022-12-20 RX ADMIN — BACLOFEN 10 MG: 10 TABLET ORAL at 08:28

## 2022-12-20 RX ADMIN — HYDROCORTISONE: 1 CREAM TOPICAL at 21:19

## 2022-12-20 RX ADMIN — BACLOFEN 10 MG: 10 TABLET ORAL at 13:11

## 2022-12-20 RX ADMIN — SENNOSIDES AND DOCUSATE SODIUM 1 TABLET: 50; 8.6 TABLET ORAL at 08:28

## 2022-12-20 RX ADMIN — VENLAFAXINE HYDROCHLORIDE 150 MG: 150 CAPSULE, EXTENDED RELEASE ORAL at 21:18

## 2022-12-20 RX ADMIN — MIRTAZAPINE 15 MG: 15 TABLET, FILM COATED ORAL at 21:18

## 2022-12-20 RX ADMIN — BACLOFEN 10 MG: 10 TABLET ORAL at 21:17

## 2022-12-20 RX ADMIN — METOPROLOL SUCCINATE 25 MG: 25 TABLET, EXTENDED RELEASE ORAL at 08:28

## 2022-12-20 RX ADMIN — EMPAGLIFLOZIN 10 MG: 10 TABLET, FILM COATED ORAL at 08:28

## 2022-12-20 RX ADMIN — HYDROXYZINE HYDROCHLORIDE 50 MG: 50 TABLET, FILM COATED ORAL at 08:28

## 2022-12-20 RX ADMIN — DIVALPROEX SODIUM 1000 MG: 500 TABLET, DELAYED RELEASE ORAL at 21:17

## 2022-12-20 RX ADMIN — Medication 25 MCG: at 08:28

## 2022-12-20 RX ADMIN — CLONIDINE HYDROCHLORIDE 0.1 MG: 0.1 TABLET ORAL at 08:28

## 2022-12-20 RX ADMIN — POLYETHYLENE GLYCOL 3350 17 G: 17 POWDER, FOR SOLUTION ORAL at 08:34

## 2022-12-20 RX ADMIN — QUETIAPINE FUMARATE 400 MG: 200 TABLET ORAL at 21:17

## 2022-12-20 RX ADMIN — LEVOTHYROXINE SODIUM 25 MCG: 0.03 TABLET ORAL at 08:28

## 2022-12-20 RX ADMIN — ATORVASTATIN CALCIUM 20 MG: 20 TABLET, FILM COATED ORAL at 21:17

## 2022-12-20 RX ADMIN — HYDROXYZINE HYDROCHLORIDE 50 MG: 50 TABLET, FILM COATED ORAL at 21:17

## 2022-12-20 RX ADMIN — HYDROXYZINE HYDROCHLORIDE 50 MG: 50 TABLET, FILM COATED ORAL at 13:11

## 2022-12-20 RX ADMIN — HYDROCORTISONE: 1 CREAM TOPICAL at 08:28

## 2022-12-20 RX ADMIN — ASPIRIN 81 MG CHEWABLE TABLET 81 MG: 81 TABLET CHEWABLE at 08:28

## 2022-12-20 RX ADMIN — KETOCONAZOLE: 20 CREAM TOPICAL at 21:19

## 2022-12-20 RX ADMIN — CLOTRIMAZOLE: 0.01 CREAM TOPICAL at 08:28

## 2022-12-20 RX ADMIN — CLONIDINE HYDROCHLORIDE 0.1 MG: 0.1 TABLET ORAL at 13:11

## 2022-12-20 RX ADMIN — CLOTRIMAZOLE: 0.01 CREAM TOPICAL at 21:20

## 2022-12-20 RX ADMIN — OLANZAPINE 2.5 MG: 2.5 TABLET, FILM COATED ORAL at 13:11

## 2022-12-20 RX ADMIN — CLONIDINE HYDROCHLORIDE 0.1 MG: 0.1 TABLET ORAL at 21:17

## 2022-12-20 RX ADMIN — QUETIAPINE 200 MG: 200 TABLET, FILM COATED ORAL at 13:11

## 2022-12-20 RX ADMIN — POLYETHYLENE GLYCOL 3350 17 G: 17 POWDER, FOR SOLUTION ORAL at 21:18

## 2022-12-20 RX ADMIN — DIVALPROEX SODIUM 750 MG: 500 TABLET, DELAYED RELEASE ORAL at 08:27

## 2022-12-20 RX ADMIN — VENLAFAXINE HYDROCHLORIDE 75 MG: 150 CAPSULE, EXTENDED RELEASE ORAL at 21:18

## 2022-12-20 RX ADMIN — KETOCONAZOLE: 20 CREAM TOPICAL at 08:28

## 2022-12-20 RX ADMIN — QUETIAPINE 200 MG: 200 TABLET, FILM COATED ORAL at 08:28

## 2022-12-20 RX ADMIN — Medication 10 MG: at 21:18

## 2022-12-20 ASSESSMENT — ACTIVITIES OF DAILY LIVING (ADL)
ADLS_ACUITY_SCORE: 54
ADLS_ACUITY_SCORE: 56
ADLS_ACUITY_SCORE: 54
ADLS_ACUITY_SCORE: 54
ADLS_ACUITY_SCORE: 56
ADLS_ACUITY_SCORE: 54
ADLS_ACUITY_SCORE: 56
ADLS_ACUITY_SCORE: 56
ADLS_ACUITY_SCORE: 54
ADLS_ACUITY_SCORE: 56
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
## 2022-12-20 NOTE — PLAN OF CARE
PRIMARY DIAGNOSIS: PLACEMENT  OUTPATIENT/OBSERVATION GOALS TO BE MET BEFORE DISCHARGE:  1. ADLs back to baseline: Yes    2. Activity and level of assistance: Up with maximum assistance. Total lift @ baseline    3. Pain status: Pain free.    4. Return to near baseline physical activity: Yes     Discharge Planner Nurse   Safe discharge environment identified: No  Barriers to discharge: Yes       Entered by: Sumaya Mccrary RN 12/20/2022 5:22 AM    Pt A&Ox4. Pt denies any pain at this time. Able to make needs known. Incontinent of bowel and bladder. Pt has no IV access. Awaiting placement. Will continue to monitor.     Please review provider order for any additional goals.   Nurse to notify provider when observation goals have been met and patient is ready for discharge.
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
## 2022-12-20 NOTE — PLAN OF CARE
PRIMARY DIAGNOSIS: PLACEMENT  OUTPATIENT/OBSERVATION GOALS TO BE MET BEFORE DISCHARGE:  1. ADLs back to baseline: Yes    2. Activity and level of assistance: Up with maximum assistance, total lift @ baseline    3. Pain status: Pain free.    4. Return to near baseline physical activity: Yes     Discharge Planner Nurse   Safe discharge environment identified: No  Barriers to discharge: Yes       Entered by: Sumaya Mccrary RN 12/20/2022 12:28 AM    /85 (BP Location: Right arm)   Pulse 77   Temp 97.9  F (36.6  C) (Oral)   Resp 18   Wt 99.9 kg (220 lb 4.8 oz)   SpO2 95%      Please review provider order for any additional goals.   Nurse to notify provider when observation goals have been met and patient is ready for discharge.
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
## 2022-12-21 PROCEDURE — 250N000013 HC RX MED GY IP 250 OP 250 PS 637: Performed by: NURSE PRACTITIONER

## 2022-12-21 PROCEDURE — 250N000013 HC RX MED GY IP 250 OP 250 PS 637

## 2022-12-21 PROCEDURE — 250N000013 HC RX MED GY IP 250 OP 250 PS 637: Performed by: PHYSICIAN ASSISTANT

## 2022-12-21 PROCEDURE — 99224 PR SUBSEQUENT OBSERVATION CARE,LEVEL I: CPT | Performed by: PHYSICIAN ASSISTANT

## 2022-12-21 PROCEDURE — G0378 HOSPITAL OBSERVATION PER HR: HCPCS

## 2022-12-21 PROCEDURE — 250N000013 HC RX MED GY IP 250 OP 250 PS 637: Performed by: HOSPITALIST

## 2022-12-21 RX ADMIN — VENLAFAXINE HYDROCHLORIDE 150 MG: 150 CAPSULE, EXTENDED RELEASE ORAL at 21:36

## 2022-12-21 RX ADMIN — OLANZAPINE 2.5 MG: 2.5 TABLET, FILM COATED ORAL at 13:06

## 2022-12-21 RX ADMIN — ASPIRIN 81 MG CHEWABLE TABLET 81 MG: 81 TABLET CHEWABLE at 08:08

## 2022-12-21 RX ADMIN — ATORVASTATIN CALCIUM 20 MG: 20 TABLET, FILM COATED ORAL at 21:28

## 2022-12-21 RX ADMIN — HYDROXYZINE HYDROCHLORIDE 50 MG: 50 TABLET, FILM COATED ORAL at 13:06

## 2022-12-21 RX ADMIN — HYDROCORTISONE: 1 CREAM TOPICAL at 21:35

## 2022-12-21 RX ADMIN — SENNOSIDES AND DOCUSATE SODIUM 1 TABLET: 50; 8.6 TABLET ORAL at 08:08

## 2022-12-21 RX ADMIN — QUETIAPINE FUMARATE 400 MG: 200 TABLET ORAL at 21:28

## 2022-12-21 RX ADMIN — HYDROXYZINE HYDROCHLORIDE 50 MG: 50 TABLET, FILM COATED ORAL at 21:29

## 2022-12-21 RX ADMIN — CLOTRIMAZOLE: 0.01 CREAM TOPICAL at 08:07

## 2022-12-21 RX ADMIN — DIVALPROEX SODIUM 750 MG: 500 TABLET, DELAYED RELEASE ORAL at 08:07

## 2022-12-21 RX ADMIN — CLONIDINE HYDROCHLORIDE 0.1 MG: 0.1 TABLET ORAL at 08:08

## 2022-12-21 RX ADMIN — BACLOFEN 10 MG: 10 TABLET ORAL at 08:07

## 2022-12-21 RX ADMIN — EMPAGLIFLOZIN 10 MG: 10 TABLET, FILM COATED ORAL at 08:08

## 2022-12-21 RX ADMIN — QUETIAPINE 200 MG: 200 TABLET, FILM COATED ORAL at 13:06

## 2022-12-21 RX ADMIN — Medication 10 MG: at 21:30

## 2022-12-21 RX ADMIN — BACLOFEN 10 MG: 10 TABLET ORAL at 13:06

## 2022-12-21 RX ADMIN — MIRTAZAPINE 15 MG: 15 TABLET, FILM COATED ORAL at 21:28

## 2022-12-21 RX ADMIN — CLONIDINE HYDROCHLORIDE 0.1 MG: 0.1 TABLET ORAL at 13:06

## 2022-12-21 RX ADMIN — METOPROLOL SUCCINATE 25 MG: 25 TABLET, EXTENDED RELEASE ORAL at 08:08

## 2022-12-21 RX ADMIN — KETOCONAZOLE: 20 CREAM TOPICAL at 08:07

## 2022-12-21 RX ADMIN — LEVOTHYROXINE SODIUM 25 MCG: 0.03 TABLET ORAL at 08:08

## 2022-12-21 RX ADMIN — POLYETHYLENE GLYCOL 3350 17 G: 17 POWDER, FOR SOLUTION ORAL at 08:07

## 2022-12-21 RX ADMIN — Medication 25 MCG: at 08:08

## 2022-12-21 RX ADMIN — POLYETHYLENE GLYCOL 3350 17 G: 17 POWDER, FOR SOLUTION ORAL at 21:28

## 2022-12-21 RX ADMIN — VENLAFAXINE HYDROCHLORIDE 75 MG: 150 CAPSULE, EXTENDED RELEASE ORAL at 21:36

## 2022-12-21 RX ADMIN — CLOTRIMAZOLE: 0.01 CREAM TOPICAL at 21:35

## 2022-12-21 RX ADMIN — CLONIDINE HYDROCHLORIDE 0.1 MG: 0.1 TABLET ORAL at 21:29

## 2022-12-21 RX ADMIN — DIVALPROEX SODIUM 1000 MG: 500 TABLET, DELAYED RELEASE ORAL at 21:29

## 2022-12-21 RX ADMIN — BACLOFEN 10 MG: 10 TABLET ORAL at 21:29

## 2022-12-21 RX ADMIN — HYDROXYZINE HYDROCHLORIDE 50 MG: 50 TABLET, FILM COATED ORAL at 08:08

## 2022-12-21 RX ADMIN — QUETIAPINE 200 MG: 200 TABLET, FILM COATED ORAL at 08:07

## 2022-12-21 RX ADMIN — METFORMIN HYDROCHLORIDE 850 MG: 850 TABLET, FILM COATED ORAL at 17:47

## 2022-12-21 ASSESSMENT — ACTIVITIES OF DAILY LIVING (ADL)
ADLS_ACUITY_SCORE: 54

## 2022-12-21 NOTE — PLAN OF CARE
PRIMARY DIAGNOSIS:  Placement  OUTPATIENT/OBSERVATION GOALS TO BE MET BEFORE DISCHARGE:  1. ADLs back to baseline: Yes    2. Activity and level of assistance: x2 assist    3. Pain status: Pain free.    4. Return to near baseline physical activity: Yes     Discharge Planner Nurse   Safe discharge environment identified: No  Barriers to discharge: Yes   Pt called to be changed, turned and repositioned.        Entered by: Soraida Camp RN 12/20/2022 00:00     Please review provider order for any additional goals.   Nurse to notify provider when observation goals have been met and patient is ready for discharge.

## 2022-12-21 NOTE — PROGRESS NOTES
Care Management Follow Up    Expected Discharge Date: 01/31/2023     Concerns to be Addressed: Discharge planning      Patient plan of care discussed at interdisciplinary rounds: Yes    Anticipated Discharge Disposition:  Group Home once placement found by relocation worker     Patient/Family in Agreement with the Plan:  Yes    Additional Information:  SW emailed pt's relocation worker Misty requesting an update on placement search. Awaiting response. SW will continue to follow.     DANITA Obrien, [facility]   Inpatient Care Coordination  [facility]   [phone]
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
## 2022-12-21 NOTE — PROGRESS NOTES
PRIMARY DIAGNOSIS: Placement    OUTPATIENT/OBSERVATION GOALS TO BE MET BEFORE DISCHARGE:  1. ADLs back to baseline: Yes     2. Activity and level of assistance: Up with maximum assistance. Baseline Total Lift parapalegia     3. Pain status: Pain free.     4. Return to near baseline physical activity: Yes          Discharge Planner Nurse   Safe discharge environment identified: No  Barriers to discharge: Yes; placement       Entered by: Vanna Patel RN 12/20/2022 9:22 AM  Please review provider order for any additional goals.   Nurse to notify provider when observation goals have been met and patient is ready for discharge.Goal Outcome Evaluation:     A&Ox4. Calm, cooperative. Speech garbled - baseline. Able to make needs known & patient dictates cares; did not wish to get up to chair for breakfast, skin assessment WNL Tolerating diet and oral meds. Incontinent bowel/bladder

## 2022-12-21 NOTE — PLAN OF CARE
PRIMARY DIAGNOSIS:  Placement  OUTPATIENT/OBSERVATION GOALS TO BE MET BEFORE DISCHARGE:  ADLs back to baseline: Yes    Activity and level of assistance: x2 assist    Pain status: Pain free.    Return to near baseline physical activity: Yes     Discharge Planner Nurse   Safe discharge environment identified: No  Barriers to discharge: Yes       Entered by: Soraida Camp RN 12/20/2022 20:00     Please review provider order for any additional goals.   Nurse to notify provider when observation goals have been met and patient is ready for discharge.
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
## 2022-12-22 LAB — GLUCOSE BLDC GLUCOMTR-MCNC: 131 MG/DL (ref 70–99)

## 2022-12-22 PROCEDURE — 250N000013 HC RX MED GY IP 250 OP 250 PS 637: Performed by: PHYSICIAN ASSISTANT

## 2022-12-22 PROCEDURE — 250N000013 HC RX MED GY IP 250 OP 250 PS 637: Performed by: NURSE PRACTITIONER

## 2022-12-22 PROCEDURE — 250N000013 HC RX MED GY IP 250 OP 250 PS 637: Performed by: HOSPITALIST

## 2022-12-22 PROCEDURE — G0378 HOSPITAL OBSERVATION PER HR: HCPCS

## 2022-12-22 PROCEDURE — 99224 PR SUBSEQUENT OBSERVATION CARE,LEVEL I: CPT | Performed by: PHYSICIAN ASSISTANT

## 2022-12-22 PROCEDURE — 82962 GLUCOSE BLOOD TEST: CPT

## 2022-12-22 PROCEDURE — 250N000013 HC RX MED GY IP 250 OP 250 PS 637

## 2022-12-22 RX ADMIN — METOPROLOL SUCCINATE 25 MG: 25 TABLET, EXTENDED RELEASE ORAL at 10:23

## 2022-12-22 RX ADMIN — OLANZAPINE 2.5 MG: 2.5 TABLET, FILM COATED ORAL at 14:40

## 2022-12-22 RX ADMIN — ASPIRIN 81 MG CHEWABLE TABLET 81 MG: 81 TABLET CHEWABLE at 10:23

## 2022-12-22 RX ADMIN — DIVALPROEX SODIUM 750 MG: 500 TABLET, DELAYED RELEASE ORAL at 10:19

## 2022-12-22 RX ADMIN — POLYETHYLENE GLYCOL 3350 17 G: 17 POWDER, FOR SOLUTION ORAL at 21:16

## 2022-12-22 RX ADMIN — QUETIAPINE 200 MG: 200 TABLET, FILM COATED ORAL at 14:40

## 2022-12-22 RX ADMIN — MIRTAZAPINE 15 MG: 15 TABLET, FILM COATED ORAL at 21:16

## 2022-12-22 RX ADMIN — CLOTRIMAZOLE: 0.01 CREAM TOPICAL at 10:31

## 2022-12-22 RX ADMIN — QUETIAPINE 200 MG: 200 TABLET, FILM COATED ORAL at 10:22

## 2022-12-22 RX ADMIN — HYDROXYZINE HYDROCHLORIDE 50 MG: 50 TABLET, FILM COATED ORAL at 14:40

## 2022-12-22 RX ADMIN — SENNOSIDES AND DOCUSATE SODIUM 1 TABLET: 50; 8.6 TABLET ORAL at 10:23

## 2022-12-22 RX ADMIN — ATORVASTATIN CALCIUM 20 MG: 20 TABLET, FILM COATED ORAL at 21:16

## 2022-12-22 RX ADMIN — Medication 10 MG: at 21:15

## 2022-12-22 RX ADMIN — HYDROCORTISONE: 1 CREAM TOPICAL at 10:31

## 2022-12-22 RX ADMIN — VENLAFAXINE HYDROCHLORIDE 150 MG: 150 CAPSULE, EXTENDED RELEASE ORAL at 21:16

## 2022-12-22 RX ADMIN — QUETIAPINE FUMARATE 400 MG: 200 TABLET ORAL at 21:15

## 2022-12-22 RX ADMIN — Medication 25 MCG: at 10:23

## 2022-12-22 RX ADMIN — HYDROCORTISONE: 1 CREAM TOPICAL at 21:16

## 2022-12-22 RX ADMIN — POLYETHYLENE GLYCOL 3350 17 G: 17 POWDER, FOR SOLUTION ORAL at 10:24

## 2022-12-22 RX ADMIN — VENLAFAXINE HYDROCHLORIDE 75 MG: 150 CAPSULE, EXTENDED RELEASE ORAL at 21:17

## 2022-12-22 RX ADMIN — BACLOFEN 10 MG: 10 TABLET ORAL at 21:16

## 2022-12-22 RX ADMIN — BACLOFEN 10 MG: 10 TABLET ORAL at 10:31

## 2022-12-22 RX ADMIN — KETOCONAZOLE: 20 CREAM TOPICAL at 21:17

## 2022-12-22 RX ADMIN — CLONIDINE HYDROCHLORIDE 0.1 MG: 0.1 TABLET ORAL at 10:22

## 2022-12-22 RX ADMIN — CLONIDINE HYDROCHLORIDE 0.1 MG: 0.1 TABLET ORAL at 14:40

## 2022-12-22 RX ADMIN — LEVOTHYROXINE SODIUM 25 MCG: 0.03 TABLET ORAL at 10:23

## 2022-12-22 RX ADMIN — HYDROXYZINE HYDROCHLORIDE 50 MG: 50 TABLET, FILM COATED ORAL at 21:16

## 2022-12-22 RX ADMIN — KETOCONAZOLE: 20 CREAM TOPICAL at 21:16

## 2022-12-22 RX ADMIN — HYDROXYZINE HYDROCHLORIDE 50 MG: 50 TABLET, FILM COATED ORAL at 10:23

## 2022-12-22 RX ADMIN — CLOTRIMAZOLE: 0.01 CREAM TOPICAL at 21:17

## 2022-12-22 RX ADMIN — CLONIDINE HYDROCHLORIDE 0.1 MG: 0.1 TABLET ORAL at 21:16

## 2022-12-22 RX ADMIN — BACLOFEN 10 MG: 10 TABLET ORAL at 14:40

## 2022-12-22 RX ADMIN — DIVALPROEX SODIUM 1000 MG: 500 TABLET, DELAYED RELEASE ORAL at 21:15

## 2022-12-22 RX ADMIN — EMPAGLIFLOZIN 10 MG: 10 TABLET, FILM COATED ORAL at 10:23

## 2022-12-22 RX ADMIN — METFORMIN HYDROCHLORIDE 850 MG: 850 TABLET, FILM COATED ORAL at 17:45

## 2022-12-22 ASSESSMENT — ACTIVITIES OF DAILY LIVING (ADL)
ADLS_ACUITY_SCORE: 54

## 2022-12-22 NOTE — PROGRESS NOTES
"Care Management Follow Up    Expected Discharge Date: 01/31/2023     Concerns to be Addressed:  Discharge planning    Patient plan of care discussed at interdisciplinary rounds: No    Anticipated Discharge Disposition: New GH once placement found by relocation worker     Private pay costs discussed: Not applicable    Additional Information:  SW received email back from relocation worker with an update:  \"I am in contact with Karsten ([facility]) about openings for Chris. They have a few locations that are able to take him and they have specific BI housing that we might be able to get him in to as well so I am hoping the process will be quick for that. They wanted some paperwork sent back to them to consider placements for him.  I also contacted [facility] and am waiting for a call back from them.\"    SW will continue to follow.     DANITA Obrien, [facility]   Inpatient Care Coordination  [facility]   [phone]      "

## 2022-12-22 NOTE — PLAN OF CARE
PRIMARY DIAGNOSIS: PLACEMENT  OUTPATIENT/OBSERVATION GOALS TO BE MET BEFORE DISCHARGE:  1. ADLs back to baseline: Yes    2. Activity and level of assistance: Up with maximum assistance.   3. Pain status: Pain free.    4. Return to near baseline physical activity: Yes     Discharge Planner Nurse   Safe discharge environment identified: No  Barriers to discharge: Yes       Entered by: Annalee Echevarria RN 12/22/2022    /81 (BP Location: Right arm)   Pulse 76   Temp 97.5  F (36.4  C) (Oral)   Resp 18   Wt 99.9 kg (220 lb 4.8 oz)   SpO2 95%    Pt. Alert & oriented x 4. Denies pain.Cooperative with cares.No IV access.Changed & repositioned. Awaiting placement. Resting comfortably listening to music. Plan of care continues.     Please review provider order for any additional goals.   Nurse to notify provider when observation goals have been met and patient is ready for discharge.Goal Outcome Evaluation:

## 2022-12-22 NOTE — PLAN OF CARE
PRIMARY DIAGNOSIS: PLACEMENT  OUTPATIENT/OBSERVATION GOALS TO BE MET BEFORE DISCHARGE:  1. ADLs back to baseline: Yes    2. Activity and level of assistance: Up with maximum assistance.   3. Pain status: Pain free.    4. Return to near baseline physical activity: Yes     Discharge Planner Nurse   Safe discharge environment identified: No  Barriers to discharge: Yes       Entered by: Annalee Echevarria RN 12/21/2022    /81 (BP Location: Right arm)   Pulse 76   Temp 97.5  F (36.4  C) (Oral)   Resp 18   Wt 99.9 kg (220 lb 4.8 oz)   SpO2 95%      Please review provider order for any additional goals.   Nurse to notify provider when observation goals have been met and patient is ready for discharge.Goal Outcome Evaluation:

## 2022-12-22 NOTE — PLAN OF CARE
PRIMARY DIAGNOSIS: PLACEMENT     OUTPATIENT/OBSERVATION GOALS TO BE MET BEFORE DISCHARGE:     ADLs back to baseline: Yes     Activity and level of assistance: Up with maximum assistance.      Pain status: Pain free.     Return to near baseline physical activity: Yes          Discharge Planner Nurse   Safe discharge environment identified: No  Barriers to discharge: Yes       Entered by: Beatris Collazo RN 12/22/2022     BP (!) 146/99 (BP Location: Right arm)   Pulse 79   Temp 98  F (36.7  C) (Oral)   Resp 16   Wt 99.9 kg (220 lb 4.8 oz)   SpO2 96%     Patient slept in. Uses call light appropriately. A/Ox4. Denies pain. A-2 q2h turn. Patient refused to get to chair for breakfast.

## 2022-12-22 NOTE — PLAN OF CARE
PRIMARY DIAGNOSIS: PLACEMENT    OUTPATIENT/OBSERVATION GOALS TO BE MET BEFORE DISCHARGE:    ADLs back to baseline: Yes     Activity and level of assistance: Up with maximum assistance.     Pain status: Pain free.     Return to near baseline physical activity: Yes          Discharge Planner Nurse   Safe discharge environment identified: No  Barriers to discharge: Yes       Entered by: Beatris Collazo RN 12/22/2022     /87 (BP Location: Right arm)   Pulse 75   Temp 97.5  F (36.4  C) (Oral)   Resp 16   Wt 99.9 kg (220 lb 4.8 oz)   SpO2 95%     Patient slept in. Uses call light appropriately. A/Ox4. Denies pain. A-2 q2h turn. Patient refused to get to chair for breakfast.
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
## 2022-12-23 PROCEDURE — 250N000013 HC RX MED GY IP 250 OP 250 PS 637: Performed by: HOSPITALIST

## 2022-12-23 PROCEDURE — 250N000013 HC RX MED GY IP 250 OP 250 PS 637: Performed by: NURSE PRACTITIONER

## 2022-12-23 PROCEDURE — G0378 HOSPITAL OBSERVATION PER HR: HCPCS

## 2022-12-23 PROCEDURE — 250N000013 HC RX MED GY IP 250 OP 250 PS 637

## 2022-12-23 PROCEDURE — 99224 PR SUBSEQUENT OBSERVATION CARE,LEVEL I: CPT | Performed by: HOSPITALIST

## 2022-12-23 PROCEDURE — 250N000013 HC RX MED GY IP 250 OP 250 PS 637: Performed by: PHYSICIAN ASSISTANT

## 2022-12-23 RX ORDER — AMOXICILLIN 250 MG
1 CAPSULE ORAL DAILY PRN
Status: DISCONTINUED | OUTPATIENT
Start: 2022-12-23 | End: 2023-04-07 | Stop reason: HOSPADM

## 2022-12-23 RX ORDER — POLYETHYLENE GLYCOL 3350 17 G/17G
17 POWDER, FOR SOLUTION ORAL DAILY
Status: DISCONTINUED | OUTPATIENT
Start: 2022-12-24 | End: 2023-04-07 | Stop reason: HOSPADM

## 2022-12-23 RX ADMIN — HYDROXYZINE HYDROCHLORIDE 50 MG: 50 TABLET, FILM COATED ORAL at 07:53

## 2022-12-23 RX ADMIN — SENNOSIDES AND DOCUSATE SODIUM 1 TABLET: 50; 8.6 TABLET ORAL at 07:52

## 2022-12-23 RX ADMIN — EMPAGLIFLOZIN 10 MG: 10 TABLET, FILM COATED ORAL at 07:52

## 2022-12-23 RX ADMIN — Medication 25 MCG: at 07:53

## 2022-12-23 RX ADMIN — VENLAFAXINE HYDROCHLORIDE 150 MG: 150 CAPSULE, EXTENDED RELEASE ORAL at 21:17

## 2022-12-23 RX ADMIN — ATORVASTATIN CALCIUM 20 MG: 20 TABLET, FILM COATED ORAL at 21:17

## 2022-12-23 RX ADMIN — HYDROCORTISONE: 1 CREAM TOPICAL at 21:16

## 2022-12-23 RX ADMIN — CLONIDINE HYDROCHLORIDE 0.1 MG: 0.1 TABLET ORAL at 07:53

## 2022-12-23 RX ADMIN — BACLOFEN 10 MG: 10 TABLET ORAL at 14:03

## 2022-12-23 RX ADMIN — DIVALPROEX SODIUM 750 MG: 500 TABLET, DELAYED RELEASE ORAL at 07:53

## 2022-12-23 RX ADMIN — BACLOFEN 10 MG: 10 TABLET ORAL at 21:17

## 2022-12-23 RX ADMIN — HYDROXYZINE HYDROCHLORIDE 50 MG: 50 TABLET, FILM COATED ORAL at 14:03

## 2022-12-23 RX ADMIN — CLOTRIMAZOLE: 0.01 CREAM TOPICAL at 21:16

## 2022-12-23 RX ADMIN — HYDROXYZINE HYDROCHLORIDE 50 MG: 50 TABLET, FILM COATED ORAL at 21:18

## 2022-12-23 RX ADMIN — METFORMIN HYDROCHLORIDE 850 MG: 850 TABLET, FILM COATED ORAL at 17:19

## 2022-12-23 RX ADMIN — KETOCONAZOLE: 20 CREAM TOPICAL at 21:16

## 2022-12-23 RX ADMIN — CLOTRIMAZOLE: 0.01 CREAM TOPICAL at 07:52

## 2022-12-23 RX ADMIN — KETOCONAZOLE: 20 CREAM TOPICAL at 07:52

## 2022-12-23 RX ADMIN — QUETIAPINE 200 MG: 200 TABLET, FILM COATED ORAL at 07:53

## 2022-12-23 RX ADMIN — LEVOTHYROXINE SODIUM 25 MCG: 0.03 TABLET ORAL at 07:53

## 2022-12-23 RX ADMIN — CLONIDINE HYDROCHLORIDE 0.1 MG: 0.1 TABLET ORAL at 14:03

## 2022-12-23 RX ADMIN — HYDROCORTISONE: 1 CREAM TOPICAL at 07:52

## 2022-12-23 RX ADMIN — Medication 10 MG: at 21:17

## 2022-12-23 RX ADMIN — VENLAFAXINE HYDROCHLORIDE 75 MG: 150 CAPSULE, EXTENDED RELEASE ORAL at 21:18

## 2022-12-23 RX ADMIN — QUETIAPINE FUMARATE 400 MG: 200 TABLET ORAL at 21:17

## 2022-12-23 RX ADMIN — OLANZAPINE 2.5 MG: 2.5 TABLET, FILM COATED ORAL at 14:03

## 2022-12-23 RX ADMIN — BACLOFEN 10 MG: 10 TABLET ORAL at 07:52

## 2022-12-23 RX ADMIN — ASPIRIN 81 MG CHEWABLE TABLET 81 MG: 81 TABLET CHEWABLE at 07:53

## 2022-12-23 RX ADMIN — METOPROLOL SUCCINATE 25 MG: 25 TABLET, EXTENDED RELEASE ORAL at 07:53

## 2022-12-23 RX ADMIN — MIRTAZAPINE 15 MG: 15 TABLET, FILM COATED ORAL at 21:17

## 2022-12-23 RX ADMIN — QUETIAPINE 200 MG: 200 TABLET, FILM COATED ORAL at 14:03

## 2022-12-23 RX ADMIN — DIVALPROEX SODIUM 1000 MG: 500 TABLET, DELAYED RELEASE ORAL at 21:17

## 2022-12-23 RX ADMIN — CLONIDINE HYDROCHLORIDE 0.1 MG: 0.1 TABLET ORAL at 21:18

## 2022-12-23 ASSESSMENT — ACTIVITIES OF DAILY LIVING (ADL)
ADLS_ACUITY_SCORE: 54

## 2022-12-23 NOTE — PLAN OF CARE
PRIMARY DIAGNOSIS: PLACEMENT  OUTPATIENT/OBSERVATION GOALS TO BE MET BEFORE DISCHARGE:  1. ADLs back to baseline: Yes, bed rest is baseline    2. Activity and level of assistance: Total cares    3. Pain status: Pain free.    4. Return to near baseline physical activity: Yes     Discharge Planner Nurse   Safe discharge environment identified: No  Barriers to discharge: No       Entered by: Kerri Mccloud RN 12/23/2022 7:07 AM     Please review provider order for any additional goals.   Nurse to notify provider when observation goals have been met and patient is ready for discharge.      Vitals are Temp: 97.8  F (36.6  C) Temp src: Oral BP: (!) 139/96 Pulse: 105   Resp: 16 SpO2: 93 %.  Patient is Self and Time. They bedrest, total cares, with lift .  No isolation.  Pt is a Regular diet.  They are denying pain.  Patient has no IV access. RA    Snack was provided per pts request. Incontinent care 0615.

## 2022-12-23 NOTE — PLAN OF CARE
PRIMARY DIAGNOSIS: PLACEMENT  OUTPATIENT/OBSERVATION GOALS TO BE MET BEFORE DISCHARGE:  1. ADLs back to baseline: Yes, bed rest is baseline    2. Activity and level of assistance: Total cares    3. Pain status: Pain free.    4. Return to near baseline physical activity: Yes     Discharge Planner Nurse   Safe discharge environment identified: No  Barriers to discharge: No       Entered by: Waleska Jin RN 12/23/2022 10:33 AM     Please review provider order for any additional goals.   Nurse to notify provider when observation goals have been met and patient is ready for discharge.      Vitals are Temp: 97.8  F (36.6  C) Temp src: Oral BP: (!) 141/97 Pulse: 73   Resp: 17 SpO2: 96 %.      Patient is AOx4, VSS, LCTA, BS active. He is total cares, needs a lift to get out of bed. Patient is refusing today to get put into the chair because he does not like to get out of the bed, will keep offering to the patient today. Waiting on placement, new referrals have been sent out

## 2022-12-23 NOTE — PLAN OF CARE
PRIMARY DIAGNOSIS: PLACEMENT  OUTPATIENT/OBSERVATION GOALS TO BE MET BEFORE DISCHARGE:  1. ADLs back to baseline: Yes, bed rest is baseline    2. Activity and level of assistance: Total cares    3. Pain status: Pain free.    4. Return to near baseline physical activity: Yes     Discharge Planner Nurse   Safe discharge environment identified: No  Barriers to discharge: No       Entered by: Waleska Jin RN 12/23/2022 4:46 PM     Please review provider order for any additional goals.   Nurse to notify provider when observation goals have been met and patient is ready for discharge.      Vitals are Temp: 97.8  F (36.6  C) Temp src: Oral BP: 134/86 Pulse: 73   Resp: 17 SpO2: 96 %.      Patient is AOx4, VSS, LCTA, BS active. He is total cares, needs a lift to get out of bed. Patient got out of bed once today. Waiting on placement, new referrals have been sent out

## 2022-12-23 NOTE — PLAN OF CARE
PRIMARY DIAGNOSIS: PLACEMENT  OUTPATIENT/OBSERVATION GOALS TO BE MET BEFORE DISCHARGE:  1. ADLs back to baseline: Yes, bed rest is baseline    2. Activity and level of assistance: Total cares    3. Pain status: Pain free.    4. Return to near baseline physical activity: Yes     Discharge Planner Nurse   Safe discharge environment identified: No  Barriers to discharge: No       Entered by: Waleska Jin RN 12/23/2022 1:44 PM     Please review provider order for any additional goals.   Nurse to notify provider when observation goals have been met and patient is ready for discharge.      Vitals are Temp: 97.8  F (36.6  C) Temp src: Oral BP: (!) 141/97 Pulse: 73   Resp: 17 SpO2: 96 %.      Patient is AOx4, VSS, LCTA, BS active. He is total cares, needs a lift to get out of bed. Patient is refusing today to get put into the chair because he does not like to get out of the bed, will keep offering to the patient today. Waiting on placement, new referrals have been sent out
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
## 2022-12-23 NOTE — PLAN OF CARE
PRIMARY DIAGNOSIS: PLACEMENT  OUTPATIENT/OBSERVATION GOALS TO BE MET BEFORE DISCHARGE:  1. ADLs back to baseline: Yes, bed rest is baseline    2. Activity and level of assistance: Total cares    3. Pain status: Pain free.    4. Return to near baseline physical activity: Yes     Discharge Planner Nurse   Safe discharge environment identified: No  Barriers to discharge: No       Entered by: Kerri Mccloud RN 12/23/2022 3:25 AM     Please review provider order for any additional goals.   Nurse to notify provider when observation goals have been met and patient is ready for discharge.      Vitals are Temp: 97.8  F (36.6  C) Temp src: Oral BP: (!) 139/96 Pulse: 105   Resp: 16 SpO2: 93 %.  Patient is Self and Time. They bedrest, total cares, with lift .  No isolation.  Pt is a Regular diet.  They are denying pain.  Patient has no IV access. RA    Snack was provided per pts request.

## 2022-12-24 PROCEDURE — 250N000013 HC RX MED GY IP 250 OP 250 PS 637: Performed by: HOSPITALIST

## 2022-12-24 PROCEDURE — 99225 PR SUBSEQUENT OBSERVATION CARE,LEVEL II: CPT | Performed by: PHYSICIAN ASSISTANT

## 2022-12-24 PROCEDURE — 250N000013 HC RX MED GY IP 250 OP 250 PS 637: Performed by: NURSE PRACTITIONER

## 2022-12-24 PROCEDURE — 250N000013 HC RX MED GY IP 250 OP 250 PS 637: Performed by: PHYSICIAN ASSISTANT

## 2022-12-24 PROCEDURE — 250N000013 HC RX MED GY IP 250 OP 250 PS 637

## 2022-12-24 PROCEDURE — G0378 HOSPITAL OBSERVATION PER HR: HCPCS

## 2022-12-24 RX ADMIN — VENLAFAXINE HYDROCHLORIDE 75 MG: 150 CAPSULE, EXTENDED RELEASE ORAL at 21:05

## 2022-12-24 RX ADMIN — HYDROCORTISONE: 1 CREAM TOPICAL at 21:06

## 2022-12-24 RX ADMIN — POLYETHYLENE GLYCOL 3350 17 G: 17 POWDER, FOR SOLUTION ORAL at 08:20

## 2022-12-24 RX ADMIN — HYDROXYZINE HYDROCHLORIDE 50 MG: 50 TABLET, FILM COATED ORAL at 08:20

## 2022-12-24 RX ADMIN — CLOTRIMAZOLE: 0.01 CREAM TOPICAL at 21:06

## 2022-12-24 RX ADMIN — LEVOTHYROXINE SODIUM 25 MCG: 0.03 TABLET ORAL at 08:20

## 2022-12-24 RX ADMIN — KETOCONAZOLE: 20 CREAM TOPICAL at 21:05

## 2022-12-24 RX ADMIN — HYDROCORTISONE: 1 CREAM TOPICAL at 08:20

## 2022-12-24 RX ADMIN — CLONIDINE HYDROCHLORIDE 0.1 MG: 0.1 TABLET ORAL at 14:10

## 2022-12-24 RX ADMIN — VENLAFAXINE HYDROCHLORIDE 150 MG: 150 CAPSULE, EXTENDED RELEASE ORAL at 21:05

## 2022-12-24 RX ADMIN — QUETIAPINE 200 MG: 200 TABLET, FILM COATED ORAL at 08:19

## 2022-12-24 RX ADMIN — KETOCONAZOLE: 20 CREAM TOPICAL at 08:20

## 2022-12-24 RX ADMIN — CLOTRIMAZOLE: 0.01 CREAM TOPICAL at 08:20

## 2022-12-24 RX ADMIN — HYDROXYZINE HYDROCHLORIDE 50 MG: 50 TABLET, FILM COATED ORAL at 14:10

## 2022-12-24 RX ADMIN — ATORVASTATIN CALCIUM 20 MG: 20 TABLET, FILM COATED ORAL at 21:05

## 2022-12-24 RX ADMIN — QUETIAPINE 200 MG: 200 TABLET, FILM COATED ORAL at 14:10

## 2022-12-24 RX ADMIN — QUETIAPINE FUMARATE 400 MG: 200 TABLET ORAL at 21:04

## 2022-12-24 RX ADMIN — DIVALPROEX SODIUM 750 MG: 500 TABLET, DELAYED RELEASE ORAL at 08:18

## 2022-12-24 RX ADMIN — CLONIDINE HYDROCHLORIDE 0.1 MG: 0.1 TABLET ORAL at 21:05

## 2022-12-24 RX ADMIN — Medication 25 MCG: at 08:19

## 2022-12-24 RX ADMIN — OLANZAPINE 2.5 MG: 2.5 TABLET, FILM COATED ORAL at 14:10

## 2022-12-24 RX ADMIN — BACLOFEN 10 MG: 10 TABLET ORAL at 21:05

## 2022-12-24 RX ADMIN — METOPROLOL SUCCINATE 25 MG: 25 TABLET, EXTENDED RELEASE ORAL at 08:20

## 2022-12-24 RX ADMIN — DIVALPROEX SODIUM 1000 MG: 500 TABLET, DELAYED RELEASE ORAL at 21:04

## 2022-12-24 RX ADMIN — EMPAGLIFLOZIN 10 MG: 10 TABLET, FILM COATED ORAL at 08:20

## 2022-12-24 RX ADMIN — HYDROXYZINE HYDROCHLORIDE 50 MG: 50 TABLET, FILM COATED ORAL at 21:05

## 2022-12-24 RX ADMIN — Medication 10 MG: at 21:05

## 2022-12-24 RX ADMIN — ASPIRIN 81 MG CHEWABLE TABLET 81 MG: 81 TABLET CHEWABLE at 08:19

## 2022-12-24 RX ADMIN — MIRTAZAPINE 15 MG: 15 TABLET, FILM COATED ORAL at 21:05

## 2022-12-24 RX ADMIN — METFORMIN HYDROCHLORIDE 850 MG: 850 TABLET, FILM COATED ORAL at 16:58

## 2022-12-24 RX ADMIN — CLONIDINE HYDROCHLORIDE 0.1 MG: 0.1 TABLET ORAL at 08:20

## 2022-12-24 RX ADMIN — BACLOFEN 10 MG: 10 TABLET ORAL at 14:10

## 2022-12-24 RX ADMIN — BACLOFEN 10 MG: 10 TABLET ORAL at 08:19

## 2022-12-24 ASSESSMENT — ACTIVITIES OF DAILY LIVING (ADL)
ADLS_ACUITY_SCORE: 54

## 2022-12-24 NOTE — PLAN OF CARE
PRIMARY DIAGNOSIS: Placement  OUTPATIENT/OBSERVATION GOALS TO BE MET BEFORE DISCHARGE:  1. ADLs back to baseline: Yes    2. Activity and level of assistance: Total cares; Lift device with transfers    3. Pain status: Pain free.    4. Return to near baseline physical activity: Yes     Discharge Planner Nurse   Safe discharge environment identified: No  Barriers to discharge: Yes       Entered by: Todd Hardwick RN 12/24/2022 4:09 PM   Pt is A/O x 4. Afebrile; VSS; RA. Tolerating oral intake. Pt had small bowel movement on this shift. Denies pain, SOB, dizziness. Plan is to continue monitoring until placement is found.   Please review provider order for any additional goals.   Nurse to notify provider when observation goals have been met and patient is ready for discharge.

## 2022-12-24 NOTE — PROGRESS NOTES
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 or approved. A negative result   does not rule out the presence of PCR inhibitors in the specimen or   target RNA in concentration below the limit of detection for the   assay. The possibility of a false negative should be considered if   the patient's recent exposure or clinical presentation suggests   COVID-19. This test was validated by the [facility]. This laboratory is certified under the Clinical Laboratory Improvement Amendments of 1988 (CLIA-88) as qualified to perform high complexity laboratory testing.     Glucose by meter     Status: Abnormal   Result Value Ref Range    GLUCOSE BY METER POCT 109 (H) 70 - 99 mg/dL   Glucose by meter     Status: Abnormal   Result Value Ref Range    GLUCOSE BY METER POCT 107 (H) 70 - 99 mg/dL   Glucose by meter     Status: Abnormal   Result Value Ref Range    GLUCOSE BY METER POCT 121 (H) 70 - 99 mg/dL   Glucose by meter     Status: Abnormal   Result Value Ref Range    GLUCOSE BY METER POCT 165 (H) 70 - 99 mg/dL   Glucose by meter     Status: Abnormal   Result Value Ref Range    GLUCOSE BY METER POCT  110 (H) 70 - 99 mg/dL   Glucose by meter     Status: Abnormal   Result Value Ref Range    GLUCOSE BY METER POCT 138 (H) 70 - 99 mg/dL   Glucose by meter     Status: Abnormal   Result Value Ref Range    GLUCOSE BY METER POCT 114 (H) 70 - 99 mg/dL   Glucose by meter     Status: Abnormal   Result Value Ref Range    GLUCOSE BY METER POCT 117 (H) 70 - 99 mg/dL   Glucose by meter     Status: Abnormal   Result Value Ref Range    GLUCOSE BY METER POCT 135 (H) 70 - 99 mg/dL   Glucose by meter     Status: Normal   Result Value Ref Range    GLUCOSE BY METER POCT 99 70 - 99 mg/dL   Glucose by meter     Status: Abnormal   Result Value Ref Range    GLUCOSE BY METER POCT 100 (H) 70 - 99 mg/dL   Glucose by meter     Status: Abnormal   Result Value Ref Range    GLUCOSE BY METER POCT 123 (H) 70 - 99 mg/dL   Glucose by meter     Status: Normal   Result Value Ref Range    GLUCOSE BY METER POCT 85 70 - 99 mg/dL   Glucose by meter     Status: Normal   Result Value Ref Range    GLUCOSE BY METER POCT 98 70 - 99 mg/dL   Glucose by meter     Status: Normal   Result Value Ref Range    GLUCOSE BY METER POCT 91 70 - 99 mg/dL   Glucose by meter     Status: Normal   Result Value Ref Range    GLUCOSE BY METER POCT 90 70 - 99 mg/dL   Glucose by meter     Status: Normal   Result Value Ref Range    GLUCOSE BY METER POCT 93 70 - 99 mg/dL   Glucose by meter     Status: Abnormal   Result Value Ref Range    GLUCOSE BY METER POCT 69 (L) 70 - 99 mg/dL   Glucose by meter     Status: Normal   Result Value Ref Range    GLUCOSE BY METER POCT 97 70 - 99 mg/dL   Glucose by meter     Status: Abnormal   Result Value Ref Range    GLUCOSE BY METER POCT 172 (H) 70 - 99 mg/dL   Glucose by meter     Status: Normal   Result Value Ref Range    GLUCOSE BY METER POCT 70 70 - 99 mg/dL   Glucose by meter     Status: Normal   Result Value Ref Range    GLUCOSE BY METER POCT 79 70 - 99 mg/dL   Glucose by meter     Status: Abnormal   Result Value Ref Range    GLUCOSE BY METER  POCT 124 (H) 70 - 99 mg/dL   Glucose by meter     Status: Abnormal   Result Value Ref Range    GLUCOSE BY METER POCT 111 (H) 70 - 99 mg/dL   Glucose by meter     Status: Normal   Result Value Ref Range    GLUCOSE BY METER POCT 95 70 - 99 mg/dL   Glucose by meter     Status: Normal   Result Value Ref Range    GLUCOSE BY METER POCT 82 70 - 99 mg/dL   Glucose by meter     Status: Normal   Result Value Ref Range    GLUCOSE BY METER POCT 80 70 - 99 mg/dL   Glucose by meter     Status: Normal   Result Value Ref Range    GLUCOSE BY METER POCT 86 70 - 99 mg/dL   Glucose by meter     Status: Abnormal   Result Value Ref Range    GLUCOSE BY METER POCT 106 (H) 70 - 99 mg/dL   Glucose by meter     Status: Abnormal   Result Value Ref Range    GLUCOSE BY METER POCT 66 (L) 70 - 99 mg/dL   Glucose by meter     Status: Abnormal   Result Value Ref Range    GLUCOSE BY METER POCT 125 (H) 70 - 99 mg/dL   Glucose by meter     Status: Abnormal   Result Value Ref Range    GLUCOSE BY METER POCT 120 (H) 70 - 99 mg/dL   Glucose by meter     Status: Abnormal   Result Value Ref Range    GLUCOSE BY METER POCT 201 (H) 70 - 99 mg/dL   Glucose by meter     Status: Abnormal   Result Value Ref Range    GLUCOSE BY METER POCT 222 (H) 70 - 99 mg/dL   Glucose by meter     Status: Abnormal   Result Value Ref Range    GLUCOSE BY METER POCT 148 (H) 70 - 99 mg/dL   Hemoglobin A1c     Status: Abnormal   Result Value Ref Range    Hemoglobin A1C 5.7 (H) <5.7 %   Glucose by meter     Status: Abnormal   Result Value Ref Range    GLUCOSE BY METER POCT 111 (H) 70 - 99 mg/dL   Glucose by meter     Status: Abnormal   Result Value Ref Range    GLUCOSE BY METER POCT 144 (H) 70 - 99 mg/dL   Glucose by meter     Status: Abnormal   Result Value Ref Range    GLUCOSE BY METER POCT 116 (H) 70 - 99 mg/dL   Glucose by meter     Status: Abnormal   Result Value Ref Range    GLUCOSE BY METER POCT 127 (H) 70 - 99 mg/dL   Glucose by meter     Status: Abnormal   Result Value Ref  Range    GLUCOSE BY METER POCT 177 (H) 70 - 99 mg/dL   Glucose by meter     Status: Abnormal   Result Value Ref Range    GLUCOSE BY METER POCT 120 (H) 70 - 99 mg/dL   Glucose by meter     Status: Abnormal   Result Value Ref Range    GLUCOSE BY METER POCT 143 (H) 70 - 99 mg/dL   Glucose by meter     Status: Abnormal   Result Value Ref Range    GLUCOSE BY METER POCT 122 (H) 70 - 99 mg/dL   Glucose by meter     Status: Abnormal   Result Value Ref Range    GLUCOSE BY METER POCT 208 (H) 70 - 99 mg/dL   Glucose by meter     Status: Abnormal   Result Value Ref Range    GLUCOSE BY METER POCT 202 (H) 70 - 99 mg/dL   Glucose by meter     Status: Abnormal   Result Value Ref Range    GLUCOSE BY METER POCT 239 (H) 70 - 99 mg/dL   Glucose by meter     Status: Abnormal   Result Value Ref Range    GLUCOSE BY METER POCT 138 (H) 70 - 99 mg/dL   Glucose by meter     Status: Abnormal   Result Value Ref Range    GLUCOSE BY METER POCT 166 (H) 70 - 99 mg/dL   Glucose by meter     Status: Abnormal   Result Value Ref Range    GLUCOSE BY METER POCT 237 (H) 70 - 99 mg/dL   Glucose by meter     Status: Abnormal   Result Value Ref Range    GLUCOSE BY METER POCT 274 (H) 70 - 99 mg/dL   Glucose by meter     Status: Abnormal   Result Value Ref Range    GLUCOSE BY METER POCT 280 (H) 70 - 99 mg/dL   Glucose by meter     Status: Abnormal   Result Value Ref Range    GLUCOSE BY METER POCT 250 (H) 70 - 99 mg/dL   Glucose by meter     Status: Abnormal   Result Value Ref Range    GLUCOSE BY METER POCT 318 (H) 70 - 99 mg/dL   Glucose by meter     Status: Abnormal   Result Value Ref Range    GLUCOSE BY METER POCT 251 (H) 70 - 99 mg/dL   Glucose by meter     Status: Abnormal   Result Value Ref Range    GLUCOSE BY METER POCT 329 (H) 70 - 99 mg/dL   Glucose by meter     Status: Abnormal   Result Value Ref Range    GLUCOSE BY METER POCT 199 (H) 70 - 99 mg/dL   Glucose by meter     Status: Abnormal   Result Value Ref Range    GLUCOSE BY METER POCT 166 (H) 70 -  99 mg/dL   Glucose by meter     Status: Abnormal   Result Value Ref Range    GLUCOSE BY METER POCT 177 (H) 70 - 99 mg/dL   Glucose by meter     Status: Abnormal   Result Value Ref Range    GLUCOSE BY METER POCT 169 (H) 70 - 99 mg/dL   Glucose by meter     Status: Abnormal   Result Value Ref Range    GLUCOSE BY METER POCT 173 (H) 70 - 99 mg/dL   Glucose by meter     Status: Abnormal   Result Value Ref Range    GLUCOSE BY METER POCT 134 (H) 70 - 99 mg/dL   Glucose by meter     Status: Abnormal   Result Value Ref Range    GLUCOSE BY METER POCT 220 (H) 70 - 99 mg/dL   Glucose by meter     Status: Abnormal   Result Value Ref Range    GLUCOSE BY METER POCT 141 (H) 70 - 99 mg/dL   Glucose by meter     Status: Abnormal   Result Value Ref Range    GLUCOSE BY METER POCT 225 (H) 70 - 99 mg/dL   Glucose by meter     Status: Abnormal   Result Value Ref Range    GLUCOSE BY METER POCT 129 (H) 70 - 99 mg/dL   Glucose by meter     Status: Abnormal   Result Value Ref Range    GLUCOSE BY METER POCT 153 (H) 70 - 99 mg/dL   Glucose by meter     Status: Abnormal   Result Value Ref Range    GLUCOSE BY METER POCT 128 (H) 70 - 99 mg/dL   Glucose by meter     Status: Abnormal   Result Value Ref Range    GLUCOSE BY METER POCT 165 (H) 70 - 99 mg/dL   Glucose by meter     Status: Abnormal   Result Value Ref Range    GLUCOSE BY METER POCT 232 (H) 70 - 99 mg/dL   Glucose by meter     Status: Abnormal   Result Value Ref Range    GLUCOSE BY METER POCT 186 (H) 70 - 99 mg/dL   Glucose by meter     Status: Abnormal   Result Value Ref Range    GLUCOSE BY METER POCT 147 (H) 70 - 99 mg/dL   Glucose by meter     Status: Abnormal   Result Value Ref Range    GLUCOSE BY METER POCT 217 (H) 70 - 99 mg/dL   Glucose by meter     Status: Abnormal   Result Value Ref Range    GLUCOSE BY METER POCT 132 (H) 70 - 99 mg/dL   Glucose by meter     Status: Abnormal   Result Value Ref Range    GLUCOSE BY METER POCT 197 (H) 70 - 99 mg/dL   Glucose by meter     Status:  Abnormal   Result Value Ref Range    GLUCOSE BY METER POCT 201 (H) 70 - 99 mg/dL   Glucose by meter     Status: Abnormal   Result Value Ref Range    GLUCOSE BY METER POCT 135 (H) 70 - 99 mg/dL   Glucose by meter     Status: Abnormal   Result Value Ref Range    GLUCOSE BY METER POCT 138 (H) 70 - 99 mg/dL   Glucose by meter     Status: Abnormal   Result Value Ref Range    GLUCOSE BY METER POCT 154 (H) 70 - 99 mg/dL   Glucose by meter     Status: Abnormal   Result Value Ref Range    GLUCOSE BY METER POCT 132 (H) 70 - 99 mg/dL   Glucose by meter     Status: Abnormal   Result Value Ref Range    GLUCOSE BY METER POCT 119 (H) 70 - 99 mg/dL   Glucose by meter     Status: Abnormal   Result Value Ref Range    GLUCOSE BY METER POCT 156 (H) 70 - 99 mg/dL   Glucose by meter     Status: Abnormal   Result Value Ref Range    GLUCOSE BY METER POCT 295 (H) 70 - 99 mg/dL   Glucose by meter     Status: Abnormal   Result Value Ref Range    GLUCOSE BY METER POCT 208 (H) 70 - 99 mg/dL   Glucose by meter     Status: Abnormal   Result Value Ref Range    GLUCOSE BY METER POCT 130 (H) 70 - 99 mg/dL   Glucose by meter     Status: Abnormal   Result Value Ref Range    GLUCOSE BY METER POCT 175 (H) 70 - 99 mg/dL   Glucose by meter     Status: Abnormal   Result Value Ref Range    GLUCOSE BY METER POCT 123 (H) 70 - 99 mg/dL   Glucose by meter     Status: Abnormal   Result Value Ref Range    GLUCOSE BY METER POCT 143 (H) 70 - 99 mg/dL   Glucose by meter     Status: Abnormal   Result Value Ref Range    GLUCOSE BY METER POCT 134 (H) 70 - 99 mg/dL   Glucose by meter     Status: Abnormal   Result Value Ref Range    GLUCOSE BY METER POCT 157 (H) 70 - 99 mg/dL   Glucose by meter     Status: Abnormal   Result Value Ref Range    GLUCOSE BY METER POCT 107 (H) 70 - 99 mg/dL   Glucose by meter     Status: Abnormal   Result Value Ref Range    GLUCOSE BY METER POCT 171 (H) 70 - 99 mg/dL   Glucose by meter     Status: Abnormal   Result Value Ref Range    GLUCOSE  BY METER POCT 154 (H) 70 - 99 mg/dL   Glucose by meter     Status: Abnormal   Result Value Ref Range    GLUCOSE BY METER POCT 129 (H) 70 - 99 mg/dL   Glucose by meter     Status: Abnormal   Result Value Ref Range    GLUCOSE BY METER POCT 162 (H) 70 - 99 mg/dL   Glucose by meter     Status: Abnormal   Result Value Ref Range    GLUCOSE BY METER POCT 175 (H) 70 - 99 mg/dL   Glucose by meter     Status: Abnormal   Result Value Ref Range    GLUCOSE BY METER POCT 261 (H) 70 - 99 mg/dL   Glucose by meter     Status: Abnormal   Result Value Ref Range    GLUCOSE BY METER POCT 152 (H) 70 - 99 mg/dL   Glucose by meter     Status: Abnormal   Result Value Ref Range    GLUCOSE BY METER POCT 157 (H) 70 - 99 mg/dL   Glucose by meter     Status: Abnormal   Result Value Ref Range    GLUCOSE BY METER POCT 148 (H) 70 - 99 mg/dL   Glucose by meter     Status: Abnormal   Result Value Ref Range    GLUCOSE BY METER POCT 105 (H) 70 - 99 mg/dL   Glucose by meter     Status: Abnormal   Result Value Ref Range    GLUCOSE BY METER POCT 155 (H) 70 - 99 mg/dL   Glucose by meter     Status: Abnormal   Result Value Ref Range    GLUCOSE BY METER POCT 180 (H) 70 - 99 mg/dL   Glucose by meter     Status: Abnormal   Result Value Ref Range    GLUCOSE BY METER POCT 170 (H) 70 - 99 mg/dL   Glucose by meter     Status: Abnormal   Result Value Ref Range    GLUCOSE BY METER POCT 120 (H) 70 - 99 mg/dL   Glucose by meter     Status: Abnormal   Result Value Ref Range    GLUCOSE BY METER POCT 143 (H) 70 - 99 mg/dL   Glucose by meter     Status: Abnormal   Result Value Ref Range    GLUCOSE BY METER POCT 214 (H) 70 - 99 mg/dL   Glucose by meter     Status: Abnormal   Result Value Ref Range    GLUCOSE BY METER POCT 134 (H) 70 - 99 mg/dL   Glucose by meter     Status: Abnormal   Result Value Ref Range    GLUCOSE BY METER POCT 100 (H) 70 - 99 mg/dL   Glucose by meter     Status: Abnormal   Result Value Ref Range    GLUCOSE BY METER POCT 135 (H) 70 - 99 mg/dL   Glucose  by meter     Status: Abnormal   Result Value Ref Range    GLUCOSE BY METER POCT 162 (H) 70 - 99 mg/dL   Glucose by meter     Status: Abnormal   Result Value Ref Range    GLUCOSE BY METER POCT 165 (H) 70 - 99 mg/dL   Glucose by meter     Status: Abnormal   Result Value Ref Range    GLUCOSE BY METER POCT 108 (H) 70 - 99 mg/dL   Glucose by meter     Status: Abnormal   Result Value Ref Range    GLUCOSE BY METER POCT 158 (H) 70 - 99 mg/dL   Glucose by meter     Status: Abnormal   Result Value Ref Range    GLUCOSE BY METER POCT 157 (H) 70 - 99 mg/dL   Glucose by meter     Status: Abnormal   Result Value Ref Range    GLUCOSE BY METER POCT 108 (H) 70 - 99 mg/dL   Glucose by meter     Status: Abnormal   Result Value Ref Range    GLUCOSE BY METER POCT 140 (H) 70 - 99 mg/dL   Glucose by meter     Status: Abnormal   Result Value Ref Range    GLUCOSE BY METER POCT 136 (H) 70 - 99 mg/dL   Glucose by meter     Status: Abnormal   Result Value Ref Range    GLUCOSE BY METER POCT 131 (H) 70 - 99 mg/dL
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
## 2022-12-25 PROCEDURE — G0378 HOSPITAL OBSERVATION PER HR: HCPCS

## 2022-12-25 PROCEDURE — 99225 PR SUBSEQUENT OBSERVATION CARE,LEVEL II: CPT

## 2022-12-25 PROCEDURE — 250N000013 HC RX MED GY IP 250 OP 250 PS 637

## 2022-12-25 PROCEDURE — 250N000013 HC RX MED GY IP 250 OP 250 PS 637: Performed by: HOSPITALIST

## 2022-12-25 PROCEDURE — 250N000013 HC RX MED GY IP 250 OP 250 PS 637: Performed by: NURSE PRACTITIONER

## 2022-12-25 PROCEDURE — 250N000013 HC RX MED GY IP 250 OP 250 PS 637: Performed by: PHYSICIAN ASSISTANT

## 2022-12-25 RX ADMIN — ATORVASTATIN CALCIUM 20 MG: 20 TABLET, FILM COATED ORAL at 21:18

## 2022-12-25 RX ADMIN — HYDROCORTISONE: 1 CREAM TOPICAL at 08:54

## 2022-12-25 RX ADMIN — METOPROLOL SUCCINATE 25 MG: 25 TABLET, EXTENDED RELEASE ORAL at 08:50

## 2022-12-25 RX ADMIN — ASPIRIN 81 MG CHEWABLE TABLET 81 MG: 81 TABLET CHEWABLE at 08:52

## 2022-12-25 RX ADMIN — HYDROXYZINE HYDROCHLORIDE 50 MG: 50 TABLET, FILM COATED ORAL at 21:18

## 2022-12-25 RX ADMIN — METFORMIN HYDROCHLORIDE 850 MG: 850 TABLET, FILM COATED ORAL at 17:13

## 2022-12-25 RX ADMIN — BACLOFEN 10 MG: 10 TABLET ORAL at 08:51

## 2022-12-25 RX ADMIN — EMPAGLIFLOZIN 10 MG: 10 TABLET, FILM COATED ORAL at 08:51

## 2022-12-25 RX ADMIN — CLOTRIMAZOLE: 0.01 CREAM TOPICAL at 08:54

## 2022-12-25 RX ADMIN — QUETIAPINE 200 MG: 200 TABLET, FILM COATED ORAL at 08:51

## 2022-12-25 RX ADMIN — CLONIDINE HYDROCHLORIDE 0.1 MG: 0.1 TABLET ORAL at 14:11

## 2022-12-25 RX ADMIN — BACLOFEN 10 MG: 10 TABLET ORAL at 21:17

## 2022-12-25 RX ADMIN — HYDROCORTISONE: 1 CREAM TOPICAL at 21:17

## 2022-12-25 RX ADMIN — OLANZAPINE 2.5 MG: 2.5 TABLET, FILM COATED ORAL at 14:11

## 2022-12-25 RX ADMIN — Medication 25 MCG: at 08:51

## 2022-12-25 RX ADMIN — HYDROXYZINE HYDROCHLORIDE 50 MG: 50 TABLET, FILM COATED ORAL at 14:11

## 2022-12-25 RX ADMIN — CLONIDINE HYDROCHLORIDE 0.1 MG: 0.1 TABLET ORAL at 08:52

## 2022-12-25 RX ADMIN — VENLAFAXINE HYDROCHLORIDE 150 MG: 150 CAPSULE, EXTENDED RELEASE ORAL at 21:18

## 2022-12-25 RX ADMIN — CLONIDINE HYDROCHLORIDE 0.1 MG: 0.1 TABLET ORAL at 21:18

## 2022-12-25 RX ADMIN — KETOCONAZOLE: 20 CREAM TOPICAL at 08:54

## 2022-12-25 RX ADMIN — HYDROXYZINE HYDROCHLORIDE 50 MG: 50 TABLET, FILM COATED ORAL at 08:51

## 2022-12-25 RX ADMIN — POLYETHYLENE GLYCOL 3350 17 G: 17 POWDER, FOR SOLUTION ORAL at 08:50

## 2022-12-25 RX ADMIN — CLOTRIMAZOLE: 0.01 CREAM TOPICAL at 21:17

## 2022-12-25 RX ADMIN — KETOCONAZOLE: 20 CREAM TOPICAL at 21:16

## 2022-12-25 RX ADMIN — QUETIAPINE 200 MG: 200 TABLET, FILM COATED ORAL at 14:11

## 2022-12-25 RX ADMIN — Medication 10 MG: at 21:17

## 2022-12-25 RX ADMIN — MIRTAZAPINE 15 MG: 15 TABLET, FILM COATED ORAL at 21:18

## 2022-12-25 RX ADMIN — DIVALPROEX SODIUM 1000 MG: 500 TABLET, DELAYED RELEASE ORAL at 21:17

## 2022-12-25 RX ADMIN — BACLOFEN 10 MG: 10 TABLET ORAL at 14:11

## 2022-12-25 RX ADMIN — VENLAFAXINE HYDROCHLORIDE 75 MG: 150 CAPSULE, EXTENDED RELEASE ORAL at 21:17

## 2022-12-25 RX ADMIN — DIVALPROEX SODIUM 750 MG: 500 TABLET, DELAYED RELEASE ORAL at 08:51

## 2022-12-25 RX ADMIN — QUETIAPINE FUMARATE 400 MG: 200 TABLET ORAL at 21:17

## 2022-12-25 RX ADMIN — LEVOTHYROXINE SODIUM 25 MCG: 0.03 TABLET ORAL at 08:51

## 2022-12-25 ASSESSMENT — ACTIVITIES OF DAILY LIVING (ADL)
ADLS_ACUITY_SCORE: 54

## 2022-12-25 NOTE — PROGRESS NOTES
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   does not rule out the presence of PCR inhibitors in the specimen or   target RNA in concentration below the limit of detection for the   assay. The possibility of a false negative should be considered if   the patient's recent exposure or clinical presentation suggests   COVID-19. This test was validated by the [facility]. This laboratory is certified under the Clinical Laboratory Improvement Amendments of 1988 (CLIA-88) as qualified to perform high complexity laboratory testing.     Glucose by meter     Status: Abnormal   Result Value Ref Range    GLUCOSE BY METER POCT 109 (H) 70 - 99 mg/dL   Glucose by meter     Status: Abnormal   Result Value Ref Range    GLUCOSE BY METER POCT 107 (H) 70 - 99 mg/dL   Glucose by meter     Status: Abnormal   Result Value Ref Range    GLUCOSE BY METER POCT 121 (H) 70 - 99 mg/dL   Glucose by meter     Status: Abnormal   Result Value Ref Range    GLUCOSE BY METER POCT 165 (H) 70 - 99 mg/dL   Glucose by meter     Status: Abnormal   Result Value Ref Range    GLUCOSE BY METER POCT  110 (H) 70 - 99 mg/dL   Glucose by meter     Status: Abnormal   Result Value Ref Range    GLUCOSE BY METER POCT 138 (H) 70 - 99 mg/dL   Glucose by meter     Status: Abnormal   Result Value Ref Range    GLUCOSE BY METER POCT 114 (H) 70 - 99 mg/dL   Glucose by meter     Status: Abnormal   Result Value Ref Range    GLUCOSE BY METER POCT 117 (H) 70 - 99 mg/dL   Glucose by meter     Status: Abnormal   Result Value Ref Range    GLUCOSE BY METER POCT 135 (H) 70 - 99 mg/dL   Glucose by meter     Status: Normal   Result Value Ref Range    GLUCOSE BY METER POCT 99 70 - 99 mg/dL   Glucose by meter     Status: Abnormal   Result Value Ref Range    GLUCOSE BY METER POCT 100 (H) 70 - 99 mg/dL   Glucose by meter     Status: Abnormal   Result Value Ref Range    GLUCOSE BY METER POCT 123 (H) 70 - 99 mg/dL   Glucose by meter     Status: Normal   Result Value Ref Range    GLUCOSE BY METER POCT 85 70 - 99 mg/dL   Glucose by meter     Status: Normal   Result Value Ref Range    GLUCOSE BY METER POCT 98 70 - 99 mg/dL   Glucose by meter     Status: Normal   Result Value Ref Range    GLUCOSE BY METER POCT 91 70 - 99 mg/dL   Glucose by meter     Status: Normal   Result Value Ref Range    GLUCOSE BY METER POCT 90 70 - 99 mg/dL   Glucose by meter     Status: Normal   Result Value Ref Range    GLUCOSE BY METER POCT 93 70 - 99 mg/dL   Glucose by meter     Status: Abnormal   Result Value Ref Range    GLUCOSE BY METER POCT 69 (L) 70 - 99 mg/dL   Glucose by meter     Status: Normal   Result Value Ref Range    GLUCOSE BY METER POCT 97 70 - 99 mg/dL   Glucose by meter     Status: Abnormal   Result Value Ref Range    GLUCOSE BY METER POCT 172 (H) 70 - 99 mg/dL   Glucose by meter     Status: Normal   Result Value Ref Range    GLUCOSE BY METER POCT 70 70 - 99 mg/dL   Glucose by meter     Status: Normal   Result Value Ref Range    GLUCOSE BY METER POCT 79 70 - 99 mg/dL   Glucose by meter     Status: Abnormal   Result Value Ref Range    GLUCOSE BY METER  POCT 124 (H) 70 - 99 mg/dL   Glucose by meter     Status: Abnormal   Result Value Ref Range    GLUCOSE BY METER POCT 111 (H) 70 - 99 mg/dL   Glucose by meter     Status: Normal   Result Value Ref Range    GLUCOSE BY METER POCT 95 70 - 99 mg/dL   Glucose by meter     Status: Normal   Result Value Ref Range    GLUCOSE BY METER POCT 82 70 - 99 mg/dL   Glucose by meter     Status: Normal   Result Value Ref Range    GLUCOSE BY METER POCT 80 70 - 99 mg/dL   Glucose by meter     Status: Normal   Result Value Ref Range    GLUCOSE BY METER POCT 86 70 - 99 mg/dL   Glucose by meter     Status: Abnormal   Result Value Ref Range    GLUCOSE BY METER POCT 106 (H) 70 - 99 mg/dL   Glucose by meter     Status: Abnormal   Result Value Ref Range    GLUCOSE BY METER POCT 66 (L) 70 - 99 mg/dL   Glucose by meter     Status: Abnormal   Result Value Ref Range    GLUCOSE BY METER POCT 125 (H) 70 - 99 mg/dL   Glucose by meter     Status: Abnormal   Result Value Ref Range    GLUCOSE BY METER POCT 120 (H) 70 - 99 mg/dL   Glucose by meter     Status: Abnormal   Result Value Ref Range    GLUCOSE BY METER POCT 201 (H) 70 - 99 mg/dL   Glucose by meter     Status: Abnormal   Result Value Ref Range    GLUCOSE BY METER POCT 222 (H) 70 - 99 mg/dL   Glucose by meter     Status: Abnormal   Result Value Ref Range    GLUCOSE BY METER POCT 148 (H) 70 - 99 mg/dL   Hemoglobin A1c     Status: Abnormal   Result Value Ref Range    Hemoglobin A1C 5.7 (H) <5.7 %   Glucose by meter     Status: Abnormal   Result Value Ref Range    GLUCOSE BY METER POCT 111 (H) 70 - 99 mg/dL   Glucose by meter     Status: Abnormal   Result Value Ref Range    GLUCOSE BY METER POCT 144 (H) 70 - 99 mg/dL   Glucose by meter     Status: Abnormal   Result Value Ref Range    GLUCOSE BY METER POCT 116 (H) 70 - 99 mg/dL   Glucose by meter     Status: Abnormal   Result Value Ref Range    GLUCOSE BY METER POCT 127 (H) 70 - 99 mg/dL   Glucose by meter     Status: Abnormal   Result Value Ref  Range    GLUCOSE BY METER POCT 177 (H) 70 - 99 mg/dL   Glucose by meter     Status: Abnormal   Result Value Ref Range    GLUCOSE BY METER POCT 120 (H) 70 - 99 mg/dL   Glucose by meter     Status: Abnormal   Result Value Ref Range    GLUCOSE BY METER POCT 143 (H) 70 - 99 mg/dL   Glucose by meter     Status: Abnormal   Result Value Ref Range    GLUCOSE BY METER POCT 122 (H) 70 - 99 mg/dL   Glucose by meter     Status: Abnormal   Result Value Ref Range    GLUCOSE BY METER POCT 208 (H) 70 - 99 mg/dL   Glucose by meter     Status: Abnormal   Result Value Ref Range    GLUCOSE BY METER POCT 202 (H) 70 - 99 mg/dL   Glucose by meter     Status: Abnormal   Result Value Ref Range    GLUCOSE BY METER POCT 239 (H) 70 - 99 mg/dL   Glucose by meter     Status: Abnormal   Result Value Ref Range    GLUCOSE BY METER POCT 138 (H) 70 - 99 mg/dL   Glucose by meter     Status: Abnormal   Result Value Ref Range    GLUCOSE BY METER POCT 166 (H) 70 - 99 mg/dL   Glucose by meter     Status: Abnormal   Result Value Ref Range    GLUCOSE BY METER POCT 237 (H) 70 - 99 mg/dL   Glucose by meter     Status: Abnormal   Result Value Ref Range    GLUCOSE BY METER POCT 274 (H) 70 - 99 mg/dL   Glucose by meter     Status: Abnormal   Result Value Ref Range    GLUCOSE BY METER POCT 280 (H) 70 - 99 mg/dL   Glucose by meter     Status: Abnormal   Result Value Ref Range    GLUCOSE BY METER POCT 250 (H) 70 - 99 mg/dL   Glucose by meter     Status: Abnormal   Result Value Ref Range    GLUCOSE BY METER POCT 318 (H) 70 - 99 mg/dL   Glucose by meter     Status: Abnormal   Result Value Ref Range    GLUCOSE BY METER POCT 251 (H) 70 - 99 mg/dL   Glucose by meter     Status: Abnormal   Result Value Ref Range    GLUCOSE BY METER POCT 329 (H) 70 - 99 mg/dL   Glucose by meter     Status: Abnormal   Result Value Ref Range    GLUCOSE BY METER POCT 199 (H) 70 - 99 mg/dL   Glucose by meter     Status: Abnormal   Result Value Ref Range    GLUCOSE BY METER POCT 166 (H) 70 -  99 mg/dL   Glucose by meter     Status: Abnormal   Result Value Ref Range    GLUCOSE BY METER POCT 177 (H) 70 - 99 mg/dL   Glucose by meter     Status: Abnormal   Result Value Ref Range    GLUCOSE BY METER POCT 169 (H) 70 - 99 mg/dL   Glucose by meter     Status: Abnormal   Result Value Ref Range    GLUCOSE BY METER POCT 173 (H) 70 - 99 mg/dL   Glucose by meter     Status: Abnormal   Result Value Ref Range    GLUCOSE BY METER POCT 134 (H) 70 - 99 mg/dL   Glucose by meter     Status: Abnormal   Result Value Ref Range    GLUCOSE BY METER POCT 220 (H) 70 - 99 mg/dL   Glucose by meter     Status: Abnormal   Result Value Ref Range    GLUCOSE BY METER POCT 141 (H) 70 - 99 mg/dL   Glucose by meter     Status: Abnormal   Result Value Ref Range    GLUCOSE BY METER POCT 225 (H) 70 - 99 mg/dL   Glucose by meter     Status: Abnormal   Result Value Ref Range    GLUCOSE BY METER POCT 129 (H) 70 - 99 mg/dL   Glucose by meter     Status: Abnormal   Result Value Ref Range    GLUCOSE BY METER POCT 153 (H) 70 - 99 mg/dL   Glucose by meter     Status: Abnormal   Result Value Ref Range    GLUCOSE BY METER POCT 128 (H) 70 - 99 mg/dL   Glucose by meter     Status: Abnormal   Result Value Ref Range    GLUCOSE BY METER POCT 165 (H) 70 - 99 mg/dL   Glucose by meter     Status: Abnormal   Result Value Ref Range    GLUCOSE BY METER POCT 232 (H) 70 - 99 mg/dL   Glucose by meter     Status: Abnormal   Result Value Ref Range    GLUCOSE BY METER POCT 186 (H) 70 - 99 mg/dL   Glucose by meter     Status: Abnormal   Result Value Ref Range    GLUCOSE BY METER POCT 147 (H) 70 - 99 mg/dL   Glucose by meter     Status: Abnormal   Result Value Ref Range    GLUCOSE BY METER POCT 217 (H) 70 - 99 mg/dL   Glucose by meter     Status: Abnormal   Result Value Ref Range    GLUCOSE BY METER POCT 132 (H) 70 - 99 mg/dL   Glucose by meter     Status: Abnormal   Result Value Ref Range    GLUCOSE BY METER POCT 197 (H) 70 - 99 mg/dL   Glucose by meter     Status:  Abnormal   Result Value Ref Range    GLUCOSE BY METER POCT 201 (H) 70 - 99 mg/dL   Glucose by meter     Status: Abnormal   Result Value Ref Range    GLUCOSE BY METER POCT 135 (H) 70 - 99 mg/dL   Glucose by meter     Status: Abnormal   Result Value Ref Range    GLUCOSE BY METER POCT 138 (H) 70 - 99 mg/dL   Glucose by meter     Status: Abnormal   Result Value Ref Range    GLUCOSE BY METER POCT 154 (H) 70 - 99 mg/dL   Glucose by meter     Status: Abnormal   Result Value Ref Range    GLUCOSE BY METER POCT 132 (H) 70 - 99 mg/dL   Glucose by meter     Status: Abnormal   Result Value Ref Range    GLUCOSE BY METER POCT 119 (H) 70 - 99 mg/dL   Glucose by meter     Status: Abnormal   Result Value Ref Range    GLUCOSE BY METER POCT 156 (H) 70 - 99 mg/dL   Glucose by meter     Status: Abnormal   Result Value Ref Range    GLUCOSE BY METER POCT 295 (H) 70 - 99 mg/dL   Glucose by meter     Status: Abnormal   Result Value Ref Range    GLUCOSE BY METER POCT 208 (H) 70 - 99 mg/dL   Glucose by meter     Status: Abnormal   Result Value Ref Range    GLUCOSE BY METER POCT 130 (H) 70 - 99 mg/dL   Glucose by meter     Status: Abnormal   Result Value Ref Range    GLUCOSE BY METER POCT 175 (H) 70 - 99 mg/dL   Glucose by meter     Status: Abnormal   Result Value Ref Range    GLUCOSE BY METER POCT 123 (H) 70 - 99 mg/dL   Glucose by meter     Status: Abnormal   Result Value Ref Range    GLUCOSE BY METER POCT 143 (H) 70 - 99 mg/dL   Glucose by meter     Status: Abnormal   Result Value Ref Range    GLUCOSE BY METER POCT 134 (H) 70 - 99 mg/dL   Glucose by meter     Status: Abnormal   Result Value Ref Range    GLUCOSE BY METER POCT 157 (H) 70 - 99 mg/dL   Glucose by meter     Status: Abnormal   Result Value Ref Range    GLUCOSE BY METER POCT 107 (H) 70 - 99 mg/dL   Glucose by meter     Status: Abnormal   Result Value Ref Range    GLUCOSE BY METER POCT 171 (H) 70 - 99 mg/dL   Glucose by meter     Status: Abnormal   Result Value Ref Range    GLUCOSE  BY METER POCT 154 (H) 70 - 99 mg/dL   Glucose by meter     Status: Abnormal   Result Value Ref Range    GLUCOSE BY METER POCT 129 (H) 70 - 99 mg/dL   Glucose by meter     Status: Abnormal   Result Value Ref Range    GLUCOSE BY METER POCT 162 (H) 70 - 99 mg/dL   Glucose by meter     Status: Abnormal   Result Value Ref Range    GLUCOSE BY METER POCT 175 (H) 70 - 99 mg/dL   Glucose by meter     Status: Abnormal   Result Value Ref Range    GLUCOSE BY METER POCT 261 (H) 70 - 99 mg/dL   Glucose by meter     Status: Abnormal   Result Value Ref Range    GLUCOSE BY METER POCT 152 (H) 70 - 99 mg/dL   Glucose by meter     Status: Abnormal   Result Value Ref Range    GLUCOSE BY METER POCT 157 (H) 70 - 99 mg/dL   Glucose by meter     Status: Abnormal   Result Value Ref Range    GLUCOSE BY METER POCT 148 (H) 70 - 99 mg/dL   Glucose by meter     Status: Abnormal   Result Value Ref Range    GLUCOSE BY METER POCT 105 (H) 70 - 99 mg/dL   Glucose by meter     Status: Abnormal   Result Value Ref Range    GLUCOSE BY METER POCT 155 (H) 70 - 99 mg/dL   Glucose by meter     Status: Abnormal   Result Value Ref Range    GLUCOSE BY METER POCT 180 (H) 70 - 99 mg/dL   Glucose by meter     Status: Abnormal   Result Value Ref Range    GLUCOSE BY METER POCT 170 (H) 70 - 99 mg/dL   Glucose by meter     Status: Abnormal   Result Value Ref Range    GLUCOSE BY METER POCT 120 (H) 70 - 99 mg/dL   Glucose by meter     Status: Abnormal   Result Value Ref Range    GLUCOSE BY METER POCT 143 (H) 70 - 99 mg/dL   Glucose by meter     Status: Abnormal   Result Value Ref Range    GLUCOSE BY METER POCT 214 (H) 70 - 99 mg/dL   Glucose by meter     Status: Abnormal   Result Value Ref Range    GLUCOSE BY METER POCT 134 (H) 70 - 99 mg/dL   Glucose by meter     Status: Abnormal   Result Value Ref Range    GLUCOSE BY METER POCT 100 (H) 70 - 99 mg/dL   Glucose by meter     Status: Abnormal   Result Value Ref Range    GLUCOSE BY METER POCT 135 (H) 70 - 99 mg/dL   Glucose  by meter     Status: Abnormal   Result Value Ref Range    GLUCOSE BY METER POCT 162 (H) 70 - 99 mg/dL   Glucose by meter     Status: Abnormal   Result Value Ref Range    GLUCOSE BY METER POCT 165 (H) 70 - 99 mg/dL   Glucose by meter     Status: Abnormal   Result Value Ref Range    GLUCOSE BY METER POCT 108 (H) 70 - 99 mg/dL   Glucose by meter     Status: Abnormal   Result Value Ref Range    GLUCOSE BY METER POCT 158 (H) 70 - 99 mg/dL   Glucose by meter     Status: Abnormal   Result Value Ref Range    GLUCOSE BY METER POCT 157 (H) 70 - 99 mg/dL   Glucose by meter     Status: Abnormal   Result Value Ref Range    GLUCOSE BY METER POCT 108 (H) 70 - 99 mg/dL   Glucose by meter     Status: Abnormal   Result Value Ref Range    GLUCOSE BY METER POCT 140 (H) 70 - 99 mg/dL   Glucose by meter     Status: Abnormal   Result Value Ref Range    GLUCOSE BY METER POCT 136 (H) 70 - 99 mg/dL   Glucose by meter     Status: Abnormal   Result Value Ref Range    GLUCOSE BY METER POCT 131 (H) 70 - 99 mg/dL
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
## 2022-12-26 PROCEDURE — 250N000013 HC RX MED GY IP 250 OP 250 PS 637: Performed by: NURSE PRACTITIONER

## 2022-12-26 PROCEDURE — 250N000013 HC RX MED GY IP 250 OP 250 PS 637: Performed by: HOSPITALIST

## 2022-12-26 PROCEDURE — 250N000013 HC RX MED GY IP 250 OP 250 PS 637

## 2022-12-26 PROCEDURE — 250N000013 HC RX MED GY IP 250 OP 250 PS 637: Performed by: PHYSICIAN ASSISTANT

## 2022-12-26 PROCEDURE — 99224 PR SUBSEQUENT OBSERVATION CARE,LEVEL I: CPT | Performed by: PHYSICIAN ASSISTANT

## 2022-12-26 PROCEDURE — G0378 HOSPITAL OBSERVATION PER HR: HCPCS

## 2022-12-26 RX ADMIN — POLYETHYLENE GLYCOL 3350 17 G: 17 POWDER, FOR SOLUTION ORAL at 08:37

## 2022-12-26 RX ADMIN — METOPROLOL SUCCINATE 25 MG: 25 TABLET, EXTENDED RELEASE ORAL at 08:40

## 2022-12-26 RX ADMIN — DIVALPROEX SODIUM 1000 MG: 500 TABLET, DELAYED RELEASE ORAL at 21:31

## 2022-12-26 RX ADMIN — KETOCONAZOLE: 20 CREAM TOPICAL at 20:07

## 2022-12-26 RX ADMIN — CLONIDINE HYDROCHLORIDE 0.1 MG: 0.1 TABLET ORAL at 08:39

## 2022-12-26 RX ADMIN — HYDROCORTISONE: 1 CREAM TOPICAL at 20:07

## 2022-12-26 RX ADMIN — CLONIDINE HYDROCHLORIDE 0.1 MG: 0.1 TABLET ORAL at 20:07

## 2022-12-26 RX ADMIN — BACLOFEN 10 MG: 10 TABLET ORAL at 14:03

## 2022-12-26 RX ADMIN — CLONIDINE HYDROCHLORIDE 0.1 MG: 0.1 TABLET ORAL at 14:03

## 2022-12-26 RX ADMIN — VENLAFAXINE HYDROCHLORIDE 75 MG: 150 CAPSULE, EXTENDED RELEASE ORAL at 21:33

## 2022-12-26 RX ADMIN — QUETIAPINE FUMARATE 400 MG: 200 TABLET ORAL at 21:30

## 2022-12-26 RX ADMIN — QUETIAPINE 200 MG: 200 TABLET, FILM COATED ORAL at 08:40

## 2022-12-26 RX ADMIN — ATORVASTATIN CALCIUM 20 MG: 20 TABLET, FILM COATED ORAL at 20:07

## 2022-12-26 RX ADMIN — HYDROCORTISONE: 1 CREAM TOPICAL at 08:40

## 2022-12-26 RX ADMIN — QUETIAPINE 200 MG: 200 TABLET, FILM COATED ORAL at 14:03

## 2022-12-26 RX ADMIN — DIVALPROEX SODIUM 750 MG: 500 TABLET, DELAYED RELEASE ORAL at 08:38

## 2022-12-26 RX ADMIN — BACLOFEN 10 MG: 10 TABLET ORAL at 08:39

## 2022-12-26 RX ADMIN — Medication 10 MG: at 21:32

## 2022-12-26 RX ADMIN — LEVOTHYROXINE SODIUM 25 MCG: 0.03 TABLET ORAL at 08:37

## 2022-12-26 RX ADMIN — CLOTRIMAZOLE: 0.01 CREAM TOPICAL at 08:41

## 2022-12-26 RX ADMIN — VENLAFAXINE HYDROCHLORIDE 150 MG: 150 CAPSULE, EXTENDED RELEASE ORAL at 21:31

## 2022-12-26 RX ADMIN — HYDROXYZINE HYDROCHLORIDE 50 MG: 50 TABLET, FILM COATED ORAL at 20:07

## 2022-12-26 RX ADMIN — CLOTRIMAZOLE: 0.01 CREAM TOPICAL at 20:12

## 2022-12-26 RX ADMIN — BACLOFEN 10 MG: 10 TABLET ORAL at 20:07

## 2022-12-26 RX ADMIN — ASPIRIN 81 MG CHEWABLE TABLET 81 MG: 81 TABLET CHEWABLE at 08:39

## 2022-12-26 RX ADMIN — EMPAGLIFLOZIN 10 MG: 10 TABLET, FILM COATED ORAL at 08:39

## 2022-12-26 RX ADMIN — HYDROXYZINE HYDROCHLORIDE 50 MG: 50 TABLET, FILM COATED ORAL at 14:03

## 2022-12-26 RX ADMIN — MIRTAZAPINE 15 MG: 15 TABLET, FILM COATED ORAL at 21:31

## 2022-12-26 RX ADMIN — OLANZAPINE 2.5 MG: 2.5 TABLET, FILM COATED ORAL at 14:03

## 2022-12-26 RX ADMIN — HYDROXYZINE HYDROCHLORIDE 50 MG: 50 TABLET, FILM COATED ORAL at 08:37

## 2022-12-26 RX ADMIN — KETOCONAZOLE: 20 CREAM TOPICAL at 08:40

## 2022-12-26 RX ADMIN — METFORMIN HYDROCHLORIDE 850 MG: 850 TABLET, FILM COATED ORAL at 16:58

## 2022-12-26 RX ADMIN — Medication 25 MCG: at 08:40

## 2022-12-26 ASSESSMENT — ACTIVITIES OF DAILY LIVING (ADL)
ADLS_ACUITY_SCORE: 54
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
## 2022-12-26 NOTE — PLAN OF CARE
"PRIMARY DIAGNOSIS: \"GENERIC\" NURSING/PLACEMENT  OUTPATIENT/OBSERVATION GOALS TO BE MET BEFORE DISCHARGE:  ADLs back to baseline: Yes    Activity and level of assistance: Up with maximum assistance. Consider SW and/or PT evaluation.     Pain status: Pain free.    Return to near baseline physical activity: Yes     Discharge Planner Nurse   Safe discharge environment identified: No  Barriers to discharge: Yes, needs placement.        Entered by: Jose James RN 12/26/2022 3:40 PM     Please review provider order for any additional goals.   Nurse to notify provider when observation goals have been met and patient is ready for discharge.  "

## 2022-12-26 NOTE — PLAN OF CARE
"PRIMARY DIAGNOSIS: \"GENERIC\" NURSING/PLACEMENT  OUTPATIENT/OBSERVATION GOALS TO BE MET BEFORE DISCHARGE:  ADLs back to baseline: Yes    Activity and level of assistance: Up with maximum assistance. Consider SW and/or PT evaluation.     Pain status: Pain free.    Return to near baseline physical activity: Yes     Discharge Planner Nurse   Safe discharge environment identified: No  Barriers to discharge: Yes, no placement        Entered by: Jose James RN 12/26/2022 9:55 AM     Please review provider order for any additional goals.   Nurse to notify provider when observation goals have been met and patient is ready for discharge.  "

## 2022-12-27 PROCEDURE — G0378 HOSPITAL OBSERVATION PER HR: HCPCS

## 2022-12-27 PROCEDURE — 250N000013 HC RX MED GY IP 250 OP 250 PS 637

## 2022-12-27 PROCEDURE — 250N000013 HC RX MED GY IP 250 OP 250 PS 637: Performed by: PHYSICIAN ASSISTANT

## 2022-12-27 PROCEDURE — 250N000013 HC RX MED GY IP 250 OP 250 PS 637: Performed by: NURSE PRACTITIONER

## 2022-12-27 PROCEDURE — 99225 PR SUBSEQUENT OBSERVATION CARE,LEVEL II: CPT | Performed by: PHYSICIAN ASSISTANT

## 2022-12-27 PROCEDURE — 250N000013 HC RX MED GY IP 250 OP 250 PS 637: Performed by: HOSPITALIST

## 2022-12-27 RX ADMIN — POLYETHYLENE GLYCOL 3350 17 G: 17 POWDER, FOR SOLUTION ORAL at 09:01

## 2022-12-27 RX ADMIN — HYDROXYZINE HYDROCHLORIDE 50 MG: 50 TABLET, FILM COATED ORAL at 20:31

## 2022-12-27 RX ADMIN — CLOTRIMAZOLE: 0.01 CREAM TOPICAL at 20:36

## 2022-12-27 RX ADMIN — CLONIDINE HYDROCHLORIDE 0.1 MG: 0.1 TABLET ORAL at 09:01

## 2022-12-27 RX ADMIN — Medication 10 MG: at 21:45

## 2022-12-27 RX ADMIN — METOPROLOL SUCCINATE 25 MG: 25 TABLET, EXTENDED RELEASE ORAL at 09:01

## 2022-12-27 RX ADMIN — VENLAFAXINE HYDROCHLORIDE 225 MG: 150 CAPSULE, EXTENDED RELEASE ORAL at 21:49

## 2022-12-27 RX ADMIN — CLONIDINE HYDROCHLORIDE 0.1 MG: 0.1 TABLET ORAL at 13:46

## 2022-12-27 RX ADMIN — Medication 25 MCG: at 09:01

## 2022-12-27 RX ADMIN — BACLOFEN 10 MG: 10 TABLET ORAL at 13:44

## 2022-12-27 RX ADMIN — HYDROXYZINE HYDROCHLORIDE 50 MG: 50 TABLET, FILM COATED ORAL at 13:44

## 2022-12-27 RX ADMIN — QUETIAPINE 200 MG: 200 TABLET, FILM COATED ORAL at 13:44

## 2022-12-27 RX ADMIN — DIVALPROEX SODIUM 1000 MG: 500 TABLET, DELAYED RELEASE ORAL at 21:44

## 2022-12-27 RX ADMIN — MIRTAZAPINE 15 MG: 15 TABLET, FILM COATED ORAL at 21:45

## 2022-12-27 RX ADMIN — LEVOTHYROXINE SODIUM 25 MCG: 0.03 TABLET ORAL at 09:01

## 2022-12-27 RX ADMIN — HYDROCORTISONE: 1 CREAM TOPICAL at 20:35

## 2022-12-27 RX ADMIN — EMPAGLIFLOZIN 10 MG: 10 TABLET, FILM COATED ORAL at 09:01

## 2022-12-27 RX ADMIN — CLONIDINE HYDROCHLORIDE 0.1 MG: 0.1 TABLET ORAL at 20:31

## 2022-12-27 RX ADMIN — HYDROXYZINE HYDROCHLORIDE 50 MG: 50 TABLET, FILM COATED ORAL at 09:01

## 2022-12-27 RX ADMIN — METFORMIN HYDROCHLORIDE 850 MG: 850 TABLET, FILM COATED ORAL at 17:14

## 2022-12-27 RX ADMIN — CLOTRIMAZOLE: 0.01 CREAM TOPICAL at 09:02

## 2022-12-27 RX ADMIN — OLANZAPINE 2.5 MG: 2.5 TABLET, FILM COATED ORAL at 13:44

## 2022-12-27 RX ADMIN — QUETIAPINE FUMARATE 400 MG: 200 TABLET ORAL at 21:45

## 2022-12-27 RX ADMIN — ASPIRIN 81 MG CHEWABLE TABLET 81 MG: 81 TABLET CHEWABLE at 09:01

## 2022-12-27 RX ADMIN — BACLOFEN 10 MG: 10 TABLET ORAL at 09:01

## 2022-12-27 RX ADMIN — QUETIAPINE 200 MG: 200 TABLET, FILM COATED ORAL at 09:01

## 2022-12-27 RX ADMIN — ATORVASTATIN CALCIUM 20 MG: 20 TABLET, FILM COATED ORAL at 20:31

## 2022-12-27 RX ADMIN — VENLAFAXINE HYDROCHLORIDE 75 MG: 150 CAPSULE, EXTENDED RELEASE ORAL at 21:51

## 2022-12-27 RX ADMIN — BACLOFEN 10 MG: 10 TABLET ORAL at 20:31

## 2022-12-27 RX ADMIN — DIVALPROEX SODIUM 750 MG: 500 TABLET, DELAYED RELEASE ORAL at 09:01

## 2022-12-27 ASSESSMENT — ACTIVITIES OF DAILY LIVING (ADL)
ADLS_ACUITY_SCORE: 54

## 2022-12-27 NOTE — PLAN OF CARE
"PRIMARY DIAGNOSIS: \"GENERIC\" NURSING/ placement  OUTPATIENT/OBSERVATION GOALS TO BE MET BEFORE DISCHARGE:  1. ADLs back to baseline: Yes    2. Activity and level of assistance: total care    3. Pain status: Pain free.    4. Return to near baseline physical activity: Yes  Vitals are Temp: 97.5  F (36.4  C) Temp src: Oral BP: (!) 139/94 Pulse: 75   Resp: 18 SpO2: 92 %.    Discharge Planner Nurse   Safe discharge environment identified: No  Barriers to discharge: Yes       Entered by: Waleska Jin RN 12/27/2022 11:30 AM      Pt is AOx4, LCTA, BS active. Currently he denies all pain and discomfort, afebrile. He is able to verbalize his needs and is still tolerating a regular diet. He is resting well with no complaints, at this time he is refusing to be moved into his chair. Waiting for placement, will continue to monitor.     Please review provider order for any additional goals.   Nurse to notify provider when observation goals have been met and patient is ready for discharge.  "

## 2022-12-27 NOTE — PLAN OF CARE
"PRIMARY DIAGNOSIS: \"GENERIC\" NURSING/ placement  OUTPATIENT/OBSERVATION GOALS TO BE MET BEFORE DISCHARGE:  1. ADLs back to baseline: Yes    2. Activity and level of assistance: total care    3. Pain status: Pain free.    4. Return to near baseline physical activity: Yes  Vitals are Temp: 98.1  F (36.7  C) Temp src: Oral BP: 130/89 Pulse: 83   Resp: 16 SpO2: 94 %.    Discharge Planner Nurse   Safe discharge environment identified: No  Barriers to discharge: Yes       Entered by: Amber Villela RN 12/27/2022 5:19 AM    Pt  Is A&OX4. Pt, Denies any pain or discomfort denies SOB lung sound clear vss, afebrile, Pt able to verbalize needs.toleraing po intake. Pt is resting well no complain noted Pt is waiting for placement. Will continue to monitor and provide care.   Please review provider order for any additional goals.   Nurse to notify provider when observation goals have been met and patient is ready for discharge.  "

## 2022-12-27 NOTE — PLAN OF CARE
"PRIMARY DIAGNOSIS: \"GENERIC\" NURSING/ placement  OUTPATIENT/OBSERVATION GOALS TO BE MET BEFORE DISCHARGE:  1. ADLs back to baseline: Yes    2. Activity and level of assistance: total care    3. Pain status: Pain free.    4. Return to near baseline physical activity: Yes        Discharge Planner Nurse   Safe discharge environment identified: No  Barriers to discharge: Yes       Entered by: Amber Villela RN 12/27/2022 2:01 AM    Pt  Is A&OX4. Pt, Denies any pain or discomfort denies SOB lung sound clear vss, afebrile, Pt able to verbalize needs.toleraing po intake. Pt is resting well no complain noted Pt is waiting for placement. Will continue to monitor and provide care.   Please review provider order for any additional goals.   Nurse to notify provider when observation goals have been met and patient is ready for discharge.  "

## 2022-12-27 NOTE — PROGRESS NOTES
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 patients. This   test is for in vitro diagnostic use under the FDA EUA for   laboratories certified under CLIA to perform high complexity testing.   This test has not been FDA cleared or approved. A negative result   does not rule out the presence of PCR inhibitors in the specimen or   target RNA in concentration below the limit of detection for the   assay. The possibility of a false negative should be considered if   the patient's recent exposure or clinical presentation suggests   COVID-19. This test was validated by the [facility]. This laboratory is certified under the Clinical Laboratory Improvement Amendments of 1988 (CLIA-88) as qualified to perform high complexity laboratory testing.     Glucose by meter     Status: Abnormal   Result Value Ref Range    GLUCOSE BY METER POCT 109 (H) 70 - 99 mg/dL   Glucose by meter     Status: Abnormal   Result Value Ref Range    GLUCOSE BY METER POCT 107 (H) 70 - 99 mg/dL   Glucose by meter     Status: Abnormal   Result Value Ref Range    GLUCOSE BY METER POCT 121 (H) 70 - 99 mg/dL   Glucose by meter     Status: Abnormal   Result Value Ref Range    GLUCOSE BY METER POCT 165 (H) 70 - 99 mg/dL   Glucose by meter      Status: Abnormal   Result Value Ref Range    GLUCOSE BY METER POCT 110 (H) 70 - 99 mg/dL   Glucose by meter     Status: Abnormal   Result Value Ref Range    GLUCOSE BY METER POCT 138 (H) 70 - 99 mg/dL   Glucose by meter     Status: Abnormal   Result Value Ref Range    GLUCOSE BY METER POCT 114 (H) 70 - 99 mg/dL   Glucose by meter     Status: Abnormal   Result Value Ref Range    GLUCOSE BY METER POCT 117 (H) 70 - 99 mg/dL   Glucose by meter     Status: Abnormal   Result Value Ref Range    GLUCOSE BY METER POCT 135 (H) 70 - 99 mg/dL   Glucose by meter     Status: Normal   Result Value Ref Range    GLUCOSE BY METER POCT 99 70 - 99 mg/dL   Glucose by meter     Status: Abnormal   Result Value Ref Range    GLUCOSE BY METER POCT 100 (H) 70 - 99 mg/dL   Glucose by meter     Status: Abnormal   Result Value Ref Range    GLUCOSE BY METER POCT 123 (H) 70 - 99 mg/dL   Glucose by meter     Status: Normal   Result Value Ref Range    GLUCOSE BY METER POCT 85 70 - 99 mg/dL   Glucose by meter     Status: Normal   Result Value Ref Range    GLUCOSE BY METER POCT 98 70 - 99 mg/dL   Glucose by meter     Status: Normal   Result Value Ref Range    GLUCOSE BY METER POCT 91 70 - 99 mg/dL   Glucose by meter     Status: Normal   Result Value Ref Range    GLUCOSE BY METER POCT 90 70 - 99 mg/dL   Glucose by meter     Status: Normal   Result Value Ref Range    GLUCOSE BY METER POCT 93 70 - 99 mg/dL   Glucose by meter     Status: Abnormal   Result Value Ref Range    GLUCOSE BY METER POCT 69 (L) 70 - 99 mg/dL   Glucose by meter     Status: Normal   Result Value Ref Range    GLUCOSE BY METER POCT 97 70 - 99 mg/dL   Glucose by meter     Status: Abnormal   Result Value Ref Range    GLUCOSE BY METER POCT 172 (H) 70 - 99 mg/dL   Glucose by meter     Status: Normal   Result Value Ref Range    GLUCOSE BY METER POCT 70 70 - 99 mg/dL   Glucose by meter     Status: Normal   Result Value Ref Range    GLUCOSE BY METER POCT 79 70 - 99 mg/dL   Glucose by meter      Status: Abnormal   Result Value Ref Range    GLUCOSE BY METER POCT 124 (H) 70 - 99 mg/dL   Glucose by meter     Status: Abnormal   Result Value Ref Range    GLUCOSE BY METER POCT 111 (H) 70 - 99 mg/dL   Glucose by meter     Status: Normal   Result Value Ref Range    GLUCOSE BY METER POCT 95 70 - 99 mg/dL   Glucose by meter     Status: Normal   Result Value Ref Range    GLUCOSE BY METER POCT 82 70 - 99 mg/dL   Glucose by meter     Status: Normal   Result Value Ref Range    GLUCOSE BY METER POCT 80 70 - 99 mg/dL   Glucose by meter     Status: Normal   Result Value Ref Range    GLUCOSE BY METER POCT 86 70 - 99 mg/dL   Glucose by meter     Status: Abnormal   Result Value Ref Range    GLUCOSE BY METER POCT 106 (H) 70 - 99 mg/dL   Glucose by meter     Status: Abnormal   Result Value Ref Range    GLUCOSE BY METER POCT 66 (L) 70 - 99 mg/dL   Glucose by meter     Status: Abnormal   Result Value Ref Range    GLUCOSE BY METER POCT 125 (H) 70 - 99 mg/dL   Glucose by meter     Status: Abnormal   Result Value Ref Range    GLUCOSE BY METER POCT 120 (H) 70 - 99 mg/dL   Glucose by meter     Status: Abnormal   Result Value Ref Range    GLUCOSE BY METER POCT 201 (H) 70 - 99 mg/dL   Glucose by meter     Status: Abnormal   Result Value Ref Range    GLUCOSE BY METER POCT 222 (H) 70 - 99 mg/dL   Glucose by meter     Status: Abnormal   Result Value Ref Range    GLUCOSE BY METER POCT 148 (H) 70 - 99 mg/dL   Hemoglobin A1c     Status: Abnormal   Result Value Ref Range    Hemoglobin A1C 5.7 (H) <5.7 %   Glucose by meter     Status: Abnormal   Result Value Ref Range    GLUCOSE BY METER POCT 111 (H) 70 - 99 mg/dL   Glucose by meter     Status: Abnormal   Result Value Ref Range    GLUCOSE BY METER POCT 144 (H) 70 - 99 mg/dL   Glucose by meter     Status: Abnormal   Result Value Ref Range    GLUCOSE BY METER POCT 116 (H) 70 - 99 mg/dL   Glucose by meter     Status: Abnormal   Result Value Ref Range    GLUCOSE BY METER POCT 127 (H) 70 - 99 mg/dL    Glucose by meter     Status: Abnormal   Result Value Ref Range    GLUCOSE BY METER POCT 177 (H) 70 - 99 mg/dL   Glucose by meter     Status: Abnormal   Result Value Ref Range    GLUCOSE BY METER POCT 120 (H) 70 - 99 mg/dL   Glucose by meter     Status: Abnormal   Result Value Ref Range    GLUCOSE BY METER POCT 143 (H) 70 - 99 mg/dL   Glucose by meter     Status: Abnormal   Result Value Ref Range    GLUCOSE BY METER POCT 122 (H) 70 - 99 mg/dL   Glucose by meter     Status: Abnormal   Result Value Ref Range    GLUCOSE BY METER POCT 208 (H) 70 - 99 mg/dL   Glucose by meter     Status: Abnormal   Result Value Ref Range    GLUCOSE BY METER POCT 202 (H) 70 - 99 mg/dL   Glucose by meter     Status: Abnormal   Result Value Ref Range    GLUCOSE BY METER POCT 239 (H) 70 - 99 mg/dL   Glucose by meter     Status: Abnormal   Result Value Ref Range    GLUCOSE BY METER POCT 138 (H) 70 - 99 mg/dL   Glucose by meter     Status: Abnormal   Result Value Ref Range    GLUCOSE BY METER POCT 166 (H) 70 - 99 mg/dL   Glucose by meter     Status: Abnormal   Result Value Ref Range    GLUCOSE BY METER POCT 237 (H) 70 - 99 mg/dL   Glucose by meter     Status: Abnormal   Result Value Ref Range    GLUCOSE BY METER POCT 274 (H) 70 - 99 mg/dL   Glucose by meter     Status: Abnormal   Result Value Ref Range    GLUCOSE BY METER POCT 280 (H) 70 - 99 mg/dL   Glucose by meter     Status: Abnormal   Result Value Ref Range    GLUCOSE BY METER POCT 250 (H) 70 - 99 mg/dL   Glucose by meter     Status: Abnormal   Result Value Ref Range    GLUCOSE BY METER POCT 318 (H) 70 - 99 mg/dL   Glucose by meter     Status: Abnormal   Result Value Ref Range    GLUCOSE BY METER POCT 251 (H) 70 - 99 mg/dL   Glucose by meter     Status: Abnormal   Result Value Ref Range    GLUCOSE BY METER POCT 329 (H) 70 - 99 mg/dL   Glucose by meter     Status: Abnormal   Result Value Ref Range    GLUCOSE BY METER POCT 199 (H) 70 - 99 mg/dL   Glucose by meter     Status: Abnormal    Result Value Ref Range    GLUCOSE BY METER POCT 166 (H) 70 - 99 mg/dL   Glucose by meter     Status: Abnormal   Result Value Ref Range    GLUCOSE BY METER POCT 177 (H) 70 - 99 mg/dL   Glucose by meter     Status: Abnormal   Result Value Ref Range    GLUCOSE BY METER POCT 169 (H) 70 - 99 mg/dL   Glucose by meter     Status: Abnormal   Result Value Ref Range    GLUCOSE BY METER POCT 173 (H) 70 - 99 mg/dL   Glucose by meter     Status: Abnormal   Result Value Ref Range    GLUCOSE BY METER POCT 134 (H) 70 - 99 mg/dL   Glucose by meter     Status: Abnormal   Result Value Ref Range    GLUCOSE BY METER POCT 220 (H) 70 - 99 mg/dL   Glucose by meter     Status: Abnormal   Result Value Ref Range    GLUCOSE BY METER POCT 141 (H) 70 - 99 mg/dL   Glucose by meter     Status: Abnormal   Result Value Ref Range    GLUCOSE BY METER POCT 225 (H) 70 - 99 mg/dL   Glucose by meter     Status: Abnormal   Result Value Ref Range    GLUCOSE BY METER POCT 129 (H) 70 - 99 mg/dL   Glucose by meter     Status: Abnormal   Result Value Ref Range    GLUCOSE BY METER POCT 153 (H) 70 - 99 mg/dL   Glucose by meter     Status: Abnormal   Result Value Ref Range    GLUCOSE BY METER POCT 128 (H) 70 - 99 mg/dL   Glucose by meter     Status: Abnormal   Result Value Ref Range    GLUCOSE BY METER POCT 165 (H) 70 - 99 mg/dL   Glucose by meter     Status: Abnormal   Result Value Ref Range    GLUCOSE BY METER POCT 232 (H) 70 - 99 mg/dL   Glucose by meter     Status: Abnormal   Result Value Ref Range    GLUCOSE BY METER POCT 186 (H) 70 - 99 mg/dL   Glucose by meter     Status: Abnormal   Result Value Ref Range    GLUCOSE BY METER POCT 147 (H) 70 - 99 mg/dL   Glucose by meter     Status: Abnormal   Result Value Ref Range    GLUCOSE BY METER POCT 217 (H) 70 - 99 mg/dL   Glucose by meter     Status: Abnormal   Result Value Ref Range    GLUCOSE BY METER POCT 132 (H) 70 - 99 mg/dL   Glucose by meter     Status: Abnormal   Result Value Ref Range    GLUCOSE BY METER  POCT 197 (H) 70 - 99 mg/dL   Glucose by meter     Status: Abnormal   Result Value Ref Range    GLUCOSE BY METER POCT 201 (H) 70 - 99 mg/dL   Glucose by meter     Status: Abnormal   Result Value Ref Range    GLUCOSE BY METER POCT 135 (H) 70 - 99 mg/dL   Glucose by meter     Status: Abnormal   Result Value Ref Range    GLUCOSE BY METER POCT 138 (H) 70 - 99 mg/dL   Glucose by meter     Status: Abnormal   Result Value Ref Range    GLUCOSE BY METER POCT 154 (H) 70 - 99 mg/dL   Glucose by meter     Status: Abnormal   Result Value Ref Range    GLUCOSE BY METER POCT 132 (H) 70 - 99 mg/dL   Glucose by meter     Status: Abnormal   Result Value Ref Range    GLUCOSE BY METER POCT 119 (H) 70 - 99 mg/dL   Glucose by meter     Status: Abnormal   Result Value Ref Range    GLUCOSE BY METER POCT 156 (H) 70 - 99 mg/dL   Glucose by meter     Status: Abnormal   Result Value Ref Range    GLUCOSE BY METER POCT 295 (H) 70 - 99 mg/dL   Glucose by meter     Status: Abnormal   Result Value Ref Range    GLUCOSE BY METER POCT 208 (H) 70 - 99 mg/dL   Glucose by meter     Status: Abnormal   Result Value Ref Range    GLUCOSE BY METER POCT 130 (H) 70 - 99 mg/dL   Glucose by meter     Status: Abnormal   Result Value Ref Range    GLUCOSE BY METER POCT 175 (H) 70 - 99 mg/dL   Glucose by meter     Status: Abnormal   Result Value Ref Range    GLUCOSE BY METER POCT 123 (H) 70 - 99 mg/dL   Glucose by meter     Status: Abnormal   Result Value Ref Range    GLUCOSE BY METER POCT 143 (H) 70 - 99 mg/dL   Glucose by meter     Status: Abnormal   Result Value Ref Range    GLUCOSE BY METER POCT 134 (H) 70 - 99 mg/dL   Glucose by meter     Status: Abnormal   Result Value Ref Range    GLUCOSE BY METER POCT 157 (H) 70 - 99 mg/dL   Glucose by meter     Status: Abnormal   Result Value Ref Range    GLUCOSE BY METER POCT 107 (H) 70 - 99 mg/dL   Glucose by meter     Status: Abnormal   Result Value Ref Range    GLUCOSE BY METER POCT 171 (H) 70 - 99 mg/dL   Glucose by meter      Status: Abnormal   Result Value Ref Range    GLUCOSE BY METER POCT 154 (H) 70 - 99 mg/dL   Glucose by meter     Status: Abnormal   Result Value Ref Range    GLUCOSE BY METER POCT 129 (H) 70 - 99 mg/dL   Glucose by meter     Status: Abnormal   Result Value Ref Range    GLUCOSE BY METER POCT 162 (H) 70 - 99 mg/dL   Glucose by meter     Status: Abnormal   Result Value Ref Range    GLUCOSE BY METER POCT 175 (H) 70 - 99 mg/dL   Glucose by meter     Status: Abnormal   Result Value Ref Range    GLUCOSE BY METER POCT 261 (H) 70 - 99 mg/dL   Glucose by meter     Status: Abnormal   Result Value Ref Range    GLUCOSE BY METER POCT 152 (H) 70 - 99 mg/dL   Glucose by meter     Status: Abnormal   Result Value Ref Range    GLUCOSE BY METER POCT 157 (H) 70 - 99 mg/dL   Glucose by meter     Status: Abnormal   Result Value Ref Range    GLUCOSE BY METER POCT 148 (H) 70 - 99 mg/dL   Glucose by meter     Status: Abnormal   Result Value Ref Range    GLUCOSE BY METER POCT 105 (H) 70 - 99 mg/dL   Glucose by meter     Status: Abnormal   Result Value Ref Range    GLUCOSE BY METER POCT 155 (H) 70 - 99 mg/dL   Glucose by meter     Status: Abnormal   Result Value Ref Range    GLUCOSE BY METER POCT 180 (H) 70 - 99 mg/dL   Glucose by meter     Status: Abnormal   Result Value Ref Range    GLUCOSE BY METER POCT 170 (H) 70 - 99 mg/dL   Glucose by meter     Status: Abnormal   Result Value Ref Range    GLUCOSE BY METER POCT 120 (H) 70 - 99 mg/dL   Glucose by meter     Status: Abnormal   Result Value Ref Range    GLUCOSE BY METER POCT 143 (H) 70 - 99 mg/dL   Glucose by meter     Status: Abnormal   Result Value Ref Range    GLUCOSE BY METER POCT 214 (H) 70 - 99 mg/dL   Glucose by meter     Status: Abnormal   Result Value Ref Range    GLUCOSE BY METER POCT 134 (H) 70 - 99 mg/dL   Glucose by meter     Status: Abnormal   Result Value Ref Range    GLUCOSE BY METER POCT 100 (H) 70 - 99 mg/dL   Glucose by meter     Status: Abnormal   Result Value Ref Range     GLUCOSE BY METER POCT 135 (H) 70 - 99 mg/dL   Glucose by meter     Status: Abnormal   Result Value Ref Range    GLUCOSE BY METER POCT 162 (H) 70 - 99 mg/dL   Glucose by meter     Status: Abnormal   Result Value Ref Range    GLUCOSE BY METER POCT 165 (H) 70 - 99 mg/dL   Glucose by meter     Status: Abnormal   Result Value Ref Range    GLUCOSE BY METER POCT 108 (H) 70 - 99 mg/dL   Glucose by meter     Status: Abnormal   Result Value Ref Range    GLUCOSE BY METER POCT 158 (H) 70 - 99 mg/dL   Glucose by meter     Status: Abnormal   Result Value Ref Range    GLUCOSE BY METER POCT 157 (H) 70 - 99 mg/dL   Glucose by meter     Status: Abnormal   Result Value Ref Range    GLUCOSE BY METER POCT 108 (H) 70 - 99 mg/dL   Glucose by meter     Status: Abnormal   Result Value Ref Range    GLUCOSE BY METER POCT 140 (H) 70 - 99 mg/dL   Glucose by meter     Status: Abnormal   Result Value Ref Range    GLUCOSE BY METER POCT 136 (H) 70 - 99 mg/dL   Glucose by meter     Status: Abnormal   Result Value Ref Range    GLUCOSE BY METER POCT 131 (H) 70 - 99 mg/dL

## 2022-12-27 NOTE — PLAN OF CARE
"PRIMARY DIAGNOSIS: \"GENERIC\" NURSING/ placement  OUTPATIENT/OBSERVATION GOALS TO BE MET BEFORE DISCHARGE:  1. ADLs back to baseline: Yes    2. Activity and level of assistance: total care    3. Pain status: Pain free.    4. Return to near baseline physical activity: Yes        Discharge Planner Nurse   Safe discharge environment identified: No  Barriers to discharge: Yes       Entered by: Amber Villela RN 12/26/2022 9:02 PM    Pt  Is A&OX4. Pt, Denies any pain or discomfort denies SOB lung sound clear vss, afebrile, Pt able to verbalize needs.toleraing po intake. Pt is waiting for placement. Will continue to monitor and provide care.   Please review provider order for any additional goals.   Nurse to notify provider when observation goals have been met and patient is ready for discharge.  "

## 2022-12-28 PROCEDURE — 250N000013 HC RX MED GY IP 250 OP 250 PS 637: Performed by: NURSE PRACTITIONER

## 2022-12-28 PROCEDURE — 250N000013 HC RX MED GY IP 250 OP 250 PS 637

## 2022-12-28 PROCEDURE — 250N000013 HC RX MED GY IP 250 OP 250 PS 637: Performed by: HOSPITALIST

## 2022-12-28 PROCEDURE — 99225 PR SUBSEQUENT OBSERVATION CARE,LEVEL II: CPT | Performed by: PHYSICIAN ASSISTANT

## 2022-12-28 PROCEDURE — 250N000013 HC RX MED GY IP 250 OP 250 PS 637: Performed by: PHYSICIAN ASSISTANT

## 2022-12-28 PROCEDURE — G0378 HOSPITAL OBSERVATION PER HR: HCPCS

## 2022-12-28 RX ADMIN — CLOTRIMAZOLE: 0.01 CREAM TOPICAL at 08:15

## 2022-12-28 RX ADMIN — KETOCONAZOLE: 20 CREAM TOPICAL at 08:15

## 2022-12-28 RX ADMIN — CLONIDINE HYDROCHLORIDE 0.1 MG: 0.1 TABLET ORAL at 13:12

## 2022-12-28 RX ADMIN — CLONIDINE HYDROCHLORIDE 0.1 MG: 0.1 TABLET ORAL at 08:10

## 2022-12-28 RX ADMIN — METFORMIN HYDROCHLORIDE 850 MG: 850 TABLET, FILM COATED ORAL at 17:37

## 2022-12-28 RX ADMIN — QUETIAPINE 200 MG: 200 TABLET, FILM COATED ORAL at 08:10

## 2022-12-28 RX ADMIN — CLONIDINE HYDROCHLORIDE 0.1 MG: 0.1 TABLET ORAL at 21:26

## 2022-12-28 RX ADMIN — ASPIRIN 81 MG CHEWABLE TABLET 81 MG: 81 TABLET CHEWABLE at 08:10

## 2022-12-28 RX ADMIN — HYDROXYZINE HYDROCHLORIDE 50 MG: 50 TABLET, FILM COATED ORAL at 08:10

## 2022-12-28 RX ADMIN — POLYETHYLENE GLYCOL 3350 17 G: 17 POWDER, FOR SOLUTION ORAL at 08:09

## 2022-12-28 RX ADMIN — BACLOFEN 10 MG: 10 TABLET ORAL at 08:10

## 2022-12-28 RX ADMIN — DIVALPROEX SODIUM 750 MG: 500 TABLET, DELAYED RELEASE ORAL at 08:10

## 2022-12-28 RX ADMIN — QUETIAPINE 200 MG: 200 TABLET, FILM COATED ORAL at 13:12

## 2022-12-28 RX ADMIN — EMPAGLIFLOZIN 10 MG: 10 TABLET, FILM COATED ORAL at 08:10

## 2022-12-28 RX ADMIN — QUETIAPINE FUMARATE 400 MG: 200 TABLET ORAL at 21:26

## 2022-12-28 RX ADMIN — ATORVASTATIN CALCIUM 20 MG: 20 TABLET, FILM COATED ORAL at 21:26

## 2022-12-28 RX ADMIN — MIRTAZAPINE 15 MG: 15 TABLET, FILM COATED ORAL at 21:27

## 2022-12-28 RX ADMIN — DIVALPROEX SODIUM 1000 MG: 500 TABLET, DELAYED RELEASE ORAL at 21:27

## 2022-12-28 RX ADMIN — LEVOTHYROXINE SODIUM 25 MCG: 0.03 TABLET ORAL at 08:10

## 2022-12-28 RX ADMIN — Medication 10 MG: at 21:26

## 2022-12-28 RX ADMIN — VENLAFAXINE HYDROCHLORIDE 75 MG: 150 CAPSULE, EXTENDED RELEASE ORAL at 21:27

## 2022-12-28 RX ADMIN — VENLAFAXINE HYDROCHLORIDE 150 MG: 150 CAPSULE, EXTENDED RELEASE ORAL at 21:27

## 2022-12-28 RX ADMIN — HYDROXYZINE HYDROCHLORIDE 50 MG: 50 TABLET, FILM COATED ORAL at 13:12

## 2022-12-28 RX ADMIN — METOPROLOL SUCCINATE 25 MG: 25 TABLET, EXTENDED RELEASE ORAL at 08:10

## 2022-12-28 RX ADMIN — Medication 25 MCG: at 08:10

## 2022-12-28 RX ADMIN — HYDROXYZINE HYDROCHLORIDE 50 MG: 50 TABLET, FILM COATED ORAL at 21:27

## 2022-12-28 RX ADMIN — BACLOFEN 10 MG: 10 TABLET ORAL at 21:27

## 2022-12-28 RX ADMIN — OLANZAPINE 2.5 MG: 2.5 TABLET, FILM COATED ORAL at 13:12

## 2022-12-28 RX ADMIN — BACLOFEN 10 MG: 10 TABLET ORAL at 13:12

## 2022-12-28 ASSESSMENT — ACTIVITIES OF DAILY LIVING (ADL)
ADLS_ACUITY_SCORE: 54

## 2022-12-28 NOTE — PLAN OF CARE
"PRIMARY DIAGNOSIS: \"GENERIC\" NURSING/ placement  OBSERVATION GOALS TO BE MET BEFORE DISCHARGE:  1. ADLs back to baseline: Yes    2. Activity and level of assistance: Total care    3. Pain status: Pain free.    4. Return to near baseline physical activity: Yes  Vitals are      Discharge Planner Nurse   Safe discharge environment identified: No  Barriers to discharge: Yes       Entered by: Waleska Jin RN 12/28/2022      Pt is AOx4, LCTA, BS active. He denies pain at this time and is comfortable in bed. He is able to verbalize he needs, tolerates a regular diet, and po meds. Waiting for placement.     BP (!) 141/97 (BP Location: Right arm, Patient Position: Semi-Toro's, Cuff Size: Adult Large)   Pulse 70   Temp 98.1  F (36.7  C) (Oral)   Resp 17   Wt 99.9 kg (220 lb 4.8 oz)   SpO2 96%     Please review provider order for any additional goals.   Nurse to notify provider when observation goals have been met and patient is ready for discharge.  "

## 2022-12-28 NOTE — PLAN OF CARE
"PRIMARY DIAGNOSIS: \"GENERIC\" NURSING/ placement  OBSERVATION GOALS TO BE MET BEFORE DISCHARGE:  1. ADLs back to baseline: Yes    2. Activity and level of assistance: Total care    3. Pain status: Pain free.    4. Return to near baseline physical activity: Yes  Vitals are      Discharge Planner Nurse   Safe discharge environment identified: No  Barriers to discharge: Yes       Entered by: Sendy Paz RN 12/27/2022       Pt is AOx4, Lung sounds CTA, Bowel sounds active x4. Denies pain and discomfort, afebrile. Able to verbalize needs and is still tolerating a regular diet and po meds. Pt resting well in bed . SW following, waiting for placement.  /87 (BP Location: Right arm)   Pulse 63   Temp 97.9  F (36.6  C) (Oral)   Resp 16   Wt 99.9 kg (220 lb 4.8 oz)   SpO2 95%   Please review provider order for any additional goals.   Nurse to notify provider when observation goals have been met and patient is ready for discharge.  "
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
## 2022-12-28 NOTE — PROGRESS NOTES
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 Clinical Laboratory Improvement Amendments of 1988 (CLIA-88) as qualified to perform high complexity laboratory testing.     Glucose by meter     Status: Abnormal   Result Value Ref Range    GLUCOSE BY METER POCT 109 (H) 70 - 99 mg/dL   Glucose by meter     Status: Abnormal   Result Value Ref Range    GLUCOSE BY METER POCT 107 (H) 70 - 99 mg/dL   Glucose by meter     Status: Abnormal   Result Value Ref Range    GLUCOSE BY METER POCT 121 (H) 70 - 99 mg/dL   Glucose by meter     Status: Abnormal   Result Value Ref Range    GLUCOSE BY METER POCT 165 (H) 70 - 99 mg/dL   Glucose by meter     Status: Abnormal   Result Value Ref Range    GLUCOSE BY METER POCT 110 (H) 70 - 99 mg/dL   Glucose by meter     Status: Abnormal   Result Value Ref Range    GLUCOSE BY METER POCT 138 (H) 70 - 99 mg/dL   Glucose by meter     Status: Abnormal   Result Value Ref Range    GLUCOSE BY METER POCT 114 (H) 70 - 99 mg/dL   Glucose by meter     Status: Abnormal   Result Value Ref Range    GLUCOSE BY METER POCT 117 (H) 70 - 99 mg/dL   Glucose by meter     Status: Abnormal    Result Value Ref Range    GLUCOSE BY METER POCT 135 (H) 70 - 99 mg/dL   Glucose by meter     Status: Normal   Result Value Ref Range    GLUCOSE BY METER POCT 99 70 - 99 mg/dL   Glucose by meter     Status: Abnormal   Result Value Ref Range    GLUCOSE BY METER POCT 100 (H) 70 - 99 mg/dL   Glucose by meter     Status: Abnormal   Result Value Ref Range    GLUCOSE BY METER POCT 123 (H) 70 - 99 mg/dL   Glucose by meter     Status: Normal   Result Value Ref Range    GLUCOSE BY METER POCT 85 70 - 99 mg/dL   Glucose by meter     Status: Normal   Result Value Ref Range    GLUCOSE BY METER POCT 98 70 - 99 mg/dL   Glucose by meter     Status: Normal   Result Value Ref Range    GLUCOSE BY METER POCT 91 70 - 99 mg/dL   Glucose by meter     Status: Normal   Result Value Ref Range    GLUCOSE BY METER POCT 90 70 - 99 mg/dL   Glucose by meter     Status: Normal   Result Value Ref Range    GLUCOSE BY METER POCT 93 70 - 99 mg/dL   Glucose by meter     Status: Abnormal   Result Value Ref Range    GLUCOSE BY METER POCT 69 (L) 70 - 99 mg/dL   Glucose by meter     Status: Normal   Result Value Ref Range    GLUCOSE BY METER POCT 97 70 - 99 mg/dL   Glucose by meter     Status: Abnormal   Result Value Ref Range    GLUCOSE BY METER POCT 172 (H) 70 - 99 mg/dL   Glucose by meter     Status: Normal   Result Value Ref Range    GLUCOSE BY METER POCT 70 70 - 99 mg/dL   Glucose by meter     Status: Normal   Result Value Ref Range    GLUCOSE BY METER POCT 79 70 - 99 mg/dL   Glucose by meter     Status: Abnormal   Result Value Ref Range    GLUCOSE BY METER POCT 124 (H) 70 - 99 mg/dL   Glucose by meter     Status: Abnormal   Result Value Ref Range    GLUCOSE BY METER POCT 111 (H) 70 - 99 mg/dL   Glucose by meter     Status: Normal   Result Value Ref Range    GLUCOSE BY METER POCT 95 70 - 99 mg/dL   Glucose by meter     Status: Normal   Result Value Ref Range    GLUCOSE BY METER POCT 82 70 - 99 mg/dL   Glucose by meter     Status: Normal   Result  Value Ref Range    GLUCOSE BY METER POCT 80 70 - 99 mg/dL   Glucose by meter     Status: Normal   Result Value Ref Range    GLUCOSE BY METER POCT 86 70 - 99 mg/dL   Glucose by meter     Status: Abnormal   Result Value Ref Range    GLUCOSE BY METER POCT 106 (H) 70 - 99 mg/dL   Glucose by meter     Status: Abnormal   Result Value Ref Range    GLUCOSE BY METER POCT 66 (L) 70 - 99 mg/dL   Glucose by meter     Status: Abnormal   Result Value Ref Range    GLUCOSE BY METER POCT 125 (H) 70 - 99 mg/dL   Glucose by meter     Status: Abnormal   Result Value Ref Range    GLUCOSE BY METER POCT 120 (H) 70 - 99 mg/dL   Glucose by meter     Status: Abnormal   Result Value Ref Range    GLUCOSE BY METER POCT 201 (H) 70 - 99 mg/dL   Glucose by meter     Status: Abnormal   Result Value Ref Range    GLUCOSE BY METER POCT 222 (H) 70 - 99 mg/dL   Glucose by meter     Status: Abnormal   Result Value Ref Range    GLUCOSE BY METER POCT 148 (H) 70 - 99 mg/dL   Hemoglobin A1c     Status: Abnormal   Result Value Ref Range    Hemoglobin A1C 5.7 (H) <5.7 %   Glucose by meter     Status: Abnormal   Result Value Ref Range    GLUCOSE BY METER POCT 111 (H) 70 - 99 mg/dL   Glucose by meter     Status: Abnormal   Result Value Ref Range    GLUCOSE BY METER POCT 144 (H) 70 - 99 mg/dL   Glucose by meter     Status: Abnormal   Result Value Ref Range    GLUCOSE BY METER POCT 116 (H) 70 - 99 mg/dL   Glucose by meter     Status: Abnormal   Result Value Ref Range    GLUCOSE BY METER POCT 127 (H) 70 - 99 mg/dL   Glucose by meter     Status: Abnormal   Result Value Ref Range    GLUCOSE BY METER POCT 177 (H) 70 - 99 mg/dL   Glucose by meter     Status: Abnormal   Result Value Ref Range    GLUCOSE BY METER POCT 120 (H) 70 - 99 mg/dL   Glucose by meter     Status: Abnormal   Result Value Ref Range    GLUCOSE BY METER POCT 143 (H) 70 - 99 mg/dL   Glucose by meter     Status: Abnormal   Result Value Ref Range    GLUCOSE BY METER POCT 122 (H) 70 - 99 mg/dL   Glucose  by meter     Status: Abnormal   Result Value Ref Range    GLUCOSE BY METER POCT 208 (H) 70 - 99 mg/dL   Glucose by meter     Status: Abnormal   Result Value Ref Range    GLUCOSE BY METER POCT 202 (H) 70 - 99 mg/dL   Glucose by meter     Status: Abnormal   Result Value Ref Range    GLUCOSE BY METER POCT 239 (H) 70 - 99 mg/dL   Glucose by meter     Status: Abnormal   Result Value Ref Range    GLUCOSE BY METER POCT 138 (H) 70 - 99 mg/dL   Glucose by meter     Status: Abnormal   Result Value Ref Range    GLUCOSE BY METER POCT 166 (H) 70 - 99 mg/dL   Glucose by meter     Status: Abnormal   Result Value Ref Range    GLUCOSE BY METER POCT 237 (H) 70 - 99 mg/dL   Glucose by meter     Status: Abnormal   Result Value Ref Range    GLUCOSE BY METER POCT 274 (H) 70 - 99 mg/dL   Glucose by meter     Status: Abnormal   Result Value Ref Range    GLUCOSE BY METER POCT 280 (H) 70 - 99 mg/dL   Glucose by meter     Status: Abnormal   Result Value Ref Range    GLUCOSE BY METER POCT 250 (H) 70 - 99 mg/dL   Glucose by meter     Status: Abnormal   Result Value Ref Range    GLUCOSE BY METER POCT 318 (H) 70 - 99 mg/dL   Glucose by meter     Status: Abnormal   Result Value Ref Range    GLUCOSE BY METER POCT 251 (H) 70 - 99 mg/dL   Glucose by meter     Status: Abnormal   Result Value Ref Range    GLUCOSE BY METER POCT 329 (H) 70 - 99 mg/dL   Glucose by meter     Status: Abnormal   Result Value Ref Range    GLUCOSE BY METER POCT 199 (H) 70 - 99 mg/dL   Glucose by meter     Status: Abnormal   Result Value Ref Range    GLUCOSE BY METER POCT 166 (H) 70 - 99 mg/dL   Glucose by meter     Status: Abnormal   Result Value Ref Range    GLUCOSE BY METER POCT 177 (H) 70 - 99 mg/dL   Glucose by meter     Status: Abnormal   Result Value Ref Range    GLUCOSE BY METER POCT 169 (H) 70 - 99 mg/dL   Glucose by meter     Status: Abnormal   Result Value Ref Range    GLUCOSE BY METER POCT 173 (H) 70 - 99 mg/dL   Glucose by meter     Status: Abnormal   Result Value  Ref Range    GLUCOSE BY METER POCT 134 (H) 70 - 99 mg/dL   Glucose by meter     Status: Abnormal   Result Value Ref Range    GLUCOSE BY METER POCT 220 (H) 70 - 99 mg/dL   Glucose by meter     Status: Abnormal   Result Value Ref Range    GLUCOSE BY METER POCT 141 (H) 70 - 99 mg/dL   Glucose by meter     Status: Abnormal   Result Value Ref Range    GLUCOSE BY METER POCT 225 (H) 70 - 99 mg/dL   Glucose by meter     Status: Abnormal   Result Value Ref Range    GLUCOSE BY METER POCT 129 (H) 70 - 99 mg/dL   Glucose by meter     Status: Abnormal   Result Value Ref Range    GLUCOSE BY METER POCT 153 (H) 70 - 99 mg/dL   Glucose by meter     Status: Abnormal   Result Value Ref Range    GLUCOSE BY METER POCT 128 (H) 70 - 99 mg/dL   Glucose by meter     Status: Abnormal   Result Value Ref Range    GLUCOSE BY METER POCT 165 (H) 70 - 99 mg/dL   Glucose by meter     Status: Abnormal   Result Value Ref Range    GLUCOSE BY METER POCT 232 (H) 70 - 99 mg/dL   Glucose by meter     Status: Abnormal   Result Value Ref Range    GLUCOSE BY METER POCT 186 (H) 70 - 99 mg/dL   Glucose by meter     Status: Abnormal   Result Value Ref Range    GLUCOSE BY METER POCT 147 (H) 70 - 99 mg/dL   Glucose by meter     Status: Abnormal   Result Value Ref Range    GLUCOSE BY METER POCT 217 (H) 70 - 99 mg/dL   Glucose by meter     Status: Abnormal   Result Value Ref Range    GLUCOSE BY METER POCT 132 (H) 70 - 99 mg/dL   Glucose by meter     Status: Abnormal   Result Value Ref Range    GLUCOSE BY METER POCT 197 (H) 70 - 99 mg/dL   Glucose by meter     Status: Abnormal   Result Value Ref Range    GLUCOSE BY METER POCT 201 (H) 70 - 99 mg/dL   Glucose by meter     Status: Abnormal   Result Value Ref Range    GLUCOSE BY METER POCT 135 (H) 70 - 99 mg/dL   Glucose by meter     Status: Abnormal   Result Value Ref Range    GLUCOSE BY METER POCT 138 (H) 70 - 99 mg/dL   Glucose by meter     Status: Abnormal   Result Value Ref Range    GLUCOSE BY METER POCT 154 (H) 70  - 99 mg/dL   Glucose by meter     Status: Abnormal   Result Value Ref Range    GLUCOSE BY METER POCT 132 (H) 70 - 99 mg/dL   Glucose by meter     Status: Abnormal   Result Value Ref Range    GLUCOSE BY METER POCT 119 (H) 70 - 99 mg/dL   Glucose by meter     Status: Abnormal   Result Value Ref Range    GLUCOSE BY METER POCT 156 (H) 70 - 99 mg/dL   Glucose by meter     Status: Abnormal   Result Value Ref Range    GLUCOSE BY METER POCT 295 (H) 70 - 99 mg/dL   Glucose by meter     Status: Abnormal   Result Value Ref Range    GLUCOSE BY METER POCT 208 (H) 70 - 99 mg/dL   Glucose by meter     Status: Abnormal   Result Value Ref Range    GLUCOSE BY METER POCT 130 (H) 70 - 99 mg/dL   Glucose by meter     Status: Abnormal   Result Value Ref Range    GLUCOSE BY METER POCT 175 (H) 70 - 99 mg/dL   Glucose by meter     Status: Abnormal   Result Value Ref Range    GLUCOSE BY METER POCT 123 (H) 70 - 99 mg/dL   Glucose by meter     Status: Abnormal   Result Value Ref Range    GLUCOSE BY METER POCT 143 (H) 70 - 99 mg/dL   Glucose by meter     Status: Abnormal   Result Value Ref Range    GLUCOSE BY METER POCT 134 (H) 70 - 99 mg/dL   Glucose by meter     Status: Abnormal   Result Value Ref Range    GLUCOSE BY METER POCT 157 (H) 70 - 99 mg/dL   Glucose by meter     Status: Abnormal   Result Value Ref Range    GLUCOSE BY METER POCT 107 (H) 70 - 99 mg/dL   Glucose by meter     Status: Abnormal   Result Value Ref Range    GLUCOSE BY METER POCT 171 (H) 70 - 99 mg/dL   Glucose by meter     Status: Abnormal   Result Value Ref Range    GLUCOSE BY METER POCT 154 (H) 70 - 99 mg/dL   Glucose by meter     Status: Abnormal   Result Value Ref Range    GLUCOSE BY METER POCT 129 (H) 70 - 99 mg/dL   Glucose by meter     Status: Abnormal   Result Value Ref Range    GLUCOSE BY METER POCT 162 (H) 70 - 99 mg/dL   Glucose by meter     Status: Abnormal   Result Value Ref Range    GLUCOSE BY METER POCT 175 (H) 70 - 99 mg/dL   Glucose by meter     Status:  Abnormal   Result Value Ref Range    GLUCOSE BY METER POCT 261 (H) 70 - 99 mg/dL   Glucose by meter     Status: Abnormal   Result Value Ref Range    GLUCOSE BY METER POCT 152 (H) 70 - 99 mg/dL   Glucose by meter     Status: Abnormal   Result Value Ref Range    GLUCOSE BY METER POCT 157 (H) 70 - 99 mg/dL   Glucose by meter     Status: Abnormal   Result Value Ref Range    GLUCOSE BY METER POCT 148 (H) 70 - 99 mg/dL   Glucose by meter     Status: Abnormal   Result Value Ref Range    GLUCOSE BY METER POCT 105 (H) 70 - 99 mg/dL   Glucose by meter     Status: Abnormal   Result Value Ref Range    GLUCOSE BY METER POCT 155 (H) 70 - 99 mg/dL   Glucose by meter     Status: Abnormal   Result Value Ref Range    GLUCOSE BY METER POCT 180 (H) 70 - 99 mg/dL   Glucose by meter     Status: Abnormal   Result Value Ref Range    GLUCOSE BY METER POCT 170 (H) 70 - 99 mg/dL   Glucose by meter     Status: Abnormal   Result Value Ref Range    GLUCOSE BY METER POCT 120 (H) 70 - 99 mg/dL   Glucose by meter     Status: Abnormal   Result Value Ref Range    GLUCOSE BY METER POCT 143 (H) 70 - 99 mg/dL   Glucose by meter     Status: Abnormal   Result Value Ref Range    GLUCOSE BY METER POCT 214 (H) 70 - 99 mg/dL   Glucose by meter     Status: Abnormal   Result Value Ref Range    GLUCOSE BY METER POCT 134 (H) 70 - 99 mg/dL   Glucose by meter     Status: Abnormal   Result Value Ref Range    GLUCOSE BY METER POCT 100 (H) 70 - 99 mg/dL   Glucose by meter     Status: Abnormal   Result Value Ref Range    GLUCOSE BY METER POCT 135 (H) 70 - 99 mg/dL   Glucose by meter     Status: Abnormal   Result Value Ref Range    GLUCOSE BY METER POCT 162 (H) 70 - 99 mg/dL   Glucose by meter     Status: Abnormal   Result Value Ref Range    GLUCOSE BY METER POCT 165 (H) 70 - 99 mg/dL   Glucose by meter     Status: Abnormal   Result Value Ref Range    GLUCOSE BY METER POCT 108 (H) 70 - 99 mg/dL   Glucose by meter     Status: Abnormal   Result Value Ref Range    GLUCOSE  BY METER POCT 158 (H) 70 - 99 mg/dL   Glucose by meter     Status: Abnormal   Result Value Ref Range    GLUCOSE BY METER POCT 157 (H) 70 - 99 mg/dL   Glucose by meter     Status: Abnormal   Result Value Ref Range    GLUCOSE BY METER POCT 108 (H) 70 - 99 mg/dL   Glucose by meter     Status: Abnormal   Result Value Ref Range    GLUCOSE BY METER POCT 140 (H) 70 - 99 mg/dL   Glucose by meter     Status: Abnormal   Result Value Ref Range    GLUCOSE BY METER POCT 136 (H) 70 - 99 mg/dL   Glucose by meter     Status: Abnormal   Result Value Ref Range    GLUCOSE BY METER POCT 131 (H) 70 - 99 mg/dL

## 2022-12-28 NOTE — PLAN OF CARE
"PRIMARY DIAGNOSIS: \"GENERIC\" NURSING/ placement  OBSERVATION GOALS TO BE MET BEFORE DISCHARGE:  1. ADLs back to baseline: Yes    2. Activity and level of assistance: Total care    3. Pain status: Pain free.    4. Return to near baseline physical activity: Yes  Vitals are      Discharge Planner Nurse   Safe discharge environment identified: No  Barriers to discharge: Yes       Entered by: Waleska Jin RN 12/28/2022      Pt is AOx4, LCTA, BS active. He denies pain at this time and is comfortable in bed. He is able to verbalize he needs, tolerates a regular diet, and po meds. Waiting for placement.     /86 (BP Location: Left arm)   Pulse 83   Temp 97.9  F (36.6  C) (Oral)   Resp 18   Wt 99.9 kg (220 lb 4.8 oz)   SpO2 92%     Please review provider order for any additional goals.   Nurse to notify provider when observation goals have been met and patient is ready for discharge.  "

## 2022-12-28 NOTE — PROGRESS NOTES
Care Management Follow Up    Expected Discharge Date: 01/31/2023     Concerns to be Addressed: Discharge planning      Patient plan of care discussed at interdisciplinary rounds: Yes    Anticipated Discharge Disposition:  Group Home once new placement found by relocation worker    Patient/family educated on Medicare website which has current facility and service quality ratings:  Yes  Education Provided on the Discharge Plan:  Yes  Patient/Family in Agreement with the Plan: Yes     Referrals Placed by CM/SW:  Group Home  Private pay costs discussed: Not applicable    Additional Information:  SW emailed pt's relocation worker Misty requesting an update on placement. Awaiting response. SW will continue to follow.     DANITA Obrien, LGSW   Inpatient Care Coordination  [facility]   [phone]
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
## 2022-12-29 PROCEDURE — 250N000013 HC RX MED GY IP 250 OP 250 PS 637: Performed by: PHYSICIAN ASSISTANT

## 2022-12-29 PROCEDURE — 250N000013 HC RX MED GY IP 250 OP 250 PS 637: Performed by: NURSE PRACTITIONER

## 2022-12-29 PROCEDURE — 250N000013 HC RX MED GY IP 250 OP 250 PS 637

## 2022-12-29 PROCEDURE — 250N000013 HC RX MED GY IP 250 OP 250 PS 637: Performed by: HOSPITALIST

## 2022-12-29 PROCEDURE — G0378 HOSPITAL OBSERVATION PER HR: HCPCS

## 2022-12-29 PROCEDURE — 99207 PR NO CHARGE LOS: CPT | Performed by: NURSE PRACTITIONER

## 2022-12-29 RX ADMIN — CLOTRIMAZOLE: 0.01 CREAM TOPICAL at 19:52

## 2022-12-29 RX ADMIN — VENLAFAXINE HYDROCHLORIDE 75 MG: 150 CAPSULE, EXTENDED RELEASE ORAL at 19:53

## 2022-12-29 RX ADMIN — EMPAGLIFLOZIN 10 MG: 10 TABLET, FILM COATED ORAL at 09:17

## 2022-12-29 RX ADMIN — BACLOFEN 10 MG: 10 TABLET ORAL at 19:52

## 2022-12-29 RX ADMIN — DIVALPROEX SODIUM 750 MG: 500 TABLET, DELAYED RELEASE ORAL at 09:17

## 2022-12-29 RX ADMIN — HYDROXYZINE HYDROCHLORIDE 50 MG: 50 TABLET, FILM COATED ORAL at 13:13

## 2022-12-29 RX ADMIN — QUETIAPINE 200 MG: 200 TABLET, FILM COATED ORAL at 09:18

## 2022-12-29 RX ADMIN — BACLOFEN 10 MG: 10 TABLET ORAL at 13:14

## 2022-12-29 RX ADMIN — HYDROCORTISONE: 1 CREAM TOPICAL at 09:19

## 2022-12-29 RX ADMIN — ATORVASTATIN CALCIUM 20 MG: 20 TABLET, FILM COATED ORAL at 19:52

## 2022-12-29 RX ADMIN — CLOTRIMAZOLE: 0.01 CREAM TOPICAL at 09:20

## 2022-12-29 RX ADMIN — Medication 25 MCG: at 09:17

## 2022-12-29 RX ADMIN — MIRTAZAPINE 15 MG: 15 TABLET, FILM COATED ORAL at 19:53

## 2022-12-29 RX ADMIN — DIVALPROEX SODIUM 1000 MG: 500 TABLET, DELAYED RELEASE ORAL at 19:52

## 2022-12-29 RX ADMIN — HYDROXYZINE HYDROCHLORIDE 50 MG: 50 TABLET, FILM COATED ORAL at 09:17

## 2022-12-29 RX ADMIN — METOPROLOL SUCCINATE 25 MG: 25 TABLET, EXTENDED RELEASE ORAL at 09:18

## 2022-12-29 RX ADMIN — QUETIAPINE FUMARATE 400 MG: 200 TABLET ORAL at 19:53

## 2022-12-29 RX ADMIN — LEVOTHYROXINE SODIUM 25 MCG: 0.03 TABLET ORAL at 09:17

## 2022-12-29 RX ADMIN — POLYETHYLENE GLYCOL 3350 17 G: 17 POWDER, FOR SOLUTION ORAL at 09:18

## 2022-12-29 RX ADMIN — Medication 10 MG: at 19:52

## 2022-12-29 RX ADMIN — QUETIAPINE 200 MG: 200 TABLET, FILM COATED ORAL at 13:13

## 2022-12-29 RX ADMIN — KETOCONAZOLE: 20 CREAM TOPICAL at 09:20

## 2022-12-29 RX ADMIN — BACLOFEN 10 MG: 10 TABLET ORAL at 09:18

## 2022-12-29 RX ADMIN — CLONIDINE HYDROCHLORIDE 0.1 MG: 0.1 TABLET ORAL at 19:52

## 2022-12-29 RX ADMIN — HYDROXYZINE HYDROCHLORIDE 50 MG: 50 TABLET, FILM COATED ORAL at 19:52

## 2022-12-29 RX ADMIN — ASPIRIN 81 MG CHEWABLE TABLET 81 MG: 81 TABLET CHEWABLE at 09:17

## 2022-12-29 RX ADMIN — CLONIDINE HYDROCHLORIDE 0.1 MG: 0.1 TABLET ORAL at 13:13

## 2022-12-29 RX ADMIN — OLANZAPINE 2.5 MG: 2.5 TABLET, FILM COATED ORAL at 13:14

## 2022-12-29 RX ADMIN — VENLAFAXINE HYDROCHLORIDE 150 MG: 150 CAPSULE, EXTENDED RELEASE ORAL at 19:53

## 2022-12-29 RX ADMIN — CLONIDINE HYDROCHLORIDE 0.1 MG: 0.1 TABLET ORAL at 09:18

## 2022-12-29 RX ADMIN — METFORMIN HYDROCHLORIDE 850 MG: 850 TABLET, FILM COATED ORAL at 17:06

## 2022-12-29 ASSESSMENT — ACTIVITIES OF DAILY LIVING (ADL)
ADLS_ACUITY_SCORE: 54
ADLS_ACUITY_SCORE: 58
ADLS_ACUITY_SCORE: 54
ADLS_ACUITY_SCORE: 54

## 2022-12-29 NOTE — PLAN OF CARE
PRIMARY DIAGNOSIS: Placement  OUTPATIENT/OBSERVATION GOALS TO BE MET BEFORE DISCHARGE:  1. ADLs back to baseline: Yes     2. Activity and level of assistance: Ax1 rolling in bed Ax2 with lift     3. Pain status: Pain free.     4. Return to near baseline physical activity: Yes          Discharge Planner Nurse   Safe discharge environment identified: No  Barriers to discharge: Yes     Temp: 97.7  F (36.5  C) Temp src: Oral BP: (!) 137/90 Pulse: 69   Resp: 16 SpO2: 97 % O2 Device: None (Room air)        Pt sleeping.    Please review provider order for any additional goals.   Nurse to notify provider when observation goals have been met and patient is ready for discharge.

## 2022-12-29 NOTE — PLAN OF CARE
PRIMARY DIAGNOSIS: Placement  OUTPATIENT/OBSERVATION GOALS TO BE MET BEFORE DISCHARGE:  ADLs back to baseline: Yes     Activity and level of assistance: Ax1 rolling in bed Ax2 with lift     Pain status: Pain free.     Return to near baseline physical activity: Yes          Discharge Planner Nurse   Safe discharge environment identified: No  Barriers to discharge: Yes       Entered by: Waleska Leon RN 12/28/2022 11:06 PM  Please review provider order for any additional goals.   Nurse to notify provider when observation goals have been met and patient is ready for discharge.     Temp: 97.7  F (36.5  C) Temp src: Oral BP: (!) 137/90 Pulse: 69   Resp: 16 SpO2: 97 % O2 Device: None (Room air)        A&O. Pt Ax1 rolling in bed, incontinent of both urine and stool, calls appropriately. Pt mostly pleasant this evening. Plan- SW following for placement/relocation worker involved.

## 2022-12-29 NOTE — PROGRESS NOTES
PRIMARY DIAGNOSIS: Placement  OUTPATIENT/OBSERVATION GOALS TO BE MET BEFORE DISCHARGE:  1. ADLs back to baseline: Yes    2. Activity and level of assistance: A1 to change in bed or A2 with lift.     3. Pain status: Pain free.    4. Return to near baseline physical activity: Yes     Discharge Planner Nurse   Safe discharge environment identified: No  Barriers to discharge: Yes, placement        BP (!) 142/95 (BP Location: Right arm)   Pulse 74   Temp 97.6  F (36.4  C) (Oral)   Resp 16   Wt 99.9 kg (220 lb 4.8 oz)   SpO2 95%

## 2022-12-29 NOTE — PROGRESS NOTES
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 patient's recent exposure or clinical presentation suggests   COVID-19. This test was validated by the [facility]. This laboratory is certified under the Clinical Laboratory Improvement Amendments of 1988 (CLIA-88) as qualified to perform high complexity laboratory testing.     Glucose by meter     Status: Abnormal   Result Value Ref Range    GLUCOSE BY METER POCT 109 (H) 70 - 99 mg/dL   Glucose by meter     Status: Abnormal   Result Value Ref Range    GLUCOSE BY METER POCT 107 (H) 70 - 99 mg/dL   Glucose by meter     Status: Abnormal   Result Value Ref Range    GLUCOSE BY METER POCT 121 (H) 70 - 99 mg/dL   Glucose by meter     Status: Abnormal   Result Value Ref Range    GLUCOSE BY METER POCT 165 (H) 70 - 99 mg/dL   Glucose by meter     Status: Abnormal   Result Value Ref Range    GLUCOSE BY METER POCT 110 (H) 70 - 99 mg/dL   Glucose by meter     Status: Abnormal   Result Value Ref Range    GLUCOSE BY METER POCT 138 (H) 70 - 99 mg/dL   Glucose by meter     Status: Abnormal   Result Value Ref Range    GLUCOSE BY METER POCT 114 (H) 70 - 99 mg/dL   Glucose by meter     Status: Abnormal   Result Value Ref  Range    GLUCOSE BY METER POCT 117 (H) 70 - 99 mg/dL   Glucose by meter     Status: Abnormal   Result Value Ref Range    GLUCOSE BY METER POCT 135 (H) 70 - 99 mg/dL   Glucose by meter     Status: Normal   Result Value Ref Range    GLUCOSE BY METER POCT 99 70 - 99 mg/dL   Glucose by meter     Status: Abnormal   Result Value Ref Range    GLUCOSE BY METER POCT 100 (H) 70 - 99 mg/dL   Glucose by meter     Status: Abnormal   Result Value Ref Range    GLUCOSE BY METER POCT 123 (H) 70 - 99 mg/dL   Glucose by meter     Status: Normal   Result Value Ref Range    GLUCOSE BY METER POCT 85 70 - 99 mg/dL   Glucose by meter     Status: Normal   Result Value Ref Range    GLUCOSE BY METER POCT 98 70 - 99 mg/dL   Glucose by meter     Status: Normal   Result Value Ref Range    GLUCOSE BY METER POCT 91 70 - 99 mg/dL   Glucose by meter     Status: Normal   Result Value Ref Range    GLUCOSE BY METER POCT 90 70 - 99 mg/dL   Glucose by meter     Status: Normal   Result Value Ref Range    GLUCOSE BY METER POCT 93 70 - 99 mg/dL   Glucose by meter     Status: Abnormal   Result Value Ref Range    GLUCOSE BY METER POCT 69 (L) 70 - 99 mg/dL   Glucose by meter     Status: Normal   Result Value Ref Range    GLUCOSE BY METER POCT 97 70 - 99 mg/dL   Glucose by meter     Status: Abnormal   Result Value Ref Range    GLUCOSE BY METER POCT 172 (H) 70 - 99 mg/dL   Glucose by meter     Status: Normal   Result Value Ref Range    GLUCOSE BY METER POCT 70 70 - 99 mg/dL   Glucose by meter     Status: Normal   Result Value Ref Range    GLUCOSE BY METER POCT 79 70 - 99 mg/dL   Glucose by meter     Status: Abnormal   Result Value Ref Range    GLUCOSE BY METER POCT 124 (H) 70 - 99 mg/dL   Glucose by meter     Status: Abnormal   Result Value Ref Range    GLUCOSE BY METER POCT 111 (H) 70 - 99 mg/dL   Glucose by meter     Status: Normal   Result Value Ref Range    GLUCOSE BY METER POCT 95 70 - 99 mg/dL   Glucose by meter     Status: Normal   Result Value Ref Range     GLUCOSE BY METER POCT 82 70 - 99 mg/dL   Glucose by meter     Status: Normal   Result Value Ref Range    GLUCOSE BY METER POCT 80 70 - 99 mg/dL   Glucose by meter     Status: Normal   Result Value Ref Range    GLUCOSE BY METER POCT 86 70 - 99 mg/dL   Glucose by meter     Status: Abnormal   Result Value Ref Range    GLUCOSE BY METER POCT 106 (H) 70 - 99 mg/dL   Glucose by meter     Status: Abnormal   Result Value Ref Range    GLUCOSE BY METER POCT 66 (L) 70 - 99 mg/dL   Glucose by meter     Status: Abnormal   Result Value Ref Range    GLUCOSE BY METER POCT 125 (H) 70 - 99 mg/dL   Glucose by meter     Status: Abnormal   Result Value Ref Range    GLUCOSE BY METER POCT 120 (H) 70 - 99 mg/dL   Glucose by meter     Status: Abnormal   Result Value Ref Range    GLUCOSE BY METER POCT 201 (H) 70 - 99 mg/dL   Glucose by meter     Status: Abnormal   Result Value Ref Range    GLUCOSE BY METER POCT 222 (H) 70 - 99 mg/dL   Glucose by meter     Status: Abnormal   Result Value Ref Range    GLUCOSE BY METER POCT 148 (H) 70 - 99 mg/dL   Hemoglobin A1c     Status: Abnormal   Result Value Ref Range    Hemoglobin A1C 5.7 (H) <5.7 %   Glucose by meter     Status: Abnormal   Result Value Ref Range    GLUCOSE BY METER POCT 111 (H) 70 - 99 mg/dL   Glucose by meter     Status: Abnormal   Result Value Ref Range    GLUCOSE BY METER POCT 144 (H) 70 - 99 mg/dL   Glucose by meter     Status: Abnormal   Result Value Ref Range    GLUCOSE BY METER POCT 116 (H) 70 - 99 mg/dL   Glucose by meter     Status: Abnormal   Result Value Ref Range    GLUCOSE BY METER POCT 127 (H) 70 - 99 mg/dL   Glucose by meter     Status: Abnormal   Result Value Ref Range    GLUCOSE BY METER POCT 177 (H) 70 - 99 mg/dL   Glucose by meter     Status: Abnormal   Result Value Ref Range    GLUCOSE BY METER POCT 120 (H) 70 - 99 mg/dL   Glucose by meter     Status: Abnormal   Result Value Ref Range    GLUCOSE BY METER POCT 143 (H) 70 - 99 mg/dL   Glucose by meter     Status:  Abnormal   Result Value Ref Range    GLUCOSE BY METER POCT 122 (H) 70 - 99 mg/dL   Glucose by meter     Status: Abnormal   Result Value Ref Range    GLUCOSE BY METER POCT 208 (H) 70 - 99 mg/dL   Glucose by meter     Status: Abnormal   Result Value Ref Range    GLUCOSE BY METER POCT 202 (H) 70 - 99 mg/dL   Glucose by meter     Status: Abnormal   Result Value Ref Range    GLUCOSE BY METER POCT 239 (H) 70 - 99 mg/dL   Glucose by meter     Status: Abnormal   Result Value Ref Range    GLUCOSE BY METER POCT 138 (H) 70 - 99 mg/dL   Glucose by meter     Status: Abnormal   Result Value Ref Range    GLUCOSE BY METER POCT 166 (H) 70 - 99 mg/dL   Glucose by meter     Status: Abnormal   Result Value Ref Range    GLUCOSE BY METER POCT 237 (H) 70 - 99 mg/dL   Glucose by meter     Status: Abnormal   Result Value Ref Range    GLUCOSE BY METER POCT 274 (H) 70 - 99 mg/dL   Glucose by meter     Status: Abnormal   Result Value Ref Range    GLUCOSE BY METER POCT 280 (H) 70 - 99 mg/dL   Glucose by meter     Status: Abnormal   Result Value Ref Range    GLUCOSE BY METER POCT 250 (H) 70 - 99 mg/dL   Glucose by meter     Status: Abnormal   Result Value Ref Range    GLUCOSE BY METER POCT 318 (H) 70 - 99 mg/dL   Glucose by meter     Status: Abnormal   Result Value Ref Range    GLUCOSE BY METER POCT 251 (H) 70 - 99 mg/dL   Glucose by meter     Status: Abnormal   Result Value Ref Range    GLUCOSE BY METER POCT 329 (H) 70 - 99 mg/dL   Glucose by meter     Status: Abnormal   Result Value Ref Range    GLUCOSE BY METER POCT 199 (H) 70 - 99 mg/dL   Glucose by meter     Status: Abnormal   Result Value Ref Range    GLUCOSE BY METER POCT 166 (H) 70 - 99 mg/dL   Glucose by meter     Status: Abnormal   Result Value Ref Range    GLUCOSE BY METER POCT 177 (H) 70 - 99 mg/dL   Glucose by meter     Status: Abnormal   Result Value Ref Range    GLUCOSE BY METER POCT 169 (H) 70 - 99 mg/dL   Glucose by meter     Status: Abnormal   Result Value Ref Range    GLUCOSE  BY METER POCT 173 (H) 70 - 99 mg/dL   Glucose by meter     Status: Abnormal   Result Value Ref Range    GLUCOSE BY METER POCT 134 (H) 70 - 99 mg/dL   Glucose by meter     Status: Abnormal   Result Value Ref Range    GLUCOSE BY METER POCT 220 (H) 70 - 99 mg/dL   Glucose by meter     Status: Abnormal   Result Value Ref Range    GLUCOSE BY METER POCT 141 (H) 70 - 99 mg/dL   Glucose by meter     Status: Abnormal   Result Value Ref Range    GLUCOSE BY METER POCT 225 (H) 70 - 99 mg/dL   Glucose by meter     Status: Abnormal   Result Value Ref Range    GLUCOSE BY METER POCT 129 (H) 70 - 99 mg/dL   Glucose by meter     Status: Abnormal   Result Value Ref Range    GLUCOSE BY METER POCT 153 (H) 70 - 99 mg/dL   Glucose by meter     Status: Abnormal   Result Value Ref Range    GLUCOSE BY METER POCT 128 (H) 70 - 99 mg/dL   Glucose by meter     Status: Abnormal   Result Value Ref Range    GLUCOSE BY METER POCT 165 (H) 70 - 99 mg/dL   Glucose by meter     Status: Abnormal   Result Value Ref Range    GLUCOSE BY METER POCT 232 (H) 70 - 99 mg/dL   Glucose by meter     Status: Abnormal   Result Value Ref Range    GLUCOSE BY METER POCT 186 (H) 70 - 99 mg/dL   Glucose by meter     Status: Abnormal   Result Value Ref Range    GLUCOSE BY METER POCT 147 (H) 70 - 99 mg/dL   Glucose by meter     Status: Abnormal   Result Value Ref Range    GLUCOSE BY METER POCT 217 (H) 70 - 99 mg/dL   Glucose by meter     Status: Abnormal   Result Value Ref Range    GLUCOSE BY METER POCT 132 (H) 70 - 99 mg/dL   Glucose by meter     Status: Abnormal   Result Value Ref Range    GLUCOSE BY METER POCT 197 (H) 70 - 99 mg/dL   Glucose by meter     Status: Abnormal   Result Value Ref Range    GLUCOSE BY METER POCT 201 (H) 70 - 99 mg/dL   Glucose by meter     Status: Abnormal   Result Value Ref Range    GLUCOSE BY METER POCT 135 (H) 70 - 99 mg/dL   Glucose by meter     Status: Abnormal   Result Value Ref Range    GLUCOSE BY METER POCT 138 (H) 70 - 99 mg/dL   Glucose  by meter     Status: Abnormal   Result Value Ref Range    GLUCOSE BY METER POCT 154 (H) 70 - 99 mg/dL   Glucose by meter     Status: Abnormal   Result Value Ref Range    GLUCOSE BY METER POCT 132 (H) 70 - 99 mg/dL   Glucose by meter     Status: Abnormal   Result Value Ref Range    GLUCOSE BY METER POCT 119 (H) 70 - 99 mg/dL   Glucose by meter     Status: Abnormal   Result Value Ref Range    GLUCOSE BY METER POCT 156 (H) 70 - 99 mg/dL   Glucose by meter     Status: Abnormal   Result Value Ref Range    GLUCOSE BY METER POCT 295 (H) 70 - 99 mg/dL   Glucose by meter     Status: Abnormal   Result Value Ref Range    GLUCOSE BY METER POCT 208 (H) 70 - 99 mg/dL   Glucose by meter     Status: Abnormal   Result Value Ref Range    GLUCOSE BY METER POCT 130 (H) 70 - 99 mg/dL   Glucose by meter     Status: Abnormal   Result Value Ref Range    GLUCOSE BY METER POCT 175 (H) 70 - 99 mg/dL   Glucose by meter     Status: Abnormal   Result Value Ref Range    GLUCOSE BY METER POCT 123 (H) 70 - 99 mg/dL   Glucose by meter     Status: Abnormal   Result Value Ref Range    GLUCOSE BY METER POCT 143 (H) 70 - 99 mg/dL   Glucose by meter     Status: Abnormal   Result Value Ref Range    GLUCOSE BY METER POCT 134 (H) 70 - 99 mg/dL   Glucose by meter     Status: Abnormal   Result Value Ref Range    GLUCOSE BY METER POCT 157 (H) 70 - 99 mg/dL   Glucose by meter     Status: Abnormal   Result Value Ref Range    GLUCOSE BY METER POCT 107 (H) 70 - 99 mg/dL   Glucose by meter     Status: Abnormal   Result Value Ref Range    GLUCOSE BY METER POCT 171 (H) 70 - 99 mg/dL   Glucose by meter     Status: Abnormal   Result Value Ref Range    GLUCOSE BY METER POCT 154 (H) 70 - 99 mg/dL   Glucose by meter     Status: Abnormal   Result Value Ref Range    GLUCOSE BY METER POCT 129 (H) 70 - 99 mg/dL   Glucose by meter     Status: Abnormal   Result Value Ref Range    GLUCOSE BY METER POCT 162 (H) 70 - 99 mg/dL   Glucose by meter     Status: Abnormal   Result Value  Ref Range    GLUCOSE BY METER POCT 175 (H) 70 - 99 mg/dL   Glucose by meter     Status: Abnormal   Result Value Ref Range    GLUCOSE BY METER POCT 261 (H) 70 - 99 mg/dL   Glucose by meter     Status: Abnormal   Result Value Ref Range    GLUCOSE BY METER POCT 152 (H) 70 - 99 mg/dL   Glucose by meter     Status: Abnormal   Result Value Ref Range    GLUCOSE BY METER POCT 157 (H) 70 - 99 mg/dL   Glucose by meter     Status: Abnormal   Result Value Ref Range    GLUCOSE BY METER POCT 148 (H) 70 - 99 mg/dL   Glucose by meter     Status: Abnormal   Result Value Ref Range    GLUCOSE BY METER POCT 105 (H) 70 - 99 mg/dL   Glucose by meter     Status: Abnormal   Result Value Ref Range    GLUCOSE BY METER POCT 155 (H) 70 - 99 mg/dL   Glucose by meter     Status: Abnormal   Result Value Ref Range    GLUCOSE BY METER POCT 180 (H) 70 - 99 mg/dL   Glucose by meter     Status: Abnormal   Result Value Ref Range    GLUCOSE BY METER POCT 170 (H) 70 - 99 mg/dL   Glucose by meter     Status: Abnormal   Result Value Ref Range    GLUCOSE BY METER POCT 120 (H) 70 - 99 mg/dL   Glucose by meter     Status: Abnormal   Result Value Ref Range    GLUCOSE BY METER POCT 143 (H) 70 - 99 mg/dL   Glucose by meter     Status: Abnormal   Result Value Ref Range    GLUCOSE BY METER POCT 214 (H) 70 - 99 mg/dL   Glucose by meter     Status: Abnormal   Result Value Ref Range    GLUCOSE BY METER POCT 134 (H) 70 - 99 mg/dL   Glucose by meter     Status: Abnormal   Result Value Ref Range    GLUCOSE BY METER POCT 100 (H) 70 - 99 mg/dL   Glucose by meter     Status: Abnormal   Result Value Ref Range    GLUCOSE BY METER POCT 135 (H) 70 - 99 mg/dL   Glucose by meter     Status: Abnormal   Result Value Ref Range    GLUCOSE BY METER POCT 162 (H) 70 - 99 mg/dL   Glucose by meter     Status: Abnormal   Result Value Ref Range    GLUCOSE BY METER POCT 165 (H) 70 - 99 mg/dL   Glucose by meter     Status: Abnormal   Result Value Ref Range    GLUCOSE BY METER POCT 108 (H) 70  - 99 mg/dL   Glucose by meter     Status: Abnormal   Result Value Ref Range    GLUCOSE BY METER POCT 158 (H) 70 - 99 mg/dL   Glucose by meter     Status: Abnormal   Result Value Ref Range    GLUCOSE BY METER POCT 157 (H) 70 - 99 mg/dL   Glucose by meter     Status: Abnormal   Result Value Ref Range    GLUCOSE BY METER POCT 108 (H) 70 - 99 mg/dL   Glucose by meter     Status: Abnormal   Result Value Ref Range    GLUCOSE BY METER POCT 140 (H) 70 - 99 mg/dL   Glucose by meter     Status: Abnormal   Result Value Ref Range    GLUCOSE BY METER POCT 136 (H) 70 - 99 mg/dL   Glucose by meter     Status: Abnormal   Result Value Ref Range    GLUCOSE BY METER POCT 131 (H) 70 - 99 mg/dL

## 2022-12-29 NOTE — PROGRESS NOTES
Care Management Follow Up    Expected Discharge Date: 01/31/2023     Concerns to be Addressed: Discharge planning      Patient plan of care discussed at interdisciplinary rounds: Yes    Anticipated Discharge Disposition:  Group Home once placement found by relocation worker    Additional Information:  SW received email from relocation worker with an update: She heard back from [facility] that they will not take any patients under age 55. She is still waiting to hear back from [facility], but has sent a referral form to them. She did send a follow up email to them requesting an update. She indicated that their program has specific BI homes that she believes will be a good fit, and would hopefully be fast placement since he qualifies. Relocation worker also reached out to [facility] for possible openings and is awaiting a response.     ANALY will continue to follow.    DANITA Obrien, [facility]   Inpatient Care Coordination  [facility]   [phone]

## 2022-12-29 NOTE — PROGRESS NOTES
PRIMARY DIAGNOSIS: PLACEMENT  OUTPATIENT/OBSERVATION GOALS TO BE MET BEFORE DISCHARGE:  1. ADLs back to baseline: Yes    2. Activity and level of assistance: A1 to change in bed or A2 with lift.     3. Pain status: Pain free.    4. Return to near baseline physical activity: Yes     Discharge Planner Nurse   Safe discharge environment identified: No  Barriers to discharge: Yes, placement       /79 (BP Location: Right arm)   Pulse 75   Temp 98.3  F (36.8  C) (Oral)   Resp 16   Wt 99.9 kg (220 lb 4.8 oz)   SpO2 97%

## 2022-12-30 PROCEDURE — 250N000013 HC RX MED GY IP 250 OP 250 PS 637: Performed by: NURSE PRACTITIONER

## 2022-12-30 PROCEDURE — 250N000013 HC RX MED GY IP 250 OP 250 PS 637

## 2022-12-30 PROCEDURE — 250N000013 HC RX MED GY IP 250 OP 250 PS 637: Performed by: HOSPITALIST

## 2022-12-30 PROCEDURE — G0378 HOSPITAL OBSERVATION PER HR: HCPCS

## 2022-12-30 PROCEDURE — 250N000013 HC RX MED GY IP 250 OP 250 PS 637: Performed by: PHYSICIAN ASSISTANT

## 2022-12-30 RX ADMIN — OLANZAPINE 2.5 MG: 2.5 TABLET, FILM COATED ORAL at 14:04

## 2022-12-30 RX ADMIN — CLOTRIMAZOLE: 0.01 CREAM TOPICAL at 08:58

## 2022-12-30 RX ADMIN — BACLOFEN 10 MG: 10 TABLET ORAL at 08:55

## 2022-12-30 RX ADMIN — MIRTAZAPINE 15 MG: 15 TABLET, FILM COATED ORAL at 21:39

## 2022-12-30 RX ADMIN — ASPIRIN 81 MG CHEWABLE TABLET 81 MG: 81 TABLET CHEWABLE at 08:55

## 2022-12-30 RX ADMIN — CLONIDINE HYDROCHLORIDE 0.1 MG: 0.1 TABLET ORAL at 21:39

## 2022-12-30 RX ADMIN — METOPROLOL SUCCINATE 25 MG: 25 TABLET, EXTENDED RELEASE ORAL at 08:56

## 2022-12-30 RX ADMIN — METFORMIN HYDROCHLORIDE 850 MG: 850 TABLET, FILM COATED ORAL at 17:12

## 2022-12-30 RX ADMIN — HYDROXYZINE HYDROCHLORIDE 50 MG: 50 TABLET, FILM COATED ORAL at 08:56

## 2022-12-30 RX ADMIN — DIVALPROEX SODIUM 1000 MG: 500 TABLET, DELAYED RELEASE ORAL at 21:40

## 2022-12-30 RX ADMIN — HYDROXYZINE HYDROCHLORIDE 50 MG: 50 TABLET, FILM COATED ORAL at 21:39

## 2022-12-30 RX ADMIN — POLYETHYLENE GLYCOL 3350 17 G: 17 POWDER, FOR SOLUTION ORAL at 08:55

## 2022-12-30 RX ADMIN — LEVOTHYROXINE SODIUM 25 MCG: 0.03 TABLET ORAL at 08:55

## 2022-12-30 RX ADMIN — VENLAFAXINE HYDROCHLORIDE 150 MG: 150 CAPSULE, EXTENDED RELEASE ORAL at 21:40

## 2022-12-30 RX ADMIN — HYDROXYZINE HYDROCHLORIDE 50 MG: 50 TABLET, FILM COATED ORAL at 14:04

## 2022-12-30 RX ADMIN — QUETIAPINE 200 MG: 200 TABLET, FILM COATED ORAL at 14:04

## 2022-12-30 RX ADMIN — DIVALPROEX SODIUM 750 MG: 500 TABLET, DELAYED RELEASE ORAL at 08:55

## 2022-12-30 RX ADMIN — BACLOFEN 10 MG: 10 TABLET ORAL at 21:39

## 2022-12-30 RX ADMIN — QUETIAPINE 200 MG: 200 TABLET, FILM COATED ORAL at 08:56

## 2022-12-30 RX ADMIN — CLONIDINE HYDROCHLORIDE 0.1 MG: 0.1 TABLET ORAL at 08:55

## 2022-12-30 RX ADMIN — HYDROCORTISONE: 1 CREAM TOPICAL at 08:58

## 2022-12-30 RX ADMIN — Medication 25 MCG: at 08:55

## 2022-12-30 RX ADMIN — CLOTRIMAZOLE: 0.01 CREAM TOPICAL at 21:39

## 2022-12-30 RX ADMIN — VENLAFAXINE HYDROCHLORIDE 75 MG: 150 CAPSULE, EXTENDED RELEASE ORAL at 21:39

## 2022-12-30 RX ADMIN — Medication 10 MG: at 21:40

## 2022-12-30 RX ADMIN — BACLOFEN 10 MG: 10 TABLET ORAL at 14:04

## 2022-12-30 RX ADMIN — QUETIAPINE FUMARATE 400 MG: 200 TABLET ORAL at 21:40

## 2022-12-30 RX ADMIN — ATORVASTATIN CALCIUM 20 MG: 20 TABLET, FILM COATED ORAL at 21:40

## 2022-12-30 RX ADMIN — EMPAGLIFLOZIN 10 MG: 10 TABLET, FILM COATED ORAL at 08:56

## 2022-12-30 RX ADMIN — CLONIDINE HYDROCHLORIDE 0.1 MG: 0.1 TABLET ORAL at 14:04

## 2022-12-30 ASSESSMENT — ACTIVITIES OF DAILY LIVING (ADL)
ADLS_ACUITY_SCORE: 54
ADLS_ACUITY_SCORE: 56
ADLS_ACUITY_SCORE: 56
ADLS_ACUITY_SCORE: 54
ADLS_ACUITY_SCORE: 56
ADLS_ACUITY_SCORE: 56

## 2022-12-30 NOTE — PLAN OF CARE
PRIMARY DIAGNOSIS: Placement  OUTPATIENT/OBSERVATION GOALS TO BE MET BEFORE DISCHARGE:  1. ADLs back to baseline: Yes     2. Activity and level of assistance: Ax1 rolling in bed Ax2 with lift     3. Pain status: Pain free.     4. Return to near baseline physical activity: Yes          Discharge Planner Nurse   Safe discharge environment identified: No  Barriers to discharge: Yes     Temp: 97.7  F (36.5  C) Temp src: Oral BP: (!) 137/90 Pulse: 69   Resp: 16 SpO2: 97 % O2 Device: None (Room air)        Pt called appropriately to be changed. Incontinent of urine. Frustrated during cares. HS meds given early per pt request. Will monitor.    Please review provider order for any additional goals.   Nurse to notify provider when observation goals have been met and patient is ready for discharge.
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
## 2022-12-31 PROCEDURE — 250N000013 HC RX MED GY IP 250 OP 250 PS 637: Performed by: HOSPITALIST

## 2022-12-31 PROCEDURE — 250N000013 HC RX MED GY IP 250 OP 250 PS 637

## 2022-12-31 PROCEDURE — 250N000013 HC RX MED GY IP 250 OP 250 PS 637: Performed by: NURSE PRACTITIONER

## 2022-12-31 PROCEDURE — G0378 HOSPITAL OBSERVATION PER HR: HCPCS

## 2022-12-31 PROCEDURE — 250N000013 HC RX MED GY IP 250 OP 250 PS 637: Performed by: PHYSICIAN ASSISTANT

## 2022-12-31 RX ADMIN — ATORVASTATIN CALCIUM 20 MG: 20 TABLET, FILM COATED ORAL at 21:08

## 2022-12-31 RX ADMIN — METFORMIN HYDROCHLORIDE 850 MG: 850 TABLET, FILM COATED ORAL at 17:16

## 2022-12-31 RX ADMIN — CLOTRIMAZOLE: 0.01 CREAM TOPICAL at 21:10

## 2022-12-31 RX ADMIN — POLYETHYLENE GLYCOL 3350 17 G: 17 POWDER, FOR SOLUTION ORAL at 09:12

## 2022-12-31 RX ADMIN — KETOCONAZOLE: 20 CREAM TOPICAL at 21:11

## 2022-12-31 RX ADMIN — CLOTRIMAZOLE: 0.01 CREAM TOPICAL at 09:14

## 2022-12-31 RX ADMIN — Medication 10 MG: at 21:08

## 2022-12-31 RX ADMIN — VENLAFAXINE HYDROCHLORIDE 75 MG: 150 CAPSULE, EXTENDED RELEASE ORAL at 21:10

## 2022-12-31 RX ADMIN — LEVOTHYROXINE SODIUM 25 MCG: 0.03 TABLET ORAL at 09:13

## 2022-12-31 RX ADMIN — HYDROXYZINE HYDROCHLORIDE 50 MG: 50 TABLET, FILM COATED ORAL at 15:04

## 2022-12-31 RX ADMIN — ASPIRIN 81 MG CHEWABLE TABLET 81 MG: 81 TABLET CHEWABLE at 09:13

## 2022-12-31 RX ADMIN — QUETIAPINE 200 MG: 200 TABLET, FILM COATED ORAL at 15:03

## 2022-12-31 RX ADMIN — HYDROCORTISONE: 1 CREAM TOPICAL at 21:11

## 2022-12-31 RX ADMIN — EMPAGLIFLOZIN 10 MG: 10 TABLET, FILM COATED ORAL at 09:13

## 2022-12-31 RX ADMIN — QUETIAPINE 200 MG: 200 TABLET, FILM COATED ORAL at 09:12

## 2022-12-31 RX ADMIN — BACLOFEN 10 MG: 10 TABLET ORAL at 21:10

## 2022-12-31 RX ADMIN — CLONIDINE HYDROCHLORIDE 0.1 MG: 0.1 TABLET ORAL at 09:13

## 2022-12-31 RX ADMIN — VENLAFAXINE HYDROCHLORIDE 150 MG: 150 CAPSULE, EXTENDED RELEASE ORAL at 21:09

## 2022-12-31 RX ADMIN — METOPROLOL SUCCINATE 25 MG: 25 TABLET, EXTENDED RELEASE ORAL at 09:12

## 2022-12-31 RX ADMIN — HYDROCORTISONE: 1 CREAM TOPICAL at 09:14

## 2022-12-31 RX ADMIN — HYDROXYZINE HYDROCHLORIDE 50 MG: 50 TABLET, FILM COATED ORAL at 21:09

## 2022-12-31 RX ADMIN — BACLOFEN 10 MG: 10 TABLET ORAL at 09:13

## 2022-12-31 RX ADMIN — CLONIDINE HYDROCHLORIDE 0.1 MG: 0.1 TABLET ORAL at 15:03

## 2022-12-31 RX ADMIN — MIRTAZAPINE 15 MG: 15 TABLET, FILM COATED ORAL at 21:08

## 2022-12-31 RX ADMIN — DIVALPROEX SODIUM 1000 MG: 500 TABLET, DELAYED RELEASE ORAL at 21:09

## 2022-12-31 RX ADMIN — OLANZAPINE 2.5 MG: 2.5 TABLET, FILM COATED ORAL at 15:04

## 2022-12-31 RX ADMIN — QUETIAPINE FUMARATE 400 MG: 200 TABLET ORAL at 21:09

## 2022-12-31 RX ADMIN — Medication 25 MCG: at 09:13

## 2022-12-31 RX ADMIN — BACLOFEN 10 MG: 10 TABLET ORAL at 15:03

## 2022-12-31 RX ADMIN — HYDROXYZINE HYDROCHLORIDE 50 MG: 50 TABLET, FILM COATED ORAL at 09:13

## 2022-12-31 RX ADMIN — DIVALPROEX SODIUM 750 MG: 500 TABLET, DELAYED RELEASE ORAL at 09:13

## 2022-12-31 RX ADMIN — CLONIDINE HYDROCHLORIDE 0.1 MG: 0.1 TABLET ORAL at 21:10

## 2022-12-31 ASSESSMENT — ACTIVITIES OF DAILY LIVING (ADL)
ADLS_ACUITY_SCORE: 56

## 2022-12-31 NOTE — PLAN OF CARE
PRIMARY DIAGNOSIS: Placement   OUTPATIENT/OBSERVATION GOALS TO BE MET BEFORE DISCHARGE:  1. ADLs back to baseline: Yes    2. Activity and level of assistance: Ax1 rolling & Ax2 with lift      3. Pain status: Pain free.    4. Return to near baseline physical activity: Yes     Discharge Planner Nurse   Safe discharge environment identified: No  Barriers to discharge: Yes       Entered by: Laura Velasquez RN 12/31/2022 4:06 PM     Please review provider order for any additional goals.   Nurse to notify provider when observation goals have been met and patient is ready for discharge.    Patient is A&O x4. Calls appropriately. No IV present. Assist x1 in bed. On room air. Denies pain. On regular diet, tolerating well. Incontinent of bowel & bladder. Feet elevated on pillows. Pt has denied repositioning Q2h. Plan for placement. Resting in bed, able to make needs known.

## 2022-12-31 NOTE — PLAN OF CARE
PRIMARY DIAGNOSIS: Placement  OUTPATIENT/OBSERVATION GOALS TO BE MET BEFORE DISCHARGE:  1. ADLs back to baseline: Yes     2. Activity and level of assistance: Ax1 rolling in bed Ax2 with lift     3. Pain status: Pain free.     4. Return to near baseline physical activity: Yes          Discharge Planner Nurse   Safe discharge environment identified: No  Barriers to discharge: Yes     Temp: 97.7  F (36.5  C) Temp src: Oral BP: (!) 137/90 Pulse: 69   Resp: 16 SpO2: 97 % O2 Device: None (Room air)        AOx4. Denies pain. Pt called appropriately to be changed. Incontinent of urine. Cooperative and pleasant. Will monitor.    Please review provider order for any additional goals.   Nurse to notify provider when observation goals have been met and patient is ready for discharge.

## 2022-12-31 NOTE — PLAN OF CARE
PRIMARY DIAGNOSIS: Social Concern  OUTPATIENT/OBSERVATION GOALS TO BE MET BEFORE DISCHARGE:  ADLs back to baseline: Yes    Activity and level of assistance: Ax1-2 w/ lift (baseline)    Pain status: Pain free.    Return to near baseline physical activity: Yes     Discharge Planner Nurse   Safe discharge environment identified: No  Barriers to discharge: Yes - safe discharge disposition       Entered by: Celina Adkins RN 12/30/2022      Please review provider order for any additional goals.   Nurse to notify provider when observation goals have been met and patient is ready for discharge.

## 2022-12-31 NOTE — PLAN OF CARE
PRIMARY DIAGNOSIS: SOCIAL CONCERN  OUTPATIENT/OBSERVATION GOALS TO BE MET BEFORE DISCHARGE:    ADLs back to baseline: Yes     Activity and level of assistance: Ax1-2 w lift (baseline)     Pain status: Pain free.     Return to near baseline physical activity: Yes          Discharge Planner Nurse   Safe discharge environment identified: No  Barriers to discharge: Yes - placement       Please review provider order for any additional goals.   Nurse to notify provider when observation goals have been met and patient is ready for discharge.

## 2022-12-31 NOTE — PLAN OF CARE
PRIMARY DIAGNOSIS: Placement   OUTPATIENT/OBSERVATION GOALS TO BE MET BEFORE DISCHARGE:  1. ADLs back to baseline: Yes    2. Activity and level of assistance: Ax1 rolling & Ax2 with lift      3. Pain status: Pain free.    4. Return to near baseline physical activity: Yes     Discharge Planner Nurse   Safe discharge environment identified: No  Barriers to discharge: Yes       Entered by: Laura Velasquez RN 12/31/2022 12:50 PM     Please review provider order for any additional goals.   Nurse to notify provider when observation goals have been met and patient is ready for discharge.    Patient is alert and oriented x4. No IV present. On room air. Denies pain. On regular diet, tolerating well. Incontinent  of bowel & bladder. Plan for placement. Resting in bed, able to make needs known.

## 2022-12-31 NOTE — PLAN OF CARE
PRIMARY DIAGNOSIS: Placement   OUTPATIENT/OBSERVATION GOALS TO BE MET BEFORE DISCHARGE:  1. ADLs back to baseline: Yes    2. Activity and level of assistance: Ax1 rolling & Ax2 with lift      3. Pain status: Pain free.    4. Return to near baseline physical activity: Yes     Discharge Planner Nurse   Safe discharge environment identified: No  Barriers to discharge: Yes       Entered by: Laura Velasquez RN 12/31/2022 9:32 AM     Please review provider order for any additional goals.   Nurse to notify provider when observation goals have been met and patient is ready for discharge.    Patient is alert and oriented x4. No IV present. On room air. Denies pain. On regular diet, tolerating well. Incontinent  of bowel & bladder. Plan for placement. Resting in bed, able to make needs known.
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
## 2022-12-31 NOTE — PLAN OF CARE
PRIMARY DIAGNOSIS: Social Concern  OUTPATIENT/OBSERVATION GOALS TO BE MET BEFORE DISCHARGE:  1. ADLs back to baseline: Yes    2. Activity and level of assistance: Ax1-2 w/ lift (baseline)    3. Pain status: Pain free.    4. Return to near baseline physical activity: Yes     Discharge Planner Nurse   Safe discharge environment identified: No  Barriers to discharge: Yes - safe discharge disposition       Entered by: Celina Adkins RN 12/30/2022        /84  Pulse 71  Temp 97.5  F  Resp 18  SpO2 95% on RA    Alert and oriented. Calm, cooperative with cares - flat affect noted. Able to make needs known & dictates care - did not wish to get up to chair today. Encouraged OOB activity. Incontinent of bowel/bladder. Tolerating diet.     Please review provider order for any additional goals.   Nurse to notify provider when observation goals have been met and patient is ready for discharge.

## 2023-01-01 LAB — GLUCOSE BLDC GLUCOMTR-MCNC: 166 MG/DL (ref 70–99)

## 2023-01-01 PROCEDURE — 82962 GLUCOSE BLOOD TEST: CPT

## 2023-01-01 PROCEDURE — 250N000013 HC RX MED GY IP 250 OP 250 PS 637: Performed by: HOSPITALIST

## 2023-01-01 PROCEDURE — 250N000013 HC RX MED GY IP 250 OP 250 PS 637: Performed by: NURSE PRACTITIONER

## 2023-01-01 PROCEDURE — 250N000013 HC RX MED GY IP 250 OP 250 PS 637: Performed by: PHYSICIAN ASSISTANT

## 2023-01-01 PROCEDURE — 250N000013 HC RX MED GY IP 250 OP 250 PS 637

## 2023-01-01 PROCEDURE — G0378 HOSPITAL OBSERVATION PER HR: HCPCS

## 2023-01-01 RX ADMIN — CLONIDINE HYDROCHLORIDE 0.1 MG: 0.1 TABLET ORAL at 20:53

## 2023-01-01 RX ADMIN — HYDROXYZINE HYDROCHLORIDE 50 MG: 50 TABLET, FILM COATED ORAL at 13:41

## 2023-01-01 RX ADMIN — BACLOFEN 10 MG: 10 TABLET ORAL at 13:41

## 2023-01-01 RX ADMIN — BACLOFEN 10 MG: 10 TABLET ORAL at 20:52

## 2023-01-01 RX ADMIN — EMPAGLIFLOZIN 10 MG: 10 TABLET, FILM COATED ORAL at 09:38

## 2023-01-01 RX ADMIN — METOPROLOL SUCCINATE 25 MG: 25 TABLET, EXTENDED RELEASE ORAL at 09:38

## 2023-01-01 RX ADMIN — OLANZAPINE 2.5 MG: 2.5 TABLET, FILM COATED ORAL at 13:41

## 2023-01-01 RX ADMIN — CLOTRIMAZOLE: 0.01 CREAM TOPICAL at 09:45

## 2023-01-01 RX ADMIN — CLONIDINE HYDROCHLORIDE 0.1 MG: 0.1 TABLET ORAL at 13:41

## 2023-01-01 RX ADMIN — POLYETHYLENE GLYCOL 3350 17 G: 17 POWDER, FOR SOLUTION ORAL at 09:37

## 2023-01-01 RX ADMIN — HYDROXYZINE HYDROCHLORIDE 50 MG: 50 TABLET, FILM COATED ORAL at 20:54

## 2023-01-01 RX ADMIN — METFORMIN HYDROCHLORIDE 850 MG: 850 TABLET, FILM COATED ORAL at 17:10

## 2023-01-01 RX ADMIN — HYDROXYZINE HYDROCHLORIDE 50 MG: 50 TABLET, FILM COATED ORAL at 09:38

## 2023-01-01 RX ADMIN — DIVALPROEX SODIUM 750 MG: 500 TABLET, DELAYED RELEASE ORAL at 09:37

## 2023-01-01 RX ADMIN — DIVALPROEX SODIUM 1000 MG: 500 TABLET, DELAYED RELEASE ORAL at 20:53

## 2023-01-01 RX ADMIN — CLONIDINE HYDROCHLORIDE 0.1 MG: 0.1 TABLET ORAL at 09:39

## 2023-01-01 RX ADMIN — Medication 25 MCG: at 09:38

## 2023-01-01 RX ADMIN — QUETIAPINE FUMARATE 400 MG: 200 TABLET ORAL at 20:53

## 2023-01-01 RX ADMIN — ATORVASTATIN CALCIUM 20 MG: 20 TABLET, FILM COATED ORAL at 20:54

## 2023-01-01 RX ADMIN — ASPIRIN 81 MG CHEWABLE TABLET 81 MG: 81 TABLET CHEWABLE at 09:38

## 2023-01-01 RX ADMIN — LEVOTHYROXINE SODIUM 25 MCG: 0.03 TABLET ORAL at 09:38

## 2023-01-01 RX ADMIN — QUETIAPINE 200 MG: 200 TABLET, FILM COATED ORAL at 13:40

## 2023-01-01 RX ADMIN — CLOTRIMAZOLE: 0.01 CREAM TOPICAL at 20:56

## 2023-01-01 RX ADMIN — QUETIAPINE 200 MG: 200 TABLET, FILM COATED ORAL at 09:38

## 2023-01-01 RX ADMIN — HYDROCORTISONE: 1 CREAM TOPICAL at 20:56

## 2023-01-01 RX ADMIN — BACLOFEN 10 MG: 10 TABLET ORAL at 09:39

## 2023-01-01 RX ADMIN — HYDROCORTISONE: 1 CREAM TOPICAL at 09:45

## 2023-01-01 RX ADMIN — VENLAFAXINE HYDROCHLORIDE 150 MG: 150 CAPSULE, EXTENDED RELEASE ORAL at 20:54

## 2023-01-01 RX ADMIN — Medication 10 MG: at 20:54

## 2023-01-01 RX ADMIN — VENLAFAXINE HYDROCHLORIDE 75 MG: 150 CAPSULE, EXTENDED RELEASE ORAL at 20:54

## 2023-01-01 RX ADMIN — MIRTAZAPINE 15 MG: 15 TABLET, FILM COATED ORAL at 20:52

## 2023-01-01 ASSESSMENT — ACTIVITIES OF DAILY LIVING (ADL)
ADLS_ACUITY_SCORE: 56

## 2023-01-01 NOTE — PLAN OF CARE
PRIMARY DIAGNOSIS: Placement   OUTPATIENT/OBSERVATION GOALS TO BE MET BEFORE DISCHARGE:  1. ADLs back to baseline: Yes    2. Activity and level of assistance: Ax1 rolling & Ax2 with lift      3. Pain status: Pain free.    4. Return to near baseline physical activity: Yes     Discharge Planner Nurse   Safe discharge environment identified: No  Barriers to discharge: Yes       Entered by: Naomy Buckley RN 12/31/2022 10:57 PM  Blood pressure 133/87, pulse 77, temperature 97.5  F (36.4  C), temperature source Oral, resp. rate 18, weight 99.9 kg (220 lb 4.8 oz), SpO2 96 %.    Patient is A&O x4. Calls appropriately. No IV present. Assist x1 in bed. On room air. Denies pain. On regular diet, tolerating well. Incontinent of bowel & bladder. Feet elevated on pillows. Pt has denied repositioning Q2h. Plan for placement. Resting in bed, able to make needs known.          Please review provider order for any additional goals.   Nurse to notify provider when observation goals have been met and patient is ready for discharge.

## 2023-01-01 NOTE — PLAN OF CARE
PRIMARY DIAGNOSIS: Placement   OUTPATIENT/OBSERVATION GOALS TO BE MET BEFORE DISCHARGE:  1. ADLs back to baseline: Yes    2. Activity and level of assistance: Ax1 rolling & Ax2 with lift      3. Pain status: Pain free.    4. Return to near baseline physical activity: Yes     Discharge Planner Nurse   Safe discharge environment identified: No  Barriers to discharge: Yes       Entered by: Naomy Buckley RN 01/01/2023 4:55 AM  Blood pressure 133/87, pulse 77, temperature 97.5  F (36.4  C), temperature source Oral, resp. rate 18, weight 99.9 kg (220 lb 4.8 oz), SpO2 96 %.    Patient is A&O x4. Calls appropriately. No IV present. Assist x1 in bed. On room air. Denies pain. On regular diet, tolerating well. Incontinent of bowel & bladder. Heels elevated on pillows. Pt has denied repositioning Q2h. Plan for placement. Resting in bed, able to make needs known.          Please review provider order for any additional goals.   Nurse to notify provider when observation goals have been met and patient is ready for discharge.
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
## 2023-01-01 NOTE — PLAN OF CARE
PRIMARY DIAGNOSIS: Placement   OUTPATIENT/OBSERVATION GOALS TO BE MET BEFORE DISCHARGE:  1. ADLs back to baseline: Yes    2. Activity and level of assistance: Ax1 rolling & Ax2 with lift      3. Pain status: Pain free.    4. Return to near baseline physical activity: Yes     Discharge Planner Nurse   Safe discharge environment identified: No  Barriers to discharge: Yes       Entered by: Laura Velasquez RN 01/01/2023 12:24 PM     Please review provider order for any additional goals.   Nurse to notify provider when observation goals have been met and patient is ready for discharge.    Patient is A&O x4. Calls appropriately. No IV present. Assist x1 in bed. On room air. Denies pain. On regular diet, tolerating well. Incontinent of bowel & bladder. Feet elevated on pillows. Pt has denied repositioning Q2h. Plan for placement. Resting in bed, able to make needs known.

## 2023-01-01 NOTE — PLAN OF CARE
PRIMARY DIAGNOSIS: Placement   OUTPATIENT/OBSERVATION GOALS TO BE MET BEFORE DISCHARGE:  1. ADLs back to baseline: Yes    2. Activity and level of assistance: Ax1 rolling & Ax2 with lift      3. Pain status: Pain free.    4. Return to near baseline physical activity: Yes     Discharge Planner Nurse   Safe discharge environment identified: No  Barriers to discharge: Yes       Entered by: Laura Velasquez RN 01/01/2023 3:43 PM     Please review provider order for any additional goals.   Nurse to notify provider when observation goals have been met and patient is ready for discharge.    Patient is A&O x4. Calls appropriately. No IV present. Assist x1 in bed, turns well for brief changes- no skin breakdown or redness. On room air. Denies pain. On regular diet, tolerating well. Incontinent of bowel & bladder. Feet elevated on pillows. Pt has denied repositioning Q2h. Plan for placement. Resting in bed, able to make needs known.

## 2023-01-01 NOTE — PLAN OF CARE
PRIMARY DIAGNOSIS: Placement   OUTPATIENT/OBSERVATION GOALS TO BE MET BEFORE DISCHARGE:  1. ADLs back to baseline: Yes    2. Activity and level of assistance: Ax1 rolling & Ax2 with lift      3. Pain status: Pain free.    4. Return to near baseline physical activity: Yes     Discharge Planner Nurse   Safe discharge environment identified: No  Barriers to discharge: Yes       Entered by: Naomy Buckley RN 01/01/2023 1:22 AM  Blood pressure 133/87, pulse 77, temperature 97.5  F (36.4  C), temperature source Oral, resp. rate 18, weight 99.9 kg (220 lb 4.8 oz), SpO2 96 %.    Patient is A&O x4. Calls appropriately. No IV present. Assist x1 in bed. On room air. Denies pain. On regular diet, tolerating well. Incontinent of bowel & bladder. Feet elevated on pillows. Pt has denied repositioning Q2h. Plan for placement. Resting in bed, able to make needs known.          Please review provider order for any additional goals.   Nurse to notify provider when observation goals have been met and patient is ready for discharge.

## 2023-01-02 PROCEDURE — G0378 HOSPITAL OBSERVATION PER HR: HCPCS

## 2023-01-02 PROCEDURE — 250N000013 HC RX MED GY IP 250 OP 250 PS 637: Performed by: HOSPITALIST

## 2023-01-02 PROCEDURE — 250N000013 HC RX MED GY IP 250 OP 250 PS 637: Performed by: NURSE PRACTITIONER

## 2023-01-02 PROCEDURE — 250N000013 HC RX MED GY IP 250 OP 250 PS 637

## 2023-01-02 PROCEDURE — 250N000013 HC RX MED GY IP 250 OP 250 PS 637: Performed by: PHYSICIAN ASSISTANT

## 2023-01-02 RX ADMIN — HYDROXYZINE HYDROCHLORIDE 50 MG: 50 TABLET, FILM COATED ORAL at 21:25

## 2023-01-02 RX ADMIN — CLONIDINE HYDROCHLORIDE 0.1 MG: 0.1 TABLET ORAL at 08:43

## 2023-01-02 RX ADMIN — CLONIDINE HYDROCHLORIDE 0.1 MG: 0.1 TABLET ORAL at 21:25

## 2023-01-02 RX ADMIN — METOPROLOL SUCCINATE 25 MG: 25 TABLET, EXTENDED RELEASE ORAL at 08:45

## 2023-01-02 RX ADMIN — HYDROCORTISONE: 1 CREAM TOPICAL at 21:26

## 2023-01-02 RX ADMIN — DIVALPROEX SODIUM 750 MG: 500 TABLET, DELAYED RELEASE ORAL at 08:42

## 2023-01-02 RX ADMIN — POLYETHYLENE GLYCOL 3350 17 G: 17 POWDER, FOR SOLUTION ORAL at 08:45

## 2023-01-02 RX ADMIN — METFORMIN HYDROCHLORIDE 850 MG: 850 TABLET, FILM COATED ORAL at 16:48

## 2023-01-02 RX ADMIN — Medication 10 MG: at 21:24

## 2023-01-02 RX ADMIN — VENLAFAXINE HYDROCHLORIDE 75 MG: 150 CAPSULE, EXTENDED RELEASE ORAL at 21:25

## 2023-01-02 RX ADMIN — BACLOFEN 10 MG: 10 TABLET ORAL at 21:25

## 2023-01-02 RX ADMIN — HYDROXYZINE HYDROCHLORIDE 50 MG: 50 TABLET, FILM COATED ORAL at 08:44

## 2023-01-02 RX ADMIN — LEVOTHYROXINE SODIUM 25 MCG: 0.03 TABLET ORAL at 08:42

## 2023-01-02 RX ADMIN — BACLOFEN 10 MG: 10 TABLET ORAL at 14:23

## 2023-01-02 RX ADMIN — ASPIRIN 81 MG CHEWABLE TABLET 81 MG: 81 TABLET CHEWABLE at 08:43

## 2023-01-02 RX ADMIN — ATORVASTATIN CALCIUM 20 MG: 20 TABLET, FILM COATED ORAL at 21:24

## 2023-01-02 RX ADMIN — KETOCONAZOLE: 20 CREAM TOPICAL at 21:26

## 2023-01-02 RX ADMIN — CLOTRIMAZOLE: 0.01 CREAM TOPICAL at 21:26

## 2023-01-02 RX ADMIN — Medication 25 MCG: at 08:45

## 2023-01-02 RX ADMIN — MIRTAZAPINE 15 MG: 15 TABLET, FILM COATED ORAL at 21:24

## 2023-01-02 RX ADMIN — HYDROCORTISONE: 1 CREAM TOPICAL at 08:44

## 2023-01-02 RX ADMIN — QUETIAPINE 200 MG: 200 TABLET, FILM COATED ORAL at 08:45

## 2023-01-02 RX ADMIN — CLONIDINE HYDROCHLORIDE 0.1 MG: 0.1 TABLET ORAL at 14:23

## 2023-01-02 RX ADMIN — HYDROXYZINE HYDROCHLORIDE 50 MG: 50 TABLET, FILM COATED ORAL at 14:23

## 2023-01-02 RX ADMIN — EMPAGLIFLOZIN 10 MG: 10 TABLET, FILM COATED ORAL at 08:43

## 2023-01-02 RX ADMIN — QUETIAPINE FUMARATE 400 MG: 200 TABLET ORAL at 21:24

## 2023-01-02 RX ADMIN — OLANZAPINE 2.5 MG: 2.5 TABLET, FILM COATED ORAL at 14:23

## 2023-01-02 RX ADMIN — CLOTRIMAZOLE: 0.01 CREAM TOPICAL at 08:44

## 2023-01-02 RX ADMIN — VENLAFAXINE HYDROCHLORIDE 150 MG: 150 CAPSULE, EXTENDED RELEASE ORAL at 21:25

## 2023-01-02 RX ADMIN — QUETIAPINE 200 MG: 200 TABLET, FILM COATED ORAL at 14:23

## 2023-01-02 RX ADMIN — BACLOFEN 10 MG: 10 TABLET ORAL at 08:44

## 2023-01-02 RX ADMIN — DIVALPROEX SODIUM 1000 MG: 500 TABLET, DELAYED RELEASE ORAL at 21:24

## 2023-01-02 ASSESSMENT — ACTIVITIES OF DAILY LIVING (ADL)
ADLS_ACUITY_SCORE: 56

## 2023-01-02 NOTE — PLAN OF CARE
PRIMARY DIAGNOSIS: Awaiting Placement   OUTPATIENT/OBSERVATION GOALS TO BE MET BEFORE DISCHARGE:  1. ADLs back to baseline: Yes    2. Activity and level of assistance: Ax1, rolls. Ax2 with lift.     3. Pain status: Pain free.    4. Return to near baseline physical activity: Yes     Discharge Planner Nurse   Safe discharge environment identified: No  Barriers to discharge: Yes       Entered by: Soraida Camp RN 01/02/2023 0400    /88   Pulse 80   Temp 97.1  F (36.2  C) (Oral)   Resp 16   Wt 99.9 kg (220 lb 4.8 oz)   SpO2 94%      Pt called for assistance changing brief. No other changes, Will continue to monitor.     Please review provider order for any additional goals.   Nurse to notify provider when observation goals have been met and patient is ready for discharge.

## 2023-01-02 NOTE — PLAN OF CARE
PRIMARY DIAGNOSIS: Awaiting Placement   OUTPATIENT/OBSERVATION GOALS TO BE MET BEFORE DISCHARGE:  ADLs back to baseline: Yes    Activity and level of assistance: Ax1, rolls. Ax2 with lift.     Pain status: Pain free.    Return to near baseline physical activity: Yes     Discharge Planner Nurse   Safe discharge environment identified: No  Barriers to discharge: Yes       Entered by: Soraida Camp RN 01/01/2023 9:35 PM    /88   Pulse 80   Temp 97.1  F (36.2  C) (Oral)   Resp 16   Wt 99.9 kg (220 lb 4.8 oz)   SpO2 94%      Bedtime meds given and brief changed for urine/small BM. Antifungal cream applied to left groin area. Pt requested snack- popsicle and stan given. Will continue to monitor.      Please review provider order for any additional goals.   Nurse to notify provider when observation goals have been met and patient is ready for discharge.

## 2023-01-02 NOTE — PLAN OF CARE
PRIMARY DIAGNOSIS: Awaiting Placement   OUTPATIENT/OBSERVATION GOALS TO BE MET BEFORE DISCHARGE:  1. ADLs back to baseline: Yes    2. Activity and level of assistance: Ax1, rolls. Ax2 with lift.     3. Pain status: Pain free.    4. Return to near baseline physical activity: Yes     Discharge Planner Nurse   Safe discharge environment identified: No  Barriers to discharge: Yes       Entered by: Soraida Camp RN 01/01/2023 0000    /88   Pulse 80   Temp 97.1  F (36.2  C) (Oral)   Resp 16   Wt 99.9 kg (220 lb 4.8 oz)   SpO2 94%      No new changes at this time. Will continue to monitor.     Please review provider order for any additional goals.   Nurse to notify provider when observation goals have been met and patient is ready for discharge.

## 2023-01-02 NOTE — PLAN OF CARE
PRIMARY DIAGNOSIS: Placement  OUTPATIENT/OBSERVATION GOALS TO BE MET BEFORE DISCHARGE:  1. ADLs back to baseline: Yes    2. Activity and level of assistance: Up with maximum assistance. Consider SW and/or PT evaluation.     3. Pain status: Pain free.    4. Return to near baseline physical activity: Yes     Discharge Planner Nurse   Safe discharge environment identified: Yes  Barriers to discharge: Yes       Entered by: Laura Dover RN 01/02/2023 11:36 AM     Please review provider order for any additional goals.   Nurse to notify provider when observation goals have been met and patient is ready for discharge.Goal Outcome Evaluation:
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
## 2023-01-03 PROCEDURE — 250N000013 HC RX MED GY IP 250 OP 250 PS 637: Performed by: NURSE PRACTITIONER

## 2023-01-03 PROCEDURE — 250N000013 HC RX MED GY IP 250 OP 250 PS 637: Performed by: HOSPITALIST

## 2023-01-03 PROCEDURE — 250N000013 HC RX MED GY IP 250 OP 250 PS 637: Performed by: PHYSICIAN ASSISTANT

## 2023-01-03 PROCEDURE — 250N000013 HC RX MED GY IP 250 OP 250 PS 637

## 2023-01-03 PROCEDURE — G0378 HOSPITAL OBSERVATION PER HR: HCPCS

## 2023-01-03 RX ADMIN — QUETIAPINE 200 MG: 200 TABLET, FILM COATED ORAL at 09:40

## 2023-01-03 RX ADMIN — Medication 10 MG: at 22:12

## 2023-01-03 RX ADMIN — ASPIRIN 81 MG CHEWABLE TABLET 81 MG: 81 TABLET CHEWABLE at 09:40

## 2023-01-03 RX ADMIN — CLOTRIMAZOLE: 0.01 CREAM TOPICAL at 20:26

## 2023-01-03 RX ADMIN — DIVALPROEX SODIUM 750 MG: 500 TABLET, DELAYED RELEASE ORAL at 09:45

## 2023-01-03 RX ADMIN — CLONIDINE HYDROCHLORIDE 0.1 MG: 0.1 TABLET ORAL at 13:59

## 2023-01-03 RX ADMIN — CLONIDINE HYDROCHLORIDE 0.1 MG: 0.1 TABLET ORAL at 09:40

## 2023-01-03 RX ADMIN — HYDROXYZINE HYDROCHLORIDE 50 MG: 50 TABLET, FILM COATED ORAL at 20:26

## 2023-01-03 RX ADMIN — CLONIDINE HYDROCHLORIDE 0.1 MG: 0.1 TABLET ORAL at 20:26

## 2023-01-03 RX ADMIN — QUETIAPINE 200 MG: 200 TABLET, FILM COATED ORAL at 13:59

## 2023-01-03 RX ADMIN — HYDROXYZINE HYDROCHLORIDE 50 MG: 50 TABLET, FILM COATED ORAL at 13:59

## 2023-01-03 RX ADMIN — HYDROXYZINE HYDROCHLORIDE 50 MG: 50 TABLET, FILM COATED ORAL at 09:42

## 2023-01-03 RX ADMIN — HYDROCORTISONE: 1 CREAM TOPICAL at 20:26

## 2023-01-03 RX ADMIN — POLYETHYLENE GLYCOL 3350 17 G: 17 POWDER, FOR SOLUTION ORAL at 09:43

## 2023-01-03 RX ADMIN — Medication 25 MCG: at 09:40

## 2023-01-03 RX ADMIN — DIVALPROEX SODIUM 1000 MG: 500 TABLET, DELAYED RELEASE ORAL at 22:13

## 2023-01-03 RX ADMIN — METOPROLOL SUCCINATE 25 MG: 25 TABLET, EXTENDED RELEASE ORAL at 09:43

## 2023-01-03 RX ADMIN — LEVOTHYROXINE SODIUM 25 MCG: 0.03 TABLET ORAL at 09:39

## 2023-01-03 RX ADMIN — METFORMIN HYDROCHLORIDE 850 MG: 850 TABLET, FILM COATED ORAL at 16:22

## 2023-01-03 RX ADMIN — BACLOFEN 10 MG: 10 TABLET ORAL at 13:59

## 2023-01-03 RX ADMIN — KETOCONAZOLE: 20 CREAM TOPICAL at 09:42

## 2023-01-03 RX ADMIN — EMPAGLIFLOZIN 10 MG: 10 TABLET, FILM COATED ORAL at 09:37

## 2023-01-03 RX ADMIN — VENLAFAXINE HYDROCHLORIDE 75 MG: 150 CAPSULE, EXTENDED RELEASE ORAL at 22:12

## 2023-01-03 RX ADMIN — BACLOFEN 10 MG: 10 TABLET ORAL at 09:41

## 2023-01-03 RX ADMIN — VENLAFAXINE HYDROCHLORIDE 150 MG: 150 CAPSULE, EXTENDED RELEASE ORAL at 22:13

## 2023-01-03 RX ADMIN — HYDROCORTISONE: 1 CREAM TOPICAL at 09:43

## 2023-01-03 RX ADMIN — ATORVASTATIN CALCIUM 20 MG: 20 TABLET, FILM COATED ORAL at 20:26

## 2023-01-03 RX ADMIN — MIRTAZAPINE 15 MG: 15 TABLET, FILM COATED ORAL at 22:13

## 2023-01-03 RX ADMIN — QUETIAPINE FUMARATE 400 MG: 200 TABLET ORAL at 22:13

## 2023-01-03 RX ADMIN — BACLOFEN 10 MG: 10 TABLET ORAL at 20:26

## 2023-01-03 RX ADMIN — OLANZAPINE 2.5 MG: 2.5 TABLET, FILM COATED ORAL at 13:59

## 2023-01-03 RX ADMIN — CLOTRIMAZOLE: 0.01 CREAM TOPICAL at 09:42

## 2023-01-03 ASSESSMENT — ACTIVITIES OF DAILY LIVING (ADL)
ADLS_ACUITY_SCORE: 56
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
## 2023-01-03 NOTE — PLAN OF CARE
PRIMARY DIAGNOSIS: Placement   OUTPATIENT/OBSERVATION GOALS TO BE MET BEFORE DISCHARGE:  1. ADLs back to baseline: Yes    2. Activity and level of assistance: Up with maximum assistance    3. Pain status: Pain free.    4. Return to near baseline physical activity: Yes     Discharge Planner Nurse   Safe discharge environment identified: No  Barriers to discharge: Yes       Entered by: Laura Dover RN 01/03/2023 10:54 AM    Patient resting comfortably in bed, incontinent of urine and stool, SW following for placement, will continue to monitor        Please review provider order for any additional goals.   Nurse to notify provider when observation goals have been met and patient is ready for discharge.

## 2023-01-03 NOTE — PLAN OF CARE
PRIMARY DIAGNOSIS: Placement   OUTPATIENT/OBSERVATION GOALS TO BE MET BEFORE DISCHARGE:  ADLs back to baseline: Yes    Activity and level of assistance: Up with maximum assistance.     Pain status: Pain free.    Return to near baseline physical activity: Yes     Discharge Planner Nurse   Safe discharge environment identified: No  Barriers to discharge: Yes       Entered by: Emily Garcia RN 01/03/2023 12:57 AM    Patient resting comfortably in bed, currently denies pain, SW following for placement, will continue to monitor         Please review provider order for any additional goals.   Nurse to notify provider when observation goals have been met and patient is ready for discharge.

## 2023-01-03 NOTE — PLAN OF CARE
PRIMARY DIAGNOSIS: Placement   OUTPATIENT/OBSERVATION GOALS TO BE MET BEFORE DISCHARGE:  ADLs back to baseline: Yes    Activity and level of assistance: Up with maximum assistance    Pain status: Pain free.    Return to near baseline physical activity: Yes     Discharge Planner Nurse   Safe discharge environment identified: No  Barriers to discharge: Yes       Entered by: Emily Garcia RN 01/03/2023 6:08 AM    Patient resting comfortably in bed, incontinent of urine and stool, SW following for placement, will continue to monitor        Please review provider order for any additional goals.   Nurse to notify provider when observation goals have been met and patient is ready for discharge.

## 2023-01-04 PROCEDURE — 250N000013 HC RX MED GY IP 250 OP 250 PS 637: Performed by: HOSPITALIST

## 2023-01-04 PROCEDURE — 250N000013 HC RX MED GY IP 250 OP 250 PS 637: Performed by: NURSE PRACTITIONER

## 2023-01-04 PROCEDURE — 99207 PR NO BILLABLE SERVICE THIS VISIT: CPT | Performed by: PHYSICIAN ASSISTANT

## 2023-01-04 PROCEDURE — 250N000013 HC RX MED GY IP 250 OP 250 PS 637: Performed by: PHYSICIAN ASSISTANT

## 2023-01-04 PROCEDURE — G0378 HOSPITAL OBSERVATION PER HR: HCPCS

## 2023-01-04 PROCEDURE — 250N000013 HC RX MED GY IP 250 OP 250 PS 637

## 2023-01-04 RX ADMIN — OLANZAPINE 2.5 MG: 2.5 TABLET, FILM COATED ORAL at 14:25

## 2023-01-04 RX ADMIN — VENLAFAXINE HYDROCHLORIDE 75 MG: 150 CAPSULE, EXTENDED RELEASE ORAL at 21:11

## 2023-01-04 RX ADMIN — ATORVASTATIN CALCIUM 20 MG: 20 TABLET, FILM COATED ORAL at 20:19

## 2023-01-04 RX ADMIN — QUETIAPINE FUMARATE 400 MG: 200 TABLET ORAL at 21:11

## 2023-01-04 RX ADMIN — HYDROXYZINE HYDROCHLORIDE 50 MG: 50 TABLET, FILM COATED ORAL at 10:02

## 2023-01-04 RX ADMIN — CLONIDINE HYDROCHLORIDE 0.1 MG: 0.1 TABLET ORAL at 14:25

## 2023-01-04 RX ADMIN — VENLAFAXINE HYDROCHLORIDE 150 MG: 150 CAPSULE, EXTENDED RELEASE ORAL at 21:11

## 2023-01-04 RX ADMIN — CLONIDINE HYDROCHLORIDE 0.1 MG: 0.1 TABLET ORAL at 10:02

## 2023-01-04 RX ADMIN — HYDROCORTISONE: 1 CREAM TOPICAL at 20:20

## 2023-01-04 RX ADMIN — CLOTRIMAZOLE: 0.01 CREAM TOPICAL at 11:49

## 2023-01-04 RX ADMIN — HYDROXYZINE HYDROCHLORIDE 50 MG: 50 TABLET, FILM COATED ORAL at 14:25

## 2023-01-04 RX ADMIN — POLYETHYLENE GLYCOL 3350 17 G: 17 POWDER, FOR SOLUTION ORAL at 10:02

## 2023-01-04 RX ADMIN — ASPIRIN 81 MG CHEWABLE TABLET 81 MG: 81 TABLET CHEWABLE at 10:01

## 2023-01-04 RX ADMIN — Medication 25 MCG: at 10:01

## 2023-01-04 RX ADMIN — METOPROLOL SUCCINATE 25 MG: 25 TABLET, EXTENDED RELEASE ORAL at 10:02

## 2023-01-04 RX ADMIN — HYDROCORTISONE: 1 CREAM TOPICAL at 11:49

## 2023-01-04 RX ADMIN — METFORMIN HYDROCHLORIDE 850 MG: 850 TABLET, FILM COATED ORAL at 17:32

## 2023-01-04 RX ADMIN — QUETIAPINE 200 MG: 200 TABLET, FILM COATED ORAL at 14:25

## 2023-01-04 RX ADMIN — BACLOFEN 10 MG: 10 TABLET ORAL at 14:25

## 2023-01-04 RX ADMIN — Medication 10 MG: at 21:11

## 2023-01-04 RX ADMIN — DIVALPROEX SODIUM 1000 MG: 500 TABLET, DELAYED RELEASE ORAL at 21:11

## 2023-01-04 RX ADMIN — HYDROXYZINE HYDROCHLORIDE 50 MG: 50 TABLET, FILM COATED ORAL at 20:19

## 2023-01-04 RX ADMIN — BACLOFEN 10 MG: 10 TABLET ORAL at 10:01

## 2023-01-04 RX ADMIN — CLOTRIMAZOLE: 0.01 CREAM TOPICAL at 20:20

## 2023-01-04 RX ADMIN — CLONIDINE HYDROCHLORIDE 0.1 MG: 0.1 TABLET ORAL at 20:19

## 2023-01-04 RX ADMIN — EMPAGLIFLOZIN 10 MG: 10 TABLET, FILM COATED ORAL at 10:02

## 2023-01-04 RX ADMIN — LEVOTHYROXINE SODIUM 25 MCG: 0.03 TABLET ORAL at 10:02

## 2023-01-04 RX ADMIN — QUETIAPINE 200 MG: 200 TABLET, FILM COATED ORAL at 10:02

## 2023-01-04 RX ADMIN — DIVALPROEX SODIUM 750 MG: 500 TABLET, DELAYED RELEASE ORAL at 10:01

## 2023-01-04 RX ADMIN — MIRTAZAPINE 15 MG: 15 TABLET, FILM COATED ORAL at 21:11

## 2023-01-04 RX ADMIN — BACLOFEN 10 MG: 10 TABLET ORAL at 20:19

## 2023-01-04 ASSESSMENT — ACTIVITIES OF DAILY LIVING (ADL)
ADLS_ACUITY_SCORE: 56
ADLS_ACUITY_SCORE: 56
ADLS_ACUITY_SCORE: 54
ADLS_ACUITY_SCORE: 50
ADLS_ACUITY_SCORE: 54
ADLS_ACUITY_SCORE: 50
ADLS_ACUITY_SCORE: 56
ADLS_ACUITY_SCORE: 50
ADLS_ACUITY_SCORE: 50
ADLS_ACUITY_SCORE: 54
ADLS_ACUITY_SCORE: 56
ADLS_ACUITY_SCORE: 50

## 2023-01-04 NOTE — PLAN OF CARE
PRIMARY DIAGNOSIS: placement  OUTPATIENT/OBSERVATION GOALS TO BE MET BEFORE DISCHARGE:  ADLs back to baseline: Yes    Activity and level of assistance: Up with maximum assistance.     Pain status: Pain free.    Return to near baseline physical activity: Yes     Discharge Planner Nurse   Safe discharge environment identified: No  Barriers to discharge: Yes       Entered by: Emily Garcia RN 01/04/2023 5:18 AM    Patient is resting comfortably in bed, SW following for placement        Please review provider order for any additional goals.   Nurse to notify provider when observation goals have been met and patient is ready for discharge.

## 2023-01-04 NOTE — PLAN OF CARE
PRIMARY DIAGNOSIS: Placement  OUTPATIENT/OBSERVATION GOALS TO BE MET BEFORE DISCHARGE:  ADLs back to baseline: Yes    Activity and level of assistance: Up with maximum assistance.     Pain status: Pain free.    Return to near baseline physical activity: Yes     Discharge Planner Nurse   Safe discharge environment identified: No  Barriers to discharge: Yes       Entered by: Emily Garcia RN 01/03/2023 10:18 PM    Patient is resting comfortably in bed, currently denies pain, SW following for placement       Please review provider order for any additional goals.   Nurse to notify provider when observation goals have been met and patient is ready for discharge.

## 2023-01-04 NOTE — PLAN OF CARE
PRIMARY DIAGNOSIS: Placement   OUTPATIENT/OBSERVATION GOALS TO BE MET BEFORE DISCHARGE:  1. ADLs back to baseline: Yes    2. Activity and level of assistance: Up with maximum assistance    3. Pain status: Pain free.    4. Return to near baseline physical activity: Yes     Discharge Planner Nurse   Safe discharge environment identified: No  Barriers to discharge: Yes       Entered by: Laura Dover RN 01/03/2023 6:08 PM    Patient resting comfortably in bed, incontinent of urine and stool, SW following for placement, will continue to monitor. Pt tolerating meals and meds.        Please review provider order for any additional goals.   Nurse to notify provider when observation goals have been met and patient is ready for discharge.

## 2023-01-04 NOTE — PLAN OF CARE
PRIMARY DIAGNOSIS: Placement  OUTPATIENT/OBSERVATION GOALS TO BE MET BEFORE DISCHARGE:  ADLs back to baseline: Yes    Activity and level of assistance: Up with maximum assistance.     Pain status: Pain free.    Return to near baseline physical activity: Yes     Discharge Planner Nurse   Safe discharge environment identified: No  Barriers to discharge: Yes       Entered by: Emily Garcia RN 01/04/2023 12:42 AM    Patient resting comfortably in bed, SW following for placement       Please review provider order for any additional goals.   Nurse to notify provider when observation goals have been met and patient is ready for discharge.

## 2023-01-04 NOTE — PLAN OF CARE
PRIMARY DIAGNOSIS: Waiting placement  OUTPATIENT/OBSERVATION GOALS TO BE MET BEFORE DISCHARGE:  ADLs back to baseline: Yes    Activity and level of assistance: Baseline A2 with lift    Pain status: Pain free.    Return to near baseline physical activity: Yes     Discharge Planner Nurse   Safe discharge environment identified: No  Barriers to discharge: Yes       Entered by: Lola Phillips RN 01/04/2023 12:27 PM     Please review provider order for any additional goals.   Nurse to notify provider when observation goals have been met and patient is ready for discharge.
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
## 2023-01-05 LAB — GLUCOSE BLDC GLUCOMTR-MCNC: 110 MG/DL (ref 70–99)

## 2023-01-05 PROCEDURE — 250N000013 HC RX MED GY IP 250 OP 250 PS 637: Performed by: HOSPITALIST

## 2023-01-05 PROCEDURE — 82962 GLUCOSE BLOOD TEST: CPT

## 2023-01-05 PROCEDURE — G0378 HOSPITAL OBSERVATION PER HR: HCPCS

## 2023-01-05 PROCEDURE — 250N000013 HC RX MED GY IP 250 OP 250 PS 637

## 2023-01-05 PROCEDURE — 250N000013 HC RX MED GY IP 250 OP 250 PS 637: Performed by: PHYSICIAN ASSISTANT

## 2023-01-05 PROCEDURE — 250N000013 HC RX MED GY IP 250 OP 250 PS 637: Performed by: NURSE PRACTITIONER

## 2023-01-05 RX ADMIN — QUETIAPINE 200 MG: 200 TABLET, FILM COATED ORAL at 09:46

## 2023-01-05 RX ADMIN — CLONIDINE HYDROCHLORIDE 0.1 MG: 0.1 TABLET ORAL at 09:45

## 2023-01-05 RX ADMIN — HYDROXYZINE HYDROCHLORIDE 50 MG: 50 TABLET, FILM COATED ORAL at 09:48

## 2023-01-05 RX ADMIN — Medication 25 MCG: at 09:47

## 2023-01-05 RX ADMIN — METOPROLOL SUCCINATE 25 MG: 25 TABLET, EXTENDED RELEASE ORAL at 09:47

## 2023-01-05 RX ADMIN — CLONIDINE HYDROCHLORIDE 0.1 MG: 0.1 TABLET ORAL at 14:14

## 2023-01-05 RX ADMIN — QUETIAPINE FUMARATE 400 MG: 200 TABLET ORAL at 21:00

## 2023-01-05 RX ADMIN — VENLAFAXINE HYDROCHLORIDE 75 MG: 150 CAPSULE, EXTENDED RELEASE ORAL at 21:01

## 2023-01-05 RX ADMIN — ATORVASTATIN CALCIUM 20 MG: 20 TABLET, FILM COATED ORAL at 20:58

## 2023-01-05 RX ADMIN — EMPAGLIFLOZIN 10 MG: 10 TABLET, FILM COATED ORAL at 09:46

## 2023-01-05 RX ADMIN — VENLAFAXINE HYDROCHLORIDE 150 MG: 150 CAPSULE, EXTENDED RELEASE ORAL at 21:01

## 2023-01-05 RX ADMIN — QUETIAPINE 200 MG: 200 TABLET, FILM COATED ORAL at 14:13

## 2023-01-05 RX ADMIN — METFORMIN HYDROCHLORIDE 850 MG: 850 TABLET, FILM COATED ORAL at 18:22

## 2023-01-05 RX ADMIN — HYDROXYZINE HYDROCHLORIDE 50 MG: 50 TABLET, FILM COATED ORAL at 20:58

## 2023-01-05 RX ADMIN — POLYETHYLENE GLYCOL 3350 17 G: 17 POWDER, FOR SOLUTION ORAL at 09:48

## 2023-01-05 RX ADMIN — HYDROCORTISONE: 1 CREAM TOPICAL at 12:42

## 2023-01-05 RX ADMIN — BACLOFEN 10 MG: 10 TABLET ORAL at 14:14

## 2023-01-05 RX ADMIN — CLONIDINE HYDROCHLORIDE 0.1 MG: 0.1 TABLET ORAL at 21:01

## 2023-01-05 RX ADMIN — LEVOTHYROXINE SODIUM 25 MCG: 0.03 TABLET ORAL at 09:47

## 2023-01-05 RX ADMIN — ASPIRIN 81 MG CHEWABLE TABLET 81 MG: 81 TABLET CHEWABLE at 09:46

## 2023-01-05 RX ADMIN — DIVALPROEX SODIUM 1000 MG: 500 TABLET, DELAYED RELEASE ORAL at 21:00

## 2023-01-05 RX ADMIN — HYDROCORTISONE: 1 CREAM TOPICAL at 21:08

## 2023-01-05 RX ADMIN — MIRTAZAPINE 15 MG: 15 TABLET, FILM COATED ORAL at 21:00

## 2023-01-05 RX ADMIN — DIVALPROEX SODIUM 750 MG: 500 TABLET, DELAYED RELEASE ORAL at 09:47

## 2023-01-05 RX ADMIN — CLOTRIMAZOLE: 0.01 CREAM TOPICAL at 21:06

## 2023-01-05 RX ADMIN — HYDROXYZINE HYDROCHLORIDE 50 MG: 50 TABLET, FILM COATED ORAL at 14:13

## 2023-01-05 RX ADMIN — Medication 10 MG: at 21:01

## 2023-01-05 RX ADMIN — CLOTRIMAZOLE: 0.01 CREAM TOPICAL at 12:42

## 2023-01-05 RX ADMIN — BACLOFEN 10 MG: 10 TABLET ORAL at 09:46

## 2023-01-05 RX ADMIN — OLANZAPINE 2.5 MG: 2.5 TABLET, FILM COATED ORAL at 14:14

## 2023-01-05 RX ADMIN — BACLOFEN 10 MG: 10 TABLET ORAL at 20:58

## 2023-01-05 ASSESSMENT — ACTIVITIES OF DAILY LIVING (ADL)
ADLS_ACUITY_SCORE: 56
ADLS_ACUITY_SCORE: 56
ADLS_ACUITY_SCORE: 60
ADLS_ACUITY_SCORE: 60
ADLS_ACUITY_SCORE: 56
ADLS_ACUITY_SCORE: 60
ADLS_ACUITY_SCORE: 56
ADLS_ACUITY_SCORE: 56
ADLS_ACUITY_SCORE: 60
ADLS_ACUITY_SCORE: 56

## 2023-01-05 NOTE — PLAN OF CARE
PRIMARY DIAGNOSIS: Placement   OUTPATIENT/OBSERVATION GOALS TO BE MET BEFORE DISCHARGE:  ADLs back to baseline: Yes    Activity and level of assistance: assist x 1    Pain status: Pain free.    Return to near baseline physical activity: Yes     Discharge Planner Nurse   Safe discharge environment identified: No  Barriers to discharge: Yes       Entered by: Emily Garcia RN 01/05/2023 12:56 AM    Patient is resting comfortably in bed, SW following for placement         Please review provider order for any additional goals.   Nurse to notify provider when observation goals have been met and patient is ready for discharge.

## 2023-01-05 NOTE — PLAN OF CARE
PRIMARY DIAGNOSIS: Pending placement  OUTPATIENT/OBSERVATION GOALS TO BE MET BEFORE DISCHARGE:  1. ADLs back to baseline: Yes    2. Activity and level of assistance: Baseline A2 with lift.    3. Pain status: Pain free.    4. Return to near baseline physical activity: Yes     Discharge Planner Nurse   Safe discharge environment identified: No  Barriers to discharge: Yes       Entered by: Lola Phillips RN 01/04/2023 6:39 PM     Please review provider order for any additional goals.   Nurse to notify provider when observation goals have been met and patient is ready for discharge.    End of shift summery: Pt a/o x4. VSS. Gordon pain. Repositions self. Refused recliner today. Incontinent. Waiting placement.
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
## 2023-01-05 NOTE — PLAN OF CARE
PRIMARY DIAGNOSIS: Placement   OUTPATIENT/OBSERVATION GOALS TO BE MET BEFORE DISCHARGE:  ADLs back to baseline: Yes    Activity and level of assistance: assist x 1    Pain status: Pain free.    Return to near baseline physical activity: Yes     Discharge Planner Nurse   Safe discharge environment identified: No  Barriers to discharge: Yes       Entered by: Emily Garcia RN 01/04/2023 8:57 PM    Patient resting comfortably in bed, currently denies pain, SW following for placement       Please review provider order for any additional goals.   Nurse to notify provider when observation goals have been met and patient is ready for discharge.

## 2023-01-05 NOTE — PROGRESS NOTES
Care Management Follow Up    Expected Discharge Date: 01/31/2023     Concerns to be Addressed: Discharge planning      Patient plan of care discussed at interdisciplinary rounds: Yes    Anticipated Discharge Disposition:  Group Home once new placement found by relocation worker    Additional Information:  SW emailed relocation worker Misty, with [facility], to request updates on facility placement search. Awaiting response. ANALY will continue to follow.     DANITA Obrien, [facility]   Inpatient Care Coordination  [facility]   [phone]

## 2023-01-05 NOTE — PLAN OF CARE
PRIMARY DIAGNOSIS: Placement  OUTPATIENT/OBSERVATION GOALS TO BE MET BEFORE DISCHARGE:  ADLs back to baseline: Yes    Activity and level of assistance: assist x 1    Pain status: Pain free.    Return to near baseline physical activity: Yes     Discharge Planner Nurse   Safe discharge environment identified: No  Barriers to discharge: Yes       Entered by: Emily Garcia RN 01/05/2023 5:40 AM    Patient resting comfortably, denies pain, SW following for placement        Please review provider order for any additional goals.   Nurse to notify provider when observation goals have been met and patient is ready for discharge.

## 2023-01-05 NOTE — PLAN OF CARE
PRIMARY DIAGNOSIS: Placement pending  OUTPATIENT/OBSERVATION GOALS TO BE MET BEFORE DISCHARGE:  ADLs back to baseline: Yes    Activity and level of assistance: Baseline A2 with lift    Pain status: Pain free.    Return to near baseline physical activity: Yes     Discharge Planner Nurse   Safe discharge environment identified: No  Barriers to discharge: Yes       Entered by: Lola Phillips RN 01/04/2023 6:38 PM     Please review provider order for any additional goals.   Nurse to notify provider when observation goals have been met and patient is ready for discharge.

## 2023-01-06 PROCEDURE — G0378 HOSPITAL OBSERVATION PER HR: HCPCS

## 2023-01-06 PROCEDURE — 250N000013 HC RX MED GY IP 250 OP 250 PS 637

## 2023-01-06 PROCEDURE — 99207 PR NO CHARGE LOS: CPT | Performed by: PHYSICIAN ASSISTANT

## 2023-01-06 PROCEDURE — 250N000013 HC RX MED GY IP 250 OP 250 PS 637: Performed by: NURSE PRACTITIONER

## 2023-01-06 PROCEDURE — 250N000013 HC RX MED GY IP 250 OP 250 PS 637: Performed by: HOSPITALIST

## 2023-01-06 PROCEDURE — 250N000013 HC RX MED GY IP 250 OP 250 PS 637: Performed by: PHYSICIAN ASSISTANT

## 2023-01-06 RX ADMIN — CLOTRIMAZOLE: 0.01 CREAM TOPICAL at 21:06

## 2023-01-06 RX ADMIN — Medication 25 MCG: at 09:24

## 2023-01-06 RX ADMIN — QUETIAPINE FUMARATE 400 MG: 200 TABLET ORAL at 21:07

## 2023-01-06 RX ADMIN — Medication 10 MG: at 21:06

## 2023-01-06 RX ADMIN — POLYETHYLENE GLYCOL 3350 17 G: 17 POWDER, FOR SOLUTION ORAL at 09:23

## 2023-01-06 RX ADMIN — DIVALPROEX SODIUM 750 MG: 500 TABLET, DELAYED RELEASE ORAL at 09:23

## 2023-01-06 RX ADMIN — HYDROCORTISONE: 1 CREAM TOPICAL at 09:28

## 2023-01-06 RX ADMIN — LEVOTHYROXINE SODIUM 25 MCG: 0.03 TABLET ORAL at 09:23

## 2023-01-06 RX ADMIN — CLONIDINE HYDROCHLORIDE 0.1 MG: 0.1 TABLET ORAL at 09:24

## 2023-01-06 RX ADMIN — HYDROXYZINE HYDROCHLORIDE 50 MG: 50 TABLET, FILM COATED ORAL at 09:24

## 2023-01-06 RX ADMIN — METFORMIN HYDROCHLORIDE 850 MG: 850 TABLET, FILM COATED ORAL at 16:55

## 2023-01-06 RX ADMIN — ASPIRIN 81 MG CHEWABLE TABLET 81 MG: 81 TABLET CHEWABLE at 09:23

## 2023-01-06 RX ADMIN — BACLOFEN 10 MG: 10 TABLET ORAL at 09:24

## 2023-01-06 RX ADMIN — QUETIAPINE 200 MG: 200 TABLET, FILM COATED ORAL at 14:06

## 2023-01-06 RX ADMIN — HYDROXYZINE HYDROCHLORIDE 50 MG: 50 TABLET, FILM COATED ORAL at 14:06

## 2023-01-06 RX ADMIN — BACLOFEN 10 MG: 10 TABLET ORAL at 14:06

## 2023-01-06 RX ADMIN — SENNOSIDES AND DOCUSATE SODIUM 1 TABLET: 50; 8.6 TABLET ORAL at 21:09

## 2023-01-06 RX ADMIN — OLANZAPINE 2.5 MG: 2.5 TABLET, FILM COATED ORAL at 14:06

## 2023-01-06 RX ADMIN — HYDROCORTISONE: 1 CREAM TOPICAL at 21:06

## 2023-01-06 RX ADMIN — BACLOFEN 10 MG: 10 TABLET ORAL at 21:07

## 2023-01-06 RX ADMIN — DIVALPROEX SODIUM 1000 MG: 500 TABLET, DELAYED RELEASE ORAL at 21:06

## 2023-01-06 RX ADMIN — VENLAFAXINE HYDROCHLORIDE 150 MG: 150 CAPSULE, EXTENDED RELEASE ORAL at 21:06

## 2023-01-06 RX ADMIN — CLONIDINE HYDROCHLORIDE 0.1 MG: 0.1 TABLET ORAL at 14:06

## 2023-01-06 RX ADMIN — CLONIDINE HYDROCHLORIDE 0.1 MG: 0.1 TABLET ORAL at 21:07

## 2023-01-06 RX ADMIN — CLOTRIMAZOLE: 0.01 CREAM TOPICAL at 09:28

## 2023-01-06 RX ADMIN — MIRTAZAPINE 15 MG: 15 TABLET, FILM COATED ORAL at 21:07

## 2023-01-06 RX ADMIN — VENLAFAXINE HYDROCHLORIDE 75 MG: 150 CAPSULE, EXTENDED RELEASE ORAL at 21:07

## 2023-01-06 RX ADMIN — HYDROXYZINE HYDROCHLORIDE 50 MG: 50 TABLET, FILM COATED ORAL at 21:07

## 2023-01-06 RX ADMIN — EMPAGLIFLOZIN 10 MG: 10 TABLET, FILM COATED ORAL at 09:24

## 2023-01-06 RX ADMIN — QUETIAPINE 200 MG: 200 TABLET, FILM COATED ORAL at 09:24

## 2023-01-06 RX ADMIN — ATORVASTATIN CALCIUM 20 MG: 20 TABLET, FILM COATED ORAL at 21:07

## 2023-01-06 RX ADMIN — METOPROLOL SUCCINATE 25 MG: 25 TABLET, EXTENDED RELEASE ORAL at 09:24

## 2023-01-06 ASSESSMENT — ACTIVITIES OF DAILY LIVING (ADL)
ADLS_ACUITY_SCORE: 52
ADLS_ACUITY_SCORE: 60
ADLS_ACUITY_SCORE: 56
ADLS_ACUITY_SCORE: 60
ADLS_ACUITY_SCORE: 60
ADLS_ACUITY_SCORE: 56
ADLS_ACUITY_SCORE: 56
ADLS_ACUITY_SCORE: 60
ADLS_ACUITY_SCORE: 56

## 2023-01-06 NOTE — PROGRESS NOTES
PRIMARY DIAGNOSIS: Placement  OUTPATIENT/OBSERVATION GOALS TO BE MET BEFORE DISCHARGE:  1. ADLs back to baseline: Yes    2. Activity and level of assistance: Ax1-2    2. Pain status: Pain free.    3. Return to near baseline physical activity: Yes     Discharge Planner Nurse   Safe discharge environment identified: No  Barriers to discharge: Yes       Entered by: Alda Victoria RN 01/05/2023     /81 (BP Location: Right arm)   Pulse 79   Temp 97.9  F (36.6  C) (Oral)   Resp 18   Wt 99.9 kg (220 lb 4.8 oz)   SpO2 97%     VSS on RA. Pt calm and cooperative, resting in bed. Will continue to monitor. SW following for placement.   Please review provider order for any additional goals.   Nurse to notify provider when observation goals have been met and patient is ready for discharge.

## 2023-01-06 NOTE — PLAN OF CARE
PRIMARY DIAGNOSIS: Placement   OUTPATIENT/OBSERVATION GOALS TO BE MET BEFORE DISCHARGE:  1. ADLs back to baseline: Yes    2. Activity and level of assistance: Ax1 rolling & Ax2 with lift      3. Pain status: Pain free.    4. Return to near baseline physical activity: Yes     Discharge Planner Nurse   Safe discharge environment identified: No  Barriers to discharge: Yes       Entered by: Laura Velasquez RN 01/06/2023 10:39 AM     Please review provider order for any additional goals.   Nurse to notify provider when observation goals have been met and patient is ready for discharge.    Patient is A&O x4. Calls appropriately. No IV present. Assist x1 in bed. On room air. Denies pain. On regular diet, tolerating well. Incontinent of bowel & bladder. Feet elevated on pillows. Pt has denied repositioning. Plan for placement. Resting in bed, able to make needs known.

## 2023-01-06 NOTE — PLAN OF CARE
PRIMARY DIAGNOSIS: Placement   OUTPATIENT/OBSERVATION GOALS TO BE MET BEFORE DISCHARGE:  1. ADLs back to baseline: Yes    2. Activity and level of assistance: Ax1 rolling & Ax2 with lift      3. Pain status: Pain free.    4. Return to near baseline physical activity: Yes     Discharge Planner Nurse   Safe discharge environment identified: No  Barriers to discharge: Yes       Entered by: Laura Velasquez RN 01/06/2023 3:09 PM     Please review provider order for any additional goals.   Nurse to notify provider when observation goals have been met and patient is ready for discharge.    Patient is A&O x4. Calls appropriately. No IV present. Assist x1 in bed. On room air. Denies pain. On regular diet, tolerating well. Incontinent of bowel & bladder. Feet elevated on pillows. Pt has denied repositioning. Plan for placement. Resting in bed, able to make needs known.

## 2023-01-06 NOTE — PROGRESS NOTES
PRIMARY DIAGNOSIS: Placement  OUTPATIENT/OBSERVATION GOALS TO BE MET BEFORE DISCHARGE:  1. ADLs back to baseline: Yes    2. Activity and level of assistance: Ax1-2    2. Pain status: Pain free.    3. Return to near baseline physical activity: Yes     Discharge Planner Nurse   Safe discharge environment identified: No  Barriers to discharge: Yes       Entered by: Alda Victoria RN 01/06/2023       /81 (BP Location: Right arm)   Pulse 79   Temp 97.9  F (36.6  C) (Oral)   Resp 18   Wt 99.9 kg (220 lb 4.8 oz)   SpO2 97%     VSS on RA. Pt calm and cooperative, resting in bed. Will continue to monitor. SW following for placement.   Please review provider order for any additional goals.   Nurse to notify provider when observation goals have been met and patient is ready for discharge.

## 2023-01-06 NOTE — PROGRESS NOTES
PRIMARY DIAGNOSIS: Placement  OUTPATIENT/OBSERVATION GOALS TO BE MET BEFORE DISCHARGE:  1. ADLs back to baseline: Yes    2. Activity and level of assistance: Ax1-2    2. Pain status: Pain free.    3. Return to near baseline physical activity: Yes     Discharge Planner Nurse   Safe discharge environment identified: No  Barriers to discharge: Yes       Entered by: Alda Victoria RN 01/05/2023     VSS on RA. Resting comfortably in bed. Will continue to monitor.   Please review provider order for any additional goals.   Nurse to notify provider when observation goals have been met and patient is ready for discharge.
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
## 2023-01-06 NOTE — PROGRESS NOTES
Care Management Follow Up    Expected Discharge Date: 01/31/2023     Concerns to be Addressed:  Discharge planning    Patient plan of care discussed at interdisciplinary rounds: Yes    Anticipated Discharge Disposition:  Group Home Placement once placement found by relocation worker    Additional Information:  SW received email from relocation worker Misty asking for clinical referral info to be sent to her to provide to facilities. [facility] has asked for additional documentation to consider pt for placement. H&P, progress notes and MAR faxed. Relocation worker has also sent [employer] assessment and CSP/CSSP to them for review. She will follow up with SW when she has another update. SW will continue to follow.     DANITA Obrien, [facility]   Inpatient Care Coordination  [facility]   [phone]

## 2023-01-06 NOTE — PLAN OF CARE
Goal Outcome Evaluation:       VSS. Pt denies pain. Able to make needs known. Repositioned as pt agreeable, 1a with perineal cares. SW following for discharge plan.    /81 (BP Location: Right arm)   Pulse 79   Temp 97.9  F (36.6  C) (Oral)   Resp 18   Wt 99.9 kg (220 lb 4.8 oz)   SpO2 97%

## 2023-01-07 PROCEDURE — 250N000013 HC RX MED GY IP 250 OP 250 PS 637: Performed by: HOSPITALIST

## 2023-01-07 PROCEDURE — 250N000013 HC RX MED GY IP 250 OP 250 PS 637: Performed by: PHYSICIAN ASSISTANT

## 2023-01-07 PROCEDURE — 99207 PR NO CHARGE LOS: CPT | Performed by: PHYSICIAN ASSISTANT

## 2023-01-07 PROCEDURE — 250N000013 HC RX MED GY IP 250 OP 250 PS 637: Performed by: NURSE PRACTITIONER

## 2023-01-07 PROCEDURE — 250N000013 HC RX MED GY IP 250 OP 250 PS 637

## 2023-01-07 PROCEDURE — G0378 HOSPITAL OBSERVATION PER HR: HCPCS

## 2023-01-07 RX ADMIN — POLYETHYLENE GLYCOL 3350 17 G: 17 POWDER, FOR SOLUTION ORAL at 08:42

## 2023-01-07 RX ADMIN — VENLAFAXINE HYDROCHLORIDE 75 MG: 150 CAPSULE, EXTENDED RELEASE ORAL at 21:03

## 2023-01-07 RX ADMIN — Medication 10 MG: at 21:04

## 2023-01-07 RX ADMIN — HYDROCORTISONE: 1 CREAM TOPICAL at 08:47

## 2023-01-07 RX ADMIN — LEVOTHYROXINE SODIUM 25 MCG: 0.03 TABLET ORAL at 08:42

## 2023-01-07 RX ADMIN — CLOTRIMAZOLE: 0.01 CREAM TOPICAL at 08:47

## 2023-01-07 RX ADMIN — EMPAGLIFLOZIN 10 MG: 10 TABLET, FILM COATED ORAL at 08:42

## 2023-01-07 RX ADMIN — DIVALPROEX SODIUM 1000 MG: 500 TABLET, DELAYED RELEASE ORAL at 21:03

## 2023-01-07 RX ADMIN — QUETIAPINE FUMARATE 400 MG: 200 TABLET ORAL at 21:04

## 2023-01-07 RX ADMIN — BACLOFEN 10 MG: 10 TABLET ORAL at 14:48

## 2023-01-07 RX ADMIN — OLANZAPINE 2.5 MG: 2.5 TABLET, FILM COATED ORAL at 14:48

## 2023-01-07 RX ADMIN — VENLAFAXINE HYDROCHLORIDE 150 MG: 150 CAPSULE, EXTENDED RELEASE ORAL at 21:04

## 2023-01-07 RX ADMIN — QUETIAPINE 200 MG: 200 TABLET, FILM COATED ORAL at 08:42

## 2023-01-07 RX ADMIN — CLONIDINE HYDROCHLORIDE 0.1 MG: 0.1 TABLET ORAL at 14:48

## 2023-01-07 RX ADMIN — Medication 25 MCG: at 08:42

## 2023-01-07 RX ADMIN — DIVALPROEX SODIUM 750 MG: 500 TABLET, DELAYED RELEASE ORAL at 08:42

## 2023-01-07 RX ADMIN — HYDROXYZINE HYDROCHLORIDE 50 MG: 50 TABLET, FILM COATED ORAL at 08:42

## 2023-01-07 RX ADMIN — METOPROLOL SUCCINATE 25 MG: 25 TABLET, EXTENDED RELEASE ORAL at 08:42

## 2023-01-07 RX ADMIN — ATORVASTATIN CALCIUM 20 MG: 20 TABLET, FILM COATED ORAL at 21:05

## 2023-01-07 RX ADMIN — QUETIAPINE 200 MG: 200 TABLET, FILM COATED ORAL at 14:48

## 2023-01-07 RX ADMIN — BACLOFEN 10 MG: 10 TABLET ORAL at 08:42

## 2023-01-07 RX ADMIN — BACLOFEN 10 MG: 10 TABLET ORAL at 21:04

## 2023-01-07 RX ADMIN — ASPIRIN 81 MG CHEWABLE TABLET 81 MG: 81 TABLET CHEWABLE at 08:42

## 2023-01-07 RX ADMIN — CLONIDINE HYDROCHLORIDE 0.1 MG: 0.1 TABLET ORAL at 21:04

## 2023-01-07 RX ADMIN — MIRTAZAPINE 15 MG: 15 TABLET, FILM COATED ORAL at 21:04

## 2023-01-07 RX ADMIN — HYDROXYZINE HYDROCHLORIDE 50 MG: 50 TABLET, FILM COATED ORAL at 14:48

## 2023-01-07 RX ADMIN — CLONIDINE HYDROCHLORIDE 0.1 MG: 0.1 TABLET ORAL at 08:42

## 2023-01-07 RX ADMIN — HYDROXYZINE HYDROCHLORIDE 50 MG: 50 TABLET, FILM COATED ORAL at 21:04

## 2023-01-07 RX ADMIN — KETOCONAZOLE: 20 CREAM TOPICAL at 08:47

## 2023-01-07 RX ADMIN — METFORMIN HYDROCHLORIDE 850 MG: 850 TABLET, FILM COATED ORAL at 17:18

## 2023-01-07 ASSESSMENT — ACTIVITIES OF DAILY LIVING (ADL)
ADLS_ACUITY_SCORE: 52
ADLS_ACUITY_SCORE: 58
ADLS_ACUITY_SCORE: 52
ADLS_ACUITY_SCORE: 52
ADLS_ACUITY_SCORE: 56
ADLS_ACUITY_SCORE: 58
ADLS_ACUITY_SCORE: 56
ADLS_ACUITY_SCORE: 52
ADLS_ACUITY_SCORE: 52
ADLS_ACUITY_SCORE: 58
ADLS_ACUITY_SCORE: 56
ADLS_ACUITY_SCORE: 56

## 2023-01-07 NOTE — PLAN OF CARE
"PRIMARY DIAGNOSIS: \"Placment\"   OUTPATIENT/OBSERVATION GOALS TO BE MET BEFORE DISCHARGE:  1. ADLs back to baseline: Yes    2. Activity and level of assistance: Bedrest at baseline.     3. Pain status: Pain free.    4. Return to near baseline physical activity: Yes     Discharge Planner Nurse   Safe discharge environment identified: No  Barriers to discharge: No       Entered by: Genevieve Ordoñez RN 01/07/2023 11:52 AM   Pt alert and oriented X 4, medically stable. Bowel sounds active. Senna and Miralax in use. No IV. Pt communicate needs properly. Pt incontinent with bowel and bladder.  Pt had bM today. Regular diet, ate 100 % his meal. Pt self reposition in bed and has air mattress for prevention of skin breakdown. SW following for discharge and placement.   /85 (BP Location: Right arm)   Pulse 71   Temp 97.9  F (36.6  C) (Oral)   Resp 16   Wt 99.9 kg (220 lb 4.8 oz)   SpO2 97%       Please review provider order for any additional goals.   Nurse to notify provider when observation goals have been met and patient is ready for discharge.  "

## 2023-01-07 NOTE — PLAN OF CARE
"PRIMARY DIAGNOSIS: \"Placment\"   OUTPATIENT/OBSERVATION GOALS TO BE MET BEFORE DISCHARGE:  1. ADLs back to baseline: Yes    2. Activity and level of assistance: Bedrest at baseline.     3. Pain status: Pain free.    4. Return to near baseline physical activity: Yes     Discharge Planner Nurse   Safe discharge environment identified: No  Barriers to discharge: No       Entered by: Genevieve Ordoñez RN 01/07/2023 10:31 AM   Pt alert and oriented X 4, medically stable. Bowel sounds active. Senna and Miralax in use. No IV. Pt communicate needs properly. Pt incontinent with bowel and bladder.  Regular diet, ate 100 % his meal. Pt self reposition in bed and has air mattress for prevention of skin breakdown. SW following for discharge and placement.   /85 (BP Location: Right arm)   Pulse 71   Temp 97.9  F (36.6  C) (Oral)   Resp 16   Wt 99.9 kg (220 lb 4.8 oz)   SpO2 97%       Please review provider order for any additional goals.   Nurse to notify provider when observation goals have been met and patient is ready for discharge.  "
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
## 2023-01-07 NOTE — PROGRESS NOTES
Pt is alert and oriented x4, denies pain, A x2 lift, able to make needs known, incontinent of B&B. Pt is on regular diet, no IV access.    /81 (BP Location: Right arm)   Pulse 79   Temp 97.9  F (36.6  C) (Oral)   Resp 16   Wt 99.9 kg (220 lb 4.8 oz)   SpO2 97%

## 2023-01-07 NOTE — PLAN OF CARE
PRIMARY DIAGNOSIS: placement  OUTPATIENT/OBSERVATION GOALS TO BE MET BEFORE DISCHARGE:  ADLs back to baseline: Yes    Activity and level of assistance: Up with maximum assistance.     Pain status: Pain free.    Return to near baseline physical activity: Yes     Discharge Planner Nurse   Safe discharge environment identified: No  Barriers to discharge: Yes       Entered by: Katheryn Duran RN 01/06/2023 9:30 PM   Pt A&O x3, disoriented to time. Calls appropriately. Medically stable, awaiting placement.   Please review provider order for any additional goals.   Nurse to notify provider when observation goals have been met and patient is ready for discharge.

## 2023-01-07 NOTE — PLAN OF CARE
PRIMARY DIAGNOSIS: Placement  OUTPATIENT/OBSERVATION GOALS TO BE MET BEFORE DISCHARGE:  ADLs back to baseline: Yes    Activity and level of assistance: Ax2 with lift    Pain status: Pain free.    Return to near baseline physical activity: Yes     Discharge Planner Nurse   Safe discharge environment identified: No  Barriers to discharge: Yes       Entered by: Waleska Leon RN 01/07/2023 5:26 PM     Please review provider order for any additional goals.   Nurse to notify provider when observation goals have been met and patient is ready for discharge.    Temp: 97.6  F (36.4  C) Temp src: Oral BP: 128/81 Pulse: 78   Resp: 16 SpO2: 97 % O2 Device: None (Room air)       A&O. Up Ax2 with lift. Pt up in recliner chair for dinner. Denies pain. So far pleasant and cooperative. Pulsating mattress in place. Pt able to make needs known. Incontinent. Plan- SW following for placement/relocation worker involved, oob 3x daily/prn, no longer doing blood sugar checks, vital signs 2x daily.

## 2023-01-07 NOTE — PLAN OF CARE
PRIMARY DIAGNOSIS: placement  OUTPATIENT/OBSERVATION GOALS TO BE MET BEFORE DISCHARGE:  ADLs back to baseline: Yes    Activity and level of assistance: Up with maximum assistance.     Pain status: Pain free.    Return to near baseline physical activity: Yes     Discharge Planner Nurse   Safe discharge environment identified: No  Barriers to discharge: Yes       Entered by: Katheryn Duran RN 01/07/2023 4:43 AM   Pt medically cleared for discharge, awaiting placement to group home, SW assisting in discharge. Pt makes needs known, incont of bowel & bladder.   Please review provider order for any additional goals.   Nurse to notify provider when observation goals have been met and patient is ready for discharge.

## 2023-01-07 NOTE — PLAN OF CARE
PRIMARY DIAGNOSIS: placement  OUTPATIENT/OBSERVATION GOALS TO BE MET BEFORE DISCHARGE:  ADLs back to baseline: Yes    Activity and level of assistance: Up with maximum assistance.   Pain status: Pain free.    Return to near baseline physical activity: Yes     Discharge Planner Nurse   Safe discharge environment identified: No  Barriers to discharge: Yes       Entered by: Katheryn Druan RN 01/07/2023 1:17 AM   Pt needs placement in group home, SW assisting w/ discharge.   Please review provider order for any additional goals.   Nurse to notify provider when observation goals have been met and patient is ready for discharge.

## 2023-01-08 PROCEDURE — 250N000013 HC RX MED GY IP 250 OP 250 PS 637: Performed by: PHYSICIAN ASSISTANT

## 2023-01-08 PROCEDURE — 250N000013 HC RX MED GY IP 250 OP 250 PS 637: Performed by: NURSE PRACTITIONER

## 2023-01-08 PROCEDURE — 250N000013 HC RX MED GY IP 250 OP 250 PS 637: Performed by: HOSPITALIST

## 2023-01-08 PROCEDURE — 250N000013 HC RX MED GY IP 250 OP 250 PS 637

## 2023-01-08 PROCEDURE — G0378 HOSPITAL OBSERVATION PER HR: HCPCS

## 2023-01-08 PROCEDURE — 99207 PR NO CHARGE LOS: CPT | Performed by: PHYSICIAN ASSISTANT

## 2023-01-08 RX ADMIN — ATORVASTATIN CALCIUM 20 MG: 20 TABLET, FILM COATED ORAL at 21:09

## 2023-01-08 RX ADMIN — METOPROLOL SUCCINATE 25 MG: 25 TABLET, EXTENDED RELEASE ORAL at 08:38

## 2023-01-08 RX ADMIN — VENLAFAXINE HYDROCHLORIDE 150 MG: 150 CAPSULE, EXTENDED RELEASE ORAL at 21:09

## 2023-01-08 RX ADMIN — HYDROCORTISONE: 1 CREAM TOPICAL at 21:13

## 2023-01-08 RX ADMIN — BACLOFEN 10 MG: 10 TABLET ORAL at 08:38

## 2023-01-08 RX ADMIN — MIRTAZAPINE 15 MG: 15 TABLET, FILM COATED ORAL at 21:09

## 2023-01-08 RX ADMIN — HYDROXYZINE HYDROCHLORIDE 50 MG: 50 TABLET, FILM COATED ORAL at 21:08

## 2023-01-08 RX ADMIN — CLONIDINE HYDROCHLORIDE 0.1 MG: 0.1 TABLET ORAL at 15:43

## 2023-01-08 RX ADMIN — CLOTRIMAZOLE: 0.01 CREAM TOPICAL at 08:38

## 2023-01-08 RX ADMIN — EMPAGLIFLOZIN 10 MG: 10 TABLET, FILM COATED ORAL at 08:38

## 2023-01-08 RX ADMIN — QUETIAPINE 200 MG: 200 TABLET, FILM COATED ORAL at 15:43

## 2023-01-08 RX ADMIN — DIVALPROEX SODIUM 750 MG: 500 TABLET, DELAYED RELEASE ORAL at 08:37

## 2023-01-08 RX ADMIN — OLANZAPINE 2.5 MG: 2.5 TABLET, FILM COATED ORAL at 15:43

## 2023-01-08 RX ADMIN — CLONIDINE HYDROCHLORIDE 0.1 MG: 0.1 TABLET ORAL at 21:09

## 2023-01-08 RX ADMIN — QUETIAPINE FUMARATE 400 MG: 200 TABLET ORAL at 21:09

## 2023-01-08 RX ADMIN — DIVALPROEX SODIUM 1000 MG: 500 TABLET, DELAYED RELEASE ORAL at 21:12

## 2023-01-08 RX ADMIN — Medication 25 MCG: at 08:38

## 2023-01-08 RX ADMIN — HYDROXYZINE HYDROCHLORIDE 50 MG: 50 TABLET, FILM COATED ORAL at 15:43

## 2023-01-08 RX ADMIN — BACLOFEN 10 MG: 10 TABLET ORAL at 15:43

## 2023-01-08 RX ADMIN — Medication 10 MG: at 21:08

## 2023-01-08 RX ADMIN — LEVOTHYROXINE SODIUM 25 MCG: 0.03 TABLET ORAL at 08:37

## 2023-01-08 RX ADMIN — CLONIDINE HYDROCHLORIDE 0.1 MG: 0.1 TABLET ORAL at 08:38

## 2023-01-08 RX ADMIN — METFORMIN HYDROCHLORIDE 850 MG: 850 TABLET, FILM COATED ORAL at 17:40

## 2023-01-08 RX ADMIN — ASPIRIN 81 MG CHEWABLE TABLET 81 MG: 81 TABLET CHEWABLE at 08:37

## 2023-01-08 RX ADMIN — HYDROXYZINE HYDROCHLORIDE 50 MG: 50 TABLET, FILM COATED ORAL at 08:37

## 2023-01-08 RX ADMIN — QUETIAPINE 200 MG: 200 TABLET, FILM COATED ORAL at 08:37

## 2023-01-08 RX ADMIN — HYDROCORTISONE: 1 CREAM TOPICAL at 08:38

## 2023-01-08 RX ADMIN — VENLAFAXINE HYDROCHLORIDE 75 MG: 150 CAPSULE, EXTENDED RELEASE ORAL at 21:12

## 2023-01-08 RX ADMIN — BACLOFEN 10 MG: 10 TABLET ORAL at 21:09

## 2023-01-08 RX ADMIN — CLOTRIMAZOLE: 0.01 CREAM TOPICAL at 21:12

## 2023-01-08 RX ADMIN — POLYETHYLENE GLYCOL 3350 17 G: 17 POWDER, FOR SOLUTION ORAL at 08:38

## 2023-01-08 ASSESSMENT — ACTIVITIES OF DAILY LIVING (ADL)
ADLS_ACUITY_SCORE: 56
ADLS_ACUITY_SCORE: 56
ADLS_ACUITY_SCORE: 58
ADLS_ACUITY_SCORE: 56
ADLS_ACUITY_SCORE: 58
ADLS_ACUITY_SCORE: 56
ADLS_ACUITY_SCORE: 56
ADLS_ACUITY_SCORE: 58
ADLS_ACUITY_SCORE: 56
ADLS_ACUITY_SCORE: 58

## 2023-01-08 NOTE — PLAN OF CARE
PRIMARY DIAGNOSIS: Placement  OUTPATIENT/OBSERVATION GOALS TO BE MET BEFORE DISCHARGE:  1. ADLs back to baseline: Yes    2. Activity and level of assistance: A2 lift    3. Pain status: Pain free.    4. Return to near baseline physical activity: Yes     Discharge Planner Nurse   Safe discharge environment identified: No  Barriers to discharge: Yes       Entered by: Saba Roberts RN 01/08/2023 4:03 AM     Please review provider order for any additional goals.   Nurse to notify provider when observation goals have been met and patient is ready for discharge.    A/Ox4, denies pain, VSS on RA. Up A2 lift, resting in bed currently, pulsate mattress. Incontinent b&b. SW following. CTM

## 2023-01-08 NOTE — PLAN OF CARE
PRIMARY DIAGNOSIS: Placement  OUTPATIENT/OBSERVATION GOALS TO BE MET BEFORE DISCHARGE:  ADLs back to baseline: Yes     Activity and level of assistance: Ax2 with lift     Pain status: Pain free.     Return to near baseline physical activity: Yes          Discharge Planner Nurse   Safe discharge environment identified: No  Barriers to discharge: Yes       Entered by: Waleska Leon RN 01/07/2023 5:26 PM  Please review provider order for any additional goals.   Nurse to notify provider when observation goals have been met and patient is ready for discharge.     Temp: 97.6  F (36.4  C) Temp src: Oral BP: (!) 130/91 Pulse: 78   Resp: 16 SpO2: 97 % O2 Device: None (Room air)          A&O. Up Ax2 with lift. Pt up in recliner chair for dinner. Denies pain. So far pleasant and cooperative. Pulsating mattress in place. Pt able to make needs known. Incontinent. Bed linens changed. Plan- SW following for placement/relocation worker involved, oob 3x daily/prn, no longer doing blood sugar checks, vital signs 2x daily.

## 2023-01-08 NOTE — PLAN OF CARE
PRIMARY DIAGNOSIS: PLACEMENT  OUTPATIENT/OBSERVATION GOALS TO BE MET BEFORE DISCHARGE:  1. ADLs back to baseline: Yes     2. Activity and level of assistance: A-1 to A-2     3. Pain status: Pain free.     4. Return to near baseline physical activity: Yes          Discharge Planner Nurse   Safe discharge environment identified: No  Barriers to discharge: Yes       Entered by: Aimee Brown RN 01/08/2023 4:00PM   Please review provider order for any additional goals.   Nurse to notify provider when observation goals have been met and patient is ready for discharge.     No changes, incontinent, encouraging turning, turned per request, oob x3, pleasant and cooperative.

## 2023-01-08 NOTE — PLAN OF CARE
PRIMARY DIAGNOSIS: PLACEMENT  OUTPATIENT/OBSERVATION GOALS TO BE MET BEFORE DISCHARGE:  1. ADLs back to baseline: Yes    2. Activity and level of assistance: A-1 to A-2    3. Pain status: Pain free.    4. Return to near baseline physical activity: Yes     Discharge Planner Nurse   Safe discharge environment identified: No  Barriers to discharge: Yes       Entered by: Aimee Brown RN 01/08/2023 8:30AM     Please review provider order for any additional goals.   Nurse to notify provider when observation goals have been met and patient is ready for discharge.    Pleasant and cooperative, able to make needs known, up in chair for meals, plan of care reviewed.

## 2023-01-08 NOTE — PLAN OF CARE
PRIMARY DIAGNOSIS: PLACEMENT  OUTPATIENT/OBSERVATION GOALS TO BE MET BEFORE DISCHARGE:  1. ADLs back to baseline: Yes    2. Activity and level of assistance: A-1 to A-2    3. Pain status: Pain free.    4. Return to near baseline physical activity: Yes     Discharge Planner Nurse   Safe discharge environment identified: No  Barriers to discharge: Yes       Entered by: Aimee Brown RN 01/08/2023 12:25 PM     Please review provider order for any additional goals.   Nurse to notify provider when observation goals have been met and patient is ready for discharge.    No changes, incontinent, turned per request, oob x3, pleasant and cooperative.

## 2023-01-08 NOTE — PLAN OF CARE
PRIMARY DIAGNOSIS: Placement  OUTPATIENT/OBSERVATION GOALS TO BE MET BEFORE DISCHARGE:  1. ADLs back to baseline: Yes    2. Activity and level of assistance: A2 lift    3. Pain status: Pain free.    4. Return to near baseline physical activity: Yes     Discharge Planner Nurse   Safe discharge environment identified: No  Barriers to discharge: Yes       Entered by: Saba Roberts RN 01/08/2023 12:21 AM     Please review provider order for any additional goals.   Nurse to notify provider when observation goals have been met and patient is ready for discharge.    A/O, denies pain, VSS on RA. Up A2 lift, resting in bed currently, pulsate mattress. Incontinent b&b. SW following. CTM
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
## 2023-01-09 PROCEDURE — 250N000013 HC RX MED GY IP 250 OP 250 PS 637: Performed by: NURSE PRACTITIONER

## 2023-01-09 PROCEDURE — 250N000013 HC RX MED GY IP 250 OP 250 PS 637: Performed by: PHYSICIAN ASSISTANT

## 2023-01-09 PROCEDURE — 250N000013 HC RX MED GY IP 250 OP 250 PS 637: Performed by: HOSPITALIST

## 2023-01-09 PROCEDURE — G0378 HOSPITAL OBSERVATION PER HR: HCPCS

## 2023-01-09 PROCEDURE — 99207 PR NO CHARGE LOS: CPT | Performed by: PHYSICIAN ASSISTANT

## 2023-01-09 PROCEDURE — 250N000013 HC RX MED GY IP 250 OP 250 PS 637

## 2023-01-09 RX ADMIN — LEVOTHYROXINE SODIUM 25 MCG: 0.03 TABLET ORAL at 08:20

## 2023-01-09 RX ADMIN — HYDROXYZINE HYDROCHLORIDE 50 MG: 50 TABLET, FILM COATED ORAL at 13:39

## 2023-01-09 RX ADMIN — VENLAFAXINE HYDROCHLORIDE 150 MG: 150 CAPSULE, EXTENDED RELEASE ORAL at 21:09

## 2023-01-09 RX ADMIN — VENLAFAXINE HYDROCHLORIDE 75 MG: 150 CAPSULE, EXTENDED RELEASE ORAL at 21:11

## 2023-01-09 RX ADMIN — CLONIDINE HYDROCHLORIDE 0.1 MG: 0.1 TABLET ORAL at 08:20

## 2023-01-09 RX ADMIN — EMPAGLIFLOZIN 10 MG: 10 TABLET, FILM COATED ORAL at 08:19

## 2023-01-09 RX ADMIN — OLANZAPINE 2.5 MG: 2.5 TABLET, FILM COATED ORAL at 13:39

## 2023-01-09 RX ADMIN — CLOTRIMAZOLE: 0.01 CREAM TOPICAL at 08:22

## 2023-01-09 RX ADMIN — BACLOFEN 10 MG: 10 TABLET ORAL at 08:33

## 2023-01-09 RX ADMIN — HYDROCORTISONE: 1 CREAM TOPICAL at 21:13

## 2023-01-09 RX ADMIN — DIVALPROEX SODIUM 750 MG: 500 TABLET, DELAYED RELEASE ORAL at 08:19

## 2023-01-09 RX ADMIN — QUETIAPINE 200 MG: 200 TABLET, FILM COATED ORAL at 08:19

## 2023-01-09 RX ADMIN — QUETIAPINE FUMARATE 400 MG: 200 TABLET ORAL at 21:10

## 2023-01-09 RX ADMIN — HYDROCORTISONE: 1 CREAM TOPICAL at 08:22

## 2023-01-09 RX ADMIN — BACLOFEN 10 MG: 10 TABLET ORAL at 21:09

## 2023-01-09 RX ADMIN — CLOTRIMAZOLE: 0.01 CREAM TOPICAL at 21:13

## 2023-01-09 RX ADMIN — Medication 10 MG: at 21:10

## 2023-01-09 RX ADMIN — DIVALPROEX SODIUM 1000 MG: 500 TABLET, DELAYED RELEASE ORAL at 21:11

## 2023-01-09 RX ADMIN — ASPIRIN 81 MG CHEWABLE TABLET 81 MG: 81 TABLET CHEWABLE at 08:19

## 2023-01-09 RX ADMIN — HYDROXYZINE HYDROCHLORIDE 50 MG: 50 TABLET, FILM COATED ORAL at 21:11

## 2023-01-09 RX ADMIN — CLONIDINE HYDROCHLORIDE 0.1 MG: 0.1 TABLET ORAL at 13:39

## 2023-01-09 RX ADMIN — Medication 25 MCG: at 08:20

## 2023-01-09 RX ADMIN — METFORMIN HYDROCHLORIDE 850 MG: 850 TABLET, FILM COATED ORAL at 16:38

## 2023-01-09 RX ADMIN — MIRTAZAPINE 15 MG: 15 TABLET, FILM COATED ORAL at 21:11

## 2023-01-09 RX ADMIN — POLYETHYLENE GLYCOL 3350 17 G: 17 POWDER, FOR SOLUTION ORAL at 08:19

## 2023-01-09 RX ADMIN — ATORVASTATIN CALCIUM 20 MG: 20 TABLET, FILM COATED ORAL at 21:10

## 2023-01-09 RX ADMIN — HYDROXYZINE HYDROCHLORIDE 50 MG: 50 TABLET, FILM COATED ORAL at 08:19

## 2023-01-09 RX ADMIN — BACLOFEN 10 MG: 10 TABLET ORAL at 13:39

## 2023-01-09 RX ADMIN — METOPROLOL SUCCINATE 25 MG: 25 TABLET, EXTENDED RELEASE ORAL at 08:19

## 2023-01-09 RX ADMIN — QUETIAPINE 200 MG: 200 TABLET, FILM COATED ORAL at 13:39

## 2023-01-09 RX ADMIN — CLONIDINE HYDROCHLORIDE 0.1 MG: 0.1 TABLET ORAL at 21:11

## 2023-01-09 ASSESSMENT — ACTIVITIES OF DAILY LIVING (ADL)
ADLS_ACUITY_SCORE: 56

## 2023-01-09 NOTE — PROGRESS NOTES
PRIMARY DIAGNOSIS: PLACEMENT  OUTPATIENT/OBSERVATION GOALS TO BE MET BEFORE DISCHARGE:  1. ADLs back to baseline: Yes    2. Activity and level of assistance: Ax1-2    2. Pain status: Pain free.    3. Return to near baseline physical activity: Yes     Discharge Planner Nurse   Safe discharge environment identified: No  Barriers to discharge: Yes       Entered by: Annalee Echevarria RN 01/09/2023     BP (!) 148/98 (BP Location: Right arm)   Pulse 105   Temp 97.6  F (36.4  C) (Oral)   Resp 16   Wt 99.9 kg (220 lb 4.8 oz)   SpO2 93%   Patient alert & oriented x 4. Assist x 1-2. Incontinent of bowel & bladder. Resting quietly in bed.  Please review provider order for any additional goals.   Nurse to notify provider when observation goals have been met and patient is ready for discharge.

## 2023-01-09 NOTE — PROGRESS NOTES
PRIMARY DIAGNOSIS: PLACEMENT  OUTPATIENT/OBSERVATION GOALS TO BE MET BEFORE DISCHARGE:  1. ADLs back to baseline: Yes    2. Activity and level of assistance: Ax1-2    2. Pain status: Pain free.    3. Return to near baseline physical activity: Yes     Discharge Planner Nurse   Safe discharge environment identified: No  Barriers to discharge: Yes       Entered by: Annalee Echevarria RN 01/08/2023     BP (!) 148/98 (BP Location: Right arm)   Pulse 105   Temp 97.6  F (36.4  C) (Oral)   Resp 16   Wt 99.9 kg (220 lb 4.8 oz)   SpO2 93%     Please review provider order for any additional goals.   Nurse to notify provider when observation goals have been met and patient is ready for discharge.
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
## 2023-01-09 NOTE — PROGRESS NOTES
PRIMARY DIAGNOSIS: PLACEMENT  OUTPATIENT/OBSERVATION GOALS TO BE MET BEFORE DISCHARGE:  1. ADLs back to baseline: Yes    2. Activity and level of assistance: Ax1-2    2. Pain status: Pain free.    3. Return to near baseline physical activity: Yes     Discharge Planner Nurse   Safe discharge environment identified: No  Barriers to discharge: Yes       Entered by: Annalee Echevarria RN 01/09/2023     BP (!) 148/98 (BP Location: Right arm)   Pulse 105   Temp 97.6  F (36.4  C) (Oral)   Resp 16   Wt 99.9 kg (220 lb 4.8 oz)   SpO2 93%   Patient alert & oriented x 4. Assist x 1-2. Incontinent of bowel & bladder. No IV access.No blood sugar checks.VS x 2 daily.SW following for placement.  Please review provider order for any additional goals.   Nurse to notify provider when observation goals have been met and patient is ready for discharge.

## 2023-01-09 NOTE — PROGRESS NOTES
PRIMARY DIAGNOSIS: PLACEMENT  OUTPATIENT/OBSERVATION GOALS TO BE MET BEFORE DISCHARGE:  1. ADLs back to baseline: Yes    2. Activity and level of assistance: Up w/ lift at baseline    3. Pain status: Pain free.    4. Return to near baseline physical activity: Yes     Discharge Planner Nurse   Safe discharge environment identified: No  Barriers to discharge: No       Entered by: Daiana Huston RN 01/09/2023     A/O x4. VSS on RA. Assist of 1-2x with lift. Tolerating regular diet. Lung sounds clear. Bowel sounds active. Passing flatus. + BM. Adequate urine output, incontinent at baseline. Skin intact ex redness to perineum/groin, topicals applied as ordered. Denies pain. Denies nausea. Consults: CM/ANALY. Plan: SW working on new GH placement.   Please review provider order for any additional goals.   Nurse to notify provider when observation goals have been met and patient is ready for discharge.

## 2023-01-10 PROCEDURE — 250N000013 HC RX MED GY IP 250 OP 250 PS 637: Performed by: NURSE PRACTITIONER

## 2023-01-10 PROCEDURE — 250N000013 HC RX MED GY IP 250 OP 250 PS 637: Performed by: HOSPITALIST

## 2023-01-10 PROCEDURE — 250N000013 HC RX MED GY IP 250 OP 250 PS 637

## 2023-01-10 PROCEDURE — 250N000013 HC RX MED GY IP 250 OP 250 PS 637: Performed by: PHYSICIAN ASSISTANT

## 2023-01-10 PROCEDURE — 99207 PR NO CHARGE LOS: CPT | Performed by: PHYSICIAN ASSISTANT

## 2023-01-10 PROCEDURE — G0378 HOSPITAL OBSERVATION PER HR: HCPCS

## 2023-01-10 RX ADMIN — HYDROXYZINE HYDROCHLORIDE 50 MG: 50 TABLET, FILM COATED ORAL at 21:24

## 2023-01-10 RX ADMIN — DIVALPROEX SODIUM 1000 MG: 500 TABLET, DELAYED RELEASE ORAL at 21:24

## 2023-01-10 RX ADMIN — BACLOFEN 10 MG: 10 TABLET ORAL at 08:02

## 2023-01-10 RX ADMIN — Medication 10 MG: at 21:24

## 2023-01-10 RX ADMIN — VENLAFAXINE HYDROCHLORIDE 75 MG: 150 CAPSULE, EXTENDED RELEASE ORAL at 21:29

## 2023-01-10 RX ADMIN — LEVOTHYROXINE SODIUM 25 MCG: 0.03 TABLET ORAL at 08:02

## 2023-01-10 RX ADMIN — VENLAFAXINE HYDROCHLORIDE 150 MG: 150 CAPSULE, EXTENDED RELEASE ORAL at 21:24

## 2023-01-10 RX ADMIN — CLONIDINE HYDROCHLORIDE 0.1 MG: 0.1 TABLET ORAL at 21:24

## 2023-01-10 RX ADMIN — DIVALPROEX SODIUM 750 MG: 500 TABLET, DELAYED RELEASE ORAL at 08:02

## 2023-01-10 RX ADMIN — QUETIAPINE 200 MG: 200 TABLET, FILM COATED ORAL at 08:02

## 2023-01-10 RX ADMIN — HYDROCORTISONE: 1 CREAM TOPICAL at 21:33

## 2023-01-10 RX ADMIN — ASPIRIN 81 MG CHEWABLE TABLET 81 MG: 81 TABLET CHEWABLE at 08:02

## 2023-01-10 RX ADMIN — METFORMIN HYDROCHLORIDE 850 MG: 850 TABLET, FILM COATED ORAL at 17:22

## 2023-01-10 RX ADMIN — OLANZAPINE 2.5 MG: 2.5 TABLET, FILM COATED ORAL at 13:35

## 2023-01-10 RX ADMIN — CLOTRIMAZOLE: 0.01 CREAM TOPICAL at 20:55

## 2023-01-10 RX ADMIN — ATORVASTATIN CALCIUM 20 MG: 20 TABLET, FILM COATED ORAL at 21:25

## 2023-01-10 RX ADMIN — METOPROLOL SUCCINATE 25 MG: 25 TABLET, EXTENDED RELEASE ORAL at 08:02

## 2023-01-10 RX ADMIN — CLOTRIMAZOLE: 0.01 CREAM TOPICAL at 08:02

## 2023-01-10 RX ADMIN — CLONIDINE HYDROCHLORIDE 0.1 MG: 0.1 TABLET ORAL at 13:35

## 2023-01-10 RX ADMIN — HYDROCORTISONE: 1 CREAM TOPICAL at 08:02

## 2023-01-10 RX ADMIN — HYDROXYZINE HYDROCHLORIDE 50 MG: 50 TABLET, FILM COATED ORAL at 13:35

## 2023-01-10 RX ADMIN — QUETIAPINE FUMARATE 400 MG: 200 TABLET ORAL at 21:24

## 2023-01-10 RX ADMIN — BACLOFEN 10 MG: 10 TABLET ORAL at 21:25

## 2023-01-10 RX ADMIN — HYDROXYZINE HYDROCHLORIDE 50 MG: 50 TABLET, FILM COATED ORAL at 08:02

## 2023-01-10 RX ADMIN — MIRTAZAPINE 15 MG: 15 TABLET, FILM COATED ORAL at 21:25

## 2023-01-10 RX ADMIN — Medication 25 MCG: at 08:03

## 2023-01-10 RX ADMIN — EMPAGLIFLOZIN 10 MG: 10 TABLET, FILM COATED ORAL at 08:02

## 2023-01-10 RX ADMIN — QUETIAPINE 200 MG: 200 TABLET, FILM COATED ORAL at 13:35

## 2023-01-10 RX ADMIN — POLYETHYLENE GLYCOL 3350 17 G: 17 POWDER, FOR SOLUTION ORAL at 08:01

## 2023-01-10 RX ADMIN — CLONIDINE HYDROCHLORIDE 0.1 MG: 0.1 TABLET ORAL at 08:02

## 2023-01-10 RX ADMIN — BACLOFEN 10 MG: 10 TABLET ORAL at 13:35

## 2023-01-10 ASSESSMENT — ACTIVITIES OF DAILY LIVING (ADL)
ADLS_ACUITY_SCORE: 56
ADLS_ACUITY_SCORE: 56
ADLS_ACUITY_SCORE: 54
ADLS_ACUITY_SCORE: 56
ADLS_ACUITY_SCORE: 56
ADLS_ACUITY_SCORE: 54
ADLS_ACUITY_SCORE: 56
ADLS_ACUITY_SCORE: 56
ADLS_ACUITY_SCORE: 54
ADLS_ACUITY_SCORE: 56

## 2023-01-10 NOTE — PLAN OF CARE
PRIMARY DIAGNOSIS: Placement  OUTPATIENT/OBSERVATION GOALS TO BE MET BEFORE DISCHARGE:  1. ADLs back to baseline: Yes    2. Activity and level of assistance: Ax1 with repositioning in bed. Ax2 and lift when OOB.    3. Pain status: Pain free.    4. Return to near baseline physical activity: Yes     Discharge Planner Nurse   Safe discharge environment identified: No  Barriers to discharge: Yes, placement       Entered by: Jeff Sutherland 01/10/2023 4:00 AM     Vitals are Temp: 97.2  F (36.2  C) Temp src: Oral BP: 131/87 Pulse: 75   Resp: 16 SpO2: 95 %.  Pt is AxOx4. Tolerating a regular diet. Denies pain. Denies dizziness, SOB, and nausea. LS clear. Incontinent of bladder. Awaiting placement. CM/SW following.       Please review provider order for any additional goals.   Nurse to notify provider when observation goals have been met and patient is ready for discharge.

## 2023-01-10 NOTE — PROGRESS NOTES
PRIMARY DIAGNOSIS: PLACEMENT  OUTPATIENT/OBSERVATION GOALS TO BE MET BEFORE DISCHARGE:  1. ADLs back to baseline: Yes    2. Activity and level of assistance: Up w/ lift at baseline    3. Pain status: Pain free.    4. Return to near baseline physical activity: Yes     Discharge Planner Nurse   Safe discharge environment identified: No  Barriers to discharge: No       Entered by: Daiana Huston RN 01/10/2023     A/O x4. VSS on RA. Assist of 1-2x with lift. Tolerating regular diet. Lung sounds clear. Bowel sounds active. Passing flatus. + BM today. Adequate urine output, incontinent. Skin intact ex redness to perineum/groin, topicals applied as ordered. Denies pain. Denies nausea. Consults: CM/ANALY. Plan: SW working on new GH placement.   Please review provider order for any additional goals.   Nurse to notify provider when observation goals have been met and patient is ready for discharge.
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
## 2023-01-10 NOTE — PLAN OF CARE
PRIMARY DIAGNOSIS: Placement  OUTPATIENT/OBSERVATION GOALS TO BE MET BEFORE DISCHARGE:  1. ADLs back to baseline: Yes    2. Activity and level of assistance: Ax1 with repositioning in bed. Ax2 and lift when OOB.    3. Pain status: Pain free.    4. Return to near baseline physical activity: Yes     Discharge Planner Nurse   Safe discharge environment identified: No  Barriers to discharge: Yes, placement       Entered by: Jeff Sutherland 01/10/2023 1:19 AM     Vitals are Temp: 97.2  F (36.2  C) Temp src: Oral BP: 131/87 Pulse: 75   Resp: 16 SpO2: 95 %.  Pt is AxOx4. Tolerating a regular diet. Denies pain. Denies dizziness, SOB, and nausea. LS clear. Incontinent of bladder. Awaiting placement. CM/SW following.       Please review provider order for any additional goals.   Nurse to notify provider when observation goals have been met and patient is ready for discharge.

## 2023-01-10 NOTE — PROGRESS NOTES
PRIMARY DIAGNOSIS: PLACEMENT  OUTPATIENT/OBSERVATION GOALS TO BE MET BEFORE DISCHARGE:  1. ADLs back to baseline: Yes    2. Activity and level of assistance: Up w/ lift at baseline    3. Pain status: Pain free.    4. Return to near baseline physical activity: Yes     Discharge Planner Nurse   Safe discharge environment identified: No  Barriers to discharge: No       Entered by: Daiana Huston RN 01/10/2023     A/O x4. VSS on RA. Assist of 1-2x with lift. Tolerating regular diet. Lung sounds clear. Bowel sounds active. Passing flatus. + BM. Adequate urine output, incontinent. Skin intact ex redness to perineum/groin, topicals applied as ordered. Denies pain. Denies nausea. Consults: CM/ANALY. Plan: SW working on new GH placement.   Please review provider order for any additional goals.   Nurse to notify provider when observation goals have been met and patient is ready for discharge.

## 2023-01-10 NOTE — PLAN OF CARE
PRIMARY DIAGNOSIS: Placement  OUTPATIENT/OBSERVATION GOALS TO BE MET BEFORE DISCHARGE:  1. ADLs back to baseline: Yes    2. Activity and level of assistance: Ax1 with repositioning in bed. Ax2 and lift when OOB.    3. Pain status: Pain free.    4. Return to near baseline physical activity: Yes     Discharge Planner Nurse   Safe discharge environment identified: No  Barriers to discharge: Yes, placement       Entered by: Jeff Sutherland 01/10/2023 1:17 AM          Please review provider order for any additional goals.   Nurse to notify provider when observation goals have been met and patient is ready for discharge.
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
## 2023-01-11 PROCEDURE — 250N000013 HC RX MED GY IP 250 OP 250 PS 637: Performed by: HOSPITALIST

## 2023-01-11 PROCEDURE — 250N000013 HC RX MED GY IP 250 OP 250 PS 637: Performed by: NURSE PRACTITIONER

## 2023-01-11 PROCEDURE — 250N000013 HC RX MED GY IP 250 OP 250 PS 637

## 2023-01-11 PROCEDURE — G0378 HOSPITAL OBSERVATION PER HR: HCPCS

## 2023-01-11 PROCEDURE — 99231 SBSQ HOSP IP/OBS SF/LOW 25: CPT | Performed by: PHYSICIAN ASSISTANT

## 2023-01-11 PROCEDURE — 250N000013 HC RX MED GY IP 250 OP 250 PS 637: Performed by: PHYSICIAN ASSISTANT

## 2023-01-11 RX ADMIN — OLANZAPINE 2.5 MG: 2.5 TABLET, FILM COATED ORAL at 14:08

## 2023-01-11 RX ADMIN — BACLOFEN 10 MG: 10 TABLET ORAL at 22:21

## 2023-01-11 RX ADMIN — CLONIDINE HYDROCHLORIDE 0.1 MG: 0.1 TABLET ORAL at 14:08

## 2023-01-11 RX ADMIN — MIRTAZAPINE 15 MG: 15 TABLET, FILM COATED ORAL at 22:21

## 2023-01-11 RX ADMIN — VENLAFAXINE HYDROCHLORIDE 150 MG: 150 CAPSULE, EXTENDED RELEASE ORAL at 22:20

## 2023-01-11 RX ADMIN — LEVOTHYROXINE SODIUM 25 MCG: 0.03 TABLET ORAL at 09:32

## 2023-01-11 RX ADMIN — BACLOFEN 10 MG: 10 TABLET ORAL at 09:33

## 2023-01-11 RX ADMIN — ATORVASTATIN CALCIUM 20 MG: 20 TABLET, FILM COATED ORAL at 22:21

## 2023-01-11 RX ADMIN — Medication 25 MCG: at 09:32

## 2023-01-11 RX ADMIN — CLONIDINE HYDROCHLORIDE 0.1 MG: 0.1 TABLET ORAL at 22:21

## 2023-01-11 RX ADMIN — HYDROXYZINE HYDROCHLORIDE 50 MG: 50 TABLET, FILM COATED ORAL at 09:32

## 2023-01-11 RX ADMIN — HYDROXYZINE HYDROCHLORIDE 50 MG: 50 TABLET, FILM COATED ORAL at 22:19

## 2023-01-11 RX ADMIN — QUETIAPINE 200 MG: 200 TABLET, FILM COATED ORAL at 09:32

## 2023-01-11 RX ADMIN — CLOTRIMAZOLE: 0.01 CREAM TOPICAL at 22:24

## 2023-01-11 RX ADMIN — QUETIAPINE 200 MG: 200 TABLET, FILM COATED ORAL at 14:08

## 2023-01-11 RX ADMIN — DIVALPROEX SODIUM 750 MG: 500 TABLET, DELAYED RELEASE ORAL at 09:31

## 2023-01-11 RX ADMIN — CLOTRIMAZOLE: 0.01 CREAM TOPICAL at 09:33

## 2023-01-11 RX ADMIN — CLONIDINE HYDROCHLORIDE 0.1 MG: 0.1 TABLET ORAL at 09:33

## 2023-01-11 RX ADMIN — VENLAFAXINE HYDROCHLORIDE 75 MG: 150 CAPSULE, EXTENDED RELEASE ORAL at 22:20

## 2023-01-11 RX ADMIN — HYDROCORTISONE: 1 CREAM TOPICAL at 22:23

## 2023-01-11 RX ADMIN — METFORMIN HYDROCHLORIDE 850 MG: 850 TABLET, FILM COATED ORAL at 17:27

## 2023-01-11 RX ADMIN — METOPROLOL SUCCINATE 25 MG: 25 TABLET, EXTENDED RELEASE ORAL at 09:32

## 2023-01-11 RX ADMIN — POLYETHYLENE GLYCOL 3350 17 G: 17 POWDER, FOR SOLUTION ORAL at 09:33

## 2023-01-11 RX ADMIN — Medication 10 MG: at 22:19

## 2023-01-11 RX ADMIN — BACLOFEN 10 MG: 10 TABLET ORAL at 14:08

## 2023-01-11 RX ADMIN — HYDROXYZINE HYDROCHLORIDE 50 MG: 50 TABLET, FILM COATED ORAL at 14:08

## 2023-01-11 RX ADMIN — HYDROCORTISONE: 1 CREAM TOPICAL at 09:33

## 2023-01-11 RX ADMIN — EMPAGLIFLOZIN 10 MG: 10 TABLET, FILM COATED ORAL at 09:32

## 2023-01-11 RX ADMIN — QUETIAPINE FUMARATE 400 MG: 200 TABLET ORAL at 22:19

## 2023-01-11 RX ADMIN — DIVALPROEX SODIUM 1000 MG: 500 TABLET, DELAYED RELEASE ORAL at 22:19

## 2023-01-11 RX ADMIN — ASPIRIN 81 MG CHEWABLE TABLET 81 MG: 81 TABLET CHEWABLE at 09:31

## 2023-01-11 ASSESSMENT — ACTIVITIES OF DAILY LIVING (ADL)
ADLS_ACUITY_SCORE: 56

## 2023-01-11 NOTE — PLAN OF CARE
PRIMARY DIAGNOSIS: PLACEMENT   OUTPATIENT/OBSERVATION GOALS TO BE MET BEFORE DISCHARGE:  1. ADLs back to baseline:  total care    2. Activity and level of assistance: total care, lift assist at baseline    3. Pain status: Pain free.    4. Return to near baseline physical activity:  total care     Discharge Planner Nurse   Safe discharge environment identified: Yes  Barriers to discharge: Yes       Entered by: SARA DERAS RN 01/11/2023    Vital signs:  Temp: 97.4  F (36.3  C) Temp src: Oral BP: 125/86 Pulse: 69   Resp: 18 SpO2: 94 % O2 Device: None (Room air)   Alert and oriented x4. Calm and cooperative with care. Denies pain. Assist of x1 in bed. Incontinent for bowel and bladder. Anti-fungal cream applied for redness to scrotum. On regular diet. Good appetite. No IV access. Toe nails trimmed. Refused getting OOB. Medically clear. Waiting for placement at group home. SW following. Will continue to monitor.       Please review provider order for any additional goals.   Nurse to notify provider when observation goals have been met and patient is ready for discharge.

## 2023-01-11 NOTE — PLAN OF CARE
PRIMARY DIAGNOSIS: PLACEMENT    [id number]    OUTPATIENT/OBSERVATION GOALS TO BE MET BEFORE DISCHARGE:  1.         ADLs back to baseline: Yes     2.         Activity and level of assistance: Up w/ lift at baseline     3.         Pain status: Pain free.     4.         Return to near baseline physical activity: Yes             Discharge Planner Nurse      Safe discharge environment identified: No  Barriers to discharge: Placement.          Entered by: Nathaly Steele RN    /86 (BP Location: Right arm)   Pulse 69   Temp 97.4  F (36.3  C) (Oral)   Resp 18   Wt 99.9 kg (220 lb 4.8 oz)   SpO2 94%        Pt alert and oriented x 4. Denies pain. Ax1 with cares. Lift assist with mobility. Regular diet.  Skin intact but crack\dry heels and feet. Pt has been pleasant and collaborating. SW following for safe discharge plan. Patient resting comfortably. Will continue to provide support cares.          Please review provider order for any additional goals.   Nurse to notify provider when observation goals have been met and patient is ready for discharge.

## 2023-01-11 NOTE — PLAN OF CARE
PRIMARY DIAGNOSIS: PLACEMENT  OUTPATIENT/OBSERVATION GOALS TO BE MET BEFORE DISCHARGE:  1. ADLs back to baseline: Yes     2. Activity and level of assistance: Up w/ lift at baseline     3. Pain status: Pain free.     4. Return to near baseline physical activity: Yes             Discharge Planner Nurse      Safe discharge environment identified: No  Barriers to discharge: Placement.          Entered by: Nathaly Andrade RN     Vital signs:  Temp: 98.2  F (36.8  C) Temp src: Oral BP: 122/85 Pulse: 76   Resp: 18 SpO2: 96 % O2 Device: None (Room air)     Weight: 99.9 kg (220 lb 4.8 oz)      Pt is alert and oriented x 4. Denies pain. Ax1 with cares. Lift assist with mobility. Regular diet. Noted redness on groin area, cream applied per orders. Skin intact but crack\dry heels and feet. Pt has been pleasant and collaborating. SW following for safe discharge plan. Patient resting comfortably. Will continue to provide support cares.         Please review provider order for any additional goals.   Nurse to notify provider when observation goals have been met and patient is ready for discharge.

## 2023-01-11 NOTE — PLAN OF CARE
PRIMARY DIAGNOSIS: PLACEMENT  OUTPATIENT/OBSERVATION GOALS TO BE MET BEFORE DISCHARGE:  1. ADLs back to baseline: Yes     2. Activity and level of assistance: Up w/ lift at baseline     3. Pain status: Pain free.     4. Return to near baseline physical activity: Yes             Discharge Planner Nurse      Safe discharge environment identified: No  Barriers to discharge: safe discharge plan       Entered by: moncho Olguin RN     /89 (BP Location: Right arm)   Pulse 73   Temp 97.5  F (36.4  C) (Oral)   Resp 16   Wt 99.9 kg (220 lb 4.8 oz)   SpO2 93%     Vitals stable. Pt alert and oriented x4. Lift assist. Ax1 for changes. Tolerating regular diet. Denies pain. Skin intact, has redness to groin and cracked feet/heels. SW following for safe discharge plan. Pt pleasant and calm. Will continue to provide supportive cares.      Please review provider order for any additional goals.   Nurse to notify provider when observation goals have been met and patient is ready for discharge.

## 2023-01-11 NOTE — PLAN OF CARE
PRIMARY DIAGNOSIS: PLACEMENT    [id number]    OUTPATIENT/OBSERVATION GOALS TO BE MET BEFORE DISCHARGE:  1.         ADLs back to baseline: Yes     2.         Activity and level of assistance: Up w/ lift at baseline     3.         Pain status: Pain free.     4.         Return to near baseline physical activity: Yes             Discharge Planner Nurse      Safe discharge environment identified: No  Barriers to discharge: Placement.          Entered by: Nathaly Steele RN    /85 (BP Location: Right arm)   Pulse 76   Temp 98.2  F (36.8  C) (Oral)   Resp 18   Wt 99.9 kg (220 lb 4.8 oz)   SpO2 96%        Pt alert and oriented x 4. Denies pain. Ax1 with cares. Lift assist with mobility. Regular diet.  Skin intact but crack\dry heels and feet. Pt has been pleasant and collaborating. SW following for safe discharge plan. Patient resting comfortably. Will continue to provide support cares.          Please review provider order for any additional goals.   Nurse to notify provider when observation goals have been met and patient is ready for discharge.

## 2023-01-11 NOTE — PLAN OF CARE
PRIMARY DIAGNOSIS: PLACEMENT     OUTPATIENT/OBSERVATION GOALS TO BE MET BEFORE DISCHARGE  1. Orthostatic performed: No    2. Tolerating PO medications: Yes    3. Return to near baseline physical activity:  total care. Lift assist at baseline    4. Cleared for discharge by consultants (if involved): Yes    Discharge Planner Nurse   Safe discharge environment identified: Yes  Barriers to discharge: Yes       Entered by: SARA DERAS RN 01/11/2023    Vital signs:  Temp: 97.4  F (36.3  C) Temp src: Oral BP: 125/86 Pulse: 69   Resp: 18 SpO2: 94 % O2 Device: None (Room air)  Alert and oriented x4. Calm and cooperative with care. Denies pain. Assist of x1 in bed. Incontinent for bowel and bladder. Anti-fungal cream applied for redness to scrotum. On regular diet. Good appetite. IV saline locked.  Medically clear. Waiting for placement at group home. SW following. Will continue to monitor.         Please review provider order for any additional goals.   Nurse to notify provider when observation goals have been met and patient is ready for discharge.

## 2023-01-11 NOTE — PLAN OF CARE
PRIMARY DIAGNOSIS: PLACEMENT   OUTPATIENT/OBSERVATION GOALS TO BE MET BEFORE DISCHARGE:  ADLs back to baseline: Yes    Activity and level of assistance: lift assist at baseline    Pain status: Pain free.    Return to near baseline physical activity:  total care. Lift assist at baseline.      Discharge Planner Nurse   Safe discharge environment identified: Yes  Barriers to discharge: Yes       Entered by: SARA DERAS RN 01/11/2023    Vital signs:  Temp: 97.4  F (36.3  C) Temp src: Oral BP: 125/86 Pulse: 69   Resp: 18 SpO2: 94 % O2 Device: None (Room air) Alert and oriented x4. Calm and cooperative with care. Denies pain. Assist of x1 in bed. Incontinent for bowel and bladder. Anti-fungal cream applied for redness to scrotum. On regular diet. Good appetite. IV saline locked.  Medically clear. Waiting for placement at group home. SW following. Will continue to monitor.      Please review provider order for any additional goals.   Nurse to notify provider when observation goals have been met and patient is ready for discharge.
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
## 2023-01-12 PROCEDURE — 250N000013 HC RX MED GY IP 250 OP 250 PS 637: Performed by: PHYSICIAN ASSISTANT

## 2023-01-12 PROCEDURE — G0378 HOSPITAL OBSERVATION PER HR: HCPCS

## 2023-01-12 PROCEDURE — 250N000013 HC RX MED GY IP 250 OP 250 PS 637: Performed by: NURSE PRACTITIONER

## 2023-01-12 PROCEDURE — 99231 SBSQ HOSP IP/OBS SF/LOW 25: CPT | Performed by: PHYSICIAN ASSISTANT

## 2023-01-12 PROCEDURE — 250N000013 HC RX MED GY IP 250 OP 250 PS 637: Performed by: HOSPITALIST

## 2023-01-12 PROCEDURE — 250N000013 HC RX MED GY IP 250 OP 250 PS 637

## 2023-01-12 RX ADMIN — Medication 10 MG: at 21:30

## 2023-01-12 RX ADMIN — HYDROXYZINE HYDROCHLORIDE 50 MG: 50 TABLET, FILM COATED ORAL at 08:37

## 2023-01-12 RX ADMIN — LEVOTHYROXINE SODIUM 25 MCG: 0.03 TABLET ORAL at 08:38

## 2023-01-12 RX ADMIN — QUETIAPINE FUMARATE 400 MG: 200 TABLET ORAL at 21:31

## 2023-01-12 RX ADMIN — POLYETHYLENE GLYCOL 3350 17 G: 17 POWDER, FOR SOLUTION ORAL at 08:37

## 2023-01-12 RX ADMIN — METOPROLOL SUCCINATE 25 MG: 25 TABLET, EXTENDED RELEASE ORAL at 08:37

## 2023-01-12 RX ADMIN — MIRTAZAPINE 15 MG: 15 TABLET, FILM COATED ORAL at 21:31

## 2023-01-12 RX ADMIN — HYDROCORTISONE: 1 CREAM TOPICAL at 21:33

## 2023-01-12 RX ADMIN — CLONIDINE HYDROCHLORIDE 0.1 MG: 0.1 TABLET ORAL at 21:32

## 2023-01-12 RX ADMIN — DIVALPROEX SODIUM 1000 MG: 500 TABLET, DELAYED RELEASE ORAL at 21:32

## 2023-01-12 RX ADMIN — BACLOFEN 10 MG: 10 TABLET ORAL at 21:32

## 2023-01-12 RX ADMIN — QUETIAPINE 200 MG: 200 TABLET, FILM COATED ORAL at 14:05

## 2023-01-12 RX ADMIN — CLOTRIMAZOLE: 0.01 CREAM TOPICAL at 08:38

## 2023-01-12 RX ADMIN — EMPAGLIFLOZIN 10 MG: 10 TABLET, FILM COATED ORAL at 08:36

## 2023-01-12 RX ADMIN — OLANZAPINE 2.5 MG: 2.5 TABLET, FILM COATED ORAL at 14:05

## 2023-01-12 RX ADMIN — CLONIDINE HYDROCHLORIDE 0.1 MG: 0.1 TABLET ORAL at 14:05

## 2023-01-12 RX ADMIN — BACLOFEN 10 MG: 10 TABLET ORAL at 08:37

## 2023-01-12 RX ADMIN — VENLAFAXINE HYDROCHLORIDE 75 MG: 150 CAPSULE, EXTENDED RELEASE ORAL at 21:31

## 2023-01-12 RX ADMIN — CLOTRIMAZOLE: 0.01 CREAM TOPICAL at 21:33

## 2023-01-12 RX ADMIN — ATORVASTATIN CALCIUM 20 MG: 20 TABLET, FILM COATED ORAL at 21:32

## 2023-01-12 RX ADMIN — HYDROCORTISONE: 1 CREAM TOPICAL at 08:38

## 2023-01-12 RX ADMIN — HYDROXYZINE HYDROCHLORIDE 50 MG: 50 TABLET, FILM COATED ORAL at 21:30

## 2023-01-12 RX ADMIN — QUETIAPINE 200 MG: 200 TABLET, FILM COATED ORAL at 08:37

## 2023-01-12 RX ADMIN — Medication 25 MCG: at 08:37

## 2023-01-12 RX ADMIN — VENLAFAXINE HYDROCHLORIDE 150 MG: 150 CAPSULE, EXTENDED RELEASE ORAL at 21:31

## 2023-01-12 RX ADMIN — METFORMIN HYDROCHLORIDE 850 MG: 850 TABLET, FILM COATED ORAL at 18:09

## 2023-01-12 RX ADMIN — ASPIRIN 81 MG CHEWABLE TABLET 81 MG: 81 TABLET CHEWABLE at 08:36

## 2023-01-12 RX ADMIN — HYDROXYZINE HYDROCHLORIDE 50 MG: 50 TABLET, FILM COATED ORAL at 14:05

## 2023-01-12 RX ADMIN — BACLOFEN 10 MG: 10 TABLET ORAL at 14:05

## 2023-01-12 RX ADMIN — DIVALPROEX SODIUM 750 MG: 500 TABLET, DELAYED RELEASE ORAL at 08:36

## 2023-01-12 RX ADMIN — CLONIDINE HYDROCHLORIDE 0.1 MG: 0.1 TABLET ORAL at 08:37

## 2023-01-12 ASSESSMENT — ACTIVITIES OF DAILY LIVING (ADL)
ADLS_ACUITY_SCORE: 56

## 2023-01-12 NOTE — PLAN OF CARE
PRIMARY DIAGNOSIS: Placement  OUTPATIENT/OBSERVATION GOALS TO BE MET BEFORE DISCHARGE:  1. ADLs back to baseline: Yes     2. Activity and level of assistance: Ax2 with lift     3. Pain status: Pain free.     4. Return to near baseline physical activity: Yes          Discharge Planner Nurse   Safe discharge environment identified: No  Barriers to discharge: Yes       Entered by: Waleska Leon RN 01/07/2023 5:26 PM  Please review provider order for any additional goals.   Nurse to notify provider when observation goals have been met and patient is ready for discharge.              A&O X 4. Wicho to communicate all needs. Up Ax2 with lift/ Ax1 with rolling in bed to be change with B&B. Denies pain. No aggressive behaviors. Pleasant and cooperative. Pulsating mattress in place.   Incontinent. Plan- SW following for placement/relocation worker involved, oob 3x daily/prn, no blood sugar checks, vital signs 2x daily. SW following for placement. Continue to provide care.

## 2023-01-12 NOTE — PLAN OF CARE
PRIMARY DIAGNOSIS: Placement  OUTPATIENT/OBSERVATION GOALS TO BE MET BEFORE DISCHARGE:  ADLs back to baseline: Yes     Activity and level of assistance: Ax2 with lift     Pain status: Pain free.     Return to near baseline physical activity: Yes          Discharge Planner Nurse   Safe discharge environment identified: No  Barriers to discharge: Yes       Entered by: Waleska Leon RN 01/07/2023 5:26 PM  Please review provider order for any additional goals.   Nurse to notify provider when observation goals have been met and patient is ready for discharge.     Temp: 97.4  F (36.3  C) Temp src: Oral BP: 133/85 Pulse: 69   Resp: 18 SpO2: 94 % O2 Device: None (Room air)            A&O. Up Ax2 with lift/ Ax1 with rolling in bed. Denies pain. Pleasant and cooperative. Pulsating mattress in place. Pt able to make needs known. Incontinent. Plan- SW following for placement/relocation worker involved, oob 3x daily/prn, no longer doing blood sugar checks, vital signs 2x daily.

## 2023-01-12 NOTE — PLAN OF CARE
PRIMARY DIAGNOSIS: Placement  OUTPATIENT/OBSERVATION GOALS TO BE MET BEFORE DISCHARGE:  1. ADLs back to baseline: Yes     2. Activity and level of assistance: Ax2 with lift     3. Pain status: Pain free.     4. Return to near baseline physical activity: Yes          Discharge Planner Nurse   Safe discharge environment identified: No  Barriers to discharge: Yes       Entered by: Waleska Leon RN 01/07/2023 5:26 PM  Please review provider order for any additional goals.   Nurse to notify provider when observation goals have been met and patient is ready for discharge.              A&O X 4. Wicho to communicate all needs. Up Ax2 with lift/ Ax1 with rolling in bed to be change with B&B. Denies pain. No aggressive behaviors. Pleasant and cooperative. Pulsating mattress in place.   Incontinent. Plan- SW following for placement/relocation worker involved, oob 3x daily/prn, no blood sugar checks, vital signs 2x daily. Continue to provide care.
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
## 2023-01-12 NOTE — PLAN OF CARE
PRIMARY DIAGNOSIS: PLACEMENT  OUTPATIENT/OBSERVATION GOALS TO BE MET BEFORE DISCHARGE:  1. ADLs back to baseline: Yes    2. Activity and level of assistance: Ax2 lift    3. Pain status: Pain free.    4. Return to near baseline physical activity: Yes     Discharge Planner Nurse   Safe discharge environment identified: No  Barriers to discharge: Yes       Entered by: Opal Jarrell RN 01/12/2023 6:02 AM  Please review provider order for any additional goals.   Nurse to notify provider when observation goals have been met and patient is ready for discharge.    Aox4. VSS. Room air. Up Ax2 with lift. Ax1 when in bed. Denes pain. Incontinent. VS BID. VS daily. Calls appropriately and makes needs known. Waiting for placement.

## 2023-01-12 NOTE — PLAN OF CARE
PRIMARY DIAGNOSIS: PLACEMENT  OUTPATIENT/OBSERVATION GOALS TO BE MET BEFORE DISCHARGE:  1. ADLs back to baseline: Yes    2. Activity and level of assistance: Ax2 lift    3. Pain status: Pain free.    4. Return to near baseline physical activity: Yes     Discharge Planner Nurse   Safe discharge environment identified: No  Barriers to discharge: Yes       Entered by: Opal Jarrell RN 01/12/2023 3:28 AM  Please review provider order for any additional goals.   Nurse to notify provider when observation goals have been met and patient is ready for discharge.    Aox4. VSS. Room air. Up Ax2 with lift. Ax1 when in bed. Denes pain. Incontinent. VS BID. VS daily. Calls appropriately and makes needs known.

## 2023-01-12 NOTE — PROGRESS NOTES
Care Management Follow Up    Expected Discharge Date: 01/31/2023     Concerns to be Addressed: Discharge planning      Patient plan of care discussed at interdisciplinary rounds: Yes    Anticipated Discharge Disposition:  Group Home placement once found by relocation worker     Additional Information:  SW received fax from [facility] and Human services with a DHS notice of action form indicating that his CADI waiver has been terminated d/t being hospitalized for greater than 120 days. Copy was given to patient.     ANALY emailed [facility] ROSIO Ruff to inquire about request for new [employer] assessment to reopen patient's CADI waiver. Awaiting response.     DANITA Obrien, [facility]   Inpatient Care Coordination  [facility]   [phone]

## 2023-01-13 LAB
GLUCOSE BLDC GLUCOMTR-MCNC: 104 MG/DL (ref 70–99)
GLUCOSE BLDC GLUCOMTR-MCNC: 144 MG/DL (ref 70–99)
GLUCOSE BLDC GLUCOMTR-MCNC: 159 MG/DL (ref 70–99)
GLUCOSE BLDC GLUCOMTR-MCNC: 185 MG/DL (ref 70–99)

## 2023-01-13 PROCEDURE — 250N000013 HC RX MED GY IP 250 OP 250 PS 637

## 2023-01-13 PROCEDURE — 250N000013 HC RX MED GY IP 250 OP 250 PS 637: Performed by: HOSPITALIST

## 2023-01-13 PROCEDURE — 250N000013 HC RX MED GY IP 250 OP 250 PS 637: Performed by: NURSE PRACTITIONER

## 2023-01-13 PROCEDURE — 250N000013 HC RX MED GY IP 250 OP 250 PS 637: Performed by: PHYSICIAN ASSISTANT

## 2023-01-13 PROCEDURE — 82962 GLUCOSE BLOOD TEST: CPT | Mod: 91

## 2023-01-13 PROCEDURE — 99231 SBSQ HOSP IP/OBS SF/LOW 25: CPT | Performed by: PHYSICIAN ASSISTANT

## 2023-01-13 PROCEDURE — G0378 HOSPITAL OBSERVATION PER HR: HCPCS

## 2023-01-13 RX ORDER — NICOTINE POLACRILEX 4 MG
15-30 LOZENGE BUCCAL
Status: DISCONTINUED | OUTPATIENT
Start: 2023-01-13 | End: 2023-04-07 | Stop reason: HOSPADM

## 2023-01-13 RX ORDER — DEXTROSE MONOHYDRATE 25 G/50ML
25-50 INJECTION, SOLUTION INTRAVENOUS
Status: DISCONTINUED | OUTPATIENT
Start: 2023-01-13 | End: 2023-04-07 | Stop reason: HOSPADM

## 2023-01-13 RX ADMIN — HYDROCORTISONE: 1 CREAM TOPICAL at 08:49

## 2023-01-13 RX ADMIN — CLONIDINE HYDROCHLORIDE 0.1 MG: 0.1 TABLET ORAL at 08:45

## 2023-01-13 RX ADMIN — MIRTAZAPINE 15 MG: 15 TABLET, FILM COATED ORAL at 21:25

## 2023-01-13 RX ADMIN — DIVALPROEX SODIUM 750 MG: 500 TABLET, DELAYED RELEASE ORAL at 08:45

## 2023-01-13 RX ADMIN — EMPAGLIFLOZIN 10 MG: 10 TABLET, FILM COATED ORAL at 08:46

## 2023-01-13 RX ADMIN — HYDROXYZINE HYDROCHLORIDE 50 MG: 50 TABLET, FILM COATED ORAL at 21:26

## 2023-01-13 RX ADMIN — QUETIAPINE 200 MG: 200 TABLET, FILM COATED ORAL at 08:45

## 2023-01-13 RX ADMIN — OLANZAPINE 2.5 MG: 2.5 TABLET, FILM COATED ORAL at 15:19

## 2023-01-13 RX ADMIN — CLONIDINE HYDROCHLORIDE 0.1 MG: 0.1 TABLET ORAL at 15:19

## 2023-01-13 RX ADMIN — Medication 25 MCG: at 08:46

## 2023-01-13 RX ADMIN — BACLOFEN 10 MG: 10 TABLET ORAL at 21:26

## 2023-01-13 RX ADMIN — CLONIDINE HYDROCHLORIDE 0.1 MG: 0.1 TABLET ORAL at 21:26

## 2023-01-13 RX ADMIN — QUETIAPINE FUMARATE 400 MG: 200 TABLET ORAL at 21:26

## 2023-01-13 RX ADMIN — CLOTRIMAZOLE: 0.01 CREAM TOPICAL at 08:49

## 2023-01-13 RX ADMIN — CLOTRIMAZOLE: 0.01 CREAM TOPICAL at 21:30

## 2023-01-13 RX ADMIN — VENLAFAXINE HYDROCHLORIDE 75 MG: 150 CAPSULE, EXTENDED RELEASE ORAL at 21:28

## 2023-01-13 RX ADMIN — LEVOTHYROXINE SODIUM 25 MCG: 0.03 TABLET ORAL at 08:45

## 2023-01-13 RX ADMIN — METFORMIN HYDROCHLORIDE 850 MG: 850 TABLET, FILM COATED ORAL at 18:25

## 2023-01-13 RX ADMIN — VENLAFAXINE HYDROCHLORIDE 150 MG: 150 CAPSULE, EXTENDED RELEASE ORAL at 21:27

## 2023-01-13 RX ADMIN — BACLOFEN 10 MG: 10 TABLET ORAL at 08:45

## 2023-01-13 RX ADMIN — METOPROLOL SUCCINATE 25 MG: 25 TABLET, EXTENDED RELEASE ORAL at 08:45

## 2023-01-13 RX ADMIN — QUETIAPINE 200 MG: 200 TABLET, FILM COATED ORAL at 15:19

## 2023-01-13 RX ADMIN — HYDROXYZINE HYDROCHLORIDE 50 MG: 50 TABLET, FILM COATED ORAL at 08:45

## 2023-01-13 RX ADMIN — HYDROXYZINE HYDROCHLORIDE 50 MG: 50 TABLET, FILM COATED ORAL at 15:19

## 2023-01-13 RX ADMIN — Medication 10 MG: at 21:25

## 2023-01-13 RX ADMIN — ASPIRIN 81 MG CHEWABLE TABLET 81 MG: 81 TABLET CHEWABLE at 08:45

## 2023-01-13 RX ADMIN — HYDROCORTISONE: 1 CREAM TOPICAL at 21:30

## 2023-01-13 RX ADMIN — KETOCONAZOLE: 20 CREAM TOPICAL at 08:49

## 2023-01-13 RX ADMIN — BACLOFEN 10 MG: 10 TABLET ORAL at 15:19

## 2023-01-13 RX ADMIN — ATORVASTATIN CALCIUM 20 MG: 20 TABLET, FILM COATED ORAL at 21:25

## 2023-01-13 RX ADMIN — DIVALPROEX SODIUM 1000 MG: 500 TABLET, DELAYED RELEASE ORAL at 21:25

## 2023-01-13 RX ADMIN — POLYETHYLENE GLYCOL 3350 17 G: 17 POWDER, FOR SOLUTION ORAL at 08:51

## 2023-01-13 ASSESSMENT — ACTIVITIES OF DAILY LIVING (ADL)
ADLS_ACUITY_SCORE: 56

## 2023-01-13 NOTE — PROGRESS NOTES
PRIMARY DIAGNOSIS: PLACEMENT  OUTPATIENT/OBSERVATION GOALS TO BE MET BEFORE DISCHARGE:  1. ADLs back to baseline: Yes    2. Activity and level of assistance: Ax1-2    2. Pain status: Pain free.    3. Return to near baseline physical activity: Yes     Discharge Planner Nurse   Safe discharge environment identified: No  Barriers to discharge: Yes       Entered by: Annalee Echevarria RN 01/13/2023     /80 (BP Location: Right arm)   Pulse 70   Temp 98  F (36.7  C) (Oral)   Resp 16   Wt 99.9 kg (220 lb 4.8 oz)   SpO2 95%   Patient alert & oriented x 4.Denies pain Assist x 1-2. Incontinent of bowel & bladder. Resting quietly in bed.  Please review provider order for any additional goals.   Nurse to notify provider when observation goals have been met and patient is ready for discharge.

## 2023-01-13 NOTE — PLAN OF CARE
PRIMARY DIAGNOSIS: Placement  OUTPATIENT/OBSERVATION GOALS TO BE MET BEFORE DISCHARGE:  ADLs back to baseline: Yes    Activity and level of assistance: Up with maximum assistance. Patient at baseline    Pain status: Pain free.    Return to near baseline physical activity: Yes     Discharge Planner Nurse   Safe discharge environment identified: No  Barriers to discharge: Yes       Entered by: Tamiko Goldstein RN 01/13/2023 11:42 AM     Please review provider order for any additional goals.   Nurse to notify provider when observation goals have been met and patient is ready for discharge.

## 2023-01-13 NOTE — PROGRESS NOTES
PRIMARY DIAGNOSIS: PLACEMENT  OUTPATIENT/OBSERVATION GOALS TO BE MET BEFORE DISCHARGE:  1. ADLs back to baseline: Yes    2. Activity and level of assistance: Ax1-2    2. Pain status: Pain free.    3. Return to near baseline physical activity: Yes     Discharge Planner Nurse   Safe discharge environment identified: No  Barriers to discharge: Yes       Entered by: Annalee Echevarria RN 01/13/2023     /80 (BP Location: Right arm)   Pulse 70   Temp 98  F (36.7  C) (Oral)   Resp 16   Wt 99.9 kg (220 lb 4.8 oz)   SpO2 95%   Patient alert & oriented x 4. Assist x 1-2. Incontinent of bowel & bladder. No IV access.No blood sugar checks.VS x 2 daily.SW following for placement.  Please review provider order for any additional goals.   Nurse to notify provider when observation goals have been met and patient is ready for discharge.

## 2023-01-13 NOTE — PLAN OF CARE
PRIMARY DIAGNOSIS: Placement  OUTPATIENT/OBSERVATION GOALS TO BE MET BEFORE DISCHARGE:  1. ADLs back to baseline: Yes    2. Activity and level of assistance: Up with maximum assistance. Patient at baseline    3. Pain status: Pain free.    4. Return to near baseline physical activity: Yes     Discharge Planner Nurse   Safe discharge environment identified: No  Barriers to discharge: Yes       Entered by: Tamiko Goldstein RN 01/13/2023 2:22 PM     Please review provider order for any additional goals.   Nurse to notify provider when observation goals have been met and patient is ready for discharge.    A&Ox4, Ax1 to turn, tolerating diet, medically cleared- awaiting placement, SW following.

## 2023-01-13 NOTE — PROGRESS NOTES
Care Management Follow Up    Expected Discharge Date: 01/31/2023     Concerns to be Addressed: Discharge planning     Patient plan of care discussed at interdisciplinary rounds: Yes    Anticipated Discharge Disposition:  Group home placement once found by relocation worker    Additional Information:  SW left  for [facility] Ver[facility], [phone], requesting a return call to schedule [employer] assessment for patient to reopen waiver. Awaiting return call. ANALY will continue to follow.     DANITA Obrien, [facility]   Inpatient Care Coordination  [facility]   [phone]
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
## 2023-01-13 NOTE — PLAN OF CARE
PRIMARY DIAGNOSIS: PLACEMENT  OUTPATIENT/OBSERVATION GOALS TO BE MET BEFORE DISCHARGE:  1. ADLs back to baseline: Yes    2. Activity and level of assistance: Up with maximum assistance.   3. Pain status: Pain free.    4. Return to near baseline physical activity: Yes     Discharge Planner Nurse   Safe discharge environment identified: No  Barriers to discharge: Yes       Entered by: Annalee Echevarria RN 01/12/2023    /74 (BP Location: Right arm)   Pulse 78   Temp 98.2  F (36.8  C) (Oral)   Resp 16   Wt 99.9 kg (220 lb 4.8 oz)   SpO2 96%      Please review provider order for any additional goals.   Nurse to notify provider when observation goals have been met and patient is ready for discharge.Goal Outcome Evaluation:

## 2023-01-14 LAB
GLUCOSE BLDC GLUCOMTR-MCNC: 124 MG/DL (ref 70–99)
GLUCOSE BLDC GLUCOMTR-MCNC: 131 MG/DL (ref 70–99)
GLUCOSE BLDC GLUCOMTR-MCNC: 138 MG/DL (ref 70–99)
GLUCOSE BLDC GLUCOMTR-MCNC: 195 MG/DL (ref 70–99)

## 2023-01-14 PROCEDURE — 250N000013 HC RX MED GY IP 250 OP 250 PS 637: Performed by: PHYSICIAN ASSISTANT

## 2023-01-14 PROCEDURE — 250N000013 HC RX MED GY IP 250 OP 250 PS 637: Performed by: NURSE PRACTITIONER

## 2023-01-14 PROCEDURE — 250N000013 HC RX MED GY IP 250 OP 250 PS 637: Performed by: HOSPITALIST

## 2023-01-14 PROCEDURE — 82962 GLUCOSE BLOOD TEST: CPT | Mod: 91

## 2023-01-14 PROCEDURE — 250N000012 HC RX MED GY IP 250 OP 636 PS 637: Performed by: PHYSICIAN ASSISTANT

## 2023-01-14 PROCEDURE — 250N000013 HC RX MED GY IP 250 OP 250 PS 637

## 2023-01-14 PROCEDURE — G0378 HOSPITAL OBSERVATION PER HR: HCPCS

## 2023-01-14 PROCEDURE — 96372 THER/PROPH/DIAG INJ SC/IM: CPT | Performed by: PHYSICIAN ASSISTANT

## 2023-01-14 PROCEDURE — 99231 SBSQ HOSP IP/OBS SF/LOW 25: CPT | Performed by: PHYSICIAN ASSISTANT

## 2023-01-14 RX ADMIN — CLOTRIMAZOLE: 0.01 CREAM TOPICAL at 08:38

## 2023-01-14 RX ADMIN — BACLOFEN 10 MG: 10 TABLET ORAL at 21:53

## 2023-01-14 RX ADMIN — DIVALPROEX SODIUM 1000 MG: 500 TABLET, DELAYED RELEASE ORAL at 21:53

## 2023-01-14 RX ADMIN — HYDROCORTISONE: 1 CREAM TOPICAL at 08:39

## 2023-01-14 RX ADMIN — CLONIDINE HYDROCHLORIDE 0.1 MG: 0.1 TABLET ORAL at 21:52

## 2023-01-14 RX ADMIN — HYDROXYZINE HYDROCHLORIDE 50 MG: 50 TABLET, FILM COATED ORAL at 08:39

## 2023-01-14 RX ADMIN — BACLOFEN 10 MG: 10 TABLET ORAL at 13:49

## 2023-01-14 RX ADMIN — HYDROCORTISONE: 1 CREAM TOPICAL at 22:03

## 2023-01-14 RX ADMIN — CLONIDINE HYDROCHLORIDE 0.1 MG: 0.1 TABLET ORAL at 13:49

## 2023-01-14 RX ADMIN — POLYETHYLENE GLYCOL 3350 17 G: 17 POWDER, FOR SOLUTION ORAL at 08:38

## 2023-01-14 RX ADMIN — CLOTRIMAZOLE: 0.01 CREAM TOPICAL at 22:03

## 2023-01-14 RX ADMIN — EMPAGLIFLOZIN 10 MG: 10 TABLET, FILM COATED ORAL at 08:37

## 2023-01-14 RX ADMIN — QUETIAPINE 200 MG: 200 TABLET, FILM COATED ORAL at 08:37

## 2023-01-14 RX ADMIN — QUETIAPINE FUMARATE 400 MG: 200 TABLET ORAL at 21:53

## 2023-01-14 RX ADMIN — KETOCONAZOLE: 20 CREAM TOPICAL at 08:38

## 2023-01-14 RX ADMIN — INSULIN ASPART 2 UNITS: 100 INJECTION, SOLUTION INTRAVENOUS; SUBCUTANEOUS at 09:10

## 2023-01-14 RX ADMIN — VENLAFAXINE HYDROCHLORIDE 150 MG: 150 CAPSULE, EXTENDED RELEASE ORAL at 21:57

## 2023-01-14 RX ADMIN — VENLAFAXINE HYDROCHLORIDE 75 MG: 150 CAPSULE, EXTENDED RELEASE ORAL at 21:54

## 2023-01-14 RX ADMIN — Medication 10 MG: at 21:53

## 2023-01-14 RX ADMIN — HYDROXYZINE HYDROCHLORIDE 50 MG: 50 TABLET, FILM COATED ORAL at 21:53

## 2023-01-14 RX ADMIN — DIVALPROEX SODIUM 750 MG: 500 TABLET, DELAYED RELEASE ORAL at 08:37

## 2023-01-14 RX ADMIN — MIRTAZAPINE 15 MG: 15 TABLET, FILM COATED ORAL at 21:53

## 2023-01-14 RX ADMIN — HYDROXYZINE HYDROCHLORIDE 50 MG: 50 TABLET, FILM COATED ORAL at 13:49

## 2023-01-14 RX ADMIN — CLONIDINE HYDROCHLORIDE 0.1 MG: 0.1 TABLET ORAL at 08:38

## 2023-01-14 RX ADMIN — ASPIRIN 81 MG CHEWABLE TABLET 81 MG: 81 TABLET CHEWABLE at 08:37

## 2023-01-14 RX ADMIN — QUETIAPINE 200 MG: 200 TABLET, FILM COATED ORAL at 13:49

## 2023-01-14 RX ADMIN — BACLOFEN 10 MG: 10 TABLET ORAL at 08:38

## 2023-01-14 RX ADMIN — METOPROLOL SUCCINATE 25 MG: 25 TABLET, EXTENDED RELEASE ORAL at 08:37

## 2023-01-14 RX ADMIN — Medication 25 MCG: at 08:37

## 2023-01-14 RX ADMIN — LEVOTHYROXINE SODIUM 25 MCG: 0.03 TABLET ORAL at 08:38

## 2023-01-14 RX ADMIN — OLANZAPINE 2.5 MG: 2.5 TABLET, FILM COATED ORAL at 13:49

## 2023-01-14 RX ADMIN — METFORMIN HYDROCHLORIDE 850 MG: 850 TABLET, FILM COATED ORAL at 17:14

## 2023-01-14 RX ADMIN — ATORVASTATIN CALCIUM 20 MG: 20 TABLET, FILM COATED ORAL at 21:53

## 2023-01-14 ASSESSMENT — ACTIVITIES OF DAILY LIVING (ADL)
ADLS_ACUITY_SCORE: 56
ADLS_ACUITY_SCORE: 55
ADLS_ACUITY_SCORE: 56

## 2023-01-14 NOTE — PLAN OF CARE
PRIMARY DIAGNOSIS: Placement  OUTPATIENT/OBSERVATION GOALS TO BE MET BEFORE DISCHARGE:  ADLs back to baseline: Yes     Activity and level of assistance: baseline     Pain status: Pain free.     Return to near baseline physical activity: Yes          Discharge Planner Nurse   Safe discharge environment identified: No  Barriers to discharge: Yes       Entered by: Sumaya Lacey RN 01/14/2023 1200    Please review provider order for any additional goals.   Nurse to notify provider when observation goals have been met and patient is ready for discharge.
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
## 2023-01-14 NOTE — PLAN OF CARE
PRIMARY DIAGNOSIS: Placement  OUTPATIENT/OBSERVATION GOALS TO BE MET BEFORE DISCHARGE:  1. ADLs back to baseline: Yes    2. Activity and level of assistance: Up with maximum assistance. Patient at baseline    3. Pain status: Pain free.    4. Return to near baseline physical activity: Yes     Discharge Planner Nurse   Safe discharge environment identified: No  Barriers to discharge: Yes       Entered by: Tamiko Goldstein RN 01/13/2023 6:02 PM     Please review provider order for any additional goals.   Nurse to notify provider when observation goals have been met and patient is ready for discharge.    A&Ox4, Ax1 to turn, tolerating diet, medically cleared- awaiting placement, SW following.
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
## 2023-01-14 NOTE — PLAN OF CARE
Goal Outcome Evaluation:  PRIMARY DIAGNOSIS: Placement    OUTPATIENT/OBSERVATION GOALS    TO BE MET BEFORE DISCHARGE:  ADLs back to baseline: Yes     Activity and level of assistance: baseline     Pain status: Pain free.     Return to near baseline physical activity: Yes          Discharge Planner Nurse   Safe discharge environment identified: No  Barriers to discharge: Yes       Entered by: Sumaya Lacey RN 01/14/2023 080    Please review provider order for any additional goals.   Nurse to notify provider when observation goals have been met and patient is ready for discharge.

## 2023-01-14 NOTE — PLAN OF CARE
PRIMARY DIAGNOSIS: Placement  OUTPATIENT/OBSERVATION GOALS TO BE MET BEFORE DISCHARGE:  ADLs back to baseline: Yes    Activity and level of assistance: baseline    Pain status: Pain free.    Return to near baseline physical activity: Yes     Discharge Planner Nurse   Safe discharge environment identified: No  Barriers to discharge: Yes       Entered by: Sumaya Lacey RN 01/14/2023 5:40 PM   Patient alert and oriented. VSS. Blood sugars 195/124/131. Incontinent of urine. No acute needs today. Refused to get out of bed and up into chair. Placement issues, SW following.  Please review provider order for any additional goals.   Nurse to notify provider when observation goals have been met and patient is ready for discharge.

## 2023-01-15 LAB
GLUCOSE BLDC GLUCOMTR-MCNC: 107 MG/DL (ref 70–99)
GLUCOSE BLDC GLUCOMTR-MCNC: 108 MG/DL (ref 70–99)
GLUCOSE BLDC GLUCOMTR-MCNC: 162 MG/DL (ref 70–99)
GLUCOSE BLDC GLUCOMTR-MCNC: 175 MG/DL (ref 70–99)

## 2023-01-15 PROCEDURE — 82962 GLUCOSE BLOOD TEST: CPT

## 2023-01-15 PROCEDURE — 250N000013 HC RX MED GY IP 250 OP 250 PS 637: Performed by: HOSPITALIST

## 2023-01-15 PROCEDURE — 250N000013 HC RX MED GY IP 250 OP 250 PS 637

## 2023-01-15 PROCEDURE — 99231 SBSQ HOSP IP/OBS SF/LOW 25: CPT | Performed by: PHYSICIAN ASSISTANT

## 2023-01-15 PROCEDURE — 250N000013 HC RX MED GY IP 250 OP 250 PS 637: Performed by: NURSE PRACTITIONER

## 2023-01-15 PROCEDURE — 250N000013 HC RX MED GY IP 250 OP 250 PS 637: Performed by: PHYSICIAN ASSISTANT

## 2023-01-15 PROCEDURE — G0378 HOSPITAL OBSERVATION PER HR: HCPCS

## 2023-01-15 RX ADMIN — DIVALPROEX SODIUM 750 MG: 500 TABLET, DELAYED RELEASE ORAL at 08:35

## 2023-01-15 RX ADMIN — METOPROLOL SUCCINATE 25 MG: 25 TABLET, EXTENDED RELEASE ORAL at 08:34

## 2023-01-15 RX ADMIN — ATORVASTATIN CALCIUM 20 MG: 20 TABLET, FILM COATED ORAL at 21:13

## 2023-01-15 RX ADMIN — QUETIAPINE 200 MG: 200 TABLET, FILM COATED ORAL at 08:35

## 2023-01-15 RX ADMIN — Medication 25 MCG: at 08:34

## 2023-01-15 RX ADMIN — METFORMIN HYDROCHLORIDE 850 MG: 850 TABLET, FILM COATED ORAL at 18:31

## 2023-01-15 RX ADMIN — HYDROXYZINE HYDROCHLORIDE 50 MG: 50 TABLET, FILM COATED ORAL at 13:17

## 2023-01-15 RX ADMIN — VENLAFAXINE HYDROCHLORIDE 150 MG: 150 CAPSULE, EXTENDED RELEASE ORAL at 21:13

## 2023-01-15 RX ADMIN — OLANZAPINE 2.5 MG: 2.5 TABLET, FILM COATED ORAL at 13:17

## 2023-01-15 RX ADMIN — POLYETHYLENE GLYCOL 3350 17 G: 17 POWDER, FOR SOLUTION ORAL at 08:36

## 2023-01-15 RX ADMIN — CLONIDINE HYDROCHLORIDE 0.1 MG: 0.1 TABLET ORAL at 08:35

## 2023-01-15 RX ADMIN — INSULIN ASPART 1 UNITS: 100 INJECTION, SOLUTION INTRAVENOUS; SUBCUTANEOUS at 12:39

## 2023-01-15 RX ADMIN — HYDROCORTISONE: 1 CREAM TOPICAL at 21:14

## 2023-01-15 RX ADMIN — QUETIAPINE FUMARATE 400 MG: 200 TABLET ORAL at 21:13

## 2023-01-15 RX ADMIN — BACLOFEN 10 MG: 10 TABLET ORAL at 08:35

## 2023-01-15 RX ADMIN — HYDROXYZINE HYDROCHLORIDE 50 MG: 50 TABLET, FILM COATED ORAL at 21:14

## 2023-01-15 RX ADMIN — HYDROXYZINE HYDROCHLORIDE 50 MG: 50 TABLET, FILM COATED ORAL at 08:35

## 2023-01-15 RX ADMIN — CLONIDINE HYDROCHLORIDE 0.1 MG: 0.1 TABLET ORAL at 21:13

## 2023-01-15 RX ADMIN — QUETIAPINE 200 MG: 200 TABLET, FILM COATED ORAL at 13:17

## 2023-01-15 RX ADMIN — CLOTRIMAZOLE: 0.01 CREAM TOPICAL at 21:14

## 2023-01-15 RX ADMIN — Medication 10 MG: at 21:12

## 2023-01-15 RX ADMIN — ASPIRIN 81 MG CHEWABLE TABLET 81 MG: 81 TABLET CHEWABLE at 08:34

## 2023-01-15 RX ADMIN — CLONIDINE HYDROCHLORIDE 0.1 MG: 0.1 TABLET ORAL at 13:17

## 2023-01-15 RX ADMIN — LEVOTHYROXINE SODIUM 25 MCG: 0.03 TABLET ORAL at 08:35

## 2023-01-15 RX ADMIN — BACLOFEN 10 MG: 10 TABLET ORAL at 13:17

## 2023-01-15 RX ADMIN — EMPAGLIFLOZIN 10 MG: 10 TABLET, FILM COATED ORAL at 08:34

## 2023-01-15 RX ADMIN — DIVALPROEX SODIUM 1000 MG: 500 TABLET, DELAYED RELEASE ORAL at 21:12

## 2023-01-15 RX ADMIN — BACLOFEN 10 MG: 10 TABLET ORAL at 21:13

## 2023-01-15 RX ADMIN — VENLAFAXINE HYDROCHLORIDE 75 MG: 150 CAPSULE, EXTENDED RELEASE ORAL at 21:12

## 2023-01-15 RX ADMIN — MIRTAZAPINE 15 MG: 15 TABLET, FILM COATED ORAL at 21:13

## 2023-01-15 ASSESSMENT — ACTIVITIES OF DAILY LIVING (ADL)
ADLS_ACUITY_SCORE: 56

## 2023-01-15 NOTE — PLAN OF CARE
PRIMARY DIAGNOSIS: PLACEMENT  OUTPATIENT/OBSERVATION GOALS TO BE MET BEFORE DISCHARGE:  1. ADLs back to baseline: Yes    2. Activity and level of assistance: activity at baseline     3. Pain status: Pain free.    4. Return to near baseline physical activity: Yes     Discharge Planner Nurse   Safe discharge environment identified: No  Barriers to discharge: Yes       Entered by: Rupinder Rubi RN 01/15/2023 7:44 AM    /85 (BP Location: Right arm)   Pulse 74   Temp 97.6  F (36.4  C) (Oral)   Resp 16   Wt 99.9 kg (220 lb 4.8 oz)   SpO2 94%   Please review provider order for any additional goals.   Nurse to notify provider when observation goals have been met and patient is ready for discharge.

## 2023-01-15 NOTE — PLAN OF CARE
PRIMARY DIAGNOSIS: Placement  OUTPATIENT/OBSERVATION GOALS TO BE MET BEFORE DISCHARGE:  ADLs back to baseline: Yes    Activity and level of assistance: Baseline-lift    Pain status: Pain free.    Return to near baseline physical activity: Yes     Discharge Planner Nurse   Safe discharge environment identified: No  Barriers to discharge: Yes       Entered by: Sumaya Lacey RN 01/15/2023 0800   A&o, denies pain. Has not gotten out bed. Incontinent. Waiting for placement.   Please review provider order for any additional goals.   Nurse to notify provider when observation goals have been met and patient is ready for discharge.

## 2023-01-15 NOTE — PLAN OF CARE
PRIMARY DIAGNOSIS: Placement  OUTPATIENT/OBSERVATION GOALS TO BE MET BEFORE DISCHARGE:    ADLs back to baseline: Yes     Activity and level of assistance: Baseline-lift     Pain status: Pain free.     Return to near baseline physical activity: Yes          Discharge Planner Nurse   Safe discharge environment identified: No  Barriers to discharge: Yes       Entered by: Sumaya Lacey RN 01/15/2023 1340     A&o, denies pain. No acute needs this shift. Waiting for placement.     Please review provider order for any additional goals.   Nurse to notify provider when observation goals have been met and patient is ready for discharge.
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
## 2023-01-15 NOTE — PLAN OF CARE
PRIMARY DIAGNOSIS: PLACEMENT  OUTPATIENT/OBSERVATION GOALS TO BE MET BEFORE DISCHARGE:  1. ADLs back to baseline: Yes    2. Activity and level of assistance: activity at baseline     3. Pain status: Pain free.    4. Return to near baseline physical activity: Yes     Discharge Planner Nurse   Safe discharge environment identified: No  Barriers to discharge: Yes       Entered by: Rupinder Rubi RN 01/15/2023 7:44 AM    /85 (BP Location: Right arm)   Pulse 74   Temp 97.6  F (36.4  C) (Oral)   Resp 16   Wt 99.9 kg (220 lb 4.8 oz)   SpO2 94%    Pt A&O, BG checks, incontinent of urine. Denies pain. SW following. Awaiting placement.   Please review provider order for any additional goals.   Nurse to notify provider when observation goals have been met and patient is ready for discharge.

## 2023-01-15 NOTE — PLAN OF CARE
PRIMARY DIAGNOSIS: PLACEMENT  OUTPATIENT/OBSERVATION GOALS TO BE MET BEFORE DISCHARGE:  1. ADLs back to baseline: Yes    2. Activity and level of assistance: activity at baseline     3. Pain status: Pain free.    4. Return to near baseline physical activity: Yes     Discharge Planner Nurse   Safe discharge environment identified: No  Barriers to discharge: Yes       Entered by: Rupinder Rubi RN 01/14/2023 8:28 PM    BP (!) 133/90 (BP Location: Right arm)   Pulse 77   Temp 97.6  F (36.4  C) (Oral)   Resp 16   Wt 99.9 kg (220 lb 4.8 oz)   SpO2 94%   Please review provider order for any additional goals.   Nurse to notify provider when observation goals have been met and patient is ready for discharge.

## 2023-01-16 LAB
GLUCOSE BLDC GLUCOMTR-MCNC: 144 MG/DL (ref 70–99)
GLUCOSE BLDC GLUCOMTR-MCNC: 145 MG/DL (ref 70–99)
GLUCOSE BLDC GLUCOMTR-MCNC: 155 MG/DL (ref 70–99)
GLUCOSE BLDC GLUCOMTR-MCNC: 227 MG/DL (ref 70–99)
GLUCOSE BLDC GLUCOMTR-MCNC: 97 MG/DL (ref 70–99)

## 2023-01-16 PROCEDURE — 250N000013 HC RX MED GY IP 250 OP 250 PS 637: Performed by: HOSPITALIST

## 2023-01-16 PROCEDURE — G0378 HOSPITAL OBSERVATION PER HR: HCPCS

## 2023-01-16 PROCEDURE — 82962 GLUCOSE BLOOD TEST: CPT

## 2023-01-16 PROCEDURE — 250N000013 HC RX MED GY IP 250 OP 250 PS 637: Performed by: NURSE PRACTITIONER

## 2023-01-16 PROCEDURE — 250N000013 HC RX MED GY IP 250 OP 250 PS 637: Performed by: PHYSICIAN ASSISTANT

## 2023-01-16 PROCEDURE — 250N000013 HC RX MED GY IP 250 OP 250 PS 637

## 2023-01-16 RX ADMIN — BACLOFEN 10 MG: 10 TABLET ORAL at 21:02

## 2023-01-16 RX ADMIN — LEVOTHYROXINE SODIUM 25 MCG: 0.03 TABLET ORAL at 08:46

## 2023-01-16 RX ADMIN — HYDROXYZINE HYDROCHLORIDE 50 MG: 50 TABLET, FILM COATED ORAL at 21:02

## 2023-01-16 RX ADMIN — INSULIN ASPART 1 UNITS: 100 INJECTION, SOLUTION INTRAVENOUS; SUBCUTANEOUS at 17:25

## 2023-01-16 RX ADMIN — CLONIDINE HYDROCHLORIDE 0.1 MG: 0.1 TABLET ORAL at 21:00

## 2023-01-16 RX ADMIN — HYDROXYZINE HYDROCHLORIDE 50 MG: 50 TABLET, FILM COATED ORAL at 08:46

## 2023-01-16 RX ADMIN — QUETIAPINE 200 MG: 200 TABLET, FILM COATED ORAL at 08:45

## 2023-01-16 RX ADMIN — MIRTAZAPINE 15 MG: 15 TABLET, FILM COATED ORAL at 21:00

## 2023-01-16 RX ADMIN — HYDROCORTISONE: 1 CREAM TOPICAL at 08:51

## 2023-01-16 RX ADMIN — QUETIAPINE FUMARATE 400 MG: 200 TABLET ORAL at 21:02

## 2023-01-16 RX ADMIN — BACLOFEN 10 MG: 10 TABLET ORAL at 14:22

## 2023-01-16 RX ADMIN — VENLAFAXINE HYDROCHLORIDE 75 MG: 150 CAPSULE, EXTENDED RELEASE ORAL at 21:02

## 2023-01-16 RX ADMIN — DIVALPROEX SODIUM 750 MG: 500 TABLET, DELAYED RELEASE ORAL at 08:46

## 2023-01-16 RX ADMIN — ASPIRIN 81 MG CHEWABLE TABLET 81 MG: 81 TABLET CHEWABLE at 08:46

## 2023-01-16 RX ADMIN — OLANZAPINE 2.5 MG: 2.5 TABLET, FILM COATED ORAL at 14:22

## 2023-01-16 RX ADMIN — CLONIDINE HYDROCHLORIDE 0.1 MG: 0.1 TABLET ORAL at 14:22

## 2023-01-16 RX ADMIN — ATORVASTATIN CALCIUM 20 MG: 20 TABLET, FILM COATED ORAL at 21:02

## 2023-01-16 RX ADMIN — HYDROXYZINE HYDROCHLORIDE 50 MG: 50 TABLET, FILM COATED ORAL at 14:22

## 2023-01-16 RX ADMIN — METFORMIN HYDROCHLORIDE 850 MG: 850 TABLET, FILM COATED ORAL at 17:25

## 2023-01-16 RX ADMIN — EMPAGLIFLOZIN 10 MG: 10 TABLET, FILM COATED ORAL at 08:46

## 2023-01-16 RX ADMIN — METOPROLOL SUCCINATE 25 MG: 25 TABLET, EXTENDED RELEASE ORAL at 08:45

## 2023-01-16 RX ADMIN — DIVALPROEX SODIUM 1000 MG: 500 TABLET, DELAYED RELEASE ORAL at 21:02

## 2023-01-16 RX ADMIN — QUETIAPINE 200 MG: 200 TABLET, FILM COATED ORAL at 14:21

## 2023-01-16 RX ADMIN — POLYETHYLENE GLYCOL 3350 17 G: 17 POWDER, FOR SOLUTION ORAL at 08:45

## 2023-01-16 RX ADMIN — CLONIDINE HYDROCHLORIDE 0.1 MG: 0.1 TABLET ORAL at 08:45

## 2023-01-16 RX ADMIN — VENLAFAXINE HYDROCHLORIDE 150 MG: 150 CAPSULE, EXTENDED RELEASE ORAL at 21:01

## 2023-01-16 RX ADMIN — INSULIN ASPART 2 UNITS: 100 INJECTION, SOLUTION INTRAVENOUS; SUBCUTANEOUS at 08:46

## 2023-01-16 RX ADMIN — BACLOFEN 10 MG: 10 TABLET ORAL at 08:46

## 2023-01-16 RX ADMIN — Medication 25 MCG: at 08:46

## 2023-01-16 RX ADMIN — Medication 10 MG: at 21:02

## 2023-01-16 RX ADMIN — KETOCONAZOLE: 20 CREAM TOPICAL at 21:09

## 2023-01-16 RX ADMIN — CLOTRIMAZOLE: 0.01 CREAM TOPICAL at 08:51

## 2023-01-16 RX ADMIN — HYDROCORTISONE: 1 CREAM TOPICAL at 21:10

## 2023-01-16 RX ADMIN — CLOTRIMAZOLE: 0.01 CREAM TOPICAL at 22:19

## 2023-01-16 ASSESSMENT — ACTIVITIES OF DAILY LIVING (ADL)
ADLS_ACUITY_SCORE: 56
ADLS_ACUITY_SCORE: 60
ADLS_ACUITY_SCORE: 56

## 2023-01-16 NOTE — PLAN OF CARE
PRIMARY DIAGNOSIS: Placement  OUTPATIENT/OBSERVATION GOALS TO BE MET BEFORE DISCHARGE:  1. ADLs back to baseline: Yes, paraplegic    2. Activity and level of assistance: Total cares baseline    3. Pain status: Pain free.    4. Return to near baseline physical activity: Yes     Discharge Planner Nurse   Safe discharge environment identified: No  Barriers to discharge: Yes       Entered by: Annalee Palomares RN 01/16/2023 12:53 PM      Vital signs:  Temp: 97.9  F (36.6  C) Temp src: Oral BP: 128/84 Pulse: 79   Resp: 16 SpO2: 96 % O2 Device: None (Room air)     Weight: 99.9 kg (220 lb 4.8 oz)  There is no height or weight on file to calculate BMI.    A/O x4. Total cares and was changed and repositioned. Denies pain. Pulsate matress algorithm WDL. 2U novolog given per orders. Placement. VSS.    Please review provider order for any additional goals.   Nurse to notify provider when observation goals have been met and patient is ready for discharge.
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
## 2023-01-16 NOTE — PLAN OF CARE
PRIMARY DIAGNOSIS: Placement  OUTPATIENT/OBSERVATION GOALS TO BE MET BEFORE DISCHARGE:  1. ADLs back to baseline: Yes, paraplegic    2. Activity and level of assistance: Total cares baseline    3. Pain status: Pain free.    4. Return to near baseline physical activity: Yes     Discharge Planner Nurse   Safe discharge environment identified: No  Barriers to discharge: Yes       Entered by: Annalee Palomares RN 01/16/2023 11:28 AM      Vital signs:  Temp: 97.9  F (36.6  C) Temp src: Oral BP: 128/84 Pulse: 79   Resp: 16 SpO2: 96 % O2 Device: None (Room air)     Weight: 99.9 kg (220 lb 4.8 oz)  There is no height or weight on file to calculate BMI.    A/O x4. Total cares and was changed and repositioned. Denies pain. Pulsate matress algorithm WDL. 2U novolog given per orders. Placement. VSS.    Please review provider order for any additional goals.   Nurse to notify provider when observation goals have been met and patient is ready for discharge.

## 2023-01-16 NOTE — PROGRESS NOTES
"PRIMARY DIAGNOSIS: \"GENERIC\" NURSING  OUTPATIENT/OBSERVATION GOALS TO BE MET BEFORE DISCHARGE:  1. ADLs back to baseline: Yes    2. Activity and level of assistance: A2 with lift baseline    3. Pain status: Pain free.    4. Return to near baseline physical activity: Yes     Discharge Planner Nurse   Safe discharge environment identified: No  Barriers to discharge: Yes       Entered by: Lola Phillips RN 01/15/2023 7:08 PM     Please review provider order for any additional goals.   Nurse to notify provider when observation goals have been met and patient is ready for discharge.  Pt alert. VSS. Denies pain. Position change independently. Adequate appetite. Family in room with pt.    "
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
## 2023-01-16 NOTE — PROGRESS NOTES
Care Management Follow Up    Expected Discharge Date: 02/28/2023     Concerns to be Addressed:   Discharge planning    Patient plan of care discussed at interdisciplinary rounds: Yes    Anticipated Discharge Disposition:  Group Home placement once found by relocation worker    Additional Information:  SW left VM for [facility] line, [phone], left VM requesting return call to schedule [employer] assessment to reopen patient's waiver. VM left requesting return call. SW will continue to follow.     DANITA Obrine, [facility]   Inpatient Care Coordination  [facility]   [phone]
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
## 2023-01-17 LAB
GLUCOSE BLDC GLUCOMTR-MCNC: 107 MG/DL (ref 70–99)
GLUCOSE BLDC GLUCOMTR-MCNC: 125 MG/DL (ref 70–99)
GLUCOSE BLDC GLUCOMTR-MCNC: 145 MG/DL (ref 70–99)
GLUCOSE BLDC GLUCOMTR-MCNC: 171 MG/DL (ref 70–99)

## 2023-01-17 PROCEDURE — 82962 GLUCOSE BLOOD TEST: CPT | Mod: 91

## 2023-01-17 PROCEDURE — 250N000013 HC RX MED GY IP 250 OP 250 PS 637: Performed by: NURSE PRACTITIONER

## 2023-01-17 PROCEDURE — 250N000013 HC RX MED GY IP 250 OP 250 PS 637

## 2023-01-17 PROCEDURE — 250N000013 HC RX MED GY IP 250 OP 250 PS 637: Performed by: PHYSICIAN ASSISTANT

## 2023-01-17 PROCEDURE — G0378 HOSPITAL OBSERVATION PER HR: HCPCS

## 2023-01-17 PROCEDURE — 99207 PR NO CHARGE LOS: CPT | Performed by: PHYSICIAN ASSISTANT

## 2023-01-17 PROCEDURE — 250N000013 HC RX MED GY IP 250 OP 250 PS 637: Performed by: HOSPITALIST

## 2023-01-17 RX ADMIN — CLOTRIMAZOLE: 0.01 CREAM TOPICAL at 19:51

## 2023-01-17 RX ADMIN — POLYETHYLENE GLYCOL 3350 17 G: 17 POWDER, FOR SOLUTION ORAL at 08:05

## 2023-01-17 RX ADMIN — BACLOFEN 10 MG: 10 TABLET ORAL at 08:05

## 2023-01-17 RX ADMIN — ASPIRIN 81 MG CHEWABLE TABLET 81 MG: 81 TABLET CHEWABLE at 08:05

## 2023-01-17 RX ADMIN — HYDROCORTISONE: 1 CREAM TOPICAL at 19:51

## 2023-01-17 RX ADMIN — INSULIN ASPART 1 UNITS: 100 INJECTION, SOLUTION INTRAVENOUS; SUBCUTANEOUS at 13:29

## 2023-01-17 RX ADMIN — BACLOFEN 10 MG: 10 TABLET ORAL at 19:41

## 2023-01-17 RX ADMIN — LEVOTHYROXINE SODIUM 25 MCG: 0.03 TABLET ORAL at 08:06

## 2023-01-17 RX ADMIN — QUETIAPINE 200 MG: 200 TABLET, FILM COATED ORAL at 13:30

## 2023-01-17 RX ADMIN — DIVALPROEX SODIUM 750 MG: 500 TABLET, DELAYED RELEASE ORAL at 08:05

## 2023-01-17 RX ADMIN — OLANZAPINE 2.5 MG: 2.5 TABLET, FILM COATED ORAL at 13:30

## 2023-01-17 RX ADMIN — METOPROLOL SUCCINATE 25 MG: 25 TABLET, EXTENDED RELEASE ORAL at 08:05

## 2023-01-17 RX ADMIN — QUETIAPINE 200 MG: 200 TABLET, FILM COATED ORAL at 08:05

## 2023-01-17 RX ADMIN — HYDROXYZINE HYDROCHLORIDE 50 MG: 50 TABLET, FILM COATED ORAL at 13:30

## 2023-01-17 RX ADMIN — CLOTRIMAZOLE: 0.01 CREAM TOPICAL at 08:06

## 2023-01-17 RX ADMIN — Medication 10 MG: at 21:51

## 2023-01-17 RX ADMIN — CLONIDINE HYDROCHLORIDE 0.1 MG: 0.1 TABLET ORAL at 08:05

## 2023-01-17 RX ADMIN — HYDROCORTISONE: 1 CREAM TOPICAL at 08:13

## 2023-01-17 RX ADMIN — CLONIDINE HYDROCHLORIDE 0.1 MG: 0.1 TABLET ORAL at 13:30

## 2023-01-17 RX ADMIN — HYDROXYZINE HYDROCHLORIDE 50 MG: 50 TABLET, FILM COATED ORAL at 19:41

## 2023-01-17 RX ADMIN — QUETIAPINE FUMARATE 400 MG: 200 TABLET ORAL at 21:50

## 2023-01-17 RX ADMIN — MIRTAZAPINE 15 MG: 15 TABLET, FILM COATED ORAL at 21:51

## 2023-01-17 RX ADMIN — Medication 25 MCG: at 08:05

## 2023-01-17 RX ADMIN — HYDROXYZINE HYDROCHLORIDE 50 MG: 50 TABLET, FILM COATED ORAL at 08:05

## 2023-01-17 RX ADMIN — VENLAFAXINE HYDROCHLORIDE 75 MG: 150 CAPSULE, EXTENDED RELEASE ORAL at 21:53

## 2023-01-17 RX ADMIN — EMPAGLIFLOZIN 10 MG: 10 TABLET, FILM COATED ORAL at 08:05

## 2023-01-17 RX ADMIN — BACLOFEN 10 MG: 10 TABLET ORAL at 13:31

## 2023-01-17 RX ADMIN — DIVALPROEX SODIUM 1000 MG: 500 TABLET, DELAYED RELEASE ORAL at 21:51

## 2023-01-17 RX ADMIN — VENLAFAXINE HYDROCHLORIDE 150 MG: 150 CAPSULE, EXTENDED RELEASE ORAL at 21:52

## 2023-01-17 RX ADMIN — CLONIDINE HYDROCHLORIDE 0.1 MG: 0.1 TABLET ORAL at 19:41

## 2023-01-17 RX ADMIN — ATORVASTATIN CALCIUM 20 MG: 20 TABLET, FILM COATED ORAL at 19:41

## 2023-01-17 RX ADMIN — METFORMIN HYDROCHLORIDE 850 MG: 850 TABLET, FILM COATED ORAL at 17:45

## 2023-01-17 ASSESSMENT — ACTIVITIES OF DAILY LIVING (ADL)
ADLS_ACUITY_SCORE: 59
ADLS_ACUITY_SCORE: 60
ADLS_ACUITY_SCORE: 59

## 2023-01-17 NOTE — PLAN OF CARE
PRIMARY DIAGNOSIS: Placement  OUTPATIENT/OBSERVATION GOALS TO BE MET BEFORE DISCHARGE:  1. ADLs back to baseline: Yes, paraplegic    2. Activity and level of assistance: Total cares baseline    3. Pain status: Pain free.    4. Return to near baseline physical activity: Yes     Discharge Planner Nurse   Safe discharge environment identified: No  Barriers to discharge: Yes       Entered by: Annalee Palomares RN 01/17/2023 9:48 AM      Vital signs:  Temp: 98.2  F (36.8  C) Temp src: Axillary BP: (!) 147/103 Pulse: 65   Resp: 16 SpO2: 94 % O2 Device: None (Room air)     Weight: 99.9 kg (220 lb 4.8 oz)  There is no height or weight on file to calculate BMI.    A/O x4. Total cares and was changed and repositioned. Denies pain. Pulsate matress algorithm WDL. 2U novolog given per orders. Placement. VSS.    Please review provider order for any additional goals.   Nurse to notify provider when observation goals have been met and patient is ready for discharge.

## 2023-01-17 NOTE — PLAN OF CARE
PRIMARY DIAGNOSIS: Placement  OUTPATIENT/OBSERVATION GOALS TO BE MET BEFORE DISCHARGE:  1. ADLs back to baseline: Yes, paraplegic    2. Activity and level of assistance: Total cares baseline    3. Pain status: Pain free.    4. Return to near baseline physical activity: Yes     Discharge Planner Nurse   Safe discharge environment identified: No  Barriers to discharge: Yes       Entered by: Annalee Palomares RN 01/17/2023 12:12 PM      Vital signs:  Temp: 98.2  F (36.8  C) Temp src: Axillary BP: (!) 147/103 Pulse: 65   Resp: 16 SpO2: 94 % O2 Device: None (Room air)     Weight: 99.9 kg (220 lb 4.8 oz)  There is no height or weight on file to calculate BMI.    A/O x4. Total cares and was changed and repositioned. Denies pain. Pulsate matress algorithm WDL. 2U novolog given per orders. Placement. VSS.    Please review provider order for any additional goals.   Nurse to notify provider when observation goals have been met and patient is ready for discharge.

## 2023-01-17 NOTE — PLAN OF CARE
PRIMARY DIAGNOSIS: PLACEMENT  OUTPATIENT/OBSERVATION GOALS TO BE MET BEFORE DISCHARGE:  1. ADLs back to baseline: Yes    2. Activity and level of assistance: total care     3. Pain status: Pain free.    4. Return to near baseline physical activity: Yes     Discharge Planner Nurse   Safe discharge environment identified: No  Barriers to discharge: Yes          Entered by: Ciara Quiros RN 01/16/2023 9:26 PM     Please review provider order for any additional goals.   Nurse to notify provider when observation goals have been met and patient is ready for discharge.    Pt A&Ox 4. Denies any pain. Swallowed pill with no problem. Awaiting placement

## 2023-01-17 NOTE — PLAN OF CARE
PRIMARY DIAGNOSIS: PLACEMENT  OUTPATIENT/OBSERVATION GOALS TO BE MET BEFORE DISCHARGE:  1. ADLs back to baseline: Yes    2. Activity and level of assistance: total care     3. Pain status: Pain free.    4. Return to near baseline physical activity: Yes     Discharge Planner Nurse   Safe discharge environment identified: No  Barriers to discharge: Yes          Entered by: Ciara Quiros RN 01/17/2023 4:01 AM     Please review provider order for any additional goals.   Nurse to notify provider when observation goals have been met and patient is ready for discharge.     Pt A&Ox4. Patient managed to sleep, denies any pain or discomfort. Able to reposition himself. Bed alarm in place,call light  within reach. Incontinence pad changed. VSS. Awaiting LTC.

## 2023-01-17 NOTE — PLAN OF CARE
PRIMARY DIAGNOSIS: Placement  OUTPATIENT/OBSERVATION GOALS TO BE MET BEFORE DISCHARGE:  1. ADLs back to baseline: Yes, Paraplegic    2. Activity and level of assistance: Total cares    3. Pain status: Pain free.    4. Return to near baseline physical activity: Yes     Discharge Planner Nurse   Safe discharge environment identified: No  Barriers to discharge: Yes       Entered by: Susan Teran RN 01/17/2023 5:18 PM    /79 (BP Location: Left arm)   Pulse 76   Temp 98  F (36.7  C) (Oral)   Resp 17   Wt 99.9 kg (220 lb 4.8 oz)   SpO2 94%

## 2023-01-17 NOTE — PROGRESS NOTES
Care Management Discharge Note    Discharge Date: 02/28/2023     Discharge Disposition:  Group Home once new placement found by relocation worker    Private pay costs discussed: Not applicable    Handoff Referral Completed: No    Additional Information:  SW spoke with Fiona ([phone]) with [facility], [phone], to schedule [employer] assessment. They have scheduled patient for an assessment on the next available date, February 9th at 1:00pm. They will call into patient's room for the assessment. SW will continue to follow.     1125: SW received call back from [facility], they had a cancellation and can complete assessment on January 26th at 9:00am, CM  Elzbieta will be calling into patient's room.     DANITA Obrien, [facility]   Inpatient Care Coordination  [facility]   [phone]
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
## 2023-01-17 NOTE — PLAN OF CARE
PRIMARY DIAGNOSIS: PLACEMENT  OUTPATIENT/OBSERVATION GOALS TO BE MET BEFORE DISCHARGE:  1. ADLs back to baseline: Yes    2. Activity and level of assistance: total care     3. Pain status: Pain free.    4. Return to near baseline physical activity: Yes     Discharge Planner Nurse   Safe discharge environment identified: No  Barriers to discharge: Yes          Entered by: Ciara Quiros RN 01/17/2023 12:06 AM     Please review provider order for any additional goals.   Nurse to notify provider when observation goals have been met and patient is ready for discharge.    Patient A&Ox4. Denies any pain or discomfort.Incontinence pad changed needs AO2x. Noted both ankles has blanchable redness, foam adhesive dressing applied. POCT 155 no coverage of insulin need.Patient asked for some snacks.

## 2023-01-18 LAB
GLUCOSE BLDC GLUCOMTR-MCNC: 114 MG/DL (ref 70–99)
GLUCOSE BLDC GLUCOMTR-MCNC: 131 MG/DL (ref 70–99)
GLUCOSE BLDC GLUCOMTR-MCNC: 139 MG/DL (ref 70–99)
GLUCOSE BLDC GLUCOMTR-MCNC: 165 MG/DL (ref 70–99)

## 2023-01-18 PROCEDURE — 250N000013 HC RX MED GY IP 250 OP 250 PS 637: Performed by: HOSPITALIST

## 2023-01-18 PROCEDURE — 250N000013 HC RX MED GY IP 250 OP 250 PS 637: Performed by: PHYSICIAN ASSISTANT

## 2023-01-18 PROCEDURE — 99207 PR NO CHARGE LOS: CPT | Performed by: PHYSICIAN ASSISTANT

## 2023-01-18 PROCEDURE — 250N000013 HC RX MED GY IP 250 OP 250 PS 637: Performed by: NURSE PRACTITIONER

## 2023-01-18 PROCEDURE — 82962 GLUCOSE BLOOD TEST: CPT

## 2023-01-18 PROCEDURE — G0378 HOSPITAL OBSERVATION PER HR: HCPCS

## 2023-01-18 PROCEDURE — 250N000013 HC RX MED GY IP 250 OP 250 PS 637

## 2023-01-18 RX ADMIN — HYDROCORTISONE: 1 CREAM TOPICAL at 08:38

## 2023-01-18 RX ADMIN — OLANZAPINE 2.5 MG: 2.5 TABLET, FILM COATED ORAL at 14:18

## 2023-01-18 RX ADMIN — BACLOFEN 10 MG: 10 TABLET ORAL at 21:17

## 2023-01-18 RX ADMIN — CLOTRIMAZOLE: 0.01 CREAM TOPICAL at 08:38

## 2023-01-18 RX ADMIN — DIVALPROEX SODIUM 1000 MG: 500 TABLET, DELAYED RELEASE ORAL at 21:18

## 2023-01-18 RX ADMIN — Medication 10 MG: at 21:19

## 2023-01-18 RX ADMIN — QUETIAPINE 200 MG: 200 TABLET, FILM COATED ORAL at 14:18

## 2023-01-18 RX ADMIN — POLYETHYLENE GLYCOL 3350 17 G: 17 POWDER, FOR SOLUTION ORAL at 08:38

## 2023-01-18 RX ADMIN — LEVOTHYROXINE SODIUM 25 MCG: 0.03 TABLET ORAL at 08:39

## 2023-01-18 RX ADMIN — CLONIDINE HYDROCHLORIDE 0.1 MG: 0.1 TABLET ORAL at 14:18

## 2023-01-18 RX ADMIN — VENLAFAXINE HYDROCHLORIDE 75 MG: 150 CAPSULE, EXTENDED RELEASE ORAL at 21:19

## 2023-01-18 RX ADMIN — HYDROXYZINE HYDROCHLORIDE 50 MG: 50 TABLET, FILM COATED ORAL at 21:17

## 2023-01-18 RX ADMIN — CLONIDINE HYDROCHLORIDE 0.1 MG: 0.1 TABLET ORAL at 21:18

## 2023-01-18 RX ADMIN — CLONIDINE HYDROCHLORIDE 0.1 MG: 0.1 TABLET ORAL at 08:39

## 2023-01-18 RX ADMIN — HYDROXYZINE HYDROCHLORIDE 50 MG: 50 TABLET, FILM COATED ORAL at 08:39

## 2023-01-18 RX ADMIN — VENLAFAXINE HYDROCHLORIDE 150 MG: 150 CAPSULE, EXTENDED RELEASE ORAL at 21:17

## 2023-01-18 RX ADMIN — BACLOFEN 10 MG: 10 TABLET ORAL at 08:39

## 2023-01-18 RX ADMIN — ASPIRIN 81 MG CHEWABLE TABLET 81 MG: 81 TABLET CHEWABLE at 08:39

## 2023-01-18 RX ADMIN — HYDROCORTISONE: 1 CREAM TOPICAL at 21:26

## 2023-01-18 RX ADMIN — METFORMIN HYDROCHLORIDE 850 MG: 850 TABLET, FILM COATED ORAL at 18:55

## 2023-01-18 RX ADMIN — MIRTAZAPINE 15 MG: 15 TABLET, FILM COATED ORAL at 21:17

## 2023-01-18 RX ADMIN — CLOTRIMAZOLE: 0.01 CREAM TOPICAL at 21:25

## 2023-01-18 RX ADMIN — EMPAGLIFLOZIN 10 MG: 10 TABLET, FILM COATED ORAL at 08:38

## 2023-01-18 RX ADMIN — DIVALPROEX SODIUM 750 MG: 500 TABLET, DELAYED RELEASE ORAL at 08:39

## 2023-01-18 RX ADMIN — ATORVASTATIN CALCIUM 20 MG: 20 TABLET, FILM COATED ORAL at 21:18

## 2023-01-18 RX ADMIN — Medication 25 MCG: at 08:39

## 2023-01-18 RX ADMIN — METOPROLOL SUCCINATE 25 MG: 25 TABLET, EXTENDED RELEASE ORAL at 08:39

## 2023-01-18 RX ADMIN — QUETIAPINE FUMARATE 400 MG: 200 TABLET ORAL at 21:18

## 2023-01-18 RX ADMIN — QUETIAPINE 200 MG: 200 TABLET, FILM COATED ORAL at 08:38

## 2023-01-18 RX ADMIN — BACLOFEN 10 MG: 10 TABLET ORAL at 14:18

## 2023-01-18 RX ADMIN — HYDROXYZINE HYDROCHLORIDE 50 MG: 50 TABLET, FILM COATED ORAL at 14:18

## 2023-01-18 ASSESSMENT — ACTIVITIES OF DAILY LIVING (ADL)
ADLS_ACUITY_SCORE: 59
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
## 2023-01-18 NOTE — PLAN OF CARE
Goal Outcome Evaluation:  PRIMARY DIAGNOSIS: PLACEMENT  OUTPATIENT/OBSERVATION GOALS TO BE MET BEFORE DISCHARGE:  1. ADLs back to baseline: Yes    2. Activity and level of assistance: Maximum assist with lift    3. Pain status: Pain free.    4. Return to near baseline physical activity: Yes     Discharge Planner Nurse   Safe discharge environment identified: No  Barriers to discharge: Yes       Entered by: Annalee Echevarria RN 01/18/2023   /55   Pulse 73   Temp 97.7  F (36.5  C) (Oral)   Resp 16   Wt 99.9 kg (220 lb 4.8 oz)   SpO2 96%    Pt. Alert & oriented x 4.Denies pain, resting comfortably in bed.    Please review provider order for any additional goals.   Nurse to notify provider when observation goals have been met and patient is ready for discharge.

## 2023-01-18 NOTE — PROGRESS NOTES
PRIMARY DIAGNOSIS: PLACEMENT  OUTPATIENT/OBSERVATION GOALS TO BE MET BEFORE DISCHARGE:  ADLs back to baseline: Yes    2. Activity and level of assistance: Ax1-2    Pain status: Pain free.    Return to near baseline physical activity: Yes     Discharge Planner Nurse   Safe discharge environment identified: No  Barriers to discharge: Yes       Entered by: Annalee Echevarria RN 01/18/2023     /55   Pulse 73   Temp 97.7  F (36.5  C) (Oral)   Resp 16   Wt 99.9 kg (220 lb 4.8 oz)   SpO2 96%    Patient alert & oriented x 4. Assist x 1-2. Incontinent of bowel & bladder. No IV access.VS x 2 daily.Medically stable.[facility] assessment on Jan 26th. SW following for placement.  Please review provider order for any additional goals.   Nurse to notify provider when observation goals have been met and patient is ready for discharge.

## 2023-01-18 NOTE — PROGRESS NOTES
Care Management Follow Up    Expected Discharge Date: 02/28/2023     Concerns to be Addressed:  Discharge planning     Patient plan of care discussed at interdisciplinary rounds: Yes    Anticipated Discharge Disposition:  Group Home once placement found by relocation worker    Additional Information:  SW received email update from relocation worker stating that she is still waiting to hear back on pending  referrals through [facility]. Other facilities have declined patient. SW will continue to follow.     DANITA Obrien, [facility]   Inpatient Care Coordination  [facility]   [phone]

## 2023-01-18 NOTE — PLAN OF CARE
"Patient walked up to me in room 301 and said, "you hear me? I'm going back to my room." Informed patient I would be there when I was finished with my other patient.  " PRIMARY DIAGNOSIS: Placement  OUTPATIENT/OBSERVATION GOALS TO BE MET BEFORE DISCHARGE:  1. ADLs back to baseline: Yes, Paraplegic    2. Activity and level of assistance: Total cares    3. Pain status: Pain free.    4. Return to near baseline physical activity: Yes     Discharge Planner Nurse   Safe discharge environment identified: No  Barriers to discharge: Yes       Entered by: Susan Teran RN 01/17/2023 10:37 PM    /55   Pulse 73   Temp 97.7  F (36.5  C) (Oral)   Resp 16   Wt 99.9 kg (220 lb 4.8 oz)   SpO2 96%

## 2023-01-19 LAB
GLUCOSE BLDC GLUCOMTR-MCNC: 107 MG/DL (ref 70–99)
GLUCOSE BLDC GLUCOMTR-MCNC: 129 MG/DL (ref 70–99)
GLUCOSE BLDC GLUCOMTR-MCNC: 130 MG/DL (ref 70–99)
GLUCOSE BLDC GLUCOMTR-MCNC: 97 MG/DL (ref 70–99)

## 2023-01-19 PROCEDURE — 250N000013 HC RX MED GY IP 250 OP 250 PS 637: Performed by: PHYSICIAN ASSISTANT

## 2023-01-19 PROCEDURE — 250N000013 HC RX MED GY IP 250 OP 250 PS 637

## 2023-01-19 PROCEDURE — 250N000013 HC RX MED GY IP 250 OP 250 PS 637: Performed by: NURSE PRACTITIONER

## 2023-01-19 PROCEDURE — 250N000013 HC RX MED GY IP 250 OP 250 PS 637: Performed by: HOSPITALIST

## 2023-01-19 PROCEDURE — 99207 PR NO CHARGE LOS: CPT | Performed by: PHYSICIAN ASSISTANT

## 2023-01-19 PROCEDURE — 82962 GLUCOSE BLOOD TEST: CPT

## 2023-01-19 PROCEDURE — G0378 HOSPITAL OBSERVATION PER HR: HCPCS

## 2023-01-19 RX ADMIN — VENLAFAXINE HYDROCHLORIDE 75 MG: 150 CAPSULE, EXTENDED RELEASE ORAL at 21:22

## 2023-01-19 RX ADMIN — HYDROXYZINE HYDROCHLORIDE 50 MG: 50 TABLET, FILM COATED ORAL at 14:03

## 2023-01-19 RX ADMIN — CLOTRIMAZOLE: 0.01 CREAM TOPICAL at 10:25

## 2023-01-19 RX ADMIN — Medication 25 MCG: at 08:43

## 2023-01-19 RX ADMIN — CLONIDINE HYDROCHLORIDE 0.1 MG: 0.1 TABLET ORAL at 21:24

## 2023-01-19 RX ADMIN — QUETIAPINE FUMARATE 400 MG: 200 TABLET ORAL at 21:23

## 2023-01-19 RX ADMIN — Medication 10 MG: at 21:23

## 2023-01-19 RX ADMIN — DIVALPROEX SODIUM 1000 MG: 500 TABLET, DELAYED RELEASE ORAL at 21:23

## 2023-01-19 RX ADMIN — ATORVASTATIN CALCIUM 20 MG: 20 TABLET, FILM COATED ORAL at 21:22

## 2023-01-19 RX ADMIN — METFORMIN HYDROCHLORIDE 850 MG: 850 TABLET, FILM COATED ORAL at 18:15

## 2023-01-19 RX ADMIN — HYDROXYZINE HYDROCHLORIDE 50 MG: 50 TABLET, FILM COATED ORAL at 21:24

## 2023-01-19 RX ADMIN — CLONIDINE HYDROCHLORIDE 0.1 MG: 0.1 TABLET ORAL at 14:03

## 2023-01-19 RX ADMIN — METOPROLOL SUCCINATE 25 MG: 25 TABLET, EXTENDED RELEASE ORAL at 08:42

## 2023-01-19 RX ADMIN — EMPAGLIFLOZIN 10 MG: 10 TABLET, FILM COATED ORAL at 08:44

## 2023-01-19 RX ADMIN — VENLAFAXINE HYDROCHLORIDE 150 MG: 150 CAPSULE, EXTENDED RELEASE ORAL at 21:25

## 2023-01-19 RX ADMIN — DIVALPROEX SODIUM 750 MG: 500 TABLET, DELAYED RELEASE ORAL at 08:42

## 2023-01-19 RX ADMIN — ASPIRIN 81 MG CHEWABLE TABLET 81 MG: 81 TABLET CHEWABLE at 08:39

## 2023-01-19 RX ADMIN — BACLOFEN 10 MG: 10 TABLET ORAL at 08:43

## 2023-01-19 RX ADMIN — BACLOFEN 10 MG: 10 TABLET ORAL at 21:24

## 2023-01-19 RX ADMIN — QUETIAPINE 200 MG: 200 TABLET, FILM COATED ORAL at 08:39

## 2023-01-19 RX ADMIN — MIRTAZAPINE 15 MG: 15 TABLET, FILM COATED ORAL at 21:22

## 2023-01-19 RX ADMIN — CLONIDINE HYDROCHLORIDE 0.1 MG: 0.1 TABLET ORAL at 08:43

## 2023-01-19 RX ADMIN — BACLOFEN 10 MG: 10 TABLET ORAL at 14:03

## 2023-01-19 RX ADMIN — OLANZAPINE 2.5 MG: 2.5 TABLET, FILM COATED ORAL at 14:03

## 2023-01-19 RX ADMIN — HYDROCORTISONE: 1 CREAM TOPICAL at 10:25

## 2023-01-19 RX ADMIN — HYDROXYZINE HYDROCHLORIDE 50 MG: 50 TABLET, FILM COATED ORAL at 08:43

## 2023-01-19 RX ADMIN — LEVOTHYROXINE SODIUM 25 MCG: 0.03 TABLET ORAL at 08:42

## 2023-01-19 RX ADMIN — CLOTRIMAZOLE: 0.01 CREAM TOPICAL at 21:31

## 2023-01-19 RX ADMIN — QUETIAPINE 200 MG: 200 TABLET, FILM COATED ORAL at 14:03

## 2023-01-19 RX ADMIN — POLYETHYLENE GLYCOL 3350 17 G: 17 POWDER, FOR SOLUTION ORAL at 08:44

## 2023-01-19 ASSESSMENT — ACTIVITIES OF DAILY LIVING (ADL)
ADLS_ACUITY_SCORE: 59

## 2023-01-19 NOTE — PLAN OF CARE
PRIMARY DIAGNOSIS: Placement  OUTPATIENT/OBSERVATION GOALS TO BE MET BEFORE DISCHARGE:  1. ADLs back to baseline: Yes    2. Activity and level of assistance: Up with maximum assistance. Consider SW and/or PT evaluation.     3. Pain status: Pain free.    4. Return to near baseline physical activity: Yes     Discharge Planner Nurse   Safe discharge environment identified: No  Barriers to discharge: Yes       Entered by: Luciana Johnson RN 01/19/2023 4:37 PM   Pt makes needs known.  Up in chair.  Up with maximum assist and lift.    Blood pressure (!) 134/91, pulse 68, temperature 97.4  F (36.3  C), temperature source Oral, resp. rate 16, weight 99.9 kg (220 lb 4.8 oz), SpO2 95 %.      Please review provider order for any additional goals.   Nurse to notify provider when observation goals have been met and patient is ready for discharge.
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
## 2023-01-19 NOTE — PLAN OF CARE
PRIMARY DIAGNOSIS: Placement  OUTPATIENT/OBSERVATION GOALS TO BE MET BEFORE DISCHARGE:  1. ADLs back to baseline: Yes, paraplegic    2. Activity and level of assistance: Total cares baseline    3. Pain status: Pain free.    4. Return to near baseline physical activity: Yes     Discharge Planner Nurse   Safe discharge environment identified: No  Barriers to discharge: Yes       Entered by: Annalee Palomares RN 01/18/2023 6:48 PM      Vital signs:  Temp: 97.3  F (36.3  C) Temp src: Oral BP: (!) 147/95 Pulse: 71   Resp: 18 SpO2: 94 % O2 Device: None (Room air)     Weight: 99.9 kg (220 lb 4.8 oz)  There is no height or weight on file to calculate BMI.    A/O x4. Total cares and was changed and repositioned. Denies pain. Pulsate matress algorithm WDL. 2U novolog given per orders. Placement. VSS.    Please review provider order for any additional goals.   Nurse to notify provider when observation goals have been met and patient is ready for discharge.

## 2023-01-19 NOTE — PLAN OF CARE
PRIMARY DIAGNOSIS: PLACEMENT  OUTPATIENT/OBSERVATION GOALS TO BE MET BEFORE DISCHARGE:  1. ADLs back to baseline: Yes    2. Activity and level of assistance: Up with maximum assistance. Total care at baseline     3. Pain status: Pain free.    4. Return to near baseline physical activity: Yes     Discharge Planner Nurse   Safe discharge environment identified: No  Barriers to discharge: Yes       Entered by: Chela Glover RN 01/18/2023 11:38 PM     Patient is Aox4, VSS, up with maximum assist and lift, total care at baseline, tolerating regular diet and oral medications, no pain noted on this shift, resting well watching tv, able to make needs known, will continue to monitor and provide cares.      Please review provider order for any additional goals.   Nurse to notify provider when observation goals have been met and patient is ready for discharge.Goal Outcome Evaluation:

## 2023-01-19 NOTE — PLAN OF CARE
PRIMARY DIAGNOSIS: PLACEMENT  OUTPATIENT/OBSERVATION GOALS TO BE MET BEFORE DISCHARGE:  1. ADLs back to baseline: Yes    2. Activity and level of assistance: Up with maximum assistance. Total care at baseline     3. Pain status: Pain free.    4. Return to near baseline physical activity: Yes     Discharge Planner Nurse   Safe discharge environment identified: No  Barriers to discharge: Yes       Entered by: Genevieve Ordoñez RN 01/19/2023 12:22 PM     Patient is Aox4, VSS on RA. Assist X 2 and total care at baseline, tolerating regular diet and oral medications, Denies pain. Able to communicate needs, check in change incontinent product in bed. Will continue to monitor and provide cares.      Please review provider order for any additional goals.   Nurse to notify provider when observation goals have been met and patient is ready for discharge.Goal Outcome Evaluation:

## 2023-01-19 NOTE — PLAN OF CARE
PRIMARY DIAGNOSIS: Placement  OUTPATIENT/OBSERVATION GOALS TO BE MET BEFORE DISCHARGE:  1. ADLs back to baseline: Yes, paraplegic    2. Activity and level of assistance: Total cares baseline    3. Pain status: Pain free.    4. Return to near baseline physical activity: Yes     Discharge Planner Nurse   Safe discharge environment identified: No  Barriers to discharge: Yes       Entered by: Annalee Palomares RN 01/18/2023 6:48 PM      Vital signs:  Temp: 97.3  F (36.3  C) Temp src: Oral BP: (!) 147/95 Pulse: 71   Resp: 18 SpO2: 94 % O2 Device: None (Room air)     Weight: 99.9 kg (220 lb 4.8 oz)  There is no height or weight on file to calculate BMI.    A/O x4. Total cares and was changed and repositioned. Denies pain. Pulsate matress algorithm WDL. VSS.    Please review provider order for any additional goals.   Nurse to notify provider when observation goals have been met and patient is ready for discharge.

## 2023-01-19 NOTE — PLAN OF CARE
PRIMARY DIAGNOSIS: PLACEMENT  OUTPATIENT/OBSERVATION GOALS TO BE MET BEFORE DISCHARGE:  1. ADLs back to baseline: Yes    2. Activity and level of assistance: Up with maximum assistance. Total care at baseline     3. Pain status: Pain free.    4. Return to near baseline physical activity: Yes     Discharge Planner Nurse   Safe discharge environment identified: No  Barriers to discharge: Yes       Entered by: Chela Glover RN 01/19/2023 12:56 AM     Patient is Aox4, VSS, up with maximum assist and lift, total care at baseline, tolerating regular diet and oral medications, no pain noted on this shift, resting well, able to make needs known, will continue to monitor and provide cares.      Please review provider order for any additional goals.   Nurse to notify provider when observation goals have been met and patient is ready for discharge.Goal Outcome Evaluation:

## 2023-01-19 NOTE — PLAN OF CARE
PRIMARY DIAGNOSIS: PLACEMENT  OUTPATIENT/OBSERVATION GOALS TO BE MET BEFORE DISCHARGE:  1. ADLs back to baseline: Yes    2. Activity and level of assistance: Up with maximum assistance. Total care at baseline     3. Pain status: Pain free.    4. Return to near baseline physical activity: Yes     Discharge Planner Nurse   Safe discharge environment identified: No  Barriers to discharge: Yes       Entered by: Chela Glover RN 01/19/2023 4:34 AM     Patient is Aox4, VSS, up with maximum assist and lift, total care at baseline, tolerating regular diet and oral medications, no pain noted on this shift, sleeping well, able to make needs known, will continue to monitor and provide cares.      Please review provider order for any additional goals.   Nurse to notify provider when observation goals have been met and patient is ready for discharge.Goal Outcome Evaluation:

## 2023-01-20 LAB
GLUCOSE BLDC GLUCOMTR-MCNC: 100 MG/DL (ref 70–99)
GLUCOSE BLDC GLUCOMTR-MCNC: 119 MG/DL (ref 70–99)
GLUCOSE BLDC GLUCOMTR-MCNC: 127 MG/DL (ref 70–99)
GLUCOSE BLDC GLUCOMTR-MCNC: 135 MG/DL (ref 70–99)

## 2023-01-20 PROCEDURE — 250N000013 HC RX MED GY IP 250 OP 250 PS 637: Performed by: HOSPITALIST

## 2023-01-20 PROCEDURE — 250N000013 HC RX MED GY IP 250 OP 250 PS 637: Performed by: NURSE PRACTITIONER

## 2023-01-20 PROCEDURE — 82962 GLUCOSE BLOOD TEST: CPT

## 2023-01-20 PROCEDURE — G0378 HOSPITAL OBSERVATION PER HR: HCPCS

## 2023-01-20 PROCEDURE — 99207 PR NO CHARGE LOS: CPT | Performed by: PHYSICIAN ASSISTANT

## 2023-01-20 PROCEDURE — 250N000013 HC RX MED GY IP 250 OP 250 PS 637

## 2023-01-20 PROCEDURE — 250N000013 HC RX MED GY IP 250 OP 250 PS 637: Performed by: PHYSICIAN ASSISTANT

## 2023-01-20 RX ADMIN — Medication 25 MCG: at 08:44

## 2023-01-20 RX ADMIN — EMPAGLIFLOZIN 10 MG: 10 TABLET, FILM COATED ORAL at 08:44

## 2023-01-20 RX ADMIN — HYDROXYZINE HYDROCHLORIDE 50 MG: 50 TABLET, FILM COATED ORAL at 21:22

## 2023-01-20 RX ADMIN — DIVALPROEX SODIUM 750 MG: 500 TABLET, DELAYED RELEASE ORAL at 08:45

## 2023-01-20 RX ADMIN — ASPIRIN 81 MG CHEWABLE TABLET 81 MG: 81 TABLET CHEWABLE at 08:45

## 2023-01-20 RX ADMIN — MIRTAZAPINE 15 MG: 15 TABLET, FILM COATED ORAL at 21:22

## 2023-01-20 RX ADMIN — CLOTRIMAZOLE: 0.01 CREAM TOPICAL at 21:28

## 2023-01-20 RX ADMIN — BACLOFEN 10 MG: 10 TABLET ORAL at 13:26

## 2023-01-20 RX ADMIN — METFORMIN HYDROCHLORIDE 850 MG: 850 TABLET, FILM COATED ORAL at 17:44

## 2023-01-20 RX ADMIN — KETOCONAZOLE: 20 CREAM TOPICAL at 21:28

## 2023-01-20 RX ADMIN — OLANZAPINE 2.5 MG: 2.5 TABLET, FILM COATED ORAL at 13:27

## 2023-01-20 RX ADMIN — CLONIDINE HYDROCHLORIDE 0.1 MG: 0.1 TABLET ORAL at 21:24

## 2023-01-20 RX ADMIN — HYDROXYZINE HYDROCHLORIDE 50 MG: 50 TABLET, FILM COATED ORAL at 08:44

## 2023-01-20 RX ADMIN — CLONIDINE HYDROCHLORIDE 0.1 MG: 0.1 TABLET ORAL at 13:26

## 2023-01-20 RX ADMIN — HYDROCORTISONE: 1 CREAM TOPICAL at 08:46

## 2023-01-20 RX ADMIN — QUETIAPINE 200 MG: 200 TABLET, FILM COATED ORAL at 13:25

## 2023-01-20 RX ADMIN — BACLOFEN 10 MG: 10 TABLET ORAL at 21:24

## 2023-01-20 RX ADMIN — KETOCONAZOLE: 20 CREAM TOPICAL at 17:41

## 2023-01-20 RX ADMIN — METOPROLOL SUCCINATE 25 MG: 25 TABLET, EXTENDED RELEASE ORAL at 08:44

## 2023-01-20 RX ADMIN — VENLAFAXINE HYDROCHLORIDE 150 MG: 150 CAPSULE, EXTENDED RELEASE ORAL at 21:23

## 2023-01-20 RX ADMIN — CLONIDINE HYDROCHLORIDE 0.1 MG: 0.1 TABLET ORAL at 08:44

## 2023-01-20 RX ADMIN — QUETIAPINE 200 MG: 200 TABLET, FILM COATED ORAL at 08:44

## 2023-01-20 RX ADMIN — ATORVASTATIN CALCIUM 20 MG: 20 TABLET, FILM COATED ORAL at 21:23

## 2023-01-20 RX ADMIN — CLOTRIMAZOLE: 0.01 CREAM TOPICAL at 08:49

## 2023-01-20 RX ADMIN — POLYETHYLENE GLYCOL 3350 17 G: 17 POWDER, FOR SOLUTION ORAL at 08:44

## 2023-01-20 RX ADMIN — VENLAFAXINE HYDROCHLORIDE 75 MG: 150 CAPSULE, EXTENDED RELEASE ORAL at 21:27

## 2023-01-20 RX ADMIN — Medication 10 MG: at 21:22

## 2023-01-20 RX ADMIN — QUETIAPINE FUMARATE 400 MG: 200 TABLET ORAL at 21:23

## 2023-01-20 RX ADMIN — HYDROXYZINE HYDROCHLORIDE 50 MG: 50 TABLET, FILM COATED ORAL at 13:26

## 2023-01-20 RX ADMIN — LEVOTHYROXINE SODIUM 25 MCG: 0.03 TABLET ORAL at 08:45

## 2023-01-20 RX ADMIN — KETOCONAZOLE: 20 CREAM TOPICAL at 08:46

## 2023-01-20 RX ADMIN — DIVALPROEX SODIUM 1000 MG: 500 TABLET, DELAYED RELEASE ORAL at 21:23

## 2023-01-20 RX ADMIN — BACLOFEN 10 MG: 10 TABLET ORAL at 08:45

## 2023-01-20 ASSESSMENT — ACTIVITIES OF DAILY LIVING (ADL)
ADLS_ACUITY_SCORE: 59
ADLS_ACUITY_SCORE: 55
ADLS_ACUITY_SCORE: 55
ADLS_ACUITY_SCORE: 59
ADLS_ACUITY_SCORE: 55
ADLS_ACUITY_SCORE: 55
ADLS_ACUITY_SCORE: 59
ADLS_ACUITY_SCORE: 55
ADLS_ACUITY_SCORE: 59
ADLS_ACUITY_SCORE: 59
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
## 2023-01-20 NOTE — PLAN OF CARE
PRIMARY DIAGNOSIS: Placement  OUTPATIENT/OBSERVATION GOALS TO BE MET BEFORE DISCHARGE:  1. ADLs back to baseline: Yes    2. Activity and level of assistance: Up with maximum assistance. Pain status: Pain free.    3. Return to near baseline physical activity: Yes     Discharge Planner Nurse   Safe discharge environment identified: No  Barriers to discharge: Yes       Entered by: Luciana Johnson RN 01/20/2023 4:14 PM   Pt makes needs known.  VSS.  Large BM.  Denies pain.  Please review provider order for any additional goals.   Nurse to notify provider when observation goals have been met and patient is ready for discharge.Goal Outcome Evaluation:

## 2023-01-20 NOTE — PLAN OF CARE
PRIMARY DIAGNOSIS: PLACEMENT  OUTPATIENT/OBSERVATION GOALS TO BE MET BEFORE DISCHARGE:  1. ADLs back to baseline: Yes    2. Activity and level of assistance: Up with maximum assistance. Total care at baseline     3. Pain status: Pain free.    4. Return to near baseline physical activity: Yes     Discharge Planner Nurse   Safe discharge environment identified: No  Barriers to discharge: Yes       Entered by: Genevieve Ordoñez RN 01/20/2023 9:14 AM     Patient is A&O x4, VSS on RA. Assist X 2 and total care at baseline, tolerating regular diet and oral medications, Denies pain. Able to communicate needs, check in change incontinent product in bed as needed. Will continue to monitor and provide cares.      Please review provider order for any additional goals.   Nurse to notify provider when observation goals have been met and patient is ready for discharge.Goal Outcome Evaluation:

## 2023-01-20 NOTE — PLAN OF CARE
PRIMARY DIAGNOSIS: Placement  OUTPATIENT/OBSERVATION GOALS TO BE MET BEFORE DISCHARGE:  ADLs back to baseline: Yes    Activity and level of assistance: assist x 1    Pain status: Pain free.    Return to near baseline physical activity: Yes     Discharge Planner Nurse   Safe discharge environment identified: No  Barriers to discharge: Yes       Entered by: Emily Garcia RN 01/20/2023 4:43 AM    Patient resting comfortably, SW following for placement        Please review provider order for any additional goals.   Nurse to notify provider when observation goals have been met and patient is ready for discharge.

## 2023-01-20 NOTE — PLAN OF CARE
PRIMARY DIAGNOSIS: PLACEMENT  OUTPATIENT/OBSERVATION GOALS TO BE MET BEFORE DISCHARGE:  1. ADLs back to baseline: Yes    2. Activity and level of assistance: Up with maximum assistance. Total care at baseline     3. Pain status: Pain free.    4. Return to near baseline physical activity: Yes     Discharge Planner Nurse   Safe discharge environment identified: No  Barriers to discharge: Yes       Entered by: Genevieve rOdoñez RN 01/20/2023 11:51 AM     Patient is A&O x4, Pt calm and cooperative.  VSS on RA. Assist X 2 and total care at baseline, Out of bed chair TID and as needed. Tolerating regular diet and oral medications, Denies pain. Able to communicate needs, check in change incontinent product in bed as needed. BLE elevated with pillow to prevent skin breakdown. Barrier cream applied for redness on scrotom. Will continue to monitor and provide cares.      Please review provider order for any additional goals.   Nurse to notify provider when observation goals have been met and patient is ready for discharge.Goal Outcome Evaluation:

## 2023-01-20 NOTE — PLAN OF CARE
PRIMARY DIAGNOSIS: Placement  OUTPATIENT/OBSERVATION GOALS TO BE MET BEFORE DISCHARGE:  1. ADLs back to baseline: Yes    2. Activity and level of assistance: Up with maximum assistance. Consider SW and/or PT evaluation.     3. Pain status: Pain free.    4. Return to near baseline physical activity: Yes     Discharge Planner Nurse   Safe discharge environment identified: No  Barriers to discharge: Yes       Entered by: Luciana Johnson RN 01/19/2023 9:43 PM   Pt denies pain.  Refused Hydrocortisone cream.  Ate 100% of meal.  HS BS did not require coverage.  Blood pressure 136/88, pulse 78, temperature 98.7  F (37.1  C), temperature source Axillary, resp. rate 16, weight 99.9 kg (220 lb 4.8 oz), SpO2 95 %.      Please review provider order for any additional goals.   Nurse to notify provider when observation goals have been met and patient is ready for discharge.

## 2023-01-20 NOTE — PLAN OF CARE
PRIMARY DIAGNOSIS: Placement  OUTPATIENT/OBSERVATION GOALS TO BE MET BEFORE DISCHARGE:  ADLs back to baseline: Yes    Activity and level of assistance: assist x 1    Pain status: Pain free.    Return to near baseline physical activity: Yes     Discharge Planner Nurse   Safe discharge environment identified: No  Barriers to discharge: Yes       Entered by: Emily Garcia RN 01/20/2023 12:24 AM    Patient is resting comfortably in bed, currently denies pain, SW following for placement         Please review provider order for any additional goals.   Nurse to notify provider when observation goals have been met and patient is ready for discharge.

## 2023-01-21 LAB
GLUCOSE BLDC GLUCOMTR-MCNC: 122 MG/DL (ref 70–99)
GLUCOSE BLDC GLUCOMTR-MCNC: 130 MG/DL (ref 70–99)
GLUCOSE BLDC GLUCOMTR-MCNC: 141 MG/DL (ref 70–99)
GLUCOSE BLDC GLUCOMTR-MCNC: 183 MG/DL (ref 70–99)

## 2023-01-21 PROCEDURE — 250N000013 HC RX MED GY IP 250 OP 250 PS 637: Performed by: NURSE PRACTITIONER

## 2023-01-21 PROCEDURE — 250N000013 HC RX MED GY IP 250 OP 250 PS 637: Performed by: PHYSICIAN ASSISTANT

## 2023-01-21 PROCEDURE — 250N000013 HC RX MED GY IP 250 OP 250 PS 637: Performed by: HOSPITALIST

## 2023-01-21 PROCEDURE — 250N000013 HC RX MED GY IP 250 OP 250 PS 637

## 2023-01-21 PROCEDURE — 82962 GLUCOSE BLOOD TEST: CPT | Mod: 91

## 2023-01-21 PROCEDURE — 99207 PR NO CHARGE LOS: CPT | Performed by: PHYSICIAN ASSISTANT

## 2023-01-21 PROCEDURE — G0378 HOSPITAL OBSERVATION PER HR: HCPCS

## 2023-01-21 RX ADMIN — ASPIRIN 81 MG CHEWABLE TABLET 81 MG: 81 TABLET CHEWABLE at 10:12

## 2023-01-21 RX ADMIN — QUETIAPINE 200 MG: 200 TABLET, FILM COATED ORAL at 14:26

## 2023-01-21 RX ADMIN — OLANZAPINE 2.5 MG: 2.5 TABLET, FILM COATED ORAL at 14:26

## 2023-01-21 RX ADMIN — INSULIN ASPART 1 UNITS: 100 INJECTION, SOLUTION INTRAVENOUS; SUBCUTANEOUS at 13:26

## 2023-01-21 RX ADMIN — Medication 25 MCG: at 10:12

## 2023-01-21 RX ADMIN — BACLOFEN 10 MG: 10 TABLET ORAL at 14:26

## 2023-01-21 RX ADMIN — QUETIAPINE FUMARATE 400 MG: 200 TABLET ORAL at 21:12

## 2023-01-21 RX ADMIN — METOPROLOL SUCCINATE 25 MG: 25 TABLET, EXTENDED RELEASE ORAL at 10:11

## 2023-01-21 RX ADMIN — CLONIDINE HYDROCHLORIDE 0.1 MG: 0.1 TABLET ORAL at 21:12

## 2023-01-21 RX ADMIN — CLOTRIMAZOLE: 0.01 CREAM TOPICAL at 13:23

## 2023-01-21 RX ADMIN — LEVOTHYROXINE SODIUM 25 MCG: 0.03 TABLET ORAL at 10:12

## 2023-01-21 RX ADMIN — HYDROXYZINE HYDROCHLORIDE 50 MG: 50 TABLET, FILM COATED ORAL at 21:12

## 2023-01-21 RX ADMIN — POLYETHYLENE GLYCOL 3350 17 G: 17 POWDER, FOR SOLUTION ORAL at 10:11

## 2023-01-21 RX ADMIN — CLONIDINE HYDROCHLORIDE 0.1 MG: 0.1 TABLET ORAL at 14:26

## 2023-01-21 RX ADMIN — METFORMIN HYDROCHLORIDE 850 MG: 850 TABLET, FILM COATED ORAL at 17:06

## 2023-01-21 RX ADMIN — CLOTRIMAZOLE: 0.01 CREAM TOPICAL at 21:23

## 2023-01-21 RX ADMIN — ATORVASTATIN CALCIUM 20 MG: 20 TABLET, FILM COATED ORAL at 21:13

## 2023-01-21 RX ADMIN — VENLAFAXINE HYDROCHLORIDE 150 MG: 150 CAPSULE, EXTENDED RELEASE ORAL at 21:12

## 2023-01-21 RX ADMIN — Medication 10 MG: at 21:13

## 2023-01-21 RX ADMIN — HYDROCORTISONE: 1 CREAM TOPICAL at 21:23

## 2023-01-21 RX ADMIN — BACLOFEN 10 MG: 10 TABLET ORAL at 10:12

## 2023-01-21 RX ADMIN — VENLAFAXINE HYDROCHLORIDE 75 MG: 150 CAPSULE, EXTENDED RELEASE ORAL at 21:16

## 2023-01-21 RX ADMIN — DIVALPROEX SODIUM 1000 MG: 500 TABLET, DELAYED RELEASE ORAL at 21:12

## 2023-01-21 RX ADMIN — QUETIAPINE 200 MG: 200 TABLET, FILM COATED ORAL at 10:11

## 2023-01-21 RX ADMIN — BACLOFEN 10 MG: 10 TABLET ORAL at 21:12

## 2023-01-21 RX ADMIN — HYDROXYZINE HYDROCHLORIDE 50 MG: 50 TABLET, FILM COATED ORAL at 10:12

## 2023-01-21 RX ADMIN — EMPAGLIFLOZIN 10 MG: 10 TABLET, FILM COATED ORAL at 10:12

## 2023-01-21 RX ADMIN — MIRTAZAPINE 15 MG: 15 TABLET, FILM COATED ORAL at 21:13

## 2023-01-21 RX ADMIN — HYDROXYZINE HYDROCHLORIDE 50 MG: 50 TABLET, FILM COATED ORAL at 14:26

## 2023-01-21 RX ADMIN — CLONIDINE HYDROCHLORIDE 0.1 MG: 0.1 TABLET ORAL at 10:12

## 2023-01-21 RX ADMIN — DIVALPROEX SODIUM 750 MG: 500 TABLET, DELAYED RELEASE ORAL at 10:12

## 2023-01-21 ASSESSMENT — ACTIVITIES OF DAILY LIVING (ADL)
ADLS_ACUITY_SCORE: 53
ADLS_ACUITY_SCORE: 55
ADLS_ACUITY_SCORE: 53
ADLS_ACUITY_SCORE: 55
ADLS_ACUITY_SCORE: 55
ADLS_ACUITY_SCORE: 53
ADLS_ACUITY_SCORE: 53
ADLS_ACUITY_SCORE: 55
ADLS_ACUITY_SCORE: 55
ADLS_ACUITY_SCORE: 53
ADLS_ACUITY_SCORE: 55
ADLS_ACUITY_SCORE: 55

## 2023-01-21 NOTE — PLAN OF CARE
PRIMARY DIAGNOSIS: Placement  OUTPATIENT/OBSERVATION GOALS TO BE MET BEFORE DISCHARGE:  1. ADLs back to baseline: Yes    2. Activity and level of assistance: Up with maximum assistance.     3. Pain status: Pain free.    4. Return to near baseline physical activity: Yes     Discharge Planner Nurse   Safe discharge environment identified: No  Barriers to discharge: Yes       Entered by: Luciana Johnson RN 01/21/2023 5:20 PM   Pt watching TV with son.  Makes needs known.  .  No coverage for BG.  AxOx4.    Please review provider order for any additional goals.   Nurse to notify provider when observation goals have been met and patient is ready for discharge.
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
## 2023-01-21 NOTE — PLAN OF CARE
PRIMARY DIAGNOSIS: Placement  OUTPATIENT/OBSERVATION GOALS TO BE MET BEFORE DISCHARGE:  1. ADLs back to baseline: Yes    2. Activity and level of assistance: Up with maximum assistance. Baseline     3. Pain status: Pain free.    4. Return to near baseline physical activity: Yes     Discharge Planner Nurse   Safe discharge environment identified: No  Barriers to discharge: Yes       Entered by: Tamiko Goldstein RN 01/21/2023 2:46 PM     Please review provider order for any additional goals.   Nurse to notify provider when observation goals have been met and patient is ready for discharge.    A&Ox4, repositions at patients request due to patient refusing when offered, tolerating regular diet, lift assistance to chair- patient declined to sit up in the chair, heart sounds- WNL, lungs- clear, bowels- active, incontinent.

## 2023-01-21 NOTE — PLAN OF CARE
PRIMARY DIAGNOSIS: Placement  OUTPATIENT/OBSERVATION GOALS TO BE MET BEFORE DISCHARGE:  ADLs back to baseline: Yes    Activity and level of assistance: Up with maximum assistance. Baseline     Pain status: Pain free.    Return to near baseline physical activity: Yes     Discharge Planner Nurse   Safe discharge environment identified: No  Barriers to discharge: Yes       Entered by: Tamiko Goldstein RN 01/21/2023 11:25 AM     Please review provider order for any additional goals.   Nurse to notify provider when observation goals have been met and patient is ready for discharge.

## 2023-01-21 NOTE — PLAN OF CARE
PRIMARY DIAGNOSIS: Placement   OUTPATIENT/OBSERVATION GOALS TO BE MET BEFORE DISCHARGE:  1. ADLs back to baseline: Yes    2. Activity and level of assistance: Up with maximum assistance. Consider SW and/or PT evaluation.     3. Pain status: Pain free.    4. Return to near baseline physical activity: Yes     Discharge Planner Nurse   Safe discharge environment identified: No  Barriers to discharge: Yes       Entered by: Luciana Johnson RN 01/20/2023 8:48 PM   Pt makes needs known.  Up to chair for dinner.  Ate well.  Denies pain.  Skin assessed for areas of breakdown.  Awaiting placement.    Please review provider order for any additional goals.   Nurse to notify provider when observation goals have been met and patient is ready for discharge.

## 2023-01-21 NOTE — PLAN OF CARE
PRIMARY DIAGNOSIS: Placement   OUTPATIENT/OBSERVATION GOALS TO BE MET BEFORE DISCHARGE:  1. ADLs back to baseline: Yes    2. Activity and level of assistance: A X 2 with a lift    3. Pain status: Denies pain    4. Return to near baseline physical activity: Yes     Discharge Planner Nurse   Safe discharge environment identified: No  Barriers to discharge: Yes   A & O X 4. Lung sound clear to ausculation bilaterally . Denies shortness or any respiratory distress.  Denies pain. Bowel sound present all quadrants. Incontinent of bowel and bladder. Patient on blood glucose monitoring and insulin for DM2 management. Medically stable to discharge . On Pulsate mattress,  Turn and reposition maintained and per patient request. Calls appropriately. Awaiting for placement.  following  /87 (BP Location: Right arm)   Pulse 70   Temp 97.6  F (36.4  C) (Oral)   Resp 16   Wt 99.9 kg (220 lb 4.8 oz)   SpO2 94%        Entered by: Monica Elder RN 01/21/2023 05:03     Please review provider order for any additional goals.   Nurse to notify provider when observation goals have been met and patient is ready for discharge.

## 2023-01-21 NOTE — PLAN OF CARE
PRIMARY DIAGNOSIS: Placement   OUTPATIENT/OBSERVATION GOALS TO BE MET BEFORE DISCHARGE:  ADLs back to baseline: Yes    Activity and level of assistance: A X 2 with a lift    Pain status: Denies pain    Return to near baseline physical activity: Yes     Discharge Planner Nurse   Safe discharge environment identified: No  Barriers to discharge: Yes       Entered by: Monica Elder RN 01/21/2023 00:24     Please review provider order for any additional goals.   Nurse to notify provider when observation goals have been met and patient is ready for discharge.

## 2023-01-22 LAB
GLUCOSE BLDC GLUCOMTR-MCNC: 133 MG/DL (ref 70–99)
GLUCOSE BLDC GLUCOMTR-MCNC: 135 MG/DL (ref 70–99)
GLUCOSE BLDC GLUCOMTR-MCNC: 144 MG/DL (ref 70–99)
GLUCOSE BLDC GLUCOMTR-MCNC: 160 MG/DL (ref 70–99)

## 2023-01-22 PROCEDURE — 250N000013 HC RX MED GY IP 250 OP 250 PS 637

## 2023-01-22 PROCEDURE — 250N000013 HC RX MED GY IP 250 OP 250 PS 637: Performed by: PHYSICIAN ASSISTANT

## 2023-01-22 PROCEDURE — 250N000013 HC RX MED GY IP 250 OP 250 PS 637: Performed by: HOSPITALIST

## 2023-01-22 PROCEDURE — 99207 PR NO CHARGE LOS: CPT | Performed by: PHYSICIAN ASSISTANT

## 2023-01-22 PROCEDURE — 250N000013 HC RX MED GY IP 250 OP 250 PS 637: Performed by: NURSE PRACTITIONER

## 2023-01-22 PROCEDURE — 82962 GLUCOSE BLOOD TEST: CPT | Mod: 91

## 2023-01-22 PROCEDURE — G0378 HOSPITAL OBSERVATION PER HR: HCPCS

## 2023-01-22 RX ADMIN — ATORVASTATIN CALCIUM 20 MG: 20 TABLET, FILM COATED ORAL at 21:22

## 2023-01-22 RX ADMIN — QUETIAPINE 200 MG: 200 TABLET, FILM COATED ORAL at 14:28

## 2023-01-22 RX ADMIN — DIVALPROEX SODIUM 750 MG: 500 TABLET, DELAYED RELEASE ORAL at 10:14

## 2023-01-22 RX ADMIN — CLONIDINE HYDROCHLORIDE 0.1 MG: 0.1 TABLET ORAL at 14:28

## 2023-01-22 RX ADMIN — LEVOTHYROXINE SODIUM 25 MCG: 0.03 TABLET ORAL at 10:15

## 2023-01-22 RX ADMIN — QUETIAPINE FUMARATE 400 MG: 200 TABLET ORAL at 21:21

## 2023-01-22 RX ADMIN — HYDROXYZINE HYDROCHLORIDE 50 MG: 50 TABLET, FILM COATED ORAL at 14:28

## 2023-01-22 RX ADMIN — CLONIDINE HYDROCHLORIDE 0.1 MG: 0.1 TABLET ORAL at 21:22

## 2023-01-22 RX ADMIN — CLONIDINE HYDROCHLORIDE 0.1 MG: 0.1 TABLET ORAL at 10:15

## 2023-01-22 RX ADMIN — CLOTRIMAZOLE: 0.01 CREAM TOPICAL at 21:32

## 2023-01-22 RX ADMIN — BACLOFEN 10 MG: 10 TABLET ORAL at 10:16

## 2023-01-22 RX ADMIN — QUETIAPINE 200 MG: 200 TABLET, FILM COATED ORAL at 10:15

## 2023-01-22 RX ADMIN — DIVALPROEX SODIUM 1000 MG: 500 TABLET, DELAYED RELEASE ORAL at 21:22

## 2023-01-22 RX ADMIN — ASPIRIN 81 MG CHEWABLE TABLET 81 MG: 81 TABLET CHEWABLE at 10:15

## 2023-01-22 RX ADMIN — BACLOFEN 10 MG: 10 TABLET ORAL at 14:28

## 2023-01-22 RX ADMIN — VENLAFAXINE HYDROCHLORIDE 75 MG: 150 CAPSULE, EXTENDED RELEASE ORAL at 21:21

## 2023-01-22 RX ADMIN — OLANZAPINE 2.5 MG: 2.5 TABLET, FILM COATED ORAL at 14:28

## 2023-01-22 RX ADMIN — EMPAGLIFLOZIN 10 MG: 10 TABLET, FILM COATED ORAL at 10:16

## 2023-01-22 RX ADMIN — METFORMIN HYDROCHLORIDE 850 MG: 850 TABLET, FILM COATED ORAL at 17:47

## 2023-01-22 RX ADMIN — HYDROCORTISONE: 1 CREAM TOPICAL at 21:32

## 2023-01-22 RX ADMIN — METOPROLOL SUCCINATE 25 MG: 25 TABLET, EXTENDED RELEASE ORAL at 10:15

## 2023-01-22 RX ADMIN — Medication 25 MCG: at 10:14

## 2023-01-22 RX ADMIN — MIRTAZAPINE 15 MG: 15 TABLET, FILM COATED ORAL at 21:23

## 2023-01-22 RX ADMIN — HYDROXYZINE HYDROCHLORIDE 50 MG: 50 TABLET, FILM COATED ORAL at 10:15

## 2023-01-22 RX ADMIN — Medication 10 MG: at 21:22

## 2023-01-22 RX ADMIN — VENLAFAXINE HYDROCHLORIDE 150 MG: 150 CAPSULE, EXTENDED RELEASE ORAL at 21:27

## 2023-01-22 RX ADMIN — BACLOFEN 10 MG: 10 TABLET ORAL at 21:23

## 2023-01-22 RX ADMIN — CLOTRIMAZOLE: 0.01 CREAM TOPICAL at 13:46

## 2023-01-22 RX ADMIN — HYDROXYZINE HYDROCHLORIDE 50 MG: 50 TABLET, FILM COATED ORAL at 21:23

## 2023-01-22 RX ADMIN — POLYETHYLENE GLYCOL 3350 17 G: 17 POWDER, FOR SOLUTION ORAL at 10:14

## 2023-01-22 ASSESSMENT — ACTIVITIES OF DAILY LIVING (ADL)
ADLS_ACUITY_SCORE: 53

## 2023-01-22 NOTE — PLAN OF CARE
PRIMARY DIAGNOSIS: Placement   OUTPATIENT/OBSERVATION GOALS TO BE MET BEFORE DISCHARGE:  1. ADLs back to baseline: Yes    2. Activity and level of assistance: A X 2 with a lift    3. Pain status: Denies pain    4. Return to near baseline physical activity: Yes     Discharge Planner Nurse   Safe discharge environment identified: No  Barriers to discharge: Yes   A & O X 4. Lung sound clear to ausculation bilaterally . Denies shortness .  Denies pain. Bowel sound present all quadrants. Incontinent of bowel and bladder. Patient on blood glucose monitoring and insulin for DM2 management. Medically stable to discharge . On Pulsate mattress,  Turn and reposition maintained and per patient request. Calls appropriately. Awaiting for placement.  following  /73 (BP Location: Right arm)   Pulse 77   Temp 97.8  F (36.6  C) (Oral)   Resp 17   Wt 99.9 kg (220 lb 4.8 oz)   SpO2 93%        Entered by: Monica Elder RN 01/22/2023 05:29     Please review provider order for any additional goals.   Nurse to notify provider when observation goals have been met and patient is ready for discharge.

## 2023-01-22 NOTE — PLAN OF CARE
PRIMARY DIAGNOSIS: Placement  OUTPATIENT/OBSERVATION GOALS TO BE MET BEFORE DISCHARGE:  1. ADLs back to baseline: Yes    2. Activity and level of assistance: Up with maximum assistance. Baseline     3. Pain status: Pain free.    4. Return to near baseline physical activity: Yes     Discharge Planner Nurse   Safe discharge environment identified: No  Barriers to discharge: Yes       Entered by: Tamiko Goldstein RN 01/22/2023 2:42 PM     Please review provider order for any additional goals.   Nurse to notify provider when observation goals have been met and patient is ready for discharge.    A&Ox4, flat affect, lift, tolerating regular diet, heart sounds- WNL, lungs- clear, bowels-active, incontinent, calls appropriately, awaiting placement

## 2023-01-22 NOTE — PLAN OF CARE
PRIMARY DIAGNOSIS: Placement  OUTPATIENT/OBSERVATION GOALS TO BE MET BEFORE DISCHARGE:  1. ADLs back to baseline: Yes    2. Activity and level of assistance: Up with maximum assistance. Baseline     3. Pain status: Pain free.    4. Return to near baseline physical activity: Yes     Discharge Planner Nurse   Safe discharge environment identified: No  Barriers to discharge: Yes       Entered by: Tamiko Goldstein RN 01/22/2023 1:38 PM     Please review provider order for any additional goals.   Nurse to notify provider when observation goals have been met and patient is ready for discharge.
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
## 2023-01-22 NOTE — PLAN OF CARE
PRIMARY DIAGNOSIS: Placement  OUTPATIENT/OBSERVATION GOALS TO BE MET BEFORE DISCHARGE:  1. ADLs back to baseline: Yes    2. Activity and level of assistance: Up with maximum assistance.      3. Pain status: Pain free.    4. Return to near baseline physical activity: Yes     Discharge Planner Nurse   Safe discharge environment identified: No  Barriers to discharge: Yes       Entered by: Luciana Johnson RN 01/21/2023 8:14 PM   Makes needs known. VSS on RA.  Denies pain.  Ate well.    Please review provider order for any additional goals.   Nurse to notify provider when observation goals have been met and patient is ready for discharge.Goal Outcome Evaluation:

## 2023-01-22 NOTE — PLAN OF CARE
PRIMARY DIAGNOSIS: Placement   OUTPATIENT/OBSERVATION GOALS TO BE MET BEFORE DISCHARGE:  1. ADLs back to baseline: Yes    2. Activity and level of assistance: Up with maximum assistance.      3. Pain status: Pain free.    4. Return to near baseline physical activity: Yes     Discharge Planner Nurse   Safe discharge environment identified: No  Barriers to discharge: Yes       Entered by: Luciana Johnson RN 01/22/2023 5:15 PM   Pt resting in bed.  Pillows placed under right hip and under legs to float heels.  Denies pain.  Makes needs known.  Flat affect.    Please review provider order for any additional goals.   Nurse to notify provider when observation goals have been met and patient is ready for discharge.

## 2023-01-23 LAB
GLUCOSE BLDC GLUCOMTR-MCNC: 104 MG/DL (ref 70–99)
GLUCOSE BLDC GLUCOMTR-MCNC: 137 MG/DL (ref 70–99)
GLUCOSE BLDC GLUCOMTR-MCNC: 149 MG/DL (ref 70–99)
GLUCOSE BLDC GLUCOMTR-MCNC: 154 MG/DL (ref 70–99)
GLUCOSE BLDC GLUCOMTR-MCNC: 158 MG/DL (ref 70–99)
GLUCOSE BLDC GLUCOMTR-MCNC: 159 MG/DL (ref 70–99)

## 2023-01-23 PROCEDURE — 250N000013 HC RX MED GY IP 250 OP 250 PS 637: Performed by: HOSPITALIST

## 2023-01-23 PROCEDURE — 250N000013 HC RX MED GY IP 250 OP 250 PS 637: Performed by: NURSE PRACTITIONER

## 2023-01-23 PROCEDURE — 82962 GLUCOSE BLOOD TEST: CPT | Mod: 91

## 2023-01-23 PROCEDURE — 250N000013 HC RX MED GY IP 250 OP 250 PS 637: Performed by: PHYSICIAN ASSISTANT

## 2023-01-23 PROCEDURE — 250N000013 HC RX MED GY IP 250 OP 250 PS 637

## 2023-01-23 PROCEDURE — 99207 PR NO CHARGE LOS: CPT | Performed by: NURSE PRACTITIONER

## 2023-01-23 PROCEDURE — G0378 HOSPITAL OBSERVATION PER HR: HCPCS

## 2023-01-23 RX ADMIN — DIVALPROEX SODIUM 1000 MG: 500 TABLET, DELAYED RELEASE ORAL at 21:21

## 2023-01-23 RX ADMIN — BACLOFEN 10 MG: 10 TABLET ORAL at 21:21

## 2023-01-23 RX ADMIN — CLOTRIMAZOLE: 0.01 CREAM TOPICAL at 11:39

## 2023-01-23 RX ADMIN — KETOCONAZOLE: 20 CREAM TOPICAL at 11:39

## 2023-01-23 RX ADMIN — EMPAGLIFLOZIN 10 MG: 10 TABLET, FILM COATED ORAL at 08:46

## 2023-01-23 RX ADMIN — OLANZAPINE 2.5 MG: 2.5 TABLET, FILM COATED ORAL at 13:59

## 2023-01-23 RX ADMIN — CLONIDINE HYDROCHLORIDE 0.1 MG: 0.1 TABLET ORAL at 21:19

## 2023-01-23 RX ADMIN — HYDROXYZINE HYDROCHLORIDE 50 MG: 50 TABLET, FILM COATED ORAL at 08:46

## 2023-01-23 RX ADMIN — CLONIDINE HYDROCHLORIDE 0.1 MG: 0.1 TABLET ORAL at 08:46

## 2023-01-23 RX ADMIN — Medication 25 MCG: at 08:46

## 2023-01-23 RX ADMIN — ASPIRIN 81 MG CHEWABLE TABLET 81 MG: 81 TABLET CHEWABLE at 08:46

## 2023-01-23 RX ADMIN — ATORVASTATIN CALCIUM 20 MG: 20 TABLET, FILM COATED ORAL at 21:21

## 2023-01-23 RX ADMIN — QUETIAPINE FUMARATE 400 MG: 200 TABLET ORAL at 21:20

## 2023-01-23 RX ADMIN — BACLOFEN 10 MG: 10 TABLET ORAL at 08:46

## 2023-01-23 RX ADMIN — QUETIAPINE 200 MG: 200 TABLET, FILM COATED ORAL at 13:59

## 2023-01-23 RX ADMIN — METFORMIN HYDROCHLORIDE 850 MG: 850 TABLET, FILM COATED ORAL at 16:54

## 2023-01-23 RX ADMIN — MIRTAZAPINE 15 MG: 15 TABLET, FILM COATED ORAL at 21:21

## 2023-01-23 RX ADMIN — METOPROLOL SUCCINATE 25 MG: 25 TABLET, EXTENDED RELEASE ORAL at 11:41

## 2023-01-23 RX ADMIN — POLYETHYLENE GLYCOL 3350 17 G: 17 POWDER, FOR SOLUTION ORAL at 08:46

## 2023-01-23 RX ADMIN — LEVOTHYROXINE SODIUM 25 MCG: 0.03 TABLET ORAL at 08:46

## 2023-01-23 RX ADMIN — CLOTRIMAZOLE: 0.01 CREAM TOPICAL at 21:27

## 2023-01-23 RX ADMIN — Medication 10 MG: at 21:20

## 2023-01-23 RX ADMIN — HYDROCORTISONE: 1 CREAM TOPICAL at 21:27

## 2023-01-23 RX ADMIN — VENLAFAXINE HYDROCHLORIDE 75 MG: 150 CAPSULE, EXTENDED RELEASE ORAL at 21:20

## 2023-01-23 RX ADMIN — VENLAFAXINE HYDROCHLORIDE 150 MG: 150 CAPSULE, EXTENDED RELEASE ORAL at 21:20

## 2023-01-23 RX ADMIN — HYDROXYZINE HYDROCHLORIDE 50 MG: 50 TABLET, FILM COATED ORAL at 13:59

## 2023-01-23 RX ADMIN — CLONIDINE HYDROCHLORIDE 0.1 MG: 0.1 TABLET ORAL at 13:59

## 2023-01-23 RX ADMIN — HYDROCORTISONE: 1 CREAM TOPICAL at 11:39

## 2023-01-23 RX ADMIN — QUETIAPINE 200 MG: 200 TABLET, FILM COATED ORAL at 08:46

## 2023-01-23 RX ADMIN — BACLOFEN 10 MG: 10 TABLET ORAL at 13:59

## 2023-01-23 RX ADMIN — DIVALPROEX SODIUM 750 MG: 500 TABLET, DELAYED RELEASE ORAL at 08:46

## 2023-01-23 RX ADMIN — HYDROXYZINE HYDROCHLORIDE 50 MG: 50 TABLET, FILM COATED ORAL at 21:21

## 2023-01-23 ASSESSMENT — ACTIVITIES OF DAILY LIVING (ADL)
ADLS_ACUITY_SCORE: 53

## 2023-01-23 NOTE — PLAN OF CARE
PRIMARY DIAGNOSIS: Placement   OUTPATIENT/OBSERVATION GOALS TO BE MET BEFORE DISCHARGE:  1. ADLs back to baseline: Yes    2. Activity and level of assistance: A X 2 with a lift    3. Pain status: Denies pain    4. Return to near baseline physical activity: Yes     Discharge Planner Nurse   Safe discharge environment identified: No  Barriers to discharge: Yes   A & O X 4. Lung sound clear to ausculation bilaterally . Denies shortness .  Denies pain. Bowel sound present all quadrants. Incontinent of bowel and bladder. Patient on blood glucose monitoring and insulin for DM2 management. Medically stable to discharge . On Pulsate mattress,  Turn and reposition maintained and per patient request. Calls appropriately. Awaiting for placement.  following  /89 (BP Location: Right arm)   Pulse 82   Temp 97.8  F (36.6  C) (Oral)   Resp 16   Wt 99.9 kg (220 lb 4.8 oz)   SpO2 94%        Entered by: Monica Elder RN 01/23/2023 04:02     Please review provider order for any additional goals.   Nurse to notify provider when observation goals have been met and patient is ready for discharge.

## 2023-01-23 NOTE — PLAN OF CARE
PRIMARY DIAGNOSIS: Placement   OUTPATIENT/OBSERVATION GOALS TO BE MET BEFORE DISCHARGE:  1. ADLs back to baseline: Yes    2. Activity and level of assistance: A X 2 with a lift    3. Pain status: Denies pain    4. Return to near baseline physical activity: Yes     Discharge Planner Nurse   Safe discharge environment identified: No  Barriers to discharge: Yes       Entered by: Monica Elder RN 01/23/2023 00:00     Please review provider order for any additional goals.   Nurse to notify provider when observation goals have been met and patient is ready for discharge.

## 2023-01-23 NOTE — PLAN OF CARE
PRIMARY DIAGNOSIS: PLACEMENT  OUTPATIENT/OBSERVATION GOALS TO BE MET BEFORE DISCHARGE:  1. ADLs back to baseline: Yes    2. Activity and level of assistance: Up with maximum assistance. Total care at baseline     3. Pain status: Pain free.    4. Return to near baseline physical activity: Yes     Discharge Planner Nurse   Safe discharge environment identified: No  Barriers to discharge: Yes       Entered by: Genevieve Ordoñez RN 01/23/2023 10:12 AM     Patient is A&O x4, Pt calm and cooperative.  VSS on RA.  no use of SL insuline this morning.  Assist X 2 and total care at baseline, Out of bed chair TID and as needed. Tolerating regular diet and oral medications, Denies pain. Able to communicate needs, check in change incontinent product in bed as needed. BLE elevated with pillow to prevent skin breakdown. Barrier cream applied for redness on scrotom. Will continue to monitor and provide cares.      Please review provider order for any additional goals.   Nurse to notify provider when observation goals have been met and patient is ready for discharge.Goal Outcome Evaluation:
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
## 2023-01-23 NOTE — PLAN OF CARE
PRIMARY DIAGNOSIS: Placement  OUTPATIENT/OBSERVATION GOALS TO BE MET BEFORE DISCHARGE:  1. ADLs back to baseline: Yes    2. Activity and level of assistance: Up with maximum assistance.      3. Pain status: Pain free.    4. Return to near baseline physical activity: Yes     Discharge Planner Nurse   Safe discharge environment identified: No  Barriers to discharge: Yes       Entered by: Luciana Johnson RN 01/22/2023 9:54 PM   Makes needs known.  Received all meds and creams.  Pillows used to shift weight in bed.  Please review provider order for any additional goals.   Nurse to notify provider when observation goals have been met and patient is ready for discharge.

## 2023-01-24 LAB
GLUCOSE BLDC GLUCOMTR-MCNC: 108 MG/DL (ref 70–99)
GLUCOSE BLDC GLUCOMTR-MCNC: 241 MG/DL (ref 70–99)
GLUCOSE BLDC GLUCOMTR-MCNC: 95 MG/DL (ref 70–99)

## 2023-01-24 PROCEDURE — 250N000013 HC RX MED GY IP 250 OP 250 PS 637: Performed by: PHYSICIAN ASSISTANT

## 2023-01-24 PROCEDURE — 82962 GLUCOSE BLOOD TEST: CPT | Mod: 91

## 2023-01-24 PROCEDURE — G0378 HOSPITAL OBSERVATION PER HR: HCPCS

## 2023-01-24 PROCEDURE — 99233 SBSQ HOSP IP/OBS HIGH 50: CPT | Performed by: HOSPITALIST

## 2023-01-24 PROCEDURE — 250N000013 HC RX MED GY IP 250 OP 250 PS 637: Performed by: HOSPITALIST

## 2023-01-24 PROCEDURE — 250N000013 HC RX MED GY IP 250 OP 250 PS 637: Performed by: NURSE PRACTITIONER

## 2023-01-24 PROCEDURE — 99207 PR NO CHARGE LOS: CPT | Performed by: NURSE PRACTITIONER

## 2023-01-24 RX ORDER — METOPROLOL SUCCINATE 50 MG/1
50 TABLET, EXTENDED RELEASE ORAL DAILY
Status: DISCONTINUED | OUTPATIENT
Start: 2023-01-24 | End: 2023-04-07 | Stop reason: HOSPADM

## 2023-01-24 RX ADMIN — ATORVASTATIN CALCIUM 20 MG: 20 TABLET, FILM COATED ORAL at 21:32

## 2023-01-24 RX ADMIN — EMPAGLIFLOZIN 10 MG: 10 TABLET, FILM COATED ORAL at 08:26

## 2023-01-24 RX ADMIN — HYDROCORTISONE: 1 CREAM TOPICAL at 15:00

## 2023-01-24 RX ADMIN — BACLOFEN 10 MG: 10 TABLET ORAL at 14:46

## 2023-01-24 RX ADMIN — QUETIAPINE FUMARATE 400 MG: 200 TABLET ORAL at 21:33

## 2023-01-24 RX ADMIN — HYDROXYZINE HYDROCHLORIDE 50 MG: 50 TABLET, FILM COATED ORAL at 14:46

## 2023-01-24 RX ADMIN — POLYETHYLENE GLYCOL 3350 17 G: 17 POWDER, FOR SOLUTION ORAL at 08:27

## 2023-01-24 RX ADMIN — CLONIDINE HYDROCHLORIDE 0.1 MG: 0.1 TABLET ORAL at 14:46

## 2023-01-24 RX ADMIN — CLONIDINE HYDROCHLORIDE 0.1 MG: 0.1 TABLET ORAL at 21:32

## 2023-01-24 RX ADMIN — METOPROLOL SUCCINATE 50 MG: 50 TABLET, EXTENDED RELEASE ORAL at 08:56

## 2023-01-24 RX ADMIN — HYDROXYZINE HYDROCHLORIDE 50 MG: 50 TABLET, FILM COATED ORAL at 21:33

## 2023-01-24 RX ADMIN — CLOTRIMAZOLE: 0.01 CREAM TOPICAL at 15:00

## 2023-01-24 RX ADMIN — BACLOFEN 10 MG: 10 TABLET ORAL at 08:26

## 2023-01-24 RX ADMIN — ASPIRIN 81 MG CHEWABLE TABLET 81 MG: 81 TABLET CHEWABLE at 08:26

## 2023-01-24 RX ADMIN — METFORMIN HYDROCHLORIDE 850 MG: 850 TABLET, FILM COATED ORAL at 17:36

## 2023-01-24 RX ADMIN — DIVALPROEX SODIUM 1000 MG: 500 TABLET, DELAYED RELEASE ORAL at 21:32

## 2023-01-24 RX ADMIN — Medication 10 MG: at 21:31

## 2023-01-24 RX ADMIN — MIRTAZAPINE 15 MG: 15 TABLET, FILM COATED ORAL at 21:32

## 2023-01-24 RX ADMIN — KETOCONAZOLE: 20 CREAM TOPICAL at 14:59

## 2023-01-24 RX ADMIN — CLONIDINE HYDROCHLORIDE 0.1 MG: 0.1 TABLET ORAL at 08:26

## 2023-01-24 RX ADMIN — VENLAFAXINE HYDROCHLORIDE 75 MG: 150 CAPSULE, EXTENDED RELEASE ORAL at 21:35

## 2023-01-24 RX ADMIN — INSULIN ASPART 3 UNITS: 100 INJECTION, SOLUTION INTRAVENOUS; SUBCUTANEOUS at 12:02

## 2023-01-24 RX ADMIN — DIVALPROEX SODIUM 750 MG: 500 TABLET, DELAYED RELEASE ORAL at 08:25

## 2023-01-24 RX ADMIN — LEVOTHYROXINE SODIUM 25 MCG: 0.03 TABLET ORAL at 08:26

## 2023-01-24 RX ADMIN — QUETIAPINE 200 MG: 200 TABLET, FILM COATED ORAL at 08:26

## 2023-01-24 RX ADMIN — VENLAFAXINE HYDROCHLORIDE 150 MG: 150 CAPSULE, EXTENDED RELEASE ORAL at 21:33

## 2023-01-24 RX ADMIN — BACLOFEN 10 MG: 10 TABLET ORAL at 21:32

## 2023-01-24 RX ADMIN — Medication 25 MCG: at 08:25

## 2023-01-24 RX ADMIN — HYDROXYZINE HYDROCHLORIDE 50 MG: 50 TABLET, FILM COATED ORAL at 08:25

## 2023-01-24 RX ADMIN — OLANZAPINE 2.5 MG: 2.5 TABLET, FILM COATED ORAL at 14:46

## 2023-01-24 RX ADMIN — QUETIAPINE 200 MG: 200 TABLET, FILM COATED ORAL at 14:46

## 2023-01-24 ASSESSMENT — ACTIVITIES OF DAILY LIVING (ADL)
ADLS_ACUITY_SCORE: 53
ADLS_ACUITY_SCORE: 56
ADLS_ACUITY_SCORE: 53
ADLS_ACUITY_SCORE: 56
ADLS_ACUITY_SCORE: 56

## 2023-01-24 NOTE — PROGRESS NOTES
Hospital Medicine Brief Note:    BP (!) 147/106 (BP Location: Right arm)   Pulse 67   Temp 97.5  F (36.4  C) (Oral)   Resp 16   Wt 99.9 kg (220 lb 4.8 oz)   SpO2 93%      Metoprolol increased earlier today.  FSG .  Continues to eat regular diet and extra food.   He would prefer not to use insulin and PTA was not on insulin.  His DM medications have been adjusted throughout his stay.  Will discontinue correctional scale coverage.  Change FSG to daily and PRN.  No need to check with meals.  Will increase Metformin from 850 mg PO daily to BID (with room to increase). Continue Jardiance 10 mg a day (could increase to 25 mg daily if needed).  While we recommend continued life style modifications -- he is not interested.      LUIS Spangler CNP
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
## 2023-01-24 NOTE — CARE PLAN
Patient is alert and oriented x4. BP elevated this morning so metoprolol increased to 50mg. Patient on RA. Active bowel sounds with last documented BM yesterday. Incontinent of bladder and is a check and change PRN. Lift to move. Plan: Placement.     BP (!) 147/106 (BP Location: Right arm)   Pulse 67   Temp 97.5  F (36.4  C) (Oral)   Resp 16   Wt 99.9 kg (220 lb 4.8 oz)   SpO2 93%

## 2023-01-24 NOTE — PLAN OF CARE
PRIMARY DIAGNOSIS: Placement   OUTPATIENT/OBSERVATION GOALS TO BE MET BEFORE DISCHARGE:  ADLs back to baseline: Yes    Activity and level of assistance: A X 2 with a lift     Pain status: Denies pain    Return to near baseline physical activity: Yes     Discharge Planner Nurse   Safe discharge environment identified: No  Barriers to discharge: Yes       Entered by: Monica Elder RN 01/23/2023 21:00     Please review provider order for any additional goals.   Nurse to notify provider when observation goals have been met and patient is ready for discharge.

## 2023-01-24 NOTE — CARE PLAN
PRIMARY DIAGNOSIS: Placement   OUTPATIENT/OBSERVATION GOALS TO BE MET BEFORE DISCHARGE:  1. ADLs back to baseline: Yes    2. Activity and level of assistance: lift     3. Pain status: Pain free.    4. Return to near baseline physical activity: Yes     Discharge Planner Nurse   Safe discharge environment identified: No  Barriers to discharge: Yes       Entered by: Nakia Uriarte RN 01/24/2023 10:11 AM     Please review provider order for any additional goals.   Nurse to notify provider when observation goals have been met and patient is ready for discharge.

## 2023-01-24 NOTE — PLAN OF CARE
PRIMARY DIAGNOSIS: Placement   OUTPATIENT/OBSERVATION GOALS TO BE MET BEFORE DISCHARGE:  1. ADLs back to baseline: Yes    2. Activity and level of assistance: A X 2 with a lift     3. Pain status: Denies pain    4. Return to near baseline physical activity: Yes     Discharge Planner Nurse   Safe discharge environment identified: No  Barriers to discharge: Yes   A & O X 4. Lung sound clear to ausculation bilaterally . Denies shortness .  Denies pain. Bowel sound present X 4. Incontinent of bowel and bladder. Patient on blood glucose monitoring and insulin for DM2 management. Medically stable to discharge . On Pulsate mattress,  Turn and reposition maintained and per patient request. Calls appropriately. Awaiting for placement.  following  /86 (BP Location: Right arm)   Pulse 74   Temp 97.5  F (36.4  C) (Oral)   Resp 18   Wt 99.9 kg (220 lb 4.8 oz)   SpO2 93%        Entered by: Monica Elder RN 01/24/2023 05:53     Please review provider order for any additional goals.   Nurse to notify provider when observation goals have been met and patient is ready for discharge.

## 2023-01-24 NOTE — PLAN OF CARE
PRIMARY DIAGNOSIS: Placement   OUTPATIENT/OBSERVATION GOALS TO BE MET BEFORE DISCHARGE:  1. ADLs back to baseline: Yes    2. Activity and level of assistance: A X 2 with a lift     3. Pain status: Denies pain    4. Return to near baseline physical activity: Yes     Discharge Planner Nurse   Safe discharge environment identified: No  Barriers to discharge: Yes   Sleeping comfortably in bed        Entered by: Monica Elder RN 01/24/2023 00:25     Please review provider order for any additional goals.   Nurse to notify provider when observation goals have been met and patient is ready for discharge.

## 2023-01-24 NOTE — PROGRESS NOTES
"Care Management Follow Up    Expected Discharge Date: 02/28/2023     Concerns to be Addressed: Discharge planning    Patient plan of care discussed at interdisciplinary rounds: Yes    Anticipated Discharge Disposition:  Group Home once placement found by relocation worker     Additional Information:  SW emailed patient's relocation worker to request an update on pending referrals through [facility] and [employer]. Awaiting response.     Pt has a [employer] assessment scheduled 1/26 at 9am to reopen his waiver.     SW will continue to follow.     1430: SW received email updated back from relocation worker: \"I heard back from one home through [facility] who said they had an opening and then when getting information about his past they suddenly did not have an opening anymore. I heard back from Vivi today there is a mixed gender multi home facility they have open so I am calling Chris today to see how he would feel with mixed gender. I had put in a referral through [facility] for an opening they had but they did not have any wheelchair accessible openings. I just received an email yesterday about [facility] having openings that would be a good fit for Chris but I am waiting to hear back from them if they take under 53 as I have run in to that age requirement issue with a few places.\"     DANITA Obrien, [facility]   Inpatient Care Coordination  [facility]   [phone]          "

## 2023-01-25 LAB — GLUCOSE BLDC GLUCOMTR-MCNC: 118 MG/DL (ref 70–99)

## 2023-01-25 PROCEDURE — 82962 GLUCOSE BLOOD TEST: CPT

## 2023-01-25 PROCEDURE — 250N000013 HC RX MED GY IP 250 OP 250 PS 637: Performed by: NURSE PRACTITIONER

## 2023-01-25 PROCEDURE — 99231 SBSQ HOSP IP/OBS SF/LOW 25: CPT | Performed by: PHYSICIAN ASSISTANT

## 2023-01-25 PROCEDURE — 250N000013 HC RX MED GY IP 250 OP 250 PS 637: Performed by: PHYSICIAN ASSISTANT

## 2023-01-25 PROCEDURE — G0378 HOSPITAL OBSERVATION PER HR: HCPCS

## 2023-01-25 PROCEDURE — 250N000013 HC RX MED GY IP 250 OP 250 PS 637: Performed by: HOSPITALIST

## 2023-01-25 RX ADMIN — METFORMIN HYDROCHLORIDE 850 MG: 850 TABLET, FILM COATED ORAL at 17:54

## 2023-01-25 RX ADMIN — CLOTRIMAZOLE: 0.01 CREAM TOPICAL at 08:21

## 2023-01-25 RX ADMIN — MIRTAZAPINE 15 MG: 15 TABLET, FILM COATED ORAL at 21:05

## 2023-01-25 RX ADMIN — VENLAFAXINE HYDROCHLORIDE 150 MG: 150 CAPSULE, EXTENDED RELEASE ORAL at 21:07

## 2023-01-25 RX ADMIN — DIVALPROEX SODIUM 750 MG: 500 TABLET, DELAYED RELEASE ORAL at 08:19

## 2023-01-25 RX ADMIN — EMPAGLIFLOZIN 10 MG: 10 TABLET, FILM COATED ORAL at 08:18

## 2023-01-25 RX ADMIN — CLONIDINE HYDROCHLORIDE 0.1 MG: 0.1 TABLET ORAL at 21:03

## 2023-01-25 RX ADMIN — BACLOFEN 10 MG: 10 TABLET ORAL at 13:55

## 2023-01-25 RX ADMIN — HYDROXYZINE HYDROCHLORIDE 50 MG: 50 TABLET, FILM COATED ORAL at 08:19

## 2023-01-25 RX ADMIN — ATORVASTATIN CALCIUM 20 MG: 20 TABLET, FILM COATED ORAL at 21:05

## 2023-01-25 RX ADMIN — HYDROXYZINE HYDROCHLORIDE 50 MG: 50 TABLET, FILM COATED ORAL at 13:55

## 2023-01-25 RX ADMIN — METOPROLOL SUCCINATE 50 MG: 50 TABLET, EXTENDED RELEASE ORAL at 08:19

## 2023-01-25 RX ADMIN — Medication 25 MCG: at 08:18

## 2023-01-25 RX ADMIN — METFORMIN HYDROCHLORIDE 850 MG: 850 TABLET, FILM COATED ORAL at 08:18

## 2023-01-25 RX ADMIN — QUETIAPINE 200 MG: 200 TABLET, FILM COATED ORAL at 13:56

## 2023-01-25 RX ADMIN — HYDROCORTISONE: 1 CREAM TOPICAL at 08:21

## 2023-01-25 RX ADMIN — CLONIDINE HYDROCHLORIDE 0.1 MG: 0.1 TABLET ORAL at 13:55

## 2023-01-25 RX ADMIN — ASPIRIN 81 MG CHEWABLE TABLET 81 MG: 81 TABLET CHEWABLE at 08:19

## 2023-01-25 RX ADMIN — QUETIAPINE 200 MG: 200 TABLET, FILM COATED ORAL at 08:19

## 2023-01-25 RX ADMIN — Medication 10 MG: at 21:03

## 2023-01-25 RX ADMIN — QUETIAPINE FUMARATE 400 MG: 200 TABLET ORAL at 21:02

## 2023-01-25 RX ADMIN — DIVALPROEX SODIUM 1000 MG: 500 TABLET, DELAYED RELEASE ORAL at 21:02

## 2023-01-25 RX ADMIN — BACLOFEN 10 MG: 10 TABLET ORAL at 08:19

## 2023-01-25 RX ADMIN — CLONIDINE HYDROCHLORIDE 0.1 MG: 0.1 TABLET ORAL at 08:19

## 2023-01-25 RX ADMIN — BACLOFEN 10 MG: 10 TABLET ORAL at 21:05

## 2023-01-25 RX ADMIN — HYDROXYZINE HYDROCHLORIDE 50 MG: 50 TABLET, FILM COATED ORAL at 21:03

## 2023-01-25 RX ADMIN — OLANZAPINE 2.5 MG: 2.5 TABLET, FILM COATED ORAL at 13:56

## 2023-01-25 RX ADMIN — LEVOTHYROXINE SODIUM 25 MCG: 0.03 TABLET ORAL at 08:18

## 2023-01-25 RX ADMIN — VENLAFAXINE HYDROCHLORIDE 75 MG: 150 CAPSULE, EXTENDED RELEASE ORAL at 21:05

## 2023-01-25 ASSESSMENT — ACTIVITIES OF DAILY LIVING (ADL)
ADLS_ACUITY_SCORE: 56

## 2023-01-25 NOTE — CARE PLAN
Patient is alert and oriented x4 (slow to respond and flat affect). VS WNL and documented on the FS. Patient on RA. Active bowel sounds with x3 BM's today (stool softeners held) Incontinent of bladder and is a check and change PRN. Lift to move. Plan: Placement    BP (!) 134/92 (BP Location: Right arm, Patient Position: Semi-Toro's, Cuff Size: Adult Regular)   Pulse 67   Temp 97.5  F (36.4  C) (Oral)   Resp 16   Wt 99.9 kg (220 lb 4.8 oz)   SpO2 94%

## 2023-01-25 NOTE — PLAN OF CARE
PRIMARY DIAGNOSIS: Placement  OUTPATIENT/OBSERVATION GOALS TO BE MET BEFORE DISCHARGE:  1. ADLs back to baseline: Yes    2. Activity and level of assistance: Up with maximum assistance. Consider SW and/or PT evaluation.     3. Pain status: Pain free.    4. Return to near baseline physical activity: Yes     Discharge Planner Nurse   Safe discharge environment identified: No  Barriers to discharge: Yes       Entered by: Helene Oreilly RN 01/25/2023   Pt AO x4, VSS on RA, LS clear, BS active. Pt is up with A2 and lift device. Pt tolerating regular diet. Able to make needs known. Pt able to shift weight independently, encouraged to reposition to prevent sores from forming. SW working to find safe discharge plan. Will continue to monitor and provide supportive cares.   Please review provider order for any additional goals.   Nurse to notify provider when observation goals have been met and patient is ready for discharge.

## 2023-01-25 NOTE — CHILD FAMILY LIFE
PRIMARY DIAGNOSIS: Placement   OUTPATIENT/OBSERVATION GOALS TO BE MET BEFORE DISCHARGE:  1. ADLs back to baseline: Yes    2. Activity and level of assistance: WC bound baseline/lift     3. Pain status: Pain free.    4. Return to near baseline physical activity: Yes     Discharge Planner Nurse   Safe discharge environment identified: No  Barriers to discharge: Yes       Entered by: Nakia Uriarte RN 01/25/2023 12:26 PM     Please review provider order for any additional goals.   Nurse to notify provider when observation goals have been met and patient is ready for discharge.

## 2023-01-25 NOTE — PLAN OF CARE
PRIMARY DIAGNOSIS: Placement   OUTPATIENT/OBSERVATION GOALS TO BE MET BEFORE DISCHARGE:  ADLs back to baseline: Yes     Activity and level of assistance: lift      Pain status: Pain free.     Return to near baseline physical activity: Yes          Discharge Planner Nurse   Safe discharge environment identified: No  Barriers to discharge: Yes, safe discharge plan        Entered by: moncho osorio RN     Please review provider order for any additional goals.   Nurse to notify provider when observation goals have been met and patient is ready for discharge.     BP (!) 134/92 (BP Location: Right arm, Patient Position: Semi-Toro's, Cuff Size: Adult Regular)   Pulse 67   Temp 97.5  F (36.4  C) (Oral)   Resp 16   Wt 99.9 kg (220 lb 4.8 oz)   SpO2 94%        Vitals stable. Pt alert and oriented x4. Lift assist. WC bound at baseline. Refusing repositioning. Pt able to independently shift weight. Incontinent of bowel and bladder. Pt able to make needs known. Redness to ankles and buttocks, protective dressings in place. Bilateral heels cracked. Tolerating regular diet. Pt calm and cooperative this shift. SW following for safe discharge plan. Will continue to provide supportive cares.
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
## 2023-01-25 NOTE — PLAN OF CARE
PRIMARY DIAGNOSIS: Placement   OUTPATIENT/OBSERVATION GOALS TO BE MET BEFORE DISCHARGE:  ADLs back to baseline: Yes     Activity and level of assistance: lift      Pain status: Pain free.     Return to near baseline physical activity: Yes          Discharge Planner Nurse   Safe discharge environment identified: No  Barriers to discharge: Yes, safe discharge plan        Entered by: moncho osorio RN    Please review provider order for any additional goals.   Nurse to notify provider when observation goals have been met and patient is ready for discharge.    BP (!) 147/106 (BP Location: Right arm)   Pulse 67   Temp 97.5  F (36.4  C) (Oral)   Resp 16   Wt 99.9 kg (220 lb 4.8 oz)   SpO2 93%     Vitals stable. Pt alert and oriented x4. Lift assist. WC bound at baseline. Refusing repositioning. Pt able to independently shift weight. Incontinent of bowel and bladder. Pt able to make needs known. Redness to ankles and buttocks, protective dressings in place. Bilateral heels cracked. Tolerating regular diet. BG checks only 1x/day now. Pt calm and cooperative this shift. SW following for safe discharge plan. Will continue to provide supportive cares.

## 2023-01-25 NOTE — PLAN OF CARE
PRIMARY DIAGNOSIS: Placement   OUTPATIENT/OBSERVATION GOALS TO BE MET BEFORE DISCHARGE:  ADLs back to baseline: Yes     Activity and level of assistance: lift      Pain status: Pain free.     Return to near baseline physical activity: Yes          Discharge Planner Nurse   Safe discharge environment identified: No  Barriers to discharge: Yes, safe discharge plan        Entered by: moncho osorio RN     Please review provider order for any additional goals.   Nurse to notify provider when observation goals have been met and patient is ready for discharge.     /83 (BP Location: Right arm)   Pulse 76   Temp 97.5  F (36.4  C) (Oral)   Resp 16   Wt 99.9 kg (220 lb 4.8 oz)   SpO2 93%     Vitals stable. Pt alert and oriented x4. Lift assist. WC bound at baseline. Refusing repositioning. Pt able to independently shift weight. Incontinent of bowel and bladder. Pt able to make needs known. Redness to ankles and buttocks, protective dressings in place. Bilateral heels cracked. Tolerating regular diet. BG checks only 1x/day now. Pt calm and cooperative this shift. SW following for safe discharge plan. Will continue to provide supportive cares.

## 2023-01-25 NOTE — PLAN OF CARE
PRIMARY DIAGNOSIS: Placement  OUTPATIENT/OBSERVATION GOALS TO BE MET BEFORE DISCHARGE:  ADLs back to baseline: Yes    Activity and level of assistance: Up with maximum assistance. Consider SW and/or PT evaluation.     Pain status: Pain free.    Return to near baseline physical activity: Yes     Discharge Planner Nurse   Safe discharge environment identified: No  Barriers to discharge: Yes       Entered by: Helene Oreilly RN 01/25/2023   Pt AO x4, VSS on RA, LS clear, BS active. Pt is up with A2 and lift device. Pt tolerating regular diet. Able to make needs known. Pt able to shift weight independently, encouraged to reposition to prevent sores from forming. SW working to find safe discharge plan. Will continue to monitor and provide supportive cares.   Please review provider order for any additional goals.   Nurse to notify provider when observation goals have been met and patient is ready for discharge.

## 2023-01-26 LAB — GLUCOSE BLDC GLUCOMTR-MCNC: 132 MG/DL (ref 70–99)

## 2023-01-26 PROCEDURE — 99231 SBSQ HOSP IP/OBS SF/LOW 25: CPT | Performed by: PHYSICIAN ASSISTANT

## 2023-01-26 PROCEDURE — 250N000013 HC RX MED GY IP 250 OP 250 PS 637: Performed by: HOSPITALIST

## 2023-01-26 PROCEDURE — 82962 GLUCOSE BLOOD TEST: CPT

## 2023-01-26 PROCEDURE — G0378 HOSPITAL OBSERVATION PER HR: HCPCS

## 2023-01-26 PROCEDURE — 250N000013 HC RX MED GY IP 250 OP 250 PS 637: Performed by: PHYSICIAN ASSISTANT

## 2023-01-26 PROCEDURE — 250N000013 HC RX MED GY IP 250 OP 250 PS 637: Performed by: NURSE PRACTITIONER

## 2023-01-26 RX ADMIN — HYDROXYZINE HYDROCHLORIDE 50 MG: 50 TABLET, FILM COATED ORAL at 21:39

## 2023-01-26 RX ADMIN — CLONIDINE HYDROCHLORIDE 0.1 MG: 0.1 TABLET ORAL at 14:24

## 2023-01-26 RX ADMIN — BACLOFEN 10 MG: 10 TABLET ORAL at 21:40

## 2023-01-26 RX ADMIN — METOPROLOL SUCCINATE 50 MG: 50 TABLET, EXTENDED RELEASE ORAL at 08:46

## 2023-01-26 RX ADMIN — POLYETHYLENE GLYCOL 3350 17 G: 17 POWDER, FOR SOLUTION ORAL at 08:49

## 2023-01-26 RX ADMIN — CLONIDINE HYDROCHLORIDE 0.1 MG: 0.1 TABLET ORAL at 08:46

## 2023-01-26 RX ADMIN — CLOTRIMAZOLE: 0.01 CREAM TOPICAL at 11:02

## 2023-01-26 RX ADMIN — METFORMIN HYDROCHLORIDE 850 MG: 850 TABLET, FILM COATED ORAL at 08:46

## 2023-01-26 RX ADMIN — HYDROCORTISONE: 1 CREAM TOPICAL at 21:38

## 2023-01-26 RX ADMIN — ASPIRIN 81 MG CHEWABLE TABLET 81 MG: 81 TABLET CHEWABLE at 08:46

## 2023-01-26 RX ADMIN — QUETIAPINE 200 MG: 200 TABLET, FILM COATED ORAL at 08:46

## 2023-01-26 RX ADMIN — Medication 10 MG: at 21:39

## 2023-01-26 RX ADMIN — DIVALPROEX SODIUM 750 MG: 500 TABLET, DELAYED RELEASE ORAL at 08:47

## 2023-01-26 RX ADMIN — HYDROCORTISONE: 1 CREAM TOPICAL at 11:03

## 2023-01-26 RX ADMIN — METFORMIN HYDROCHLORIDE 850 MG: 850 TABLET, FILM COATED ORAL at 18:13

## 2023-01-26 RX ADMIN — HYDROXYZINE HYDROCHLORIDE 50 MG: 50 TABLET, FILM COATED ORAL at 14:25

## 2023-01-26 RX ADMIN — Medication 25 MCG: at 08:46

## 2023-01-26 RX ADMIN — CLOTRIMAZOLE: 0.01 CREAM TOPICAL at 21:37

## 2023-01-26 RX ADMIN — ATORVASTATIN CALCIUM 20 MG: 20 TABLET, FILM COATED ORAL at 21:40

## 2023-01-26 RX ADMIN — CLONIDINE HYDROCHLORIDE 0.1 MG: 0.1 TABLET ORAL at 21:41

## 2023-01-26 RX ADMIN — BACLOFEN 10 MG: 10 TABLET ORAL at 14:27

## 2023-01-26 RX ADMIN — LEVOTHYROXINE SODIUM 25 MCG: 0.03 TABLET ORAL at 08:46

## 2023-01-26 RX ADMIN — OLANZAPINE 2.5 MG: 2.5 TABLET, FILM COATED ORAL at 14:24

## 2023-01-26 RX ADMIN — DIVALPROEX SODIUM 1000 MG: 500 TABLET, DELAYED RELEASE ORAL at 21:39

## 2023-01-26 RX ADMIN — MIRTAZAPINE 15 MG: 15 TABLET, FILM COATED ORAL at 21:39

## 2023-01-26 RX ADMIN — VENLAFAXINE HYDROCHLORIDE 75 MG: 150 CAPSULE, EXTENDED RELEASE ORAL at 21:40

## 2023-01-26 RX ADMIN — BACLOFEN 10 MG: 10 TABLET ORAL at 08:45

## 2023-01-26 RX ADMIN — HYDROXYZINE HYDROCHLORIDE 50 MG: 50 TABLET, FILM COATED ORAL at 08:45

## 2023-01-26 RX ADMIN — QUETIAPINE FUMARATE 400 MG: 200 TABLET ORAL at 21:39

## 2023-01-26 RX ADMIN — QUETIAPINE 200 MG: 200 TABLET, FILM COATED ORAL at 14:24

## 2023-01-26 RX ADMIN — EMPAGLIFLOZIN 10 MG: 10 TABLET, FILM COATED ORAL at 08:46

## 2023-01-26 RX ADMIN — VENLAFAXINE HYDROCHLORIDE 150 MG: 150 CAPSULE, EXTENDED RELEASE ORAL at 21:39

## 2023-01-26 ASSESSMENT — ACTIVITIES OF DAILY LIVING (ADL)
ADLS_ACUITY_SCORE: 56

## 2023-01-26 NOTE — PROGRESS NOTES
Care Management Follow Up    Expected Discharge Date: 02/28/2023     Concerns to be Addressed: Discharge planning      Patient plan of care discussed at interdisciplinary rounds: Yes    Anticipated Discharge Disposition:  Group Home placement once found by relocation worker     Patient/Family in Agreement with the Plan:  Yes    Additional Information:  [employer]  Shanta ([email or web address], [phone]) was unable to reach pt for assessment. SW woke patient up and was able to complete the assessment.     SW faxed information (H&P, progress notes, etc.) to [employer]  for collateral information.     [employer]  will send docusign information to SW to assist pt with signing electronically.    Awaiting further placement updates from relocation worker on  placement.     SW will continue to follow.     DANITA Obrien, [facility]   Inpatient Care Coordination  [facility]   [phone]

## 2023-01-26 NOTE — PLAN OF CARE
PRIMARY DIAGNOSIS: Placement  OUTPATIENT/OBSERVATION GOALS TO BE MET BEFORE DISCHARGE:  ADLs back to baseline: Yes    Activity and level of assistance: assist x 1    Pain status: Pain free.    Return to near baseline physical activity: Yes     Discharge Planner Nurse   Safe discharge environment identified: No  Barriers to discharge: Yes       Entered by: Emily Garcia RN 01/26/2023 12:23 AM    Patient resting comfortably in bed, currently denies pain, SW following for placement         Please review provider order for any additional goals.   Nurse to notify provider when observation goals have been met and patient is ready for discharge.
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
## 2023-01-26 NOTE — PLAN OF CARE
PRIMARY DIAGNOSIS: placement  OUTPATIENT/OBSERVATION GOALS TO BE MET BEFORE DISCHARGE:  1. ADLs back to baseline: Yes    2. Activity and level of assistance: A1 for cares, A2 lift    3. Pain status: Pain free.    4. Return to near baseline physical activity: Yes     Discharge Planner Nurse   Safe discharge environment identified: No  Barriers to discharge: Yes       Entered by: Heather Browning RN 01/26/2023    Pt resting in bed, comfortable, no complaints. SW following for placement.    Please review provider order for any additional goals.   Nurse to notify provider when observation goals have been met and patient is ready for discharge.

## 2023-01-26 NOTE — PLAN OF CARE
PRIMARY DIAGNOSIS: placement  OUTPATIENT/OBSERVATION GOALS TO BE MET BEFORE DISCHARGE:  1. ADLs back to baseline: Yes    2. Activity and level of assistance: A1 for cares, A2 lift    3. Pain status: Pain free.    4. Return to near baseline physical activity: Yes     Discharge Planner Nurse   Safe discharge environment identified: No  Barriers to discharge: Yes       Entered by: Heather Browning RN 01/26/2023    Pt resting in bed, morning meds given. SW following for placement.    Please review provider order for any additional goals.   Nurse to notify provider when observation goals have been met and patient is ready for discharge.

## 2023-01-26 NOTE — PLAN OF CARE
PRIMARY DIAGNOSIS: Placement   OUTPATIENT/OBSERVATION GOALS TO BE MET BEFORE DISCHARGE:  ADLs back to baseline: Yes     Activity and level of assistance: lift      Pain status: Pain free.     Return to near baseline physical activity: Yes          Discharge Planner Nurse   Safe discharge environment identified: No  Barriers to discharge: Yes, safe discharge plan        Entered by: moncho osorio RN     Please review provider order for any additional goals.   Nurse to notify provider when observation goals have been met and patient is ready for discharge.        Vitals stable. Pt alert and oriented x4. Lift assist. WC bound at baseline. Refusing repositioning. Pt able to independently shift weight. Incontinent of bowel and bladder. Pt able to make needs known. Redness to ankles and buttocks, protective dressings in place. Bilateral heels cracked. Tolerating regular diet. Pt calm and cooperative this shift. SW following for safe discharge plan. Will continue to provide supportive cares.

## 2023-01-26 NOTE — PLAN OF CARE
PRIMARY DIAGNOSIS: placement  OUTPATIENT/OBSERVATION GOALS TO BE MET BEFORE DISCHARGE:  1. ADLs back to baseline: Yes    2. Activity and level of assistance: A1 for cares, A2 lift    3. Pain status: Pain free.    4. Return to near baseline physical activity: Yes     Discharge Planner Nurse   Safe discharge environment identified: No  Barriers to discharge: Yes       Entered by: Heather Browning RN 01/26/2023    Pt resting in bed, ate lunch. This RN changed wet depends, meds given. SW following for placement.    Please review provider order for any additional goals.   Nurse to notify provider when observation goals have been met and patient is ready for discharge.

## 2023-01-26 NOTE — PLAN OF CARE
PRIMARY DIAGNOSIS: Placement  OUTPATIENT/OBSERVATION GOALS TO BE MET BEFORE DISCHARGE:  ADLs back to baseline: Yes    Activity and level of assistance: assist x 1    Pain status: Pain free.    Return to near baseline physical activity: Yes     Discharge Planner Nurse   Safe discharge environment identified: No  Barriers to discharge: Yes       Entered by: Emily Garcia RN 01/26/2023 5:11 AM    Patient resting comfortably in bed, SW following for placement        Please review provider order for any additional goals.   Nurse to notify provider when observation goals have been met and patient is ready for discharge.

## 2023-01-27 LAB — GLUCOSE BLDC GLUCOMTR-MCNC: 149 MG/DL (ref 70–99)

## 2023-01-27 PROCEDURE — 250N000013 HC RX MED GY IP 250 OP 250 PS 637: Performed by: HOSPITALIST

## 2023-01-27 PROCEDURE — 82962 GLUCOSE BLOOD TEST: CPT

## 2023-01-27 PROCEDURE — 250N000013 HC RX MED GY IP 250 OP 250 PS 637: Performed by: NURSE PRACTITIONER

## 2023-01-27 PROCEDURE — 99207 PR NO CHARGE LOS: CPT | Performed by: NURSE PRACTITIONER

## 2023-01-27 PROCEDURE — 250N000013 HC RX MED GY IP 250 OP 250 PS 637: Performed by: PHYSICIAN ASSISTANT

## 2023-01-27 PROCEDURE — G0378 HOSPITAL OBSERVATION PER HR: HCPCS

## 2023-01-27 RX ADMIN — Medication 25 MCG: at 09:13

## 2023-01-27 RX ADMIN — VENLAFAXINE HYDROCHLORIDE 75 MG: 150 CAPSULE, EXTENDED RELEASE ORAL at 21:31

## 2023-01-27 RX ADMIN — CLOTRIMAZOLE: 0.01 CREAM TOPICAL at 21:34

## 2023-01-27 RX ADMIN — KETOCONAZOLE: 20 CREAM TOPICAL at 21:34

## 2023-01-27 RX ADMIN — QUETIAPINE FUMARATE 400 MG: 200 TABLET ORAL at 21:31

## 2023-01-27 RX ADMIN — QUETIAPINE 200 MG: 200 TABLET, FILM COATED ORAL at 09:13

## 2023-01-27 RX ADMIN — BACLOFEN 10 MG: 10 TABLET ORAL at 21:33

## 2023-01-27 RX ADMIN — OLANZAPINE 2.5 MG: 2.5 TABLET, FILM COATED ORAL at 14:39

## 2023-01-27 RX ADMIN — METFORMIN HYDROCHLORIDE 850 MG: 850 TABLET, FILM COATED ORAL at 17:21

## 2023-01-27 RX ADMIN — VENLAFAXINE HYDROCHLORIDE 150 MG: 150 CAPSULE, EXTENDED RELEASE ORAL at 21:33

## 2023-01-27 RX ADMIN — Medication 10 MG: at 21:32

## 2023-01-27 RX ADMIN — EMPAGLIFLOZIN 10 MG: 10 TABLET, FILM COATED ORAL at 09:12

## 2023-01-27 RX ADMIN — CLOTRIMAZOLE: 0.01 CREAM TOPICAL at 09:14

## 2023-01-27 RX ADMIN — DIVALPROEX SODIUM 750 MG: 500 TABLET, DELAYED RELEASE ORAL at 09:12

## 2023-01-27 RX ADMIN — KETOCONAZOLE: 20 CREAM TOPICAL at 09:14

## 2023-01-27 RX ADMIN — ATORVASTATIN CALCIUM 20 MG: 20 TABLET, FILM COATED ORAL at 21:31

## 2023-01-27 RX ADMIN — CLONIDINE HYDROCHLORIDE 0.1 MG: 0.1 TABLET ORAL at 09:13

## 2023-01-27 RX ADMIN — QUETIAPINE 200 MG: 200 TABLET, FILM COATED ORAL at 14:39

## 2023-01-27 RX ADMIN — BACLOFEN 10 MG: 10 TABLET ORAL at 09:13

## 2023-01-27 RX ADMIN — MIRTAZAPINE 15 MG: 15 TABLET, FILM COATED ORAL at 21:31

## 2023-01-27 RX ADMIN — BACLOFEN 10 MG: 10 TABLET ORAL at 14:39

## 2023-01-27 RX ADMIN — HYDROCORTISONE: 1 CREAM TOPICAL at 21:34

## 2023-01-27 RX ADMIN — CLONIDINE HYDROCHLORIDE 0.1 MG: 0.1 TABLET ORAL at 14:39

## 2023-01-27 RX ADMIN — LEVOTHYROXINE SODIUM 25 MCG: 0.03 TABLET ORAL at 09:13

## 2023-01-27 RX ADMIN — HYDROCORTISONE: 1 CREAM TOPICAL at 09:13

## 2023-01-27 RX ADMIN — DIVALPROEX SODIUM 1000 MG: 500 TABLET, DELAYED RELEASE ORAL at 21:33

## 2023-01-27 RX ADMIN — HYDROXYZINE HYDROCHLORIDE 50 MG: 50 TABLET, FILM COATED ORAL at 21:31

## 2023-01-27 RX ADMIN — HYDROXYZINE HYDROCHLORIDE 50 MG: 50 TABLET, FILM COATED ORAL at 14:39

## 2023-01-27 RX ADMIN — ASPIRIN 81 MG CHEWABLE TABLET 81 MG: 81 TABLET CHEWABLE at 09:13

## 2023-01-27 RX ADMIN — CLONIDINE HYDROCHLORIDE 0.1 MG: 0.1 TABLET ORAL at 21:33

## 2023-01-27 RX ADMIN — POLYETHYLENE GLYCOL 3350 17 G: 17 POWDER, FOR SOLUTION ORAL at 09:13

## 2023-01-27 RX ADMIN — HYDROXYZINE HYDROCHLORIDE 50 MG: 50 TABLET, FILM COATED ORAL at 09:13

## 2023-01-27 RX ADMIN — METFORMIN HYDROCHLORIDE 850 MG: 850 TABLET, FILM COATED ORAL at 09:12

## 2023-01-27 RX ADMIN — METOPROLOL SUCCINATE 50 MG: 50 TABLET, EXTENDED RELEASE ORAL at 09:13

## 2023-01-27 ASSESSMENT — ACTIVITIES OF DAILY LIVING (ADL)
ADLS_ACUITY_SCORE: 54
ADLS_ACUITY_SCORE: 56
ADLS_ACUITY_SCORE: 58
ADLS_ACUITY_SCORE: 54
ADLS_ACUITY_SCORE: 58
ADLS_ACUITY_SCORE: 54
ADLS_ACUITY_SCORE: 54
ADLS_ACUITY_SCORE: 56
ADLS_ACUITY_SCORE: 58
ADLS_ACUITY_SCORE: 54
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
## 2023-01-27 NOTE — PLAN OF CARE
PRIMARY DIAGNOSIS: Placement  OUTPATIENT/OBSERVATION GOALS TO BE MET BEFORE DISCHARGE:  ADLs back to baseline: Yes    Activity and level of assistance: assist x 1     Pain status: Pain free.    Return to near baseline physical activity: Yes     Discharge Planner Nurse   Safe discharge environment identified: No  Barriers to discharge: Yes       Entered by: Emily Garcia RN 01/27/2023 2:25 AM    Patient is resting comfortably in bed, SW following for placement       Please review provider order for any additional goals.   Nurse to notify provider when observation goals have been met and patient is ready for discharge.

## 2023-01-27 NOTE — PLAN OF CARE
PRIMARY DIAGNOSIS: PLACEMENT  OUTPATIENT/OBSERVATION GOALS TO BE MET BEFORE DISCHARGE:  1. ADLs back to baseline: Yes    2. Activity and level of assistance: Up with maximum assistance. Total care at baseline     3. Pain status: Pain free.    4. Return to near baseline physical activity: Yes     Discharge Planner Nurse   Safe discharge environment identified: No  Barriers to discharge: Yes       Entered by: Genevieve Ordoñez RN 01/27/2023 1:23 PM     Patient is A&O x4, Pt calm and cooperative.  VSS on RA.  no use of SL insuline this morning.  Assist X 2 and total care at baseline, Out of bed chair TID and as needed. Tolerating regular diet and oral medications, Denies pain. Able to communicate needs, check in change incontinent product in bed as needed. BLE elevated with pillow to prevent skin breakdown. Barrier cream applied for redness on scrotom. Will continue to monitor and provide cares.      Please review provider order for any additional goals.   Nurse to notify provider when observation goals have been met and patient is ready for discharge.Goal Outcome Evaluation:

## 2023-01-27 NOTE — PLAN OF CARE
PRIMARY DIAGNOSIS: Placement  OUTPATIENT/OBSERVATION GOALS TO BE MET BEFORE DISCHARGE:  ADLs back to baseline: Yes    Activity and level of assistance: assist x 1    Pain status: Pain free.    Return to near baseline physical activity: Yes     Discharge Planner Nurse   Safe discharge environment identified: No  Barriers to discharge: Yes       Entered by: Emily Garcia RN 01/27/2023 4:42 AM    Patient resting comfortably in bed, SW following for placement       Please review provider order for any additional goals.   Nurse to notify provider when observation goals have been met and patient is ready for discharge.

## 2023-01-27 NOTE — PLAN OF CARE
PRIMARY DIAGNOSIS: Placement   OUTPATIENT/OBSERVATION GOALS TO BE MET BEFORE DISCHARGE:  ADLs back to baseline: Yes    Activity and level of assistance: A X 2    Pain status: Pain free.    Return to near baseline physical activity: Yes     Discharge Planner Nurse   Safe discharge environment identified: No  Barriers to discharge: Yes       Entered by: Monica Elder RN 01/26/2023 20:27     Please review provider order for any additional goals.   Nurse to notify provider when observation goals have been met and patient is ready for discharge.

## 2023-01-27 NOTE — PLAN OF CARE
PRIMARY DIAGNOSIS: PLACEMENT  OUTPATIENT/OBSERVATION GOALS TO BE MET BEFORE DISCHARGE:  1. ADLs back to baseline: Yes    2. Activity and level of assistance: Up with maximum assistance. Total care at baseline     3. Pain status: Pain free.    4. Return to near baseline physical activity: Yes     Discharge Planner Nurse   Safe discharge environment identified: No  Barriers to discharge: Yes       Entered by: Genevieve Ordoñez RN 01/27/2023 4:54 PM     Patient is A&O x4, Pt calm and cooperative.  VSS on RA.  no use of SL insuline this morning.  Assist X 2 and total care at baseline, Out of bed chair TID and as needed. Tolerating regular diet and oral medications, Denies pain. Able to communicate needs, check in change incontinent product in bed as needed. BLE elevated with pillow to prevent skin breakdown. Barrier cream applied for redness on scrotom. Will continue to monitor and provide cares. No behaviors noted.       Please review provider order for any additional goals.   Nurse to notify provider when observation goals have been met and patient is ready for discharge.Goal Outcome Evaluation:

## 2023-01-27 NOTE — PLAN OF CARE
PRIMARY DIAGNOSIS: PLACEMENT  OUTPATIENT/OBSERVATION GOALS TO BE MET BEFORE DISCHARGE:  1. ADLs back to baseline: Yes    2. Activity and level of assistance: Up with maximum assistance. Total care at baseline     3. Pain status: Pain free.    4. Return to near baseline physical activity: Yes     Discharge Planner Nurse   Safe discharge environment identified: No  Barriers to discharge: Yes       Entered by: Genevieve Ordoñez RN 01/27/2023 1:24 PM     Patient is A&O x4, Pt calm and cooperative.  VSS on RA.  no use of SL insuline this morning.  Assist X 2 and total care at baseline, Out of bed chair TID and as needed. Tolerating regular diet and oral medications, Denies pain. Able to communicate needs, check in change incontinent product in bed as needed. BLE elevated with pillow to prevent skin breakdown. Barrier cream applied for redness on scrotom. Will continue to monitor and provide cares.      Please review provider order for any additional goals.   Nurse to notify provider when observation goals have been met and patient is ready for discharge.Goal Outcome Evaluation:

## 2023-01-28 LAB — GLUCOSE BLDC GLUCOMTR-MCNC: 108 MG/DL (ref 70–99)

## 2023-01-28 PROCEDURE — 250N000013 HC RX MED GY IP 250 OP 250 PS 637: Performed by: NURSE PRACTITIONER

## 2023-01-28 PROCEDURE — 250N000013 HC RX MED GY IP 250 OP 250 PS 637: Performed by: PHYSICIAN ASSISTANT

## 2023-01-28 PROCEDURE — 250N000013 HC RX MED GY IP 250 OP 250 PS 637: Performed by: HOSPITALIST

## 2023-01-28 PROCEDURE — 82962 GLUCOSE BLOOD TEST: CPT

## 2023-01-28 PROCEDURE — 99207 PR NO CHARGE LOS: CPT | Performed by: NURSE PRACTITIONER

## 2023-01-28 PROCEDURE — G0378 HOSPITAL OBSERVATION PER HR: HCPCS

## 2023-01-28 RX ADMIN — EMPAGLIFLOZIN 10 MG: 10 TABLET, FILM COATED ORAL at 08:26

## 2023-01-28 RX ADMIN — HYDROCORTISONE: 1 CREAM TOPICAL at 08:27

## 2023-01-28 RX ADMIN — OLANZAPINE 2.5 MG: 2.5 TABLET, FILM COATED ORAL at 14:38

## 2023-01-28 RX ADMIN — Medication 10 MG: at 21:11

## 2023-01-28 RX ADMIN — ATORVASTATIN CALCIUM 20 MG: 20 TABLET, FILM COATED ORAL at 21:12

## 2023-01-28 RX ADMIN — MIRTAZAPINE 15 MG: 15 TABLET, FILM COATED ORAL at 21:12

## 2023-01-28 RX ADMIN — CLOTRIMAZOLE: 0.01 CREAM TOPICAL at 21:19

## 2023-01-28 RX ADMIN — LEVOTHYROXINE SODIUM 25 MCG: 0.03 TABLET ORAL at 08:26

## 2023-01-28 RX ADMIN — VENLAFAXINE HYDROCHLORIDE 150 MG: 150 CAPSULE, EXTENDED RELEASE ORAL at 21:11

## 2023-01-28 RX ADMIN — METOPROLOL SUCCINATE 50 MG: 50 TABLET, EXTENDED RELEASE ORAL at 08:26

## 2023-01-28 RX ADMIN — DIVALPROEX SODIUM 1000 MG: 500 TABLET, DELAYED RELEASE ORAL at 21:11

## 2023-01-28 RX ADMIN — POLYETHYLENE GLYCOL 3350 17 G: 17 POWDER, FOR SOLUTION ORAL at 08:25

## 2023-01-28 RX ADMIN — QUETIAPINE FUMARATE 400 MG: 200 TABLET ORAL at 21:11

## 2023-01-28 RX ADMIN — CLONIDINE HYDROCHLORIDE 0.1 MG: 0.1 TABLET ORAL at 21:11

## 2023-01-28 RX ADMIN — HYDROXYZINE HYDROCHLORIDE 50 MG: 50 TABLET, FILM COATED ORAL at 21:12

## 2023-01-28 RX ADMIN — VENLAFAXINE HYDROCHLORIDE 75 MG: 150 CAPSULE, EXTENDED RELEASE ORAL at 21:12

## 2023-01-28 RX ADMIN — DIVALPROEX SODIUM 750 MG: 500 TABLET, DELAYED RELEASE ORAL at 08:25

## 2023-01-28 RX ADMIN — CLONIDINE HYDROCHLORIDE 0.1 MG: 0.1 TABLET ORAL at 14:39

## 2023-01-28 RX ADMIN — CLOTRIMAZOLE: 0.01 CREAM TOPICAL at 08:27

## 2023-01-28 RX ADMIN — BACLOFEN 10 MG: 10 TABLET ORAL at 08:26

## 2023-01-28 RX ADMIN — METFORMIN HYDROCHLORIDE 850 MG: 850 TABLET, FILM COATED ORAL at 18:45

## 2023-01-28 RX ADMIN — QUETIAPINE 200 MG: 200 TABLET, FILM COATED ORAL at 08:26

## 2023-01-28 RX ADMIN — BACLOFEN 10 MG: 10 TABLET ORAL at 14:39

## 2023-01-28 RX ADMIN — QUETIAPINE 200 MG: 200 TABLET, FILM COATED ORAL at 14:38

## 2023-01-28 RX ADMIN — ASPIRIN 81 MG CHEWABLE TABLET 81 MG: 81 TABLET CHEWABLE at 08:26

## 2023-01-28 RX ADMIN — HYDROXYZINE HYDROCHLORIDE 50 MG: 50 TABLET, FILM COATED ORAL at 08:26

## 2023-01-28 RX ADMIN — Medication 25 MCG: at 08:26

## 2023-01-28 RX ADMIN — HYDROXYZINE HYDROCHLORIDE 50 MG: 50 TABLET, FILM COATED ORAL at 14:38

## 2023-01-28 RX ADMIN — HYDROCORTISONE: 1 CREAM TOPICAL at 21:19

## 2023-01-28 RX ADMIN — BACLOFEN 10 MG: 10 TABLET ORAL at 21:12

## 2023-01-28 RX ADMIN — METFORMIN HYDROCHLORIDE 850 MG: 850 TABLET, FILM COATED ORAL at 08:26

## 2023-01-28 RX ADMIN — CLONIDINE HYDROCHLORIDE 0.1 MG: 0.1 TABLET ORAL at 08:26

## 2023-01-28 ASSESSMENT — ACTIVITIES OF DAILY LIVING (ADL)
ADLS_ACUITY_SCORE: 56
ADLS_ACUITY_SCORE: 54
ADLS_ACUITY_SCORE: 54
ADLS_ACUITY_SCORE: 56
ADLS_ACUITY_SCORE: 54
ADLS_ACUITY_SCORE: 56

## 2023-01-28 NOTE — PLAN OF CARE
PRIMARY DIAGNOSIS:  Placement  OUTPATIENT/OBSERVATION GOALS TO BE MET BEFORE DISCHARGE:  1. ADLs back to baseline: Yes    2. Activity and level of assistance: Up with maximum assistance. Consider SW and/or PT evaluation.     3. Pain status: Pain free.    4. Return to near baseline physical activity: Yes     Discharge Planner Nurse   Safe discharge environment identified: No  Barriers to discharge: Yes       Entered by: Ciara Quiros RN 01/28/2023 5:11 AM     Please review provider order for any additional goals.   Nurse to notify provider when observation goals have been met and patient is ready for discharge.        Pt A&OX4.No concerns verbalized.

## 2023-01-28 NOTE — PLAN OF CARE
PRIMARY DIAGNOSIS:  Placement  OUTPATIENT/OBSERVATION GOALS TO BE MET BEFORE DISCHARGE:  1. ADLs back to baseline: Yes    2. Activity and level of assistance: Up with maximum assistance. Consider SW and/or PT evaluation.     3. Pain status: Pain free.    4. Return to near baseline physical activity: Yes     Discharge Planner Nurse   Safe discharge environment identified: No  Barriers to discharge: Yes       Entered by: Ciara Quiros RN 01/28/2023 5:13 AM     Please review provider order for any additional goals.   Nurse to notify provider when observation goals have been met and patient is ready for discharge.        Pt A&OX4. Due medications given. Denies any pain or discomfort.
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
## 2023-01-28 NOTE — PLAN OF CARE
PRIMARY DIAGNOSIS:  Placement  OUTPATIENT/OBSERVATION GOALS TO BE MET BEFORE DISCHARGE:  1. ADLs back to baseline: Yes    2. Activity and level of assistance: Up with maximum assistance. Consider SW and/or PT evaluation.     3. Pain status: Pain free.    4. Return to near baseline physical activity: Yes     Discharge Planner Nurse   Safe discharge environment identified: No  Barriers to discharge: Yes       Entered by: Ciara Quiros RN 01/28/2023 5:15 AM     Please review provider order for any additional goals.   Nurse to notify provider when observation goals have been met and patient is ready for discharge.        Pt A&OX4. Asleep between cares. No concerns raised. Awaiting placement.

## 2023-01-29 LAB — GLUCOSE BLDC GLUCOMTR-MCNC: 88 MG/DL (ref 70–99)

## 2023-01-29 PROCEDURE — 99207 PR NO CHARGE LOS: CPT | Performed by: NURSE PRACTITIONER

## 2023-01-29 PROCEDURE — 250N000013 HC RX MED GY IP 250 OP 250 PS 637: Performed by: HOSPITALIST

## 2023-01-29 PROCEDURE — 250N000013 HC RX MED GY IP 250 OP 250 PS 637: Performed by: NURSE PRACTITIONER

## 2023-01-29 PROCEDURE — G0378 HOSPITAL OBSERVATION PER HR: HCPCS

## 2023-01-29 PROCEDURE — 82962 GLUCOSE BLOOD TEST: CPT

## 2023-01-29 PROCEDURE — 250N000013 HC RX MED GY IP 250 OP 250 PS 637: Performed by: PHYSICIAN ASSISTANT

## 2023-01-29 RX ADMIN — LEVOTHYROXINE SODIUM 25 MCG: 0.03 TABLET ORAL at 10:28

## 2023-01-29 RX ADMIN — METOPROLOL SUCCINATE 50 MG: 50 TABLET, EXTENDED RELEASE ORAL at 10:28

## 2023-01-29 RX ADMIN — METFORMIN HYDROCHLORIDE 850 MG: 850 TABLET, FILM COATED ORAL at 10:27

## 2023-01-29 RX ADMIN — METFORMIN HYDROCHLORIDE 850 MG: 850 TABLET, FILM COATED ORAL at 18:35

## 2023-01-29 RX ADMIN — HYDROXYZINE HYDROCHLORIDE 50 MG: 50 TABLET, FILM COATED ORAL at 16:33

## 2023-01-29 RX ADMIN — BACLOFEN 10 MG: 10 TABLET ORAL at 16:33

## 2023-01-29 RX ADMIN — DIVALPROEX SODIUM 1000 MG: 500 TABLET, DELAYED RELEASE ORAL at 21:23

## 2023-01-29 RX ADMIN — BACLOFEN 10 MG: 10 TABLET ORAL at 21:25

## 2023-01-29 RX ADMIN — BACLOFEN 10 MG: 10 TABLET ORAL at 10:28

## 2023-01-29 RX ADMIN — QUETIAPINE 200 MG: 200 TABLET, FILM COATED ORAL at 16:33

## 2023-01-29 RX ADMIN — Medication 25 MCG: at 10:28

## 2023-01-29 RX ADMIN — QUETIAPINE FUMARATE 400 MG: 200 TABLET ORAL at 21:24

## 2023-01-29 RX ADMIN — Medication 10 MG: at 21:25

## 2023-01-29 RX ADMIN — HYDROCORTISONE: 1 CREAM TOPICAL at 21:30

## 2023-01-29 RX ADMIN — MIRTAZAPINE 15 MG: 15 TABLET, FILM COATED ORAL at 21:24

## 2023-01-29 RX ADMIN — CLONIDINE HYDROCHLORIDE 0.1 MG: 0.1 TABLET ORAL at 16:33

## 2023-01-29 RX ADMIN — HYDROXYZINE HYDROCHLORIDE 50 MG: 50 TABLET, FILM COATED ORAL at 21:24

## 2023-01-29 RX ADMIN — VENLAFAXINE HYDROCHLORIDE 150 MG: 150 CAPSULE, EXTENDED RELEASE ORAL at 21:25

## 2023-01-29 RX ADMIN — HYDROXYZINE HYDROCHLORIDE 50 MG: 50 TABLET, FILM COATED ORAL at 10:29

## 2023-01-29 RX ADMIN — CLONIDINE HYDROCHLORIDE 0.1 MG: 0.1 TABLET ORAL at 21:26

## 2023-01-29 RX ADMIN — CLONIDINE HYDROCHLORIDE 0.1 MG: 0.1 TABLET ORAL at 10:29

## 2023-01-29 RX ADMIN — ATORVASTATIN CALCIUM 20 MG: 20 TABLET, FILM COATED ORAL at 21:23

## 2023-01-29 RX ADMIN — HYDROCORTISONE: 1 CREAM TOPICAL at 10:35

## 2023-01-29 RX ADMIN — CLOTRIMAZOLE: 0.01 CREAM TOPICAL at 10:36

## 2023-01-29 RX ADMIN — DIVALPROEX SODIUM 750 MG: 500 TABLET, DELAYED RELEASE ORAL at 10:28

## 2023-01-29 RX ADMIN — EMPAGLIFLOZIN 10 MG: 10 TABLET, FILM COATED ORAL at 10:29

## 2023-01-29 RX ADMIN — VENLAFAXINE HYDROCHLORIDE 75 MG: 150 CAPSULE, EXTENDED RELEASE ORAL at 21:24

## 2023-01-29 RX ADMIN — OLANZAPINE 2.5 MG: 2.5 TABLET, FILM COATED ORAL at 16:33

## 2023-01-29 RX ADMIN — CLOTRIMAZOLE: 0.01 CREAM TOPICAL at 21:29

## 2023-01-29 RX ADMIN — ASPIRIN 81 MG CHEWABLE TABLET 81 MG: 81 TABLET CHEWABLE at 10:28

## 2023-01-29 RX ADMIN — POLYETHYLENE GLYCOL 3350 17 G: 17 POWDER, FOR SOLUTION ORAL at 10:28

## 2023-01-29 RX ADMIN — QUETIAPINE 200 MG: 200 TABLET, FILM COATED ORAL at 10:29

## 2023-01-29 ASSESSMENT — ACTIVITIES OF DAILY LIVING (ADL)
ADLS_ACUITY_SCORE: 56
ADLS_ACUITY_SCORE: 60
ADLS_ACUITY_SCORE: 56
ADLS_ACUITY_SCORE: 60
ADLS_ACUITY_SCORE: 60
ADLS_ACUITY_SCORE: 56
ADLS_ACUITY_SCORE: 60
ADLS_ACUITY_SCORE: 56
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
## 2023-01-29 NOTE — PLAN OF CARE
/81   Pulse 65   Temp 97.1  F (36.2  C) (Oral)   Resp 16   Wt 99.9 kg (220 lb 4.8 oz)   SpO2 95%       PRIMARY DIAGNOSIS: PLACEMENT     OUTPATIENT OBSERVATION GOALS TO BE MET BEFORE DISCHARGE:     ADLs back to baseline: Yes  Activity and level of assistance: A x 2. Lift  Return to near baseline physical activity: yes      Discharge Planner Nurse      Safe discharge environment identified: No     Barriers to discharge: Yes          Entered by:Nathaly Steele RN 01/29/2023 12:08 AM     Pt alert and oriented x 4. Patient denies pain. Regular diet. Incontinent.  Resting in bed.      Please review provider order for any additional goals.      Nurse to notify provider when observation goals have been met and patient is ready for discharge.
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
## 2023-01-29 NOTE — PLAN OF CARE
/81   Pulse 65   Temp 97.1  F (36.2  C) (Oral)   Resp 16   Wt 99.9 kg (220 lb 4.8 oz)   SpO2 95%       PRIMARY DIAGNOSIS: PLACEMENT     OUTPATIENT OBSERVATION GOALS TO BE MET BEFORE DISCHARGE:     ADLs back to baseline: Yes  Activity and level of assistance: A x 2. Lift  Return to near baseline physical activity: yes      Discharge Planner Nurse      Safe discharge environment identified: No     Barriers to discharge: Yes          Entered by:Nathaly Steele RN 01/29/2023 0438 AM     Pt alert and oriented x 4. Patient denies pain. Regular diet. Incontinent.  A x 2. Resting in bed. Will continue with plan of care.      Please review provider order for any additional goals.      Nurse to notify provider when observation goals have been met and patient is ready for discharge.

## 2023-01-29 NOTE — PLAN OF CARE
PRIMARY DIAGNOSIS: Placement  OUTPATIENT/OBSERVATION GOALS TO BE MET BEFORE DISCHARGE:  1. ADLs back to baseline: Yes    2. Activity and level of assistance: Total assist baseline    3. Pain status: Pain free.    4. Return to near baseline physical activity: Yes     Discharge Planner Nurse   Safe discharge environment identified: No  Barriers to discharge: Yes       Entered by: Annalee Palomares RN 01/28/2023 6:31 PM    A/O x4. Sitting in bed. Regular diet. Doing well, awaiting placement.  Please review provider order for any additional goals.   Nurse to notify provider when observation goals have been met and patient is ready for discharge.
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
## 2023-01-29 NOTE — PLAN OF CARE
/81   Pulse 65   Temp 97.1  F (36.2  C) (Oral)   Resp 16   Wt 99.9 kg (220 lb 4.8 oz)   SpO2 95%      PRIMARY DIAGNOSIS: PLACEMENT    OUTPATIENT OBSERVATION GOALS TO BE MET BEFORE DISCHARGE:    1. ADLs back to baseline: Yes  2. Activity and level of assistance: A x 2. Lift  3. Return to near baseline physical activity: yes     Discharge Planner Nurse     Safe discharge environment identified: No    Barriers to discharge: Yes         Entered by:Nathaly Steele RN 01/28/2023 12:01 AM    Pt alert and oriented x 4. LS clear. BS active and audible. Denies pain. Regular diet. Incontinent.  Resting in bed.     Please review provider order for any additional goals.     Nurse to notify provider when observation goals have been met and patient is ready for discharge.

## 2023-01-30 PROCEDURE — 250N000013 HC RX MED GY IP 250 OP 250 PS 637: Performed by: NURSE PRACTITIONER

## 2023-01-30 PROCEDURE — 250N000013 HC RX MED GY IP 250 OP 250 PS 637: Performed by: PHYSICIAN ASSISTANT

## 2023-01-30 PROCEDURE — 99231 SBSQ HOSP IP/OBS SF/LOW 25: CPT

## 2023-01-30 PROCEDURE — 250N000013 HC RX MED GY IP 250 OP 250 PS 637: Performed by: HOSPITALIST

## 2023-01-30 PROCEDURE — G0378 HOSPITAL OBSERVATION PER HR: HCPCS

## 2023-01-30 RX ADMIN — ATORVASTATIN CALCIUM 20 MG: 20 TABLET, FILM COATED ORAL at 20:00

## 2023-01-30 RX ADMIN — METFORMIN HYDROCHLORIDE 850 MG: 850 TABLET, FILM COATED ORAL at 17:43

## 2023-01-30 RX ADMIN — DIVALPROEX SODIUM 1000 MG: 500 TABLET, DELAYED RELEASE ORAL at 22:07

## 2023-01-30 RX ADMIN — Medication 25 MCG: at 08:29

## 2023-01-30 RX ADMIN — VENLAFAXINE HYDROCHLORIDE 75 MG: 150 CAPSULE, EXTENDED RELEASE ORAL at 22:08

## 2023-01-30 RX ADMIN — Medication 10 MG: at 22:07

## 2023-01-30 RX ADMIN — LEVOTHYROXINE SODIUM 25 MCG: 0.03 TABLET ORAL at 08:29

## 2023-01-30 RX ADMIN — HYDROXYZINE HYDROCHLORIDE 50 MG: 50 TABLET, FILM COATED ORAL at 20:00

## 2023-01-30 RX ADMIN — VENLAFAXINE HYDROCHLORIDE 150 MG: 150 CAPSULE, EXTENDED RELEASE ORAL at 22:08

## 2023-01-30 RX ADMIN — QUETIAPINE 200 MG: 200 TABLET, FILM COATED ORAL at 08:29

## 2023-01-30 RX ADMIN — QUETIAPINE FUMARATE 400 MG: 200 TABLET ORAL at 22:07

## 2023-01-30 RX ADMIN — CLONIDINE HYDROCHLORIDE 0.1 MG: 0.1 TABLET ORAL at 14:44

## 2023-01-30 RX ADMIN — HYDROCORTISONE: 1 CREAM TOPICAL at 20:10

## 2023-01-30 RX ADMIN — HYDROXYZINE HYDROCHLORIDE 50 MG: 50 TABLET, FILM COATED ORAL at 08:29

## 2023-01-30 RX ADMIN — QUETIAPINE 200 MG: 200 TABLET, FILM COATED ORAL at 14:43

## 2023-01-30 RX ADMIN — BACLOFEN 10 MG: 10 TABLET ORAL at 14:43

## 2023-01-30 RX ADMIN — OLANZAPINE 2.5 MG: 2.5 TABLET, FILM COATED ORAL at 14:44

## 2023-01-30 RX ADMIN — HYDROXYZINE HYDROCHLORIDE 50 MG: 50 TABLET, FILM COATED ORAL at 14:43

## 2023-01-30 RX ADMIN — CLONIDINE HYDROCHLORIDE 0.1 MG: 0.1 TABLET ORAL at 20:00

## 2023-01-30 RX ADMIN — METOPROLOL SUCCINATE 50 MG: 50 TABLET, EXTENDED RELEASE ORAL at 08:29

## 2023-01-30 RX ADMIN — DIVALPROEX SODIUM 750 MG: 500 TABLET, DELAYED RELEASE ORAL at 08:30

## 2023-01-30 RX ADMIN — BACLOFEN 10 MG: 10 TABLET ORAL at 08:29

## 2023-01-30 RX ADMIN — METFORMIN HYDROCHLORIDE 850 MG: 850 TABLET, FILM COATED ORAL at 08:29

## 2023-01-30 RX ADMIN — CLONIDINE HYDROCHLORIDE 0.1 MG: 0.1 TABLET ORAL at 08:29

## 2023-01-30 RX ADMIN — ASPIRIN 81 MG CHEWABLE TABLET 81 MG: 81 TABLET CHEWABLE at 08:29

## 2023-01-30 RX ADMIN — BACLOFEN 10 MG: 10 TABLET ORAL at 20:00

## 2023-01-30 RX ADMIN — CLOTRIMAZOLE: 0.01 CREAM TOPICAL at 20:10

## 2023-01-30 RX ADMIN — MIRTAZAPINE 15 MG: 15 TABLET, FILM COATED ORAL at 22:07

## 2023-01-30 RX ADMIN — EMPAGLIFLOZIN 10 MG: 10 TABLET, FILM COATED ORAL at 08:29

## 2023-01-30 ASSESSMENT — ACTIVITIES OF DAILY LIVING (ADL)
ADLS_ACUITY_SCORE: 56

## 2023-01-30 NOTE — PROGRESS NOTES
Care Management Follow Up    Expected Discharge Date: 02/28/2023     Concerns to be Addressed: Discharge planning      Patient plan of care discussed at interdisciplinary rounds: Yes    Anticipated Discharge Disposition:  Group Home once placement found by relocation worker     Additional Information:  SW reviewed [employer] assessment documents with patient, electronically signed with patient's permission after reviewing documents. Copies of paperwork printed and placed in patient's chart for his records.     Awaiting update from pt's relocation worker on status of GH placement.     SW will continue to follow.     DANITA Obrien, [facility]   Inpatient Care Coordination  [facility]   [phone]
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
## 2023-01-30 NOTE — PLAN OF CARE
"PRIMARY DIAGNOSIS: \"GENERIC\" NURSING  OUTPATIENT/OBSERVATION GOALS TO BE MET BEFORE DISCHARGE:  1. ADLs back to baseline: Yes    2. Activity and level of assistance: Up with maximum assistance.     3. Pain status: Pain free.    4. Return to near baseline physical activity: Yes     Discharge Planner Nurse   Safe discharge environment identified: No  Barriers to discharge: Yes       Entered by: Hira Fraire RN 01/30/2023     A/O x4. VSS on RA. Total assist. Tolerating regular diet. Lung sounds clear. Bowel sounds active. 1/27 LBM according to chart. Adequate urine output via briefs. Skin intact, redness in groin, barrier applied. Denies pain. Denies nausea.     Please review provider order for any additional goals.   Nurse to notify provider when observation goals have been met and patient is ready for discharge.    "

## 2023-01-30 NOTE — PLAN OF CARE
"PRIMARY DIAGNOSIS: \"GENERIC\" NURSING  OUTPATIENT/OBSERVATION GOALS TO BE MET BEFORE DISCHARGE:  1. ADLs back to baseline: Yes    2. Activity and level of assistance: Up with maximum assistance.     3. Pain status: Pain free.    4. Return to near baseline physical activity: Yes     Discharge Planner Nurse   Safe discharge environment identified: No  Barriers to discharge: Yes       Entered by: Hira Fraire RN 01/30/2023     A/O x4. VSS on RA. Total assist. Tolerating regular diet. Lung sounds clear. Bowel sounds active. 1/27 LBM according to chart. Adequate urine output via briefs. Skin intact, redness in groin barrier applied. Denies pain. Denies nausea.     Please review provider order for any additional goals.   Nurse to notify provider when observation goals have been met and patient is ready for discharge.    "
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
## 2023-01-31 LAB — GLUCOSE BLDC GLUCOMTR-MCNC: 100 MG/DL (ref 70–99)

## 2023-01-31 PROCEDURE — 82962 GLUCOSE BLOOD TEST: CPT

## 2023-01-31 PROCEDURE — 250N000013 HC RX MED GY IP 250 OP 250 PS 637: Performed by: HOSPITALIST

## 2023-01-31 PROCEDURE — G0378 HOSPITAL OBSERVATION PER HR: HCPCS

## 2023-01-31 PROCEDURE — 250N000013 HC RX MED GY IP 250 OP 250 PS 637: Performed by: PHYSICIAN ASSISTANT

## 2023-01-31 PROCEDURE — 99231 SBSQ HOSP IP/OBS SF/LOW 25: CPT

## 2023-01-31 PROCEDURE — 250N000013 HC RX MED GY IP 250 OP 250 PS 637: Performed by: NURSE PRACTITIONER

## 2023-01-31 RX ADMIN — QUETIAPINE FUMARATE 400 MG: 200 TABLET ORAL at 21:19

## 2023-01-31 RX ADMIN — QUETIAPINE 200 MG: 200 TABLET, FILM COATED ORAL at 08:26

## 2023-01-31 RX ADMIN — LEVOTHYROXINE SODIUM 25 MCG: 0.03 TABLET ORAL at 08:27

## 2023-01-31 RX ADMIN — DIVALPROEX SODIUM 750 MG: 500 TABLET, DELAYED RELEASE ORAL at 08:26

## 2023-01-31 RX ADMIN — Medication 25 MCG: at 08:27

## 2023-01-31 RX ADMIN — Medication 10 MG: at 21:19

## 2023-01-31 RX ADMIN — CLOTRIMAZOLE: 0.01 CREAM TOPICAL at 21:26

## 2023-01-31 RX ADMIN — METFORMIN HYDROCHLORIDE 850 MG: 850 TABLET, FILM COATED ORAL at 17:05

## 2023-01-31 RX ADMIN — OLANZAPINE 2.5 MG: 2.5 TABLET, FILM COATED ORAL at 13:21

## 2023-01-31 RX ADMIN — MIRTAZAPINE 15 MG: 15 TABLET, FILM COATED ORAL at 21:19

## 2023-01-31 RX ADMIN — CLONIDINE HYDROCHLORIDE 0.1 MG: 0.1 TABLET ORAL at 08:27

## 2023-01-31 RX ADMIN — QUETIAPINE 200 MG: 200 TABLET, FILM COATED ORAL at 13:21

## 2023-01-31 RX ADMIN — VENLAFAXINE HYDROCHLORIDE 150 MG: 150 CAPSULE, EXTENDED RELEASE ORAL at 21:20

## 2023-01-31 RX ADMIN — CLONIDINE HYDROCHLORIDE 0.1 MG: 0.1 TABLET ORAL at 13:21

## 2023-01-31 RX ADMIN — POLYETHYLENE GLYCOL 3350 17 G: 17 POWDER, FOR SOLUTION ORAL at 08:26

## 2023-01-31 RX ADMIN — VENLAFAXINE HYDROCHLORIDE 75 MG: 150 CAPSULE, EXTENDED RELEASE ORAL at 21:20

## 2023-01-31 RX ADMIN — HYDROXYZINE HYDROCHLORIDE 50 MG: 50 TABLET, FILM COATED ORAL at 13:21

## 2023-01-31 RX ADMIN — BACLOFEN 10 MG: 10 TABLET ORAL at 08:27

## 2023-01-31 RX ADMIN — METOPROLOL SUCCINATE 50 MG: 50 TABLET, EXTENDED RELEASE ORAL at 08:27

## 2023-01-31 RX ADMIN — ATORVASTATIN CALCIUM 20 MG: 20 TABLET, FILM COATED ORAL at 21:20

## 2023-01-31 RX ADMIN — BACLOFEN 10 MG: 10 TABLET ORAL at 21:21

## 2023-01-31 RX ADMIN — BACLOFEN 10 MG: 10 TABLET ORAL at 13:21

## 2023-01-31 RX ADMIN — CLONIDINE HYDROCHLORIDE 0.1 MG: 0.1 TABLET ORAL at 21:19

## 2023-01-31 RX ADMIN — ASPIRIN 81 MG CHEWABLE TABLET 81 MG: 81 TABLET CHEWABLE at 08:27

## 2023-01-31 RX ADMIN — HYDROXYZINE HYDROCHLORIDE 50 MG: 50 TABLET, FILM COATED ORAL at 21:20

## 2023-01-31 RX ADMIN — HYDROCORTISONE: 1 CREAM TOPICAL at 21:26

## 2023-01-31 RX ADMIN — DIVALPROEX SODIUM 1000 MG: 500 TABLET, DELAYED RELEASE ORAL at 21:19

## 2023-01-31 RX ADMIN — EMPAGLIFLOZIN 10 MG: 10 TABLET, FILM COATED ORAL at 08:27

## 2023-01-31 RX ADMIN — METFORMIN HYDROCHLORIDE 850 MG: 850 TABLET, FILM COATED ORAL at 08:26

## 2023-01-31 RX ADMIN — HYDROXYZINE HYDROCHLORIDE 50 MG: 50 TABLET, FILM COATED ORAL at 08:26

## 2023-01-31 ASSESSMENT — ACTIVITIES OF DAILY LIVING (ADL)
ADLS_ACUITY_SCORE: 56
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
## 2023-01-31 NOTE — PLAN OF CARE
PRIMARY DIAGNOSIS: Placement  OUTPATIENT/OBSERVATION GOALS TO BE MET BEFORE DISCHARGE:  ADLs back to baseline: Yes     Activity and level of assistance: Up with maximum assistance.      Pain status: Pain free.     Return to near baseline physical activity: Yes          Discharge Planner Nurse   Safe discharge environment identified: No  Barriers to discharge: Yes       Entered by: Diane Du RN 01/31/2023 4PM   Pt A&Ox4. VSS, on RA. Denies pain, nausea, chest pain and SOB. Repositioned as tolerated. Mepilex on bilateral ankles d/t blanchable redness. Blood sugar levels WDL. Incontinence care provided. Regular diet. No PIV. SW following. Waiting for placement.     Please review provider order for any additional goals.   Nurse to notify provider when observation goals have been met and patient is ready for discharge

## 2023-01-31 NOTE — PLAN OF CARE
PRIMARY DIAGNOSIS: Placement  OUTPATIENT/OBSERVATION GOALS TO BE MET BEFORE DISCHARGE:  ADLs back to baseline: Yes     Activity and level of assistance: Up with maximum assistance.      Pain status: Pain free.     Return to near baseline physical activity: Yes          Discharge Planner Nurse   Safe discharge environment identified: No  Barriers to discharge: Yes       Entered by: Diane Du RN 01/31/2023 12PM   Pt A&Ox4. Hypertensive with SBP in the 130s. Scheduled BP meds given. Other VSS, on RA. Denies pain, nausea, chest pain and SOB. Repositioned as tolerated. Mepilex placed on bilateral ankles d/t blanchable redness. Blood sugar levels WDL. Incontinence care provided. Regular diet. No PIV. SW following. Waiting for placement.   Please review provider order for any additional goals.   Nurse to notify provider when observation goals have been met and patient is ready for discharge

## 2023-01-31 NOTE — PLAN OF CARE
PRIMARY DIAGNOSIS: Placement  OUTPATIENT/OBSERVATION GOALS TO BE MET BEFORE DISCHARGE:  ADLs back to baseline: Yes    Activity and level of assistance: assist x 1     Pain status: Pain free.    Return to near baseline physical activity: Yes     Discharge Planner Nurse   Safe discharge environment identified: No  Barriers to discharge: Yes       Entered by: Emily Garcia RN 01/31/2023 12:08 AM    Patient resting comfortably in bed, currently denies pain, SW following for placement         Please review provider order for any additional goals.   Nurse to notify provider when observation goals have been met and patient is ready for discharge.

## 2023-01-31 NOTE — PROGRESS NOTES
1900 - 2300  Pt hypertensive but otherwise stable and comfortable with no complaints. Safety checks completed and meds administered as ordered. Will continue to provide supportive cares.

## 2023-01-31 NOTE — PLAN OF CARE
PRIMARY DIAGNOSIS: placement  OUTPATIENT/OBSERVATION GOALS TO BE MET BEFORE DISCHARGE:  ADLs back to baseline: Yes    Activity and level of assistance: Up with maximum assistance.    Pain status: Pain free.    Return to near baseline physical activity: Yes     Discharge Planner Nurse   Safe discharge environment identified: No  Barriers to discharge: Yes       Entered by: Emily Garcia RN 01/31/2023 5:47 AM    Patient resting comfortably in bed, SW following for placement       Please review provider order for any additional goals.   Nurse to notify provider when observation goals have been met and patient is ready for discharge.

## 2023-02-01 LAB — GLUCOSE BLDC GLUCOMTR-MCNC: 121 MG/DL (ref 70–99)

## 2023-02-01 PROCEDURE — G0378 HOSPITAL OBSERVATION PER HR: HCPCS

## 2023-02-01 PROCEDURE — 99231 SBSQ HOSP IP/OBS SF/LOW 25: CPT

## 2023-02-01 PROCEDURE — 250N000013 HC RX MED GY IP 250 OP 250 PS 637: Performed by: HOSPITALIST

## 2023-02-01 PROCEDURE — 250N000013 HC RX MED GY IP 250 OP 250 PS 637: Performed by: NURSE PRACTITIONER

## 2023-02-01 PROCEDURE — 82962 GLUCOSE BLOOD TEST: CPT

## 2023-02-01 PROCEDURE — 250N000013 HC RX MED GY IP 250 OP 250 PS 637: Performed by: PHYSICIAN ASSISTANT

## 2023-02-01 RX ADMIN — VENLAFAXINE HYDROCHLORIDE 150 MG: 150 CAPSULE, EXTENDED RELEASE ORAL at 22:02

## 2023-02-01 RX ADMIN — QUETIAPINE 200 MG: 200 TABLET, FILM COATED ORAL at 08:33

## 2023-02-01 RX ADMIN — LEVOTHYROXINE SODIUM 25 MCG: 0.03 TABLET ORAL at 08:34

## 2023-02-01 RX ADMIN — ATORVASTATIN CALCIUM 20 MG: 20 TABLET, FILM COATED ORAL at 19:41

## 2023-02-01 RX ADMIN — BACLOFEN 10 MG: 10 TABLET ORAL at 08:34

## 2023-02-01 RX ADMIN — BACLOFEN 10 MG: 10 TABLET ORAL at 13:47

## 2023-02-01 RX ADMIN — HYDROXYZINE HYDROCHLORIDE 50 MG: 50 TABLET, FILM COATED ORAL at 19:41

## 2023-02-01 RX ADMIN — METFORMIN HYDROCHLORIDE 850 MG: 850 TABLET, FILM COATED ORAL at 08:33

## 2023-02-01 RX ADMIN — CLONIDINE HYDROCHLORIDE 0.1 MG: 0.1 TABLET ORAL at 08:33

## 2023-02-01 RX ADMIN — OLANZAPINE 2.5 MG: 2.5 TABLET, FILM COATED ORAL at 13:47

## 2023-02-01 RX ADMIN — CLOTRIMAZOLE: 0.01 CREAM TOPICAL at 20:14

## 2023-02-01 RX ADMIN — HYDROCORTISONE: 1 CREAM TOPICAL at 19:48

## 2023-02-01 RX ADMIN — KETOCONAZOLE: 20 CREAM TOPICAL at 19:49

## 2023-02-01 RX ADMIN — Medication 25 MCG: at 08:33

## 2023-02-01 RX ADMIN — MIRTAZAPINE 15 MG: 15 TABLET, FILM COATED ORAL at 22:00

## 2023-02-01 RX ADMIN — CLONIDINE HYDROCHLORIDE 0.1 MG: 0.1 TABLET ORAL at 13:47

## 2023-02-01 RX ADMIN — QUETIAPINE FUMARATE 400 MG: 200 TABLET ORAL at 22:00

## 2023-02-01 RX ADMIN — EMPAGLIFLOZIN 10 MG: 10 TABLET, FILM COATED ORAL at 08:34

## 2023-02-01 RX ADMIN — DIVALPROEX SODIUM 750 MG: 500 TABLET, DELAYED RELEASE ORAL at 08:33

## 2023-02-01 RX ADMIN — CLONIDINE HYDROCHLORIDE 0.1 MG: 0.1 TABLET ORAL at 19:41

## 2023-02-01 RX ADMIN — KETOCONAZOLE: 20 CREAM TOPICAL at 19:48

## 2023-02-01 RX ADMIN — HYDROXYZINE HYDROCHLORIDE 50 MG: 50 TABLET, FILM COATED ORAL at 08:33

## 2023-02-01 RX ADMIN — METFORMIN HYDROCHLORIDE 850 MG: 850 TABLET, FILM COATED ORAL at 17:34

## 2023-02-01 RX ADMIN — POLYETHYLENE GLYCOL 3350 17 G: 17 POWDER, FOR SOLUTION ORAL at 08:30

## 2023-02-01 RX ADMIN — BACLOFEN 10 MG: 10 TABLET ORAL at 19:41

## 2023-02-01 RX ADMIN — HYDROXYZINE HYDROCHLORIDE 50 MG: 50 TABLET, FILM COATED ORAL at 13:47

## 2023-02-01 RX ADMIN — QUETIAPINE 200 MG: 200 TABLET, FILM COATED ORAL at 13:47

## 2023-02-01 RX ADMIN — METOPROLOL SUCCINATE 50 MG: 50 TABLET, EXTENDED RELEASE ORAL at 08:33

## 2023-02-01 RX ADMIN — Medication 10 MG: at 21:58

## 2023-02-01 RX ADMIN — DIVALPROEX SODIUM 1000 MG: 500 TABLET, DELAYED RELEASE ORAL at 21:58

## 2023-02-01 RX ADMIN — ASPIRIN 81 MG CHEWABLE TABLET 81 MG: 81 TABLET CHEWABLE at 08:34

## 2023-02-01 RX ADMIN — VENLAFAXINE HYDROCHLORIDE 75 MG: 150 CAPSULE, EXTENDED RELEASE ORAL at 22:03

## 2023-02-01 ASSESSMENT — ACTIVITIES OF DAILY LIVING (ADL)
ADLS_ACUITY_SCORE: 56

## 2023-02-01 NOTE — PLAN OF CARE
PRIMARY DIAGNOSIS: Placement  OUTPATIENT/OBSERVATION GOALS TO BE MET BEFORE DISCHARGE:  ADLs back to baseline: Yes    Activity and level of assistance: assist x 2    Pain status: Pain free.    Return to near baseline physical activity: Yes     Discharge Planner Nurse   Safe discharge environment identified: No  Barriers to discharge: Yes       Entered by: Emily Garcia RN 02/01/2023 4:17 AM    Patient resting comfortably in bed, SW following for placement       Please review provider order for any additional goals.   Nurse to notify provider when observation goals have been met and patient is ready for discharge.

## 2023-02-01 NOTE — PLAN OF CARE
PRIMARY DIAGNOSIS: Placement  OUTPATIENT/OBSERVATION GOALS TO BE MET BEFORE DISCHARGE:  ADLs back to baseline: Yes     Activity and level of assistance: Up with maximum assistance.      Pain status: Pain free.     Return to near baseline physical activity: Yes          Discharge Planner Nurse   Safe discharge environment identified: No  Barriers to discharge: Yes       Entered by: Diane Du RN 02/01/2023 8AM   Pt A&Ox4. VSS, on RA. Denies pain, nausea, chest pain and SOB. Repositioned as tolerated. Mepilex on bilateral ankles d/t blanchable redness. Blood sugar levels WDL. Incontinence care provided. Regular diet. No PIV. SW following. Waiting for placement.      Please review provider order for any additional goals.   Nurse to notify provider when observation goals have been met and patient is ready for discharge
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
## 2023-02-01 NOTE — PLAN OF CARE
PRIMARY DIAGNOSIS: Placement  OUTPATIENT/OBSERVATION GOALS TO BE MET BEFORE DISCHARGE:  ADLs back to baseline: Yes     Activity and level of assistance: Up with maximum assistance.      Pain status: Pain free.     Return to near baseline physical activity: Yes          Discharge Planner Nurse   Safe discharge environment identified: No  Barriers to discharge: Yes       Entered by: Diane Du RN 02/01/2023 12PM   Pt A&Ox4. VSS, on RA. Denies pain, nausea, chest pain and SOB. Repositioned as tolerated. Mepilex on bilateral ankles d/t blanchable redness. Daily Blood sugar check WDL. Incontinence care provided. Regular diet. No PIV. SW following. Waiting for placement.      Please review provider order for any additional goals.   Nurse to notify provider when observation goals have been met and patient is ready for discharge.

## 2023-02-01 NOTE — PLAN OF CARE
PRIMARY DIAGNOSIS: Placement    OBSERVATION GOALS TO BE MET BEFORE DISCHARGE:  1. ADLs back to baseline: Yes     2. Activity and level of assistance: Up with maximum assistance/lift      3. Pain status: Pain free.     4. Return to near baseline physical activity: Yes          Discharge Planner Nurse   Safe discharge environment identified: No  Barriers to discharge: Yes         Patient A & O x4, Lung sounts CTA, bowel sounds active, LBM 1/31. VSS, denies pain, N/V, SOB or chest pain. Turned and repositioned as needed. Mepilex on bilateral ankles intact. Daily Blood sugar check WDL. Incontinence care provided. Tolerating regular diet and oral meds. No PIV. SW following. Waiting for placement.      Please review provider order for any additional goals.   Nurse to notify provider when observation goals have been met and patient is ready for discharge.

## 2023-02-01 NOTE — PLAN OF CARE
PRIMARY DIAGNOSIS: Placement  OUTPATIENT/OBSERVATION GOALS TO BE MET BEFORE DISCHARGE:  ADLs back to baseline: Yes    Activity and level of assistance: assist x 2    Pain status: Pain free.    Return to near baseline physical activity: Yes     Discharge Planner Nurse   Safe discharge environment identified: No  Barriers to discharge: Yes       Entered by: Emily Garcia RN 02/01/2023 12:01 AM    Patient resting comfortably in bed, currently denies pain, SW following for placement       Please review provider order for any additional goals.   Nurse to notify provider when observation goals have been met and patient is ready for discharge.

## 2023-02-02 LAB — GLUCOSE BLDC GLUCOMTR-MCNC: 115 MG/DL (ref 70–99)

## 2023-02-02 PROCEDURE — 250N000013 HC RX MED GY IP 250 OP 250 PS 637: Performed by: HOSPITALIST

## 2023-02-02 PROCEDURE — G0378 HOSPITAL OBSERVATION PER HR: HCPCS

## 2023-02-02 PROCEDURE — 250N000013 HC RX MED GY IP 250 OP 250 PS 637: Performed by: PHYSICIAN ASSISTANT

## 2023-02-02 PROCEDURE — 250N000013 HC RX MED GY IP 250 OP 250 PS 637: Performed by: NURSE PRACTITIONER

## 2023-02-02 PROCEDURE — 82962 GLUCOSE BLOOD TEST: CPT

## 2023-02-02 PROCEDURE — 99231 SBSQ HOSP IP/OBS SF/LOW 25: CPT | Performed by: PHYSICIAN ASSISTANT

## 2023-02-02 RX ADMIN — HYDROXYZINE HYDROCHLORIDE 50 MG: 50 TABLET, FILM COATED ORAL at 08:47

## 2023-02-02 RX ADMIN — BACLOFEN 10 MG: 10 TABLET ORAL at 14:09

## 2023-02-02 RX ADMIN — ATORVASTATIN CALCIUM 20 MG: 20 TABLET, FILM COATED ORAL at 21:08

## 2023-02-02 RX ADMIN — LEVOTHYROXINE SODIUM 25 MCG: 0.03 TABLET ORAL at 08:47

## 2023-02-02 RX ADMIN — QUETIAPINE FUMARATE 400 MG: 200 TABLET ORAL at 21:08

## 2023-02-02 RX ADMIN — EMPAGLIFLOZIN 10 MG: 10 TABLET, FILM COATED ORAL at 08:47

## 2023-02-02 RX ADMIN — Medication 25 MCG: at 08:47

## 2023-02-02 RX ADMIN — CLONIDINE HYDROCHLORIDE 0.1 MG: 0.1 TABLET ORAL at 14:09

## 2023-02-02 RX ADMIN — Medication 10 MG: at 21:08

## 2023-02-02 RX ADMIN — MIRTAZAPINE 15 MG: 15 TABLET, FILM COATED ORAL at 21:08

## 2023-02-02 RX ADMIN — METFORMIN HYDROCHLORIDE 850 MG: 850 TABLET, FILM COATED ORAL at 08:47

## 2023-02-02 RX ADMIN — OLANZAPINE 2.5 MG: 2.5 TABLET, FILM COATED ORAL at 14:09

## 2023-02-02 RX ADMIN — METOPROLOL SUCCINATE 50 MG: 50 TABLET, EXTENDED RELEASE ORAL at 08:47

## 2023-02-02 RX ADMIN — DIVALPROEX SODIUM 1000 MG: 500 TABLET, DELAYED RELEASE ORAL at 21:09

## 2023-02-02 RX ADMIN — CLOTRIMAZOLE: 0.01 CREAM TOPICAL at 08:48

## 2023-02-02 RX ADMIN — POLYETHYLENE GLYCOL 3350 17 G: 17 POWDER, FOR SOLUTION ORAL at 08:48

## 2023-02-02 RX ADMIN — CLONIDINE HYDROCHLORIDE 0.1 MG: 0.1 TABLET ORAL at 21:08

## 2023-02-02 RX ADMIN — CLOTRIMAZOLE: 0.01 CREAM TOPICAL at 21:13

## 2023-02-02 RX ADMIN — BACLOFEN 10 MG: 10 TABLET ORAL at 08:47

## 2023-02-02 RX ADMIN — ASPIRIN 81 MG CHEWABLE TABLET 81 MG: 81 TABLET CHEWABLE at 08:47

## 2023-02-02 RX ADMIN — VENLAFAXINE HYDROCHLORIDE 75 MG: 150 CAPSULE, EXTENDED RELEASE ORAL at 21:08

## 2023-02-02 RX ADMIN — CLONIDINE HYDROCHLORIDE 0.1 MG: 0.1 TABLET ORAL at 08:47

## 2023-02-02 RX ADMIN — BACLOFEN 10 MG: 10 TABLET ORAL at 21:08

## 2023-02-02 RX ADMIN — METFORMIN HYDROCHLORIDE 850 MG: 850 TABLET, FILM COATED ORAL at 18:36

## 2023-02-02 RX ADMIN — HYDROXYZINE HYDROCHLORIDE 50 MG: 50 TABLET, FILM COATED ORAL at 14:08

## 2023-02-02 RX ADMIN — DIVALPROEX SODIUM 750 MG: 500 TABLET, DELAYED RELEASE ORAL at 08:47

## 2023-02-02 RX ADMIN — QUETIAPINE 200 MG: 200 TABLET, FILM COATED ORAL at 08:47

## 2023-02-02 RX ADMIN — HYDROXYZINE HYDROCHLORIDE 50 MG: 50 TABLET, FILM COATED ORAL at 21:08

## 2023-02-02 RX ADMIN — HYDROCORTISONE: 1 CREAM TOPICAL at 21:13

## 2023-02-02 RX ADMIN — QUETIAPINE 200 MG: 200 TABLET, FILM COATED ORAL at 14:08

## 2023-02-02 RX ADMIN — HYDROCORTISONE: 1 CREAM TOPICAL at 08:59

## 2023-02-02 RX ADMIN — VENLAFAXINE HYDROCHLORIDE 150 MG: 150 CAPSULE, EXTENDED RELEASE ORAL at 21:09

## 2023-02-02 ASSESSMENT — ACTIVITIES OF DAILY LIVING (ADL)
ADLS_ACUITY_SCORE: 56

## 2023-02-02 NOTE — PROGRESS NOTES
Care Management Follow Up    Expected Discharge Date: 02/28/2023     Concerns to be Addressed:  Discharge planning    Patient plan of care discussed at interdisciplinary rounds: Yes    Anticipated Discharge Disposition: Group Home once placement found by relocation worker    Additional Information:  SW emailed pt's relocation worker Misty requesting an update on GH placement search. Requested a list of all group home referrals sent, and pending/denied as well.     As relocation worker has been unable to obtain new GH facility as of yet, SW also sent additional LTC referrals for consideration. No accepting facility at this time.     SW will continue to follow.     DANITA Obrien, [facility]   Inpatient Care Coordination  [facility]   [phone]

## 2023-02-02 NOTE — PLAN OF CARE
PRIMARY DIAGNOSIS: Placement    OBSERVATION GOALS TO BE MET BEFORE DISCHARGE:  1. ADLs back to baseline: Yes     2. Activity and level of assistance: Up with maximum assistance/lift      3. Pain status: Pain free.     4. Return to near baseline physical activity: Yes          Discharge Planner Nurse   Safe discharge environment identified: No  Barriers to discharge: Yes        Patient A & O x4, Lung sounts CTA, bowel sounds active, LBM 1/31. VSS, denies pain, N/V, SOB or chest pain. Turned and repositioned as needed. Mepilex on bilateral ankles intact. Incontinence care provided. Tolerating regular diet and oral meds. No PIV. SW following. Waiting for placement.      Please review provider order for any additional goals.   Nurse to notify provider when observation goals have been met and patient is ready for discharge.

## 2023-02-02 NOTE — PLAN OF CARE
PRIMARY DIAGNOSIS: PLACMENT  OUTPATIENT/OBSERVATION GOALS TO BE MET BEFORE DISCHARGE:  1. ADLs back to baseline: Yes     2. Activity and level of assistance: Up with maximum assistance.      3. Pain status: Pain free.     4. Return to near baseline physical activity: Yes          Discharge Planner Nurse   Safe discharge environment identified: No  Barriers to discharge: Yes       Pt A&Ox4. VSS, on RA. Denies pain, nausea, chest pain and SOB. Repositioned as tolerated. Blanchable redness on bilateral ankles Mepilex in use.  Incontinence care provided. Regular diet. No PIV. SW following. Waiting for placement.      Please review provider order for any additional goals.   Nurse to notify provider when observation goals have been met and patient is ready for discharge.

## 2023-02-02 NOTE — PLAN OF CARE

## 2023-02-02 NOTE — PLAN OF CARE
PRIMARY DIAGNOSIS: Placement  OUTPATIENT/OBSERVATION GOALS TO BE MET BEFORE DISCHARGE:  ADLs back to baseline: Yes    Activity and level of assistance: assist x 2    Pain status: Pain free.    Return to near baseline physical activity: Yes     Discharge Planner Nurse   Safe discharge environment identified: No  Barriers to discharge: Yes       Entered by: Emily Garcia RN 02/02/2023 12:18 AM    Patient currently denies pain, resting comfortably in bed, SW following for placement         Please review provider order for any additional goals.   Nurse to notify provider when observation goals have been met and patient is ready for discharge.

## 2023-02-02 NOTE — PLAN OF CARE
PRIMARY DIAGNOSIS: PLACMENT  OUTPATIENT/OBSERVATION GOALS TO BE MET BEFORE DISCHARGE:  1. ADLs back to baseline: Yes     2. Activity and level of assistance: Up with maximum assistance.      3. Pain status: Pain free.     4. Return to near baseline physical activity: Yes          Discharge Planner Nurse   Safe discharge environment identified: No  Barriers to discharge: Yes       Pt A&Ox4. VSS, on RA. No aggressive behaviors noted at this time. Denies pain, nausea, chest pain and SOB. Repositioned as tolerated. Blanchable redness on bilateral ankles Mepilex in use.  Incontinence care provided. Regular diet. No PIV. SW following. Waiting for placement.      Please review provider order for any additional goals.   Nurse to notify provider when observation goals have been met and patient is ready for discharge.
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
## 2023-02-03 LAB — GLUCOSE BLDC GLUCOMTR-MCNC: 90 MG/DL (ref 70–99)

## 2023-02-03 PROCEDURE — 250N000013 HC RX MED GY IP 250 OP 250 PS 637: Performed by: HOSPITALIST

## 2023-02-03 PROCEDURE — 250N000013 HC RX MED GY IP 250 OP 250 PS 637: Performed by: NURSE PRACTITIONER

## 2023-02-03 PROCEDURE — 250N000013 HC RX MED GY IP 250 OP 250 PS 637: Performed by: PHYSICIAN ASSISTANT

## 2023-02-03 PROCEDURE — G0378 HOSPITAL OBSERVATION PER HR: HCPCS

## 2023-02-03 PROCEDURE — 99231 SBSQ HOSP IP/OBS SF/LOW 25: CPT | Performed by: PHYSICIAN ASSISTANT

## 2023-02-03 PROCEDURE — 82962 GLUCOSE BLOOD TEST: CPT

## 2023-02-03 RX ADMIN — OLANZAPINE 2.5 MG: 2.5 TABLET, FILM COATED ORAL at 14:26

## 2023-02-03 RX ADMIN — MIRTAZAPINE 15 MG: 15 TABLET, FILM COATED ORAL at 21:20

## 2023-02-03 RX ADMIN — Medication 25 MCG: at 08:48

## 2023-02-03 RX ADMIN — HYDROXYZINE HYDROCHLORIDE 50 MG: 50 TABLET, FILM COATED ORAL at 08:48

## 2023-02-03 RX ADMIN — VENLAFAXINE HYDROCHLORIDE 150 MG: 150 CAPSULE, EXTENDED RELEASE ORAL at 21:22

## 2023-02-03 RX ADMIN — BACLOFEN 10 MG: 10 TABLET ORAL at 20:17

## 2023-02-03 RX ADMIN — ATORVASTATIN CALCIUM 20 MG: 20 TABLET, FILM COATED ORAL at 20:17

## 2023-02-03 RX ADMIN — QUETIAPINE 200 MG: 200 TABLET, FILM COATED ORAL at 14:26

## 2023-02-03 RX ADMIN — METFORMIN HYDROCHLORIDE 850 MG: 850 TABLET, FILM COATED ORAL at 08:47

## 2023-02-03 RX ADMIN — BACLOFEN 10 MG: 10 TABLET ORAL at 08:48

## 2023-02-03 RX ADMIN — VENLAFAXINE HYDROCHLORIDE 75 MG: 150 CAPSULE, EXTENDED RELEASE ORAL at 21:20

## 2023-02-03 RX ADMIN — HYDROCORTISONE: 1 CREAM TOPICAL at 09:56

## 2023-02-03 RX ADMIN — METFORMIN HYDROCHLORIDE 850 MG: 850 TABLET, FILM COATED ORAL at 18:03

## 2023-02-03 RX ADMIN — LEVOTHYROXINE SODIUM 25 MCG: 0.03 TABLET ORAL at 08:48

## 2023-02-03 RX ADMIN — BACLOFEN 10 MG: 10 TABLET ORAL at 14:26

## 2023-02-03 RX ADMIN — CLONIDINE HYDROCHLORIDE 0.1 MG: 0.1 TABLET ORAL at 20:17

## 2023-02-03 RX ADMIN — DIVALPROEX SODIUM 750 MG: 500 TABLET, DELAYED RELEASE ORAL at 08:49

## 2023-02-03 RX ADMIN — DIVALPROEX SODIUM 1000 MG: 500 TABLET, DELAYED RELEASE ORAL at 21:21

## 2023-02-03 RX ADMIN — QUETIAPINE FUMARATE 400 MG: 200 TABLET ORAL at 21:20

## 2023-02-03 RX ADMIN — CLOTRIMAZOLE: 0.01 CREAM TOPICAL at 09:56

## 2023-02-03 RX ADMIN — HYDROXYZINE HYDROCHLORIDE 50 MG: 50 TABLET, FILM COATED ORAL at 14:26

## 2023-02-03 RX ADMIN — CLOTRIMAZOLE: 0.01 CREAM TOPICAL at 20:27

## 2023-02-03 RX ADMIN — KETOCONAZOLE: 20 CREAM TOPICAL at 20:26

## 2023-02-03 RX ADMIN — POLYETHYLENE GLYCOL 3350 17 G: 17 POWDER, FOR SOLUTION ORAL at 08:54

## 2023-02-03 RX ADMIN — ASPIRIN 81 MG CHEWABLE TABLET 81 MG: 81 TABLET CHEWABLE at 08:48

## 2023-02-03 RX ADMIN — CLONIDINE HYDROCHLORIDE 0.1 MG: 0.1 TABLET ORAL at 08:48

## 2023-02-03 RX ADMIN — EMPAGLIFLOZIN 10 MG: 10 TABLET, FILM COATED ORAL at 08:48

## 2023-02-03 RX ADMIN — METOPROLOL SUCCINATE 50 MG: 50 TABLET, EXTENDED RELEASE ORAL at 08:48

## 2023-02-03 RX ADMIN — Medication 10 MG: at 21:19

## 2023-02-03 RX ADMIN — HYDROCORTISONE: 1 CREAM TOPICAL at 20:26

## 2023-02-03 RX ADMIN — QUETIAPINE 200 MG: 200 TABLET, FILM COATED ORAL at 08:47

## 2023-02-03 RX ADMIN — HYDROXYZINE HYDROCHLORIDE 50 MG: 50 TABLET, FILM COATED ORAL at 20:18

## 2023-02-03 ASSESSMENT — ACTIVITIES OF DAILY LIVING (ADL)
ADLS_ACUITY_SCORE: 56

## 2023-02-03 NOTE — PLAN OF CARE
PRIMARY DIAGNOSIS: PLACEMENT   OUTPATIENT/OBSERVATION GOALS TO BE MET BEFORE DISCHARGE:  1. ADLs back to baseline: Yes    2. Activity and level of assistance: Up with maximum assistance. Consider SW and/or PT evaluation.     3. Pain status: Pain free.    4. Return to near baseline physical activity: Yes     Discharge Planner Nurse   Safe discharge environment identified: No  Barriers to discharge: Yes       Entered by: Chantel Spencer RN 02/03/2023   Patient A&Ox2. Tolerating regular diet. Pt is two staff assist using lift. Incontinent of bowel and bladder. Pt makes this needs known. SW for finding Group Home. Will continue care.   Please review provider order for any additional goals.   Nurse to notify provider when observation goals have been met and patient is ready for discharge.

## 2023-02-03 NOTE — PLAN OF CARE
PRIMARY DIAGNOSIS: Placement    OBSERVATION GOALS TO BE MET BEFORE DISCHARGE:  1. ADLs back to baseline: Yes     2. Activity and level of assistance: Up with maximum assistance/lift      3. Pain status: Pain free.     4. Return to near baseline physical activity: Yes          Discharge Planner Nurse   Safe discharge environment identified: No  Barriers to discharge: Yes        Patient A & O x4, Lung sounts CTA, bowel sounds active, LBM 2/3. VSS, denies pain, N/V, SOB or chest pain. Turned and repositioned as needed. Pt is A 2 using lift. Incontinence care provided. Tolerating regular diet and oral meds. No PIV. SW following. Waiting for placement.     Please review provider order for any additional goals.   Nurse to notify provider when observation goals have been met and patient is ready for discharge.

## 2023-02-03 NOTE — PLAN OF CARE
PRIMARY DIAGNOSIS: Placement   OUTPATIENT/OBSERVATION GOALS TO BE MET BEFORE DISCHARGE:  ADLs back to baseline: Yes    Activity and level of assistance: assist x 2    Pain status: Pain free.    Return to near baseline physical activity: Yes     Discharge Planner Nurse   Safe discharge environment identified: No  Barriers to discharge: Yes       Entered by: Emily Garcia RN 02/03/2023 4:13 AM    Patient currently sleeping, SW following for placement, incontinent of urine         Please review provider order for any additional goals.   Nurse to notify provider when observation goals have been met and patient is ready for discharge.

## 2023-02-03 NOTE — PLAN OF CARE
PRIMARY DIAGNOSIS: PLACEMENT  OUTPATIENT/OBSERVATION GOALS TO BE MET BEFORE DISCHARGE:  ADLs back to baseline: Yes    Activity and level of assistance: Up with maximum assistance.      Pain status: Pain free.    Return to near baseline physical activity: Yes     Discharge Planner Nurse   Safe discharge environment identified: No  Barriers to discharge: Yes, placement       Entered by: Opal Garcia RN 02/02/2023 10:22 PM     Please review provider order for any additional goals.   Nurse to notify provider when observation goals have been met and patient is ready for discharge.

## 2023-02-03 NOTE — PLAN OF CARE
PRIMARY DIAGNOSIS: PLACEMENT   OUTPATIENT/OBSERVATION GOALS TO BE MET BEFORE DISCHARGE:  1. ADLs back to baseline: Yes    2. Activity and level of assistance: Up with maximum assistance. Consider SW and/or PT evaluation.     3. Pain status: Pain free.    4. Return to near baseline physical activity: Yes     Discharge Planner Nurse   Safe discharge environment identified: No  Barriers to discharge: Yes       Entered by: Chantel Spencer RN 02/03/2023   Patient A&Ox2. Tolerating regular diet. Pt is two staff assist using lift. Incontinent of bowel and bladder. Pt makes this needs known. Pt is calm/ cooperative. SW for finding Group Home. Will continue care.   Please review provider order for any additional goals.   Nurse to notify provider when observation goals have been met and patient is ready for discharge.

## 2023-02-03 NOTE — PLAN OF CARE
PRIMARY DIAGNOSIS: Placement  OUTPATIENT/OBSERVATION GOALS TO BE MET BEFORE DISCHARGE:  ADLs back to baseline: Yes    Activity and level of assistance: assist x 2    Pain status: Pain free.    Return to near baseline physical activity: Yes     Discharge Planner Nurse   Safe discharge environment identified: No  Barriers to discharge: Yes       Entered by: Emily Garcia RN 02/03/2023 12:12 AM    Patient resting comfortably in bed, currently denies pain, SW following for placement       Please review provider order for any additional goals.   Nurse to notify provider when observation goals have been met and patient is ready for discharge.
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
## 2023-02-04 LAB
GLUCOSE BLDC GLUCOMTR-MCNC: 122 MG/DL (ref 70–99)
GLUCOSE BLDC GLUCOMTR-MCNC: 97 MG/DL (ref 70–99)

## 2023-02-04 PROCEDURE — 250N000013 HC RX MED GY IP 250 OP 250 PS 637: Performed by: HOSPITALIST

## 2023-02-04 PROCEDURE — 99207 PR NO CHARGE LOS: CPT | Performed by: PHYSICIAN ASSISTANT

## 2023-02-04 PROCEDURE — 250N000013 HC RX MED GY IP 250 OP 250 PS 637: Performed by: NURSE PRACTITIONER

## 2023-02-04 PROCEDURE — 250N000013 HC RX MED GY IP 250 OP 250 PS 637: Performed by: PHYSICIAN ASSISTANT

## 2023-02-04 PROCEDURE — G0378 HOSPITAL OBSERVATION PER HR: HCPCS

## 2023-02-04 PROCEDURE — 82962 GLUCOSE BLOOD TEST: CPT

## 2023-02-04 RX ADMIN — BACLOFEN 10 MG: 10 TABLET ORAL at 08:09

## 2023-02-04 RX ADMIN — CLONIDINE HYDROCHLORIDE 0.1 MG: 0.1 TABLET ORAL at 13:51

## 2023-02-04 RX ADMIN — ATORVASTATIN CALCIUM 20 MG: 20 TABLET, FILM COATED ORAL at 21:09

## 2023-02-04 RX ADMIN — CLONIDINE HYDROCHLORIDE 0.1 MG: 0.1 TABLET ORAL at 08:08

## 2023-02-04 RX ADMIN — METOPROLOL SUCCINATE 50 MG: 50 TABLET, EXTENDED RELEASE ORAL at 08:08

## 2023-02-04 RX ADMIN — KETOCONAZOLE: 20 CREAM TOPICAL at 21:19

## 2023-02-04 RX ADMIN — HYDROCORTISONE: 1 CREAM TOPICAL at 21:19

## 2023-02-04 RX ADMIN — CLONIDINE HYDROCHLORIDE 0.1 MG: 0.1 TABLET ORAL at 21:10

## 2023-02-04 RX ADMIN — QUETIAPINE 200 MG: 200 TABLET, FILM COATED ORAL at 13:51

## 2023-02-04 RX ADMIN — QUETIAPINE FUMARATE 400 MG: 200 TABLET ORAL at 21:09

## 2023-02-04 RX ADMIN — HYDROXYZINE HYDROCHLORIDE 50 MG: 50 TABLET, FILM COATED ORAL at 13:51

## 2023-02-04 RX ADMIN — HYDROXYZINE HYDROCHLORIDE 50 MG: 50 TABLET, FILM COATED ORAL at 21:10

## 2023-02-04 RX ADMIN — LEVOTHYROXINE SODIUM 25 MCG: 0.03 TABLET ORAL at 08:08

## 2023-02-04 RX ADMIN — Medication 25 MCG: at 08:08

## 2023-02-04 RX ADMIN — BACLOFEN 10 MG: 10 TABLET ORAL at 13:51

## 2023-02-04 RX ADMIN — EMPAGLIFLOZIN 10 MG: 10 TABLET, FILM COATED ORAL at 08:09

## 2023-02-04 RX ADMIN — METFORMIN HYDROCHLORIDE 850 MG: 850 TABLET, FILM COATED ORAL at 08:11

## 2023-02-04 RX ADMIN — ASPIRIN 81 MG CHEWABLE TABLET 81 MG: 81 TABLET CHEWABLE at 08:09

## 2023-02-04 RX ADMIN — DIVALPROEX SODIUM 1000 MG: 500 TABLET, DELAYED RELEASE ORAL at 21:08

## 2023-02-04 RX ADMIN — OLANZAPINE 2.5 MG: 2.5 TABLET, FILM COATED ORAL at 13:51

## 2023-02-04 RX ADMIN — HYDROXYZINE HYDROCHLORIDE 50 MG: 50 TABLET, FILM COATED ORAL at 08:08

## 2023-02-04 RX ADMIN — BACLOFEN 10 MG: 10 TABLET ORAL at 21:09

## 2023-02-04 RX ADMIN — DIVALPROEX SODIUM 750 MG: 500 TABLET, DELAYED RELEASE ORAL at 08:09

## 2023-02-04 RX ADMIN — CLOTRIMAZOLE: 0.01 CREAM TOPICAL at 21:19

## 2023-02-04 RX ADMIN — HYDROCORTISONE: 1 CREAM TOPICAL at 08:15

## 2023-02-04 RX ADMIN — CLOTRIMAZOLE: 0.01 CREAM TOPICAL at 08:16

## 2023-02-04 RX ADMIN — QUETIAPINE 200 MG: 200 TABLET, FILM COATED ORAL at 08:08

## 2023-02-04 RX ADMIN — POLYETHYLENE GLYCOL 3350 17 G: 17 POWDER, FOR SOLUTION ORAL at 08:11

## 2023-02-04 RX ADMIN — METFORMIN HYDROCHLORIDE 850 MG: 850 TABLET, FILM COATED ORAL at 18:08

## 2023-02-04 RX ADMIN — VENLAFAXINE HYDROCHLORIDE 75 MG: 150 CAPSULE, EXTENDED RELEASE ORAL at 21:11

## 2023-02-04 RX ADMIN — VENLAFAXINE HYDROCHLORIDE 150 MG: 150 CAPSULE, EXTENDED RELEASE ORAL at 21:10

## 2023-02-04 RX ADMIN — Medication 10 MG: at 21:09

## 2023-02-04 RX ADMIN — MIRTAZAPINE 15 MG: 15 TABLET, FILM COATED ORAL at 21:09

## 2023-02-04 ASSESSMENT — ACTIVITIES OF DAILY LIVING (ADL)
ADLS_ACUITY_SCORE: 54
ADLS_ACUITY_SCORE: 56

## 2023-02-04 NOTE — PLAN OF CARE
PRIMARY DIAGNOSIS: PLACEMENT  OUTPATIENT/OBSERVATION GOALS TO BE MET BEFORE DISCHARGE:  1. ADLs back to baseline: Yes    2. Activity and level of assistance: Up with maximum assistance.      3. Pain status: Pain free.    4. Return to near baseline physical activity: Yes     Discharge Planner Nurse   Safe discharge environment identified: No  Barriers to discharge: Yes, placement       Entered by: Opal Garcia RN 02/04/2023 12:13 AM       Pt sleeping.     Please review provider order for any additional goals.   Nurse to notify provider when observation goals have been met and patient is ready for discharge.

## 2023-02-04 NOTE — PLAN OF CARE
PRIMARY DIAGNOSIS: PLACEMENT  OUTPATIENT/OBSERVATION GOALS TO BE MET BEFORE DISCHARGE:  1. ADLs back to baseline: Yes    2. Activity and level of assistance: Up with maximum assistance.      3. Pain status: Pain free.    4. Return to near baseline physical activity: Yes     Discharge Planner Nurse   Safe discharge environment identified: No  Barriers to discharge: Yes, placement       Entered by: Opal Garcia RN 02/04/2023 4:10 AM       Pt sleeping.     Please review provider order for any additional goals.   Nurse to notify provider when observation goals have been met and patient is ready for discharge.

## 2023-02-04 NOTE — PLAN OF CARE
PRIMARY DIAGNOSIS: PLACEMENT   OUTPATIENT/OBSERVATION GOALS TO BE MET BEFORE DISCHARGE:  1. ADLs back to baseline: Yes    2. Activity and level of assistance: Up with maximum assistance. Consider SW and/or PT evaluation.     3. Pain status: Pain free.    4. Return to near baseline physical activity: Yes     Discharge Planner Nurse   Safe discharge environment identified: No  Barriers to discharge: Yes       Entered by: Chantel Spencer RN 02/04/2023   Patient A&Ox2. Tolerating regular diet. Pt is two staff assist using lift. Incontinent of bowel and bladder. Pt makes his needs known. Pt is calm/ cooperative. SW working on finding Group Home. Will continue care.   Please review provider order for any additional goals.   Nurse to notify provider when observation goals have been met and patient is ready for discharge.
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
## 2023-02-04 NOTE — PLAN OF CARE
PRIMARY DIAGNOSIS: Placement    OBSERVATION GOALS TO BE MET BEFORE DISCHARGE:  1. ADLs back to baseline: Yes     2. Activity and level of assistance: Up with maximum assistance/lift      3. Pain status: Pain free.     4. Return to near baseline physical activity: Yes          Discharge Planner Nurse   Safe discharge environment identified: No  Barriers to discharge: Yes        Patient A & O x4, Lung sounds CTA, bowel sounds active, LBM 2/3. VSS, denies pain, N/V, SOB or chest pain. Turned and repositioned as needed. Pt is A 2 using lift. Incontinence care provided. Tolerating regular diet and oral meds. No PIV. Medically cleared, SW following. Waiting for placement.     BP (!) 145/93 (BP Location: Right arm, Cuff Size: Adult Regular)   Pulse 88   Temp 98.7  F (37.1  C) (Oral)   Resp 18   Wt 99.9 kg (220 lb 4.8 oz)   SpO2 97%     Please review provider order for any additional goals.   Nurse to notify provider when observation goals have been met and patient is ready for discharge.

## 2023-02-04 NOTE — PLAN OF CARE
PRIMARY DIAGNOSIS: PLACEMENT   OUTPATIENT/OBSERVATION GOALS TO BE MET BEFORE DISCHARGE:  1. ADLs back to baseline: Yes    2. Activity and level of assistance: Up with maximum assistance. Consider SW and/or PT evaluation.     3. Pain status: Pain free.    4. Return to near baseline physical activity: Yes     Discharge Planner Nurse   Safe discharge environment identified: No  Barriers to discharge: Yes       Entered by: Chantel Spencer RN 02/04/2023   Patient A&Ox2. Tolerating regular diet. Pt is two staff assist using lift. Incontinent of bowel and bladder. Pt makes this needs known. Pt is calm/ cooperative. SW working on finding Group Home. Will continue care.   Please review provider order for any additional goals.   Nurse to notify provider when observation goals have been met and patient is ready for discharge.

## 2023-02-05 LAB
GLUCOSE BLDC GLUCOMTR-MCNC: 122 MG/DL (ref 70–99)
GLUCOSE BLDC GLUCOMTR-MCNC: 154 MG/DL (ref 70–99)

## 2023-02-05 PROCEDURE — 82962 GLUCOSE BLOOD TEST: CPT

## 2023-02-05 PROCEDURE — 250N000013 HC RX MED GY IP 250 OP 250 PS 637: Performed by: PHYSICIAN ASSISTANT

## 2023-02-05 PROCEDURE — G0378 HOSPITAL OBSERVATION PER HR: HCPCS

## 2023-02-05 PROCEDURE — 250N000013 HC RX MED GY IP 250 OP 250 PS 637: Performed by: NURSE PRACTITIONER

## 2023-02-05 PROCEDURE — 250N000013 HC RX MED GY IP 250 OP 250 PS 637: Performed by: HOSPITALIST

## 2023-02-05 PROCEDURE — 99231 SBSQ HOSP IP/OBS SF/LOW 25: CPT | Performed by: PHYSICIAN ASSISTANT

## 2023-02-05 RX ADMIN — HYDROXYZINE HYDROCHLORIDE 50 MG: 50 TABLET, FILM COATED ORAL at 15:06

## 2023-02-05 RX ADMIN — OLANZAPINE 2.5 MG: 2.5 TABLET, FILM COATED ORAL at 15:06

## 2023-02-05 RX ADMIN — POLYETHYLENE GLYCOL 3350 17 G: 17 POWDER, FOR SOLUTION ORAL at 08:18

## 2023-02-05 RX ADMIN — METFORMIN HYDROCHLORIDE 850 MG: 850 TABLET, FILM COATED ORAL at 08:17

## 2023-02-05 RX ADMIN — DIVALPROEX SODIUM 1000 MG: 500 TABLET, DELAYED RELEASE ORAL at 21:17

## 2023-02-05 RX ADMIN — QUETIAPINE 200 MG: 200 TABLET, FILM COATED ORAL at 08:17

## 2023-02-05 RX ADMIN — QUETIAPINE FUMARATE 400 MG: 200 TABLET ORAL at 21:16

## 2023-02-05 RX ADMIN — EMPAGLIFLOZIN 10 MG: 10 TABLET, FILM COATED ORAL at 08:18

## 2023-02-05 RX ADMIN — CLONIDINE HYDROCHLORIDE 0.1 MG: 0.1 TABLET ORAL at 15:05

## 2023-02-05 RX ADMIN — CLOTRIMAZOLE: 0.01 CREAM TOPICAL at 08:18

## 2023-02-05 RX ADMIN — LEVOTHYROXINE SODIUM 25 MCG: 0.03 TABLET ORAL at 08:18

## 2023-02-05 RX ADMIN — HYDROXYZINE HYDROCHLORIDE 50 MG: 50 TABLET, FILM COATED ORAL at 08:17

## 2023-02-05 RX ADMIN — CLONIDINE HYDROCHLORIDE 0.1 MG: 0.1 TABLET ORAL at 08:17

## 2023-02-05 RX ADMIN — BACLOFEN 10 MG: 10 TABLET ORAL at 08:17

## 2023-02-05 RX ADMIN — BACLOFEN 10 MG: 10 TABLET ORAL at 15:06

## 2023-02-05 RX ADMIN — CLONIDINE HYDROCHLORIDE 0.1 MG: 0.1 TABLET ORAL at 21:16

## 2023-02-05 RX ADMIN — METOPROLOL SUCCINATE 50 MG: 50 TABLET, EXTENDED RELEASE ORAL at 08:16

## 2023-02-05 RX ADMIN — VENLAFAXINE HYDROCHLORIDE 75 MG: 150 CAPSULE, EXTENDED RELEASE ORAL at 21:17

## 2023-02-05 RX ADMIN — Medication 25 MCG: at 08:18

## 2023-02-05 RX ADMIN — ASPIRIN 81 MG CHEWABLE TABLET 81 MG: 81 TABLET CHEWABLE at 08:16

## 2023-02-05 RX ADMIN — HYDROCORTISONE: 1 CREAM TOPICAL at 19:21

## 2023-02-05 RX ADMIN — BACLOFEN 10 MG: 10 TABLET ORAL at 21:17

## 2023-02-05 RX ADMIN — QUETIAPINE 200 MG: 200 TABLET, FILM COATED ORAL at 15:05

## 2023-02-05 RX ADMIN — CLOTRIMAZOLE: 0.01 CREAM TOPICAL at 19:21

## 2023-02-05 RX ADMIN — DIVALPROEX SODIUM 750 MG: 500 TABLET, DELAYED RELEASE ORAL at 08:17

## 2023-02-05 RX ADMIN — ATORVASTATIN CALCIUM 20 MG: 20 TABLET, FILM COATED ORAL at 21:17

## 2023-02-05 RX ADMIN — MIRTAZAPINE 15 MG: 15 TABLET, FILM COATED ORAL at 21:17

## 2023-02-05 RX ADMIN — Medication 10 MG: at 21:17

## 2023-02-05 RX ADMIN — HYDROCORTISONE: 1 CREAM TOPICAL at 08:18

## 2023-02-05 RX ADMIN — VENLAFAXINE HYDROCHLORIDE 150 MG: 150 CAPSULE, EXTENDED RELEASE ORAL at 21:17

## 2023-02-05 RX ADMIN — HYDROXYZINE HYDROCHLORIDE 50 MG: 50 TABLET, FILM COATED ORAL at 21:17

## 2023-02-05 RX ADMIN — METFORMIN HYDROCHLORIDE 850 MG: 850 TABLET, FILM COATED ORAL at 17:51

## 2023-02-05 ASSESSMENT — ACTIVITIES OF DAILY LIVING (ADL)
ADLS_ACUITY_SCORE: 56
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
## 2023-02-05 NOTE — PLAN OF CARE
1600PRIMARY DIAGNOSIS: Placement    OBSERVATION GOALS TO BE MET BEFORE DISCHARGE:  1. ADLs back to baseline: Yes     2. Activity and level of assistance: Up with maximum assistance/lift      3. Pain status: Pain free.     4. Return to near baseline physical activity: Yes          Discharge Planner Nurse   Safe discharge environment identified: No  Barriers to discharge: Yes        Patient A & O x4, Lung sounds CTA, bowel sounds active, LBM 2/3. VSS, denies pain, N/V, SOB or chest pain. Turned and repositioned as needed. Pt is A 2 using lift, now sitting upright in the recliner chair. Incontinence care provided. Tolerating regular diet and oral meds. No PIV. Medically cleared, SW following. Waiting for placement.     Please review provider order for any additional goals.   Nurse to notify provider when observation goals have been met and patient is ready for discharge.
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
## 2023-02-05 NOTE — PLAN OF CARE
PRIMARY DIAGNOSIS: PLACEMENT   OUTPATIENT/OBSERVATION GOALS TO BE MET BEFORE DISCHARGE:  1. ADLs back to baseline: Yes    2. Activity and level of assistance: Up with maximum assistance. Consider SW and/or PT evaluation.     3. Pain status: Pain free.    4. Return to near baseline physical activity: Yes     Discharge Planner Nurse   Safe discharge environment identified: No  Barriers to discharge: Yes       Entered by: Chantel Spencer RN 02/05/2023   Patient A&Ox2. Tolerating regular diet. Pt is two staff assist using lift. Incontinent of bowel and bladder. Pt is up in chair.Pt makes his needs known. Pt is calm/ cooperative. SW working on finding Group Home. Will continue care.   /83 (BP Location: Left arm, Patient Position: Supine, Cuff Size: Adult Regular)   Pulse 82   Temp 97.7  F (36.5  C) (Oral)   Resp 16   Wt 99.9 kg (220 lb 4.8 oz)   SpO2 95%     Please review provider order for any additional goals.   Nurse to notify provider when observation goals have been met and patient is ready for discharge.

## 2023-02-06 LAB — GLUCOSE BLDC GLUCOMTR-MCNC: 134 MG/DL (ref 70–99)

## 2023-02-06 PROCEDURE — 250N000013 HC RX MED GY IP 250 OP 250 PS 637: Performed by: HOSPITALIST

## 2023-02-06 PROCEDURE — 99207 PR NO CHARGE LOS: CPT | Performed by: PHYSICIAN ASSISTANT

## 2023-02-06 PROCEDURE — 250N000013 HC RX MED GY IP 250 OP 250 PS 637: Performed by: PHYSICIAN ASSISTANT

## 2023-02-06 PROCEDURE — 250N000013 HC RX MED GY IP 250 OP 250 PS 637: Performed by: NURSE PRACTITIONER

## 2023-02-06 PROCEDURE — 82962 GLUCOSE BLOOD TEST: CPT

## 2023-02-06 PROCEDURE — G0378 HOSPITAL OBSERVATION PER HR: HCPCS

## 2023-02-06 RX ADMIN — POLYETHYLENE GLYCOL 3350 17 G: 17 POWDER, FOR SOLUTION ORAL at 09:14

## 2023-02-06 RX ADMIN — BACLOFEN 10 MG: 10 TABLET ORAL at 21:15

## 2023-02-06 RX ADMIN — ATORVASTATIN CALCIUM 20 MG: 20 TABLET, FILM COATED ORAL at 21:15

## 2023-02-06 RX ADMIN — METFORMIN HYDROCHLORIDE 850 MG: 850 TABLET, FILM COATED ORAL at 18:04

## 2023-02-06 RX ADMIN — BACLOFEN 10 MG: 10 TABLET ORAL at 14:43

## 2023-02-06 RX ADMIN — CLOTRIMAZOLE: 0.01 CREAM TOPICAL at 21:14

## 2023-02-06 RX ADMIN — HYDROXYZINE HYDROCHLORIDE 50 MG: 50 TABLET, FILM COATED ORAL at 09:13

## 2023-02-06 RX ADMIN — EMPAGLIFLOZIN 10 MG: 10 TABLET, FILM COATED ORAL at 09:14

## 2023-02-06 RX ADMIN — METFORMIN HYDROCHLORIDE 850 MG: 850 TABLET, FILM COATED ORAL at 09:14

## 2023-02-06 RX ADMIN — QUETIAPINE 200 MG: 200 TABLET, FILM COATED ORAL at 14:43

## 2023-02-06 RX ADMIN — HYDROXYZINE HYDROCHLORIDE 50 MG: 50 TABLET, FILM COATED ORAL at 14:43

## 2023-02-06 RX ADMIN — HYDROCORTISONE: 1 CREAM TOPICAL at 21:14

## 2023-02-06 RX ADMIN — DIVALPROEX SODIUM 1000 MG: 500 TABLET, DELAYED RELEASE ORAL at 21:15

## 2023-02-06 RX ADMIN — OLANZAPINE 2.5 MG: 2.5 TABLET, FILM COATED ORAL at 14:43

## 2023-02-06 RX ADMIN — MIRTAZAPINE 15 MG: 15 TABLET, FILM COATED ORAL at 21:15

## 2023-02-06 RX ADMIN — QUETIAPINE FUMARATE 400 MG: 200 TABLET ORAL at 21:14

## 2023-02-06 RX ADMIN — VENLAFAXINE HYDROCHLORIDE 75 MG: 150 CAPSULE, EXTENDED RELEASE ORAL at 21:15

## 2023-02-06 RX ADMIN — LEVOTHYROXINE SODIUM 25 MCG: 0.03 TABLET ORAL at 09:14

## 2023-02-06 RX ADMIN — HYDROCORTISONE: 1 CREAM TOPICAL at 09:15

## 2023-02-06 RX ADMIN — CLONIDINE HYDROCHLORIDE 0.1 MG: 0.1 TABLET ORAL at 21:15

## 2023-02-06 RX ADMIN — BACLOFEN 10 MG: 10 TABLET ORAL at 09:14

## 2023-02-06 RX ADMIN — ASPIRIN 81 MG CHEWABLE TABLET 81 MG: 81 TABLET CHEWABLE at 09:14

## 2023-02-06 RX ADMIN — Medication 25 MCG: at 09:14

## 2023-02-06 RX ADMIN — CLOTRIMAZOLE: 0.01 CREAM TOPICAL at 09:15

## 2023-02-06 RX ADMIN — Medication 10 MG: at 21:15

## 2023-02-06 RX ADMIN — DIVALPROEX SODIUM 750 MG: 500 TABLET, DELAYED RELEASE ORAL at 09:13

## 2023-02-06 RX ADMIN — CLONIDINE HYDROCHLORIDE 0.1 MG: 0.1 TABLET ORAL at 09:14

## 2023-02-06 RX ADMIN — VENLAFAXINE HYDROCHLORIDE 150 MG: 150 CAPSULE, EXTENDED RELEASE ORAL at 21:15

## 2023-02-06 RX ADMIN — CLONIDINE HYDROCHLORIDE 0.1 MG: 0.1 TABLET ORAL at 14:43

## 2023-02-06 RX ADMIN — HYDROXYZINE HYDROCHLORIDE 50 MG: 50 TABLET, FILM COATED ORAL at 21:15

## 2023-02-06 RX ADMIN — METOPROLOL SUCCINATE 50 MG: 50 TABLET, EXTENDED RELEASE ORAL at 09:13

## 2023-02-06 RX ADMIN — QUETIAPINE 200 MG: 200 TABLET, FILM COATED ORAL at 09:13

## 2023-02-06 ASSESSMENT — ACTIVITIES OF DAILY LIVING (ADL)
ADLS_ACUITY_SCORE: 56

## 2023-02-06 NOTE — PLAN OF CARE
PRIMARY DIAGNOSIS: PLACEMENT  OUTPATIENT/OBSERVATION GOALS TO BE MET BEFORE DISCHARGE:  1. ADLs back to baseline: Yes    2. Activity and level of assistance: Total cares at baseline    3. Pain status: Pain free.    4. Return to near baseline physical activity: Yes     Discharge Planner Nurse   Safe discharge environment identified: No  Barriers to discharge: Yes       Entered by: Annalee Palomares RN 02/06/2023 11:06 AM    Pt A/O x4. Total cares at baseline. Meds given per orders. Denies pain. VSS. Awaiting placement.  Vital signs:  Temp: 97.9  F (36.6  C) Temp src: Oral BP: 122/81 Pulse: 78   Resp: 16 SpO2: 95 % O2 Device: None (Room air)     Weight: 99.9 kg (220 lb 4.8 oz)  There is no height or weight on file to calculate BMI.    Please review provider order for any additional goals.   Nurse to notify provider when observation goals have been met and patient is ready for discharge.
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
## 2023-02-06 NOTE — PLAN OF CARE
PRIMARY DIAGNOSIS: AWAITING PLACEMENT  OUTPATIENT/OBSERVATION GOALS TO BE MET BEFORE DISCHARGE:  ADLs back to baseline: Yes    Activity and level of assistance: A2, lift    Pain status: Pain free.    Return to near baseline physical activity: Yes     Discharge Planner Nurse   Safe discharge environment identified: Yes  Barriers to discharge: Yes       Entered by: Vero Franco RN 02/06/2023   A&O x4. A2, lift. Regular safety checks implemented. Pt sleeping. Pt is able to call and make needs known. No acute needs at this time.     Please review provider order for any additional goals.   Nurse to notify provider when observation goals have been met and patient is ready for discharge.

## 2023-02-06 NOTE — PLAN OF CARE
PRIMARY DIAGNOSIS: PLACEMENT   OUTPATIENT/OBSERVATION GOALS TO BE MET BEFORE DISCHARGE:  1. ADLs back to baseline: Yes    2. Activity and level of assistance: Up with maximum assistance. Consider SW and/or PT evaluation.     3. Pain status: Pain free.    4. Return to near baseline physical activity: Yes     Discharge Planner Nurse   Safe discharge environment identified: No  Barriers to discharge: Yes       Entered by: Chantel Spencer RN 02/05/2023   Patient A&Ox2. Tolerating regular diet. Pt is two staff assist using lift. Incontinent of bowel and bladder. Pt is up in chair.Pt makes his needs known. Pt is calm/ cooperative. SW working on finding Group Home. Will continue care.   /86 (BP Location: Left arm)   Pulse 75   Temp 97.3  F (36.3  C) (Oral)   Resp 18   Wt 99.9 kg (220 lb 4.8 oz)   SpO2 95%     Please review provider order for any additional goals.   Nurse to notify provider when observation goals have been met and patient is ready for discharge.

## 2023-02-06 NOTE — PLAN OF CARE
PRIMARY DIAGNOSIS: AWAITING PLACEMENT  OUTPATIENT/OBSERVATION GOALS TO BE MET BEFORE DISCHARGE:  1. ADLs back to baseline: Yes    2. Activity and level of assistance: A2, lift    3. Pain status: Pain free.    4. Return to near baseline physical activity: Yes     Discharge Planner Nurse   Safe discharge environment identified: Yes  Barriers to discharge: Yes       Entered by: Vero Franco RN 02/06/2023   A&O x4. A2, lift. Regular safety checks implemented. Pt sleeping. Pt is able to call and make needs known. No acute needs at this time.     Please review provider order for any additional goals.   Nurse to notify provider when observation goals have been met and patient is ready for discharge.

## 2023-02-06 NOTE — PLAN OF CARE
PRIMARY DIAGNOSIS: PLACEMENT  OUTPATIENT/OBSERVATION GOALS TO BE MET BEFORE DISCHARGE:  1. ADLs back to baseline: Yes    2. Activity and level of assistance: Total cares at baseline    3. Pain status: Pain free.    4. Return to near baseline physical activity: Yes     Discharge Planner Nurse   Safe discharge environment identified: No  Barriers to discharge: Yes       Entered by: Annalee Palomares RN 02/06/2023 4:24 PM    Pt A/O x4. Total cares at baseline. Meds given per orders. Denies pain. VSS. Awaiting placement.  Vital signs:  Temp: 97.9  F (36.6  C) Temp src: Oral BP: 126/80 Pulse: 78   Resp: 16 SpO2: 95 % O2 Device: None (Room air)     Weight: 99.9 kg (220 lb 4.8 oz)  There is no height or weight on file to calculate BMI.    Please review provider order for any additional goals.   Nurse to notify provider when observation goals have been met and patient is ready for discharge.

## 2023-02-06 NOTE — PLAN OF CARE
PRIMARY DIAGNOSIS: PLACEMENT  OUTPATIENT/OBSERVATION GOALS TO BE MET BEFORE DISCHARGE:  1. ADLs back to baseline: Yes    2. Activity and level of assistance: Up with maximum assistance.      3. Pain status: Pain free.    4. Return to near baseline physical activity: Yes     Discharge Planner Nurse   Safe discharge environment identified: No  Barriers to discharge: Yes, placement       Entered by: Opal Garcia RN 02/05/2023 7:37 PM       Pt disoriented to time. Cooperative and pleasant. Family member visiting. Incontinent of urine, sergei-care provided. Will monitor.     Please review provider order for any additional goals.   Nurse to notify provider when observation goals have been met and patient is ready for discharge.

## 2023-02-07 LAB — GLUCOSE BLDC GLUCOMTR-MCNC: 97 MG/DL (ref 70–99)

## 2023-02-07 PROCEDURE — 250N000013 HC RX MED GY IP 250 OP 250 PS 637: Performed by: PHYSICIAN ASSISTANT

## 2023-02-07 PROCEDURE — 82962 GLUCOSE BLOOD TEST: CPT

## 2023-02-07 PROCEDURE — G0378 HOSPITAL OBSERVATION PER HR: HCPCS

## 2023-02-07 PROCEDURE — 99207 PR NO CHARGE LOS: CPT | Performed by: PHYSICIAN ASSISTANT

## 2023-02-07 PROCEDURE — 250N000013 HC RX MED GY IP 250 OP 250 PS 637: Performed by: HOSPITALIST

## 2023-02-07 PROCEDURE — 250N000013 HC RX MED GY IP 250 OP 250 PS 637: Performed by: NURSE PRACTITIONER

## 2023-02-07 RX ADMIN — BACLOFEN 10 MG: 10 TABLET ORAL at 08:39

## 2023-02-07 RX ADMIN — DIVALPROEX SODIUM 750 MG: 500 TABLET, DELAYED RELEASE ORAL at 08:39

## 2023-02-07 RX ADMIN — METOPROLOL SUCCINATE 50 MG: 50 TABLET, EXTENDED RELEASE ORAL at 08:40

## 2023-02-07 RX ADMIN — ATORVASTATIN CALCIUM 20 MG: 20 TABLET, FILM COATED ORAL at 21:36

## 2023-02-07 RX ADMIN — BACLOFEN 10 MG: 10 TABLET ORAL at 14:31

## 2023-02-07 RX ADMIN — HYDROXYZINE HYDROCHLORIDE 50 MG: 50 TABLET, FILM COATED ORAL at 21:34

## 2023-02-07 RX ADMIN — CLOTRIMAZOLE: 0.01 CREAM TOPICAL at 08:40

## 2023-02-07 RX ADMIN — HYDROXYZINE HYDROCHLORIDE 50 MG: 50 TABLET, FILM COATED ORAL at 08:39

## 2023-02-07 RX ADMIN — CLONIDINE HYDROCHLORIDE 0.1 MG: 0.1 TABLET ORAL at 21:36

## 2023-02-07 RX ADMIN — QUETIAPINE 200 MG: 200 TABLET, FILM COATED ORAL at 08:39

## 2023-02-07 RX ADMIN — VENLAFAXINE HYDROCHLORIDE 75 MG: 150 CAPSULE, EXTENDED RELEASE ORAL at 21:34

## 2023-02-07 RX ADMIN — CLONIDINE HYDROCHLORIDE 0.1 MG: 0.1 TABLET ORAL at 08:44

## 2023-02-07 RX ADMIN — CLONIDINE HYDROCHLORIDE 0.1 MG: 0.1 TABLET ORAL at 14:31

## 2023-02-07 RX ADMIN — METFORMIN HYDROCHLORIDE 850 MG: 850 TABLET, FILM COATED ORAL at 08:40

## 2023-02-07 RX ADMIN — QUETIAPINE FUMARATE 400 MG: 200 TABLET ORAL at 21:35

## 2023-02-07 RX ADMIN — HYDROCORTISONE: 1 CREAM TOPICAL at 08:40

## 2023-02-07 RX ADMIN — MIRTAZAPINE 15 MG: 15 TABLET, FILM COATED ORAL at 21:36

## 2023-02-07 RX ADMIN — METFORMIN HYDROCHLORIDE 850 MG: 850 TABLET, FILM COATED ORAL at 18:02

## 2023-02-07 RX ADMIN — BACLOFEN 10 MG: 10 TABLET ORAL at 21:36

## 2023-02-07 RX ADMIN — EMPAGLIFLOZIN 10 MG: 10 TABLET, FILM COATED ORAL at 08:39

## 2023-02-07 RX ADMIN — POLYETHYLENE GLYCOL 3350 17 G: 17 POWDER, FOR SOLUTION ORAL at 08:41

## 2023-02-07 RX ADMIN — HYDROXYZINE HYDROCHLORIDE 50 MG: 50 TABLET, FILM COATED ORAL at 14:31

## 2023-02-07 RX ADMIN — Medication 10 MG: at 21:35

## 2023-02-07 RX ADMIN — DIVALPROEX SODIUM 1000 MG: 500 TABLET, DELAYED RELEASE ORAL at 21:35

## 2023-02-07 RX ADMIN — Medication 25 MCG: at 08:40

## 2023-02-07 RX ADMIN — OLANZAPINE 2.5 MG: 2.5 TABLET, FILM COATED ORAL at 14:31

## 2023-02-07 RX ADMIN — CLOTRIMAZOLE: 0.01 CREAM TOPICAL at 21:41

## 2023-02-07 RX ADMIN — LEVOTHYROXINE SODIUM 25 MCG: 0.03 TABLET ORAL at 08:39

## 2023-02-07 RX ADMIN — QUETIAPINE 200 MG: 200 TABLET, FILM COATED ORAL at 14:31

## 2023-02-07 RX ADMIN — VENLAFAXINE HYDROCHLORIDE 150 MG: 150 CAPSULE, EXTENDED RELEASE ORAL at 21:40

## 2023-02-07 RX ADMIN — ASPIRIN 81 MG CHEWABLE TABLET 81 MG: 81 TABLET CHEWABLE at 08:39

## 2023-02-07 ASSESSMENT — ACTIVITIES OF DAILY LIVING (ADL)
ADLS_ACUITY_SCORE: 56
ADLS_ACUITY_SCORE: 58
ADLS_ACUITY_SCORE: 56
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
## 2023-02-07 NOTE — PLAN OF CARE
PRIMARY DIAGNOSIS: PLACEMENT  OUTPATIENT/OBSERVATION GOALS TO BE MET BEFORE DISCHARGE:  1. ADLs back to baseline: Yes    2. Activity and level of assistance: Total cares at baseline    3. Pain status: Pain free.    4. Return to near baseline physical activity: Yes     Discharge Planner Nurse   Safe discharge environment identified: No  Barriers to discharge: Yes       Entered by: Annalee Palomares RN 02/07/2023 12:17 PM    Pt A/O x4. Total cares at baseline. Meds given per orders. Denies pain. VSS. Awaiting placement.  Vital signs:  Temp: 97.7  F (36.5  C) Temp src: Oral BP: (!) 139/99 Pulse: 69   Resp: 16 SpO2: 95 % O2 Device: None (Room air)     Weight: 99.9 kg (220 lb 4.8 oz)  There is no height or weight on file to calculate BMI.    Please review provider order for any additional goals.   Nurse to notify provider when observation goals have been met and patient is ready for discharge.
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
## 2023-02-07 NOTE — PLAN OF CARE
PRIMARY DIAGNOSIS: placement  OUTPATIENT/OBSERVATION GOALS TO BE MET BEFORE DISCHARGE:  ADLs back to baseline: Yes    Activity and level of assistance: up w/ lift assist, baseline    Pain status: Pain free.    Return to near baseline physical activity: Yes     Discharge Planner Nurse   Safe discharge environment identified: No  Barriers to discharge: Yes       Entered by: Katheryn Duran RN 02/06/2023 9:41 PM  SW working on placement. Continue to provide supportive cares, pt calls to make needs known.      Please review provider order for any additional goals.   Nurse to notify provider when observation goals have been met and patient is ready for discharge.

## 2023-02-07 NOTE — PLAN OF CARE
PRIMARY DIAGNOSIS: Placement  OUTPATIENT/OBSERVATION GOALS TO BE MET BEFORE DISCHARGE:  ADLs back to baseline: Yes    Activity and level of assistance: assist x 2    Pain status: Pain free.    Return to near baseline physical activity: Yes     Discharge Planner Nurse   Safe discharge environment identified: No  Barriers to discharge: Yes       Entered by: Emily Garcia RN 02/07/2023 12:03 AM    Patient is resting comfortably in bed, currently denies pain, SW following for placement         Please review provider order for any additional goals.   Nurse to notify provider when observation goals have been met and patient is ready for discharge.

## 2023-02-08 LAB
GLUCOSE BLDC GLUCOMTR-MCNC: 131 MG/DL (ref 70–99)
GLUCOSE BLDC GLUCOMTR-MCNC: 85 MG/DL (ref 70–99)

## 2023-02-08 PROCEDURE — 82962 GLUCOSE BLOOD TEST: CPT

## 2023-02-08 PROCEDURE — 250N000013 HC RX MED GY IP 250 OP 250 PS 637: Performed by: HOSPITALIST

## 2023-02-08 PROCEDURE — 99207 PR NO CHARGE LOS: CPT | Performed by: PHYSICIAN ASSISTANT

## 2023-02-08 PROCEDURE — 250N000013 HC RX MED GY IP 250 OP 250 PS 637: Performed by: PHYSICIAN ASSISTANT

## 2023-02-08 PROCEDURE — G0378 HOSPITAL OBSERVATION PER HR: HCPCS

## 2023-02-08 PROCEDURE — 250N000013 HC RX MED GY IP 250 OP 250 PS 637: Performed by: INTERNAL MEDICINE

## 2023-02-08 PROCEDURE — 250N000013 HC RX MED GY IP 250 OP 250 PS 637: Performed by: NURSE PRACTITIONER

## 2023-02-08 RX ADMIN — CLOTRIMAZOLE: 0.01 CREAM TOPICAL at 21:30

## 2023-02-08 RX ADMIN — EMPAGLIFLOZIN 10 MG: 10 TABLET, FILM COATED ORAL at 08:34

## 2023-02-08 RX ADMIN — POLYETHYLENE GLYCOL 3350 17 G: 17 POWDER, FOR SOLUTION ORAL at 08:35

## 2023-02-08 RX ADMIN — HYDROXYZINE HYDROCHLORIDE 50 MG: 50 TABLET, FILM COATED ORAL at 08:30

## 2023-02-08 RX ADMIN — CLOTRIMAZOLE: 0.01 CREAM TOPICAL at 09:48

## 2023-02-08 RX ADMIN — CLONIDINE HYDROCHLORIDE 0.1 MG: 0.1 TABLET ORAL at 08:34

## 2023-02-08 RX ADMIN — BACLOFEN 10 MG: 10 TABLET ORAL at 08:34

## 2023-02-08 RX ADMIN — QUETIAPINE 200 MG: 200 TABLET, FILM COATED ORAL at 08:31

## 2023-02-08 RX ADMIN — VENLAFAXINE HYDROCHLORIDE 75 MG: 150 CAPSULE, EXTENDED RELEASE ORAL at 21:22

## 2023-02-08 RX ADMIN — METFORMIN HYDROCHLORIDE 850 MG: 850 TABLET, FILM COATED ORAL at 18:27

## 2023-02-08 RX ADMIN — METFORMIN HYDROCHLORIDE 850 MG: 850 TABLET, FILM COATED ORAL at 08:31

## 2023-02-08 RX ADMIN — HYDROCORTISONE: 1 CREAM TOPICAL at 09:48

## 2023-02-08 RX ADMIN — DIVALPROEX SODIUM 1000 MG: 500 TABLET, DELAYED RELEASE ORAL at 21:24

## 2023-02-08 RX ADMIN — LEVOTHYROXINE SODIUM 25 MCG: 0.03 TABLET ORAL at 08:30

## 2023-02-08 RX ADMIN — ASPIRIN 81 MG CHEWABLE TABLET 81 MG: 81 TABLET CHEWABLE at 09:49

## 2023-02-08 RX ADMIN — HYDROCORTISONE: 1 CREAM TOPICAL at 21:30

## 2023-02-08 RX ADMIN — DIVALPROEX SODIUM 750 MG: 500 TABLET, DELAYED RELEASE ORAL at 08:32

## 2023-02-08 RX ADMIN — QUETIAPINE 200 MG: 200 TABLET, FILM COATED ORAL at 15:32

## 2023-02-08 RX ADMIN — MIRTAZAPINE 15 MG: 15 TABLET, FILM COATED ORAL at 21:24

## 2023-02-08 RX ADMIN — CLONIDINE HYDROCHLORIDE 0.1 MG: 0.1 TABLET ORAL at 15:32

## 2023-02-08 RX ADMIN — QUETIAPINE FUMARATE 400 MG: 200 TABLET ORAL at 21:23

## 2023-02-08 RX ADMIN — Medication 10 MG: at 21:24

## 2023-02-08 RX ADMIN — HYDROXYZINE HYDROCHLORIDE 50 MG: 50 TABLET, FILM COATED ORAL at 15:32

## 2023-02-08 RX ADMIN — CLONIDINE HYDROCHLORIDE 0.1 MG: 0.1 TABLET ORAL at 21:23

## 2023-02-08 RX ADMIN — OLANZAPINE 2.5 MG: 2.5 TABLET, FILM COATED ORAL at 15:32

## 2023-02-08 RX ADMIN — BACLOFEN 10 MG: 10 TABLET ORAL at 15:32

## 2023-02-08 RX ADMIN — VENLAFAXINE HYDROCHLORIDE 150 MG: 150 CAPSULE, EXTENDED RELEASE ORAL at 21:22

## 2023-02-08 RX ADMIN — ATORVASTATIN CALCIUM 20 MG: 20 TABLET, FILM COATED ORAL at 21:23

## 2023-02-08 RX ADMIN — METOPROLOL SUCCINATE 50 MG: 50 TABLET, EXTENDED RELEASE ORAL at 08:31

## 2023-02-08 RX ADMIN — HYDROXYZINE HYDROCHLORIDE 50 MG: 50 TABLET, FILM COATED ORAL at 21:24

## 2023-02-08 RX ADMIN — BACLOFEN 10 MG: 10 TABLET ORAL at 21:23

## 2023-02-08 RX ADMIN — Medication 25 MCG: at 08:34

## 2023-02-08 ASSESSMENT — ACTIVITIES OF DAILY LIVING (ADL)
ADLS_ACUITY_SCORE: 56
ADLS_ACUITY_SCORE: 58
ADLS_ACUITY_SCORE: 58
ADLS_ACUITY_SCORE: 56
ADLS_ACUITY_SCORE: 58
ADLS_ACUITY_SCORE: 56
ADLS_ACUITY_SCORE: 56
ADLS_ACUITY_SCORE: 58
ADLS_ACUITY_SCORE: 58

## 2023-02-08 NOTE — PLAN OF CARE
PRIMARY DIAGNOSIS: PLACEMENT  OUTPATIENT/OBSERVATION GOALS TO BE MET BEFORE DISCHARGE:  1. ADLs back to baseline: Yes    2. Activity and level of assistance: Total cares at baseline    3. Pain status: Pain free.    4. Return to near baseline physical activity: Yes     Discharge Planner Nurse   Safe discharge environment identified: No  Barriers to discharge: Yes       Entered by: Annalee Palomares RN 02/07/2023 7:21 PM    Pt A/O x4. Total cares at baseline. Meds given per orders. Denies pain. VSS. Awaiting placement.  Vital signs:  Temp: 97.7  F (36.5  C) Temp src: Oral BP: 116/85 Pulse: 69   Resp: 16 SpO2: 95 % O2 Device: None (Room air)     Weight: 99.9 kg (220 lb 4.8 oz)  There is no height or weight on file to calculate BMI.    Please review provider order for any additional goals.   Nurse to notify provider when observation goals have been met and patient is ready for discharge.

## 2023-02-08 NOTE — PLAN OF CARE
PRIMARY DIAGNOSIS: Placement  OUTPATIENT/OBSERVATION GOALS TO BE MET BEFORE DISCHARGE:  ADLs back to baseline: Yes    Activity and level of assistance: Up with A2 with lift device    Pain status: Pain free.    Return to near baseline physical activity: Yes     Discharge Planner Nurse   Safe discharge environment identified: No  Barriers to discharge: Yes       Entered by: Helene Oreilly RN 02/08/2023   Pt AO x4. LS clear, BS active. Total cares, Denies pain. Tolerating regular diet. Awaiting placement. Will continue to monitor.   Please review provider order for any additional goals.   Nurse to notify provider when observation goals have been met and patient is ready for discharge.

## 2023-02-08 NOTE — PLAN OF CARE
PRIMARY DIAGNOSIS: PLACEMENT   OUTPATIENT/OBSERVATION GOALS TO BE MET BEFORE DISCHARGE:  1. ADLs back to baseline: Yes    2. Activity and level of assistance: Up with maximum assistance. Consider SW and/or PT evaluation.     3. Pain status: Pain free.    4. Return to near baseline physical activity: Yes     Discharge Planner Nurse   Safe discharge environment identified: No  Barriers to discharge: Yes       Entered by: Chantel Spencer RN 02/08/2023   Patient A&Ox2. Tolerating regular diet. Pt is two staff assist using lift. Incontinent of bowel and bladder.Pt makes his needs known. Pt is calm/ cooperative. SW working on finding Group Home. Will continue care.   BP (!) 132/94   Pulse 66   Temp 97.8  F (36.6  C) (Oral)   Resp 18   Wt 99.9 kg (220 lb 4.8 oz)   SpO2 94%     Please review provider order for any additional goals.   Nurse to notify provider when observation goals have been met and patient is ready for discharge.
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
## 2023-02-08 NOTE — PLAN OF CARE
PRIMARY DIAGNOSIS: Placement   OUTPATIENT/OBSERVATION GOALS TO BE MET BEFORE DISCHARGE:  1. ADLs back to baseline: Yes    2. Activity and level of assistance: Up with maximum assistance.    3. Pain status: Pain free.    4. Return to near baseline physical activity: Yes     Discharge Planner Nurse   Safe discharge environment identified: No  Barriers to discharge: Yes       Entered by: Luciana Johnson RN 02/07/2023 8:19 PM   Pt makes needs known.  Denies pain.  Total cares.  VSS. Blood pressure 116/85, pulse 69, temperature 97.7  F (36.5  C), temperature source Oral, resp. rate 16, weight 99.9 kg (220 lb 4.8 oz), SpO2 95 %.      Please review provider order for any additional goals.   Nurse to notify provider when observation goals have been met and patient is ready for discharge.Goal Outcome Evaluation:

## 2023-02-08 NOTE — PLAN OF CARE
PRIMARY DIAGNOSIS: Placement  OUTPATIENT/OBSERVATION GOALS TO BE MET BEFORE DISCHARGE:  1. ADLs back to baseline: Yes    2. Activity and level of assistance: Up with A2 with lift device    3. Pain status: Pain free.    4. Return to near baseline physical activity: Yes     Discharge Planner Nurse   Safe discharge environment identified: No  Barriers to discharge: Yes       Entered by: Helene Oreilly RN 02/08/2023   Pt AO x4. LS clear, BS active. Total cares, Denies pain. Tolerating regular diet. Awaiting placement. Will continue to monitor.   Please review provider order for any additional goals.   Nurse to notify provider when observation goals have been met and patient is ready for discharge.

## 2023-02-09 LAB — GLUCOSE BLDC GLUCOMTR-MCNC: 108 MG/DL (ref 70–99)

## 2023-02-09 PROCEDURE — 250N000013 HC RX MED GY IP 250 OP 250 PS 637: Performed by: PHYSICIAN ASSISTANT

## 2023-02-09 PROCEDURE — 99231 SBSQ HOSP IP/OBS SF/LOW 25: CPT

## 2023-02-09 PROCEDURE — 82962 GLUCOSE BLOOD TEST: CPT

## 2023-02-09 PROCEDURE — 250N000013 HC RX MED GY IP 250 OP 250 PS 637: Performed by: HOSPITALIST

## 2023-02-09 PROCEDURE — 250N000013 HC RX MED GY IP 250 OP 250 PS 637: Performed by: NURSE PRACTITIONER

## 2023-02-09 PROCEDURE — G0378 HOSPITAL OBSERVATION PER HR: HCPCS

## 2023-02-09 RX ADMIN — BACLOFEN 10 MG: 10 TABLET ORAL at 13:11

## 2023-02-09 RX ADMIN — CLOTRIMAZOLE: 0.01 CREAM TOPICAL at 08:09

## 2023-02-09 RX ADMIN — CLONIDINE HYDROCHLORIDE 0.1 MG: 0.1 TABLET ORAL at 20:55

## 2023-02-09 RX ADMIN — VENLAFAXINE HYDROCHLORIDE 150 MG: 150 CAPSULE, EXTENDED RELEASE ORAL at 21:59

## 2023-02-09 RX ADMIN — VENLAFAXINE HYDROCHLORIDE 75 MG: 150 CAPSULE, EXTENDED RELEASE ORAL at 22:00

## 2023-02-09 RX ADMIN — LEVOTHYROXINE SODIUM 25 MCG: 0.03 TABLET ORAL at 08:11

## 2023-02-09 RX ADMIN — Medication 10 MG: at 21:57

## 2023-02-09 RX ADMIN — HYDROXYZINE HYDROCHLORIDE 50 MG: 50 TABLET, FILM COATED ORAL at 08:09

## 2023-02-09 RX ADMIN — METFORMIN HYDROCHLORIDE 850 MG: 850 TABLET, FILM COATED ORAL at 08:10

## 2023-02-09 RX ADMIN — CLOTRIMAZOLE: 0.01 CREAM TOPICAL at 20:58

## 2023-02-09 RX ADMIN — METOPROLOL SUCCINATE 50 MG: 50 TABLET, EXTENDED RELEASE ORAL at 08:10

## 2023-02-09 RX ADMIN — Medication 25 MCG: at 08:14

## 2023-02-09 RX ADMIN — HYDROCORTISONE: 1 CREAM TOPICAL at 08:08

## 2023-02-09 RX ADMIN — QUETIAPINE 200 MG: 200 TABLET, FILM COATED ORAL at 08:09

## 2023-02-09 RX ADMIN — HYDROCORTISONE: 1 CREAM TOPICAL at 20:58

## 2023-02-09 RX ADMIN — DIVALPROEX SODIUM 750 MG: 500 TABLET, DELAYED RELEASE ORAL at 08:10

## 2023-02-09 RX ADMIN — CLONIDINE HYDROCHLORIDE 0.1 MG: 0.1 TABLET ORAL at 08:10

## 2023-02-09 RX ADMIN — OLANZAPINE 2.5 MG: 2.5 TABLET, FILM COATED ORAL at 13:11

## 2023-02-09 RX ADMIN — ATORVASTATIN CALCIUM 20 MG: 20 TABLET, FILM COATED ORAL at 20:55

## 2023-02-09 RX ADMIN — QUETIAPINE FUMARATE 400 MG: 200 TABLET ORAL at 21:59

## 2023-02-09 RX ADMIN — EMPAGLIFLOZIN 10 MG: 10 TABLET, FILM COATED ORAL at 08:10

## 2023-02-09 RX ADMIN — MIRTAZAPINE 15 MG: 15 TABLET, FILM COATED ORAL at 22:00

## 2023-02-09 RX ADMIN — DIVALPROEX SODIUM 1000 MG: 500 TABLET, DELAYED RELEASE ORAL at 21:58

## 2023-02-09 RX ADMIN — HYDROXYZINE HYDROCHLORIDE 50 MG: 50 TABLET, FILM COATED ORAL at 13:11

## 2023-02-09 RX ADMIN — ASPIRIN 81 MG CHEWABLE TABLET 81 MG: 81 TABLET CHEWABLE at 08:11

## 2023-02-09 RX ADMIN — BACLOFEN 10 MG: 10 TABLET ORAL at 08:10

## 2023-02-09 RX ADMIN — METFORMIN HYDROCHLORIDE 850 MG: 850 TABLET, FILM COATED ORAL at 17:41

## 2023-02-09 RX ADMIN — HYDROXYZINE HYDROCHLORIDE 50 MG: 50 TABLET, FILM COATED ORAL at 20:55

## 2023-02-09 RX ADMIN — QUETIAPINE 200 MG: 200 TABLET, FILM COATED ORAL at 13:11

## 2023-02-09 RX ADMIN — CLONIDINE HYDROCHLORIDE 0.1 MG: 0.1 TABLET ORAL at 13:11

## 2023-02-09 RX ADMIN — BACLOFEN 10 MG: 10 TABLET ORAL at 20:55

## 2023-02-09 RX ADMIN — POLYETHYLENE GLYCOL 3350 17 G: 17 POWDER, FOR SOLUTION ORAL at 08:09

## 2023-02-09 ASSESSMENT — ACTIVITIES OF DAILY LIVING (ADL)
ADLS_ACUITY_SCORE: 56

## 2023-02-09 NOTE — PLAN OF CARE
PRIMARY DIAGNOSIS: Placement  OUTPATIENT/OBSERVATION GOALS TO BE MET BEFORE DISCHARGE:  ADLs back to baseline: Yes    Activity and level of assistance: assist x 2    Pain status: Pain free.    Return to near baseline physical activity: Yes     Discharge Planner Nurse   Safe discharge environment identified: No  Barriers to discharge: Yes       Entered by: Emily Garcia RN 02/09/2023 12:14 AM    Patient resting comfortably in bed, currently denies pain, SW following for placement         Please review provider order for any additional goals.   Nurse to notify provider when observation goals have been met and patient is ready for discharge.

## 2023-02-09 NOTE — PLAN OF CARE
PRIMARY DIAGNOSIS: Placement  OUTPATIENT/OBSERVATION GOALS TO BE MET BEFORE DISCHARGE:  ADLs back to baseline: Yes    Activity and level of assistance: assist x 2    Pain status: Pain free.    Return to near baseline physical activity: Yes     Discharge Planner Nurse   Safe discharge environment identified: No  Barriers to discharge: Yes       Entered by: Emily Garcia RN 02/09/2023 4:03 AM    Patient is currently sleeping, SW following for placement        Please review provider order for any additional goals.   Nurse to notify provider when observation goals have been met and patient is ready for discharge
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
## 2023-02-09 NOTE — PLAN OF CARE
PRIMARY DIAGNOSIS: PLACEMENT   OUTPATIENT/OBSERVATION GOALS TO BE MET BEFORE DISCHARGE:  1. ADLs back to baseline: Yes    2. Activity and level of assistance: Up with Two assist using lift  3. Pain status: Pain free.    4. Return to near baseline physical activity: Yes     Discharge Planner Nurse   Safe discharge environment identified: No  Barriers to discharge: Yes       Entered by: Chantel Spencer RN 02/09/2023   Patient A&Ox2. Tolerating regular diet. Pt is two staff assist using lift. Incontinent of bowel and bladder.Pt makes his needs known. Pt is calm/ cooperative. SW working on finding Group Home. Will continue care.   /89   Pulse 76   Temp 97.4  F (36.3  C) (Oral)   Resp 16   Wt 99.9 kg (220 lb 4.8 oz)   SpO2 93%     Please review provider order for any additional goals.   Nurse to notify provider when observation goals have been met and patient is ready for discharge.

## 2023-02-09 NOTE — PLAN OF CARE
PRIMARY DIAGNOSIS: PLACEMENT   OUTPATIENT/OBSERVATION GOALS TO BE MET BEFORE DISCHARGE:  1. ADLs back to baseline: Yes    2. Activity and level of assistance: Up with Two assist using lift  3. Pain status: Pain free.    4. Return to near baseline physical activity: Yes     Discharge Planner Nurse   Safe discharge environment identified: No  Barriers to discharge: Yes       Entered by: Chantel Spencer RN 02/09/2023   Patient A&Ox2. Tolerating regular diet. Pt is two staff assist using lift. Incontinent of bowel and bladder.Pt makes his needs known. Pt is calm/ cooperative. SW working on finding Group Home. Will continue care.   BP (!) 137/93 (BP Location: Left arm)   Pulse 79   Temp 97.7  F (36.5  C) (Oral)   Resp 16   Wt 99.9 kg (220 lb 4.8 oz)   SpO2 95%     Please review provider order for any additional goals.   Nurse to notify provider when observation goals have been met and patient is ready for discharge.

## 2023-02-10 LAB — GLUCOSE BLDC GLUCOMTR-MCNC: 103 MG/DL (ref 70–99)

## 2023-02-10 PROCEDURE — 250N000013 HC RX MED GY IP 250 OP 250 PS 637: Performed by: PHYSICIAN ASSISTANT

## 2023-02-10 PROCEDURE — 82962 GLUCOSE BLOOD TEST: CPT

## 2023-02-10 PROCEDURE — G0378 HOSPITAL OBSERVATION PER HR: HCPCS

## 2023-02-10 PROCEDURE — 99233 SBSQ HOSP IP/OBS HIGH 50: CPT | Performed by: PHYSICIAN ASSISTANT

## 2023-02-10 PROCEDURE — 250N000013 HC RX MED GY IP 250 OP 250 PS 637: Performed by: HOSPITALIST

## 2023-02-10 PROCEDURE — 250N000013 HC RX MED GY IP 250 OP 250 PS 637: Performed by: NURSE PRACTITIONER

## 2023-02-10 RX ADMIN — VENLAFAXINE HYDROCHLORIDE 75 MG: 150 CAPSULE, EXTENDED RELEASE ORAL at 21:13

## 2023-02-10 RX ADMIN — BACLOFEN 10 MG: 10 TABLET ORAL at 14:12

## 2023-02-10 RX ADMIN — ASPIRIN 81 MG CHEWABLE TABLET 81 MG: 81 TABLET CHEWABLE at 08:36

## 2023-02-10 RX ADMIN — EMPAGLIFLOZIN 10 MG: 10 TABLET, FILM COATED ORAL at 08:36

## 2023-02-10 RX ADMIN — VENLAFAXINE HYDROCHLORIDE 150 MG: 150 CAPSULE, EXTENDED RELEASE ORAL at 21:14

## 2023-02-10 RX ADMIN — QUETIAPINE FUMARATE 400 MG: 200 TABLET ORAL at 21:16

## 2023-02-10 RX ADMIN — Medication 25 MCG: at 08:36

## 2023-02-10 RX ADMIN — BACLOFEN 10 MG: 10 TABLET ORAL at 20:38

## 2023-02-10 RX ADMIN — HYDROXYZINE HYDROCHLORIDE 50 MG: 50 TABLET, FILM COATED ORAL at 14:12

## 2023-02-10 RX ADMIN — METFORMIN HYDROCHLORIDE 850 MG: 850 TABLET, FILM COATED ORAL at 17:34

## 2023-02-10 RX ADMIN — Medication 10 MG: at 21:26

## 2023-02-10 RX ADMIN — OLANZAPINE 2.5 MG: 2.5 TABLET, FILM COATED ORAL at 14:12

## 2023-02-10 RX ADMIN — CLOTRIMAZOLE: 0.01 CREAM TOPICAL at 21:18

## 2023-02-10 RX ADMIN — QUETIAPINE 200 MG: 200 TABLET, FILM COATED ORAL at 08:35

## 2023-02-10 RX ADMIN — ATORVASTATIN CALCIUM 20 MG: 20 TABLET, FILM COATED ORAL at 20:38

## 2023-02-10 RX ADMIN — HYDROXYZINE HYDROCHLORIDE 50 MG: 50 TABLET, FILM COATED ORAL at 08:35

## 2023-02-10 RX ADMIN — DIVALPROEX SODIUM 1000 MG: 500 TABLET, DELAYED RELEASE ORAL at 21:16

## 2023-02-10 RX ADMIN — METFORMIN HYDROCHLORIDE 850 MG: 850 TABLET, FILM COATED ORAL at 08:36

## 2023-02-10 RX ADMIN — DIVALPROEX SODIUM 750 MG: 500 TABLET, DELAYED RELEASE ORAL at 08:35

## 2023-02-10 RX ADMIN — QUETIAPINE 200 MG: 200 TABLET, FILM COATED ORAL at 14:12

## 2023-02-10 RX ADMIN — HYDROXYZINE HYDROCHLORIDE 50 MG: 50 TABLET, FILM COATED ORAL at 20:39

## 2023-02-10 RX ADMIN — BACLOFEN 10 MG: 10 TABLET ORAL at 08:36

## 2023-02-10 RX ADMIN — CLONIDINE HYDROCHLORIDE 0.1 MG: 0.1 TABLET ORAL at 20:38

## 2023-02-10 RX ADMIN — LEVOTHYROXINE SODIUM 25 MCG: 0.03 TABLET ORAL at 08:36

## 2023-02-10 RX ADMIN — POLYETHYLENE GLYCOL 3350 17 G: 17 POWDER, FOR SOLUTION ORAL at 08:36

## 2023-02-10 RX ADMIN — HYDROCORTISONE: 1 CREAM TOPICAL at 21:18

## 2023-02-10 RX ADMIN — CLONIDINE HYDROCHLORIDE 0.1 MG: 0.1 TABLET ORAL at 08:36

## 2023-02-10 RX ADMIN — CLONIDINE HYDROCHLORIDE 0.1 MG: 0.1 TABLET ORAL at 14:12

## 2023-02-10 RX ADMIN — METOPROLOL SUCCINATE 50 MG: 50 TABLET, EXTENDED RELEASE ORAL at 08:36

## 2023-02-10 RX ADMIN — MIRTAZAPINE 15 MG: 15 TABLET, FILM COATED ORAL at 21:16

## 2023-02-10 ASSESSMENT — ACTIVITIES OF DAILY LIVING (ADL)
ADLS_ACUITY_SCORE: 54
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
## 2023-02-10 NOTE — PLAN OF CARE
PRIMARY DIAGNOSIS: Placement  OUTPATIENT/OBSERVATION GOALS TO BE MET BEFORE DISCHARGE:  ADLs back to baseline: Yes    Activity and level of assistance: Up with maximum assistance.      Pain status: Pain free.    Return to near baseline physical activity: Yes     Discharge Planner Nurse   Safe discharge environment identified: No  Barriers to discharge: Yes       Entered by: Diane Du RN 02/10/2023 9:45 AM   Pt A&Ox4. VSS, on RA. Denies pain, nausea, chest pain, and SOB. Incontinence care provided. Repositioned as tolerated. Regular diet. Waiting for placement. SW following.   Please review provider order for any additional goals.   Nurse to notify provider when observation goals have been met and patient is ready for discharge.
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
## 2023-02-10 NOTE — PLAN OF CARE

## 2023-02-10 NOTE — PLAN OF CARE
PRIMARY DIAGNOSIS: Placement  OUTPATIENT/OBSERVATION GOALS TO BE MET BEFORE DISCHARGE:  ADLs back to baseline: Yes     Activity and level of assistance: Up with maximum assistance.       Pain status: Pain free.     Return to near baseline physical activity: Yes          Discharge Planner Nurse   Safe discharge environment identified: No  Barriers to discharge: Yes       Entered by: Diane Du RN 02/10/2023 12PM   Pt A&Ox4. VSS, on RA. Denies pain, nausea, chest pain, and SOB. Incontinence care provided. Repositioned as tolerated. Regular diet. Waiting for placement. SW following.   Please review provider order for any additional goals.   Nurse to notify provider when observation goals have been met and patient is ready for discharge

## 2023-02-10 NOTE — PLAN OF CARE
PRIMARY DIAGNOSIS: Placement  OUTPATIENT/OBSERVATION GOALS TO BE MET BEFORE DISCHARGE:  ADLs back to baseline: Yes     Activity and level of assistance: Up with maximum assistance.       Pain status: Pain free.     Return to near baseline physical activity: Yes          Discharge Planner Nurse   Safe discharge environment identified: No  Barriers to discharge: Yes       Entered by: Diane Du RN 02/10/2023 4PM   Pt A&Ox4. VSS, on RA. Denies pain, nausea, chest pain, and SOB. Incontinence care provided. Repositioned as tolerated. Regular diet. Waiting for placement. SW following.   Please review provider order for any additional goals.   Nurse to notify provider when observation goals have been met and patient is ready for discharge

## 2023-02-10 NOTE — PLAN OF CARE
PRIMARY DIAGNOSIS: PLACEMENT   OUTPATIENT/OBSERVATION GOALS TO BE MET BEFORE DISCHARGE:  1. ADLs back to baseline: Yes    2. Activity and level of assistance: Up with Two assist using lift  3. Pain status: Pain free.    4. Return to near baseline physical activity: Yes     Discharge Planner Nurse   Safe discharge environment identified: No  Barriers to discharge: Yes       Entered by: Chantel Spencer RN 02/09/2023   Patient A&Ox2. Tolerating regular diet. Pt is two staff assist using lift. Incontinent of bowel and bladder.Pt makes his needs known. Pt is calm/ cooperative. SW working on finding Group Home. Will continue care.   /84   Pulse 79   Temp 97.7  F (36.5  C) (Oral)   Resp 16   Wt 99.9 kg (220 lb 4.8 oz)   SpO2 95%     Please review provider order for any additional goals.   Nurse to notify provider when observation goals have been met and patient is ready for discharge.

## 2023-02-10 NOTE — PLAN OF CARE
PRIMARY DIAGNOSIS: Placement  OUTPATIENT/OBSERVATION GOALS TO BE MET BEFORE DISCHARGE:  ADLs back to baseline: Yes    Activity and level of assistance: assist x 2    Pain status: Pain free.    Return to near baseline physical activity: Yes     Discharge Planner Nurse   Safe discharge environment identified: Yes  Barriers to discharge: Yes       Entered by: Emily Garcia RN 02/10/2023 12:05 AM      Patient is resting comfortably in bed, currently denies pain, SW following for placement       Please review provider order for any additional goals.   Nurse to notify provider when observation goals have been met and patient is ready for discharge.
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
## 2023-02-11 LAB — GLUCOSE BLDC GLUCOMTR-MCNC: 116 MG/DL (ref 70–99)

## 2023-02-11 PROCEDURE — 250N000013 HC RX MED GY IP 250 OP 250 PS 637: Performed by: HOSPITALIST

## 2023-02-11 PROCEDURE — 99231 SBSQ HOSP IP/OBS SF/LOW 25: CPT

## 2023-02-11 PROCEDURE — 250N000013 HC RX MED GY IP 250 OP 250 PS 637: Performed by: PHYSICIAN ASSISTANT

## 2023-02-11 PROCEDURE — 250N000013 HC RX MED GY IP 250 OP 250 PS 637: Performed by: NURSE PRACTITIONER

## 2023-02-11 PROCEDURE — G0378 HOSPITAL OBSERVATION PER HR: HCPCS

## 2023-02-11 PROCEDURE — 82962 GLUCOSE BLOOD TEST: CPT

## 2023-02-11 RX ADMIN — ATORVASTATIN CALCIUM 20 MG: 20 TABLET, FILM COATED ORAL at 20:01

## 2023-02-11 RX ADMIN — HYDROCORTISONE: 1 CREAM TOPICAL at 08:34

## 2023-02-11 RX ADMIN — VENLAFAXINE HYDROCHLORIDE 75 MG: 150 CAPSULE, EXTENDED RELEASE ORAL at 21:05

## 2023-02-11 RX ADMIN — METOPROLOL SUCCINATE 50 MG: 50 TABLET, EXTENDED RELEASE ORAL at 08:31

## 2023-02-11 RX ADMIN — EMPAGLIFLOZIN 10 MG: 10 TABLET, FILM COATED ORAL at 08:31

## 2023-02-11 RX ADMIN — DIVALPROEX SODIUM 750 MG: 500 TABLET, DELAYED RELEASE ORAL at 08:30

## 2023-02-11 RX ADMIN — ASPIRIN 81 MG CHEWABLE TABLET 81 MG: 81 TABLET CHEWABLE at 08:31

## 2023-02-11 RX ADMIN — VENLAFAXINE HYDROCHLORIDE 150 MG: 150 CAPSULE, EXTENDED RELEASE ORAL at 21:05

## 2023-02-11 RX ADMIN — OLANZAPINE 2.5 MG: 2.5 TABLET, FILM COATED ORAL at 14:02

## 2023-02-11 RX ADMIN — HYDROCORTISONE: 1 CREAM TOPICAL at 20:02

## 2023-02-11 RX ADMIN — BACLOFEN 10 MG: 10 TABLET ORAL at 20:01

## 2023-02-11 RX ADMIN — QUETIAPINE 200 MG: 200 TABLET, FILM COATED ORAL at 14:02

## 2023-02-11 RX ADMIN — HYDROXYZINE HYDROCHLORIDE 50 MG: 50 TABLET, FILM COATED ORAL at 08:30

## 2023-02-11 RX ADMIN — DIVALPROEX SODIUM 1000 MG: 500 TABLET, DELAYED RELEASE ORAL at 21:06

## 2023-02-11 RX ADMIN — POLYETHYLENE GLYCOL 3350 17 G: 17 POWDER, FOR SOLUTION ORAL at 08:30

## 2023-02-11 RX ADMIN — CLONIDINE HYDROCHLORIDE 0.1 MG: 0.1 TABLET ORAL at 20:01

## 2023-02-11 RX ADMIN — QUETIAPINE FUMARATE 400 MG: 200 TABLET ORAL at 21:06

## 2023-02-11 RX ADMIN — Medication 10 MG: at 21:06

## 2023-02-11 RX ADMIN — BACLOFEN 10 MG: 10 TABLET ORAL at 14:02

## 2023-02-11 RX ADMIN — METFORMIN HYDROCHLORIDE 850 MG: 850 TABLET, FILM COATED ORAL at 08:30

## 2023-02-11 RX ADMIN — HYDROXYZINE HYDROCHLORIDE 50 MG: 50 TABLET, FILM COATED ORAL at 14:02

## 2023-02-11 RX ADMIN — CLOTRIMAZOLE: 0.01 CREAM TOPICAL at 08:34

## 2023-02-11 RX ADMIN — CLONIDINE HYDROCHLORIDE 0.1 MG: 0.1 TABLET ORAL at 14:03

## 2023-02-11 RX ADMIN — CLOTRIMAZOLE: 0.01 CREAM TOPICAL at 20:03

## 2023-02-11 RX ADMIN — BACLOFEN 10 MG: 10 TABLET ORAL at 08:30

## 2023-02-11 RX ADMIN — Medication 25 MCG: at 08:31

## 2023-02-11 RX ADMIN — MIRTAZAPINE 15 MG: 15 TABLET, FILM COATED ORAL at 21:06

## 2023-02-11 RX ADMIN — METFORMIN HYDROCHLORIDE 850 MG: 850 TABLET, FILM COATED ORAL at 17:20

## 2023-02-11 RX ADMIN — SENNOSIDES AND DOCUSATE SODIUM 1 TABLET: 50; 8.6 TABLET ORAL at 08:30

## 2023-02-11 RX ADMIN — QUETIAPINE 200 MG: 200 TABLET, FILM COATED ORAL at 08:30

## 2023-02-11 RX ADMIN — HYDROXYZINE HYDROCHLORIDE 50 MG: 50 TABLET, FILM COATED ORAL at 20:00

## 2023-02-11 RX ADMIN — LEVOTHYROXINE SODIUM 25 MCG: 0.03 TABLET ORAL at 08:31

## 2023-02-11 RX ADMIN — CLONIDINE HYDROCHLORIDE 0.1 MG: 0.1 TABLET ORAL at 08:30

## 2023-02-11 ASSESSMENT — ACTIVITIES OF DAILY LIVING (ADL)
ADLS_ACUITY_SCORE: 54

## 2023-02-11 NOTE — PLAN OF CARE
PRIMARY DIAGNOSIS: PLACEMENT  OUTPATIENT/OBSERVATION GOALS TO BE MET BEFORE DISCHARGE:  ADLs back to baseline: Yes    Activity and level of assistance: Up with maximum assistance.    Pain status: Pain free.    Return to near baseline physical activity: Yes     Discharge Planner Nurse   Safe discharge environment identified: Yes  Barriers to discharge: No       Entered by: Preston Arango RN 02/10/2023 8:41 PM     Please review provider order for any additional goals.   Nurse to notify provider when observation goals have been met and patient is ready for discharge.Goal Outcome Evaluation:
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
## 2023-02-11 NOTE — PLAN OF CARE
PRIMARY DIAGNOSIS: Placement  OUTPATIENT/OBSERVATION GOALS TO BE MET BEFORE DISCHARGE:  ADLs back to baseline: Yes     Activity and level of assistance: Up with maximum assistance.       Pain status: Pain free.     Return to near baseline physical activity: Yes          Discharge Planner Nurse   Safe discharge environment identified: No  Barriers to discharge: Yes       Entered by: Diane Du RN 02/11/2023 8AM   Pt A&Ox4. VSS, on RA. Denies pain, nausea, chest pain, and SOB. Incontinence care provided. Repositioned as tolerated. Regular diet. Waiting for placement. SW following.     Please review provider order for any additional goals.   Nurse to notify provider when observation goals have been met and patient is ready for discharge

## 2023-02-11 NOTE — PLAN OF CARE
PRIMARY DIAGNOSIS: Placement  OUTPATIENT/OBSERVATION GOALS TO BE MET BEFORE DISCHARGE:  ADLs back to baseline: Yes     Activity and level of assistance: Up with maximum assistance.       Pain status: Pain free.     Return to near baseline physical activity: Yes          Discharge Planner Nurse   Safe discharge environment identified: No  Barriers to discharge: Yes       Entered by: Diane Du RN 02/11/2023 12PM   Pt A&Ox4. VSS, on RA. Denies pain, nausea, chest pain, and SOB. Incontinence care provided. BMx1. Repositioned as tolerated. Regular diet. Waiting for placement. SW following.      Please review provider order for any additional goals.   Nurse to notify provider when observation goals have been met and patient is ready for discharge

## 2023-02-11 NOTE — PLAN OF CARE
PRIMARY DIAGNOSIS: PLACEMENT    OUTPATIENT/OBSERVATION GOALS TO BE MET BEFORE DISCHARGE  1. Orthostatic performed: N/A    2. Tolerating PO medications: Yes    3. Return to near baseline physical activity: Yes    4. Cleared for discharge by consultants (if involved): Yes    Discharge Planner Nurse   Safe discharge environment identified: Yes  Barriers to discharge: No       Entered by: Preston Arango RN 02/11/2023 1:52 AM   Pt is A 7 O x 4, up w/ A x 2 lift transfer, denies pain, reg diet, SW following with placement.  Please review provider order for any additional goals.   Nurse to notify provider when observation goals have been met and patient is ready for discharge.Goal Outcome Evaluation:

## 2023-02-11 NOTE — PLAN OF CARE
PRIMARY DIAGNOSIS: Placement  OUTPATIENT/OBSERVATION GOALS TO BE MET BEFORE DISCHARGE:  ADLs back to baseline: Yes     Activity and level of assistance: Up with maximum assistance.       Pain status: Pain free.     Return to near baseline physical activity: Yes          Discharge Planner Nurse   Safe discharge environment identified: No  Barriers to discharge: Yes       Entered by: Diane Du RN 02/11/2023 4PM   Pt A&Ox4. VSS, on RA. Denies pain, nausea, chest pain, and SOB. Incontinence care provided. BMx1. Repositioned as tolerated. Regular diet. Waiting for placement. SW following.      Please review provider order for any additional goals.   Nurse to notify provider when observation goals have been met and patient is ready for discharge

## 2023-02-11 NOTE — PLAN OF CARE
PRIMARY DIAGNOSIS: PLACEMENT  OUTPATIENT/OBSERVATION GOALS TO BE MET BEFORE DISCHARGE:  ADLs back to baseline: Yes    Activity and level of assistance: Up with maximum assistance.     Pain status: Pain free.    Return to near baseline physical activity: Yes     Discharge Planner Nurse   Safe discharge environment identified: Yes  Barriers to discharge: No       Entered by: Preston Arango RN 02/11/2023 12:56 AM     Please review provider order for any additional goals.   Nurse to notify provider when observation goals have been met and patient is ready for discharge.Goal Outcome Evaluation:

## 2023-02-12 LAB — GLUCOSE BLDC GLUCOMTR-MCNC: 105 MG/DL (ref 70–99)

## 2023-02-12 PROCEDURE — 250N000013 HC RX MED GY IP 250 OP 250 PS 637: Performed by: PHYSICIAN ASSISTANT

## 2023-02-12 PROCEDURE — 250N000013 HC RX MED GY IP 250 OP 250 PS 637: Performed by: HOSPITALIST

## 2023-02-12 PROCEDURE — G0378 HOSPITAL OBSERVATION PER HR: HCPCS

## 2023-02-12 PROCEDURE — 250N000013 HC RX MED GY IP 250 OP 250 PS 637: Performed by: NURSE PRACTITIONER

## 2023-02-12 PROCEDURE — 82962 GLUCOSE BLOOD TEST: CPT

## 2023-02-12 PROCEDURE — 99231 SBSQ HOSP IP/OBS SF/LOW 25: CPT

## 2023-02-12 RX ADMIN — BACLOFEN 10 MG: 10 TABLET ORAL at 13:33

## 2023-02-12 RX ADMIN — METFORMIN HYDROCHLORIDE 850 MG: 850 TABLET, FILM COATED ORAL at 08:26

## 2023-02-12 RX ADMIN — BACLOFEN 10 MG: 10 TABLET ORAL at 20:10

## 2023-02-12 RX ADMIN — POLYETHYLENE GLYCOL 3350 17 G: 17 POWDER, FOR SOLUTION ORAL at 08:26

## 2023-02-12 RX ADMIN — OLANZAPINE 2.5 MG: 2.5 TABLET, FILM COATED ORAL at 13:33

## 2023-02-12 RX ADMIN — QUETIAPINE 200 MG: 200 TABLET, FILM COATED ORAL at 08:25

## 2023-02-12 RX ADMIN — METFORMIN HYDROCHLORIDE 850 MG: 850 TABLET, FILM COATED ORAL at 17:26

## 2023-02-12 RX ADMIN — CLONIDINE HYDROCHLORIDE 0.1 MG: 0.1 TABLET ORAL at 08:25

## 2023-02-12 RX ADMIN — DIVALPROEX SODIUM 1000 MG: 500 TABLET, DELAYED RELEASE ORAL at 21:25

## 2023-02-12 RX ADMIN — QUETIAPINE 200 MG: 200 TABLET, FILM COATED ORAL at 13:33

## 2023-02-12 RX ADMIN — QUETIAPINE FUMARATE 400 MG: 200 TABLET ORAL at 21:24

## 2023-02-12 RX ADMIN — VENLAFAXINE HYDROCHLORIDE 150 MG: 150 CAPSULE, EXTENDED RELEASE ORAL at 21:27

## 2023-02-12 RX ADMIN — METOPROLOL SUCCINATE 50 MG: 50 TABLET, EXTENDED RELEASE ORAL at 08:26

## 2023-02-12 RX ADMIN — CLONIDINE HYDROCHLORIDE 0.1 MG: 0.1 TABLET ORAL at 13:33

## 2023-02-12 RX ADMIN — EMPAGLIFLOZIN 10 MG: 10 TABLET, FILM COATED ORAL at 08:26

## 2023-02-12 RX ADMIN — ASPIRIN 81 MG CHEWABLE TABLET 81 MG: 81 TABLET CHEWABLE at 08:26

## 2023-02-12 RX ADMIN — Medication 10 MG: at 21:25

## 2023-02-12 RX ADMIN — HYDROCORTISONE: 1 CREAM TOPICAL at 20:11

## 2023-02-12 RX ADMIN — DIVALPROEX SODIUM 750 MG: 500 TABLET, DELAYED RELEASE ORAL at 08:25

## 2023-02-12 RX ADMIN — CLONIDINE HYDROCHLORIDE 0.1 MG: 0.1 TABLET ORAL at 20:10

## 2023-02-12 RX ADMIN — HYDROXYZINE HYDROCHLORIDE 50 MG: 50 TABLET, FILM COATED ORAL at 13:33

## 2023-02-12 RX ADMIN — BACLOFEN 10 MG: 10 TABLET ORAL at 08:26

## 2023-02-12 RX ADMIN — LEVOTHYROXINE SODIUM 25 MCG: 0.03 TABLET ORAL at 08:25

## 2023-02-12 RX ADMIN — ATORVASTATIN CALCIUM 20 MG: 20 TABLET, FILM COATED ORAL at 20:10

## 2023-02-12 RX ADMIN — HYDROXYZINE HYDROCHLORIDE 50 MG: 50 TABLET, FILM COATED ORAL at 08:25

## 2023-02-12 RX ADMIN — MIRTAZAPINE 15 MG: 15 TABLET, FILM COATED ORAL at 21:25

## 2023-02-12 RX ADMIN — CLOTRIMAZOLE: 0.01 CREAM TOPICAL at 20:11

## 2023-02-12 RX ADMIN — Medication 25 MCG: at 08:26

## 2023-02-12 RX ADMIN — VENLAFAXINE HYDROCHLORIDE 75 MG: 150 CAPSULE, EXTENDED RELEASE ORAL at 21:28

## 2023-02-12 RX ADMIN — HYDROXYZINE HYDROCHLORIDE 50 MG: 50 TABLET, FILM COATED ORAL at 20:10

## 2023-02-12 ASSESSMENT — ACTIVITIES OF DAILY LIVING (ADL)
ADLS_ACUITY_SCORE: 54
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
## 2023-02-12 NOTE — PLAN OF CARE
PRIMARY DIAGNOSIS: Placement  OUTPATIENT/OBSERVATION GOALS TO BE MET BEFORE DISCHARGE:  ADLs back to baseline: Yes     Activity and level of assistance: Up with maximum assistance.       Pain status: Pain free.     Return to near baseline physical activity: Yes          Discharge Planner Nurse   Safe discharge environment identified: No  Barriers to discharge: Yes       Entered by: Diane Du RN 02/12/2023 4PM   Pt A&Ox4. VSS, on RA. Denies pain, nausea, chest pain, and SOB. Incontinence care provided. Repositioned as tolerated. Regular diet. Waiting for placement. SW following.      Please review provider order for any additional goals.   Nurse to notify provider when observation goals have been met and patient is ready for discharge

## 2023-02-12 NOTE — PLAN OF CARE
PRIMARY DIAGNOSIS: Placement  OUTPATIENT/OBSERVATION GOALS TO BE MET BEFORE DISCHARGE:  ADLs back to baseline: Yes     Activity and level of assistance: Up with maximum assistance.       Pain status: Pain free.     Return to near baseline physical activity: Yes          Discharge Planner Nurse   Safe discharge environment identified: No  Barriers to discharge: Yes       Entered by: Diane Du RN 02/12/2023 12PM   Pt A&Ox4. VSS, on RA. Denies pain, nausea, chest pain, and SOB. Incontinence care provided. Repositioned as tolerated. Regular diet. Waiting for placement. SW following.      Please review provider order for any additional goals.   Nurse to notify provider when observation goals have been met and patient is ready for discharge

## 2023-02-12 NOTE — PLAN OF CARE
PRIMARY DIAGNOSIS: PLACEMENT  OUTPATIENT/OBSERVATION GOALS TO BE MET BEFORE DISCHARGE:  ADLs back to baseline: Yes    Activity and level of assistance: Up with maximum assistance.     Pain status: Pain free.    Return to near baseline physical activity: Yes     Discharge Planner Nurse   Safe discharge environment identified: Yes  Barriers to discharge: No       Entered by: Preston Arango RN 02/12/2023 1:22 AM     Please review provider order for any additional goals.   Nurse to notify provider when observation goals have been met and patient is ready for discharge.Goal Outcome Evaluation:

## 2023-02-12 NOTE — PLAN OF CARE
PRIMARY DIAGNOSIS: Placement  OUTPATIENT/OBSERVATION GOALS TO BE MET BEFORE DISCHARGE:  ADLs back to baseline: Yes     Activity and level of assistance: Up with maximum assistance.       Pain status: Pain free.     Return to near baseline physical activity: Yes          Discharge Planner Nurse   Safe discharge environment identified: No  Barriers to discharge: Yes       Entered by: Diane Du RN 02/12/2023 8AM   Pt A&Ox4. VSS, on RA. Denies pain, nausea, chest pain, and SOB. Incontinence care provided. Repositioned as tolerated. Regular diet. Waiting for placement. SW following.      Please review provider order for any additional goals.   Nurse to notify provider when observation goals have been met and patient is ready for discharge

## 2023-02-12 NOTE — PLAN OF CARE
PRIMARY DIAGNOSIS: Placement  OUTPATIENT/OBSERVATION GOALS TO BE MET BEFORE DISCHARGE:  ADLs back to baseline: Yes    Activity and level of assistance: Up with maximum assistance.    Pain status: Pain free.    Return to near baseline physical activity: Yes     Discharge Planner Nurse   Safe discharge environment identified: Yes  Barriers to discharge: No       Entered by: Preston Arango RN 02/11/2023 8:03 PM     Please review provider order for any additional goals.   Nurse to notify provider when observation goals have been met and patient is ready for discharge.Goal Outcome Evaluation:

## 2023-02-12 NOTE — PLAN OF CARE
PRIMARY DIAGNOSIS: PLACEMENT  OUTPATIENT/OBSERVATION GOALS TO BE MET BEFORE DISCHARGE:  1. ADLs back to baseline: Yes    2. Activity and level of assistance: Up with maximum assistance.    3. Pain status: Pain free.    4. Return to near baseline physical activity: Yes     Discharge Planner Nurse   Safe discharge environment identified: Yes  Barriers to discharge: No       Entered by: Preston Arango RN 02/12/2023 4:23 AM    Pt is A & O x 4, up w/ A x 2 lift transfer, denies pain, reg diet, SW following with placement.  Please review provider order for any additional goals.   Nurse to notify provider when observation goals have been met and patient is ready for discharge.Goal Outcome Evaluation:

## 2023-02-13 LAB — GLUCOSE BLDC GLUCOMTR-MCNC: 164 MG/DL (ref 70–99)

## 2023-02-13 PROCEDURE — 250N000013 HC RX MED GY IP 250 OP 250 PS 637: Performed by: HOSPITALIST

## 2023-02-13 PROCEDURE — 250N000013 HC RX MED GY IP 250 OP 250 PS 637: Performed by: PHYSICIAN ASSISTANT

## 2023-02-13 PROCEDURE — 250N000013 HC RX MED GY IP 250 OP 250 PS 637: Performed by: NURSE PRACTITIONER

## 2023-02-13 PROCEDURE — G0378 HOSPITAL OBSERVATION PER HR: HCPCS

## 2023-02-13 PROCEDURE — 99231 SBSQ HOSP IP/OBS SF/LOW 25: CPT | Performed by: PHYSICIAN ASSISTANT

## 2023-02-13 PROCEDURE — 82962 GLUCOSE BLOOD TEST: CPT

## 2023-02-13 RX ADMIN — CLONIDINE HYDROCHLORIDE 0.1 MG: 0.1 TABLET ORAL at 21:22

## 2023-02-13 RX ADMIN — ASPIRIN 81 MG CHEWABLE TABLET 81 MG: 81 TABLET CHEWABLE at 07:53

## 2023-02-13 RX ADMIN — POLYETHYLENE GLYCOL 3350 17 G: 17 POWDER, FOR SOLUTION ORAL at 07:55

## 2023-02-13 RX ADMIN — CLONIDINE HYDROCHLORIDE 0.1 MG: 0.1 TABLET ORAL at 13:57

## 2023-02-13 RX ADMIN — OLANZAPINE 2.5 MG: 2.5 TABLET, FILM COATED ORAL at 13:57

## 2023-02-13 RX ADMIN — Medication 25 MCG: at 07:54

## 2023-02-13 RX ADMIN — VENLAFAXINE HYDROCHLORIDE 150 MG: 150 CAPSULE, EXTENDED RELEASE ORAL at 21:24

## 2023-02-13 RX ADMIN — METFORMIN HYDROCHLORIDE 850 MG: 850 TABLET, FILM COATED ORAL at 07:55

## 2023-02-13 RX ADMIN — VENLAFAXINE HYDROCHLORIDE 75 MG: 150 CAPSULE, EXTENDED RELEASE ORAL at 21:22

## 2023-02-13 RX ADMIN — QUETIAPINE 200 MG: 200 TABLET, FILM COATED ORAL at 07:54

## 2023-02-13 RX ADMIN — LEVOTHYROXINE SODIUM 25 MCG: 0.03 TABLET ORAL at 07:53

## 2023-02-13 RX ADMIN — BACLOFEN 10 MG: 10 TABLET ORAL at 07:54

## 2023-02-13 RX ADMIN — HYDROXYZINE HYDROCHLORIDE 50 MG: 50 TABLET, FILM COATED ORAL at 13:57

## 2023-02-13 RX ADMIN — BACLOFEN 10 MG: 10 TABLET ORAL at 21:24

## 2023-02-13 RX ADMIN — MIRTAZAPINE 15 MG: 15 TABLET, FILM COATED ORAL at 21:24

## 2023-02-13 RX ADMIN — METFORMIN HYDROCHLORIDE 850 MG: 850 TABLET, FILM COATED ORAL at 17:15

## 2023-02-13 RX ADMIN — BACLOFEN 10 MG: 10 TABLET ORAL at 13:57

## 2023-02-13 RX ADMIN — HYDROCORTISONE: 1 CREAM TOPICAL at 21:25

## 2023-02-13 RX ADMIN — Medication 10 MG: at 21:24

## 2023-02-13 RX ADMIN — ATORVASTATIN CALCIUM 20 MG: 20 TABLET, FILM COATED ORAL at 21:24

## 2023-02-13 RX ADMIN — EMPAGLIFLOZIN 10 MG: 10 TABLET, FILM COATED ORAL at 07:55

## 2023-02-13 RX ADMIN — CLOTRIMAZOLE: 0.01 CREAM TOPICAL at 21:25

## 2023-02-13 RX ADMIN — HYDROXYZINE HYDROCHLORIDE 50 MG: 50 TABLET, FILM COATED ORAL at 21:23

## 2023-02-13 RX ADMIN — QUETIAPINE 200 MG: 200 TABLET, FILM COATED ORAL at 13:57

## 2023-02-13 RX ADMIN — METOPROLOL SUCCINATE 50 MG: 50 TABLET, EXTENDED RELEASE ORAL at 07:58

## 2023-02-13 RX ADMIN — HYDROXYZINE HYDROCHLORIDE 50 MG: 50 TABLET, FILM COATED ORAL at 13:56

## 2023-02-13 RX ADMIN — DIVALPROEX SODIUM 750 MG: 500 TABLET, DELAYED RELEASE ORAL at 07:53

## 2023-02-13 RX ADMIN — QUETIAPINE FUMARATE 400 MG: 200 TABLET ORAL at 21:23

## 2023-02-13 RX ADMIN — DIVALPROEX SODIUM 1000 MG: 500 TABLET, DELAYED RELEASE ORAL at 21:23

## 2023-02-13 RX ADMIN — CLONIDINE HYDROCHLORIDE 0.1 MG: 0.1 TABLET ORAL at 07:58

## 2023-02-13 ASSESSMENT — ACTIVITIES OF DAILY LIVING (ADL)
ADLS_ACUITY_SCORE: 54

## 2023-02-13 NOTE — PLAN OF CARE
PRIMARY DIAGNOSIS: PLACEMENT  OUTPATIENT/OBSERVATION GOALS TO BE MET BEFORE DISCHARGE:  ADLs back to baseline: Yes    Activity and level of assistance: Up with maximum assistance.    Pain status: Pain free.    Return to near baseline physical activity: Yes     Discharge Planner Nurse   Safe discharge environment identified: Yes  Barriers to discharge: No       Entered by: Preston Arango RN 02/12/2023 8:25 PM     Please review provider order for any additional goals.   Nurse to notify provider when observation goals have been met and patient is ready for discharge.Goal Outcome Evaluation:                         yes
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
## 2023-02-13 NOTE — PROGRESS NOTES
Pt A/O, VSS, BP's a little high, Assist of 2 with lift. Denies pain, tolerating reg diet. Incontinent cares done. Will continue to provide cares and monitor

## 2023-02-13 NOTE — PLAN OF CARE
PRIMARY DIAGNOSIS: PLACEMENT  OUTPATIENT/OBSERVATION GOALS TO BE MET BEFORE DISCHARGE:  1. ADLs back to baseline: Yes    2. Activity and level of assistance: A x 2 lift transfer    3. Pain status: Pain free.    4. Return to near baseline physical activity: Yes     Discharge Planner Nurse   Safe discharge environment identified: Yes  Barriers to discharge: No       Entered by: Preston Arango RN 02/13/2023 2:44 AM   Pt is A & O x 4, up w/ A x 2 lift transfer, denies pain, reg diet, SW following with placement.  Please review provider order for any additional goals.   Nurse to notify provider when observation goals have been met and patient is ready for discharge.Goal Outcome Evaluation:

## 2023-02-13 NOTE — PROGRESS NOTES
Pt A/Ox4, breakfast ordered, VSS, assist of 2 with lift transfer. Denies pain. Pt paraplegic, waiting on Placement.

## 2023-02-14 LAB — GLUCOSE BLDC GLUCOMTR-MCNC: 96 MG/DL (ref 70–99)

## 2023-02-14 PROCEDURE — 250N000013 HC RX MED GY IP 250 OP 250 PS 637: Performed by: PHYSICIAN ASSISTANT

## 2023-02-14 PROCEDURE — 250N000013 HC RX MED GY IP 250 OP 250 PS 637: Performed by: HOSPITALIST

## 2023-02-14 PROCEDURE — 99231 SBSQ HOSP IP/OBS SF/LOW 25: CPT | Performed by: PHYSICIAN ASSISTANT

## 2023-02-14 PROCEDURE — 82962 GLUCOSE BLOOD TEST: CPT

## 2023-02-14 PROCEDURE — 250N000013 HC RX MED GY IP 250 OP 250 PS 637: Performed by: NURSE PRACTITIONER

## 2023-02-14 PROCEDURE — G0378 HOSPITAL OBSERVATION PER HR: HCPCS

## 2023-02-14 RX ADMIN — CLOTRIMAZOLE: 0.01 CREAM TOPICAL at 20:22

## 2023-02-14 RX ADMIN — QUETIAPINE 200 MG: 200 TABLET, FILM COATED ORAL at 08:45

## 2023-02-14 RX ADMIN — DIVALPROEX SODIUM 1000 MG: 500 TABLET, DELAYED RELEASE ORAL at 21:47

## 2023-02-14 RX ADMIN — CLONIDINE HYDROCHLORIDE 0.1 MG: 0.1 TABLET ORAL at 20:13

## 2023-02-14 RX ADMIN — Medication 10 MG: at 21:47

## 2023-02-14 RX ADMIN — CLONIDINE HYDROCHLORIDE 0.1 MG: 0.1 TABLET ORAL at 08:46

## 2023-02-14 RX ADMIN — EMPAGLIFLOZIN 10 MG: 10 TABLET, FILM COATED ORAL at 08:46

## 2023-02-14 RX ADMIN — MIRTAZAPINE 15 MG: 15 TABLET, FILM COATED ORAL at 21:47

## 2023-02-14 RX ADMIN — Medication 25 MCG: at 08:46

## 2023-02-14 RX ADMIN — ATORVASTATIN CALCIUM 20 MG: 20 TABLET, FILM COATED ORAL at 20:13

## 2023-02-14 RX ADMIN — VENLAFAXINE HYDROCHLORIDE 150 MG: 150 CAPSULE, EXTENDED RELEASE ORAL at 21:47

## 2023-02-14 RX ADMIN — HYDROXYZINE HYDROCHLORIDE 50 MG: 50 TABLET, FILM COATED ORAL at 13:48

## 2023-02-14 RX ADMIN — HYDROCORTISONE: 1 CREAM TOPICAL at 20:22

## 2023-02-14 RX ADMIN — DIVALPROEX SODIUM 750 MG: 500 TABLET, DELAYED RELEASE ORAL at 08:45

## 2023-02-14 RX ADMIN — METFORMIN HYDROCHLORIDE 850 MG: 850 TABLET, FILM COATED ORAL at 08:54

## 2023-02-14 RX ADMIN — BACLOFEN 10 MG: 10 TABLET ORAL at 13:48

## 2023-02-14 RX ADMIN — HYDROCORTISONE: 1 CREAM TOPICAL at 08:55

## 2023-02-14 RX ADMIN — CLOTRIMAZOLE: 0.01 CREAM TOPICAL at 08:55

## 2023-02-14 RX ADMIN — BACLOFEN 10 MG: 10 TABLET ORAL at 08:45

## 2023-02-14 RX ADMIN — POLYETHYLENE GLYCOL 3350 17 G: 17 POWDER, FOR SOLUTION ORAL at 08:46

## 2023-02-14 RX ADMIN — QUETIAPINE FUMARATE 400 MG: 200 TABLET ORAL at 21:47

## 2023-02-14 RX ADMIN — OLANZAPINE 2.5 MG: 2.5 TABLET, FILM COATED ORAL at 13:48

## 2023-02-14 RX ADMIN — VENLAFAXINE HYDROCHLORIDE 75 MG: 150 CAPSULE, EXTENDED RELEASE ORAL at 21:46

## 2023-02-14 RX ADMIN — METOPROLOL SUCCINATE 50 MG: 50 TABLET, EXTENDED RELEASE ORAL at 08:45

## 2023-02-14 RX ADMIN — QUETIAPINE 200 MG: 200 TABLET, FILM COATED ORAL at 13:48

## 2023-02-14 RX ADMIN — HYDROXYZINE HYDROCHLORIDE 50 MG: 50 TABLET, FILM COATED ORAL at 08:45

## 2023-02-14 RX ADMIN — CLONIDINE HYDROCHLORIDE 0.1 MG: 0.1 TABLET ORAL at 13:48

## 2023-02-14 RX ADMIN — HYDROXYZINE HYDROCHLORIDE 50 MG: 50 TABLET, FILM COATED ORAL at 20:13

## 2023-02-14 RX ADMIN — METFORMIN HYDROCHLORIDE 850 MG: 850 TABLET, FILM COATED ORAL at 18:48

## 2023-02-14 RX ADMIN — LEVOTHYROXINE SODIUM 25 MCG: 0.03 TABLET ORAL at 08:45

## 2023-02-14 RX ADMIN — ASPIRIN 81 MG CHEWABLE TABLET 81 MG: 81 TABLET CHEWABLE at 08:45

## 2023-02-14 RX ADMIN — BACLOFEN 10 MG: 10 TABLET ORAL at 20:13

## 2023-02-14 ASSESSMENT — ACTIVITIES OF DAILY LIVING (ADL)
ADLS_ACUITY_SCORE: 54

## 2023-02-14 NOTE — PROGRESS NOTES
PRIMARY DIAGNOSIS: Placement     OUTPATIENT/OBSERVATION GOALS TO BE MET BEFORE DISCHARGE  1. Orthostatic performed: No     2. Tolerating PO medications: Yes     3. Return to near baseline physical activity: Yes     4. Cleared for discharge by consultants (if involved): Yes     Discharge Planner Nurse   Safe discharge environment identified: No  Barriers to discharge: Yes; placement       Entered by: Vanna Patel RN 02/14/2023 10:51 AM  Please review provider order for any additional goals.   Nurse to notify provider when observation goals have been met and patient is ready for discharge.Goal Outcome Evaluation:     A&O x4; vss on r/a, baseline HTN. Calls for cares. Barrier cream applied to sergei area, blanchable rash/redness to face ointment applied. Calls for cares. SW following for dispo placement.

## 2023-02-14 NOTE — PLAN OF CARE
PRIMARY DIAGNOSIS: Placement  OUTPATIENT/OBSERVATION GOALS TO BE MET BEFORE DISCHARGE:  ADLs back to baseline: Yes    Activity and level of assistance: assist x 2    Pain status: Pain free.    Return to near baseline physical activity: Yes     Discharge Planner Nurse   Safe discharge environment identified: No  Barriers to discharge: Yes       Entered by: Emily Garcia RN 02/14/2023 12:06 AM    Patient resting comfortably in bed, currently denies pain, SW following for placement       Please review provider order for any additional goals.   Nurse to notify provider when observation goals have been met and patient is ready for discharge.
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
## 2023-02-14 NOTE — PROGRESS NOTES
"Care Management Follow Up    Expected Discharge Date: 02/28/2023     Concerns to be Addressed: Discharge planning      Patient plan of care discussed at interdisciplinary rounds: Yes    Anticipated Discharge Disposition:  Group Home once placement found    Additional Information:  SW received email update from pt's relocation worker Misty:     \"Currently [facility] has an opening in [place] and want to have a face to face meeting with Chris and are willing to come to him at the hospital for that meeting so I had sent them your information to help set that meeting up.     Also [facility] has an opening in [place] and they want to set a tour with Chris. (Misty will be assisting with arranging a virtual tour with patient.)     There is also [facility] that accepted the initial referral and asked for more information so I sent them his CSP and assessment report and the last hospital notes you sent me awhile back. It has been about a week since I sent that so I asked them for a status update again today.    [facility] sent out an email this week about openings so I sent a referral for Chris today.     All other places on the tracking sheet have been denied.\"    ANALY received email from pt's [employer] : \"I ve learned that [facility] (unfortunately) will not be re-opening his CADI waiver since [facility] remains his County of Financial Responsibility. I ve forwarded all of his assessment documents to [place] for review and approval of waiver services upon his finding placement.\" She attached a Notice of Action and CSP/CSSP, SW will provide copy to patient.     SW will continue to follow.     DANITA Obrien, [facility]   Inpatient Care Coordination  [facility]   [phone]        "

## 2023-02-14 NOTE — PLAN OF CARE

## 2023-02-14 NOTE — PROGRESS NOTES
PRIMARY DIAGNOSIS: Placement     OUTPATIENT/OBSERVATION GOALS TO BE MET BEFORE DISCHARGE  1. Orthostatic performed: No     2. Tolerating PO medications: Yes     3. Return to near baseline physical activity: Yes     4. Cleared for discharge by consultants (if involved): Yes     Discharge Planner Nurse   Safe discharge environment identified: No  Barriers to discharge: Yes; placement       Entered by: Vanna Patel RN 02/14/2023 1520  Please review provider order for any additional goals.   Nurse to notify provider when observation goals have been met and patient is ready for discharge.Goal Outcome Evaluation:     A&O x4; vss on r/a, baseline HTN. Calls for cares. Barrier cream applied to sergei area, blanchable rash/redness to face.  Calls for cares. SW following for dispo placement.

## 2023-02-15 LAB — GLUCOSE BLDC GLUCOMTR-MCNC: 137 MG/DL (ref 70–99)

## 2023-02-15 PROCEDURE — 99231 SBSQ HOSP IP/OBS SF/LOW 25: CPT | Performed by: PHYSICIAN ASSISTANT

## 2023-02-15 PROCEDURE — 250N000013 HC RX MED GY IP 250 OP 250 PS 637: Performed by: HOSPITALIST

## 2023-02-15 PROCEDURE — 82962 GLUCOSE BLOOD TEST: CPT

## 2023-02-15 PROCEDURE — G0378 HOSPITAL OBSERVATION PER HR: HCPCS

## 2023-02-15 PROCEDURE — 250N000013 HC RX MED GY IP 250 OP 250 PS 637: Performed by: NURSE PRACTITIONER

## 2023-02-15 PROCEDURE — 250N000013 HC RX MED GY IP 250 OP 250 PS 637: Performed by: PHYSICIAN ASSISTANT

## 2023-02-15 RX ORDER — CARBOXYMETHYLCELLULOSE SODIUM 5 MG/ML
1 SOLUTION/ DROPS OPHTHALMIC 4 TIMES DAILY
Status: DISCONTINUED | OUTPATIENT
Start: 2023-02-15 | End: 2023-02-16

## 2023-02-15 RX ORDER — KETOCONAZOLE 20 MG/G
CREAM TOPICAL 2 TIMES DAILY
Status: DISCONTINUED | OUTPATIENT
Start: 2023-02-15 | End: 2023-04-07 | Stop reason: HOSPADM

## 2023-02-15 RX ORDER — CARBOXYMETHYLCELLULOSE SODIUM 5 MG/ML
1 SOLUTION/ DROPS OPHTHALMIC 3 TIMES DAILY
Status: DISCONTINUED | OUTPATIENT
Start: 2023-02-15 | End: 2023-02-15

## 2023-02-15 RX ORDER — CARBOXYMETHYLCELLULOSE SODIUM 5 MG/ML
1 SOLUTION/ DROPS OPHTHALMIC 4 TIMES DAILY PRN
Status: DISCONTINUED | OUTPATIENT
Start: 2023-02-15 | End: 2023-02-15

## 2023-02-15 RX ADMIN — BACLOFEN 10 MG: 10 TABLET ORAL at 15:12

## 2023-02-15 RX ADMIN — CLONIDINE HYDROCHLORIDE 0.1 MG: 0.1 TABLET ORAL at 21:13

## 2023-02-15 RX ADMIN — ASPIRIN 81 MG CHEWABLE TABLET 81 MG: 81 TABLET CHEWABLE at 08:52

## 2023-02-15 RX ADMIN — CARBOXYMETHYLCELLULOSE SODIUM 1 DROP: 5 SOLUTION/ DROPS OPHTHALMIC at 21:13

## 2023-02-15 RX ADMIN — HYDROCORTISONE: 1 CREAM TOPICAL at 21:24

## 2023-02-15 RX ADMIN — HYDROXYZINE HYDROCHLORIDE 50 MG: 50 TABLET, FILM COATED ORAL at 21:12

## 2023-02-15 RX ADMIN — CARBOXYMETHYLCELLULOSE SODIUM 1 DROP: 5 SOLUTION/ DROPS OPHTHALMIC at 15:16

## 2023-02-15 RX ADMIN — Medication 25 MCG: at 08:53

## 2023-02-15 RX ADMIN — HYDROXYZINE HYDROCHLORIDE 50 MG: 50 TABLET, FILM COATED ORAL at 08:52

## 2023-02-15 RX ADMIN — CLONIDINE HYDROCHLORIDE 0.1 MG: 0.1 TABLET ORAL at 08:54

## 2023-02-15 RX ADMIN — METFORMIN HYDROCHLORIDE 850 MG: 850 TABLET, FILM COATED ORAL at 18:06

## 2023-02-15 RX ADMIN — METOPROLOL SUCCINATE 50 MG: 50 TABLET, EXTENDED RELEASE ORAL at 08:53

## 2023-02-15 RX ADMIN — OLANZAPINE 2.5 MG: 2.5 TABLET, FILM COATED ORAL at 15:12

## 2023-02-15 RX ADMIN — BACLOFEN 10 MG: 10 TABLET ORAL at 21:13

## 2023-02-15 RX ADMIN — QUETIAPINE 200 MG: 200 TABLET, FILM COATED ORAL at 15:12

## 2023-02-15 RX ADMIN — QUETIAPINE 200 MG: 200 TABLET, FILM COATED ORAL at 08:51

## 2023-02-15 RX ADMIN — POLYETHYLENE GLYCOL 3350 17 G: 17 POWDER, FOR SOLUTION ORAL at 08:53

## 2023-02-15 RX ADMIN — METFORMIN HYDROCHLORIDE 850 MG: 850 TABLET, FILM COATED ORAL at 08:53

## 2023-02-15 RX ADMIN — DIVALPROEX SODIUM 1000 MG: 500 TABLET, DELAYED RELEASE ORAL at 21:12

## 2023-02-15 RX ADMIN — Medication 10 MG: at 21:12

## 2023-02-15 RX ADMIN — VENLAFAXINE HYDROCHLORIDE 75 MG: 150 CAPSULE, EXTENDED RELEASE ORAL at 21:14

## 2023-02-15 RX ADMIN — KETOCONAZOLE: 20 CREAM TOPICAL at 21:24

## 2023-02-15 RX ADMIN — BACLOFEN 10 MG: 10 TABLET ORAL at 08:51

## 2023-02-15 RX ADMIN — CLOTRIMAZOLE: 0.01 CREAM TOPICAL at 21:24

## 2023-02-15 RX ADMIN — VENLAFAXINE HYDROCHLORIDE 150 MG: 150 CAPSULE, EXTENDED RELEASE ORAL at 21:12

## 2023-02-15 RX ADMIN — QUETIAPINE FUMARATE 400 MG: 200 TABLET ORAL at 21:12

## 2023-02-15 RX ADMIN — HYDROCORTISONE: 1 CREAM TOPICAL at 15:18

## 2023-02-15 RX ADMIN — EMPAGLIFLOZIN 10 MG: 10 TABLET, FILM COATED ORAL at 08:54

## 2023-02-15 RX ADMIN — CLOTRIMAZOLE: 0.01 CREAM TOPICAL at 15:18

## 2023-02-15 RX ADMIN — ATORVASTATIN CALCIUM 20 MG: 20 TABLET, FILM COATED ORAL at 21:13

## 2023-02-15 RX ADMIN — HYDROXYZINE HYDROCHLORIDE 50 MG: 50 TABLET, FILM COATED ORAL at 15:12

## 2023-02-15 RX ADMIN — MIRTAZAPINE 15 MG: 15 TABLET, FILM COATED ORAL at 21:13

## 2023-02-15 RX ADMIN — LEVOTHYROXINE SODIUM 25 MCG: 0.03 TABLET ORAL at 08:52

## 2023-02-15 RX ADMIN — CLONIDINE HYDROCHLORIDE 0.1 MG: 0.1 TABLET ORAL at 15:12

## 2023-02-15 RX ADMIN — DIVALPROEX SODIUM 750 MG: 500 TABLET, DELAYED RELEASE ORAL at 08:51

## 2023-02-15 ASSESSMENT — ACTIVITIES OF DAILY LIVING (ADL)
ADLS_ACUITY_SCORE: 54
ADLS_ACUITY_SCORE: 54
ADLS_ACUITY_SCORE: 52
ADLS_ACUITY_SCORE: 54
ADLS_ACUITY_SCORE: 52
ADLS_ACUITY_SCORE: 56
ADLS_ACUITY_SCORE: 52

## 2023-02-15 NOTE — PLAN OF CARE
PRIMARY DIAGNOSIS: Placement  OUTPATIENT/OBSERVATION GOALS TO BE MET BEFORE DISCHARGE:  ADLs back to baseline: Yes    Activity and level of assistance: assist x 2    Pain status: Pain free.    Return to near baseline physical activity: Yes     Discharge Planner Nurse   Safe discharge environment identified: No  Barriers to discharge: Yes       Entered by: Emily Garcia RN 02/15/2023 4:35 AM    Patient is currently sleeping, SW following for placement       Please review provider order for any additional goals.   Nurse to notify provider when observation goals have been met and patient is ready for discharge.
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
## 2023-02-15 NOTE — PLAN OF CARE
PRIMARY DIAGNOSIS: Placement  OUTPATIENT/OBSERVATION GOALS TO BE MET BEFORE DISCHARGE:  1. ADLs back to baseline: Yes    2. Activity and level of assistance: Up with maximum assistance. Consider SW and/or PT evaluation.     3. Pain status: Pain free.    4. Return to near baseline physical activity: Yes     Discharge Planner Nurse   Safe discharge environment identified: No  Barriers to discharge: Yes       Entered by: Laura Dover RN 02/15/2023 10:38 AM     Please review provider order for any additional goals.   Nurse to notify provider when observation goals have been met and patient is ready for discharge.Goal Outcome Evaluation:  Eye gtt given to right eye for eye redness.

## 2023-02-15 NOTE — PLAN OF CARE
PRIMARY DIAGNOSIS: PLACEMENT  OUTPATIENT/OBSERVATION GOALS TO BE MET BEFORE DISCHARGE:  ADLs back to baseline: Yes     Activity and level of assistance: Up with maximum assistance. He requires 2 people assist and transfers with lift device.     Pain status: Pain free.     Return to near baseline physical activity: Yes          Discharge Planner Nurse   Safe discharge environment identified: No  Barriers to discharge: Yes  Entered by: Ana Castro RN 02/14/2023      /82 (BP Location: Left arm, Cuff Size: Adult Regular)   Pulse 76   Temp 97.9  F (36.6  C) (Oral)   Resp 18   Wt 99.9 kg (220 lb 4.8 oz)   SpO2 95%       Pt  A & O X 4. On regular diet, tolerated well. Incontinent of both bowel and bladder. Denies any pain. Assist of one with cares and 2 with lift device transfer to chair. Both heels off loaded off the bed with pillows. Son visited this evening. Plan: SW following for placement. Will monitor.      Please review provider order for any additional goals.   Nurse to notify provider when observation goals have been met and patient is ready for discharge.

## 2023-02-15 NOTE — PROGRESS NOTES
Care Management Follow Up    Expected Discharge Date: 02/28/2023     Concerns to be Addressed: Discharge planning      Patient plan of care discussed at interdisciplinary rounds: Yes    Anticipated Discharge Disposition:  Group Home once placement found by relocation worker    Additional Information:  Awaiting further updates from relocation worker re: pending referrals.     ANALY was made aware of GH availability at [facility] in [place]. Initial referral faxed to (f) [phone]. They will review and return call to ANALY.     ANALY will continue to follow.     DANITA Obrien, [facility]   Inpatient Care Coordination  [facility]   [phone]

## 2023-02-15 NOTE — PLAN OF CARE
PRIMARY DIAGNOSIS: Placement  OUTPATIENT/OBSERVATION GOALS TO BE MET BEFORE DISCHARGE:  ADLs back to baseline: Yes    Activity and level of assistance: assist x 2    Pain status: Improved but still requiring IV narcotics.    Return to near baseline physical activity: Yes     Discharge Planner Nurse   Safe discharge environment identified: No  Barriers to discharge: Yes       Entered by: Emily Garcia RN 02/15/2023 1:02 AM    Patient resting comfortably in bed, currently denies pain, SW following for placement       Please review provider order for any additional goals.   Nurse to notify provider when observation goals have been met and patient is ready for discharge.

## 2023-02-16 LAB — GLUCOSE BLDC GLUCOMTR-MCNC: 100 MG/DL (ref 70–99)

## 2023-02-16 PROCEDURE — 250N000013 HC RX MED GY IP 250 OP 250 PS 637: Performed by: NURSE PRACTITIONER

## 2023-02-16 PROCEDURE — 250N000013 HC RX MED GY IP 250 OP 250 PS 637: Performed by: PHYSICIAN ASSISTANT

## 2023-02-16 PROCEDURE — 82962 GLUCOSE BLOOD TEST: CPT

## 2023-02-16 PROCEDURE — 250N000013 HC RX MED GY IP 250 OP 250 PS 637: Performed by: HOSPITALIST

## 2023-02-16 PROCEDURE — G0378 HOSPITAL OBSERVATION PER HR: HCPCS

## 2023-02-16 PROCEDURE — 99231 SBSQ HOSP IP/OBS SF/LOW 25: CPT | Performed by: PHYSICIAN ASSISTANT

## 2023-02-16 RX ORDER — CARBOXYMETHYLCELLULOSE SODIUM 5 MG/ML
1 SOLUTION/ DROPS OPHTHALMIC 4 TIMES DAILY
Status: DISCONTINUED | OUTPATIENT
Start: 2023-02-16 | End: 2023-04-07 | Stop reason: HOSPADM

## 2023-02-16 RX ORDER — DESONIDE 0.5 MG/G
CREAM TOPICAL DAILY
Status: DISCONTINUED | OUTPATIENT
Start: 2023-02-16 | End: 2023-03-30

## 2023-02-16 RX ORDER — DESONIDE 0.5 MG/G
OINTMENT TOPICAL DAILY
Status: DISCONTINUED | OUTPATIENT
Start: 2023-02-16 | End: 2023-02-16

## 2023-02-16 RX ADMIN — MIRTAZAPINE 15 MG: 15 TABLET, FILM COATED ORAL at 21:06

## 2023-02-16 RX ADMIN — VENLAFAXINE HYDROCHLORIDE 75 MG: 150 CAPSULE, EXTENDED RELEASE ORAL at 21:05

## 2023-02-16 RX ADMIN — DIVALPROEX SODIUM 750 MG: 500 TABLET, DELAYED RELEASE ORAL at 08:47

## 2023-02-16 RX ADMIN — METFORMIN HYDROCHLORIDE 850 MG: 850 TABLET, FILM COATED ORAL at 08:48

## 2023-02-16 RX ADMIN — HYDROXYZINE HYDROCHLORIDE 50 MG: 50 TABLET, FILM COATED ORAL at 14:45

## 2023-02-16 RX ADMIN — ASPIRIN 81 MG CHEWABLE TABLET 81 MG: 81 TABLET CHEWABLE at 08:47

## 2023-02-16 RX ADMIN — KETOCONAZOLE: 20 CREAM TOPICAL at 21:05

## 2023-02-16 RX ADMIN — CLONIDINE HYDROCHLORIDE 0.1 MG: 0.1 TABLET ORAL at 21:04

## 2023-02-16 RX ADMIN — ATORVASTATIN CALCIUM 20 MG: 20 TABLET, FILM COATED ORAL at 21:04

## 2023-02-16 RX ADMIN — HYDROXYZINE HYDROCHLORIDE 50 MG: 50 TABLET, FILM COATED ORAL at 21:04

## 2023-02-16 RX ADMIN — QUETIAPINE FUMARATE 400 MG: 200 TABLET ORAL at 21:02

## 2023-02-16 RX ADMIN — LEVOTHYROXINE SODIUM 25 MCG: 0.03 TABLET ORAL at 08:47

## 2023-02-16 RX ADMIN — CARBOXYMETHYLCELLULOSE SODIUM 1 DROP: 0.5 SOLUTION/ DROPS OPHTHALMIC at 17:31

## 2023-02-16 RX ADMIN — VENLAFAXINE HYDROCHLORIDE 150 MG: 150 CAPSULE, EXTENDED RELEASE ORAL at 21:03

## 2023-02-16 RX ADMIN — METOPROLOL SUCCINATE 50 MG: 50 TABLET, EXTENDED RELEASE ORAL at 08:47

## 2023-02-16 RX ADMIN — CLONIDINE HYDROCHLORIDE 0.1 MG: 0.1 TABLET ORAL at 08:47

## 2023-02-16 RX ADMIN — CARBOXYMETHYLCELLULOSE SODIUM 1 DROP: 0.5 SOLUTION/ DROPS OPHTHALMIC at 21:03

## 2023-02-16 RX ADMIN — CLONIDINE HYDROCHLORIDE 0.1 MG: 0.1 TABLET ORAL at 14:45

## 2023-02-16 RX ADMIN — CLOTRIMAZOLE: 0.01 CREAM TOPICAL at 08:53

## 2023-02-16 RX ADMIN — HYDROCORTISONE: 1 CREAM TOPICAL at 21:06

## 2023-02-16 RX ADMIN — CARBOXYMETHYLCELLULOSE SODIUM 1 DROP: 0.5 SOLUTION/ DROPS OPHTHALMIC at 14:45

## 2023-02-16 RX ADMIN — QUETIAPINE 200 MG: 200 TABLET, FILM COATED ORAL at 08:48

## 2023-02-16 RX ADMIN — DESONIDE: 0.5 CREAM TOPICAL at 15:40

## 2023-02-16 RX ADMIN — BACLOFEN 10 MG: 10 TABLET ORAL at 08:47

## 2023-02-16 RX ADMIN — HYDROXYZINE HYDROCHLORIDE 50 MG: 50 TABLET, FILM COATED ORAL at 08:47

## 2023-02-16 RX ADMIN — EMPAGLIFLOZIN 10 MG: 10 TABLET, FILM COATED ORAL at 08:48

## 2023-02-16 RX ADMIN — DIVALPROEX SODIUM 1000 MG: 500 TABLET, DELAYED RELEASE ORAL at 21:03

## 2023-02-16 RX ADMIN — CLOTRIMAZOLE: 0.01 CREAM TOPICAL at 21:05

## 2023-02-16 RX ADMIN — KETOCONAZOLE: 20 CREAM TOPICAL at 08:54

## 2023-02-16 RX ADMIN — QUETIAPINE 200 MG: 200 TABLET, FILM COATED ORAL at 14:45

## 2023-02-16 RX ADMIN — Medication 25 MCG: at 08:47

## 2023-02-16 RX ADMIN — SENNOSIDES AND DOCUSATE SODIUM 1 TABLET: 50; 8.6 TABLET ORAL at 21:03

## 2023-02-16 RX ADMIN — POLYETHYLENE GLYCOL 3350 17 G: 17 POWDER, FOR SOLUTION ORAL at 08:52

## 2023-02-16 RX ADMIN — Medication 10 MG: at 21:03

## 2023-02-16 RX ADMIN — OLANZAPINE 2.5 MG: 2.5 TABLET, FILM COATED ORAL at 14:45

## 2023-02-16 RX ADMIN — BACLOFEN 10 MG: 10 TABLET ORAL at 21:03

## 2023-02-16 RX ADMIN — CARBOXYMETHYLCELLULOSE SODIUM 1 DROP: 5 SOLUTION/ DROPS OPHTHALMIC at 08:46

## 2023-02-16 RX ADMIN — METFORMIN HYDROCHLORIDE 850 MG: 850 TABLET, FILM COATED ORAL at 17:31

## 2023-02-16 RX ADMIN — BACLOFEN 10 MG: 10 TABLET ORAL at 14:45

## 2023-02-16 ASSESSMENT — ACTIVITIES OF DAILY LIVING (ADL)
ADLS_ACUITY_SCORE: 52
ADLS_ACUITY_SCORE: 54
ADLS_ACUITY_SCORE: 54
ADLS_ACUITY_SCORE: 52
ADLS_ACUITY_SCORE: 56
ADLS_ACUITY_SCORE: 56
ADLS_ACUITY_SCORE: 54
ADLS_ACUITY_SCORE: 52
ADLS_ACUITY_SCORE: 54

## 2023-02-16 NOTE — PLAN OF CARE
PRIMARY DIAGNOSIS: Placement   OUTPATIENT/OBSERVATION GOALS TO BE MET BEFORE DISCHARGE:  1. ADLs back to baseline: Yes    2. Activity and level of assistance: Up with maximum assistance. Not OOB.    3. Pain status: Pain free.    4. Return to near baseline physical activity: Yes     Discharge Planner Nurse   Safe discharge environment identified: No  Barriers to discharge: Yes       Entered by: Hria Fraire RN 02/16/2023     A/O x4. VSS on RA. Assist of 2, lift. Tolerating regular diet. Lung sounds clear. Bowel sounds active. 2/13 LBM. Adequate urine output via briefs. Skin rash on face, redness in right eye. Denies pain. Denies nausea.     Please review provider order for any additional goals.   Nurse to notify provider when observation goals have been met and patient is ready for discharge.

## 2023-02-16 NOTE — PROGRESS NOTES
Care Management Follow Up    Expected Discharge Date: 02/28/2023     Concerns to be Addressed: Discharge planning      Patient plan of care discussed at interdisciplinary rounds: Yes    Anticipated Discharge Disposition:  GH once placement found by relocation worker    Patient/Family in Agreement with the Plan:  Yes    Referrals Placed by CM/SW:  Skilled Nursing Facility, Group Home  Private pay costs discussed: Not applicable    Additional Information:  ANALY sent follow up email to pt's relocation worker Misty requesting contact information for [facility] as they have not yet reached out to this writer to coordinate an assessment.     SW received phone call today from Michele at "[facility] in [place]. She reports that she will forward clinical info on to her nurses for review and then follow up for in person assessment if they can clinically meet his needs.      ANALY updated pt on the above information/pending referrals.     ANALY will continue to follow.     1510: ANALY received contact information for [facility]: [email or web address], [phone]. ANALY emailed Edith to coordinate an assessment at the hospital. Awaiting response.     1530: "[facility] GH has declined patient after reviewing referral.     1535: Relocation worker following up with Vivi to proceed with phone/virtual tour of their GH.     DANITA Obrien, [facility]   Inpatient Care Coordination  [facility]   [phone]
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
## 2023-02-16 NOTE — PLAN OF CARE
PRIMARY DIAGNOSIS: PLACEMENT  OUTPATIENT/OBSERVATION GOALS TO BE MET BEFORE DISCHARGE:  1. ADLs back to baseline: Yes    2. Activity and level of assistance: Up with maximum assistance. Consider SW and/or PT evaluation.     3. Pain status: Pain free.    4. Return to near baseline physical activity: Yes     Discharge Planner Nurse   Safe discharge environment identified: No  Barriers to discharge: Yes       Entered by: Naomy Buckley RN 02/16/2023 5:43 AM   Pt is alert and oriented x4. He denies pain. Total assistance with cares. Positive interaction with patient. SW following for placement  Please review provider order for any additional goals.   Nurse to notify provider when observation goals have been met and patient is ready for discharge.

## 2023-02-16 NOTE — PLAN OF CARE
PRIMARY DIAGNOSIS: Placement   OUTPATIENT/OBSERVATION GOALS TO BE MET BEFORE DISCHARGE:  1. ADLs back to baseline: Yes    2. Activity and level of assistance: Up with maximum assistance. Not OOB.    3. Pain status: Pain free.    4. Return to near baseline physical activity: Yes     Discharge Planner Nurse   Safe discharge environment identified: No  Barriers to discharge: Yes       Entered by: Hira Fraire RN 02/16/2023     A/O x4. VSS on RA. Assist of 2, lift. Tolerating regular diet. Lung sounds clear. Bowel sounds active. 2/13 LBM. Adequate urine output via briefs. Skin rash on face, redness in right eye. Denies pain. Denies nausea.     Please review provider order for any additional goals.   Nurse to notify provider when observation goals have been met and patient is ready for discharge.

## 2023-02-16 NOTE — PLAN OF CARE
PRIMARY DIAGNOSIS: Placement   OUTPATIENT/OBSERVATION GOALS TO BE MET BEFORE DISCHARGE:  1. ADLs back to baseline: Yes    2. Activity and level of assistance: Up with maximum assistance. Not OOB.    3. Pain status: Pain free.    4. Return to near baseline physical activity: Yes     Discharge Planner Nurse   Safe discharge environment identified: No  Barriers to discharge: Yes       Entered by: Hira Fraire RN 02/16/2023     A/O x4. VSS on RA. Assist of 2, lift. Tolerating regular diet. Lung sounds clear. Bowel sounds active. 2/13 LBM. Adequate urine output via briefs. Skin rash on face, redness in right eye. Ointment applies, drops administered. Denies pain. Denies nausea.     Please review provider order for any additional goals.   Nurse to notify provider when observation goals have been met and patient is ready for discharge.

## 2023-02-16 NOTE — PLAN OF CARE
PRIMARY DIAGNOSIS: PLACEMENT  OUTPATIENT/OBSERVATION GOALS TO BE MET BEFORE DISCHARGE:  1. ADLs back to baseline: Yes    2. Activity and level of assistance: Up with maximum assistance. Consider SW and/or PT evaluation.     3. Pain status: Pain free.    4. Return to near baseline physical activity: Yes     Discharge Planner Nurse   Safe discharge environment identified: No  Barriers to discharge: Yes       Entered by: Naomy Buckley RN 02/15/2023 10:58 PM     Please review provider order for any additional goals.   Nurse to notify provider when observation goals have been met and patient is ready for discharge.

## 2023-02-16 NOTE — PLAN OF CARE
PRIMARY DIAGNOSIS: PLACEMENT  OUTPATIENT/OBSERVATION GOALS TO BE MET BEFORE DISCHARGE:  1. ADLs back to baseline: Yes    2. Activity and level of assistance: Up with maximum assistance. Consider SW and/or PT evaluation.     3. Pain status: Pain free.    4. Return to near baseline physical activity: Yes     Discharge Planner Nurse   Safe discharge environment identified: No  Barriers to discharge: Yes       Entered by: Naomy Buckley RN 02/16/2023 1:53 AM   Pt is alert and oriented x4. He denies pain. Total assistance with cares. Positive interaction with patient. SW following for placement  Please review provider order for any additional goals.   Nurse to notify provider when observation goals have been met and patient is ready for discharge.

## 2023-02-17 PROCEDURE — 250N000013 HC RX MED GY IP 250 OP 250 PS 637: Performed by: NURSE PRACTITIONER

## 2023-02-17 PROCEDURE — 250N000013 HC RX MED GY IP 250 OP 250 PS 637: Performed by: HOSPITALIST

## 2023-02-17 PROCEDURE — 99231 SBSQ HOSP IP/OBS SF/LOW 25: CPT | Performed by: PHYSICIAN ASSISTANT

## 2023-02-17 PROCEDURE — 250N000013 HC RX MED GY IP 250 OP 250 PS 637: Performed by: PHYSICIAN ASSISTANT

## 2023-02-17 PROCEDURE — G0378 HOSPITAL OBSERVATION PER HR: HCPCS

## 2023-02-17 PROCEDURE — 250N000013 HC RX MED GY IP 250 OP 250 PS 637: Performed by: INTERNAL MEDICINE

## 2023-02-17 RX ADMIN — BACLOFEN 10 MG: 10 TABLET ORAL at 19:23

## 2023-02-17 RX ADMIN — Medication 10 MG: at 21:13

## 2023-02-17 RX ADMIN — POLYETHYLENE GLYCOL 3350 17 G: 17 POWDER, FOR SOLUTION ORAL at 08:00

## 2023-02-17 RX ADMIN — KETOCONAZOLE: 20 CREAM TOPICAL at 12:37

## 2023-02-17 RX ADMIN — MIRTAZAPINE 15 MG: 15 TABLET, FILM COATED ORAL at 21:12

## 2023-02-17 RX ADMIN — VENLAFAXINE HYDROCHLORIDE 75 MG: 150 CAPSULE, EXTENDED RELEASE ORAL at 21:14

## 2023-02-17 RX ADMIN — Medication 25 MCG: at 08:00

## 2023-02-17 RX ADMIN — BACLOFEN 10 MG: 10 TABLET ORAL at 14:21

## 2023-02-17 RX ADMIN — OLANZAPINE 2.5 MG: 2.5 TABLET, FILM COATED ORAL at 14:20

## 2023-02-17 RX ADMIN — CARBOXYMETHYLCELLULOSE SODIUM 1 DROP: 0.5 SOLUTION/ DROPS OPHTHALMIC at 17:09

## 2023-02-17 RX ADMIN — CLOTRIMAZOLE: 0.01 CREAM TOPICAL at 12:41

## 2023-02-17 RX ADMIN — VENLAFAXINE HYDROCHLORIDE 225 MG: 150 CAPSULE, EXTENDED RELEASE ORAL at 21:13

## 2023-02-17 RX ADMIN — BACLOFEN 10 MG: 10 TABLET ORAL at 08:01

## 2023-02-17 RX ADMIN — HYDROCORTISONE: 1 CREAM TOPICAL at 19:26

## 2023-02-17 RX ADMIN — ATORVASTATIN CALCIUM 20 MG: 20 TABLET, FILM COATED ORAL at 19:22

## 2023-02-17 RX ADMIN — QUETIAPINE 200 MG: 200 TABLET, FILM COATED ORAL at 14:20

## 2023-02-17 RX ADMIN — HYDROXYZINE HYDROCHLORIDE 50 MG: 50 TABLET, FILM COATED ORAL at 19:23

## 2023-02-17 RX ADMIN — KETOCONAZOLE: 20 CREAM TOPICAL at 19:27

## 2023-02-17 RX ADMIN — QUETIAPINE FUMARATE 400 MG: 200 TABLET ORAL at 21:12

## 2023-02-17 RX ADMIN — DIVALPROEX SODIUM 1000 MG: 500 TABLET, DELAYED RELEASE ORAL at 21:12

## 2023-02-17 RX ADMIN — QUETIAPINE 200 MG: 200 TABLET, FILM COATED ORAL at 08:01

## 2023-02-17 RX ADMIN — DESONIDE: 0.5 CREAM TOPICAL at 12:38

## 2023-02-17 RX ADMIN — CARBOXYMETHYLCELLULOSE SODIUM 1 DROP: 0.5 SOLUTION/ DROPS OPHTHALMIC at 12:38

## 2023-02-17 RX ADMIN — CARBOXYMETHYLCELLULOSE SODIUM 1 DROP: 0.5 SOLUTION/ DROPS OPHTHALMIC at 08:00

## 2023-02-17 RX ADMIN — CLOTRIMAZOLE: 0.01 CREAM TOPICAL at 19:27

## 2023-02-17 RX ADMIN — HYDROXYZINE HYDROCHLORIDE 50 MG: 50 TABLET, FILM COATED ORAL at 14:20

## 2023-02-17 RX ADMIN — DIVALPROEX SODIUM 750 MG: 500 TABLET, DELAYED RELEASE ORAL at 08:00

## 2023-02-17 RX ADMIN — ACETAMINOPHEN 650 MG: 325 TABLET, FILM COATED ORAL at 23:35

## 2023-02-17 RX ADMIN — LEVOTHYROXINE SODIUM 25 MCG: 0.03 TABLET ORAL at 08:01

## 2023-02-17 RX ADMIN — METFORMIN HYDROCHLORIDE 850 MG: 850 TABLET, FILM COATED ORAL at 08:00

## 2023-02-17 RX ADMIN — CLONIDINE HYDROCHLORIDE 0.1 MG: 0.1 TABLET ORAL at 08:00

## 2023-02-17 RX ADMIN — EMPAGLIFLOZIN 10 MG: 10 TABLET, FILM COATED ORAL at 08:01

## 2023-02-17 RX ADMIN — CARBOXYMETHYLCELLULOSE SODIUM 1 DROP: 0.5 SOLUTION/ DROPS OPHTHALMIC at 19:23

## 2023-02-17 RX ADMIN — METFORMIN HYDROCHLORIDE 850 MG: 850 TABLET, FILM COATED ORAL at 17:09

## 2023-02-17 RX ADMIN — METOPROLOL SUCCINATE 50 MG: 50 TABLET, EXTENDED RELEASE ORAL at 08:01

## 2023-02-17 RX ADMIN — CLONIDINE HYDROCHLORIDE 0.1 MG: 0.1 TABLET ORAL at 14:20

## 2023-02-17 RX ADMIN — CLONIDINE HYDROCHLORIDE 0.1 MG: 0.1 TABLET ORAL at 19:23

## 2023-02-17 RX ADMIN — ASPIRIN 81 MG CHEWABLE TABLET 81 MG: 81 TABLET CHEWABLE at 08:01

## 2023-02-17 RX ADMIN — HYDROXYZINE HYDROCHLORIDE 50 MG: 50 TABLET, FILM COATED ORAL at 08:01

## 2023-02-17 ASSESSMENT — ACTIVITIES OF DAILY LIVING (ADL)
ADLS_ACUITY_SCORE: 54
ADLS_ACUITY_SCORE: 52
ADLS_ACUITY_SCORE: 54
ADLS_ACUITY_SCORE: 52
ADLS_ACUITY_SCORE: 54
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
## 2023-02-17 NOTE — PLAN OF CARE
PRIMARY DIAGNOSIS: Placement  OBSERVATION GOALS TO BE MET BEFORE DISCHARGE:  1. ADLs back to baseline: Yes     2. Activity and level of assistance: Up with maximum assistance/lift      3. Pain status: Pain free.     4. Return to near baseline physical activity: Yes          Discharge Planner Nurse   Safe discharge environment identified: No  Barriers to discharge: Yes        Patient A & O x4, Lung sounds CTA, bowel sounds active. VSS, denies pain, N/V, SOB or chest pain. Turned and repositioned as needed. Pt is A 2 using lift, now relaxing in bed playing with his phone. Tolerating regular diet and oral meds. No PIV. Medically cleared, SW following. Waiting for placement.    /81 (BP Location: Left arm)   Pulse 79   Temp 98.7  F (37.1  C) (Oral)   Resp 16   Wt 99.9 kg (220 lb 4.8 oz)   SpO2 97%      Please review provider order for any additional goals.   Nurse to notify provider when observation goals have been met and patient is ready for discharge.

## 2023-02-17 NOTE — PLAN OF CARE
PRIMARY DIAGNOSIS: PLACEMENT  OUTPATIENT/OBSERVATION GOALS TO BE MET BEFORE DISCHARGE:  1. ADLs back to baseline: Yes    2. Activity and level of assistance: Up with maximum assistance. Consider SW and/or PT evaluation.     3. Pain status: Pain free.    4. Return to near baseline physical activity: Yes     Discharge Planner Nurse   Safe discharge environment identified: No  Barriers to discharge: Yes       Entered by: Naomy Buckley RN 02/17/2023 4:42 AM  Blood pressure 115/82, pulse 84, temperature 98  F (36.7  C), temperature source Oral, resp. rate 18, weight 99.9 kg (220 lb 4.8 oz), SpO2 95 %.     Pt is alert and oriented x4. He denies pain. Total assistance with cares. Positive interaction with patient. Given PRN Senna (Last BM was 2/13/23). SW following for placement  Please review provider order for any additional goals.   Nurse to notify provider when observation goals have been met and patient is ready for discharge.

## 2023-02-17 NOTE — PLAN OF CARE
PRIMARY DIAGNOSIS: PLACEMENT  OUTPATIENT/OBSERVATION GOALS TO BE MET BEFORE DISCHARGE:  1. ADLs back to baseline: Yes    2. Activity and level of assistance: Up with maximum assistance. .     3. Pain status: Pain free.    4. Return to near baseline physical activity: Yes     Discharge Planner Nurse   Safe discharge environment identified: No  Barriers to discharge: Yes       Entered by: Luciana Johnson RN 02/17/2023 12:57 PM   Pt mostly resting in bed.  Pillows in use to raise heels.  Compliant with meds.  Makes needs known.    Please review provider order for any additional goals.   Nurse to notify provider when observation goals have been met and patient is ready for discharge.Goal Outcome Evaluation:

## 2023-02-17 NOTE — PLAN OF CARE
PRIMARY DIAGNOSIS: PLACEMENT  OUTPATIENT/OBSERVATION GOALS TO BE MET BEFORE DISCHARGE:  1. ADLs back to baseline: Yes    2. Activity and level of assistance: Up with maximum assistance. Consider SW and/or PT evaluation.     3. Pain status: Pain free.    4. Return to near baseline physical activity: Yes     Discharge Planner Nurse   Safe discharge environment identified: No  Barriers to discharge: Yes       Entered by: Naomy Buckley RN 02/16/2023 10:32 PM  Blood pressure 115/82, pulse 84, temperature 98  F (36.7  C), temperature source Oral, resp. rate 18, weight 99.9 kg (220 lb 4.8 oz), SpO2 95 %.     Pt is alert and oriented x4. He denies pain. Total assistance with cares. Positive interaction with patient. Given PRN Senna (Last BM was 2/13/23). SW following for placement  Please review provider order for any additional goals.   Nurse to notify provider when observation goals have been met and patient is ready for discharge.

## 2023-02-17 NOTE — PLAN OF CARE
PRIMARY DIAGNOSIS: PLACEMENT  OUTPATIENT/OBSERVATION GOALS TO BE MET BEFORE DISCHARGE:  1. ADLs back to baseline: Yes    2. Activity and level of assistance: Up with maximum assistance.      3. Pain status: Pain free.    4. Return to near baseline physical activity: Yes     Discharge Planner Nurse   Safe discharge environment identified: No  Barriers to discharge: Yes       Entered by: Luciana Johnson RN 02/17/2023 9:37 AM   Med compliant.  Makes needs known.  Denies pain.  Assisted with cares.  Please review provider order for any additional goals.   Nurse to notify provider when observation goals have been met and patient is ready for discharge.:

## 2023-02-17 NOTE — PLAN OF CARE
PRIMARY DIAGNOSIS: PLACEMENT  OUTPATIENT/OBSERVATION GOALS TO BE MET BEFORE DISCHARGE:  1. ADLs back to baseline: Yes    2. Activity and level of assistance: Up with maximum assistance. Consider SW and/or PT evaluation.     3. Pain status: Pain free.    4. Return to near baseline physical activity: Yes     Discharge Planner Nurse   Safe discharge environment identified: No  Barriers to discharge: Yes       Entered by: Naomy Buckley RN 02/17/2023 3:29 AM  Blood pressure 115/82, pulse 84, temperature 98  F (36.7  C), temperature source Oral, resp. rate 18, weight 99.9 kg (220 lb 4.8 oz), SpO2 95 %.     Pt is alert and oriented x4. He denies pain. Total assistance with cares. Positive interaction with patient. Given PRN Senna (Last BM was 2/13/23). SW following for placement  Please review provider order for any additional goals.   Nurse to notify provider when observation goals have been met and patient is ready for discharge.

## 2023-02-18 LAB — GLUCOSE BLDC GLUCOMTR-MCNC: 85 MG/DL (ref 70–99)

## 2023-02-18 PROCEDURE — G0378 HOSPITAL OBSERVATION PER HR: HCPCS

## 2023-02-18 PROCEDURE — 250N000013 HC RX MED GY IP 250 OP 250 PS 637: Performed by: HOSPITALIST

## 2023-02-18 PROCEDURE — 250N000013 HC RX MED GY IP 250 OP 250 PS 637: Performed by: NURSE PRACTITIONER

## 2023-02-18 PROCEDURE — 250N000013 HC RX MED GY IP 250 OP 250 PS 637: Performed by: PHYSICIAN ASSISTANT

## 2023-02-18 PROCEDURE — 82962 GLUCOSE BLOOD TEST: CPT

## 2023-02-18 PROCEDURE — 99207 PR NO CHARGE LOS: CPT | Performed by: NURSE PRACTITIONER

## 2023-02-18 RX ADMIN — HYDROXYZINE HYDROCHLORIDE 50 MG: 50 TABLET, FILM COATED ORAL at 14:59

## 2023-02-18 RX ADMIN — METFORMIN HYDROCHLORIDE 850 MG: 850 TABLET, FILM COATED ORAL at 08:54

## 2023-02-18 RX ADMIN — CLOTRIMAZOLE: 0.01 CREAM TOPICAL at 19:55

## 2023-02-18 RX ADMIN — CLONIDINE HYDROCHLORIDE 0.1 MG: 0.1 TABLET ORAL at 08:54

## 2023-02-18 RX ADMIN — CLONIDINE HYDROCHLORIDE 0.1 MG: 0.1 TABLET ORAL at 14:59

## 2023-02-18 RX ADMIN — ASPIRIN 81 MG CHEWABLE TABLET 81 MG: 81 TABLET CHEWABLE at 08:54

## 2023-02-18 RX ADMIN — DESONIDE: 0.5 CREAM TOPICAL at 12:11

## 2023-02-18 RX ADMIN — HYDROCORTISONE: 1 CREAM TOPICAL at 19:54

## 2023-02-18 RX ADMIN — LEVOTHYROXINE SODIUM 25 MCG: 0.03 TABLET ORAL at 08:54

## 2023-02-18 RX ADMIN — CARBOXYMETHYLCELLULOSE SODIUM 1 DROP: 0.5 SOLUTION/ DROPS OPHTHALMIC at 12:10

## 2023-02-18 RX ADMIN — QUETIAPINE FUMARATE 400 MG: 200 TABLET ORAL at 21:26

## 2023-02-18 RX ADMIN — BACLOFEN 10 MG: 10 TABLET ORAL at 19:49

## 2023-02-18 RX ADMIN — VENLAFAXINE HYDROCHLORIDE 225 MG: 150 CAPSULE, EXTENDED RELEASE ORAL at 21:26

## 2023-02-18 RX ADMIN — CLOTRIMAZOLE: 0.01 CREAM TOPICAL at 12:02

## 2023-02-18 RX ADMIN — METFORMIN HYDROCHLORIDE 850 MG: 850 TABLET, FILM COATED ORAL at 17:08

## 2023-02-18 RX ADMIN — OLANZAPINE 2.5 MG: 2.5 TABLET, FILM COATED ORAL at 14:59

## 2023-02-18 RX ADMIN — KETOCONAZOLE: 20 CREAM TOPICAL at 19:54

## 2023-02-18 RX ADMIN — CLONIDINE HYDROCHLORIDE 0.1 MG: 0.1 TABLET ORAL at 19:49

## 2023-02-18 RX ADMIN — HYDROXYZINE HYDROCHLORIDE 50 MG: 50 TABLET, FILM COATED ORAL at 08:54

## 2023-02-18 RX ADMIN — QUETIAPINE 200 MG: 200 TABLET, FILM COATED ORAL at 08:54

## 2023-02-18 RX ADMIN — CARBOXYMETHYLCELLULOSE SODIUM 1 DROP: 0.5 SOLUTION/ DROPS OPHTHALMIC at 19:48

## 2023-02-18 RX ADMIN — Medication 25 MCG: at 08:55

## 2023-02-18 RX ADMIN — CARBOXYMETHYLCELLULOSE SODIUM 1 DROP: 0.5 SOLUTION/ DROPS OPHTHALMIC at 16:48

## 2023-02-18 RX ADMIN — Medication 10 MG: at 21:25

## 2023-02-18 RX ADMIN — BACLOFEN 10 MG: 10 TABLET ORAL at 08:54

## 2023-02-18 RX ADMIN — DIVALPROEX SODIUM 750 MG: 500 TABLET, DELAYED RELEASE ORAL at 08:53

## 2023-02-18 RX ADMIN — CARBOXYMETHYLCELLULOSE SODIUM 1 DROP: 0.5 SOLUTION/ DROPS OPHTHALMIC at 08:53

## 2023-02-18 RX ADMIN — EMPAGLIFLOZIN 10 MG: 10 TABLET, FILM COATED ORAL at 08:54

## 2023-02-18 RX ADMIN — MIRTAZAPINE 15 MG: 15 TABLET, FILM COATED ORAL at 21:25

## 2023-02-18 RX ADMIN — HYDROXYZINE HYDROCHLORIDE 50 MG: 50 TABLET, FILM COATED ORAL at 19:49

## 2023-02-18 RX ADMIN — VENLAFAXINE HYDROCHLORIDE 75 MG: 150 CAPSULE, EXTENDED RELEASE ORAL at 21:30

## 2023-02-18 RX ADMIN — QUETIAPINE 200 MG: 200 TABLET, FILM COATED ORAL at 14:59

## 2023-02-18 RX ADMIN — METOPROLOL SUCCINATE 50 MG: 50 TABLET, EXTENDED RELEASE ORAL at 08:54

## 2023-02-18 RX ADMIN — BACLOFEN 10 MG: 10 TABLET ORAL at 14:59

## 2023-02-18 RX ADMIN — KETOCONAZOLE: 20 CREAM TOPICAL at 12:11

## 2023-02-18 RX ADMIN — ATORVASTATIN CALCIUM 20 MG: 20 TABLET, FILM COATED ORAL at 19:49

## 2023-02-18 RX ADMIN — DIVALPROEX SODIUM 1000 MG: 500 TABLET, DELAYED RELEASE ORAL at 21:25

## 2023-02-18 RX ADMIN — POLYETHYLENE GLYCOL 3350 17 G: 17 POWDER, FOR SOLUTION ORAL at 08:53

## 2023-02-18 ASSESSMENT — ACTIVITIES OF DAILY LIVING (ADL)
ADLS_ACUITY_SCORE: 52
ADLS_ACUITY_SCORE: 54
ADLS_ACUITY_SCORE: 52
ADLS_ACUITY_SCORE: 54
ADLS_ACUITY_SCORE: 52
ADLS_ACUITY_SCORE: 52
ADLS_ACUITY_SCORE: 54
ADLS_ACUITY_SCORE: 52

## 2023-02-18 NOTE — PLAN OF CARE
PRIMARY DIAGNOSIS: Placement  OUTPATIENT/OBSERVATION GOALS TO BE MET BEFORE DISCHARGE:  ADLs back to baseline: Yes    Activity and level of assistance: Up with A2 with lift device    Pain status: Pain free.    Return to near baseline physical activity: Yes     Discharge Planner Nurse   Safe discharge environment identified: No  Barriers to discharge: Yes       Entered by: Helene Oreilly RN 02/18/2023   Pt AO x4. LS clear, BS active. Total cares, denies pain. Tolerating regular diet. Awaiting placement. Will continue to monitor.   Please review provider order for any additional goals.   Nurse to notify provider when observation goals have been met and patient is ready for discharge.
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
## 2023-02-18 NOTE — PLAN OF CARE
PRIMARY DIAGNOSIS:PLACEMENT  OUTPATIENT/OBSERVATION GOALS TO BE MET BEFORE DISCHARGE:  1. ADLs back to baseline: Yes    2. Activity and level of assistance: Up with maximum assistance.     3. Pain status: Pain free.    4. Return to near baseline physical activity: Yes     Discharge Planner Nurse   Safe discharge environment identified: No  Barriers to discharge: Yes       Entered by: Luciana Johnson RN 02/18/2023 2:34 PM    Pt makes needs known.  Med compliant.  Declined encouragement to get up to chair.  Receptive to encouragement to try rolling more often to his weak side to increase his strength.     Please review provider order for any additional goals.   Nurse to notify provider when observation goals have been met and patient is ready for discharge.Goal Outcome Evaluation:

## 2023-02-18 NOTE — PLAN OF CARE
PRIMARY DIAGNOSIS: Placement  OUTPATIENT/OBSERVATION GOALS TO BE MET BEFORE DISCHARGE:  ADLs back to baseline: Yes    Activity and level of assistance: Up with A2 with lift device    Pain status: Pain free.    Return to near baseline physical activity: Yes     Discharge Planner Nurse   Safe discharge environment identified: No  Barriers to discharge: Yes       Entered by: Helene Oreilly RN 02/18/2023   Pt AO x4. LS clear, BS active. Total cares, acetaminophen administered for headache, denies other pain. Tolerating regular diet. Awaiting placement. Will continue to monitor.   Please review provider order for any additional goals.   Nurse to notify provider when observation goals have been met and patient is ready for discharge.

## 2023-02-18 NOTE — PLAN OF CARE
PRIMARY DIAGNOSIS: PLACEMENT  OUTPATIENT/OBSERVATION GOALS TO BE MET BEFORE DISCHARGE:  1. ADLs back to baseline: Yes    2. Activity and level of assistance: Up with maximum assistance.      3. Pain status: Pain free.    4. Return to near baseline physical activity: Yes     Discharge Planner Nurse   Safe discharge environment identified: No  Barriers to discharge: Yes       Entered by: Luciana Johnson RN 02/18/2023 11:31 AM   Pt cooperative and med compliant.  Makes needs known.    Please review provider order for any additional goals.   Nurse to notify provider when observation goals have been met and patient is ready for discharge.Goal Outcome Evaluation:

## 2023-02-19 LAB — GLUCOSE BLDC GLUCOMTR-MCNC: 105 MG/DL (ref 70–99)

## 2023-02-19 PROCEDURE — 250N000013 HC RX MED GY IP 250 OP 250 PS 637: Performed by: PHYSICIAN ASSISTANT

## 2023-02-19 PROCEDURE — 250N000013 HC RX MED GY IP 250 OP 250 PS 637: Performed by: HOSPITALIST

## 2023-02-19 PROCEDURE — 250N000013 HC RX MED GY IP 250 OP 250 PS 637: Performed by: NURSE PRACTITIONER

## 2023-02-19 PROCEDURE — 82962 GLUCOSE BLOOD TEST: CPT

## 2023-02-19 PROCEDURE — 99207 PR NO CHARGE LOS: CPT | Performed by: NURSE PRACTITIONER

## 2023-02-19 PROCEDURE — G0378 HOSPITAL OBSERVATION PER HR: HCPCS

## 2023-02-19 RX ADMIN — QUETIAPINE FUMARATE 400 MG: 200 TABLET ORAL at 21:27

## 2023-02-19 RX ADMIN — QUETIAPINE 200 MG: 200 TABLET, FILM COATED ORAL at 14:22

## 2023-02-19 RX ADMIN — METFORMIN HYDROCHLORIDE 850 MG: 850 TABLET, FILM COATED ORAL at 08:33

## 2023-02-19 RX ADMIN — POLYETHYLENE GLYCOL 3350 17 G: 17 POWDER, FOR SOLUTION ORAL at 08:34

## 2023-02-19 RX ADMIN — EMPAGLIFLOZIN 10 MG: 10 TABLET, FILM COATED ORAL at 08:34

## 2023-02-19 RX ADMIN — HYDROXYZINE HYDROCHLORIDE 50 MG: 50 TABLET, FILM COATED ORAL at 14:22

## 2023-02-19 RX ADMIN — CARBOXYMETHYLCELLULOSE SODIUM 1 DROP: 0.5 SOLUTION/ DROPS OPHTHALMIC at 16:38

## 2023-02-19 RX ADMIN — DESONIDE: 0.5 CREAM TOPICAL at 12:32

## 2023-02-19 RX ADMIN — VENLAFAXINE HYDROCHLORIDE 225 MG: 150 CAPSULE, EXTENDED RELEASE ORAL at 21:27

## 2023-02-19 RX ADMIN — HYDROCORTISONE: 1 CREAM TOPICAL at 20:01

## 2023-02-19 RX ADMIN — BACLOFEN 10 MG: 10 TABLET ORAL at 08:33

## 2023-02-19 RX ADMIN — KETOCONAZOLE: 20 CREAM TOPICAL at 20:00

## 2023-02-19 RX ADMIN — HYDROXYZINE HYDROCHLORIDE 50 MG: 50 TABLET, FILM COATED ORAL at 08:32

## 2023-02-19 RX ADMIN — CARBOXYMETHYLCELLULOSE SODIUM 1 DROP: 0.5 SOLUTION/ DROPS OPHTHALMIC at 08:33

## 2023-02-19 RX ADMIN — ATORVASTATIN CALCIUM 20 MG: 20 TABLET, FILM COATED ORAL at 19:57

## 2023-02-19 RX ADMIN — QUETIAPINE 200 MG: 200 TABLET, FILM COATED ORAL at 08:32

## 2023-02-19 RX ADMIN — DIVALPROEX SODIUM 1000 MG: 500 TABLET, DELAYED RELEASE ORAL at 21:27

## 2023-02-19 RX ADMIN — DIVALPROEX SODIUM 750 MG: 500 TABLET, DELAYED RELEASE ORAL at 08:33

## 2023-02-19 RX ADMIN — METFORMIN HYDROCHLORIDE 850 MG: 850 TABLET, FILM COATED ORAL at 18:49

## 2023-02-19 RX ADMIN — LEVOTHYROXINE SODIUM 25 MCG: 0.03 TABLET ORAL at 08:33

## 2023-02-19 RX ADMIN — OLANZAPINE 2.5 MG: 2.5 TABLET, FILM COATED ORAL at 14:22

## 2023-02-19 RX ADMIN — BACLOFEN 10 MG: 10 TABLET ORAL at 19:57

## 2023-02-19 RX ADMIN — CARBOXYMETHYLCELLULOSE SODIUM 1 DROP: 0.5 SOLUTION/ DROPS OPHTHALMIC at 11:48

## 2023-02-19 RX ADMIN — ASPIRIN 81 MG CHEWABLE TABLET 81 MG: 81 TABLET CHEWABLE at 08:32

## 2023-02-19 RX ADMIN — CLOTRIMAZOLE: 0.01 CREAM TOPICAL at 20:00

## 2023-02-19 RX ADMIN — METOPROLOL SUCCINATE 50 MG: 50 TABLET, EXTENDED RELEASE ORAL at 08:34

## 2023-02-19 RX ADMIN — Medication 25 MCG: at 08:34

## 2023-02-19 RX ADMIN — Medication 10 MG: at 21:27

## 2023-02-19 RX ADMIN — HYDROXYZINE HYDROCHLORIDE 50 MG: 50 TABLET, FILM COATED ORAL at 19:57

## 2023-02-19 RX ADMIN — MIRTAZAPINE 15 MG: 15 TABLET, FILM COATED ORAL at 21:28

## 2023-02-19 RX ADMIN — BACLOFEN 10 MG: 10 TABLET ORAL at 14:22

## 2023-02-19 RX ADMIN — CARBOXYMETHYLCELLULOSE SODIUM 1 DROP: 0.5 SOLUTION/ DROPS OPHTHALMIC at 19:57

## 2023-02-19 RX ADMIN — CLONIDINE HYDROCHLORIDE 0.1 MG: 0.1 TABLET ORAL at 19:57

## 2023-02-19 RX ADMIN — CLONIDINE HYDROCHLORIDE 0.1 MG: 0.1 TABLET ORAL at 08:32

## 2023-02-19 RX ADMIN — CLONIDINE HYDROCHLORIDE 0.1 MG: 0.1 TABLET ORAL at 14:22

## 2023-02-19 RX ADMIN — VENLAFAXINE HYDROCHLORIDE 75 MG: 150 CAPSULE, EXTENDED RELEASE ORAL at 21:32

## 2023-02-19 ASSESSMENT — ACTIVITIES OF DAILY LIVING (ADL)
ADLS_ACUITY_SCORE: 52

## 2023-02-19 NOTE — PLAN OF CARE
NOTE: 1930 - 2300  PRIMARY DIAGNOSIS: Placement  OBSERVATION GOALS TO BE MET BEFORE DISCHARGE:  1. ADLs back to baseline: Yes     2. Activity and level of assistance: Up with maximum assistance/lift      3. Pain status: Pain free.     4. Return to near baseline physical activity: Yes          Discharge Planner Nurse   Safe discharge environment identified: No  Barriers to discharge: Yes        Patient A & O x4, Lung sounds CTA, bowel sounds active. Last BM 2/18. VSS, denies pain, N/V, SOB or chest pain. Turned and repositioned as needed. Pt is A 2 using lift, now relaxing in bed playing with his phone. Tolerating regular diet and oral meds. No PIV. Medically cleared, SW following. Waiting for placement.    BP (!) 131/94 (BP Location: Left arm)   Pulse 68   Temp 97.6  F (36.4  C) (Oral)   Resp 16   Wt 99.9 kg (220 lb 4.8 oz)   SpO2 95%      Please review provider order for any additional goals.   Nurse to notify provider when observation goals have been met and patient is ready for discharge.

## 2023-02-19 NOTE — PLAN OF CARE
NOTE: 1930 - 2300  PRIMARY DIAGNOSIS: Placement  OBSERVATION GOALS TO BE MET BEFORE DISCHARGE:  1. ADLs back to baseline: Yes     2. Activity and level of assistance: Up with maximum assistance/lift      3. Pain status: Pain free.     4. Return to near baseline physical activity: Yes          Discharge Planner Nurse   Safe discharge environment identified: No  Barriers to discharge: Yes        Patient A & O x4, Lung sounds CTA, bowel sounds active. Last BM 2/18. Denies pain, N/V, SOB or chest pain. Turned and repositioned as needed. Pt is A 2 using lift, now relaxing in bed. Tolerating regular diet and oral meds. No PIV. Medically cleared, SW following. Waiting for placement.    Please review provider order for any additional goals.   Nurse to notify provider when observation goals have been met and patient is ready for discharge.

## 2023-02-19 NOTE — PLAN OF CARE
PRIMARY DIAGNOSIS: Placement  OUTPATIENT/OBSERVATION GOALS TO BE MET BEFORE DISCHARGE:  1. ADLs back to baseline:  Yes    2. Activity and level of assistance: x2 and lift    3. Pain status: Pain free.    4. Return to near baseline physical activity: Yes     Discharge Planner Nurse   Safe discharge environment identified: No  Barriers to discharge: Yes, placement       Entered by: Jeff Sutherland 02/19/2023 5:32 AM     Pt AxOx4. Denies pain. Denies dizziness, SOB, and nausea. Resting in bed. SW following for placement.       Please review provider order for any additional goals.   Nurse to notify provider when observation goals have been met and patient is ready for discharge.

## 2023-02-19 NOTE — PLAN OF CARE
PRIMARY DIAGNOSIS: Placement  OUTPATIENT/OBSERVATION GOALS TO BE MET BEFORE DISCHARGE:  1. ADLs back to baseline:  Yes    2. Activity and level of assistance: x2 and lift    3. Pain status: Pain free.    4. Return to near baseline physical activity: Yes     Discharge Planner Nurse   Safe discharge environment identified: No  Barriers to discharge: Yes, placement       Entered by: Jeff Sutherland 02/18/2023 11:44 PM     Pt AxOx4. Denies pain. Denies dizziness, SOB, and nausea. Popsicle given per request. Resting in bed.       Please review provider order for any additional goals.   Nurse to notify provider when observation goals have been met and patient is ready for discharge.

## 2023-02-19 NOTE — CARE PLAN
PRIMARY DIAGNOSIS: Placement  OUTPATIENT/OBSERVATION GOALS TO BE MET BEFORE DISCHARGE:  1. ADLs back to baseline: Yes    2. Activity and level of assistance: Up with maximum assistance.     3. Pain status: Pain free.    4. Return to near baseline physical activity: Yes     Discharge Planner Nurse   Safe discharge environment identified: No  Barriers to discharge: Yes       Entered by: Nakia Uriarte RN 02/19/2023 9:55 AM     Please review provider order for any additional goals.   Nurse to notify provider when observation goals have been met and patient is ready for discharge.

## 2023-02-19 NOTE — PLAN OF CARE
PRIMARY DIAGNOSIS: PLACEMENT  OUTPATIENT/OBSERVATION GOALS TO BE MET BEFORE DISCHARGE:  1. ADLs back to baseline: Yes    2. Activity and level of assistance: Up with maximum assistance. .     3. Pain status: Pain free.    4. Return to near baseline physical activity: Yes     Discharge Planner Nurse   Safe discharge environment identified: No  Barriers to discharge: Yes       Entered by: Luciana Johnson RN 02/19/2023 1:37 PM   Pt makes needs known.  Med compliant.  Pillows placed to float heels.  Total cares for hygiene.    Please review provider order for any additional goals.   Nurse to notify provider when observation goals have been met and patient is ready for discharge.
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
## 2023-02-20 PROCEDURE — 99207 PR NO CHARGE LOS: CPT | Performed by: NURSE PRACTITIONER

## 2023-02-20 PROCEDURE — 250N000013 HC RX MED GY IP 250 OP 250 PS 637: Performed by: NURSE PRACTITIONER

## 2023-02-20 PROCEDURE — G0378 HOSPITAL OBSERVATION PER HR: HCPCS

## 2023-02-20 PROCEDURE — 250N000013 HC RX MED GY IP 250 OP 250 PS 637: Performed by: PHYSICIAN ASSISTANT

## 2023-02-20 PROCEDURE — 250N000013 HC RX MED GY IP 250 OP 250 PS 637: Performed by: HOSPITALIST

## 2023-02-20 RX ADMIN — CLONIDINE HYDROCHLORIDE 0.1 MG: 0.1 TABLET ORAL at 19:02

## 2023-02-20 RX ADMIN — Medication 25 MCG: at 08:49

## 2023-02-20 RX ADMIN — VENLAFAXINE HYDROCHLORIDE 75 MG: 150 CAPSULE, EXTENDED RELEASE ORAL at 21:25

## 2023-02-20 RX ADMIN — ATORVASTATIN CALCIUM 20 MG: 20 TABLET, FILM COATED ORAL at 19:02

## 2023-02-20 RX ADMIN — CLOTRIMAZOLE: 0.01 CREAM TOPICAL at 08:51

## 2023-02-20 RX ADMIN — HYDROXYZINE HYDROCHLORIDE 50 MG: 50 TABLET, FILM COATED ORAL at 19:02

## 2023-02-20 RX ADMIN — EMPAGLIFLOZIN 10 MG: 10 TABLET, FILM COATED ORAL at 08:50

## 2023-02-20 RX ADMIN — KETOCONAZOLE: 20 CREAM TOPICAL at 21:26

## 2023-02-20 RX ADMIN — Medication 10 MG: at 21:23

## 2023-02-20 RX ADMIN — DIVALPROEX SODIUM 1000 MG: 500 TABLET, DELAYED RELEASE ORAL at 21:23

## 2023-02-20 RX ADMIN — CLOTRIMAZOLE: 0.01 CREAM TOPICAL at 21:25

## 2023-02-20 RX ADMIN — METFORMIN HYDROCHLORIDE 850 MG: 850 TABLET, FILM COATED ORAL at 18:48

## 2023-02-20 RX ADMIN — QUETIAPINE 200 MG: 200 TABLET, FILM COATED ORAL at 13:03

## 2023-02-20 RX ADMIN — LEVOTHYROXINE SODIUM 25 MCG: 0.03 TABLET ORAL at 08:50

## 2023-02-20 RX ADMIN — HYDROXYZINE HYDROCHLORIDE 50 MG: 50 TABLET, FILM COATED ORAL at 13:03

## 2023-02-20 RX ADMIN — CARBOXYMETHYLCELLULOSE SODIUM 1 DROP: 0.5 SOLUTION/ DROPS OPHTHALMIC at 21:22

## 2023-02-20 RX ADMIN — QUETIAPINE 200 MG: 200 TABLET, FILM COATED ORAL at 08:47

## 2023-02-20 RX ADMIN — POLYETHYLENE GLYCOL 3350 17 G: 17 POWDER, FOR SOLUTION ORAL at 08:47

## 2023-02-20 RX ADMIN — BACLOFEN 10 MG: 10 TABLET ORAL at 19:02

## 2023-02-20 RX ADMIN — CARBOXYMETHYLCELLULOSE SODIUM 1 DROP: 0.5 SOLUTION/ DROPS OPHTHALMIC at 08:50

## 2023-02-20 RX ADMIN — BACLOFEN 10 MG: 10 TABLET ORAL at 13:04

## 2023-02-20 RX ADMIN — KETOCONAZOLE: 20 CREAM TOPICAL at 08:51

## 2023-02-20 RX ADMIN — DESONIDE: 0.5 CREAM TOPICAL at 13:06

## 2023-02-20 RX ADMIN — METOPROLOL SUCCINATE 50 MG: 50 TABLET, EXTENDED RELEASE ORAL at 08:48

## 2023-02-20 RX ADMIN — OLANZAPINE 2.5 MG: 2.5 TABLET, FILM COATED ORAL at 13:03

## 2023-02-20 RX ADMIN — HYDROCORTISONE: 1 CREAM TOPICAL at 08:51

## 2023-02-20 RX ADMIN — CLONIDINE HYDROCHLORIDE 0.1 MG: 0.1 TABLET ORAL at 08:49

## 2023-02-20 RX ADMIN — CLONIDINE HYDROCHLORIDE 0.1 MG: 0.1 TABLET ORAL at 13:04

## 2023-02-20 RX ADMIN — HYDROCORTISONE: 1 CREAM TOPICAL at 21:25

## 2023-02-20 RX ADMIN — HYDROXYZINE HYDROCHLORIDE 50 MG: 50 TABLET, FILM COATED ORAL at 08:48

## 2023-02-20 RX ADMIN — ASPIRIN 81 MG CHEWABLE TABLET 81 MG: 81 TABLET CHEWABLE at 08:50

## 2023-02-20 RX ADMIN — BACLOFEN 10 MG: 10 TABLET ORAL at 08:50

## 2023-02-20 RX ADMIN — QUETIAPINE FUMARATE 400 MG: 200 TABLET ORAL at 21:24

## 2023-02-20 RX ADMIN — DIVALPROEX SODIUM 750 MG: 500 TABLET, DELAYED RELEASE ORAL at 08:47

## 2023-02-20 RX ADMIN — MIRTAZAPINE 15 MG: 15 TABLET, FILM COATED ORAL at 21:25

## 2023-02-20 RX ADMIN — CARBOXYMETHYLCELLULOSE SODIUM 1 DROP: 0.5 SOLUTION/ DROPS OPHTHALMIC at 18:47

## 2023-02-20 RX ADMIN — METFORMIN HYDROCHLORIDE 850 MG: 850 TABLET, FILM COATED ORAL at 08:50

## 2023-02-20 RX ADMIN — VENLAFAXINE HYDROCHLORIDE 150 MG: 150 CAPSULE, EXTENDED RELEASE ORAL at 21:23

## 2023-02-20 RX ADMIN — CARBOXYMETHYLCELLULOSE SODIUM 1 DROP: 0.5 SOLUTION/ DROPS OPHTHALMIC at 13:03

## 2023-02-20 ASSESSMENT — ACTIVITIES OF DAILY LIVING (ADL)
ADLS_ACUITY_SCORE: 52
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
## 2023-02-20 NOTE — PLAN OF CARE
PRIMARY DIAGNOSIS: Placement   OUTPATIENT/OBSERVATION GOALS TO BE MET BEFORE DISCHARGE:  1. ADLs back to baseline: Yes    2. Activity and level of assistance: Ax1 rolling & Ax2 with lift      3. Pain status: Pain free.    4. Return to near baseline physical activity: Yes     Discharge Planner Nurse   Safe discharge environment identified: No  Barriers to discharge: Yes       Entered by: Laura Velasquez RN 02/20/2023 4:53 PM     Please review provider order for any additional goals.   Nurse to notify provider when observation goals have been met and patient is ready for discharge.    Patient is A&O x4. Calls appropriately. No IV present. Assist x1 in bed. On room air. Denies pain. On regular diet, tolerating well. Incontinent of bowel & bladder. Pt has denied repositioning. Plan for placement. Resting in bed, able to make needs known.

## 2023-02-20 NOTE — PROGRESS NOTES
Care Management Follow Up    Expected Discharge Date: 03/31/2023     Concerns to be Addressed: Discharge planning       Patient plan of care discussed at interdisciplinary rounds: Yes    Anticipated Discharge Disposition:  Group Home once placement found by relocation worker    Additional Information:  ANALY received a call from Edith with [facility], [phone]. She is planning to assess patient in hospital 2/24 at 1300 to see if they can meet pt's needs.     ANALY updated relocation worker Misty via email and inquired if she had any updates on other pending referrals.     ANALY will continue to follow.     DANITA Obrien, [facility]   Inpatient Care Coordination  [facility]   [phone]

## 2023-02-20 NOTE — PLAN OF CARE
PRIMARY DIAGNOSIS: Placement   OUTPATIENT/OBSERVATION GOALS TO BE MET BEFORE DISCHARGE:  1. ADLs back to baseline: Yes    2. Activity and level of assistance: Ax1 rolling & Ax2 with lift      3. Pain status: Pain free.    4. Return to near baseline physical activity: Yes     Discharge Planner Nurse   Safe discharge environment identified: No  Barriers to discharge: Yes       Entered by: Laura Velasquez RN 02/20/2023 4:52 PM     Please review provider order for any additional goals.   Nurse to notify provider when observation goals have been met and patient is ready for discharge.    Patient is A&O x4. Calls appropriately. No IV present. Assist x1 in bed. On room air. Denies pain. On regular diet, tolerating well. Incontinent of bowel & bladder. Pt has denied repositioning. Plan for placement. Resting in bed, able to make needs known.     No

## 2023-02-20 NOTE — PLAN OF CARE
NOTE: 1930 - 2300  PRIMARY DIAGNOSIS: Placement  OBSERVATION GOALS TO BE MET BEFORE DISCHARGE:  1. ADLs back to baseline: Yes     2. Activity and level of assistance: Up with maximum assistance/lift      3. Pain status: Pain free.     4. Return to near baseline physical activity: Yes          Discharge Planner Nurse   Safe discharge environment identified: No  Barriers to discharge: Yes        Patient A & O x4, Lung sounds CTA, bowel sounds active. Last BM 2/18. Denies pain, N/V, SOB or chest pain. Turned and repositioned as needed. Pt is A 2 using lift, incontinent of bladder x 4. Tolerating regular diet and oral meds. No PIV. Medically cleared, SW following. Waiting for placement.    /86 (BP Location: Left arm)   Pulse 79   Temp 98  F (36.7  C) (Oral)   Resp 18   Wt 99.9 kg (220 lb 4.8 oz)   SpO2 95%     Please review provider order for any additional goals.   Nurse to notify provider when observation goals have been met and patient is ready for discharge.

## 2023-02-20 NOTE — PLAN OF CARE
PRIMARY DIAGNOSIS: AWAITING PLACEMENT  OUTPATIENT/OBSERVATION GOALS TO BE MET BEFORE DISCHARGE:  1. ADLs back to baseline: Yes    2. Activity and level of assistance: A2, lift    3. Pain status: Pain free.    4. Return to near baseline physical activity: Yes     Discharge Planner Nurse   Safe discharge environment identified: Yes  Barriers to discharge: Yes       Entered by: Vero Franco RN 02/20/2023   A&O x4. A2, lift. Pt denies any pain or discomfort at this time and is able to call and make needs known. Medically cleared, SW following for placement.    Please review provider order for any additional goals.   Nurse to notify provider when observation goals have been met and patient is ready for discharge.

## 2023-02-21 PROCEDURE — 250N000013 HC RX MED GY IP 250 OP 250 PS 637: Performed by: HOSPITALIST

## 2023-02-21 PROCEDURE — 99207 PR NO CHARGE LOS: CPT | Performed by: NURSE PRACTITIONER

## 2023-02-21 PROCEDURE — 250N000013 HC RX MED GY IP 250 OP 250 PS 637: Performed by: PHYSICIAN ASSISTANT

## 2023-02-21 PROCEDURE — G0378 HOSPITAL OBSERVATION PER HR: HCPCS

## 2023-02-21 PROCEDURE — 250N000013 HC RX MED GY IP 250 OP 250 PS 637: Performed by: NURSE PRACTITIONER

## 2023-02-21 RX ADMIN — CLONIDINE HYDROCHLORIDE 0.1 MG: 0.1 TABLET ORAL at 08:55

## 2023-02-21 RX ADMIN — CLONIDINE HYDROCHLORIDE 0.1 MG: 0.1 TABLET ORAL at 14:25

## 2023-02-21 RX ADMIN — Medication 25 MCG: at 08:55

## 2023-02-21 RX ADMIN — CLOTRIMAZOLE: 0.01 CREAM TOPICAL at 22:38

## 2023-02-21 RX ADMIN — ATORVASTATIN CALCIUM 20 MG: 20 TABLET, FILM COATED ORAL at 20:48

## 2023-02-21 RX ADMIN — CARBOXYMETHYLCELLULOSE SODIUM 1 DROP: 0.5 SOLUTION/ DROPS OPHTHALMIC at 08:55

## 2023-02-21 RX ADMIN — QUETIAPINE 200 MG: 200 TABLET, FILM COATED ORAL at 14:25

## 2023-02-21 RX ADMIN — METFORMIN HYDROCHLORIDE 850 MG: 850 TABLET, FILM COATED ORAL at 08:55

## 2023-02-21 RX ADMIN — OLANZAPINE 2.5 MG: 2.5 TABLET, FILM COATED ORAL at 14:25

## 2023-02-21 RX ADMIN — CLOTRIMAZOLE: 0.01 CREAM TOPICAL at 09:00

## 2023-02-21 RX ADMIN — HYDROXYZINE HYDROCHLORIDE 50 MG: 50 TABLET, FILM COATED ORAL at 20:49

## 2023-02-21 RX ADMIN — DIVALPROEX SODIUM 1000 MG: 500 TABLET, DELAYED RELEASE ORAL at 22:40

## 2023-02-21 RX ADMIN — HYDROXYZINE HYDROCHLORIDE 50 MG: 50 TABLET, FILM COATED ORAL at 14:25

## 2023-02-21 RX ADMIN — CARBOXYMETHYLCELLULOSE SODIUM 1 DROP: 0.5 SOLUTION/ DROPS OPHTHALMIC at 11:48

## 2023-02-21 RX ADMIN — POLYETHYLENE GLYCOL 3350 17 G: 17 POWDER, FOR SOLUTION ORAL at 09:00

## 2023-02-21 RX ADMIN — HYDROCORTISONE: 1 CREAM TOPICAL at 22:39

## 2023-02-21 RX ADMIN — KETOCONAZOLE: 20 CREAM TOPICAL at 22:39

## 2023-02-21 RX ADMIN — Medication 10 MG: at 22:41

## 2023-02-21 RX ADMIN — QUETIAPINE 200 MG: 200 TABLET, FILM COATED ORAL at 08:55

## 2023-02-21 RX ADMIN — LEVOTHYROXINE SODIUM 25 MCG: 0.03 TABLET ORAL at 08:55

## 2023-02-21 RX ADMIN — VENLAFAXINE HYDROCHLORIDE 150 MG: 150 CAPSULE, EXTENDED RELEASE ORAL at 22:47

## 2023-02-21 RX ADMIN — EMPAGLIFLOZIN 10 MG: 10 TABLET, FILM COATED ORAL at 08:55

## 2023-02-21 RX ADMIN — MIRTAZAPINE 15 MG: 15 TABLET, FILM COATED ORAL at 22:41

## 2023-02-21 RX ADMIN — DIVALPROEX SODIUM 750 MG: 500 TABLET, DELAYED RELEASE ORAL at 08:54

## 2023-02-21 RX ADMIN — KETOCONAZOLE: 20 CREAM TOPICAL at 09:00

## 2023-02-21 RX ADMIN — BACLOFEN 10 MG: 10 TABLET ORAL at 14:25

## 2023-02-21 RX ADMIN — CARBOXYMETHYLCELLULOSE SODIUM 1 DROP: 0.5 SOLUTION/ DROPS OPHTHALMIC at 18:37

## 2023-02-21 RX ADMIN — QUETIAPINE FUMARATE 400 MG: 200 TABLET ORAL at 22:40

## 2023-02-21 RX ADMIN — VENLAFAXINE HYDROCHLORIDE 75 MG: 150 CAPSULE, EXTENDED RELEASE ORAL at 22:39

## 2023-02-21 RX ADMIN — BACLOFEN 10 MG: 10 TABLET ORAL at 08:55

## 2023-02-21 RX ADMIN — METOPROLOL SUCCINATE 50 MG: 50 TABLET, EXTENDED RELEASE ORAL at 08:55

## 2023-02-21 RX ADMIN — DESONIDE: 0.5 CREAM TOPICAL at 11:48

## 2023-02-21 RX ADMIN — HYDROXYZINE HYDROCHLORIDE 50 MG: 50 TABLET, FILM COATED ORAL at 08:55

## 2023-02-21 RX ADMIN — CLONIDINE HYDROCHLORIDE 0.1 MG: 0.1 TABLET ORAL at 20:48

## 2023-02-21 RX ADMIN — ASPIRIN 81 MG CHEWABLE TABLET 81 MG: 81 TABLET CHEWABLE at 08:55

## 2023-02-21 RX ADMIN — BACLOFEN 10 MG: 10 TABLET ORAL at 20:48

## 2023-02-21 RX ADMIN — METFORMIN HYDROCHLORIDE 850 MG: 850 TABLET, FILM COATED ORAL at 18:37

## 2023-02-21 RX ADMIN — CARBOXYMETHYLCELLULOSE SODIUM 1 DROP: 0.5 SOLUTION/ DROPS OPHTHALMIC at 20:49

## 2023-02-21 ASSESSMENT — ACTIVITIES OF DAILY LIVING (ADL)
ADLS_ACUITY_SCORE: 52
ADLS_ACUITY_SCORE: 54
ADLS_ACUITY_SCORE: 52
ADLS_ACUITY_SCORE: 52
ADLS_ACUITY_SCORE: 54

## 2023-02-21 NOTE — PLAN OF CARE
PRIMARY DIAGNOSIS: Placement   OUTPATIENT/OBSERVATION GOALS TO BE MET BEFORE DISCHARGE:  1. ADLs back to baseline: Yes    2. Activity and level of assistance: Up with maximum assistance. Not OOB.    3. Pain status: Pain free.    4. Return to near baseline physical activity: Yes     Discharge Planner Nurse   Safe discharge environment identified: No  Barriers to discharge: Yes       Entered by: Hira Fraire RN 02/21/2023     A/O x4. VSS on RA. Assist of 2, lift. Tolerating regular diet. Lung sounds clear. Bowel sounds active. 2/18 LBM. Adequate urine output via briefs. Skin rash on face, redness in right eye. Eye drops given. Denies pain. Denies nausea.     Please review provider order for any additional goals.   Nurse to notify provider when observation goals have been met and patient is ready for discharge.

## 2023-02-21 NOTE — PLAN OF CARE

## 2023-02-21 NOTE — PLAN OF CARE
PRIMARY DIAGNOSIS: Placement   OUTPATIENT/OBSERVATION GOALS TO BE MET BEFORE DISCHARGE:  1. ADLs back to baseline: Yes    2. Activity and level of assistance: Ax1 rolling & Ax2 with lift      3. Pain status: Pain free.    4. Return to near baseline physical activity: Yes     Discharge Planner Nurse   Safe discharge environment identified: No  Barriers to discharge: Yes       Entered by: Laura Velasquez RN 02/20/2023 11:20 PM     Please review provider order for any additional goals.   Nurse to notify provider when observation goals have been met and patient is ready for discharge.    Patient is A&O x4. Calls appropriately. No IV present. Assist x1 in bed. On room air. Denies pain. On regular diet, tolerating well. Incontinent of bowel & bladder. Pt has denied repositioning. Plan for placement. Resting in bed, able to make needs known.
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
## 2023-02-21 NOTE — PLAN OF CARE
PRIMARY DIAGNOSIS: Placement  OUTPATIENT/OBSERVATION GOALS TO BE MET BEFORE DISCHARGE:  ADLs back to baseline: Yes    Activity and level of assistance: assist x 2    Pain status: Pain free.    Return to near baseline physical activity: Yes     Discharge Planner Nurse   Safe discharge environment identified: No  Barriers to discharge: Yes       Entered by: Emily Garcia RN 02/21/2023 12:39 AM    Patient resting comfortably in bed, currently denies pain, SW following for placement         Please review provider order for any additional goals.   Nurse to notify provider when observation goals have been met and patient is ready for discharge.

## 2023-02-22 LAB — GLUCOSE BLDC GLUCOMTR-MCNC: 106 MG/DL (ref 70–99)

## 2023-02-22 PROCEDURE — 82962 GLUCOSE BLOOD TEST: CPT

## 2023-02-22 PROCEDURE — 99207 PR NO CHARGE LOS: CPT | Performed by: NURSE PRACTITIONER

## 2023-02-22 PROCEDURE — G0378 HOSPITAL OBSERVATION PER HR: HCPCS

## 2023-02-22 PROCEDURE — 250N000013 HC RX MED GY IP 250 OP 250 PS 637: Performed by: HOSPITALIST

## 2023-02-22 PROCEDURE — 250N000013 HC RX MED GY IP 250 OP 250 PS 637: Performed by: NURSE PRACTITIONER

## 2023-02-22 PROCEDURE — 250N000013 HC RX MED GY IP 250 OP 250 PS 637: Performed by: PHYSICIAN ASSISTANT

## 2023-02-22 RX ADMIN — LEVOTHYROXINE SODIUM 25 MCG: 0.03 TABLET ORAL at 09:03

## 2023-02-22 RX ADMIN — BACLOFEN 10 MG: 10 TABLET ORAL at 09:02

## 2023-02-22 RX ADMIN — EMPAGLIFLOZIN 10 MG: 10 TABLET, FILM COATED ORAL at 09:03

## 2023-02-22 RX ADMIN — CLOTRIMAZOLE: 0.01 CREAM TOPICAL at 09:02

## 2023-02-22 RX ADMIN — CARBOXYMETHYLCELLULOSE SODIUM 1 DROP: 0.5 SOLUTION/ DROPS OPHTHALMIC at 12:31

## 2023-02-22 RX ADMIN — KETOCONAZOLE: 20 CREAM TOPICAL at 20:03

## 2023-02-22 RX ADMIN — BACLOFEN 10 MG: 10 TABLET ORAL at 14:12

## 2023-02-22 RX ADMIN — BACLOFEN 10 MG: 10 TABLET ORAL at 20:01

## 2023-02-22 RX ADMIN — Medication 25 MCG: at 09:03

## 2023-02-22 RX ADMIN — CLONIDINE HYDROCHLORIDE 0.1 MG: 0.1 TABLET ORAL at 20:01

## 2023-02-22 RX ADMIN — POLYETHYLENE GLYCOL 3350 17 G: 17 POWDER, FOR SOLUTION ORAL at 09:02

## 2023-02-22 RX ADMIN — METFORMIN HYDROCHLORIDE 850 MG: 850 TABLET, FILM COATED ORAL at 17:39

## 2023-02-22 RX ADMIN — VENLAFAXINE HYDROCHLORIDE 150 MG: 150 CAPSULE, EXTENDED RELEASE ORAL at 21:51

## 2023-02-22 RX ADMIN — HYDROXYZINE HYDROCHLORIDE 50 MG: 50 TABLET, FILM COATED ORAL at 20:01

## 2023-02-22 RX ADMIN — CLONIDINE HYDROCHLORIDE 0.1 MG: 0.1 TABLET ORAL at 09:02

## 2023-02-22 RX ADMIN — QUETIAPINE 200 MG: 200 TABLET, FILM COATED ORAL at 14:12

## 2023-02-22 RX ADMIN — HYDROXYZINE HYDROCHLORIDE 50 MG: 50 TABLET, FILM COATED ORAL at 09:03

## 2023-02-22 RX ADMIN — CLOTRIMAZOLE: 0.01 CREAM TOPICAL at 20:02

## 2023-02-22 RX ADMIN — ATORVASTATIN CALCIUM 20 MG: 20 TABLET, FILM COATED ORAL at 20:01

## 2023-02-22 RX ADMIN — KETOCONAZOLE: 20 CREAM TOPICAL at 09:02

## 2023-02-22 RX ADMIN — CARBOXYMETHYLCELLULOSE SODIUM 1 DROP: 0.5 SOLUTION/ DROPS OPHTHALMIC at 09:02

## 2023-02-22 RX ADMIN — CLONIDINE HYDROCHLORIDE 0.1 MG: 0.1 TABLET ORAL at 14:12

## 2023-02-22 RX ADMIN — CARBOXYMETHYLCELLULOSE SODIUM 1 DROP: 0.5 SOLUTION/ DROPS OPHTHALMIC at 20:01

## 2023-02-22 RX ADMIN — ASPIRIN 81 MG CHEWABLE TABLET 81 MG: 81 TABLET CHEWABLE at 09:03

## 2023-02-22 RX ADMIN — QUETIAPINE FUMARATE 400 MG: 200 TABLET ORAL at 21:51

## 2023-02-22 RX ADMIN — DIVALPROEX SODIUM 1000 MG: 500 TABLET, DELAYED RELEASE ORAL at 21:51

## 2023-02-22 RX ADMIN — VENLAFAXINE HYDROCHLORIDE 75 MG: 150 CAPSULE, EXTENDED RELEASE ORAL at 21:50

## 2023-02-22 RX ADMIN — OLANZAPINE 2.5 MG: 2.5 TABLET, FILM COATED ORAL at 14:12

## 2023-02-22 RX ADMIN — Medication 10 MG: at 21:51

## 2023-02-22 RX ADMIN — CARBOXYMETHYLCELLULOSE SODIUM 1 DROP: 0.5 SOLUTION/ DROPS OPHTHALMIC at 17:39

## 2023-02-22 RX ADMIN — QUETIAPINE 200 MG: 200 TABLET, FILM COATED ORAL at 09:02

## 2023-02-22 RX ADMIN — DESONIDE: 0.5 CREAM TOPICAL at 12:31

## 2023-02-22 RX ADMIN — METFORMIN HYDROCHLORIDE 850 MG: 850 TABLET, FILM COATED ORAL at 09:02

## 2023-02-22 RX ADMIN — MIRTAZAPINE 15 MG: 15 TABLET, FILM COATED ORAL at 21:51

## 2023-02-22 RX ADMIN — HYDROXYZINE HYDROCHLORIDE 50 MG: 50 TABLET, FILM COATED ORAL at 14:12

## 2023-02-22 RX ADMIN — METOPROLOL SUCCINATE 50 MG: 50 TABLET, EXTENDED RELEASE ORAL at 09:03

## 2023-02-22 RX ADMIN — DIVALPROEX SODIUM 750 MG: 500 TABLET, DELAYED RELEASE ORAL at 09:02

## 2023-02-22 RX ADMIN — HYDROCORTISONE: 1 CREAM TOPICAL at 20:02

## 2023-02-22 ASSESSMENT — ACTIVITIES OF DAILY LIVING (ADL)
ADLS_ACUITY_SCORE: 54

## 2023-02-22 NOTE — PLAN OF CARE
PRIMARY DIAGNOSIS: PLACEMENT   OUTPATIENT/OBSERVATION GOALS TO BE MET BEFORE DISCHARGE:  1. ADLs back to baseline: Yes total care     2. Activity and level of assistance: lift assist. Total care.    3. Pain status: Pain free.    4. Return to near baseline physical activity: Yes     Discharge Planner Nurse   Safe discharge environment identified: Yes  Barriers to discharge: Yes       Entered by: SARA DERAS RN 02/22/2023    Vital signs:  Temp: 97.8  F (36.6  C) Temp src: Oral BP: (!) 141/95 Pulse: 82   Resp: 18 SpO2: 93 % O2 Device: None (Room air) Alert and oriented x4. Flat affect. Cooperative and calm. BG this morning 106. Denies pain. On regular diet. discomfort. Medically clear. Waiting for placement. [facility] planning to assess pt on Friday 2/24 @1300. Will continue to monitor.      Please review provider order for any additional goals.   Nurse to notify provider when observation goals have been met and patient is ready for discharge.
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
## 2023-02-22 NOTE — PLAN OF CARE
PRIMARY DIAGNOSIS: Placement  OUTPATIENT/OBSERVATION GOALS TO BE MET BEFORE DISCHARGE:  ADLs back to baseline: Yes    Activity and level of assistance: assist x 2    Pain status: Pain free.    Return to near baseline physical activity: Yes     Discharge Planner Nurse   Safe discharge environment identified: No  Barriers to discharge: Yes       Entered by: Emily Garcia RN 02/22/2023 12:15 AM    Patient is resting comfortably in bed, currently denies pain, SW following for placement         Please review provider order for any additional goals.   Nurse to notify provider when observation goals have been met and patient is ready for discharge.

## 2023-02-22 NOTE — PLAN OF CARE
PRIMARY DIAGNOSIS: Placement  OUTPATIENT/OBSERVATION GOALS TO BE MET BEFORE DISCHARGE:  ADLs back to baseline: Yes    Activity and level of assistance: assist x 2    Pain status: Pain free.    Return to near baseline physical activity: Yes     Discharge Planner Nurse   Safe discharge environment identified: No  Barriers to discharge: Yes       Entered by: Emily Garcia RN 02/22/2023 4:29 AM    Patient currently sleeping, SW following for placement       Please review provider order for any additional goals.   Nurse to notify provider when observation goals have been met and patient is ready for discharge
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
## 2023-02-23 PROCEDURE — 250N000013 HC RX MED GY IP 250 OP 250 PS 637: Performed by: PHYSICIAN ASSISTANT

## 2023-02-23 PROCEDURE — 250N000013 HC RX MED GY IP 250 OP 250 PS 637: Performed by: HOSPITALIST

## 2023-02-23 PROCEDURE — 250N000013 HC RX MED GY IP 250 OP 250 PS 637: Performed by: NURSE PRACTITIONER

## 2023-02-23 PROCEDURE — 99231 SBSQ HOSP IP/OBS SF/LOW 25: CPT | Performed by: PHYSICIAN ASSISTANT

## 2023-02-23 PROCEDURE — G0378 HOSPITAL OBSERVATION PER HR: HCPCS

## 2023-02-23 RX ADMIN — CLONIDINE HYDROCHLORIDE 0.1 MG: 0.1 TABLET ORAL at 19:59

## 2023-02-23 RX ADMIN — Medication 25 MCG: at 08:22

## 2023-02-23 RX ADMIN — METFORMIN HYDROCHLORIDE 850 MG: 850 TABLET, FILM COATED ORAL at 08:22

## 2023-02-23 RX ADMIN — HYDROCORTISONE: 1 CREAM TOPICAL at 20:00

## 2023-02-23 RX ADMIN — LEVOTHYROXINE SODIUM 25 MCG: 0.03 TABLET ORAL at 08:21

## 2023-02-23 RX ADMIN — KETOCONAZOLE: 20 CREAM TOPICAL at 08:23

## 2023-02-23 RX ADMIN — METFORMIN HYDROCHLORIDE 850 MG: 850 TABLET, FILM COATED ORAL at 17:20

## 2023-02-23 RX ADMIN — QUETIAPINE FUMARATE 400 MG: 200 TABLET ORAL at 22:26

## 2023-02-23 RX ADMIN — POLYETHYLENE GLYCOL 3350 17 G: 17 POWDER, FOR SOLUTION ORAL at 08:22

## 2023-02-23 RX ADMIN — EMPAGLIFLOZIN 10 MG: 10 TABLET, FILM COATED ORAL at 08:22

## 2023-02-23 RX ADMIN — CARBOXYMETHYLCELLULOSE SODIUM 1 DROP: 0.5 SOLUTION/ DROPS OPHTHALMIC at 20:00

## 2023-02-23 RX ADMIN — VENLAFAXINE HYDROCHLORIDE 75 MG: 150 CAPSULE, EXTENDED RELEASE ORAL at 22:25

## 2023-02-23 RX ADMIN — DIVALPROEX SODIUM 1000 MG: 500 TABLET, DELAYED RELEASE ORAL at 22:25

## 2023-02-23 RX ADMIN — ATORVASTATIN CALCIUM 20 MG: 20 TABLET, FILM COATED ORAL at 19:59

## 2023-02-23 RX ADMIN — CARBOXYMETHYLCELLULOSE SODIUM 1 DROP: 0.5 SOLUTION/ DROPS OPHTHALMIC at 13:41

## 2023-02-23 RX ADMIN — HYDROXYZINE HYDROCHLORIDE 50 MG: 50 TABLET, FILM COATED ORAL at 08:22

## 2023-02-23 RX ADMIN — MIRTAZAPINE 15 MG: 15 TABLET, FILM COATED ORAL at 22:26

## 2023-02-23 RX ADMIN — CLONIDINE HYDROCHLORIDE 0.1 MG: 0.1 TABLET ORAL at 08:22

## 2023-02-23 RX ADMIN — BACLOFEN 10 MG: 10 TABLET ORAL at 08:21

## 2023-02-23 RX ADMIN — VENLAFAXINE HYDROCHLORIDE 150 MG: 150 CAPSULE, EXTENDED RELEASE ORAL at 22:26

## 2023-02-23 RX ADMIN — CLOTRIMAZOLE: 0.01 CREAM TOPICAL at 08:23

## 2023-02-23 RX ADMIN — DESONIDE: 0.5 CREAM TOPICAL at 13:42

## 2023-02-23 RX ADMIN — DIVALPROEX SODIUM 750 MG: 500 TABLET, DELAYED RELEASE ORAL at 08:22

## 2023-02-23 RX ADMIN — Medication 10 MG: at 22:26

## 2023-02-23 RX ADMIN — CLOTRIMAZOLE: 0.01 CREAM TOPICAL at 20:00

## 2023-02-23 RX ADMIN — KETOCONAZOLE: 20 CREAM TOPICAL at 20:01

## 2023-02-23 RX ADMIN — QUETIAPINE 200 MG: 200 TABLET, FILM COATED ORAL at 08:22

## 2023-02-23 RX ADMIN — QUETIAPINE 200 MG: 200 TABLET, FILM COATED ORAL at 13:42

## 2023-02-23 RX ADMIN — CLONIDINE HYDROCHLORIDE 0.1 MG: 0.1 TABLET ORAL at 13:42

## 2023-02-23 RX ADMIN — CARBOXYMETHYLCELLULOSE SODIUM 1 DROP: 0.5 SOLUTION/ DROPS OPHTHALMIC at 08:21

## 2023-02-23 RX ADMIN — HYDROXYZINE HYDROCHLORIDE 50 MG: 50 TABLET, FILM COATED ORAL at 19:59

## 2023-02-23 RX ADMIN — HYDROCORTISONE: 1 CREAM TOPICAL at 08:23

## 2023-02-23 RX ADMIN — BACLOFEN 10 MG: 10 TABLET ORAL at 13:42

## 2023-02-23 RX ADMIN — HYDROXYZINE HYDROCHLORIDE 50 MG: 50 TABLET, FILM COATED ORAL at 13:42

## 2023-02-23 RX ADMIN — BACLOFEN 10 MG: 10 TABLET ORAL at 20:00

## 2023-02-23 RX ADMIN — METOPROLOL SUCCINATE 50 MG: 50 TABLET, EXTENDED RELEASE ORAL at 08:22

## 2023-02-23 RX ADMIN — OLANZAPINE 2.5 MG: 2.5 TABLET, FILM COATED ORAL at 13:42

## 2023-02-23 RX ADMIN — ASPIRIN 81 MG CHEWABLE TABLET 81 MG: 81 TABLET CHEWABLE at 08:22

## 2023-02-23 ASSESSMENT — ACTIVITIES OF DAILY LIVING (ADL)
ADLS_ACUITY_SCORE: 54

## 2023-02-23 NOTE — PLAN OF CARE
PRIMARY DIAGNOSIS: Placement  OUTPATIENT/OBSERVATION GOALS TO BE MET BEFORE DISCHARGE:  ADLs back to baseline: Yes    Activity and level of assistance: assist x 2    Pain status: Pain free.    Return to near baseline physical activity: Yes     Discharge Planner Nurse   Safe discharge environment identified: No  Barriers to discharge: Yes       Entered by: Emily Garcia RN 02/23/2023 12:13 AM    Patient is resting comfortably in bed, currently denies pain, SW following for placement       Please review provider order for any additional goals.   Nurse to notify provider when observation goals have been met and patient is ready for discharge.

## 2023-02-23 NOTE — PLAN OF CARE
PRIMARY DIAGNOSIS: PLACEMENT  OUTPATIENT/OBSERVATION GOALS TO BE MET BEFORE DISCHARGE:  1. ADLs back to baseline: Yes     2. Activity and level of assistance: Up with maximum assistance. Baseline      3. Pain status: Pain free.     4. Return to near baseline physical activity: Yes          Discharge Planner Nurse   Safe discharge environment identified: No  Barriers to discharge: Yes       Entered by: Aimee Brown RN 02/23/2023 3:52PM  Please review provider order for any additional goals.   Nurse to notify provider when observation goals have been met and patient is ready for discharge.     A&Ox4, sleeping in between cares, repositioned per req, calm, able to make needs known, calls when needs to be changed, call light within reach, [facility] to assess pt tomorrow at 1300, SW following, plan of care reviewed.

## 2023-02-23 NOTE — PLAN OF CARE
PRIMARY DIAGNOSIS: PLACEMENT  OUTPATIENT/OBSERVATION GOALS TO BE MET BEFORE DISCHARGE:  1. ADLs back to baseline: Yes     2. Activity and level of assistance: Up with maximum assistance. Baseline      3. Pain status: Pain free.     4. Return to near baseline physical activity: Yes          Discharge Planner Nurse   Safe discharge environment identified: No  Barriers to discharge: Yes       Entered by: Aimee Brown RN 02/23/2023 11:26AM  Please review provider order for any additional goals.   Nurse to notify provider when observation goals have been met and patient is ready for discharge.     A&Ox4, sleeping in between cares, repositioned per req, calm, able to make needs known, calls when needs to be changed, call light within reach, plan of care reviewed.
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
## 2023-02-23 NOTE — PLAN OF CARE
PRIMARY DIAGNOSIS: PLACEMENT  OUTPATIENT/OBSERVATION GOALS TO BE MET BEFORE DISCHARGE:  1. ADLs back to baseline: Yes    2. Activity and level of assistance: Up with maximum assistance. Baseline     3. Pain status: Pain free.    4. Return to near baseline physical activity: Yes     Discharge Planner Nurse   Safe discharge environment identified: No  Barriers to discharge: Yes       Entered by: Aimee Brown RN 02/23/2023 8:30AM     Please review provider order for any additional goals.   Nurse to notify provider when observation goals have been met and patient is ready for discharge.    A&Ox4, sleeping in between cares, calm, able to make needs known, call light within reach, plan of care reviewed.

## 2023-02-23 NOTE — PLAN OF CARE
PRIMARY DIAGNOSIS: Placement   OUTPATIENT/OBSERVATION GOALS TO BE MET BEFORE DISCHARGE:  ADLs back to baseline: Yes    Activity and level of assistance: assist x2    Pain status: Pain free.    Return to near baseline physical activity: Yes     Discharge Planner Nurse   Safe discharge environment identified: No  Barriers to discharge: Yes       Entered by: Emily Garcia RN 02/23/2023 4:19 AM    Patient is currently sleeping, SW following for placement       Please review provider order for any additional goals.   Nurse to notify provider when observation goals have been met and patient is ready for discharge.
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
## 2023-02-24 PROCEDURE — 250N000013 HC RX MED GY IP 250 OP 250 PS 637: Performed by: PHYSICIAN ASSISTANT

## 2023-02-24 PROCEDURE — 250N000013 HC RX MED GY IP 250 OP 250 PS 637: Performed by: HOSPITALIST

## 2023-02-24 PROCEDURE — 250N000013 HC RX MED GY IP 250 OP 250 PS 637: Performed by: NURSE PRACTITIONER

## 2023-02-24 PROCEDURE — G0378 HOSPITAL OBSERVATION PER HR: HCPCS

## 2023-02-24 RX ADMIN — MIRTAZAPINE 15 MG: 15 TABLET, FILM COATED ORAL at 21:23

## 2023-02-24 RX ADMIN — CLOTRIMAZOLE: 0.01 CREAM TOPICAL at 08:44

## 2023-02-24 RX ADMIN — HYDROXYZINE HYDROCHLORIDE 50 MG: 50 TABLET, FILM COATED ORAL at 08:46

## 2023-02-24 RX ADMIN — METFORMIN HYDROCHLORIDE 850 MG: 850 TABLET, FILM COATED ORAL at 08:46

## 2023-02-24 RX ADMIN — QUETIAPINE FUMARATE 400 MG: 200 TABLET ORAL at 21:22

## 2023-02-24 RX ADMIN — ASPIRIN 81 MG CHEWABLE TABLET 81 MG: 81 TABLET CHEWABLE at 08:45

## 2023-02-24 RX ADMIN — CARBOXYMETHYLCELLULOSE SODIUM 1 DROP: 0.5 SOLUTION/ DROPS OPHTHALMIC at 18:28

## 2023-02-24 RX ADMIN — CLONIDINE HYDROCHLORIDE 0.1 MG: 0.1 TABLET ORAL at 08:46

## 2023-02-24 RX ADMIN — CLONIDINE HYDROCHLORIDE 0.1 MG: 0.1 TABLET ORAL at 14:12

## 2023-02-24 RX ADMIN — ATORVASTATIN CALCIUM 20 MG: 20 TABLET, FILM COATED ORAL at 21:22

## 2023-02-24 RX ADMIN — BACLOFEN 10 MG: 10 TABLET ORAL at 08:46

## 2023-02-24 RX ADMIN — METOPROLOL SUCCINATE 50 MG: 50 TABLET, EXTENDED RELEASE ORAL at 08:46

## 2023-02-24 RX ADMIN — KETOCONAZOLE: 20 CREAM TOPICAL at 21:25

## 2023-02-24 RX ADMIN — HYDROXYZINE HYDROCHLORIDE 50 MG: 50 TABLET, FILM COATED ORAL at 14:11

## 2023-02-24 RX ADMIN — CLONIDINE HYDROCHLORIDE 0.1 MG: 0.1 TABLET ORAL at 21:22

## 2023-02-24 RX ADMIN — KETOCONAZOLE: 20 CREAM TOPICAL at 08:44

## 2023-02-24 RX ADMIN — EMPAGLIFLOZIN 10 MG: 10 TABLET, FILM COATED ORAL at 08:46

## 2023-02-24 RX ADMIN — QUETIAPINE 200 MG: 200 TABLET, FILM COATED ORAL at 14:12

## 2023-02-24 RX ADMIN — QUETIAPINE 200 MG: 200 TABLET, FILM COATED ORAL at 08:46

## 2023-02-24 RX ADMIN — OLANZAPINE 2.5 MG: 2.5 TABLET, FILM COATED ORAL at 14:12

## 2023-02-24 RX ADMIN — VENLAFAXINE HYDROCHLORIDE 75 MG: 150 CAPSULE, EXTENDED RELEASE ORAL at 21:21

## 2023-02-24 RX ADMIN — POLYETHYLENE GLYCOL 3350 17 G: 17 POWDER, FOR SOLUTION ORAL at 08:44

## 2023-02-24 RX ADMIN — BACLOFEN 10 MG: 10 TABLET ORAL at 14:12

## 2023-02-24 RX ADMIN — DESONIDE: 0.5 CREAM TOPICAL at 12:40

## 2023-02-24 RX ADMIN — BACLOFEN 10 MG: 10 TABLET ORAL at 21:23

## 2023-02-24 RX ADMIN — Medication 25 MCG: at 08:47

## 2023-02-24 RX ADMIN — METFORMIN HYDROCHLORIDE 850 MG: 850 TABLET, FILM COATED ORAL at 18:28

## 2023-02-24 RX ADMIN — HYDROXYZINE HYDROCHLORIDE 50 MG: 50 TABLET, FILM COATED ORAL at 21:21

## 2023-02-24 RX ADMIN — DIVALPROEX SODIUM 1000 MG: 500 TABLET, DELAYED RELEASE ORAL at 21:21

## 2023-02-24 RX ADMIN — Medication 10 MG: at 21:23

## 2023-02-24 RX ADMIN — VENLAFAXINE HYDROCHLORIDE 150 MG: 150 CAPSULE, EXTENDED RELEASE ORAL at 21:21

## 2023-02-24 RX ADMIN — CARBOXYMETHYLCELLULOSE SODIUM 1 DROP: 0.5 SOLUTION/ DROPS OPHTHALMIC at 08:44

## 2023-02-24 RX ADMIN — CARBOXYMETHYLCELLULOSE SODIUM 1 DROP: 0.5 SOLUTION/ DROPS OPHTHALMIC at 21:21

## 2023-02-24 RX ADMIN — HYDROCORTISONE: 1 CREAM TOPICAL at 21:25

## 2023-02-24 RX ADMIN — LEVOTHYROXINE SODIUM 25 MCG: 0.03 TABLET ORAL at 08:46

## 2023-02-24 RX ADMIN — DIVALPROEX SODIUM 750 MG: 500 TABLET, DELAYED RELEASE ORAL at 08:46

## 2023-02-24 RX ADMIN — CARBOXYMETHYLCELLULOSE SODIUM 1 DROP: 0.5 SOLUTION/ DROPS OPHTHALMIC at 12:40

## 2023-02-24 RX ADMIN — CLOTRIMAZOLE: 0.01 CREAM TOPICAL at 21:25

## 2023-02-24 RX ADMIN — HYDROCORTISONE: 1 CREAM TOPICAL at 08:44

## 2023-02-24 ASSESSMENT — ACTIVITIES OF DAILY LIVING (ADL)
ADLS_ACUITY_SCORE: 54
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
## 2023-02-24 NOTE — PLAN OF CARE
PRIMARY DIAGNOSIS: Placement  OUTPATIENT/OBSERVATION GOALS TO BE MET BEFORE DISCHARGE:  1. ADLs back to baseline: Yes    2. Activity and level of assistance: Up with maximum assistance. Consider SW and/or PT evaluation.     3. Pain status: Pain free.    4. Return to near baseline physical activity: Yes     Discharge Planner Nurse   Safe discharge environment identified: No  Barriers to discharge: Yes       Entered by: Todd Hardwick RN 02/24/2023 10:42 AM   Pt is A/Ox4. Denies pain, SOB, dizziness. Tolerating oral intake without any problem. Pt has a visit from [facility] planned today @ 1300. Plan is to continue monitor.  Please review provider order for any additional goals.   Nurse to notify provider when observation goals have been met and patient is ready for discharge.
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
## 2023-02-24 NOTE — PLAN OF CARE
PRIMARY DIAGNOSIS: Placement  OUTPATIENT/OBSERVATION GOALS TO BE MET BEFORE DISCHARGE:  ADLs back to baseline: Yes    Activity and level of assistance: assist x 2    Pain status: Pain free.    Return to near baseline physical activity: Yes     Discharge Planner Nurse   Safe discharge environment identified: No  Barriers to discharge: Yes       Entered by: Emily Garcia RN 02/24/2023 12:20 AM    Patient currently resting in bed, denies pain, SW following for placement       Please review provider order for any additional goals.   Nurse to notify provider when observation goals have been met and patient is ready for discharge

## 2023-02-24 NOTE — PLAN OF CARE

## 2023-02-25 LAB — GLUCOSE BLDC GLUCOMTR-MCNC: 94 MG/DL (ref 70–99)

## 2023-02-25 PROCEDURE — 99207 PR NO CHARGE LOS: CPT | Performed by: PHYSICIAN ASSISTANT

## 2023-02-25 PROCEDURE — 82962 GLUCOSE BLOOD TEST: CPT

## 2023-02-25 PROCEDURE — 250N000013 HC RX MED GY IP 250 OP 250 PS 637: Performed by: NURSE PRACTITIONER

## 2023-02-25 PROCEDURE — 250N000013 HC RX MED GY IP 250 OP 250 PS 637: Performed by: HOSPITALIST

## 2023-02-25 PROCEDURE — 250N000013 HC RX MED GY IP 250 OP 250 PS 637: Performed by: PHYSICIAN ASSISTANT

## 2023-02-25 PROCEDURE — G0378 HOSPITAL OBSERVATION PER HR: HCPCS

## 2023-02-25 RX ADMIN — DIVALPROEX SODIUM 750 MG: 500 TABLET, DELAYED RELEASE ORAL at 09:18

## 2023-02-25 RX ADMIN — BACLOFEN 10 MG: 10 TABLET ORAL at 13:44

## 2023-02-25 RX ADMIN — CARBOXYMETHYLCELLULOSE SODIUM 1 DROP: 0.5 SOLUTION/ DROPS OPHTHALMIC at 09:17

## 2023-02-25 RX ADMIN — CLONIDINE HYDROCHLORIDE 0.1 MG: 0.1 TABLET ORAL at 13:44

## 2023-02-25 RX ADMIN — HYDROXYZINE HYDROCHLORIDE 50 MG: 50 TABLET, FILM COATED ORAL at 13:44

## 2023-02-25 RX ADMIN — LEVOTHYROXINE SODIUM 25 MCG: 0.03 TABLET ORAL at 09:17

## 2023-02-25 RX ADMIN — CLOTRIMAZOLE: 0.01 CREAM TOPICAL at 20:37

## 2023-02-25 RX ADMIN — EMPAGLIFLOZIN 10 MG: 10 TABLET, FILM COATED ORAL at 09:18

## 2023-02-25 RX ADMIN — ASPIRIN 81 MG CHEWABLE TABLET 81 MG: 81 TABLET CHEWABLE at 09:18

## 2023-02-25 RX ADMIN — VENLAFAXINE HYDROCHLORIDE 150 MG: 150 CAPSULE, EXTENDED RELEASE ORAL at 22:13

## 2023-02-25 RX ADMIN — CARBOXYMETHYLCELLULOSE SODIUM 1 DROP: 0.5 SOLUTION/ DROPS OPHTHALMIC at 12:14

## 2023-02-25 RX ADMIN — BACLOFEN 10 MG: 10 TABLET ORAL at 09:18

## 2023-02-25 RX ADMIN — CLONIDINE HYDROCHLORIDE 0.1 MG: 0.1 TABLET ORAL at 09:17

## 2023-02-25 RX ADMIN — KETOCONAZOLE: 20 CREAM TOPICAL at 20:37

## 2023-02-25 RX ADMIN — Medication 10 MG: at 22:13

## 2023-02-25 RX ADMIN — ATORVASTATIN CALCIUM 20 MG: 20 TABLET, FILM COATED ORAL at 20:32

## 2023-02-25 RX ADMIN — CARBOXYMETHYLCELLULOSE SODIUM 1 DROP: 0.5 SOLUTION/ DROPS OPHTHALMIC at 18:36

## 2023-02-25 RX ADMIN — DESONIDE: 0.5 CREAM TOPICAL at 12:15

## 2023-02-25 RX ADMIN — CLONIDINE HYDROCHLORIDE 0.1 MG: 0.1 TABLET ORAL at 20:32

## 2023-02-25 RX ADMIN — HYDROCORTISONE: 1 CREAM TOPICAL at 20:36

## 2023-02-25 RX ADMIN — METFORMIN HYDROCHLORIDE 850 MG: 850 TABLET, FILM COATED ORAL at 18:37

## 2023-02-25 RX ADMIN — HYDROXYZINE HYDROCHLORIDE 50 MG: 50 TABLET, FILM COATED ORAL at 09:17

## 2023-02-25 RX ADMIN — MIRTAZAPINE 15 MG: 15 TABLET, FILM COATED ORAL at 22:12

## 2023-02-25 RX ADMIN — POLYETHYLENE GLYCOL 3350 17 G: 17 POWDER, FOR SOLUTION ORAL at 09:16

## 2023-02-25 RX ADMIN — METOPROLOL SUCCINATE 50 MG: 50 TABLET, EXTENDED RELEASE ORAL at 09:17

## 2023-02-25 RX ADMIN — CLOTRIMAZOLE: 0.01 CREAM TOPICAL at 09:19

## 2023-02-25 RX ADMIN — HYDROCORTISONE: 1 CREAM TOPICAL at 09:19

## 2023-02-25 RX ADMIN — VENLAFAXINE HYDROCHLORIDE 75 MG: 150 CAPSULE, EXTENDED RELEASE ORAL at 22:14

## 2023-02-25 RX ADMIN — METFORMIN HYDROCHLORIDE 850 MG: 850 TABLET, FILM COATED ORAL at 09:17

## 2023-02-25 RX ADMIN — HYDROXYZINE HYDROCHLORIDE 50 MG: 50 TABLET, FILM COATED ORAL at 20:35

## 2023-02-25 RX ADMIN — Medication 25 MCG: at 09:20

## 2023-02-25 RX ADMIN — QUETIAPINE 200 MG: 200 TABLET, FILM COATED ORAL at 13:44

## 2023-02-25 RX ADMIN — OLANZAPINE 2.5 MG: 2.5 TABLET, FILM COATED ORAL at 13:44

## 2023-02-25 RX ADMIN — DIVALPROEX SODIUM 1000 MG: 500 TABLET, DELAYED RELEASE ORAL at 22:13

## 2023-02-25 RX ADMIN — QUETIAPINE 200 MG: 200 TABLET, FILM COATED ORAL at 09:17

## 2023-02-25 RX ADMIN — QUETIAPINE FUMARATE 400 MG: 200 TABLET ORAL at 22:12

## 2023-02-25 RX ADMIN — KETOCONAZOLE: 20 CREAM TOPICAL at 09:20

## 2023-02-25 RX ADMIN — BACLOFEN 10 MG: 10 TABLET ORAL at 20:36

## 2023-02-25 RX ADMIN — CARBOXYMETHYLCELLULOSE SODIUM 1 DROP: 0.5 SOLUTION/ DROPS OPHTHALMIC at 20:31

## 2023-02-25 ASSESSMENT — ACTIVITIES OF DAILY LIVING (ADL)
ADLS_ACUITY_SCORE: 54
ADLS_ACUITY_SCORE: 54
ADLS_ACUITY_SCORE: 58
ADLS_ACUITY_SCORE: 54
ADLS_ACUITY_SCORE: 58
ADLS_ACUITY_SCORE: 54
ADLS_ACUITY_SCORE: 58
ADLS_ACUITY_SCORE: 54
ADLS_ACUITY_SCORE: 54
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
## 2023-02-25 NOTE — PLAN OF CARE
PRIMARY DIAGNOSIS: Placement  OUTPATIENT/OBSERVATION GOALS TO BE MET BEFORE DISCHARGE:  1. ADLs back to baseline: Yes    2. Activity and level of assistance: Up A X 2w/ lift    3. Pain status: Pain free.    4. Return to near baseline physical activity: Yes     Discharge Planner Nurse   Safe discharge environment identified: No  Barriers to discharge: Yes       Entered by: Susan Teran RN 02/24/2023 6:55 PM    Turn/Repo as needed, incontinence care. [facility] home assessed patient today. See social work note.    /87   Pulse 84   Temp 97.6  F (36.4  C) (Oral)   Resp 18   Wt 99.9 kg (220 lb 4.8 oz)   SpO2 94%

## 2023-02-25 NOTE — PLAN OF CARE
"PRIMARY DIAGNOSIS: \"GENERIC\" NURSING  OUTPATIENT/OBSERVATION GOALS TO BE MET BEFORE DISCHARGE:  1. ADLs back to baseline: Yes    2. Activity and level of assistance: Up with maximum assistance. 2 assist lift     3. Pain status: Pain free.    4. Return to near baseline physical activity: Yes     Discharge Planner Nurse   Safe discharge environment identified: No  Barriers to discharge: Yes awaiting placement       Entered by: Della Stewart RN 02/25/2023 11:13 AM     Please review provider order for any additional goals.   Nurse to notify provider when observation goals have been met and patient is ready for discharge.Goal Outcome Evaluation:                        "
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
## 2023-02-25 NOTE — PLAN OF CARE
PRIMARY DIAGNOSIS: Placement   OUTPATIENT/OBSERVATION GOALS TO BE MET BEFORE DISCHARGE:  ADLs back to baseline: Yes    Activity and level of assistance: A X 2 with a lift    Pain status: Pain free.    Return to near baseline physical activity: Yes     Discharge Planner Nurse   Safe discharge environment identified: No  Barriers to discharge: Yes  A & O X 4. Lung sound clear to ausculation bilaterally . Denies  pain, shortness of breath. Bowel sound present  to all quads. Incontinent of B & B . Medically stable to discharge . Awaiting for placement.  [facility] assessed patient , will review determination and follow up with SW . On Pulsate mattress, Turn and reposition maintained and per patient request. Calls appropriately.  following.  /72   Pulse 84   Temp 97.6  F (36.4  C) (Oral)   Resp 18   Wt 99.9 kg (220 lb 4.8 oz)   SpO2 94%         Entered by: Monica Elder RN 02/25/2023 03:25     Please review provider order for any additional goals.   Nurse to notify provider when observation goals have been met and patient is ready for discharge.

## 2023-02-25 NOTE — PLAN OF CARE
PRIMARY DIAGNOSIS: Placement  OUTPATIENT/OBSERVATION GOALS TO BE MET BEFORE DISCHARGE:  1. ADLs back to baseline: Yes    2. Activity and level of assistance: Up A X 2w/ lift    3. Pain status: Pain free.    4. Return to near baseline physical activity: Yes     Discharge Planner Nurse   Safe discharge environment identified: No  Barriers to discharge: Yes       Entered by: Susan Teran RN 02/24/2023 7:04 PM    Turn/Repo as needed, incontinence care.    /87   Pulse 84   Temp 97.6  F (36.4  C) (Oral)   Resp 18   Wt 99.9 kg (220 lb 4.8 oz)   SpO2 94%

## 2023-02-25 NOTE — PLAN OF CARE
PRIMARY DIAGNOSIS: Placement  OUTPATIENT/OBSERVATION GOALS TO BE MET BEFORE DISCHARGE:  1. ADLs back to baseline: Yes    2. Activity and level of assistance: Up A X 2w/ lift    3. Pain status: Pain free.    4. Return to near baseline physical activity: Yes     Discharge Planner Nurse   Safe discharge environment identified: No  Barriers to discharge: Yes       Entered by: Susan Teran RN 02/24/2023 7:05 PM    Turn/Repo as needed, incontinence care. [facility] assessed patient today. See social work note.    /87   Pulse 84   Temp 97.6  F (36.4  C) (Oral)   Resp 18   Wt 99.9 kg (220 lb 4.8 oz)   SpO2 94%

## 2023-02-25 NOTE — PLAN OF CARE
"PRIMARY DIAGNOSIS: \"GENERIC\" NURSING  OUTPATIENT/OBSERVATION GOALS TO BE MET BEFORE DISCHARGE:  1. ADLs back to baseline: Yes    2. Activity and level of assistance: 2 assist lift    3. Pain status: Pain free.    4. Return to near baseline physical activity: Yes     Discharge Planner Nurse   Safe discharge environment identified: No  Barriers to discharge: Yes awaiting placement       Entered by: Della Stewart RN 02/25/2023 7:39 AM     Please review provider order for any additional goals.   Nurse to notify provider when observation goals have been met and patient is ready for discharge.Goal Outcome Evaluation:                        "

## 2023-02-26 LAB — GLUCOSE BLDC GLUCOMTR-MCNC: 125 MG/DL (ref 70–99)

## 2023-02-26 PROCEDURE — 250N000013 HC RX MED GY IP 250 OP 250 PS 637: Performed by: HOSPITALIST

## 2023-02-26 PROCEDURE — 99207 PR NO CHARGE LOS: CPT | Performed by: PHYSICIAN ASSISTANT

## 2023-02-26 PROCEDURE — 250N000013 HC RX MED GY IP 250 OP 250 PS 637: Performed by: NURSE PRACTITIONER

## 2023-02-26 PROCEDURE — G0378 HOSPITAL OBSERVATION PER HR: HCPCS

## 2023-02-26 PROCEDURE — 250N000013 HC RX MED GY IP 250 OP 250 PS 637: Performed by: PHYSICIAN ASSISTANT

## 2023-02-26 PROCEDURE — 82962 GLUCOSE BLOOD TEST: CPT

## 2023-02-26 RX ADMIN — BACLOFEN 10 MG: 10 TABLET ORAL at 09:00

## 2023-02-26 RX ADMIN — CLOTRIMAZOLE: 0.01 CREAM TOPICAL at 09:00

## 2023-02-26 RX ADMIN — HYDROXYZINE HYDROCHLORIDE 50 MG: 50 TABLET, FILM COATED ORAL at 09:00

## 2023-02-26 RX ADMIN — DIVALPROEX SODIUM 1000 MG: 500 TABLET, DELAYED RELEASE ORAL at 21:15

## 2023-02-26 RX ADMIN — VENLAFAXINE HYDROCHLORIDE 75 MG: 150 CAPSULE, EXTENDED RELEASE ORAL at 21:14

## 2023-02-26 RX ADMIN — CLOTRIMAZOLE: 0.01 CREAM TOPICAL at 21:18

## 2023-02-26 RX ADMIN — HYDROXYZINE HYDROCHLORIDE 50 MG: 50 TABLET, FILM COATED ORAL at 21:14

## 2023-02-26 RX ADMIN — BACLOFEN 10 MG: 10 TABLET ORAL at 15:12

## 2023-02-26 RX ADMIN — DIVALPROEX SODIUM 750 MG: 500 TABLET, DELAYED RELEASE ORAL at 08:59

## 2023-02-26 RX ADMIN — QUETIAPINE FUMARATE 400 MG: 200 TABLET ORAL at 21:14

## 2023-02-26 RX ADMIN — METFORMIN HYDROCHLORIDE 850 MG: 850 TABLET, FILM COATED ORAL at 17:55

## 2023-02-26 RX ADMIN — OLANZAPINE 2.5 MG: 2.5 TABLET, FILM COATED ORAL at 15:12

## 2023-02-26 RX ADMIN — METFORMIN HYDROCHLORIDE 850 MG: 850 TABLET, FILM COATED ORAL at 09:00

## 2023-02-26 RX ADMIN — METOPROLOL SUCCINATE 50 MG: 50 TABLET, EXTENDED RELEASE ORAL at 08:59

## 2023-02-26 RX ADMIN — QUETIAPINE 200 MG: 200 TABLET, FILM COATED ORAL at 09:00

## 2023-02-26 RX ADMIN — ASPIRIN 81 MG CHEWABLE TABLET 81 MG: 81 TABLET CHEWABLE at 08:58

## 2023-02-26 RX ADMIN — Medication 25 MCG: at 08:59

## 2023-02-26 RX ADMIN — HYDROCORTISONE: 1 CREAM TOPICAL at 21:18

## 2023-02-26 RX ADMIN — CLONIDINE HYDROCHLORIDE 0.1 MG: 0.1 TABLET ORAL at 09:00

## 2023-02-26 RX ADMIN — CLONIDINE HYDROCHLORIDE 0.1 MG: 0.1 TABLET ORAL at 15:12

## 2023-02-26 RX ADMIN — CARBOXYMETHYLCELLULOSE SODIUM 1 DROP: 0.5 SOLUTION/ DROPS OPHTHALMIC at 11:24

## 2023-02-26 RX ADMIN — HYDROXYZINE HYDROCHLORIDE 50 MG: 50 TABLET, FILM COATED ORAL at 15:12

## 2023-02-26 RX ADMIN — EMPAGLIFLOZIN 10 MG: 10 TABLET, FILM COATED ORAL at 09:00

## 2023-02-26 RX ADMIN — DESONIDE: 0.5 CREAM TOPICAL at 11:24

## 2023-02-26 RX ADMIN — QUETIAPINE 200 MG: 200 TABLET, FILM COATED ORAL at 15:12

## 2023-02-26 RX ADMIN — KETOCONAZOLE: 20 CREAM TOPICAL at 09:00

## 2023-02-26 RX ADMIN — CLONIDINE HYDROCHLORIDE 0.1 MG: 0.1 TABLET ORAL at 21:15

## 2023-02-26 RX ADMIN — LEVOTHYROXINE SODIUM 25 MCG: 0.03 TABLET ORAL at 09:00

## 2023-02-26 RX ADMIN — KETOCONAZOLE: 20 CREAM TOPICAL at 21:18

## 2023-02-26 RX ADMIN — MIRTAZAPINE 15 MG: 15 TABLET, FILM COATED ORAL at 21:15

## 2023-02-26 RX ADMIN — Medication 10 MG: at 21:14

## 2023-02-26 RX ADMIN — CARBOXYMETHYLCELLULOSE SODIUM 1 DROP: 0.5 SOLUTION/ DROPS OPHTHALMIC at 21:18

## 2023-02-26 RX ADMIN — CARBOXYMETHYLCELLULOSE SODIUM 1 DROP: 0.5 SOLUTION/ DROPS OPHTHALMIC at 15:12

## 2023-02-26 RX ADMIN — BACLOFEN 10 MG: 10 TABLET ORAL at 21:15

## 2023-02-26 RX ADMIN — ATORVASTATIN CALCIUM 20 MG: 20 TABLET, FILM COATED ORAL at 21:15

## 2023-02-26 RX ADMIN — HYDROCORTISONE: 1 CREAM TOPICAL at 09:00

## 2023-02-26 RX ADMIN — VENLAFAXINE HYDROCHLORIDE 150 MG: 150 CAPSULE, EXTENDED RELEASE ORAL at 21:14

## 2023-02-26 RX ADMIN — CARBOXYMETHYLCELLULOSE SODIUM 1 DROP: 0.5 SOLUTION/ DROPS OPHTHALMIC at 08:58

## 2023-02-26 RX ADMIN — POLYETHYLENE GLYCOL 3350 17 G: 17 POWDER, FOR SOLUTION ORAL at 08:59

## 2023-02-26 ASSESSMENT — ACTIVITIES OF DAILY LIVING (ADL)
ADLS_ACUITY_SCORE: 56
ADLS_ACUITY_SCORE: 54
ADLS_ACUITY_SCORE: 54
ADLS_ACUITY_SCORE: 58
ADLS_ACUITY_SCORE: 54
ADLS_ACUITY_SCORE: 58
ADLS_ACUITY_SCORE: 56
ADLS_ACUITY_SCORE: 54
ADLS_ACUITY_SCORE: 58
ADLS_ACUITY_SCORE: 58

## 2023-02-26 NOTE — PLAN OF CARE
Blood pressure 127/89, pulse 76, temperature 97.5  F (36.4  C), temperature source Oral, resp. rate 20, weight 99.9 kg (220 lb 4.8 oz), SpO2 93 %.   Patient is Alert and Oriented x4. The pt is on bedrest.. Pt is a Regular diet. Pt denying pain.  Patient has no IV access. Gave her crackers for snack. I will continue to monitor.

## 2023-02-26 NOTE — PLAN OF CARE
PRIMARY DIAGNOSIS: Placement  OUTPATIENT/OBSERVATION GOALS TO BE MET BEFORE DISCHARGE:  1. ADLs back to baseline: Yes    2. Activity and level of assistance: Up with Ax2 with lift    3. Pain status: Pain free.    4. Return to near baseline physical activity: Yes     Discharge Planner Nurse   Safe discharge environment identified: No  Barriers to discharge: Yes       Entered by: Todd Hardwick RN 02/26/2023 10:04 AM   Pt Beatriz/Ox4. Afebrile, VSS, RA. Tolerating oral intake. Denies pain this morning. No IV access. Pt is waiting for placement.  Please review provider order for any additional goals.   Nurse to notify provider when observation goals have been met and patient is ready for discharge.

## 2023-02-26 NOTE — PLAN OF CARE
PRIMARY DIAGNOSIS: Placement  OUTPATIENT/OBSERVATION GOALS TO BE MET BEFORE DISCHARGE:  1. ADLs back to baseline: Yes    2. Activity and level of assistance: Up with Ax2 with lift    3. Pain status: Pain free.    4. Return to near baseline physical activity: Yes     Discharge Planner Nurse   Safe discharge environment identified: No  Barriers to discharge: Yes       Entered by: Todd Hardwick RN 02/26/2023 11:42 AM   Eyedrops administered. Continue to monitor.  Please review provider order for any additional goals.   Nurse to notify provider when observation goals have been met and patient is ready for discharge.
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
## 2023-02-26 NOTE — PLAN OF CARE
PRIMARY DIAGNOSIS: Behavioral.  OUTPATIENT/OBSERVATION GOALS TO BE MET BEFORE DISCHARGE:  1. ADLs back to baseline: Yes    2. Activity and level of assistance: Up with maximum assistance. Consider SW and/or PT evaluation.     3. Pain status: Pain free.    4. Return to near baseline physical activity: Yes     Patient is Alert and Oriented x4. The pt is on bedrest.. Pt is a Regular diet. Pt denying pain.  Patient has no IV access. Currently sleeping. No concerns noted. SW helping to find placement. I will continue to monitor.  Blood pressure 113/80, pulse 80, temperature 98.1  F (36.7  C), temperature source Oral, resp. rate 19, weight 99.9 kg (220 lb 4.8 oz), SpO2 93 %.         Discharge Planner Nurse   Safe discharge environment identified: Yes  Barriers to discharge: No       Entered by: Chato Aguirre RN 02/26/2023 4:45 AM     Please review provider order for any additional goals.   Nurse to notify provider when observation goals have been met and patient is ready for discharge.

## 2023-02-27 LAB — GLUCOSE BLDC GLUCOMTR-MCNC: 117 MG/DL (ref 70–99)

## 2023-02-27 PROCEDURE — 250N000013 HC RX MED GY IP 250 OP 250 PS 637: Performed by: NURSE PRACTITIONER

## 2023-02-27 PROCEDURE — 250N000013 HC RX MED GY IP 250 OP 250 PS 637: Performed by: PHYSICIAN ASSISTANT

## 2023-02-27 PROCEDURE — 82962 GLUCOSE BLOOD TEST: CPT

## 2023-02-27 PROCEDURE — 250N000013 HC RX MED GY IP 250 OP 250 PS 637: Performed by: HOSPITALIST

## 2023-02-27 PROCEDURE — G0378 HOSPITAL OBSERVATION PER HR: HCPCS

## 2023-02-27 RX ADMIN — METOPROLOL SUCCINATE 50 MG: 50 TABLET, EXTENDED RELEASE ORAL at 08:27

## 2023-02-27 RX ADMIN — HYDROXYZINE HYDROCHLORIDE 50 MG: 50 TABLET, FILM COATED ORAL at 08:27

## 2023-02-27 RX ADMIN — CLONIDINE HYDROCHLORIDE 0.1 MG: 0.1 TABLET ORAL at 13:55

## 2023-02-27 RX ADMIN — BACLOFEN 10 MG: 10 TABLET ORAL at 08:27

## 2023-02-27 RX ADMIN — ATORVASTATIN CALCIUM 20 MG: 20 TABLET, FILM COATED ORAL at 21:09

## 2023-02-27 RX ADMIN — CLONIDINE HYDROCHLORIDE 0.1 MG: 0.1 TABLET ORAL at 08:27

## 2023-02-27 RX ADMIN — HYDROXYZINE HYDROCHLORIDE 50 MG: 50 TABLET, FILM COATED ORAL at 13:55

## 2023-02-27 RX ADMIN — CARBOXYMETHYLCELLULOSE SODIUM 1 DROP: 0.5 SOLUTION/ DROPS OPHTHALMIC at 08:25

## 2023-02-27 RX ADMIN — POLYETHYLENE GLYCOL 3350 17 G: 17 POWDER, FOR SOLUTION ORAL at 08:23

## 2023-02-27 RX ADMIN — CLOTRIMAZOLE: 0.01 CREAM TOPICAL at 08:32

## 2023-02-27 RX ADMIN — QUETIAPINE 200 MG: 200 TABLET, FILM COATED ORAL at 08:27

## 2023-02-27 RX ADMIN — QUETIAPINE FUMARATE 400 MG: 200 TABLET ORAL at 21:08

## 2023-02-27 RX ADMIN — VENLAFAXINE HYDROCHLORIDE 75 MG: 150 CAPSULE, EXTENDED RELEASE ORAL at 21:09

## 2023-02-27 RX ADMIN — CARBOXYMETHYLCELLULOSE SODIUM 1 DROP: 0.5 SOLUTION/ DROPS OPHTHALMIC at 13:55

## 2023-02-27 RX ADMIN — EMPAGLIFLOZIN 10 MG: 10 TABLET, FILM COATED ORAL at 08:26

## 2023-02-27 RX ADMIN — CLONIDINE HYDROCHLORIDE 0.1 MG: 0.1 TABLET ORAL at 21:09

## 2023-02-27 RX ADMIN — Medication 10 MG: at 21:08

## 2023-02-27 RX ADMIN — HYDROXYZINE HYDROCHLORIDE 50 MG: 50 TABLET, FILM COATED ORAL at 21:09

## 2023-02-27 RX ADMIN — LEVOTHYROXINE SODIUM 25 MCG: 0.03 TABLET ORAL at 08:26

## 2023-02-27 RX ADMIN — DIVALPROEX SODIUM 1000 MG: 500 TABLET, DELAYED RELEASE ORAL at 21:08

## 2023-02-27 RX ADMIN — MIRTAZAPINE 15 MG: 15 TABLET, FILM COATED ORAL at 21:09

## 2023-02-27 RX ADMIN — OLANZAPINE 2.5 MG: 2.5 TABLET, FILM COATED ORAL at 13:55

## 2023-02-27 RX ADMIN — BACLOFEN 10 MG: 10 TABLET ORAL at 13:55

## 2023-02-27 RX ADMIN — METFORMIN HYDROCHLORIDE 850 MG: 850 TABLET, FILM COATED ORAL at 08:27

## 2023-02-27 RX ADMIN — BACLOFEN 10 MG: 10 TABLET ORAL at 21:09

## 2023-02-27 RX ADMIN — KETOCONAZOLE: 20 CREAM TOPICAL at 08:32

## 2023-02-27 RX ADMIN — HYDROCORTISONE: 1 CREAM TOPICAL at 08:32

## 2023-02-27 RX ADMIN — Medication 25 MCG: at 08:27

## 2023-02-27 RX ADMIN — CARBOXYMETHYLCELLULOSE SODIUM 1 DROP: 0.5 SOLUTION/ DROPS OPHTHALMIC at 17:27

## 2023-02-27 RX ADMIN — VENLAFAXINE HYDROCHLORIDE 150 MG: 150 CAPSULE, EXTENDED RELEASE ORAL at 21:12

## 2023-02-27 RX ADMIN — METFORMIN HYDROCHLORIDE 850 MG: 850 TABLET, FILM COATED ORAL at 17:26

## 2023-02-27 RX ADMIN — DIVALPROEX SODIUM 750 MG: 500 TABLET, DELAYED RELEASE ORAL at 08:26

## 2023-02-27 RX ADMIN — CARBOXYMETHYLCELLULOSE SODIUM 1 DROP: 0.5 SOLUTION/ DROPS OPHTHALMIC at 21:10

## 2023-02-27 RX ADMIN — QUETIAPINE 200 MG: 200 TABLET, FILM COATED ORAL at 13:55

## 2023-02-27 RX ADMIN — ASPIRIN 81 MG CHEWABLE TABLET 81 MG: 81 TABLET CHEWABLE at 08:26

## 2023-02-27 ASSESSMENT — ACTIVITIES OF DAILY LIVING (ADL)
ADLS_ACUITY_SCORE: 56

## 2023-02-27 NOTE — PLAN OF CARE
PRIMARY DIAGNOSIS: Placement   OUTPATIENT/OBSERVATION GOALS TO BE MET BEFORE DISCHARGE:  1. ADLs back to baseline: Yes    2. Activity and level of assistance: Ax1 rolling & Ax2 with lift      3. Pain status: Pain free.    4. Return to near baseline physical activity: Yes     Discharge Planner Nurse   Safe discharge environment identified: No  Barriers to discharge: Yes       Entered by: Laura Velasquez RN 02/27/2023      Please review provider order for any additional goals.   Nurse to notify provider when observation goals have been met and patient is ready for discharge.    Patient is A&O x4. Calls appropriately. No IV present. Assist x1 in bed. On room air. Denies pain. On regular diet, tolerating well. Incontinent of bowel & bladder. Pt declined recliner & repositioning. Plan for placement. Resting in bed, able to make needs known.
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
## 2023-02-27 NOTE — PLAN OF CARE
PRIMARY DIAGNOSIS: Placement  OUTPATIENT/OBSERVATION GOALS TO BE MET BEFORE DISCHARGE:  1. ADLs back to baseline: Yes    2. Activity and level of assistance: Up with Ax2 lift device    3. Pain status: Pain free.    4. Return to near baseline physical activity: Yes     Discharge Planner Nurse   Safe discharge environment identified: No  Barriers to discharge: Yes       Entered by: Todd Hardwick RN 02/26/2023 6:34 PM   Pt is A/Ox4. Afebrile, VSS, RA. Tolerating oral intake. Medically stable and waiting on placement.  Please review provider order for any additional goals.   Nurse to notify provider when observation goals have been met and patient is ready for discharge.

## 2023-02-28 PROCEDURE — 250N000013 HC RX MED GY IP 250 OP 250 PS 637: Performed by: HOSPITALIST

## 2023-02-28 PROCEDURE — 99207 PR NO CHARGE LOS: CPT | Performed by: NURSE PRACTITIONER

## 2023-02-28 PROCEDURE — 250N000013 HC RX MED GY IP 250 OP 250 PS 637: Performed by: PHYSICIAN ASSISTANT

## 2023-02-28 PROCEDURE — G0378 HOSPITAL OBSERVATION PER HR: HCPCS

## 2023-02-28 PROCEDURE — 250N000013 HC RX MED GY IP 250 OP 250 PS 637: Performed by: NURSE PRACTITIONER

## 2023-02-28 RX ADMIN — METFORMIN HYDROCHLORIDE 850 MG: 850 TABLET, FILM COATED ORAL at 09:53

## 2023-02-28 RX ADMIN — CLONIDINE HYDROCHLORIDE 0.1 MG: 0.1 TABLET ORAL at 21:20

## 2023-02-28 RX ADMIN — CLONIDINE HYDROCHLORIDE 0.1 MG: 0.1 TABLET ORAL at 13:55

## 2023-02-28 RX ADMIN — ATORVASTATIN CALCIUM 20 MG: 20 TABLET, FILM COATED ORAL at 21:17

## 2023-02-28 RX ADMIN — HYDROXYZINE HYDROCHLORIDE 50 MG: 50 TABLET, FILM COATED ORAL at 21:19

## 2023-02-28 RX ADMIN — BACLOFEN 10 MG: 10 TABLET ORAL at 13:55

## 2023-02-28 RX ADMIN — CARBOXYMETHYLCELLULOSE SODIUM 1 DROP: 0.5 SOLUTION/ DROPS OPHTHALMIC at 13:55

## 2023-02-28 RX ADMIN — QUETIAPINE FUMARATE 400 MG: 200 TABLET ORAL at 21:17

## 2023-02-28 RX ADMIN — VENLAFAXINE HYDROCHLORIDE 150 MG: 150 CAPSULE, EXTENDED RELEASE ORAL at 21:22

## 2023-02-28 RX ADMIN — CLOTRIMAZOLE: 0.01 CREAM TOPICAL at 21:21

## 2023-02-28 RX ADMIN — VENLAFAXINE HYDROCHLORIDE 75 MG: 150 CAPSULE, EXTENDED RELEASE ORAL at 21:17

## 2023-02-28 RX ADMIN — OLANZAPINE 2.5 MG: 2.5 TABLET, FILM COATED ORAL at 13:55

## 2023-02-28 RX ADMIN — KETOCONAZOLE: 20 CREAM TOPICAL at 21:21

## 2023-02-28 RX ADMIN — EMPAGLIFLOZIN 10 MG: 10 TABLET, FILM COATED ORAL at 09:53

## 2023-02-28 RX ADMIN — LEVOTHYROXINE SODIUM 25 MCG: 0.03 TABLET ORAL at 09:53

## 2023-02-28 RX ADMIN — POLYETHYLENE GLYCOL 3350 17 G: 17 POWDER, FOR SOLUTION ORAL at 09:51

## 2023-02-28 RX ADMIN — METOPROLOL SUCCINATE 50 MG: 50 TABLET, EXTENDED RELEASE ORAL at 09:51

## 2023-02-28 RX ADMIN — HYDROXYZINE HYDROCHLORIDE 50 MG: 50 TABLET, FILM COATED ORAL at 09:52

## 2023-02-28 RX ADMIN — BACLOFEN 10 MG: 10 TABLET ORAL at 09:53

## 2023-02-28 RX ADMIN — QUETIAPINE 200 MG: 200 TABLET, FILM COATED ORAL at 13:55

## 2023-02-28 RX ADMIN — Medication 25 MCG: at 09:53

## 2023-02-28 RX ADMIN — HYDROXYZINE HYDROCHLORIDE 50 MG: 50 TABLET, FILM COATED ORAL at 13:55

## 2023-02-28 RX ADMIN — HYDROCORTISONE: 1 CREAM TOPICAL at 21:21

## 2023-02-28 RX ADMIN — DIVALPROEX SODIUM 750 MG: 500 TABLET, DELAYED RELEASE ORAL at 09:52

## 2023-02-28 RX ADMIN — CARBOXYMETHYLCELLULOSE SODIUM 1 DROP: 0.5 SOLUTION/ DROPS OPHTHALMIC at 17:52

## 2023-02-28 RX ADMIN — ASPIRIN 81 MG CHEWABLE TABLET 81 MG: 81 TABLET CHEWABLE at 09:53

## 2023-02-28 RX ADMIN — CARBOXYMETHYLCELLULOSE SODIUM 1 DROP: 0.5 SOLUTION/ DROPS OPHTHALMIC at 21:16

## 2023-02-28 RX ADMIN — BACLOFEN 10 MG: 10 TABLET ORAL at 21:19

## 2023-02-28 RX ADMIN — DIVALPROEX SODIUM 1000 MG: 500 TABLET, DELAYED RELEASE ORAL at 21:17

## 2023-02-28 RX ADMIN — QUETIAPINE 200 MG: 200 TABLET, FILM COATED ORAL at 09:53

## 2023-02-28 RX ADMIN — CLOTRIMAZOLE: 0.01 CREAM TOPICAL at 09:56

## 2023-02-28 RX ADMIN — Medication 10 MG: at 21:19

## 2023-02-28 RX ADMIN — METFORMIN HYDROCHLORIDE 850 MG: 850 TABLET, FILM COATED ORAL at 17:52

## 2023-02-28 RX ADMIN — MIRTAZAPINE 15 MG: 15 TABLET, FILM COATED ORAL at 21:19

## 2023-02-28 RX ADMIN — CARBOXYMETHYLCELLULOSE SODIUM 1 DROP: 0.5 SOLUTION/ DROPS OPHTHALMIC at 09:52

## 2023-02-28 RX ADMIN — CLONIDINE HYDROCHLORIDE 0.1 MG: 0.1 TABLET ORAL at 09:53

## 2023-02-28 RX ADMIN — KETOCONAZOLE: 20 CREAM TOPICAL at 09:56

## 2023-02-28 ASSESSMENT — ACTIVITIES OF DAILY LIVING (ADL)
ADLS_ACUITY_SCORE: 56
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
## 2023-02-28 NOTE — PLAN OF CARE
PRIMARY DIAGNOSIS: Placement  OUTPATIENT/OBSERVATION GOALS TO BE MET BEFORE DISCHARGE:  ADLs back to baseline: Yes    Activity and level of assistance: Up with A2 and lift device    Pain status: Pain free.    Return to near baseline physical activity: Yes     Discharge Planner Nurse   Safe discharge environment identified: No  Barriers to discharge: Yes       Entered by: Helene Oreilly RN 02/27/2023   Pt AO x4. VSS on RA, LS clear, BS active. Pt denies pain. Tolerating regular diet. No PIV in place. Plan to discharge when placement found.   Please review provider order for any additional goals.   Nurse to notify provider when observation goals have been met and patient is ready for discharge.

## 2023-02-28 NOTE — PLAN OF CARE

## 2023-02-28 NOTE — PLAN OF CARE
PRIMARY DIAGNOSIS: Placement  OUTPATIENT/OBSERVATION GOALS TO BE MET BEFORE DISCHARGE:  ADLs back to baseline: Yes    Activity and level of assistance: Assist x 2    Pain status: Pain free.    Return to near baseline physical activity: Yes     Discharge Planner Nurse   Safe discharge environment identified: No  Barriers to discharge: Yes       Entered by: Emily Garcia RN 02/28/2023 12:03 AM    Patient resting comfortably in bed, currently denies pain, SW following for placement       Please review provider order for any additional goals.   Nurse to notify provider when observation goals have been met and patient is ready for discharge.

## 2023-02-28 NOTE — PLAN OF CARE
PRIMARY DIAGNOSIS: Placement  OUTPATIENT/OBSERVATION GOALS TO BE MET BEFORE DISCHARGE:  1. ADLs back to baseline: Yes    2. Activity and level of assistance: Up with maximum assistance.     3. Pain status: Pain free.    4. Return to near baseline physical activity: Yes     Discharge Planner Nurse   Safe discharge environment identified: No  Barriers to discharge: Yes       Entered by: Luciana Johnson RN 02/28/2023 11:12 AM   Pt denies pain.  Makes needs known.  Med compliant.  Incontinent of bowel and bladder.  Skin assessed.  Pt encouraged to roll to weaker side to increase strength.  Pt offered opportunity to get up in chair.  Pt declined.  Please review provider order for any additional goals.   Nurse to notify provider when observation goals have been met and patient is ready for discharge.Goal Outcome Evaluation:

## 2023-02-28 NOTE — PROGRESS NOTES
Care Management Follow Up    Expected Discharge Date: 03/31/2023     Concerns to be Addressed: Discharge planning     Patient plan of care discussed at interdisciplinary rounds: Yes    Anticipated Discharge Disposition:  Group Home once placement found by relocation worker     Additional Information:  ANALY received email from relocation worker supervisor Ghazal, covering for Misty while she is on vacation. She has not yet heard back from [facility] about the 6790 rate sheet getting completed.     Ghazal inquired about determination from [facility] after their assessment this past Friday. ANALY has not yet heard. ANALY emailed their DON Edith ([email or web address]) requesting an update.    ANALY will continue to follow.     DANITA Obrien, [facility]   Inpatient Care Coordination  [facility]   [phone]

## 2023-02-28 NOTE — PLAN OF CARE
PRIMARY DIAGNOSIS: Placement  OUTPATIENT/OBSERVATION GOALS TO BE MET BEFORE DISCHARGE:  1. ADLs back to baseline: Yes    2. Activity and level of assistance: Up with maximum assistance. Consider SW and/or PT evaluation.     3. Pain status: Pain free.    4. Return to near baseline physical activity: Yes     Discharge Planner Nurse   Safe discharge environment identified: No  Barriers to discharge: Yes       Entered by: Luciana Johnson RN 02/27/2023 7:15 PM     Please review provider order for any additional goals.   Nurse to notify provider when observation goals have been met and patient is ready for discharge.Goal Outcome Evaluation:

## 2023-03-01 LAB — GLUCOSE BLDC GLUCOMTR-MCNC: 102 MG/DL (ref 70–99)

## 2023-03-01 PROCEDURE — 250N000013 HC RX MED GY IP 250 OP 250 PS 637: Performed by: PHYSICIAN ASSISTANT

## 2023-03-01 PROCEDURE — 99207 PR NO CHARGE LOS: CPT | Performed by: NURSE PRACTITIONER

## 2023-03-01 PROCEDURE — 250N000013 HC RX MED GY IP 250 OP 250 PS 637: Performed by: HOSPITALIST

## 2023-03-01 PROCEDURE — 82962 GLUCOSE BLOOD TEST: CPT

## 2023-03-01 PROCEDURE — 250N000013 HC RX MED GY IP 250 OP 250 PS 637: Performed by: NURSE PRACTITIONER

## 2023-03-01 PROCEDURE — G0378 HOSPITAL OBSERVATION PER HR: HCPCS

## 2023-03-01 RX ADMIN — CLONIDINE HYDROCHLORIDE 0.1 MG: 0.1 TABLET ORAL at 20:32

## 2023-03-01 RX ADMIN — DIVALPROEX SODIUM 750 MG: 500 TABLET, DELAYED RELEASE ORAL at 10:08

## 2023-03-01 RX ADMIN — POLYETHYLENE GLYCOL 3350 17 G: 17 POWDER, FOR SOLUTION ORAL at 10:07

## 2023-03-01 RX ADMIN — CARBOXYMETHYLCELLULOSE SODIUM 1 DROP: 0.5 SOLUTION/ DROPS OPHTHALMIC at 20:32

## 2023-03-01 RX ADMIN — OLANZAPINE 2.5 MG: 2.5 TABLET, FILM COATED ORAL at 14:22

## 2023-03-01 RX ADMIN — QUETIAPINE 200 MG: 200 TABLET, FILM COATED ORAL at 14:22

## 2023-03-01 RX ADMIN — Medication 10 MG: at 21:50

## 2023-03-01 RX ADMIN — METFORMIN HYDROCHLORIDE 850 MG: 850 TABLET, FILM COATED ORAL at 10:08

## 2023-03-01 RX ADMIN — CLONIDINE HYDROCHLORIDE 0.1 MG: 0.1 TABLET ORAL at 14:22

## 2023-03-01 RX ADMIN — HYDROXYZINE HYDROCHLORIDE 50 MG: 50 TABLET, FILM COATED ORAL at 14:22

## 2023-03-01 RX ADMIN — HYDROXYZINE HYDROCHLORIDE 50 MG: 50 TABLET, FILM COATED ORAL at 20:32

## 2023-03-01 RX ADMIN — METOPROLOL SUCCINATE 50 MG: 50 TABLET, EXTENDED RELEASE ORAL at 10:07

## 2023-03-01 RX ADMIN — BACLOFEN 10 MG: 10 TABLET ORAL at 10:09

## 2023-03-01 RX ADMIN — ATORVASTATIN CALCIUM 20 MG: 20 TABLET, FILM COATED ORAL at 20:32

## 2023-03-01 RX ADMIN — MIRTAZAPINE 15 MG: 15 TABLET, FILM COATED ORAL at 21:51

## 2023-03-01 RX ADMIN — CLOTRIMAZOLE: 0.01 CREAM TOPICAL at 20:33

## 2023-03-01 RX ADMIN — QUETIAPINE 200 MG: 200 TABLET, FILM COATED ORAL at 10:07

## 2023-03-01 RX ADMIN — VENLAFAXINE HYDROCHLORIDE 75 MG: 150 CAPSULE, EXTENDED RELEASE ORAL at 21:51

## 2023-03-01 RX ADMIN — CLONIDINE HYDROCHLORIDE 0.1 MG: 0.1 TABLET ORAL at 10:08

## 2023-03-01 RX ADMIN — BACLOFEN 10 MG: 10 TABLET ORAL at 14:22

## 2023-03-01 RX ADMIN — LEVOTHYROXINE SODIUM 25 MCG: 0.03 TABLET ORAL at 10:09

## 2023-03-01 RX ADMIN — HYDROCORTISONE: 1 CREAM TOPICAL at 10:13

## 2023-03-01 RX ADMIN — DESONIDE: 0.5 CREAM TOPICAL at 12:57

## 2023-03-01 RX ADMIN — VENLAFAXINE HYDROCHLORIDE 150 MG: 150 CAPSULE, EXTENDED RELEASE ORAL at 21:51

## 2023-03-01 RX ADMIN — CARBOXYMETHYLCELLULOSE SODIUM 1 DROP: 0.5 SOLUTION/ DROPS OPHTHALMIC at 10:08

## 2023-03-01 RX ADMIN — BACLOFEN 10 MG: 10 TABLET ORAL at 20:32

## 2023-03-01 RX ADMIN — HYDROCORTISONE: 1 CREAM TOPICAL at 20:33

## 2023-03-01 RX ADMIN — EMPAGLIFLOZIN 10 MG: 10 TABLET, FILM COATED ORAL at 10:09

## 2023-03-01 RX ADMIN — QUETIAPINE FUMARATE 400 MG: 200 TABLET ORAL at 21:50

## 2023-03-01 RX ADMIN — KETOCONAZOLE: 20 CREAM TOPICAL at 10:13

## 2023-03-01 RX ADMIN — CARBOXYMETHYLCELLULOSE SODIUM 1 DROP: 0.5 SOLUTION/ DROPS OPHTHALMIC at 12:57

## 2023-03-01 RX ADMIN — CLOTRIMAZOLE: 0.01 CREAM TOPICAL at 10:13

## 2023-03-01 RX ADMIN — CARBOXYMETHYLCELLULOSE SODIUM 1 DROP: 0.5 SOLUTION/ DROPS OPHTHALMIC at 16:37

## 2023-03-01 RX ADMIN — HYDROXYZINE HYDROCHLORIDE 50 MG: 50 TABLET, FILM COATED ORAL at 10:09

## 2023-03-01 RX ADMIN — ASPIRIN 81 MG CHEWABLE TABLET 81 MG: 81 TABLET CHEWABLE at 10:08

## 2023-03-01 RX ADMIN — Medication 25 MCG: at 10:09

## 2023-03-01 RX ADMIN — KETOCONAZOLE: 20 CREAM TOPICAL at 20:33

## 2023-03-01 RX ADMIN — DIVALPROEX SODIUM 1000 MG: 500 TABLET, DELAYED RELEASE ORAL at 21:50

## 2023-03-01 RX ADMIN — METFORMIN HYDROCHLORIDE 850 MG: 850 TABLET, FILM COATED ORAL at 18:33

## 2023-03-01 ASSESSMENT — ACTIVITIES OF DAILY LIVING (ADL)
ADLS_ACUITY_SCORE: 56

## 2023-03-01 NOTE — PLAN OF CARE
PRIMARY DIAGNOSIS: Placement  OUTPATIENT/OBSERVATION GOALS TO BE MET BEFORE DISCHARGE:  1. ADLs back to baseline: Yes    2. Activity and level of assistance: Up with maximum assistance. Consider SW and/or PT evaluation.     3. Pain status: Pain free.    4. Return to near baseline physical activity: Yes     Discharge Planner Nurse   Safe discharge environment identified: No  Barriers to discharge: Yes       Entered by: Luciana Johnson RN 03/01/2023 1:17 PM     Please review provider order for any additional goals.   Nurse to notify provider when observation goals have been met and patient is ready for discharge.Goal Outcome Evaluation:

## 2023-03-01 NOTE — PLAN OF CARE

## 2023-03-01 NOTE — PLAN OF CARE
PRIMARY DIAGNOSIS: Placement  OUTPATIENT/OBSERVATION GOALS TO BE MET BEFORE DISCHARGE:  ADLs back to baseline: Yes    Activity and level of assistance: Up with maximum assistance. Consider SW and/or PT evaluation.     Pain status: Pain free.    Return to near baseline physical activity: Yes     Discharge Planner Nurse   Safe discharge environment identified: No  Barriers to discharge: Yes       Entered by: Bhavik Thomas RN 03/01/2023     Please review provider order for any additional goals.   Nurse to notify provider when observation goals have been met and patient is ready for discharge.

## 2023-03-01 NOTE — PLAN OF CARE
PRIMARY DIAGNOSIS: PLACEMENT  OUTPATIENT/OBSERVATION GOALS TO BE MET BEFORE DISCHARGE:  1. ADLs back to baseline: Yes    2. Activity and level of assistance: Lift    3. Pain status: Pain free.    4. Return to near baseline physical activity: Yes     Discharge Planner Nurse   Safe discharge environment identified: No  Barriers to discharge: Yes       Entered by: Luciana Johnson RN 03/01/2023 11:15 AM   Med compliant.  Makes needs known.  Eating and drinking.  Declined offer to get up to chair.    Please review provider order for any additional goals.   Nurse to notify provider when observation goals have been met and patient is ready for discharge.Goal Outcome Evaluation:
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
## 2023-03-01 NOTE — PROGRESS NOTES
Care Management Follow Up     Expected Discharge Date: 03/31/2023     Concerns to be Addressed: Discharge planning     Patient plan of care discussed at interdisciplinary rounds: Yes     Anticipated Discharge Disposition:  Group Home once placement found by relocation worker     Additional Information:  Relocation worker has not yet heard back from [facility] about the 6790 rate sheet getting completed.       ANALY received email from WASHINGTON Sharma ([email or web address]), they have not yet made a determination whether or not they can meet pt's needs. She plans to call patient or follow up with him in person today to ask a few additional questions. She will follow up with SW once a determination has been made.     ANALY will continue to follow.      DANITA Obrien, [facility]   Inpatient Care Coordination  [facility]   [phone]

## 2023-03-01 NOTE — PLAN OF CARE
PRIMARY DIAGNOSIS: PLACEMENT  OUTPATIENT/OBSERVATION GOALS TO BE MET BEFORE DISCHARGE:  1. ADLs back to baseline: Yes    2. Activity and level of assistance: Up with maximum assistance.    3. Pain status: Pain free.    4. Return to near baseline physical activity: Yes     Discharge Planner Nurse   Safe discharge environment identified: No  Barriers to discharge: Yes       Entered by: Yvrose Abdi RN 03/01/2023 1:39 AM     Please review provider order for any additional goals.   Nurse to notify provider when observation goals have been met and patient is ready for discharge.    Able to make needs known. Denies pain. Compliant w/ meds & care. Incontinent of bowel & bladder. Resting between cares. SW following for placement. Will continue to monitor.

## 2023-03-01 NOTE — PLAN OF CARE
PRIMARY DIAGNOSIS: Placement  OUTPATIENT/OBSERVATION GOALS TO BE MET BEFORE DISCHARGE:  ADLs back to baseline: Yes    Activity and level of assistance: assist x 2    Pain status: Pain free.    Return to near baseline physical activity: Yes     Discharge Planner Nurse   Safe discharge environment identified: No  Barriers to discharge: Yes       Entered by: Emily Garcia RN 03/01/2023 12:24 AM    Patient currently denies pain, resting comfortably in bed, SW following for placement       Please review provider order for any additional goals.   Nurse to notify provider when observation goals have been met and patient is ready for discharge.

## 2023-03-02 LAB — GLUCOSE BLDC GLUCOMTR-MCNC: 111 MG/DL (ref 70–99)

## 2023-03-02 PROCEDURE — 99207 PR NO CHARGE LOS: CPT | Performed by: NURSE PRACTITIONER

## 2023-03-02 PROCEDURE — 250N000013 HC RX MED GY IP 250 OP 250 PS 637: Performed by: NURSE PRACTITIONER

## 2023-03-02 PROCEDURE — 250N000013 HC RX MED GY IP 250 OP 250 PS 637: Performed by: HOSPITALIST

## 2023-03-02 PROCEDURE — 250N000013 HC RX MED GY IP 250 OP 250 PS 637: Performed by: PHYSICIAN ASSISTANT

## 2023-03-02 PROCEDURE — G0378 HOSPITAL OBSERVATION PER HR: HCPCS

## 2023-03-02 PROCEDURE — 82962 GLUCOSE BLOOD TEST: CPT

## 2023-03-02 RX ADMIN — CLONIDINE HYDROCHLORIDE 0.1 MG: 0.1 TABLET ORAL at 20:13

## 2023-03-02 RX ADMIN — CLONIDINE HYDROCHLORIDE 0.1 MG: 0.1 TABLET ORAL at 08:22

## 2023-03-02 RX ADMIN — DIVALPROEX SODIUM 750 MG: 500 TABLET, DELAYED RELEASE ORAL at 08:21

## 2023-03-02 RX ADMIN — METFORMIN HYDROCHLORIDE 850 MG: 850 TABLET, FILM COATED ORAL at 11:35

## 2023-03-02 RX ADMIN — VENLAFAXINE HYDROCHLORIDE 75 MG: 150 CAPSULE, EXTENDED RELEASE ORAL at 22:34

## 2023-03-02 RX ADMIN — BACLOFEN 10 MG: 10 TABLET ORAL at 20:13

## 2023-03-02 RX ADMIN — HYDROCORTISONE: 1 CREAM TOPICAL at 20:14

## 2023-03-02 RX ADMIN — QUETIAPINE 200 MG: 200 TABLET, FILM COATED ORAL at 14:04

## 2023-03-02 RX ADMIN — CLOTRIMAZOLE: 0.01 CREAM TOPICAL at 08:23

## 2023-03-02 RX ADMIN — HYDROXYZINE HYDROCHLORIDE 50 MG: 50 TABLET, FILM COATED ORAL at 08:22

## 2023-03-02 RX ADMIN — POLYETHYLENE GLYCOL 3350 17 G: 17 POWDER, FOR SOLUTION ORAL at 08:20

## 2023-03-02 RX ADMIN — Medication 25 MCG: at 08:00

## 2023-03-02 RX ADMIN — KETOCONAZOLE: 20 CREAM TOPICAL at 20:14

## 2023-03-02 RX ADMIN — DESONIDE: 0.5 CREAM TOPICAL at 11:31

## 2023-03-02 RX ADMIN — MIRTAZAPINE 15 MG: 15 TABLET, FILM COATED ORAL at 22:32

## 2023-03-02 RX ADMIN — ATORVASTATIN CALCIUM 20 MG: 20 TABLET, FILM COATED ORAL at 20:13

## 2023-03-02 RX ADMIN — HYDROCORTISONE: 1 CREAM TOPICAL at 08:00

## 2023-03-02 RX ADMIN — LEVOTHYROXINE SODIUM 25 MCG: 0.03 TABLET ORAL at 08:22

## 2023-03-02 RX ADMIN — CARBOXYMETHYLCELLULOSE SODIUM 1 DROP: 0.5 SOLUTION/ DROPS OPHTHALMIC at 08:23

## 2023-03-02 RX ADMIN — ASPIRIN 81 MG CHEWABLE TABLET 81 MG: 81 TABLET CHEWABLE at 08:22

## 2023-03-02 RX ADMIN — METFORMIN HYDROCHLORIDE 850 MG: 850 TABLET, FILM COATED ORAL at 17:33

## 2023-03-02 RX ADMIN — CLONIDINE HYDROCHLORIDE 0.1 MG: 0.1 TABLET ORAL at 14:04

## 2023-03-02 RX ADMIN — CLOTRIMAZOLE: 0.01 CREAM TOPICAL at 20:14

## 2023-03-02 RX ADMIN — OLANZAPINE 2.5 MG: 2.5 TABLET, FILM COATED ORAL at 14:04

## 2023-03-02 RX ADMIN — METOPROLOL SUCCINATE 50 MG: 50 TABLET, EXTENDED RELEASE ORAL at 08:22

## 2023-03-02 RX ADMIN — Medication 10 MG: at 22:33

## 2023-03-02 RX ADMIN — CARBOXYMETHYLCELLULOSE SODIUM 1 DROP: 0.5 SOLUTION/ DROPS OPHTHALMIC at 20:13

## 2023-03-02 RX ADMIN — HYDROXYZINE HYDROCHLORIDE 50 MG: 50 TABLET, FILM COATED ORAL at 20:13

## 2023-03-02 RX ADMIN — EMPAGLIFLOZIN 10 MG: 10 TABLET, FILM COATED ORAL at 08:22

## 2023-03-02 RX ADMIN — BACLOFEN 10 MG: 10 TABLET ORAL at 14:04

## 2023-03-02 RX ADMIN — KETOCONAZOLE: 20 CREAM TOPICAL at 11:36

## 2023-03-02 RX ADMIN — DIVALPROEX SODIUM 1000 MG: 500 TABLET, DELAYED RELEASE ORAL at 22:33

## 2023-03-02 RX ADMIN — CARBOXYMETHYLCELLULOSE SODIUM 1 DROP: 0.5 SOLUTION/ DROPS OPHTHALMIC at 11:36

## 2023-03-02 RX ADMIN — CARBOXYMETHYLCELLULOSE SODIUM 1 DROP: 0.5 SOLUTION/ DROPS OPHTHALMIC at 17:33

## 2023-03-02 RX ADMIN — BACLOFEN 10 MG: 10 TABLET ORAL at 08:22

## 2023-03-02 RX ADMIN — QUETIAPINE 200 MG: 200 TABLET, FILM COATED ORAL at 08:20

## 2023-03-02 RX ADMIN — HYDROXYZINE HYDROCHLORIDE 50 MG: 50 TABLET, FILM COATED ORAL at 14:05

## 2023-03-02 RX ADMIN — QUETIAPINE FUMARATE 400 MG: 200 TABLET ORAL at 22:32

## 2023-03-02 RX ADMIN — VENLAFAXINE HYDROCHLORIDE 150 MG: 150 CAPSULE, EXTENDED RELEASE ORAL at 22:34

## 2023-03-02 ASSESSMENT — ACTIVITIES OF DAILY LIVING (ADL)
ADLS_ACUITY_SCORE: 56

## 2023-03-02 NOTE — PLAN OF CARE
PRIMARY DIAGNOSIS: Placement  OUTPATIENT/OBSERVATION GOALS TO BE MET BEFORE DISCHARGE:  ADLs back to baseline: Yes    Activity and level of assistance: assist x 2    Pain status: Pain free.    Return to near baseline physical activity: Yes     Discharge Planner Nurse   Safe discharge environment identified: No  Barriers to discharge: Yes       Entered by: Emily Garcia RN 03/02/2023 12:24 AM    Patient currently denies pain, resting comfortably in bed, SW following for placement        Please review provider order for any additional goals.   Nurse to notify provider when observation goals have been met and patient is ready for discharge.

## 2023-03-02 NOTE — PLAN OF CARE
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
## 2023-03-02 NOTE — PROGRESS NOTES
"Care Management Follow Up    Expected Discharge Date: 03/31/2023     Concerns to be Addressed: Discharge planning      Patient plan of care discussed at interdisciplinary rounds: Yes    Anticipated Discharge Disposition:  once placement found by relocation worker.    Additional Information:  [facility] to see if [facility]'ve clinically accepted patient or not.     SW emailed pt's relocation worker Misty to inquire if she has received rate sheet from [facility] to proceed with placement.     SW will continue to follow.     1400: SW received email back from relocation worker with an update:     \"As for [facility], they said the meggan lift may be a barrier and are waiting to hear from the  for approval on that. I will be calling them again tomorrow if I have not heard anything.    I did talk with [facility]  today for a referral for crisis respite in the mean time and a coordinator should be getting back to me within a couple days to talk further about possible openings for Chris until I can find him permanent housing. Hopefully [facility] or [facility] move along fast enough where this is not needed but still should be a good back up if needed.\"    DANITA Obrien, [facility]   Inpatient Care Coordination  [facility]   [phone]        "

## 2023-03-03 PROCEDURE — G0378 HOSPITAL OBSERVATION PER HR: HCPCS

## 2023-03-03 PROCEDURE — 250N000013 HC RX MED GY IP 250 OP 250 PS 637: Performed by: NURSE PRACTITIONER

## 2023-03-03 PROCEDURE — 250N000013 HC RX MED GY IP 250 OP 250 PS 637: Performed by: PHYSICIAN ASSISTANT

## 2023-03-03 PROCEDURE — 250N000013 HC RX MED GY IP 250 OP 250 PS 637: Performed by: HOSPITALIST

## 2023-03-03 RX ADMIN — DIVALPROEX SODIUM 750 MG: 500 TABLET, DELAYED RELEASE ORAL at 08:31

## 2023-03-03 RX ADMIN — DIVALPROEX SODIUM 1000 MG: 500 TABLET, DELAYED RELEASE ORAL at 21:27

## 2023-03-03 RX ADMIN — Medication 10 MG: at 21:27

## 2023-03-03 RX ADMIN — CLONIDINE HYDROCHLORIDE 0.1 MG: 0.1 TABLET ORAL at 15:01

## 2023-03-03 RX ADMIN — CARBOXYMETHYLCELLULOSE SODIUM 1 DROP: 0.5 SOLUTION/ DROPS OPHTHALMIC at 18:17

## 2023-03-03 RX ADMIN — MIRTAZAPINE 15 MG: 15 TABLET, FILM COATED ORAL at 21:27

## 2023-03-03 RX ADMIN — LEVOTHYROXINE SODIUM 25 MCG: 0.03 TABLET ORAL at 08:32

## 2023-03-03 RX ADMIN — QUETIAPINE 200 MG: 200 TABLET, FILM COATED ORAL at 08:32

## 2023-03-03 RX ADMIN — KETOCONAZOLE: 20 CREAM TOPICAL at 08:36

## 2023-03-03 RX ADMIN — CARBOXYMETHYLCELLULOSE SODIUM 1 DROP: 0.5 SOLUTION/ DROPS OPHTHALMIC at 08:32

## 2023-03-03 RX ADMIN — CARBOXYMETHYLCELLULOSE SODIUM 1 DROP: 0.5 SOLUTION/ DROPS OPHTHALMIC at 12:13

## 2023-03-03 RX ADMIN — CLONIDINE HYDROCHLORIDE 0.1 MG: 0.1 TABLET ORAL at 19:46

## 2023-03-03 RX ADMIN — CLONIDINE HYDROCHLORIDE 0.1 MG: 0.1 TABLET ORAL at 08:32

## 2023-03-03 RX ADMIN — ATORVASTATIN CALCIUM 20 MG: 20 TABLET, FILM COATED ORAL at 19:46

## 2023-03-03 RX ADMIN — VENLAFAXINE HYDROCHLORIDE 75 MG: 150 CAPSULE, EXTENDED RELEASE ORAL at 21:26

## 2023-03-03 RX ADMIN — CARBOXYMETHYLCELLULOSE SODIUM 1 DROP: 0.5 SOLUTION/ DROPS OPHTHALMIC at 21:26

## 2023-03-03 RX ADMIN — OLANZAPINE 2.5 MG: 2.5 TABLET, FILM COATED ORAL at 15:01

## 2023-03-03 RX ADMIN — BACLOFEN 10 MG: 10 TABLET ORAL at 15:01

## 2023-03-03 RX ADMIN — QUETIAPINE 200 MG: 200 TABLET, FILM COATED ORAL at 15:01

## 2023-03-03 RX ADMIN — DESONIDE: 0.5 CREAM TOPICAL at 12:12

## 2023-03-03 RX ADMIN — Medication 25 MCG: at 08:32

## 2023-03-03 RX ADMIN — BACLOFEN 10 MG: 10 TABLET ORAL at 19:46

## 2023-03-03 RX ADMIN — METFORMIN HYDROCHLORIDE 850 MG: 850 TABLET, FILM COATED ORAL at 18:17

## 2023-03-03 RX ADMIN — HYDROXYZINE HYDROCHLORIDE 50 MG: 50 TABLET, FILM COATED ORAL at 15:01

## 2023-03-03 RX ADMIN — EMPAGLIFLOZIN 10 MG: 10 TABLET, FILM COATED ORAL at 08:32

## 2023-03-03 RX ADMIN — QUETIAPINE FUMARATE 400 MG: 200 TABLET ORAL at 21:27

## 2023-03-03 RX ADMIN — METOPROLOL SUCCINATE 50 MG: 50 TABLET, EXTENDED RELEASE ORAL at 08:32

## 2023-03-03 RX ADMIN — VENLAFAXINE HYDROCHLORIDE 150 MG: 150 CAPSULE, EXTENDED RELEASE ORAL at 21:27

## 2023-03-03 RX ADMIN — BACLOFEN 10 MG: 10 TABLET ORAL at 08:32

## 2023-03-03 RX ADMIN — METFORMIN HYDROCHLORIDE 850 MG: 850 TABLET, FILM COATED ORAL at 08:32

## 2023-03-03 RX ADMIN — HYDROXYZINE HYDROCHLORIDE 50 MG: 50 TABLET, FILM COATED ORAL at 19:46

## 2023-03-03 RX ADMIN — KETOCONAZOLE: 20 CREAM TOPICAL at 19:48

## 2023-03-03 RX ADMIN — CLOTRIMAZOLE: 0.01 CREAM TOPICAL at 19:48

## 2023-03-03 RX ADMIN — ASPIRIN 81 MG CHEWABLE TABLET 81 MG: 81 TABLET CHEWABLE at 08:31

## 2023-03-03 RX ADMIN — HYDROCORTISONE: 1 CREAM TOPICAL at 19:49

## 2023-03-03 RX ADMIN — HYDROXYZINE HYDROCHLORIDE 50 MG: 50 TABLET, FILM COATED ORAL at 08:32

## 2023-03-03 RX ADMIN — POLYETHYLENE GLYCOL 3350 17 G: 17 POWDER, FOR SOLUTION ORAL at 08:32

## 2023-03-03 ASSESSMENT — ACTIVITIES OF DAILY LIVING (ADL)
ADLS_ACUITY_SCORE: 56
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
## 2023-03-03 NOTE — PLAN OF CARE
PRIMARY DIAGNOSIS: Placement  OUTPATIENT/OBSERVATION GOALS TO BE MET BEFORE DISCHARGE:  ADLs back to baseline: Yes    Activity and level of assistance: assist x 2    Pain status: Pain free.    Return to near baseline physical activity: Yes     Discharge Planner Nurse   Safe discharge environment identified: No  Barriers to discharge: Yes       Entered by: Emily Garcia RN 03/03/2023 12:39 AM    Patient currently resting in bed, denies pain, SW following for placement       Please review provider order for any additional goals.   Nurse to notify provider when observation goals have been met and patient is ready for discharge.

## 2023-03-03 NOTE — PLAN OF CARE
PRIMARY DIAGNOSIS: Placement  OUTPATIENT/OBSERVATION GOALS TO BE MET BEFORE DISCHARGE:  1. Pain Status: Pain free.    2. Return to near baseline physical activity: Yes    3. Cleared for discharge by consultants (if involved): No    Discharge Planner Nurse   Safe discharge environment identified: No  Barriers to discharge: Yes       Entered by: Bhavik Thomas RN 03/02/2023    Please review provider order for any additional goals.   Nurse to notify provider when observation goals have been met and patient is ready for discharge.

## 2023-03-03 NOTE — PLAN OF CARE
PRIMARY DIAGNOSIS: Placement   OUTPATIENT/OBSERVATION GOALS TO BE MET BEFORE DISCHARGE:  1. ADLs back to baseline: Yes    2. Activity and level of assistance: Up with maximum assistance. Not OOB.    3. Pain status: Pain free.    4. Return to near baseline physical activity: Yes     Discharge Planner Nurse   Safe discharge environment identified: No  Barriers to discharge: Yes       Entered by: Hira Fraire RN 03/03/2023     A/O x4. VSS on RA. Assist of 2, lift. Tolerating regular diet. Lung sounds clear. Bowel sounds active. 3/2 LBM per chart. Adequate urine output via briefs. Skin rash on face, redness in right and left eye. Eye drops given. Denies pain. Denies nausea.     Please review provider order for any additional goals.   Nurse to notify provider when observation goals have been met and patient is ready for discharge.

## 2023-03-04 LAB — GLUCOSE BLDC GLUCOMTR-MCNC: 107 MG/DL (ref 70–99)

## 2023-03-04 PROCEDURE — 82962 GLUCOSE BLOOD TEST: CPT

## 2023-03-04 PROCEDURE — 250N000013 HC RX MED GY IP 250 OP 250 PS 637: Performed by: PHYSICIAN ASSISTANT

## 2023-03-04 PROCEDURE — 250N000013 HC RX MED GY IP 250 OP 250 PS 637: Performed by: HOSPITALIST

## 2023-03-04 PROCEDURE — G0378 HOSPITAL OBSERVATION PER HR: HCPCS

## 2023-03-04 PROCEDURE — 250N000013 HC RX MED GY IP 250 OP 250 PS 637: Performed by: NURSE PRACTITIONER

## 2023-03-04 RX ADMIN — ASPIRIN 81 MG CHEWABLE TABLET 81 MG: 81 TABLET CHEWABLE at 08:45

## 2023-03-04 RX ADMIN — HYDROCORTISONE: 1 CREAM TOPICAL at 20:13

## 2023-03-04 RX ADMIN — OLANZAPINE 2.5 MG: 2.5 TABLET, FILM COATED ORAL at 15:14

## 2023-03-04 RX ADMIN — Medication 25 MCG: at 08:47

## 2023-03-04 RX ADMIN — HYDROXYZINE HYDROCHLORIDE 50 MG: 50 TABLET, FILM COATED ORAL at 15:13

## 2023-03-04 RX ADMIN — CARBOXYMETHYLCELLULOSE SODIUM 1 DROP: 0.5 SOLUTION/ DROPS OPHTHALMIC at 12:15

## 2023-03-04 RX ADMIN — LEVOTHYROXINE SODIUM 25 MCG: 0.03 TABLET ORAL at 08:45

## 2023-03-04 RX ADMIN — QUETIAPINE 200 MG: 200 TABLET, FILM COATED ORAL at 15:14

## 2023-03-04 RX ADMIN — CLONIDINE HYDROCHLORIDE 0.1 MG: 0.1 TABLET ORAL at 20:11

## 2023-03-04 RX ADMIN — CLONIDINE HYDROCHLORIDE 0.1 MG: 0.1 TABLET ORAL at 15:14

## 2023-03-04 RX ADMIN — METOPROLOL SUCCINATE 50 MG: 50 TABLET, EXTENDED RELEASE ORAL at 08:45

## 2023-03-04 RX ADMIN — METFORMIN HYDROCHLORIDE 850 MG: 850 TABLET, FILM COATED ORAL at 18:28

## 2023-03-04 RX ADMIN — VENLAFAXINE HYDROCHLORIDE 75 MG: 150 CAPSULE, EXTENDED RELEASE ORAL at 21:27

## 2023-03-04 RX ADMIN — CARBOXYMETHYLCELLULOSE SODIUM 1 DROP: 0.5 SOLUTION/ DROPS OPHTHALMIC at 08:45

## 2023-03-04 RX ADMIN — CARBOXYMETHYLCELLULOSE SODIUM 1 DROP: 0.5 SOLUTION/ DROPS OPHTHALMIC at 20:11

## 2023-03-04 RX ADMIN — VENLAFAXINE HYDROCHLORIDE 150 MG: 150 CAPSULE, EXTENDED RELEASE ORAL at 21:27

## 2023-03-04 RX ADMIN — BACLOFEN 10 MG: 10 TABLET ORAL at 08:46

## 2023-03-04 RX ADMIN — DIVALPROEX SODIUM 1000 MG: 500 TABLET, DELAYED RELEASE ORAL at 21:26

## 2023-03-04 RX ADMIN — CLOTRIMAZOLE: 0.01 CREAM TOPICAL at 20:13

## 2023-03-04 RX ADMIN — EMPAGLIFLOZIN 10 MG: 10 TABLET, FILM COATED ORAL at 08:46

## 2023-03-04 RX ADMIN — DIVALPROEX SODIUM 750 MG: 500 TABLET, DELAYED RELEASE ORAL at 08:45

## 2023-03-04 RX ADMIN — HYDROXYZINE HYDROCHLORIDE 50 MG: 50 TABLET, FILM COATED ORAL at 20:11

## 2023-03-04 RX ADMIN — CARBOXYMETHYLCELLULOSE SODIUM 1 DROP: 0.5 SOLUTION/ DROPS OPHTHALMIC at 15:14

## 2023-03-04 RX ADMIN — CLONIDINE HYDROCHLORIDE 0.1 MG: 0.1 TABLET ORAL at 08:46

## 2023-03-04 RX ADMIN — BACLOFEN 10 MG: 10 TABLET ORAL at 15:14

## 2023-03-04 RX ADMIN — ATORVASTATIN CALCIUM 20 MG: 20 TABLET, FILM COATED ORAL at 20:11

## 2023-03-04 RX ADMIN — Medication 10 MG: at 21:26

## 2023-03-04 RX ADMIN — QUETIAPINE FUMARATE 400 MG: 200 TABLET ORAL at 21:26

## 2023-03-04 RX ADMIN — QUETIAPINE 200 MG: 200 TABLET, FILM COATED ORAL at 08:46

## 2023-03-04 RX ADMIN — HYDROXYZINE HYDROCHLORIDE 50 MG: 50 TABLET, FILM COATED ORAL at 08:45

## 2023-03-04 RX ADMIN — METFORMIN HYDROCHLORIDE 850 MG: 850 TABLET, FILM COATED ORAL at 08:46

## 2023-03-04 RX ADMIN — KETOCONAZOLE: 20 CREAM TOPICAL at 08:48

## 2023-03-04 RX ADMIN — KETOCONAZOLE: 20 CREAM TOPICAL at 20:13

## 2023-03-04 RX ADMIN — DESONIDE: 0.5 CREAM TOPICAL at 12:15

## 2023-03-04 RX ADMIN — MIRTAZAPINE 15 MG: 15 TABLET, FILM COATED ORAL at 21:26

## 2023-03-04 RX ADMIN — BACLOFEN 10 MG: 10 TABLET ORAL at 20:11

## 2023-03-04 ASSESSMENT — ACTIVITIES OF DAILY LIVING (ADL)
ADLS_ACUITY_SCORE: 56

## 2023-03-04 NOTE — PLAN OF CARE
PRIMARY DIAGNOSIS: Placement   OUTPATIENT/OBSERVATION GOALS TO BE MET BEFORE DISCHARGE:  1. ADLs back to baseline: Yes    2. Activity and level of assistance: Up with maximum assistance. Not OOB.    3. Pain status: Pain free.    4. Return to near baseline physical activity: Yes     Discharge Planner Nurse   Safe discharge environment identified: No  Barriers to discharge: Yes       Entered by: Hira Fraire RN 03/04/2023     A/O x4. VSS on RA. Assist of 2, lift. Tolerating regular diet. Lung sounds clear. Bowel sounds active. 3/2 LBM per chart. Adequate urine output via briefs. Skin rash on face, redness in right and left eye. Eye drops given. Denies pain. Denies nausea.     Please review provider order for any additional goals.   Nurse to notify provider when observation goals have been met and patient is ready for discharge.

## 2023-03-04 NOTE — PLAN OF CARE
PRIMARY DIAGNOSIS: PLACEMENT  OUTPATIENT/OBSERVATION GOALS TO BE MET BEFORE DISCHARGE:  ADLs back to baseline: Yes    Activity and level of assistance: A x 2    Pain status: Pain free.    Return to near baseline physical activity: Yes     Discharge Planner Nurse   Safe discharge environment identified: Yes  Barriers to discharge: No       Entered by: Preston Arango RN 03/04/2023 12:57 AM     Please review provider order for any additional goals.   Nurse to notify provider when observation goals have been met and patient is ready for discharge.Goal Outcome Evaluation:

## 2023-03-04 NOTE — PLAN OF CARE
PRIMARY DIAGNOSIS: PLACEMENT  OUTPATIENT/OBSERVATION GOALS TO BE MET BEFORE DISCHARGE:  ADLs back to baseline: Yes    Activity and level of assistance: A x 2    Pain status: Pain free.    Return to near baseline physical activity: Yes     Discharge Planner Nurse   Safe discharge environment identified: Yes  Barriers to discharge: No       Entered by: Preston Arango RN 03/04/2023 4:54 AM   Pt is A & O x 4, up w/ A x 2 lift transfer, denies pain, reg diet, SW following with placement.  Please review provider order for any additional goals.   Nurse to notify provider when observation goals have been met and patient is ready for discharge.Goal Outcome Evaluation:
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
## 2023-03-05 LAB — GLUCOSE BLDC GLUCOMTR-MCNC: 139 MG/DL (ref 70–99)

## 2023-03-05 PROCEDURE — 82962 GLUCOSE BLOOD TEST: CPT

## 2023-03-05 PROCEDURE — 99232 SBSQ HOSP IP/OBS MODERATE 35: CPT | Performed by: NURSE PRACTITIONER

## 2023-03-05 PROCEDURE — 250N000013 HC RX MED GY IP 250 OP 250 PS 637: Performed by: HOSPITALIST

## 2023-03-05 PROCEDURE — G0378 HOSPITAL OBSERVATION PER HR: HCPCS

## 2023-03-05 PROCEDURE — 250N000013 HC RX MED GY IP 250 OP 250 PS 637: Performed by: NURSE PRACTITIONER

## 2023-03-05 PROCEDURE — 250N000013 HC RX MED GY IP 250 OP 250 PS 637: Performed by: PHYSICIAN ASSISTANT

## 2023-03-05 RX ADMIN — CARBOXYMETHYLCELLULOSE SODIUM 1 DROP: 0.5 SOLUTION/ DROPS OPHTHALMIC at 09:47

## 2023-03-05 RX ADMIN — EMPAGLIFLOZIN 10 MG: 10 TABLET, FILM COATED ORAL at 09:46

## 2023-03-05 RX ADMIN — QUETIAPINE FUMARATE 400 MG: 200 TABLET ORAL at 21:11

## 2023-03-05 RX ADMIN — OLANZAPINE 2.5 MG: 2.5 TABLET, FILM COATED ORAL at 13:12

## 2023-03-05 RX ADMIN — CLONIDINE HYDROCHLORIDE 0.1 MG: 0.1 TABLET ORAL at 09:46

## 2023-03-05 RX ADMIN — HYDROCORTISONE: 1 CREAM TOPICAL at 21:11

## 2023-03-05 RX ADMIN — CLONIDINE HYDROCHLORIDE 0.1 MG: 0.1 TABLET ORAL at 13:12

## 2023-03-05 RX ADMIN — HYDROXYZINE HYDROCHLORIDE 50 MG: 50 TABLET, FILM COATED ORAL at 09:46

## 2023-03-05 RX ADMIN — CARBOXYMETHYLCELLULOSE SODIUM 1 DROP: 0.5 SOLUTION/ DROPS OPHTHALMIC at 21:11

## 2023-03-05 RX ADMIN — QUETIAPINE 200 MG: 200 TABLET, FILM COATED ORAL at 13:12

## 2023-03-05 RX ADMIN — BACLOFEN 10 MG: 10 TABLET ORAL at 13:12

## 2023-03-05 RX ADMIN — DIVALPROEX SODIUM 1000 MG: 500 TABLET, DELAYED RELEASE ORAL at 21:10

## 2023-03-05 RX ADMIN — BACLOFEN 10 MG: 10 TABLET ORAL at 09:47

## 2023-03-05 RX ADMIN — LEVOTHYROXINE SODIUM 25 MCG: 0.03 TABLET ORAL at 09:47

## 2023-03-05 RX ADMIN — Medication 10 MG: at 21:11

## 2023-03-05 RX ADMIN — BACLOFEN 10 MG: 10 TABLET ORAL at 21:11

## 2023-03-05 RX ADMIN — DESONIDE: 0.5 CREAM TOPICAL at 13:13

## 2023-03-05 RX ADMIN — KETOCONAZOLE: 20 CREAM TOPICAL at 09:51

## 2023-03-05 RX ADMIN — METFORMIN HYDROCHLORIDE 850 MG: 850 TABLET, FILM COATED ORAL at 17:37

## 2023-03-05 RX ADMIN — CARBOXYMETHYLCELLULOSE SODIUM 1 DROP: 0.5 SOLUTION/ DROPS OPHTHALMIC at 13:12

## 2023-03-05 RX ADMIN — QUETIAPINE 200 MG: 200 TABLET, FILM COATED ORAL at 09:47

## 2023-03-05 RX ADMIN — CARBOXYMETHYLCELLULOSE SODIUM 1 DROP: 0.5 SOLUTION/ DROPS OPHTHALMIC at 17:38

## 2023-03-05 RX ADMIN — POLYETHYLENE GLYCOL 3350 17 G: 17 POWDER, FOR SOLUTION ORAL at 09:46

## 2023-03-05 RX ADMIN — ASPIRIN 81 MG CHEWABLE TABLET 81 MG: 81 TABLET CHEWABLE at 09:46

## 2023-03-05 RX ADMIN — HYDROXYZINE HYDROCHLORIDE 50 MG: 50 TABLET, FILM COATED ORAL at 21:11

## 2023-03-05 RX ADMIN — METOPROLOL SUCCINATE 50 MG: 50 TABLET, EXTENDED RELEASE ORAL at 09:47

## 2023-03-05 RX ADMIN — KETOCONAZOLE: 20 CREAM TOPICAL at 21:12

## 2023-03-05 RX ADMIN — DIVALPROEX SODIUM 750 MG: 500 TABLET, DELAYED RELEASE ORAL at 09:46

## 2023-03-05 RX ADMIN — CLOTRIMAZOLE: 0.01 CREAM TOPICAL at 21:12

## 2023-03-05 RX ADMIN — HYDROXYZINE HYDROCHLORIDE 50 MG: 50 TABLET, FILM COATED ORAL at 13:12

## 2023-03-05 RX ADMIN — VENLAFAXINE HYDROCHLORIDE 75 MG: 150 CAPSULE, EXTENDED RELEASE ORAL at 21:11

## 2023-03-05 RX ADMIN — ATORVASTATIN CALCIUM 20 MG: 20 TABLET, FILM COATED ORAL at 21:11

## 2023-03-05 RX ADMIN — MIRTAZAPINE 15 MG: 15 TABLET, FILM COATED ORAL at 21:10

## 2023-03-05 RX ADMIN — METFORMIN HYDROCHLORIDE 850 MG: 850 TABLET, FILM COATED ORAL at 09:46

## 2023-03-05 RX ADMIN — VENLAFAXINE HYDROCHLORIDE 150 MG: 150 CAPSULE, EXTENDED RELEASE ORAL at 21:11

## 2023-03-05 RX ADMIN — Medication 25 MCG: at 09:46

## 2023-03-05 RX ADMIN — CLONIDINE HYDROCHLORIDE 0.1 MG: 0.1 TABLET ORAL at 21:10

## 2023-03-05 ASSESSMENT — ACTIVITIES OF DAILY LIVING (ADL)
ADLS_ACUITY_SCORE: 56
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
## 2023-03-05 NOTE — PLAN OF CARE
PRIMARY DIAGNOSIS: PLACEMENT  OUTPATIENT/OBSERVATION GOALS TO BE MET BEFORE DISCHARGE:  ADLs back to baseline: Yes    Activity and level of assistance: A x 2    Pain status: Pain free.    Return to near baseline physical activity: Yes     Discharge Planner Nurse   Safe discharge environment identified: Yes  Barriers to discharge: No       Entered by: Preston Arango RN 03/04/2023 8:38 PM     Please review provider order for any additional goals.   Nurse to notify provider when observation goals have been met and patient is ready for discharge.Goal Outcome Evaluation:

## 2023-03-05 NOTE — PLAN OF CARE
PRIMARY DIAGNOSIS: Placement   OUTPATIENT/OBSERVATION GOALS TO BE MET BEFORE DISCHARGE:  1. ADLs back to baseline: Yes    2. Activity and level of assistance: Up with maximum assistance. Not OOB.    3. Pain status: Pain free.    4. Return to near baseline physical activity: Yes     Discharge Planner Nurse   Safe discharge environment identified: No  Barriers to discharge: Yes       Entered by: Hira Fraire RN 03/05/2023     A/O x4. VSS on RA. Assist of 2, lift. Tolerating regular diet. Lung sounds clear. Bowel sounds active. 3/4 LBM per chart. Adequate urine output via briefs. Skin rash on face, redness in right and left eye. Eye drops given. Denies pain. Denies nausea.     Please review provider order for any additional goals.   Nurse to notify provider when observation goals have been met and patient is ready for discharge.

## 2023-03-05 NOTE — PLAN OF CARE
PRIMARY DIAGNOSIS: PLACEMENT  OUTPATIENT/OBSERVATION GOALS TO BE MET BEFORE DISCHARGE:  1. ADLs back to baseline: Yes    2. Activity and level of assistance: A x 2    3. Pain status: Pain free.    4. Return to near baseline physical activity: Yes     Discharge Planner Nurse   Safe discharge environment identified: Yes  Barriers to discharge: No       Entered by: Preston Arango RN 03/05/2023 3:24 AM    Pt is A & O x 4, up w/ A x 2 lift transfer, denies pain, reg diet, SW following with placement.  Please review provider order for any additional goals.   Nurse to notify provider when observation goals have been met and patient is ready for discharge.Goal Outcome Evaluation:

## 2023-03-06 LAB — GLUCOSE BLDC GLUCOMTR-MCNC: 107 MG/DL (ref 70–99)

## 2023-03-06 PROCEDURE — 250N000013 HC RX MED GY IP 250 OP 250 PS 637: Performed by: HOSPITALIST

## 2023-03-06 PROCEDURE — 82962 GLUCOSE BLOOD TEST: CPT

## 2023-03-06 PROCEDURE — 250N000013 HC RX MED GY IP 250 OP 250 PS 637: Performed by: PHYSICIAN ASSISTANT

## 2023-03-06 PROCEDURE — 250N000013 HC RX MED GY IP 250 OP 250 PS 637: Performed by: NURSE PRACTITIONER

## 2023-03-06 PROCEDURE — 99231 SBSQ HOSP IP/OBS SF/LOW 25: CPT | Performed by: PHYSICIAN ASSISTANT

## 2023-03-06 PROCEDURE — G0378 HOSPITAL OBSERVATION PER HR: HCPCS

## 2023-03-06 RX ADMIN — CARBOXYMETHYLCELLULOSE SODIUM 1 DROP: 0.5 SOLUTION/ DROPS OPHTHALMIC at 12:35

## 2023-03-06 RX ADMIN — LEVOTHYROXINE SODIUM 25 MCG: 0.03 TABLET ORAL at 08:57

## 2023-03-06 RX ADMIN — CLOTRIMAZOLE: 0.01 CREAM TOPICAL at 09:02

## 2023-03-06 RX ADMIN — BACLOFEN 10 MG: 10 TABLET ORAL at 08:57

## 2023-03-06 RX ADMIN — KETOCONAZOLE: 20 CREAM TOPICAL at 09:02

## 2023-03-06 RX ADMIN — CLONIDINE HYDROCHLORIDE 0.1 MG: 0.1 TABLET ORAL at 13:48

## 2023-03-06 RX ADMIN — CLONIDINE HYDROCHLORIDE 0.1 MG: 0.1 TABLET ORAL at 20:23

## 2023-03-06 RX ADMIN — CARBOXYMETHYLCELLULOSE SODIUM 1 DROP: 0.5 SOLUTION/ DROPS OPHTHALMIC at 16:09

## 2023-03-06 RX ADMIN — ATORVASTATIN CALCIUM 20 MG: 20 TABLET, FILM COATED ORAL at 20:23

## 2023-03-06 RX ADMIN — Medication 10 MG: at 22:34

## 2023-03-06 RX ADMIN — DESONIDE: 0.5 CREAM TOPICAL at 12:36

## 2023-03-06 RX ADMIN — BACLOFEN 10 MG: 10 TABLET ORAL at 20:23

## 2023-03-06 RX ADMIN — KETOCONAZOLE: 20 CREAM TOPICAL at 20:26

## 2023-03-06 RX ADMIN — Medication 25 MCG: at 08:57

## 2023-03-06 RX ADMIN — CLOTRIMAZOLE: 0.01 CREAM TOPICAL at 20:25

## 2023-03-06 RX ADMIN — HYDROCORTISONE: 1 CREAM TOPICAL at 09:02

## 2023-03-06 RX ADMIN — METFORMIN HYDROCHLORIDE 850 MG: 850 TABLET, FILM COATED ORAL at 08:57

## 2023-03-06 RX ADMIN — EMPAGLIFLOZIN 10 MG: 10 TABLET, FILM COATED ORAL at 08:57

## 2023-03-06 RX ADMIN — DIVALPROEX SODIUM 750 MG: 500 TABLET, DELAYED RELEASE ORAL at 08:58

## 2023-03-06 RX ADMIN — VENLAFAXINE HYDROCHLORIDE 75 MG: 150 CAPSULE, EXTENDED RELEASE ORAL at 22:34

## 2023-03-06 RX ADMIN — HYDROXYZINE HYDROCHLORIDE 50 MG: 50 TABLET, FILM COATED ORAL at 20:23

## 2023-03-06 RX ADMIN — QUETIAPINE FUMARATE 400 MG: 200 TABLET ORAL at 22:34

## 2023-03-06 RX ADMIN — METFORMIN HYDROCHLORIDE 850 MG: 850 TABLET, FILM COATED ORAL at 18:30

## 2023-03-06 RX ADMIN — QUETIAPINE 200 MG: 200 TABLET, FILM COATED ORAL at 13:47

## 2023-03-06 RX ADMIN — HYDROCORTISONE: 1 CREAM TOPICAL at 20:25

## 2023-03-06 RX ADMIN — DIVALPROEX SODIUM 1000 MG: 500 TABLET, DELAYED RELEASE ORAL at 22:35

## 2023-03-06 RX ADMIN — QUETIAPINE 200 MG: 200 TABLET, FILM COATED ORAL at 08:57

## 2023-03-06 RX ADMIN — CARBOXYMETHYLCELLULOSE SODIUM 1 DROP: 0.5 SOLUTION/ DROPS OPHTHALMIC at 08:57

## 2023-03-06 RX ADMIN — CARBOXYMETHYLCELLULOSE SODIUM 1 DROP: 0.5 SOLUTION/ DROPS OPHTHALMIC at 20:24

## 2023-03-06 RX ADMIN — OLANZAPINE 2.5 MG: 2.5 TABLET, FILM COATED ORAL at 13:47

## 2023-03-06 RX ADMIN — CLONIDINE HYDROCHLORIDE 0.1 MG: 0.1 TABLET ORAL at 08:58

## 2023-03-06 RX ADMIN — BACLOFEN 10 MG: 10 TABLET ORAL at 13:47

## 2023-03-06 RX ADMIN — ASPIRIN 81 MG CHEWABLE TABLET 81 MG: 81 TABLET CHEWABLE at 08:57

## 2023-03-06 RX ADMIN — HYDROXYZINE HYDROCHLORIDE 50 MG: 50 TABLET, FILM COATED ORAL at 08:58

## 2023-03-06 RX ADMIN — MIRTAZAPINE 15 MG: 15 TABLET, FILM COATED ORAL at 22:35

## 2023-03-06 RX ADMIN — POLYETHYLENE GLYCOL 3350 17 G: 17 POWDER, FOR SOLUTION ORAL at 08:57

## 2023-03-06 RX ADMIN — METOPROLOL SUCCINATE 50 MG: 50 TABLET, EXTENDED RELEASE ORAL at 08:57

## 2023-03-06 RX ADMIN — VENLAFAXINE HYDROCHLORIDE 150 MG: 150 CAPSULE, EXTENDED RELEASE ORAL at 22:34

## 2023-03-06 RX ADMIN — HYDROXYZINE HYDROCHLORIDE 50 MG: 50 TABLET, FILM COATED ORAL at 13:47

## 2023-03-06 ASSESSMENT — ACTIVITIES OF DAILY LIVING (ADL)
ADLS_ACUITY_SCORE: 56
ADLS_ACUITY_SCORE: 60
ADLS_ACUITY_SCORE: 56
ADLS_ACUITY_SCORE: 60
ADLS_ACUITY_SCORE: 56
ADLS_ACUITY_SCORE: 56
ADLS_ACUITY_SCORE: 60
ADLS_ACUITY_SCORE: 56
ADLS_ACUITY_SCORE: 56

## 2023-03-06 NOTE — PLAN OF CARE
PRIMARY DIAGNOSIS: Placement  OUTPATIENT/OBSERVATION GOALS TO BE MET BEFORE DISCHARGE:  1. ADLs back to baseline: Yes    2. Activity and level of assistance: Up with maximum assistance.     3. Pain status: Pain free.    4. Return to near baseline physical activity: Yes     Discharge Planner Nurse   Safe discharge environment identified: No  Barriers to discharge: Yes       Entered by: Luciana Johnson RN 03/06/2023 12:30 PM     Please review provider order for any additional goals.   Nurse to notify provider when observation goals have been met and patient is ready for discharge.Goal Outcome Evaluation:

## 2023-03-06 NOTE — PLAN OF CARE
PRIMARY DIAGNOSIS: PLACEMENT  OUTPATIENT/OBSERVATION GOALS TO BE MET BEFORE DISCHARGE:  1. ADLs back to baseline: Yes    2. Activity and level of assistance: Lift devoce    3. Pain status: Pain free.    4. Return to near baseline physical activity: Yes     Discharge Planner Nurse   Safe discharge environment identified: No  Barriers to discharge: Yes       Entered by: Opal Garcia RN 03/05/2023 10:02 PM       Vitals are Temp: 98.2  F (36.8  C) Temp src: Oral BP: 134/89 Pulse: 93   Resp: 18 SpO2: 94 %.  Patient is Alert and Oriented x4. Pleasant and cooperative. They are 2 assist with lift, rolls well in bed.  Incontinent of bowel and bladder. Denies pain. Regular diet. No IV access. Calls appropriately. Will continue to monitor and provide supportive cares.     Please review provider order for any additional goals.   Nurse to notify provider when observation goals have been met and patient is ready for discharge.

## 2023-03-06 NOTE — PLAN OF CARE
PRIMARY DIAGNOSIS: PLACEMENT  OUTPATIENT/OBSERVATION GOALS TO BE MET BEFORE DISCHARGE:  1. ADLs back to baseline: Yes    2. Activity and level of assistance: Lift devoce    3. Pain status: Pain free.    4. Return to near baseline physical activity: Yes     Discharge Planner Nurse   Safe discharge environment identified: No  Barriers to discharge: Yes       Entered by: Opal Garcia RN 03/06/2023 12:57 AM       Vitals are Temp: 98.2  F (36.8  C) Temp src: Oral BP: 134/89 Pulse: 93   Resp: 18 SpO2: 94 %.    Pt sleeping.    Please review provider order for any additional goals.   Nurse to notify provider when observation goals have been met and patient is ready for discharge.

## 2023-03-06 NOTE — PLAN OF CARE
PRIMARY DIAGNOSIS: Placement  OUTPATIENT/OBSERVATION GOALS TO BE MET BEFORE DISCHARGE:  1. ADLs back to baseline: Yes    2. Activity and level of assistance: Up with maximum assistance.     3. Pain status: Pain free.    4. Return to near baseline physical activity: Yes     Discharge Planner Nurse   Safe discharge environment identified: No  Barriers to discharge: Yes       Entered by: Luciana Johnson RN 03/06/2023 12:23 PM   Pt educated about importance of repositioning.  Pillows placed under legs to float heels.  Pt declined other repositioning.  Med compliant.    Please review provider order for any additional goals.   Nurse to notify provider when observation goals have been met and patient is ready for discharge.

## 2023-03-06 NOTE — PLAN OF CARE
PRIMARY DIAGNOSIS: PLACEMENT  OUTPATIENT/OBSERVATION GOALS TO BE MET BEFORE DISCHARGE:  1. ADLs back to baseline: Yes    2. Activity and level of assistance: Lift devoce    3. Pain status: Pain free.    4. Return to near baseline physical activity: Yes     Discharge Planner Nurse   Safe discharge environment identified: No  Barriers to discharge: Yes       Entered by: Opal Garcia RN 03/06/2023 6:33 AM       Vitals are Temp: 98.2  F (36.8  C) Temp src: Oral BP: 134/89 Pulse: 93   Resp: 18 SpO2: 94 %.    Pt sleeping.    Please review provider order for any additional goals.   Nurse to notify provider when observation goals have been met and patient is ready for discharge.

## 2023-03-07 LAB — GLUCOSE BLDC GLUCOMTR-MCNC: 116 MG/DL (ref 70–99)

## 2023-03-07 PROCEDURE — 99231 SBSQ HOSP IP/OBS SF/LOW 25: CPT | Performed by: PHYSICIAN ASSISTANT

## 2023-03-07 PROCEDURE — 250N000013 HC RX MED GY IP 250 OP 250 PS 637: Performed by: HOSPITALIST

## 2023-03-07 PROCEDURE — G0378 HOSPITAL OBSERVATION PER HR: HCPCS

## 2023-03-07 PROCEDURE — 250N000013 HC RX MED GY IP 250 OP 250 PS 637: Performed by: PHYSICIAN ASSISTANT

## 2023-03-07 PROCEDURE — 82962 GLUCOSE BLOOD TEST: CPT

## 2023-03-07 PROCEDURE — 250N000013 HC RX MED GY IP 250 OP 250 PS 637: Performed by: NURSE PRACTITIONER

## 2023-03-07 RX ADMIN — Medication 10 MG: at 21:51

## 2023-03-07 RX ADMIN — ATORVASTATIN CALCIUM 20 MG: 20 TABLET, FILM COATED ORAL at 21:52

## 2023-03-07 RX ADMIN — CLONIDINE HYDROCHLORIDE 0.1 MG: 0.1 TABLET ORAL at 14:48

## 2023-03-07 RX ADMIN — METFORMIN HYDROCHLORIDE 850 MG: 850 TABLET, FILM COATED ORAL at 17:32

## 2023-03-07 RX ADMIN — BACLOFEN 10 MG: 10 TABLET ORAL at 14:48

## 2023-03-07 RX ADMIN — CARBOXYMETHYLCELLULOSE SODIUM 1 DROP: 0.5 SOLUTION/ DROPS OPHTHALMIC at 14:49

## 2023-03-07 RX ADMIN — HYDROXYZINE HYDROCHLORIDE 50 MG: 50 TABLET, FILM COATED ORAL at 21:52

## 2023-03-07 RX ADMIN — METFORMIN HYDROCHLORIDE 850 MG: 850 TABLET, FILM COATED ORAL at 08:33

## 2023-03-07 RX ADMIN — HYDROXYZINE HYDROCHLORIDE 50 MG: 50 TABLET, FILM COATED ORAL at 08:34

## 2023-03-07 RX ADMIN — DIVALPROEX SODIUM 750 MG: 500 TABLET, DELAYED RELEASE ORAL at 08:34

## 2023-03-07 RX ADMIN — CLOTRIMAZOLE: 0.01 CREAM TOPICAL at 22:01

## 2023-03-07 RX ADMIN — CARBOXYMETHYLCELLULOSE SODIUM 1 DROP: 0.5 SOLUTION/ DROPS OPHTHALMIC at 17:32

## 2023-03-07 RX ADMIN — BACLOFEN 10 MG: 10 TABLET ORAL at 21:52

## 2023-03-07 RX ADMIN — VENLAFAXINE HYDROCHLORIDE 75 MG: 150 CAPSULE, EXTENDED RELEASE ORAL at 21:51

## 2023-03-07 RX ADMIN — QUETIAPINE 200 MG: 200 TABLET, FILM COATED ORAL at 08:33

## 2023-03-07 RX ADMIN — BACLOFEN 10 MG: 10 TABLET ORAL at 08:34

## 2023-03-07 RX ADMIN — DIVALPROEX SODIUM 1000 MG: 500 TABLET, DELAYED RELEASE ORAL at 21:52

## 2023-03-07 RX ADMIN — DESONIDE: 0.5 CREAM TOPICAL at 17:34

## 2023-03-07 RX ADMIN — HYDROCORTISONE: 1 CREAM TOPICAL at 22:01

## 2023-03-07 RX ADMIN — HYDROXYZINE HYDROCHLORIDE 50 MG: 50 TABLET, FILM COATED ORAL at 14:48

## 2023-03-07 RX ADMIN — Medication 25 MCG: at 08:34

## 2023-03-07 RX ADMIN — VENLAFAXINE HYDROCHLORIDE 150 MG: 150 CAPSULE, EXTENDED RELEASE ORAL at 21:52

## 2023-03-07 RX ADMIN — EMPAGLIFLOZIN 10 MG: 10 TABLET, FILM COATED ORAL at 08:34

## 2023-03-07 RX ADMIN — CLONIDINE HYDROCHLORIDE 0.1 MG: 0.1 TABLET ORAL at 21:51

## 2023-03-07 RX ADMIN — MIRTAZAPINE 15 MG: 15 TABLET, FILM COATED ORAL at 21:51

## 2023-03-07 RX ADMIN — QUETIAPINE FUMARATE 400 MG: 200 TABLET ORAL at 21:51

## 2023-03-07 RX ADMIN — METOPROLOL SUCCINATE 50 MG: 50 TABLET, EXTENDED RELEASE ORAL at 08:34

## 2023-03-07 RX ADMIN — ASPIRIN 81 MG CHEWABLE TABLET 81 MG: 81 TABLET CHEWABLE at 08:34

## 2023-03-07 RX ADMIN — LEVOTHYROXINE SODIUM 25 MCG: 0.03 TABLET ORAL at 08:34

## 2023-03-07 RX ADMIN — CLONIDINE HYDROCHLORIDE 0.1 MG: 0.1 TABLET ORAL at 08:34

## 2023-03-07 RX ADMIN — KETOCONAZOLE: 20 CREAM TOPICAL at 22:00

## 2023-03-07 RX ADMIN — CARBOXYMETHYLCELLULOSE SODIUM 1 DROP: 0.5 SOLUTION/ DROPS OPHTHALMIC at 21:52

## 2023-03-07 RX ADMIN — OLANZAPINE 2.5 MG: 2.5 TABLET, FILM COATED ORAL at 14:49

## 2023-03-07 RX ADMIN — POLYETHYLENE GLYCOL 3350 17 G: 17 POWDER, FOR SOLUTION ORAL at 08:33

## 2023-03-07 RX ADMIN — QUETIAPINE 200 MG: 200 TABLET, FILM COATED ORAL at 14:48

## 2023-03-07 RX ADMIN — CARBOXYMETHYLCELLULOSE SODIUM 1 DROP: 0.5 SOLUTION/ DROPS OPHTHALMIC at 08:33

## 2023-03-07 RX ADMIN — KETOCONAZOLE: 20 CREAM TOPICAL at 08:34

## 2023-03-07 ASSESSMENT — ACTIVITIES OF DAILY LIVING (ADL)
ADLS_ACUITY_SCORE: 58
ADLS_ACUITY_SCORE: 56

## 2023-03-07 NOTE — PLAN OF CARE
PRIMARY DIAGNOSIS: Placement  OUTPATIENT/OBSERVATION GOALS TO BE MET BEFORE DISCHARGE:  ADLs back to baseline: Yes     Activity and level of assistance: Up with maximum assistance.      Pain status: Pain free.     Return to near baseline physical activity: Yes          Discharge Planner Nurse   Safe discharge environment identified: No  Barriers to discharge: Yes  Entered by: Ana Castro RN 03/06/2023     Pt A & O X 4. Assist of one with cares and two lift device for transfers. On regular diet. Denies any pain. Occasional outburst of anger towards staff during cares, but calmer for most of the shift. Able to verbalize needs. Incontinent of bladder and bowels.     Plan: Awaiting placement. Will monitor.     Please review provider order for any additional goals.   Nurse to notify provider when observation goals have been met and patient is ready for discharge

## 2023-03-07 NOTE — PLAN OF CARE
PRIMARY DIAGNOSIS: Placement  OUTPATIENT/OBSERVATION GOALS TO BE MET BEFORE DISCHARGE:  ADLs back to baseline: Yes    Activity and level of assistance: assist x 2    Pain status: Pain free.    Return to near baseline physical activity: Yes     Discharge Planner Nurse   Safe discharge environment identified: No  Barriers to discharge: Yes       Entered by: Emily Garcia RN 03/07/2023 4:12 AM    Patient currently sleeping, SW following patient        Please review provider order for any additional goals.   Nurse to notify provider when observation goals have been met and patient is ready for discharge.

## 2023-03-07 NOTE — PLAN OF CARE
PRIMARY DIAGNOSIS: Placement  OUTPATIENT/OBSERVATION GOALS TO BE MET BEFORE DISCHARGE:  ADLs back to baseline: Yes     Activity and level of assistance: Up with maximum assistance.      Pain status: Pain free.     Return to near baseline physical activity: Yes          Discharge Planner Nurse   Safe discharge environment identified: No  Barriers to discharge: Yes  Entered by: nAa Castro RN 03/06/2023     /81 (BP Location: Left arm, Cuff Size: Adult Regular)   Pulse 70   Temp 97.8  F (36.6  C) (Oral)   Resp 17   Wt 99.9 kg (220 lb 4.8 oz)   SpO2 95%       Pt A & O X 4. Assist of one with cares and two lift device for transfers. On regular diet. Denies any pain. Occasional outburst of anger towards staff during cares, but calmer for most of the shift. Able to verbalize needs. Incontinent of bladder and bowels.      Plan: Awaiting placement. Will monitor.      Please review provider order for any additional goals.   Nurse to notify provider when observation goals have been met and patient is ready for discharge
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
## 2023-03-07 NOTE — PLAN OF CARE
PRIMARY DIAGNOSIS: Placement   OUTPATIENT/OBSERVATION GOALS TO BE MET BEFORE DISCHARGE:  1. ADLs back to baseline: Yes    2. Activity and level of assistance: Up with maximum assistance. Not OOB.    3. Pain status: Pain free.    4. Return to near baseline physical activity: Yes     Discharge Planner Nurse   Safe discharge environment identified: No  Barriers to discharge: Yes       Entered by: Hira Fraire RN 03/07/2023     A/O x4. VSS on RA. Assist of 2, lift. Tolerating regular diet. Lung sounds clear. Bowel sounds active. 3/7 LBM. Adequate urine output via briefs. Skin rash on face, redness in right and left eye. Eye drops given. Denies pain. Denies nausea.     Please review provider order for any additional goals.   Nurse to notify provider when observation goals have been met and patient is ready for discharge.

## 2023-03-07 NOTE — PLAN OF CARE
PRIMARY DIAGNOSIS: Placement   OUTPATIENT/OBSERVATION GOALS TO BE MET BEFORE DISCHARGE:  1. ADLs back to baseline: Yes    2. Activity and level of assistance: Up with maximum assistance. Not OOB.    3. Pain status: Pain free.    4. Return to near baseline physical activity: Yes     Discharge Planner Nurse   Safe discharge environment identified: No  Barriers to discharge: Yes       Entered by: Hira Fraire RN 03/07/2023     A/O x4. VSS on RA. Assist of 2, lift. Tolerating regular diet. Lung sounds clear. Bowel sounds active. 3/4 LBM per chart. Adequate urine output via briefs. Skin rash on face, redness in right and left eye. Eye drops given. Denies pain. Denies nausea.     Please review provider order for any additional goals.   Nurse to notify provider when observation goals have been met and patient is ready for discharge.

## 2023-03-07 NOTE — PROGRESS NOTES
Care Management Follow Up    Expected Discharge Date: 03/31/2023     Concerns to be Addressed: Discharge planning      Patient plan of care discussed at interdisciplinary rounds: Yes    Anticipated Discharge Disposition:  Group Home once placement found     Referrals Placed by CM/SW:  Skilled Nursing facility,   Private pay costs discussed: Not applicable    Additional Information:  SW received email from [employer] relocation worker requesting updated clinical info. This has been sent to her. She continues to make additional referrals for placement consideration.     SW inquired if she has received a response from [facility] re: the rate sheet or [facility] about if they have clinically accepted pt.     SW will continue to follow.     DANITA Obrien, [facility]   Inpatient Care Coordination  [facility]   [phone]

## 2023-03-07 NOTE — PLAN OF CARE
PRIMARY DIAGNOSIS: Placement  OUTPATIENT/OBSERVATION GOALS TO BE MET BEFORE DISCHARGE:  ADLs back to baseline: Yes    Activity and level of assistance: assist x 2    Pain status: Pain free.    Return to near baseline physical activity: Yes     Discharge Planner Nurse   Safe discharge environment identified: No  Barriers to discharge: Yes       Entered by: Emily Garcia RN 03/07/2023 12:23 AM    Patient is currently resting in bed, denies pain at this time, SW following for pain       Please review provider order for any additional goals.   Nurse to notify provider when observation goals have been met and patient is ready for discharge.

## 2023-03-08 LAB — GLUCOSE BLDC GLUCOMTR-MCNC: 109 MG/DL (ref 70–99)

## 2023-03-08 PROCEDURE — 250N000013 HC RX MED GY IP 250 OP 250 PS 637: Performed by: NURSE PRACTITIONER

## 2023-03-08 PROCEDURE — 250N000013 HC RX MED GY IP 250 OP 250 PS 637: Performed by: HOSPITALIST

## 2023-03-08 PROCEDURE — 99231 SBSQ HOSP IP/OBS SF/LOW 25: CPT | Performed by: PHYSICIAN ASSISTANT

## 2023-03-08 PROCEDURE — G0378 HOSPITAL OBSERVATION PER HR: HCPCS

## 2023-03-08 PROCEDURE — 82962 GLUCOSE BLOOD TEST: CPT

## 2023-03-08 PROCEDURE — 250N000013 HC RX MED GY IP 250 OP 250 PS 637: Performed by: PHYSICIAN ASSISTANT

## 2023-03-08 RX ADMIN — CLONIDINE HYDROCHLORIDE 0.1 MG: 0.1 TABLET ORAL at 09:39

## 2023-03-08 RX ADMIN — METOPROLOL SUCCINATE 50 MG: 50 TABLET, EXTENDED RELEASE ORAL at 09:39

## 2023-03-08 RX ADMIN — CARBOXYMETHYLCELLULOSE SODIUM 1 DROP: 0.5 SOLUTION/ DROPS OPHTHALMIC at 21:01

## 2023-03-08 RX ADMIN — OLANZAPINE 2.5 MG: 2.5 TABLET, FILM COATED ORAL at 13:29

## 2023-03-08 RX ADMIN — BACLOFEN 10 MG: 10 TABLET ORAL at 21:01

## 2023-03-08 RX ADMIN — Medication 25 MCG: at 09:39

## 2023-03-08 RX ADMIN — HYDROXYZINE HYDROCHLORIDE 50 MG: 50 TABLET, FILM COATED ORAL at 21:01

## 2023-03-08 RX ADMIN — KETOCONAZOLE: 20 CREAM TOPICAL at 21:02

## 2023-03-08 RX ADMIN — METFORMIN HYDROCHLORIDE 850 MG: 850 TABLET, FILM COATED ORAL at 17:14

## 2023-03-08 RX ADMIN — CARBOXYMETHYLCELLULOSE SODIUM 1 DROP: 0.5 SOLUTION/ DROPS OPHTHALMIC at 09:39

## 2023-03-08 RX ADMIN — KETOCONAZOLE: 20 CREAM TOPICAL at 09:40

## 2023-03-08 RX ADMIN — DIVALPROEX SODIUM 1000 MG: 500 TABLET, DELAYED RELEASE ORAL at 22:21

## 2023-03-08 RX ADMIN — HYDROCORTISONE: 1 CREAM TOPICAL at 21:02

## 2023-03-08 RX ADMIN — VENLAFAXINE HYDROCHLORIDE 75 MG: 150 CAPSULE, EXTENDED RELEASE ORAL at 22:21

## 2023-03-08 RX ADMIN — MIRTAZAPINE 15 MG: 15 TABLET, FILM COATED ORAL at 22:21

## 2023-03-08 RX ADMIN — ATORVASTATIN CALCIUM 20 MG: 20 TABLET, FILM COATED ORAL at 21:01

## 2023-03-08 RX ADMIN — DIVALPROEX SODIUM 750 MG: 500 TABLET, DELAYED RELEASE ORAL at 09:39

## 2023-03-08 RX ADMIN — POLYETHYLENE GLYCOL 3350 17 G: 17 POWDER, FOR SOLUTION ORAL at 09:44

## 2023-03-08 RX ADMIN — CLONIDINE HYDROCHLORIDE 0.1 MG: 0.1 TABLET ORAL at 21:01

## 2023-03-08 RX ADMIN — Medication 10 MG: at 22:22

## 2023-03-08 RX ADMIN — CARBOXYMETHYLCELLULOSE SODIUM 1 DROP: 0.5 SOLUTION/ DROPS OPHTHALMIC at 17:14

## 2023-03-08 RX ADMIN — CARBOXYMETHYLCELLULOSE SODIUM 1 DROP: 0.5 SOLUTION/ DROPS OPHTHALMIC at 13:29

## 2023-03-08 RX ADMIN — BACLOFEN 10 MG: 10 TABLET ORAL at 09:39

## 2023-03-08 RX ADMIN — CLOTRIMAZOLE: 0.01 CREAM TOPICAL at 21:02

## 2023-03-08 RX ADMIN — HYDROXYZINE HYDROCHLORIDE 50 MG: 50 TABLET, FILM COATED ORAL at 13:29

## 2023-03-08 RX ADMIN — VENLAFAXINE HYDROCHLORIDE 150 MG: 150 CAPSULE, EXTENDED RELEASE ORAL at 22:21

## 2023-03-08 RX ADMIN — QUETIAPINE 200 MG: 200 TABLET, FILM COATED ORAL at 13:29

## 2023-03-08 RX ADMIN — METFORMIN HYDROCHLORIDE 850 MG: 850 TABLET, FILM COATED ORAL at 09:39

## 2023-03-08 RX ADMIN — HYDROXYZINE HYDROCHLORIDE 50 MG: 50 TABLET, FILM COATED ORAL at 09:39

## 2023-03-08 RX ADMIN — BACLOFEN 10 MG: 10 TABLET ORAL at 13:29

## 2023-03-08 RX ADMIN — QUETIAPINE 200 MG: 200 TABLET, FILM COATED ORAL at 09:39

## 2023-03-08 RX ADMIN — CLONIDINE HYDROCHLORIDE 0.1 MG: 0.1 TABLET ORAL at 13:29

## 2023-03-08 RX ADMIN — LEVOTHYROXINE SODIUM 25 MCG: 0.03 TABLET ORAL at 09:39

## 2023-03-08 RX ADMIN — QUETIAPINE FUMARATE 400 MG: 200 TABLET ORAL at 22:21

## 2023-03-08 RX ADMIN — ASPIRIN 81 MG CHEWABLE TABLET 81 MG: 81 TABLET CHEWABLE at 09:38

## 2023-03-08 RX ADMIN — EMPAGLIFLOZIN 10 MG: 10 TABLET, FILM COATED ORAL at 09:38

## 2023-03-08 RX ADMIN — DESONIDE: 0.5 CREAM TOPICAL at 13:30

## 2023-03-08 ASSESSMENT — ACTIVITIES OF DAILY LIVING (ADL)
ADLS_ACUITY_SCORE: 58
ADLS_ACUITY_SCORE: 56
ADLS_ACUITY_SCORE: 56
ADLS_ACUITY_SCORE: 58
ADLS_ACUITY_SCORE: 56
ADLS_ACUITY_SCORE: 58

## 2023-03-08 NOTE — PLAN OF CARE
PRIMARY DIAGNOSIS: Placement  OUTPATIENT/OBSERVATION GOALS TO BE MET BEFORE DISCHARGE:  ADLs back to baseline: Yes    Activity and level of assistance: assist x 2    Pain status: Pain free.    Return to near baseline physical activity: Yes     Discharge Planner Nurse   Safe discharge environment identified: No  Barriers to discharge: Yes       Entered by: Emily Garcia RN 03/08/2023 4:37 AM    Patient currently sleeping, SW following for placement       Please review provider order for any additional goals.   Nurse to notify provider when observation goals have been met and patient is ready for discharge
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
## 2023-03-08 NOTE — PLAN OF CARE
PRIMARY DIAGNOSIS: Placement   OUTPATIENT/OBSERVATION GOALS TO BE MET BEFORE DISCHARGE:  1. ADLs back to baseline: Yes    2. Activity and level of assistance: Up with maximum assistance. Not OOB.    3. Pain status: Pain free.    4. Return to near baseline physical activity: Yes     Discharge Planner Nurse   Safe discharge environment identified: No  Barriers to discharge: Yes       Entered by: Hira Fraire RN 03/08/2023     A/O x4. VSS on RA. Assist of 2, lift. Tolerating regular diet. Lung sounds clear. Bowel sounds active. 3/7 LBM. Adequate urine output via briefs. Skin rash on face, redness in right and left eye. Eye drops given. Denies pain. Denies nausea.     Please review provider order for any additional goals.   Nurse to notify provider when observation goals have been met and patient is ready for discharge.

## 2023-03-08 NOTE — PLAN OF CARE
PRIMARY DIAGNOSIS: Placement  OUTPATIENT/OBSERVATION GOALS TO BE MET BEFORE DISCHARGE:  ADLs back to baseline: Yes    Activity and level of assistance: assist x 2    Pain status: Pain free.    Return to near baseline physical activity: Yes     Discharge Planner Nurse   Safe discharge environment identified: No  Barriers to discharge: Yes       Entered by: Emily Garcia RN 03/08/2023 12:14 AM    Patient currently denies pain, resting in bed, incontinent of urine, SW following for placement        Please review provider order for any additional goals.   Nurse to notify provider when observation goals have been met and patient is ready for discharge.

## 2023-03-08 NOTE — PLAN OF CARE
PRIMARY DIAGNOSIS: Placement  OUTPATIENT/OBSERVATION GOALS TO BE MET BEFORE DISCHARGE:  ADLs back to baseline: Yes     Activity and level of assistance: Ax2 with lift     Pain status: Pain free.     Return to near baseline physical activity: Yes          Discharge Planner Nurse   Safe discharge environment identified: No  Barriers to discharge: Yes       Entered by: Waleska Leon RN 01/07/2023 5:26 PM  Please review provider order for any additional goals.   Nurse to notify provider when observation goals have been met and patient is ready for discharge.     Temp: 98  F (36.7  C) Temp src: Oral BP: (!) 130/90 Pulse: 83   Resp: 16 SpO2: 97 % O2 Device: None (Room air)             A&O. Up Ax2 with lift/ Ax1 with rolling in bed. Denies pain. Pleasant and cooperative. Pulsating mattress in place. Pt able to make needs known. Incontinent. Plan- SW following for placement/relocation worker involved, oob 3x daily/prn, daily blood sugar checks, vital signs prn.                         I have reviewed and confirmed nurses' notes for patient's medications, allergies, medical history, and surgical history.

## 2023-03-09 PROCEDURE — 250N000013 HC RX MED GY IP 250 OP 250 PS 637: Performed by: PHYSICIAN ASSISTANT

## 2023-03-09 PROCEDURE — 250N000013 HC RX MED GY IP 250 OP 250 PS 637: Performed by: NURSE PRACTITIONER

## 2023-03-09 PROCEDURE — 99207 PR NO CHARGE LOS: CPT | Performed by: PHYSICIAN ASSISTANT

## 2023-03-09 PROCEDURE — G0378 HOSPITAL OBSERVATION PER HR: HCPCS

## 2023-03-09 PROCEDURE — 250N000013 HC RX MED GY IP 250 OP 250 PS 637: Performed by: HOSPITALIST

## 2023-03-09 RX ADMIN — Medication 25 MCG: at 09:15

## 2023-03-09 RX ADMIN — BACLOFEN 10 MG: 10 TABLET ORAL at 14:03

## 2023-03-09 RX ADMIN — QUETIAPINE 200 MG: 200 TABLET, FILM COATED ORAL at 09:15

## 2023-03-09 RX ADMIN — OLANZAPINE 2.5 MG: 2.5 TABLET, FILM COATED ORAL at 14:04

## 2023-03-09 RX ADMIN — LEVOTHYROXINE SODIUM 25 MCG: 0.03 TABLET ORAL at 09:15

## 2023-03-09 RX ADMIN — BACLOFEN 10 MG: 10 TABLET ORAL at 09:16

## 2023-03-09 RX ADMIN — CARBOXYMETHYLCELLULOSE SODIUM 1 DROP: 0.5 SOLUTION/ DROPS OPHTHALMIC at 20:08

## 2023-03-09 RX ADMIN — Medication 10 MG: at 23:18

## 2023-03-09 RX ADMIN — CARBOXYMETHYLCELLULOSE SODIUM 1 DROP: 0.5 SOLUTION/ DROPS OPHTHALMIC at 09:15

## 2023-03-09 RX ADMIN — DIVALPROEX SODIUM 1000 MG: 500 TABLET, DELAYED RELEASE ORAL at 23:18

## 2023-03-09 RX ADMIN — DIVALPROEX SODIUM 750 MG: 500 TABLET, DELAYED RELEASE ORAL at 09:16

## 2023-03-09 RX ADMIN — METFORMIN HYDROCHLORIDE 850 MG: 850 TABLET, FILM COATED ORAL at 18:09

## 2023-03-09 RX ADMIN — VENLAFAXINE HYDROCHLORIDE 150 MG: 150 CAPSULE, EXTENDED RELEASE ORAL at 23:18

## 2023-03-09 RX ADMIN — BACLOFEN 10 MG: 10 TABLET ORAL at 20:07

## 2023-03-09 RX ADMIN — HYDROXYZINE HYDROCHLORIDE 50 MG: 50 TABLET, FILM COATED ORAL at 09:15

## 2023-03-09 RX ADMIN — EMPAGLIFLOZIN 10 MG: 10 TABLET, FILM COATED ORAL at 08:00

## 2023-03-09 RX ADMIN — ASPIRIN 81 MG CHEWABLE TABLET 81 MG: 81 TABLET CHEWABLE at 09:16

## 2023-03-09 RX ADMIN — HYDROCORTISONE: 1 CREAM TOPICAL at 14:04

## 2023-03-09 RX ADMIN — MIRTAZAPINE 15 MG: 15 TABLET, FILM COATED ORAL at 23:18

## 2023-03-09 RX ADMIN — DESONIDE: 0.5 CREAM TOPICAL at 14:03

## 2023-03-09 RX ADMIN — POLYETHYLENE GLYCOL 3350 17 G: 17 POWDER, FOR SOLUTION ORAL at 09:15

## 2023-03-09 RX ADMIN — CLONIDINE HYDROCHLORIDE 0.1 MG: 0.1 TABLET ORAL at 20:07

## 2023-03-09 RX ADMIN — KETOCONAZOLE: 20 CREAM TOPICAL at 20:12

## 2023-03-09 RX ADMIN — VENLAFAXINE HYDROCHLORIDE 75 MG: 150 CAPSULE, EXTENDED RELEASE ORAL at 23:19

## 2023-03-09 RX ADMIN — CARBOXYMETHYLCELLULOSE SODIUM 1 DROP: 0.5 SOLUTION/ DROPS OPHTHALMIC at 14:02

## 2023-03-09 RX ADMIN — QUETIAPINE FUMARATE 400 MG: 200 TABLET ORAL at 23:18

## 2023-03-09 RX ADMIN — HYDROXYZINE HYDROCHLORIDE 50 MG: 50 TABLET, FILM COATED ORAL at 20:07

## 2023-03-09 RX ADMIN — ATORVASTATIN CALCIUM 20 MG: 20 TABLET, FILM COATED ORAL at 20:07

## 2023-03-09 RX ADMIN — METFORMIN HYDROCHLORIDE 850 MG: 850 TABLET, FILM COATED ORAL at 09:16

## 2023-03-09 RX ADMIN — CARBOXYMETHYLCELLULOSE SODIUM 1 DROP: 0.5 SOLUTION/ DROPS OPHTHALMIC at 18:10

## 2023-03-09 RX ADMIN — QUETIAPINE 200 MG: 200 TABLET, FILM COATED ORAL at 14:02

## 2023-03-09 RX ADMIN — CLONIDINE HYDROCHLORIDE 0.1 MG: 0.1 TABLET ORAL at 14:03

## 2023-03-09 RX ADMIN — CLONIDINE HYDROCHLORIDE 0.1 MG: 0.1 TABLET ORAL at 08:00

## 2023-03-09 RX ADMIN — METOPROLOL SUCCINATE 50 MG: 50 TABLET, EXTENDED RELEASE ORAL at 09:15

## 2023-03-09 RX ADMIN — HYDROXYZINE HYDROCHLORIDE 50 MG: 50 TABLET, FILM COATED ORAL at 14:04

## 2023-03-09 ASSESSMENT — ACTIVITIES OF DAILY LIVING (ADL)
ADLS_ACUITY_SCORE: 56

## 2023-03-09 NOTE — PLAN OF CARE
PRIMARY DIAGNOSIS: placement  OUTPATIENT/OBSERVATION GOALS TO BE MET BEFORE DISCHARGE:  ADLs back to baseline: Yes    Activity and level of assistance: assist x 2    Pain status: Pain free.    Return to near baseline physical activity: Yes     Discharge Planner Nurse   Safe discharge environment identified: No  Barriers to discharge: Yes       Entered by: Emily Garcia RN 03/09/2023 12:25 AM    Patient currently denies pain, resting comfortably in bed, SW following for placement       Please review provider order for any additional goals.   Nurse to notify provider when observation goals have been met and patient is ready for discharge.

## 2023-03-09 NOTE — PLAN OF CARE
PRIMARY DIAGNOSIS: Placement  OUTPATIENT/OBSERVATION GOALS TO BE MET BEFORE DISCHARGE:  ADLs back to baseline: Yes     Activity and level of assistance: Up with maximum assistance.      Pain status: Pain free.     Return to near baseline physical activity: Yes          Discharge Planner Nurse   Safe discharge environment identified: No  Barriers to discharge: Yes  Entered by: Ana Castro RN 03/08/2023      PRIMARY DIAGNOSIS: Placement  OUTPATIENT/OBSERVATION GOALS TO BE MET BEFORE DISCHARGE:  ADLs back to baseline: Yes     Activity and level of assistance: Up with maximum assistance.      Pain status: Pain free.     Return to near baseline physical activity: Yes          Discharge Planner Nurse   Safe discharge environment identified: No  Barriers to discharge: Yes  Entered by: Ana Castro RN 03/06/2023      /81 (BP Location: Left arm, Cuff Size: Adult Regular)   Pulse 70   Temp 97.8  F (36.6  C) (Oral)   Resp 17   Wt 99.9 kg (220 lb 4.8 oz)   SpO2 95%       Pt A & O X 4. Assist of one with cares and two lift device for transfers. On regular diet. Denies any pain. No behaviors this shift, calmer for most of the shift. Able to verbalize needs. Incontinent of bladder and bowels.      Plan: Awaiting placement. Will monitor.      Please review provider order for any additional goals.   Nurse to notify provider when observation goals have been met and patient is ready for discharge
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
## 2023-03-09 NOTE — PLAN OF CARE
PRIMARY DIAGNOSIS: Placement  OUTPATIENT/OBSERVATION GOALS TO BE MET BEFORE DISCHARGE:  ADLs back to baseline: Yes    Activity and level of assistance: assist x 2    Pain status: Pain free.    Return to near baseline physical activity: Yes     Discharge Planner Nurse   Safe discharge environment identified: No  Barriers to discharge: Yes       Entered by: Emily Garcia RN 03/09/2023 4:44 AM    Patient currently sleeping, SW following for placement       Please review provider order for any additional goals.   Nurse to notify provider when observation goals have been met and patient is ready for discharge.

## 2023-03-10 LAB — GLUCOSE BLDC GLUCOMTR-MCNC: 142 MG/DL (ref 70–99)

## 2023-03-10 PROCEDURE — 250N000013 HC RX MED GY IP 250 OP 250 PS 637: Performed by: HOSPITALIST

## 2023-03-10 PROCEDURE — 99207 PR NO CHARGE LOS: CPT | Performed by: PHYSICIAN ASSISTANT

## 2023-03-10 PROCEDURE — 82962 GLUCOSE BLOOD TEST: CPT

## 2023-03-10 PROCEDURE — G0378 HOSPITAL OBSERVATION PER HR: HCPCS

## 2023-03-10 PROCEDURE — 250N000013 HC RX MED GY IP 250 OP 250 PS 637: Performed by: PHYSICIAN ASSISTANT

## 2023-03-10 PROCEDURE — 250N000013 HC RX MED GY IP 250 OP 250 PS 637: Performed by: NURSE PRACTITIONER

## 2023-03-10 RX ADMIN — CLOTRIMAZOLE: 0.01 CREAM TOPICAL at 10:30

## 2023-03-10 RX ADMIN — Medication 10 MG: at 21:05

## 2023-03-10 RX ADMIN — HYDROCORTISONE: 1 CREAM TOPICAL at 21:06

## 2023-03-10 RX ADMIN — KETOCONAZOLE: 20 CREAM TOPICAL at 21:06

## 2023-03-10 RX ADMIN — LEVOTHYROXINE SODIUM 25 MCG: 0.03 TABLET ORAL at 10:25

## 2023-03-10 RX ADMIN — METFORMIN HYDROCHLORIDE 850 MG: 850 TABLET, FILM COATED ORAL at 10:25

## 2023-03-10 RX ADMIN — CARBOXYMETHYLCELLULOSE SODIUM 1 DROP: 0.5 SOLUTION/ DROPS OPHTHALMIC at 21:04

## 2023-03-10 RX ADMIN — METFORMIN HYDROCHLORIDE 850 MG: 850 TABLET, FILM COATED ORAL at 18:43

## 2023-03-10 RX ADMIN — ATORVASTATIN CALCIUM 20 MG: 20 TABLET, FILM COATED ORAL at 21:05

## 2023-03-10 RX ADMIN — VENLAFAXINE HYDROCHLORIDE 75 MG: 150 CAPSULE, EXTENDED RELEASE ORAL at 21:03

## 2023-03-10 RX ADMIN — POLYETHYLENE GLYCOL 3350 17 G: 17 POWDER, FOR SOLUTION ORAL at 10:23

## 2023-03-10 RX ADMIN — CLONIDINE HYDROCHLORIDE 0.1 MG: 0.1 TABLET ORAL at 21:05

## 2023-03-10 RX ADMIN — CARBOXYMETHYLCELLULOSE SODIUM 1 DROP: 0.5 SOLUTION/ DROPS OPHTHALMIC at 12:09

## 2023-03-10 RX ADMIN — KETOCONAZOLE: 20 CREAM TOPICAL at 10:30

## 2023-03-10 RX ADMIN — CLONIDINE HYDROCHLORIDE 0.1 MG: 0.1 TABLET ORAL at 10:25

## 2023-03-10 RX ADMIN — HYDROCORTISONE: 1 CREAM TOPICAL at 10:29

## 2023-03-10 RX ADMIN — QUETIAPINE FUMARATE 400 MG: 200 TABLET ORAL at 21:04

## 2023-03-10 RX ADMIN — OLANZAPINE 2.5 MG: 2.5 TABLET, FILM COATED ORAL at 14:49

## 2023-03-10 RX ADMIN — QUETIAPINE 200 MG: 200 TABLET, FILM COATED ORAL at 10:24

## 2023-03-10 RX ADMIN — BACLOFEN 10 MG: 10 TABLET ORAL at 10:25

## 2023-03-10 RX ADMIN — ASPIRIN 81 MG CHEWABLE TABLET 81 MG: 81 TABLET CHEWABLE at 10:25

## 2023-03-10 RX ADMIN — HYDROXYZINE HYDROCHLORIDE 50 MG: 50 TABLET, FILM COATED ORAL at 21:05

## 2023-03-10 RX ADMIN — DESONIDE: 0.5 CREAM TOPICAL at 12:10

## 2023-03-10 RX ADMIN — DIVALPROEX SODIUM 1000 MG: 500 TABLET, DELAYED RELEASE ORAL at 21:05

## 2023-03-10 RX ADMIN — Medication 25 MCG: at 10:25

## 2023-03-10 RX ADMIN — EMPAGLIFLOZIN 10 MG: 10 TABLET, FILM COATED ORAL at 10:25

## 2023-03-10 RX ADMIN — CARBOXYMETHYLCELLULOSE SODIUM 1 DROP: 0.5 SOLUTION/ DROPS OPHTHALMIC at 10:23

## 2023-03-10 RX ADMIN — BACLOFEN 10 MG: 10 TABLET ORAL at 14:49

## 2023-03-10 RX ADMIN — VENLAFAXINE HYDROCHLORIDE 150 MG: 150 CAPSULE, EXTENDED RELEASE ORAL at 21:04

## 2023-03-10 RX ADMIN — CARBOXYMETHYLCELLULOSE SODIUM 1 DROP: 0.5 SOLUTION/ DROPS OPHTHALMIC at 18:43

## 2023-03-10 RX ADMIN — HYDROXYZINE HYDROCHLORIDE 50 MG: 50 TABLET, FILM COATED ORAL at 14:49

## 2023-03-10 RX ADMIN — QUETIAPINE 200 MG: 200 TABLET, FILM COATED ORAL at 14:49

## 2023-03-10 RX ADMIN — METOPROLOL SUCCINATE 50 MG: 50 TABLET, EXTENDED RELEASE ORAL at 10:25

## 2023-03-10 RX ADMIN — CLOTRIMAZOLE: 0.01 CREAM TOPICAL at 21:06

## 2023-03-10 RX ADMIN — DIVALPROEX SODIUM 750 MG: 500 TABLET, DELAYED RELEASE ORAL at 10:24

## 2023-03-10 RX ADMIN — CLONIDINE HYDROCHLORIDE 0.1 MG: 0.1 TABLET ORAL at 14:49

## 2023-03-10 RX ADMIN — BACLOFEN 10 MG: 10 TABLET ORAL at 21:05

## 2023-03-10 RX ADMIN — MIRTAZAPINE 15 MG: 15 TABLET, FILM COATED ORAL at 21:05

## 2023-03-10 RX ADMIN — HYDROXYZINE HYDROCHLORIDE 50 MG: 50 TABLET, FILM COATED ORAL at 10:24

## 2023-03-10 ASSESSMENT — ACTIVITIES OF DAILY LIVING (ADL)
ADLS_ACUITY_SCORE: 56

## 2023-03-10 NOTE — PROGRESS NOTES
Care Management Follow Up    Expected Discharge Date: 03/31/2023     Concerns to be Addressed: Discharge planning      Patient plan of care discussed at interdisciplinary rounds: Yes    Anticipated Discharge Disposition:  Group Home Placement once found by relocation worker     Referrals Placed by CM/SW: Skilled Nursing facility, Group Home   Private pay costs discussed: Not applicable    Additional Information:  SW emailed relocation worker 3/7 requesting update, no response at this time. SW placed call directly to relocation worker [phone], left  requesting return call.     ANALY will continue to follow.     DANITA Obrien, [facility]   Inpatient Care Coordination  [facility]   [phone]

## 2023-03-10 NOTE — PROGRESS NOTES
PRIMARY DIAGNOSIS: Placement  OUTPATIENT/OBSERVATION GOALS TO BE MET BEFORE DISCHARGE:  1. ADLs back to baseline: Yes    2. Activity and level of assistance: Ax1 @ BL    3. Pain status: Pain free.    4. Return to near baseline physical activity: Yes     Discharge Planner Nurse   Safe discharge environment identified: No  Barriers to discharge: Yes       Entered by: Marcin Dent RN 03/09/2023 8:37 PM  /86   Pulse 82   Temp 97.8  F (36.6  C) (Oral)   Resp 18   Wt 99.9 kg (220 lb 4.8 oz)   SpO2 92%   VSS on Ra. AxOx4 and able to make needs known. Transfering Ax2, Turning Ax1. Tolerating regular diet. Denies pain. No MD BINDU aware. Awaiting LTC placement. Pt is visiting with son, will continue to provide supportive cares.  Please review provider order for any additional goals.   Nurse to notify provider when observation goals have been met and patient is ready for discharge.

## 2023-03-10 NOTE — PLAN OF CARE
PRIMARY DIAGNOSIS:ASSAULT/ PLACEMENT  OUTPATIENT/OBSERVATION GOALS TO BE MET BEFORE DISCHARGE:  1. ADLs back to baseline: Yes    2. Activity and level of assistance: Assist of 2 with lift device.    3. Pain status: Pain free.    4. Return to near baseline physical activity: No    Vitals are Temp: 97.6  F (36.4  C) Temp src: Oral BP: (!) 128/91 Pulse: 78   Resp: 18 SpO2: 93 %.  Patient is Alert and Oriented x4. They are 2 assist with Lift.  Pt is on a Regular diet.  They are denying pain.  Patient has no IV access. Pt is medically ready for discharge. Awaiting group home placement. SW is following for safe disposition.      Discharge Planner Nurse   Safe discharge environment identified: Yes  Barriers to discharge: Yes       Entered by: Tita Loja RN 03/10/2023      Please review provider order for any additional goals.   Nurse to notify provider when observation goals have been met and patient is ready for discharge.
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
## 2023-03-10 NOTE — PLAN OF CARE

## 2023-03-10 NOTE — PLAN OF CARE
PRIMARY DIAGNOSIS: Placement  OUTPATIENT/OBSERVATION GOALS TO BE MET BEFORE DISCHARGE:  ADLs back to baseline: Yes    Activity and level of assistance: assist x 2    Pain status: Pain free.    Return to near baseline physical activity: Yes     Discharge Planner Nurse   Safe discharge environment identified: No  Barriers to discharge: Yes       Entered by: Emily Garcia RN 03/10/2023 12:08 AM    Patient currently denies pain, incontinent of urine, resting comfortably in bed, SW following for placement       Please review provider order for any additional goals.   Nurse to notify provider when observation goals have been met and patient is ready for discharge.

## 2023-03-10 NOTE — PLAN OF CARE
PRIMARY DIAGNOSIS: Placement  OUTPATIENT/OBSERVATION GOALS TO BE MET BEFORE DISCHARGE:  1. ADLs back to baseline: Yes    2. Activity and level of assistance: Up with maximum assistance. Consider SW and/or PT evaluation.     3. Pain status: Pain free.    4. Return to near baseline physical activity: Yes     Discharge Planner Nurse   Safe discharge environment identified: Yes  Barriers to discharge: Yes       Entered by: Laura Dover RN 03/09/2023 6:45 PM     Please review provider order for any additional goals.   Nurse to notify provider when observation goals have been met and patient is ready for discharge.Goal Outcome Evaluation:

## 2023-03-10 NOTE — PLAN OF CARE
PRIMARY DIAGNOSIS:ASSAULT/ PLACEMENT  OUTPATIENT/OBSERVATION GOALS TO BE MET BEFORE DISCHARGE:  ADLs back to baseline: Yes    Activity and level of assistance: Assist of 2 with lift device.    Pain status: Pain free.    Return to near baseline physical activity: No    Vitals are Temp: 97.6  F (36.4  C) Temp src: Oral BP: (!) 128/91 Pulse: 78   Resp: 18 SpO2: 93 %.  Patient is Alert and Oriented x4. They are 2 assist with Lift.  Pt is on a Regular diet.  They are denying pain.  Patient has no IV access. Pt is medically ready for discharge. Awaiting group home placement. SW is following for safe disposition.      Discharge Planner Nurse   Safe discharge environment identified: Yes  Barriers to discharge: Yes       Entered by: Tita Loja RN 03/10/2023      Please review provider order for any additional goals.   Nurse to notify provider when observation goals have been met and patient is ready for discharge.

## 2023-03-11 LAB — GLUCOSE BLDC GLUCOMTR-MCNC: 146 MG/DL (ref 70–99)

## 2023-03-11 PROCEDURE — 99207 PR NO CHARGE LOS: CPT | Performed by: PHYSICIAN ASSISTANT

## 2023-03-11 PROCEDURE — 250N000013 HC RX MED GY IP 250 OP 250 PS 637: Performed by: HOSPITALIST

## 2023-03-11 PROCEDURE — G0378 HOSPITAL OBSERVATION PER HR: HCPCS

## 2023-03-11 PROCEDURE — 250N000013 HC RX MED GY IP 250 OP 250 PS 637: Performed by: NURSE PRACTITIONER

## 2023-03-11 PROCEDURE — 82962 GLUCOSE BLOOD TEST: CPT

## 2023-03-11 PROCEDURE — 250N000013 HC RX MED GY IP 250 OP 250 PS 637: Performed by: PHYSICIAN ASSISTANT

## 2023-03-11 RX ADMIN — CARBOXYMETHYLCELLULOSE SODIUM 1 DROP: 0.5 SOLUTION/ DROPS OPHTHALMIC at 13:11

## 2023-03-11 RX ADMIN — BACLOFEN 10 MG: 10 TABLET ORAL at 13:11

## 2023-03-11 RX ADMIN — METFORMIN HYDROCHLORIDE 850 MG: 850 TABLET, FILM COATED ORAL at 17:15

## 2023-03-11 RX ADMIN — CLONIDINE HYDROCHLORIDE 0.1 MG: 0.1 TABLET ORAL at 09:08

## 2023-03-11 RX ADMIN — QUETIAPINE FUMARATE 400 MG: 200 TABLET ORAL at 21:23

## 2023-03-11 RX ADMIN — CLOTRIMAZOLE: 0.01 CREAM TOPICAL at 09:23

## 2023-03-11 RX ADMIN — DIVALPROEX SODIUM 1000 MG: 500 TABLET, DELAYED RELEASE ORAL at 21:22

## 2023-03-11 RX ADMIN — OLANZAPINE 2.5 MG: 2.5 TABLET, FILM COATED ORAL at 13:11

## 2023-03-11 RX ADMIN — HYDROCORTISONE: 1 CREAM TOPICAL at 09:23

## 2023-03-11 RX ADMIN — CLOTRIMAZOLE: 0.01 CREAM TOPICAL at 20:03

## 2023-03-11 RX ADMIN — METOPROLOL SUCCINATE 50 MG: 50 TABLET, EXTENDED RELEASE ORAL at 09:09

## 2023-03-11 RX ADMIN — KETOCONAZOLE: 20 CREAM TOPICAL at 20:03

## 2023-03-11 RX ADMIN — ATORVASTATIN CALCIUM 20 MG: 20 TABLET, FILM COATED ORAL at 20:02

## 2023-03-11 RX ADMIN — BACLOFEN 10 MG: 10 TABLET ORAL at 09:09

## 2023-03-11 RX ADMIN — ASPIRIN 81 MG CHEWABLE TABLET 81 MG: 81 TABLET CHEWABLE at 09:08

## 2023-03-11 RX ADMIN — CARBOXYMETHYLCELLULOSE SODIUM 1 DROP: 0.5 SOLUTION/ DROPS OPHTHALMIC at 20:02

## 2023-03-11 RX ADMIN — VENLAFAXINE HYDROCHLORIDE 75 MG: 150 CAPSULE, EXTENDED RELEASE ORAL at 21:24

## 2023-03-11 RX ADMIN — HYDROXYZINE HYDROCHLORIDE 50 MG: 50 TABLET, FILM COATED ORAL at 13:11

## 2023-03-11 RX ADMIN — CARBOXYMETHYLCELLULOSE SODIUM 1 DROP: 0.5 SOLUTION/ DROPS OPHTHALMIC at 09:09

## 2023-03-11 RX ADMIN — EMPAGLIFLOZIN 10 MG: 10 TABLET, FILM COATED ORAL at 09:08

## 2023-03-11 RX ADMIN — DESONIDE: 0.5 CREAM TOPICAL at 13:13

## 2023-03-11 RX ADMIN — VENLAFAXINE HYDROCHLORIDE 150 MG: 150 CAPSULE, EXTENDED RELEASE ORAL at 21:23

## 2023-03-11 RX ADMIN — DIVALPROEX SODIUM 750 MG: 500 TABLET, DELAYED RELEASE ORAL at 09:08

## 2023-03-11 RX ADMIN — HYDROCORTISONE: 1 CREAM TOPICAL at 20:03

## 2023-03-11 RX ADMIN — HYDROXYZINE HYDROCHLORIDE 50 MG: 50 TABLET, FILM COATED ORAL at 09:09

## 2023-03-11 RX ADMIN — Medication 10 MG: at 21:23

## 2023-03-11 RX ADMIN — MIRTAZAPINE 15 MG: 15 TABLET, FILM COATED ORAL at 21:24

## 2023-03-11 RX ADMIN — LEVOTHYROXINE SODIUM 25 MCG: 0.03 TABLET ORAL at 09:09

## 2023-03-11 RX ADMIN — Medication 25 MCG: at 09:10

## 2023-03-11 RX ADMIN — KETOCONAZOLE: 20 CREAM TOPICAL at 09:23

## 2023-03-11 RX ADMIN — QUETIAPINE 200 MG: 200 TABLET, FILM COATED ORAL at 13:11

## 2023-03-11 RX ADMIN — HYDROXYZINE HYDROCHLORIDE 50 MG: 50 TABLET, FILM COATED ORAL at 20:02

## 2023-03-11 RX ADMIN — BACLOFEN 10 MG: 10 TABLET ORAL at 20:02

## 2023-03-11 RX ADMIN — CLONIDINE HYDROCHLORIDE 0.1 MG: 0.1 TABLET ORAL at 20:02

## 2023-03-11 RX ADMIN — QUETIAPINE 200 MG: 200 TABLET, FILM COATED ORAL at 09:08

## 2023-03-11 RX ADMIN — CARBOXYMETHYLCELLULOSE SODIUM 1 DROP: 0.5 SOLUTION/ DROPS OPHTHALMIC at 16:34

## 2023-03-11 RX ADMIN — POLYETHYLENE GLYCOL 3350 17 G: 17 POWDER, FOR SOLUTION ORAL at 09:10

## 2023-03-11 RX ADMIN — CLONIDINE HYDROCHLORIDE 0.1 MG: 0.1 TABLET ORAL at 13:11

## 2023-03-11 RX ADMIN — METFORMIN HYDROCHLORIDE 850 MG: 850 TABLET, FILM COATED ORAL at 09:08

## 2023-03-11 ASSESSMENT — ACTIVITIES OF DAILY LIVING (ADL)
ADLS_ACUITY_SCORE: 56

## 2023-03-11 NOTE — PLAN OF CARE
PRIMARY DIAGNOSIS: PLACEMENT  OUTPATIENT/OBSERVATION GOALS TO BE MET BEFORE DISCHARGE:  1. ADLs back to baseline: Yes    2. Activity and level of assistance: Up with maximum assistance.     3. Pain status: Pain free.    4. Return to near baseline physical activity: Yes     Discharge Planner Nurse   Safe discharge environment identified: No  Barriers to discharge: Yes       Entered by: Annalee Echevarria RN 03/10/2023   BP (!) 128/91 (BP Location: Left arm)   Pulse 78   Temp 97.6  F (36.4  C) (Oral)   Resp 18   Wt 99.9 kg (220 lb 4.8 oz)   SpO2 93%     Please review provider order for any additional goals.   Nurse to notify provider when observation goals have been met and patient is ready for discharge.

## 2023-03-11 NOTE — PLAN OF CARE
PRIMARY DIAGNOSIS: PLACEMENT  OUTPATIENT/OBSERVATION GOALS TO BE MET BEFORE DISCHARGE:  1. ADLs back to baseline: Yes    2. Activity and level of assistance: Up with maximum assistance.     3. Pain status: Pain free.    4. Return to near baseline physical activity: Yes     Discharge Planner Nurse   Safe discharge environment identified: No  Barriers to discharge: Yes       Entered by: Annalee Echevarria RN 03/11/2023   BP (!) 128/91 (BP Location: Left arm)   Pulse 78   Temp 97.6  F (36.4  C) (Oral)   Resp 18   Wt 99.9 kg (220 lb 4.8 oz)   SpO2 93%   Pt. Alert & oriented x 4. Denies pain.  Assist x 2 with lift for transfers.No IV access. Medically cleared for discharge. Awaiting placement. SW following.  Please review provider order for any additional goals.   Nurse to notify provider when observation goals have been met and patient is ready for discharge.
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
## 2023-03-11 NOTE — PLAN OF CARE
PRIMARY DIAGNOSIS: Placement   OUTPATIENT/OBSERVATION GOALS TO BE MET BEFORE DISCHARGE  /83 (BP Location: Left arm)   Pulse 80   Temp 98.1  F (36.7  C) (Oral)   Resp 22   Wt 99.9 kg (220 lb 4.8 oz)   SpO2 96%   1. ADLs back to baseline: Yes    2. Activity and level of assistance: Lift device.    3. Pain status: Pain free.    4. Return to near baseline physical activity: Yes     Discharge Planner Nurse   Safe discharge environment identified: Yes  Barriers to discharge: Yes       Entered by: Bhavik Thomas RN 03/11/2023    Please review provider order for any additional goals.   Nurse to notify provider when observation goals have been met and patient is ready for discharge.   hair removal not indicated

## 2023-03-11 NOTE — PLAN OF CARE
PRIMARY DIAGNOSIS: PLACEMENT  OUTPATIENT/OBSERVATION GOALS TO BE MET BEFORE DISCHARGE:  1. ADLs back to baseline: Yes    2. Activity and level of assistance: Lift device    3. Pain status: Pain free.    4. Return to near baseline physical activity: Yes    Vitals are Temp: 97.8  F (36.6  C) Temp src: Oral BP: 126/87 Pulse: 68   Resp: 16 SpO2: 94 %.  Patient is Alert and Oriented x4. Pt is 2 person  assist with Lift device.  Pt is on a Regular diet.  Pt is denying pain.  Patient has no IV access. SW is following for safe disposition. Will cont to monitor and provide cares.     Discharge Planner Nurse   Safe discharge environment identified: Yes  Barriers to discharge: Yes       Entered by: Tita Loja RN 03/11/2023      Please review provider order for any additional goals.   Nurse to notify provider when observation goals have been met and patient is ready for discharge.

## 2023-03-11 NOTE — PLAN OF CARE
PRIMARY DIAGNOSIS: PLACEMENT  OUTPATIENT/OBSERVATION GOALS TO BE MET BEFORE DISCHARGE:  ADLs back to baseline: Yes    Activity and level of assistance: Lift device    Pain status: Pain free.    Return to near baseline physical activity: Yes    Vitals are Temp: 97.8  F (36.6  C) Temp src: Oral BP: 126/87 Pulse: 68   Resp: 16 SpO2: 94 %.  Patient is Alert and Oriented x4. Pt is 2 person  assist with Lift device.  Pt is on a Regular diet.  Pt is denying pain.  Patient has no IV access. SW is following for safe disposition. Will cont to monitor.     Discharge Planner Nurse   Safe discharge environment identified: Yes  Barriers to discharge: Yes       Entered by: Tita Loja RN 03/11/2023      Please review provider order for any additional goals.   Nurse to notify provider when observation goals have been met and patient is ready for discharge.

## 2023-03-12 LAB — GLUCOSE BLDC GLUCOMTR-MCNC: 93 MG/DL (ref 70–99)

## 2023-03-12 PROCEDURE — 82962 GLUCOSE BLOOD TEST: CPT

## 2023-03-12 PROCEDURE — 250N000013 HC RX MED GY IP 250 OP 250 PS 637: Performed by: PHYSICIAN ASSISTANT

## 2023-03-12 PROCEDURE — 250N000013 HC RX MED GY IP 250 OP 250 PS 637: Performed by: HOSPITALIST

## 2023-03-12 PROCEDURE — 250N000013 HC RX MED GY IP 250 OP 250 PS 637: Performed by: NURSE PRACTITIONER

## 2023-03-12 PROCEDURE — G0378 HOSPITAL OBSERVATION PER HR: HCPCS

## 2023-03-12 PROCEDURE — 99231 SBSQ HOSP IP/OBS SF/LOW 25: CPT | Performed by: NURSE PRACTITIONER

## 2023-03-12 RX ADMIN — OLANZAPINE 2.5 MG: 2.5 TABLET, FILM COATED ORAL at 14:31

## 2023-03-12 RX ADMIN — METFORMIN HYDROCHLORIDE 850 MG: 850 TABLET, FILM COATED ORAL at 09:28

## 2023-03-12 RX ADMIN — KETOCONAZOLE: 20 CREAM TOPICAL at 21:21

## 2023-03-12 RX ADMIN — QUETIAPINE 200 MG: 200 TABLET, FILM COATED ORAL at 14:31

## 2023-03-12 RX ADMIN — CARBOXYMETHYLCELLULOSE SODIUM 1 DROP: 0.5 SOLUTION/ DROPS OPHTHALMIC at 16:01

## 2023-03-12 RX ADMIN — BACLOFEN 10 MG: 10 TABLET ORAL at 21:15

## 2023-03-12 RX ADMIN — CLONIDINE HYDROCHLORIDE 0.1 MG: 0.1 TABLET ORAL at 09:27

## 2023-03-12 RX ADMIN — HYDROXYZINE HYDROCHLORIDE 50 MG: 50 TABLET, FILM COATED ORAL at 14:31

## 2023-03-12 RX ADMIN — DIVALPROEX SODIUM 1000 MG: 500 TABLET, DELAYED RELEASE ORAL at 21:15

## 2023-03-12 RX ADMIN — LEVOTHYROXINE SODIUM 25 MCG: 0.03 TABLET ORAL at 09:27

## 2023-03-12 RX ADMIN — BACLOFEN 10 MG: 10 TABLET ORAL at 09:27

## 2023-03-12 RX ADMIN — HYDROXYZINE HYDROCHLORIDE 50 MG: 50 TABLET, FILM COATED ORAL at 21:16

## 2023-03-12 RX ADMIN — METOPROLOL SUCCINATE 50 MG: 50 TABLET, EXTENDED RELEASE ORAL at 09:28

## 2023-03-12 RX ADMIN — CLONIDINE HYDROCHLORIDE 0.1 MG: 0.1 TABLET ORAL at 21:15

## 2023-03-12 RX ADMIN — MIRTAZAPINE 15 MG: 15 TABLET, FILM COATED ORAL at 21:15

## 2023-03-12 RX ADMIN — BACLOFEN 10 MG: 10 TABLET ORAL at 14:31

## 2023-03-12 RX ADMIN — KETOCONAZOLE: 20 CREAM TOPICAL at 09:26

## 2023-03-12 RX ADMIN — EMPAGLIFLOZIN 10 MG: 10 TABLET, FILM COATED ORAL at 09:27

## 2023-03-12 RX ADMIN — HYDROXYZINE HYDROCHLORIDE 50 MG: 50 TABLET, FILM COATED ORAL at 09:28

## 2023-03-12 RX ADMIN — QUETIAPINE FUMARATE 400 MG: 200 TABLET ORAL at 21:16

## 2023-03-12 RX ADMIN — Medication 25 MCG: at 09:27

## 2023-03-12 RX ADMIN — CARBOXYMETHYLCELLULOSE SODIUM 1 DROP: 0.5 SOLUTION/ DROPS OPHTHALMIC at 21:19

## 2023-03-12 RX ADMIN — DIVALPROEX SODIUM 750 MG: 500 TABLET, DELAYED RELEASE ORAL at 09:27

## 2023-03-12 RX ADMIN — Medication 10 MG: at 21:15

## 2023-03-12 RX ADMIN — CARBOXYMETHYLCELLULOSE SODIUM 1 DROP: 0.5 SOLUTION/ DROPS OPHTHALMIC at 09:28

## 2023-03-12 RX ADMIN — VENLAFAXINE HYDROCHLORIDE 75 MG: 150 CAPSULE, EXTENDED RELEASE ORAL at 21:14

## 2023-03-12 RX ADMIN — ATORVASTATIN CALCIUM 20 MG: 20 TABLET, FILM COATED ORAL at 21:15

## 2023-03-12 RX ADMIN — QUETIAPINE 200 MG: 200 TABLET, FILM COATED ORAL at 09:27

## 2023-03-12 RX ADMIN — ASPIRIN 81 MG CHEWABLE TABLET 81 MG: 81 TABLET CHEWABLE at 09:27

## 2023-03-12 RX ADMIN — VENLAFAXINE HYDROCHLORIDE 150 MG: 150 CAPSULE, EXTENDED RELEASE ORAL at 21:14

## 2023-03-12 RX ADMIN — METFORMIN HYDROCHLORIDE 850 MG: 850 TABLET, FILM COATED ORAL at 18:26

## 2023-03-12 RX ADMIN — CLONIDINE HYDROCHLORIDE 0.1 MG: 0.1 TABLET ORAL at 14:31

## 2023-03-12 RX ADMIN — POLYETHYLENE GLYCOL 3350 17 G: 17 POWDER, FOR SOLUTION ORAL at 09:28

## 2023-03-12 RX ADMIN — CLOTRIMAZOLE: 0.01 CREAM TOPICAL at 09:26

## 2023-03-12 ASSESSMENT — ACTIVITIES OF DAILY LIVING (ADL)
ADLS_ACUITY_SCORE: 56

## 2023-03-12 NOTE — PLAN OF CARE
PRIMARY DIAGNOSIS: PLACEMENT   OUTPATIENT/OBSERVATION GOALS TO BE MET BEFORE DISCHARGE:  1. Pain Status: Pain free.    2. Return to near baseline physical activity:  total care    3. Cleared for discharge by consultants (if involved): Yes    Discharge Planner Nurse   Safe discharge environment identified: Yes  Barriers to discharge: Yes       Entered by: SARA DERAS RN 03/12/2023    Vital signs:  Temp: 97.5  F (36.4  C) Temp src: Oral BP: (!) 137/90 Pulse: 80   Resp: 18 SpO2: 94 % O2 Device: None (Room air) Alert and oriented x4. Denies pain. Vitals and BG stable. Incontinent for bowel and bladder. Assist of x1 in bed. Lift assist when OOB. On regular diet. Good appetite. No IV access. Medically stable to discharge. Awaiting for placement in . SW following. Will continue to monitor and provide supportive care.      Please review provider order for any additional goals.   Nurse to notify provider when observation goals have been met and patient is ready for discharge.

## 2023-03-12 NOTE — PLAN OF CARE
PRIMARY DIAGNOSIS: PLACEMENT   OUTPATIENT/OBSERVATION GOALS TO BE MET BEFORE DISCHARGE:  1. Pain Status: Pain free.    2. Return to near baseline physical activity:  total care    3. Cleared for discharge by consultants (if involved): Yes    Discharge Planner Nurse   Safe discharge environment identified: Yes  Barriers to discharge: Yes       Entered by: SARA DERAS RN 03/12/2023    Alert and oriented x4. Denies pain. Vitals and BG stable. Incontinent for bowel and bladder. Assist of x1 in bed. Lift assist when OOB. On regular diet. Good appetite. No IV access. Medically stable to discharge. Awaiting for placement in . SW following. Will continue to monitor and provide supportive care.    Please review provider order for any additional goals.   Nurse to notify provider when observation goals have been met and patient is ready for discharge.

## 2023-03-12 NOTE — PLAN OF CARE
PRIMARY DIAGNOSIS: PLACEMENT  OUTPATIENT/OBSERVATION GOALS TO BE MET BEFORE DISCHARGE:  1. ADLs back to baseline: Yes    2. Activity and level of assistance: Up with maximum assistance.     3. Pain status: Pain free.    4. Return to near baseline physical activity: Yes     Discharge Planner Nurse   Safe discharge environment identified: Yes  Barriers to discharge: No       Entered by: Preston Arango RN 03/12/2023 5:11 AM     Please review provider order for any additional goals.   Nurse to notify provider when observation goals have been met and patient is ready for discharge.Goal Outcome Evaluation:
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
## 2023-03-12 NOTE — PLAN OF CARE
PRIMARY DIAGNOSIS: Placement  OUTPATIENT/OBSERVATION GOALS TO BE MET BEFORE DISCHARGE:  1. ADLs back to baseline: Yes    2. Activity and level of assistance: Lift device     3. Pain status: Pain free.    4. Return to near baseline physical activity: Yes     Discharge Planner Nurse   Safe discharge environment identified: Yes  Barriers to discharge: Yes   /83 (BP Location: Left arm)   Pulse 80   Temp 98.1  F (36.7  C) (Oral)   Resp 22   Wt 99.9 kg (220 lb 4.8 oz)   SpO2 96%        Entered by: Bhavik Thomas RN 03/11/2023    Please review provider order for any additional goals.   Nurse to notify provider when observation goals have been met and patient is ready for discharge.

## 2023-03-12 NOTE — PLAN OF CARE
PRIMARY DIAGNOSIS: PLACEMENT   OUTPATIENT/OBSERVATION GOALS TO BE MET BEFORE DISCHARGE:  1. Pain Status: Pain free.    2. Return to near baseline physical activity:  total care    3. Cleared for discharge by consultants (if involved): Yes    Discharge Planner Nurse   Safe discharge environment identified: Yes  Barriers to discharge: Yes       Entered by: SARA DERAS RN 03/12/2023 1:46 PM   Vital signs:  Temp: 97.5  F (36.4  C) Temp src: Oral BP: (!) 137/90 Pulse: 80   Resp: 18 SpO2: 94 % O2 Device: None (Room air)   Alert and oriented x4. Denies pain. Vitals and BG stable. Incontinent for bowel and bladder. Assist of x1 in bed. Lift assist when OOB. On regular diet. Good appetite. No IV access. Medically stable to discharge. Awaiting for placement in . SW following. Will continue to monitor and provide supportive care.    Please review provider order for any additional goals.   Nurse to notify provider when observation goals have been met and patient is ready for discharge.

## 2023-03-13 LAB — GLUCOSE BLDC GLUCOMTR-MCNC: 116 MG/DL (ref 70–99)

## 2023-03-13 PROCEDURE — 250N000013 HC RX MED GY IP 250 OP 250 PS 637: Performed by: HOSPITALIST

## 2023-03-13 PROCEDURE — 250N000013 HC RX MED GY IP 250 OP 250 PS 637: Performed by: NURSE PRACTITIONER

## 2023-03-13 PROCEDURE — G0378 HOSPITAL OBSERVATION PER HR: HCPCS

## 2023-03-13 PROCEDURE — 99207 PR NO CHARGE LOS: CPT | Performed by: PHYSICIAN ASSISTANT

## 2023-03-13 PROCEDURE — 250N000013 HC RX MED GY IP 250 OP 250 PS 637: Performed by: PHYSICIAN ASSISTANT

## 2023-03-13 PROCEDURE — 82962 GLUCOSE BLOOD TEST: CPT

## 2023-03-13 RX ADMIN — QUETIAPINE 200 MG: 200 TABLET, FILM COATED ORAL at 09:47

## 2023-03-13 RX ADMIN — VENLAFAXINE HYDROCHLORIDE 150 MG: 150 CAPSULE, EXTENDED RELEASE ORAL at 23:21

## 2023-03-13 RX ADMIN — CLONIDINE HYDROCHLORIDE 0.1 MG: 0.1 TABLET ORAL at 14:08

## 2023-03-13 RX ADMIN — Medication 25 MCG: at 09:46

## 2023-03-13 RX ADMIN — METFORMIN HYDROCHLORIDE 850 MG: 850 TABLET, FILM COATED ORAL at 18:45

## 2023-03-13 RX ADMIN — Medication 10 MG: at 23:22

## 2023-03-13 RX ADMIN — ATORVASTATIN CALCIUM 20 MG: 20 TABLET, FILM COATED ORAL at 20:40

## 2023-03-13 RX ADMIN — LEVOTHYROXINE SODIUM 25 MCG: 0.03 TABLET ORAL at 09:46

## 2023-03-13 RX ADMIN — VENLAFAXINE HYDROCHLORIDE 75 MG: 150 CAPSULE, EXTENDED RELEASE ORAL at 23:21

## 2023-03-13 RX ADMIN — BACLOFEN 10 MG: 10 TABLET ORAL at 09:47

## 2023-03-13 RX ADMIN — CLONIDINE HYDROCHLORIDE 0.1 MG: 0.1 TABLET ORAL at 20:40

## 2023-03-13 RX ADMIN — HYDROXYZINE HYDROCHLORIDE 50 MG: 50 TABLET, FILM COATED ORAL at 09:47

## 2023-03-13 RX ADMIN — KETOCONAZOLE: 20 CREAM TOPICAL at 20:41

## 2023-03-13 RX ADMIN — QUETIAPINE 200 MG: 200 TABLET, FILM COATED ORAL at 14:08

## 2023-03-13 RX ADMIN — CARBOXYMETHYLCELLULOSE SODIUM 1 DROP: 0.5 SOLUTION/ DROPS OPHTHALMIC at 20:41

## 2023-03-13 RX ADMIN — CARBOXYMETHYLCELLULOSE SODIUM 1 DROP: 0.5 SOLUTION/ DROPS OPHTHALMIC at 18:45

## 2023-03-13 RX ADMIN — CARBOXYMETHYLCELLULOSE SODIUM 1 DROP: 0.5 SOLUTION/ DROPS OPHTHALMIC at 09:46

## 2023-03-13 RX ADMIN — HYDROCORTISONE: 1 CREAM TOPICAL at 20:41

## 2023-03-13 RX ADMIN — DIVALPROEX SODIUM 750 MG: 500 TABLET, DELAYED RELEASE ORAL at 09:46

## 2023-03-13 RX ADMIN — METOPROLOL SUCCINATE 50 MG: 50 TABLET, EXTENDED RELEASE ORAL at 09:46

## 2023-03-13 RX ADMIN — MIRTAZAPINE 15 MG: 15 TABLET, FILM COATED ORAL at 23:22

## 2023-03-13 RX ADMIN — CLOTRIMAZOLE: 0.01 CREAM TOPICAL at 20:41

## 2023-03-13 RX ADMIN — POLYETHYLENE GLYCOL 3350 17 G: 17 POWDER, FOR SOLUTION ORAL at 09:51

## 2023-03-13 RX ADMIN — BACLOFEN 10 MG: 10 TABLET ORAL at 14:08

## 2023-03-13 RX ADMIN — HYDROXYZINE HYDROCHLORIDE 50 MG: 50 TABLET, FILM COATED ORAL at 14:08

## 2023-03-13 RX ADMIN — CLONIDINE HYDROCHLORIDE 0.1 MG: 0.1 TABLET ORAL at 09:46

## 2023-03-13 RX ADMIN — ASPIRIN 81 MG CHEWABLE TABLET 81 MG: 81 TABLET CHEWABLE at 09:46

## 2023-03-13 RX ADMIN — EMPAGLIFLOZIN 10 MG: 10 TABLET, FILM COATED ORAL at 09:47

## 2023-03-13 RX ADMIN — DIVALPROEX SODIUM 1000 MG: 500 TABLET, DELAYED RELEASE ORAL at 23:22

## 2023-03-13 RX ADMIN — BACLOFEN 10 MG: 10 TABLET ORAL at 20:40

## 2023-03-13 RX ADMIN — METFORMIN HYDROCHLORIDE 850 MG: 850 TABLET, FILM COATED ORAL at 09:47

## 2023-03-13 RX ADMIN — QUETIAPINE FUMARATE 400 MG: 200 TABLET ORAL at 23:22

## 2023-03-13 RX ADMIN — HYDROXYZINE HYDROCHLORIDE 50 MG: 50 TABLET, FILM COATED ORAL at 20:40

## 2023-03-13 RX ADMIN — OLANZAPINE 2.5 MG: 2.5 TABLET, FILM COATED ORAL at 14:08

## 2023-03-13 RX ADMIN — CARBOXYMETHYLCELLULOSE SODIUM 1 DROP: 0.5 SOLUTION/ DROPS OPHTHALMIC at 14:08

## 2023-03-13 ASSESSMENT — ACTIVITIES OF DAILY LIVING (ADL)
ADLS_ACUITY_SCORE: 56

## 2023-03-13 NOTE — PROGRESS NOTES
PRIMARY DIAGNOSIS: PLACEMENT   OUTPATIENT/OBSERVATION GOALS TO BE MET BEFORE DISCHARGE:  1. ADLs back to baseline: Yes    2. Activity and level of assistance: Ax1 lift baseline    3. Pain status: Pain free.    4. Return to near baseline physical activity: Yes     Discharge Planner Nurse   Safe discharge environment identified: No  Barriers to discharge: No       Entered by: Daiana Green RN 03/13/2023 1:06 AM    Please review provider order for any additional goals.   Nurse to notify provider when observation goals have been met and patient is ready for discharge.

## 2023-03-13 NOTE — PROGRESS NOTES
PRIMARY DIAGNOSIS: PLACEMENT   OUTPATIENT/OBSERVATION GOALS TO BE MET BEFORE DISCHARGE:  1. ADLs back to baseline: Yes    2. Activity and level of assistance: Ax1 lift baseline    3. Pain status: Pain free.    4. Return to near baseline physical activity: Yes     Discharge Planner Nurse   Safe discharge environment identified: No  Barriers to discharge: No       Entered by: Ghazal Alcazar RN 03/13/2023     VSS. Medically stable. Awaiting placement, SW following. A&Ox4. Incontinent B&B, calls appropriately.     Please review provider order for any additional goals.   Nurse to notify provider when observation goals have been met and patient is ready for discharge.

## 2023-03-13 NOTE — PROGRESS NOTES
PRIMARY DIAGNOSIS: PLACEMENT   OUTPATIENT/OBSERVATION GOALS TO BE MET BEFORE DISCHARGE:  1. ADLs back to baseline: Yes    2. Activity and level of assistance: Ax1 lift baseline    3. Pain status: Pain free.    4. Return to near baseline physical activity: Yes     Discharge Planner Nurse   Safe discharge environment identified: No  Barriers to discharge: No       Entered by: Daiana Green RN 03/13/2023     VSS. Medically stable. Awaiting placement. Will continue to provide supportive cares.       Please review provider order for any additional goals.   Nurse to notify provider when observation goals have been met and patient is ready for discharge.

## 2023-03-13 NOTE — PROGRESS NOTES
PRIMARY DIAGNOSIS: PLACEMENT   OUTPATIENT/OBSERVATION GOALS TO BE MET BEFORE DISCHARGE:  1. ADLs back to baseline: Yes    2. Activity and level of assistance: Ax1 lift baseline    3. Pain status: Pain free.    4. Return to near baseline physical activity: Yes     Discharge Planner Nurse   Safe discharge environment identified: No  Barriers to discharge: No       Entered by: Daiana Green RN 03/13/2023     Please review provider order for any additional goals.   Nurse to notify provider when observation goals have been met and patient is ready for discharge.
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
## 2023-03-14 LAB — GLUCOSE BLDC GLUCOMTR-MCNC: 167 MG/DL (ref 70–99)

## 2023-03-14 PROCEDURE — G0378 HOSPITAL OBSERVATION PER HR: HCPCS

## 2023-03-14 PROCEDURE — 250N000013 HC RX MED GY IP 250 OP 250 PS 637: Performed by: HOSPITALIST

## 2023-03-14 PROCEDURE — 250N000013 HC RX MED GY IP 250 OP 250 PS 637: Performed by: PHYSICIAN ASSISTANT

## 2023-03-14 PROCEDURE — 250N000013 HC RX MED GY IP 250 OP 250 PS 637: Performed by: NURSE PRACTITIONER

## 2023-03-14 PROCEDURE — 99207 PR NO CHARGE LOS: CPT | Performed by: PHYSICIAN ASSISTANT

## 2023-03-14 PROCEDURE — 82962 GLUCOSE BLOOD TEST: CPT

## 2023-03-14 RX ADMIN — METOPROLOL SUCCINATE 50 MG: 50 TABLET, EXTENDED RELEASE ORAL at 09:36

## 2023-03-14 RX ADMIN — METFORMIN HYDROCHLORIDE 850 MG: 850 TABLET, FILM COATED ORAL at 09:36

## 2023-03-14 RX ADMIN — CLOTRIMAZOLE: 0.01 CREAM TOPICAL at 19:34

## 2023-03-14 RX ADMIN — CARBOXYMETHYLCELLULOSE SODIUM 1 DROP: 0.5 SOLUTION/ DROPS OPHTHALMIC at 09:36

## 2023-03-14 RX ADMIN — HYDROXYZINE HYDROCHLORIDE 50 MG: 50 TABLET, FILM COATED ORAL at 19:32

## 2023-03-14 RX ADMIN — BACLOFEN 10 MG: 10 TABLET ORAL at 09:37

## 2023-03-14 RX ADMIN — Medication 10 MG: at 21:11

## 2023-03-14 RX ADMIN — BACLOFEN 10 MG: 10 TABLET ORAL at 14:25

## 2023-03-14 RX ADMIN — DIVALPROEX SODIUM 750 MG: 500 TABLET, DELAYED RELEASE ORAL at 09:37

## 2023-03-14 RX ADMIN — HYDROXYZINE HYDROCHLORIDE 50 MG: 50 TABLET, FILM COATED ORAL at 14:25

## 2023-03-14 RX ADMIN — QUETIAPINE FUMARATE 400 MG: 200 TABLET ORAL at 21:12

## 2023-03-14 RX ADMIN — VENLAFAXINE HYDROCHLORIDE 75 MG: 150 CAPSULE, EXTENDED RELEASE ORAL at 21:11

## 2023-03-14 RX ADMIN — CARBOXYMETHYLCELLULOSE SODIUM 1 DROP: 0.5 SOLUTION/ DROPS OPHTHALMIC at 19:32

## 2023-03-14 RX ADMIN — CARBOXYMETHYLCELLULOSE SODIUM 1 DROP: 0.5 SOLUTION/ DROPS OPHTHALMIC at 17:29

## 2023-03-14 RX ADMIN — ATORVASTATIN CALCIUM 20 MG: 20 TABLET, FILM COATED ORAL at 19:32

## 2023-03-14 RX ADMIN — QUETIAPINE 200 MG: 200 TABLET, FILM COATED ORAL at 14:25

## 2023-03-14 RX ADMIN — BACLOFEN 10 MG: 10 TABLET ORAL at 19:32

## 2023-03-14 RX ADMIN — MIRTAZAPINE 15 MG: 15 TABLET, FILM COATED ORAL at 21:12

## 2023-03-14 RX ADMIN — KETOCONAZOLE: 20 CREAM TOPICAL at 19:34

## 2023-03-14 RX ADMIN — CLONIDINE HYDROCHLORIDE 0.1 MG: 0.1 TABLET ORAL at 09:37

## 2023-03-14 RX ADMIN — QUETIAPINE 200 MG: 200 TABLET, FILM COATED ORAL at 09:37

## 2023-03-14 RX ADMIN — ASPIRIN 81 MG CHEWABLE TABLET 81 MG: 81 TABLET CHEWABLE at 09:37

## 2023-03-14 RX ADMIN — POLYETHYLENE GLYCOL 3350 17 G: 17 POWDER, FOR SOLUTION ORAL at 09:36

## 2023-03-14 RX ADMIN — METFORMIN HYDROCHLORIDE 850 MG: 850 TABLET, FILM COATED ORAL at 17:29

## 2023-03-14 RX ADMIN — VENLAFAXINE HYDROCHLORIDE 150 MG: 150 CAPSULE, EXTENDED RELEASE ORAL at 21:11

## 2023-03-14 RX ADMIN — LEVOTHYROXINE SODIUM 25 MCG: 0.03 TABLET ORAL at 09:37

## 2023-03-14 RX ADMIN — CLONIDINE HYDROCHLORIDE 0.1 MG: 0.1 TABLET ORAL at 19:32

## 2023-03-14 RX ADMIN — CARBOXYMETHYLCELLULOSE SODIUM 1 DROP: 0.5 SOLUTION/ DROPS OPHTHALMIC at 14:25

## 2023-03-14 RX ADMIN — EMPAGLIFLOZIN 10 MG: 10 TABLET, FILM COATED ORAL at 09:37

## 2023-03-14 RX ADMIN — OLANZAPINE 2.5 MG: 2.5 TABLET, FILM COATED ORAL at 14:25

## 2023-03-14 RX ADMIN — DIVALPROEX SODIUM 1000 MG: 500 TABLET, DELAYED RELEASE ORAL at 21:11

## 2023-03-14 RX ADMIN — Medication 25 MCG: at 09:37

## 2023-03-14 RX ADMIN — CLONIDINE HYDROCHLORIDE 0.1 MG: 0.1 TABLET ORAL at 14:25

## 2023-03-14 RX ADMIN — HYDROXYZINE HYDROCHLORIDE 50 MG: 50 TABLET, FILM COATED ORAL at 09:37

## 2023-03-14 RX ADMIN — HYDROCORTISONE: 1 CREAM TOPICAL at 19:34

## 2023-03-14 ASSESSMENT — ACTIVITIES OF DAILY LIVING (ADL)
ADLS_ACUITY_SCORE: 56

## 2023-03-14 NOTE — PLAN OF CARE
PRIMARY DIAGNOSIS: Placement  OUTPATIENT/OBSERVATION GOALS TO BE MET BEFORE DISCHARGE:  ADLs back to baseline: Yes    Activity and level of assistance: Up with maximum assistance.     Pain status: Pain free.    Return to near baseline physical activity: Yes     Discharge Planner Nurse   Safe discharge environment identified: No  Barriers to discharge: Yes       Entered by: Diane Du RN 03/14/2023 5:43 PM   Pt A&Ox4. VSS, on RA. Denies pain, nausea, chest pain, or SOB. Incontinence care provided. Regular diet. Waiting for placement. SW following. Medically stable.   Please review provider order for any additional goals.   Nurse to notify provider when observation goals have been met and patient is ready for discharge.

## 2023-03-14 NOTE — PROGRESS NOTES
Care Management Follow Up    Length of Stay (days): 0    Expected Discharge Date: 03/31/2023     Concerns to be Addressed:       Patient plan of care discussed at interdisciplinary rounds: Yes    Referrals Placed by CM/ANALY:    Private pay costs discussed: Not applicable    Additional Information:    SW placed call directly to relocation worker [phone], left  requesting return call today 3/14 requesting an update.     DANITA Vaughn, [facility]  Inpatient Care Coordination  Ortho/Spine Unit  [phone]  Courtney Goldstein, [facility]
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
## 2023-03-14 NOTE — PLAN OF CARE
PRIMARY DIAGNOSIS: Placement  OUTPATIENT/OBSERVATION GOALS TO BE MET BEFORE DISCHARGE:  ADLs back to baseline: Yes    Activity and level of assistance: assist x 2    Pain status: Pain free.    Return to near baseline physical activity: Yes     Discharge Planner Nurse   Safe discharge environment identified: No  Barriers to discharge: Yes       Entered by: Emily Garcia RN 03/14/2023 12:32 AM    Patient resting comfortably in bed, currently denies pain, SW following for placement        Please review provider order for any additional goals.   Nurse to notify provider when observation goals have been met and patient is ready for discharge.

## 2023-03-14 NOTE — PLAN OF CARE
PRIMARY DIAGNOSIS: Placement  OUTPATIENT/OBSERVATION GOALS TO BE MET BEFORE DISCHARGE:  ADLs back to baseline: Yes     Activity and level of assistance: Up with maximum assistance.      Pain status: Pain free.     Return to near baseline physical activity: Yes          Discharge Planner Nurse   Safe discharge environment identified: No  Barriers to discharge: Yes  Entered by: Ana Castro RN 03/13/2023      /83 (BP Location: Left arm, Cuff Size: Adult Regular)   Pulse 73   Temp 98.3  F (36.8  C) (Oral)   Resp 18   Wt 99.9 kg (220 lb 4.8 oz)   SpO2 94%       Pt A & O X 4. Assist of one with cares and two lift device for transfers. On regular diet. Denies any pain. Able to verbalize needs. Incontinent of bladder and bowels. Plan: Awaiting placement. Will monitor.      Please review provider order for any additional goals.   Nurse to notify provider when observation goals have been met and patient is ready for discharge

## 2023-03-14 NOTE — PLAN OF CARE
PRIMARY DIAGNOSIS: Placement  OUTPATIENT/OBSERVATION GOALS TO BE MET BEFORE DISCHARGE:  ADLs back to baseline: Yes    Activity and level of assistance: assist x 2    Pain status: Pain free.    Return to near baseline physical activity: Yes     Discharge Planner Nurse   Safe discharge environment identified: No  Barriers to discharge: Yes       Entered by: Emily Garcia RN 03/14/2023 4:23 AM    Patient is currently sleeping, SW following for placement       Please review provider order for any additional goals.   Nurse to notify provider when observation goals have been met and patient is ready for discharge.

## 2023-03-14 NOTE — PROGRESS NOTES
PRIMARY DIAGNOSIS: PLACEMENT   OUTPATIENT/OBSERVATION GOALS TO BE MET BEFORE DISCHARGE:  1. ADLs back to baseline: Yes    2. Activity and level of assistance: Ax1 lift baseline    3. Pain status: Pain free.    4. Return to near baseline physical activity: Yes     Discharge Planner Nurse   Safe discharge environment identified: No  Barriers to discharge: No       Entered by: Ghazal Alcazar RN 03/14/2023     VSS. Medically stable. Awaiting placement, SW following. A&Ox4. Incontinent B&B, calls appropriately.     Please review provider order for any additional goals.   Nurse to notify provider when observation goals have been met and patient is ready for discharge.

## 2023-03-15 LAB — GLUCOSE BLDC GLUCOMTR-MCNC: 77 MG/DL (ref 70–99)

## 2023-03-15 PROCEDURE — 99231 SBSQ HOSP IP/OBS SF/LOW 25: CPT | Performed by: PHYSICIAN ASSISTANT

## 2023-03-15 PROCEDURE — 250N000013 HC RX MED GY IP 250 OP 250 PS 637: Performed by: HOSPITALIST

## 2023-03-15 PROCEDURE — 82962 GLUCOSE BLOOD TEST: CPT

## 2023-03-15 PROCEDURE — 250N000013 HC RX MED GY IP 250 OP 250 PS 637: Performed by: NURSE PRACTITIONER

## 2023-03-15 PROCEDURE — 250N000013 HC RX MED GY IP 250 OP 250 PS 637: Performed by: PHYSICIAN ASSISTANT

## 2023-03-15 PROCEDURE — G0378 HOSPITAL OBSERVATION PER HR: HCPCS

## 2023-03-15 RX ADMIN — CARBOXYMETHYLCELLULOSE SODIUM 1 DROP: 0.5 SOLUTION/ DROPS OPHTHALMIC at 17:31

## 2023-03-15 RX ADMIN — BACLOFEN 10 MG: 10 TABLET ORAL at 19:16

## 2023-03-15 RX ADMIN — KETOCONAZOLE: 20 CREAM TOPICAL at 19:20

## 2023-03-15 RX ADMIN — HYDROXYZINE HYDROCHLORIDE 50 MG: 50 TABLET, FILM COATED ORAL at 13:23

## 2023-03-15 RX ADMIN — QUETIAPINE 200 MG: 200 TABLET, FILM COATED ORAL at 09:28

## 2023-03-15 RX ADMIN — CARBOXYMETHYLCELLULOSE SODIUM 1 DROP: 0.5 SOLUTION/ DROPS OPHTHALMIC at 13:24

## 2023-03-15 RX ADMIN — QUETIAPINE FUMARATE 400 MG: 200 TABLET ORAL at 21:32

## 2023-03-15 RX ADMIN — METFORMIN HYDROCHLORIDE 850 MG: 850 TABLET, FILM COATED ORAL at 17:32

## 2023-03-15 RX ADMIN — ATORVASTATIN CALCIUM 20 MG: 20 TABLET, FILM COATED ORAL at 19:16

## 2023-03-15 RX ADMIN — HYDROXYZINE HYDROCHLORIDE 50 MG: 50 TABLET, FILM COATED ORAL at 19:16

## 2023-03-15 RX ADMIN — Medication 10 MG: at 21:32

## 2023-03-15 RX ADMIN — HYDROXYZINE HYDROCHLORIDE 50 MG: 50 TABLET, FILM COATED ORAL at 09:29

## 2023-03-15 RX ADMIN — CLOTRIMAZOLE: 0.01 CREAM TOPICAL at 09:31

## 2023-03-15 RX ADMIN — CARBOXYMETHYLCELLULOSE SODIUM 1 DROP: 0.5 SOLUTION/ DROPS OPHTHALMIC at 19:16

## 2023-03-15 RX ADMIN — OLANZAPINE 2.5 MG: 2.5 TABLET, FILM COATED ORAL at 13:23

## 2023-03-15 RX ADMIN — EMPAGLIFLOZIN 10 MG: 10 TABLET, FILM COATED ORAL at 09:29

## 2023-03-15 RX ADMIN — Medication 25 MCG: at 09:29

## 2023-03-15 RX ADMIN — KETOCONAZOLE: 20 CREAM TOPICAL at 09:31

## 2023-03-15 RX ADMIN — DIVALPROEX SODIUM 1000 MG: 500 TABLET, DELAYED RELEASE ORAL at 21:32

## 2023-03-15 RX ADMIN — CARBOXYMETHYLCELLULOSE SODIUM 1 DROP: 0.5 SOLUTION/ DROPS OPHTHALMIC at 09:28

## 2023-03-15 RX ADMIN — VENLAFAXINE HYDROCHLORIDE 150 MG: 150 CAPSULE, EXTENDED RELEASE ORAL at 21:33

## 2023-03-15 RX ADMIN — ASPIRIN 81 MG CHEWABLE TABLET 81 MG: 81 TABLET CHEWABLE at 09:28

## 2023-03-15 RX ADMIN — BACLOFEN 10 MG: 10 TABLET ORAL at 09:28

## 2023-03-15 RX ADMIN — HYDROCORTISONE: 1 CREAM TOPICAL at 19:20

## 2023-03-15 RX ADMIN — CLOTRIMAZOLE: 0.01 CREAM TOPICAL at 19:21

## 2023-03-15 RX ADMIN — MIRTAZAPINE 15 MG: 15 TABLET, FILM COATED ORAL at 21:33

## 2023-03-15 RX ADMIN — DIVALPROEX SODIUM 750 MG: 500 TABLET, DELAYED RELEASE ORAL at 09:29

## 2023-03-15 RX ADMIN — POLYETHYLENE GLYCOL 3350 17 G: 17 POWDER, FOR SOLUTION ORAL at 09:30

## 2023-03-15 RX ADMIN — METOPROLOL SUCCINATE 50 MG: 50 TABLET, EXTENDED RELEASE ORAL at 09:29

## 2023-03-15 RX ADMIN — CLONIDINE HYDROCHLORIDE 0.1 MG: 0.1 TABLET ORAL at 09:28

## 2023-03-15 RX ADMIN — QUETIAPINE 200 MG: 200 TABLET, FILM COATED ORAL at 13:23

## 2023-03-15 RX ADMIN — LEVOTHYROXINE SODIUM 25 MCG: 0.03 TABLET ORAL at 09:29

## 2023-03-15 RX ADMIN — CLONIDINE HYDROCHLORIDE 0.1 MG: 0.1 TABLET ORAL at 19:16

## 2023-03-15 RX ADMIN — METFORMIN HYDROCHLORIDE 850 MG: 850 TABLET, FILM COATED ORAL at 09:29

## 2023-03-15 RX ADMIN — BACLOFEN 10 MG: 10 TABLET ORAL at 13:24

## 2023-03-15 RX ADMIN — DESONIDE: 0.5 CREAM TOPICAL at 13:24

## 2023-03-15 RX ADMIN — CLONIDINE HYDROCHLORIDE 0.1 MG: 0.1 TABLET ORAL at 13:23

## 2023-03-15 RX ADMIN — VENLAFAXINE HYDROCHLORIDE 75 MG: 150 CAPSULE, EXTENDED RELEASE ORAL at 21:33

## 2023-03-15 ASSESSMENT — ACTIVITIES OF DAILY LIVING (ADL)
ADLS_ACUITY_SCORE: 56
ADLS_ACUITY_SCORE: 56
ADLS_ACUITY_SCORE: 54
ADLS_ACUITY_SCORE: 56
ADLS_ACUITY_SCORE: 54
ADLS_ACUITY_SCORE: 54
ADLS_ACUITY_SCORE: 56

## 2023-03-15 NOTE — PROGRESS NOTES
Care Management Follow Up    Expected Discharge Date: 03/31/2023      Concerns to be Addressed: Discharge planning      Patient plan of care discussed at interdisciplinary rounds: Yes    Anticipated Discharge Disposition:  Group Home once placement found by relocation worker     Additional Information:  SW received VM from pt's relocation worker stating she has not yet heard from [facility] or [facility]. She has sent additional  referrals in addition to crisis respite referrals.     She inquired about an update from [facility]  that  management was working on, will follow up with leadership to request update.     SW will continue to follow.     DANITA Obrien, [facility]   Inpatient Care Coordination  [facility]   [phone]

## 2023-03-15 NOTE — PLAN OF CARE
PRIMARY DIAGNOSIS: PLACEMENT  OUTPATIENT/OBSERVATION GOALS TO BE MET BEFORE DISCHARGE:  ADLs back to baseline: Yes    Activity and level of assistance: A X 2    Pain status: Pain free.    Return to near baseline physical activity: Yes     Discharge Planner Nurse   Safe discharge environment identified: Yes  Barriers to discharge: No       Entered by: Preston Arango RN 03/14/2023 7:35 PM     Please review provider order for any additional goals.   Nurse to notify provider when observation goals have been met and patient is ready for discharge.Goal Outcome Evaluation:
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
## 2023-03-15 NOTE — CARE PLAN
PRIMARY DIAGNOSIS: Placement.  OUTPATIENT/OBSERVATION GOALS TO BE MET BEFORE DISCHARGE:  1. ADLs back to baseline: Yes    2. Activity and level of assistance: Lift    3. Pain status: Pain free.    4. Return to near baseline physical activity: Yes     Discharge Planner Nurse   Safe discharge environment identified: No  Barriers to discharge: Yes       Entered by: Bhavik Thomas RN 03/15/2023 5:16 PM     Please review provider order for any additional goals.   Nurse to notify provider when observation goals have been met and patient is ready for discharge.

## 2023-03-15 NOTE — PLAN OF CARE
PRIMARY DIAGNOSIS: PLACEMENT  OUTPATIENT/OBSERVATION GOALS TO BE MET BEFORE DISCHARGE:  ADLs back to baseline: Yes    Activity and level of assistance: A x 2    Pain status: Pain free.    Return to near baseline physical activity: Yes     Discharge Planner Nurse   Safe discharge environment identified: Yes  Barriers to discharge: No       Entered by: Preston Arango RN 03/15/2023 12:43 AM     Please review provider order for any additional goals.   Nurse to notify provider when observation goals have been met and patient is ready for discharge.Goal Outcome Evaluation:

## 2023-03-15 NOTE — PLAN OF CARE
PRIMARY DIAGNOSIS: placement  OUTPATIENT/OBSERVATION GOALS TO BE MET BEFORE DISCHARGE:  1. ADLs back to baseline: Yes    2. Activity and level of assistance: lift    3. Pain status: Pain free.    4. Return to near baseline physical activity: Yes     Discharge Planner Nurse   Safe discharge environment identified: No  Barriers to discharge: Yes       Entered by: Taniya Brumfield RN 03/15/2023     Please review provider order for any additional goals.   Nurse to notify provider when observation goals have been met and patient is ready for discharge.

## 2023-03-15 NOTE — PLAN OF CARE
PRIMARY DIAGNOSIS: PLACEMENT  OUTPATIENT/OBSERVATION GOALS TO BE MET BEFORE DISCHARGE:  1. ADLs back to baseline: Yes    2. Activity and level of assistance: A x 2    3. Pain status: Pain free.    4. Return to near baseline physical activity: Yes     Discharge Planner Nurse   Safe discharge environment identified: Yes  Barriers to discharge: No       Entered by: Preston Arango RN 03/15/2023 2:30 AM   Pt is A & O x 4, up w/ A x 2 lift transfer, denies pain, reg diet, SW following with placement  Please review provider order for any additional goals.   Nurse to notify provider when observation goals have been met and patient is ready for discharge.Goal Outcome Evaluation:

## 2023-03-15 NOTE — PLAN OF CARE
PRIMARY DIAGNOSIS: placement  OUTPATIENT/OBSERVATION GOALS TO BE MET BEFORE DISCHARGE:  ADLs back to baseline: Yes    Activity and level of assistance: lift    Pain status: Pain free.    Return to near baseline physical activity: Yes     Discharge Planner Nurse   Safe discharge environment identified: No  Barriers to discharge: Yes       Entered by: Taniya Brumfield RN 03/15/2023     Please review provider order for any additional goals.   Nurse to notify provider when observation goals have been met and patient is ready for discharge.

## 2023-03-16 PROCEDURE — 99207 PR NO CHARGE LOS: CPT | Performed by: PHYSICIAN ASSISTANT

## 2023-03-16 PROCEDURE — 250N000013 HC RX MED GY IP 250 OP 250 PS 637: Performed by: NURSE PRACTITIONER

## 2023-03-16 PROCEDURE — G0378 HOSPITAL OBSERVATION PER HR: HCPCS

## 2023-03-16 PROCEDURE — 250N000013 HC RX MED GY IP 250 OP 250 PS 637: Performed by: HOSPITALIST

## 2023-03-16 PROCEDURE — 250N000013 HC RX MED GY IP 250 OP 250 PS 637: Performed by: PHYSICIAN ASSISTANT

## 2023-03-16 RX ADMIN — LEVOTHYROXINE SODIUM 25 MCG: 0.03 TABLET ORAL at 09:24

## 2023-03-16 RX ADMIN — MIRTAZAPINE 15 MG: 15 TABLET, FILM COATED ORAL at 21:56

## 2023-03-16 RX ADMIN — Medication 10 MG: at 21:56

## 2023-03-16 RX ADMIN — CLONIDINE HYDROCHLORIDE 0.1 MG: 0.1 TABLET ORAL at 09:24

## 2023-03-16 RX ADMIN — CARBOXYMETHYLCELLULOSE SODIUM 1 DROP: 0.5 SOLUTION/ DROPS OPHTHALMIC at 19:20

## 2023-03-16 RX ADMIN — Medication 25 MCG: at 09:24

## 2023-03-16 RX ADMIN — POLYETHYLENE GLYCOL 3350 17 G: 17 POWDER, FOR SOLUTION ORAL at 09:23

## 2023-03-16 RX ADMIN — CARBOXYMETHYLCELLULOSE SODIUM 1 DROP: 0.5 SOLUTION/ DROPS OPHTHALMIC at 14:45

## 2023-03-16 RX ADMIN — KETOCONAZOLE: 20 CREAM TOPICAL at 19:26

## 2023-03-16 RX ADMIN — QUETIAPINE FUMARATE 400 MG: 200 TABLET ORAL at 21:57

## 2023-03-16 RX ADMIN — DIVALPROEX SODIUM 750 MG: 500 TABLET, DELAYED RELEASE ORAL at 09:24

## 2023-03-16 RX ADMIN — HYDROCORTISONE: 1 CREAM TOPICAL at 19:25

## 2023-03-16 RX ADMIN — CLOTRIMAZOLE: 0.01 CREAM TOPICAL at 09:03

## 2023-03-16 RX ADMIN — BACLOFEN 10 MG: 10 TABLET ORAL at 14:45

## 2023-03-16 RX ADMIN — EMPAGLIFLOZIN 10 MG: 10 TABLET, FILM COATED ORAL at 09:24

## 2023-03-16 RX ADMIN — ASPIRIN 81 MG CHEWABLE TABLET 81 MG: 81 TABLET CHEWABLE at 09:24

## 2023-03-16 RX ADMIN — DIVALPROEX SODIUM 1000 MG: 500 TABLET, DELAYED RELEASE ORAL at 21:56

## 2023-03-16 RX ADMIN — HYDROXYZINE HYDROCHLORIDE 50 MG: 50 TABLET, FILM COATED ORAL at 19:20

## 2023-03-16 RX ADMIN — CLOTRIMAZOLE: 0.01 CREAM TOPICAL at 19:25

## 2023-03-16 RX ADMIN — CLONIDINE HYDROCHLORIDE 0.1 MG: 0.1 TABLET ORAL at 19:20

## 2023-03-16 RX ADMIN — KETOCONAZOLE: 20 CREAM TOPICAL at 09:02

## 2023-03-16 RX ADMIN — VENLAFAXINE HYDROCHLORIDE 150 MG: 150 CAPSULE, EXTENDED RELEASE ORAL at 21:56

## 2023-03-16 RX ADMIN — QUETIAPINE 200 MG: 200 TABLET, FILM COATED ORAL at 09:24

## 2023-03-16 RX ADMIN — BACLOFEN 10 MG: 10 TABLET ORAL at 09:24

## 2023-03-16 RX ADMIN — HYDROXYZINE HYDROCHLORIDE 50 MG: 50 TABLET, FILM COATED ORAL at 09:24

## 2023-03-16 RX ADMIN — VENLAFAXINE HYDROCHLORIDE 75 MG: 150 CAPSULE, EXTENDED RELEASE ORAL at 21:56

## 2023-03-16 RX ADMIN — CLONIDINE HYDROCHLORIDE 0.1 MG: 0.1 TABLET ORAL at 14:45

## 2023-03-16 RX ADMIN — METFORMIN HYDROCHLORIDE 850 MG: 850 TABLET, FILM COATED ORAL at 09:24

## 2023-03-16 RX ADMIN — HYDROXYZINE HYDROCHLORIDE 50 MG: 50 TABLET, FILM COATED ORAL at 14:45

## 2023-03-16 RX ADMIN — QUETIAPINE 200 MG: 200 TABLET, FILM COATED ORAL at 14:45

## 2023-03-16 RX ADMIN — ATORVASTATIN CALCIUM 20 MG: 20 TABLET, FILM COATED ORAL at 19:20

## 2023-03-16 RX ADMIN — METFORMIN HYDROCHLORIDE 850 MG: 850 TABLET, FILM COATED ORAL at 19:19

## 2023-03-16 RX ADMIN — METOPROLOL SUCCINATE 50 MG: 50 TABLET, EXTENDED RELEASE ORAL at 09:24

## 2023-03-16 RX ADMIN — CARBOXYMETHYLCELLULOSE SODIUM 1 DROP: 0.5 SOLUTION/ DROPS OPHTHALMIC at 09:25

## 2023-03-16 RX ADMIN — BACLOFEN 10 MG: 10 TABLET ORAL at 19:20

## 2023-03-16 RX ADMIN — OLANZAPINE 2.5 MG: 2.5 TABLET, FILM COATED ORAL at 14:45

## 2023-03-16 ASSESSMENT — ACTIVITIES OF DAILY LIVING (ADL)
ADLS_ACUITY_SCORE: 54
ADLS_ACUITY_SCORE: 58
ADLS_ACUITY_SCORE: 54

## 2023-03-16 NOTE — CARE PLAN
PRIMARY DIAGNOSIS:PLACEMENT.  OUTPATIENT/OBSERVATION GOALS TO BE MET BEFORE DISCHARGE:  1. ADLs back to baseline: Yes    2. Activity and level of assistance: lift device.    3. Pain status: Pain free.    4. Return to near baseline physical activity: No     Discharge Planner Nurse   Safe discharge environment identified: No  Barriers to discharge: No     /85 (BP Location: Left arm)   Pulse 78   Temp 97.8  F (36.6  C) (Oral)   Resp 22   Wt 99.9 kg (220 lb 4.8 oz)   SpO2 95%        Entered by: Bhavik Thomas RN 03/16/2023 4:49 PM     Please review provider order for any additional goals.   Nurse to notify provider when observation goals have been met and patient is ready for discharge.

## 2023-03-16 NOTE — PLAN OF CARE
PRIMARY DIAGNOSIS: PLACEMENT  OUTPATIENT/OBSERVATION GOALS TO BE MET BEFORE DISCHARGE:  1. ADLs back to baseline: Yes    2. Activity and level of assistance: A X 2 lift transfer    3. Pain status: Pain free.    4. Return to near baseline physical activity: Yes     Discharge Planner Nurse   Safe discharge environment identified: No  Barriers to discharge:Yes- group home placement       Entered by: Taniya Brumfield RN 03/16/2023    Please review provider order for any additional goals.   Nurse to notify provider when observation goals have been met and patient is ready for discharge.Goal Outcome Evaluation:    No events- continue current POC.

## 2023-03-16 NOTE — PLAN OF CARE
PRIMARY DIAGNOSIS: PLACEMENT  OUTPATIENT/OBSERVATION GOALS TO BE MET BEFORE DISCHARGE:  1. ADLs back to baseline: Yes    2. Activity and level of assistance: A X 2 lift transfer    3. Pain status: Pain free.    4. Return to near baseline physical activity: Yes     Discharge Planner Nurse   Safe discharge environment identified: Yes  Barriers to discharge: No       Entered by: Preston Arango RN 03/16/2023 3:37 AM   Pt is A & O x 4, up w/ A x 2 lift transfer, denies pain, reg diet, SW following with placement.  Please review provider order for any additional goals.   Nurse to notify provider when observation goals have been met and patient is ready for discharge.Goal Outcome Evaluation:

## 2023-03-16 NOTE — PLAN OF CARE
PRIMARY DIAGNOSIS: PLACEMENT  OUTPATIENT/OBSERVATION GOALS TO BE MET BEFORE DISCHARGE:  1. ADLs back to baseline: Yes    2. Activity and level of assistance: A X 2 lift transfer    3. Pain status: Pain free.    4. Return to near baseline physical activity: Yes     Discharge Planner Nurse   Safe discharge environment identified: No  Barriers to discharge:Yes- group home placement       Entered by: Taniya Brumfield RN 03/16/2023    Please review provider order for any additional goals.   Nurse to notify provider when observation goals have been met and patient is ready for discharge.Goal Outcome Evaluation:

## 2023-03-16 NOTE — CARE PLAN
PRIMARY DIAGNOSIS: Placement.  OUTPATIENT/OBSERVATION GOALS TO BE MET BEFORE DISCHARGE:  1. ADLs back to baseline: Yes    2. Activity and level of assistance: lift    3. Pain status: Pain free.    4. Return to near baseline physical activity: Yes     Discharge Planner Nurse   Safe discharge environment identified: No  Barriers to discharge: Yes       Entered by: Bhavik Thomas RN 03/15/2023 11:34 PM     Please review provider order for any additional goals.   Nurse to notify provider when observation goals have been met and patient is ready for discharge.
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
## 2023-03-17 LAB — GLUCOSE BLDC GLUCOMTR-MCNC: 139 MG/DL (ref 70–99)

## 2023-03-17 PROCEDURE — 82962 GLUCOSE BLOOD TEST: CPT

## 2023-03-17 PROCEDURE — 250N000013 HC RX MED GY IP 250 OP 250 PS 637: Performed by: PHYSICIAN ASSISTANT

## 2023-03-17 PROCEDURE — 250N000013 HC RX MED GY IP 250 OP 250 PS 637: Performed by: HOSPITALIST

## 2023-03-17 PROCEDURE — G0378 HOSPITAL OBSERVATION PER HR: HCPCS

## 2023-03-17 PROCEDURE — 99207 PR NO CHARGE LOS: CPT | Performed by: PHYSICIAN ASSISTANT

## 2023-03-17 PROCEDURE — 250N000013 HC RX MED GY IP 250 OP 250 PS 637: Performed by: NURSE PRACTITIONER

## 2023-03-17 RX ADMIN — BACLOFEN 10 MG: 10 TABLET ORAL at 16:14

## 2023-03-17 RX ADMIN — EMPAGLIFLOZIN 10 MG: 10 TABLET, FILM COATED ORAL at 09:42

## 2023-03-17 RX ADMIN — CLONIDINE HYDROCHLORIDE 0.1 MG: 0.1 TABLET ORAL at 21:15

## 2023-03-17 RX ADMIN — CLOTRIMAZOLE: 0.01 CREAM TOPICAL at 21:20

## 2023-03-17 RX ADMIN — KETOCONAZOLE: 20 CREAM TOPICAL at 09:47

## 2023-03-17 RX ADMIN — HYDROXYZINE HYDROCHLORIDE 50 MG: 50 TABLET, FILM COATED ORAL at 16:11

## 2023-03-17 RX ADMIN — MIRTAZAPINE 15 MG: 15 TABLET, FILM COATED ORAL at 21:15

## 2023-03-17 RX ADMIN — KETOCONAZOLE: 20 CREAM TOPICAL at 21:21

## 2023-03-17 RX ADMIN — HYDROXYZINE HYDROCHLORIDE 50 MG: 50 TABLET, FILM COATED ORAL at 09:41

## 2023-03-17 RX ADMIN — BACLOFEN 10 MG: 10 TABLET ORAL at 09:39

## 2023-03-17 RX ADMIN — VENLAFAXINE HYDROCHLORIDE 150 MG: 150 CAPSULE, EXTENDED RELEASE ORAL at 21:16

## 2023-03-17 RX ADMIN — ATORVASTATIN CALCIUM 20 MG: 20 TABLET, FILM COATED ORAL at 21:15

## 2023-03-17 RX ADMIN — Medication 25 MCG: at 09:44

## 2023-03-17 RX ADMIN — METFORMIN HYDROCHLORIDE 850 MG: 850 TABLET, FILM COATED ORAL at 18:43

## 2023-03-17 RX ADMIN — CARBOXYMETHYLCELLULOSE SODIUM 1 DROP: 0.5 SOLUTION/ DROPS OPHTHALMIC at 21:16

## 2023-03-17 RX ADMIN — VENLAFAXINE HYDROCHLORIDE 75 MG: 150 CAPSULE, EXTENDED RELEASE ORAL at 21:16

## 2023-03-17 RX ADMIN — METOPROLOL SUCCINATE 50 MG: 50 TABLET, EXTENDED RELEASE ORAL at 09:40

## 2023-03-17 RX ADMIN — CARBOXYMETHYLCELLULOSE SODIUM 1 DROP: 0.5 SOLUTION/ DROPS OPHTHALMIC at 16:11

## 2023-03-17 RX ADMIN — METFORMIN HYDROCHLORIDE 850 MG: 850 TABLET, FILM COATED ORAL at 09:39

## 2023-03-17 RX ADMIN — QUETIAPINE 200 MG: 200 TABLET, FILM COATED ORAL at 09:39

## 2023-03-17 RX ADMIN — QUETIAPINE 200 MG: 200 TABLET, FILM COATED ORAL at 16:11

## 2023-03-17 RX ADMIN — CLOTRIMAZOLE: 0.01 CREAM TOPICAL at 09:45

## 2023-03-17 RX ADMIN — HYDROCORTISONE: 1 CREAM TOPICAL at 09:45

## 2023-03-17 RX ADMIN — Medication 10 MG: at 21:15

## 2023-03-17 RX ADMIN — ASPIRIN 81 MG CHEWABLE TABLET 81 MG: 81 TABLET CHEWABLE at 09:40

## 2023-03-17 RX ADMIN — DIVALPROEX SODIUM 1000 MG: 500 TABLET, DELAYED RELEASE ORAL at 21:15

## 2023-03-17 RX ADMIN — QUETIAPINE FUMARATE 400 MG: 200 TABLET ORAL at 21:15

## 2023-03-17 RX ADMIN — CLONIDINE HYDROCHLORIDE 0.1 MG: 0.1 TABLET ORAL at 16:11

## 2023-03-17 RX ADMIN — OLANZAPINE 2.5 MG: 2.5 TABLET, FILM COATED ORAL at 16:11

## 2023-03-17 RX ADMIN — LEVOTHYROXINE SODIUM 25 MCG: 0.03 TABLET ORAL at 09:40

## 2023-03-17 RX ADMIN — BACLOFEN 10 MG: 10 TABLET ORAL at 21:15

## 2023-03-17 RX ADMIN — POLYETHYLENE GLYCOL 3350 17 G: 17 POWDER, FOR SOLUTION ORAL at 09:45

## 2023-03-17 RX ADMIN — CARBOXYMETHYLCELLULOSE SODIUM 1 DROP: 0.5 SOLUTION/ DROPS OPHTHALMIC at 09:45

## 2023-03-17 RX ADMIN — CARBOXYMETHYLCELLULOSE SODIUM 1 DROP: 0.5 SOLUTION/ DROPS OPHTHALMIC at 12:33

## 2023-03-17 RX ADMIN — CLONIDINE HYDROCHLORIDE 0.1 MG: 0.1 TABLET ORAL at 09:41

## 2023-03-17 RX ADMIN — HYDROXYZINE HYDROCHLORIDE 50 MG: 50 TABLET, FILM COATED ORAL at 21:16

## 2023-03-17 RX ADMIN — DESONIDE: 0.5 CREAM TOPICAL at 12:41

## 2023-03-17 RX ADMIN — DIVALPROEX SODIUM 750 MG: 500 TABLET, DELAYED RELEASE ORAL at 09:41

## 2023-03-17 ASSESSMENT — ACTIVITIES OF DAILY LIVING (ADL)
ADLS_ACUITY_SCORE: 54
ADLS_ACUITY_SCORE: 58
ADLS_ACUITY_SCORE: 54
ADLS_ACUITY_SCORE: 58
ADLS_ACUITY_SCORE: 54
ADLS_ACUITY_SCORE: 58
ADLS_ACUITY_SCORE: 54
ADLS_ACUITY_SCORE: 58

## 2023-03-17 NOTE — PLAN OF CARE
PRIMARY DIAGNOSIS: Placement  OUTPATIENT/OBSERVATION GOALS TO BE MET BEFORE DISCHARGE:  ADLs back to baseline: Yes    Activity and level of assistance: assist x 2    Pain status: Pain free.    Return to near baseline physical activity: Yes     Discharge Planner Nurse   Safe discharge environment identified: No  Barriers to discharge: Yes       Entered by: Emily Garcia RN 03/17/2023 12:37 AM    Patient currently resting in bed, denies pain at this time, SW following for placement       Please review provider order for any additional goals.   Nurse to notify provider when observation goals have been met and patient is ready for discharge.

## 2023-03-17 NOTE — PROGRESS NOTES
Hospitalist short note    Pt was getting bed bath when I went to see him.   Remains medically stable. Still awaiting bed placement in group home.

## 2023-03-17 NOTE — PLAN OF CARE
PRIMARY DIAGNOSIS: Placement   OUTPATIENT/OBSERVATION GOALS TO BE MET BEFORE DISCHARGE:  1. ADLs back to baseline: Yes    2. Activity and level of assistance: Two assist using lift     3. Pain status: Pain free.    4. Return to near baseline physical activity: Yes     Discharge Planner Nurse   Safe discharge environment identified: Yes  Barriers to discharge: Yes       Entered by: Chantel Spencer RN 03/17/2023 12:44 PM  BP (!) 128/94   Pulse 83   Temp 97.8  F (36.6  C) (Oral)   Resp 20   Wt 99.9 kg (220 lb 4.8 oz)   SpO2 94%   Patient A&O x 4. One to two staff assist. Denies pain. Tolerating regular diet. SW working finding placement.   Please review provider order for any additional goals.   Nurse to notify provider when observation goals have been met and patient is ready for discharge.Goal Outcome Evaluation:

## 2023-03-17 NOTE — PLAN OF CARE
PRIMARY DIAGNOSIS: Placement   OUTPATIENT/OBSERVATION GOALS TO BE MET BEFORE DISCHARGE:  1. ADLs back to baseline: Yes    2. Activity and level of assistance: Two assist using lift     3. Pain status: Pain free.    4. Return to near baseline physical activity: Yes     Discharge Planner Nurse   Safe discharge environment identified: Yes  Barriers to discharge: Yes       Entered by: Chantel Spencer RN 03/17/2023 10:56 AM  BP (!) 128/94   Pulse 83   Temp 97.8  F (36.6  C) (Oral)   Resp 20   Wt 99.9 kg (220 lb 4.8 oz)   SpO2 94%     Please review provider order for any additional goals.   Nurse to notify provider when observation goals have been met and patient is ready for discharge.Goal Outcome Evaluation:

## 2023-03-17 NOTE — PLAN OF CARE
PRIMARY DIAGNOSIS: Placement  OUTPATIENT/OBSERVATION GOALS TO BE MET BEFORE DISCHARGE:  ADLs back to baseline: Yes    Activity and level of assistance: assist x 2    Pain status: Pain free.    Return to near baseline physical activity: Yes     Discharge Planner Nurse   Safe discharge environment identified: No  Barriers to discharge: Yes       Entered by: Emily Garcia RN 03/17/2023 5:01 AM    Patient currently sleeping, SW following for pain       Please review provider order for any additional goals.   Nurse to notify provider when observation goals have been met and patient is ready for discharge.

## 2023-03-17 NOTE — PLAN OF CARE
PRIMARY DIAGNOSIS: Placement   OUTPATIENT/OBSERVATION GOALS TO BE MET BEFORE DISCHARGE:  1. ADLs back to baseline: Yes    2. Activity and level of assistance: Two assist using lift     3. Pain status: Pain free.    4. Return to near baseline physical activity: Yes     Discharge Planner Nurse   Safe discharge environment identified: Yes  Barriers to discharge: Yes       Entered by: Chantel Spencer RN 03/17/2023 4:07 PM  BP (!) 128/94   Pulse 83   Temp 97.8  F (36.6  C) (Oral)   Resp 20   Wt 99.9 kg (220 lb 4.8 oz)   SpO2 94%   Patient A&O x 4. One to two staff assist. Denies pain. Tolerating regular diet. SW working finding placement.   Please review provider order for any additional goals.   Nurse to notify provider when observation goals have been met and patient is ready for discharge.Goal Outcome Evaluation:

## 2023-03-18 LAB — GLUCOSE BLDC GLUCOMTR-MCNC: 125 MG/DL (ref 70–99)

## 2023-03-18 PROCEDURE — 82962 GLUCOSE BLOOD TEST: CPT

## 2023-03-18 PROCEDURE — 250N000013 HC RX MED GY IP 250 OP 250 PS 637: Performed by: HOSPITALIST

## 2023-03-18 PROCEDURE — G0378 HOSPITAL OBSERVATION PER HR: HCPCS

## 2023-03-18 PROCEDURE — 250N000013 HC RX MED GY IP 250 OP 250 PS 637: Performed by: NURSE PRACTITIONER

## 2023-03-18 PROCEDURE — 99231 SBSQ HOSP IP/OBS SF/LOW 25: CPT | Performed by: PHYSICIAN ASSISTANT

## 2023-03-18 PROCEDURE — 250N000013 HC RX MED GY IP 250 OP 250 PS 637: Performed by: PHYSICIAN ASSISTANT

## 2023-03-18 RX ADMIN — KETOCONAZOLE: 20 CREAM TOPICAL at 08:50

## 2023-03-18 RX ADMIN — CLONIDINE HYDROCHLORIDE 0.1 MG: 0.1 TABLET ORAL at 19:42

## 2023-03-18 RX ADMIN — METOPROLOL SUCCINATE 50 MG: 50 TABLET, EXTENDED RELEASE ORAL at 08:48

## 2023-03-18 RX ADMIN — Medication 10 MG: at 21:05

## 2023-03-18 RX ADMIN — ATORVASTATIN CALCIUM 20 MG: 20 TABLET, FILM COATED ORAL at 19:42

## 2023-03-18 RX ADMIN — CLONIDINE HYDROCHLORIDE 0.1 MG: 0.1 TABLET ORAL at 08:49

## 2023-03-18 RX ADMIN — CLONIDINE HYDROCHLORIDE 0.1 MG: 0.1 TABLET ORAL at 14:29

## 2023-03-18 RX ADMIN — Medication 25 MCG: at 08:48

## 2023-03-18 RX ADMIN — QUETIAPINE FUMARATE 400 MG: 200 TABLET ORAL at 21:05

## 2023-03-18 RX ADMIN — HYDROXYZINE HYDROCHLORIDE 50 MG: 50 TABLET, FILM COATED ORAL at 19:42

## 2023-03-18 RX ADMIN — CLOTRIMAZOLE: 0.01 CREAM TOPICAL at 08:42

## 2023-03-18 RX ADMIN — LEVOTHYROXINE SODIUM 25 MCG: 0.03 TABLET ORAL at 08:47

## 2023-03-18 RX ADMIN — HYDROCORTISONE: 1 CREAM TOPICAL at 08:42

## 2023-03-18 RX ADMIN — QUETIAPINE 200 MG: 200 TABLET, FILM COATED ORAL at 08:47

## 2023-03-18 RX ADMIN — BACLOFEN 10 MG: 10 TABLET ORAL at 08:48

## 2023-03-18 RX ADMIN — DIVALPROEX SODIUM 750 MG: 500 TABLET, DELAYED RELEASE ORAL at 08:47

## 2023-03-18 RX ADMIN — KETOCONAZOLE: 20 CREAM TOPICAL at 19:46

## 2023-03-18 RX ADMIN — BACLOFEN 10 MG: 10 TABLET ORAL at 14:29

## 2023-03-18 RX ADMIN — VENLAFAXINE HYDROCHLORIDE 75 MG: 150 CAPSULE, EXTENDED RELEASE ORAL at 21:07

## 2023-03-18 RX ADMIN — HYDROCORTISONE: 1 CREAM TOPICAL at 19:45

## 2023-03-18 RX ADMIN — DESONIDE: 0.5 CREAM TOPICAL at 14:32

## 2023-03-18 RX ADMIN — POLYETHYLENE GLYCOL 3350 17 G: 17 POWDER, FOR SOLUTION ORAL at 08:47

## 2023-03-18 RX ADMIN — CARBOXYMETHYLCELLULOSE SODIUM 1 DROP: 0.5 SOLUTION/ DROPS OPHTHALMIC at 14:30

## 2023-03-18 RX ADMIN — BACLOFEN 10 MG: 10 TABLET ORAL at 19:42

## 2023-03-18 RX ADMIN — CARBOXYMETHYLCELLULOSE SODIUM 1 DROP: 0.5 SOLUTION/ DROPS OPHTHALMIC at 19:42

## 2023-03-18 RX ADMIN — DIVALPROEX SODIUM 1000 MG: 500 TABLET, DELAYED RELEASE ORAL at 21:06

## 2023-03-18 RX ADMIN — CARBOXYMETHYLCELLULOSE SODIUM 1 DROP: 0.5 SOLUTION/ DROPS OPHTHALMIC at 08:49

## 2023-03-18 RX ADMIN — QUETIAPINE 200 MG: 200 TABLET, FILM COATED ORAL at 14:29

## 2023-03-18 RX ADMIN — OLANZAPINE 2.5 MG: 2.5 TABLET, FILM COATED ORAL at 14:29

## 2023-03-18 RX ADMIN — CLOTRIMAZOLE: 0.01 CREAM TOPICAL at 19:46

## 2023-03-18 RX ADMIN — METFORMIN HYDROCHLORIDE 850 MG: 850 TABLET, FILM COATED ORAL at 08:48

## 2023-03-18 RX ADMIN — MIRTAZAPINE 15 MG: 15 TABLET, FILM COATED ORAL at 21:05

## 2023-03-18 RX ADMIN — METFORMIN HYDROCHLORIDE 850 MG: 850 TABLET, FILM COATED ORAL at 17:37

## 2023-03-18 RX ADMIN — HYDROXYZINE HYDROCHLORIDE 50 MG: 50 TABLET, FILM COATED ORAL at 08:47

## 2023-03-18 RX ADMIN — VENLAFAXINE HYDROCHLORIDE 225 MG: 150 CAPSULE, EXTENDED RELEASE ORAL at 21:06

## 2023-03-18 RX ADMIN — ASPIRIN 81 MG CHEWABLE TABLET 81 MG: 81 TABLET CHEWABLE at 08:48

## 2023-03-18 RX ADMIN — EMPAGLIFLOZIN 10 MG: 10 TABLET, FILM COATED ORAL at 08:48

## 2023-03-18 RX ADMIN — HYDROXYZINE HYDROCHLORIDE 50 MG: 50 TABLET, FILM COATED ORAL at 14:30

## 2023-03-18 RX ADMIN — CARBOXYMETHYLCELLULOSE SODIUM 1 DROP: 0.5 SOLUTION/ DROPS OPHTHALMIC at 17:37

## 2023-03-18 ASSESSMENT — ACTIVITIES OF DAILY LIVING (ADL)
ADLS_ACUITY_SCORE: 54
ADLS_ACUITY_SCORE: 56
ADLS_ACUITY_SCORE: 54
ADLS_ACUITY_SCORE: 54
ADLS_ACUITY_SCORE: 56
ADLS_ACUITY_SCORE: 54

## 2023-03-18 NOTE — PLAN OF CARE
PRIMARY DIAGNOSIS: Placement  OUTPATIENT/OBSERVATION GOALS TO BE MET BEFORE DISCHARGE:  1. ADLs back to baseline: Yes    2. Activity and level of assistance: Up with maximum assistance. Consider SW and/or PT evaluation.     3. Pain status: Pain free.    4. Return to near baseline physical activity: Yes     Discharge Planner Nurse   Safe discharge environment identified: Yes  Barriers to discharge: Yes       Entered by: Alda Victoria RN 03/18/2023    /85 (BP Location: Left arm)   Pulse 96   Temp 97.9  F (36.6  C) (Oral)   Resp 16   Wt 99.9 kg (220 lb 4.8 oz)   SpO2 94%     Pt A&Ox4. Pt is calm and cooperative. Will continue to monitor.    Please review provider order for any additional goals.   Nurse to notify provider when observation goals have been met and patient is ready for discharge

## 2023-03-18 NOTE — PLAN OF CARE

## 2023-03-18 NOTE — PLAN OF CARE
NOTE: 1930 - 2300  PRIMARY DIAGNOSIS: Placement  OBSERVATION GOALS TO BE MET BEFORE DISCHARGE:  1. ADLs back to baseline: Yes     2. Activity and level of assistance: Up with maximum assistance/lift      3. Pain status: Pain free.     4. Return to near baseline physical activity: Yes          Discharge Planner Nurse   Safe discharge environment identified: No  Barriers to discharge: Yes        Patient A & O x4, Lung sounds CTA, bowel sounds active. Last BM 3/17. Denies pain, N/V, SOB or chest pain. Turned and repositioned as needed. Pt is A 2 using lift, incontinent of bladder x 2. Tolerating regular diet and oral meds. No PIV. Medically cleared, SW following. Waiting for placement.    /84   Pulse 72   Temp 97.5  F (36.4  C) (Oral)   Resp 16   Wt 99.9 kg (220 lb 4.8 oz)   SpO2 95%     Please review provider order for any additional goals.   Nurse to notify provider when observation goals have been met and patient is ready for discharge.
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
## 2023-03-18 NOTE — PLAN OF CARE
PRIMARY DIAGNOSIS: Placement   OUTPATIENT/OBSERVATION GOALS TO BE MET BEFORE DISCHARGE:  1. ADLs back to baseline: Yes    2. Activity and level of assistance: Two assist using lift     3. Pain status: Pain free.    4. Return to near baseline physical activity: Yes     Discharge Planner Nurse   Safe discharge environment identified: Yes  Barriers to discharge: Yes       Entered by: Chantel Spencer RN 03/18/2023 3:04 PM  /84   Pulse 72   Temp 97.5  F (36.4  C) (Oral)   Resp 16   Wt 99.9 kg (220 lb 4.8 oz)   SpO2 95%   Patient A&O x 3. One to two staff assist. Denies pain. Tolerating regular diet. SW working finding placement.   Please review provider order for any additional goals.   Nurse to notify provider when observation goals have been met and patient is ready for discharge.Goal Outcome Evaluation:

## 2023-03-18 NOTE — PLAN OF CARE
PRIMARY DIAGNOSIS: Placement   OUTPATIENT/OBSERVATION GOALS TO BE MET BEFORE DISCHARGE:  1. ADLs back to baseline: Yes    2. Activity and level of assistance: Two assist using lift     3. Pain status: Pain free.    4. Return to near baseline physical activity: Yes     Discharge Planner Nurse   Safe discharge environment identified: Yes  Barriers to discharge: Yes       Entered by: Chantel Spencer RN 03/18/2023 10:00 AM  /84 (BP Location: Left arm)   Pulse 72   Temp 97.5  F (36.4  C) (Oral)   Resp 16   Wt 99.9 kg (220 lb 4.8 oz)   SpO2 95%     Please review provider order for any additional goals.   Nurse to notify provider when observation goals have been met and patient is ready for discharge.Goal Outcome Evaluation:

## 2023-03-18 NOTE — PLAN OF CARE

## 2023-03-19 LAB — GLUCOSE BLDC GLUCOMTR-MCNC: 91 MG/DL (ref 70–99)

## 2023-03-19 PROCEDURE — G0378 HOSPITAL OBSERVATION PER HR: HCPCS

## 2023-03-19 PROCEDURE — 250N000013 HC RX MED GY IP 250 OP 250 PS 637: Performed by: NURSE PRACTITIONER

## 2023-03-19 PROCEDURE — 250N000013 HC RX MED GY IP 250 OP 250 PS 637: Performed by: HOSPITALIST

## 2023-03-19 PROCEDURE — 250N000013 HC RX MED GY IP 250 OP 250 PS 637: Performed by: PHYSICIAN ASSISTANT

## 2023-03-19 PROCEDURE — 99207 PR NO CHARGE LOS: CPT | Performed by: PHYSICIAN ASSISTANT

## 2023-03-19 PROCEDURE — 82962 GLUCOSE BLOOD TEST: CPT

## 2023-03-19 RX ADMIN — QUETIAPINE 200 MG: 200 TABLET, FILM COATED ORAL at 13:21

## 2023-03-19 RX ADMIN — METFORMIN HYDROCHLORIDE 850 MG: 850 TABLET, FILM COATED ORAL at 08:47

## 2023-03-19 RX ADMIN — ASPIRIN 81 MG CHEWABLE TABLET 81 MG: 81 TABLET CHEWABLE at 08:47

## 2023-03-19 RX ADMIN — Medication 10 MG: at 21:04

## 2023-03-19 RX ADMIN — CARBOXYMETHYLCELLULOSE SODIUM 1 DROP: 0.5 SOLUTION/ DROPS OPHTHALMIC at 13:21

## 2023-03-19 RX ADMIN — DIVALPROEX SODIUM 750 MG: 500 TABLET, DELAYED RELEASE ORAL at 08:46

## 2023-03-19 RX ADMIN — BACLOFEN 10 MG: 10 TABLET ORAL at 13:21

## 2023-03-19 RX ADMIN — EMPAGLIFLOZIN 10 MG: 10 TABLET, FILM COATED ORAL at 08:47

## 2023-03-19 RX ADMIN — CARBOXYMETHYLCELLULOSE SODIUM 1 DROP: 0.5 SOLUTION/ DROPS OPHTHALMIC at 21:05

## 2023-03-19 RX ADMIN — CARBOXYMETHYLCELLULOSE SODIUM 1 DROP: 0.5 SOLUTION/ DROPS OPHTHALMIC at 08:46

## 2023-03-19 RX ADMIN — KETOCONAZOLE: 20 CREAM TOPICAL at 08:50

## 2023-03-19 RX ADMIN — CARBOXYMETHYLCELLULOSE SODIUM 1 DROP: 0.5 SOLUTION/ DROPS OPHTHALMIC at 16:19

## 2023-03-19 RX ADMIN — POLYETHYLENE GLYCOL 3350 17 G: 17 POWDER, FOR SOLUTION ORAL at 08:44

## 2023-03-19 RX ADMIN — QUETIAPINE 200 MG: 200 TABLET, FILM COATED ORAL at 08:47

## 2023-03-19 RX ADMIN — HYDROCORTISONE: 1 CREAM TOPICAL at 08:50

## 2023-03-19 RX ADMIN — Medication 25 MCG: at 08:47

## 2023-03-19 RX ADMIN — HYDROXYZINE HYDROCHLORIDE 50 MG: 50 TABLET, FILM COATED ORAL at 13:21

## 2023-03-19 RX ADMIN — QUETIAPINE FUMARATE 400 MG: 200 TABLET ORAL at 21:04

## 2023-03-19 RX ADMIN — LEVOTHYROXINE SODIUM 25 MCG: 0.03 TABLET ORAL at 08:48

## 2023-03-19 RX ADMIN — CLOTRIMAZOLE: 0.01 CREAM TOPICAL at 08:50

## 2023-03-19 RX ADMIN — OLANZAPINE 2.5 MG: 2.5 TABLET, FILM COATED ORAL at 13:21

## 2023-03-19 RX ADMIN — KETOCONAZOLE: 20 CREAM TOPICAL at 21:15

## 2023-03-19 RX ADMIN — BACLOFEN 10 MG: 10 TABLET ORAL at 21:04

## 2023-03-19 RX ADMIN — VENLAFAXINE HYDROCHLORIDE 75 MG: 150 CAPSULE, EXTENDED RELEASE ORAL at 21:05

## 2023-03-19 RX ADMIN — HYDROXYZINE HYDROCHLORIDE 50 MG: 50 TABLET, FILM COATED ORAL at 08:47

## 2023-03-19 RX ADMIN — CLONIDINE HYDROCHLORIDE 0.1 MG: 0.1 TABLET ORAL at 21:04

## 2023-03-19 RX ADMIN — CLONIDINE HYDROCHLORIDE 0.1 MG: 0.1 TABLET ORAL at 08:47

## 2023-03-19 RX ADMIN — VENLAFAXINE HYDROCHLORIDE 150 MG: 150 CAPSULE, EXTENDED RELEASE ORAL at 21:05

## 2023-03-19 RX ADMIN — METOPROLOL SUCCINATE 50 MG: 50 TABLET, EXTENDED RELEASE ORAL at 08:47

## 2023-03-19 RX ADMIN — DIVALPROEX SODIUM 1000 MG: 500 TABLET, DELAYED RELEASE ORAL at 21:04

## 2023-03-19 RX ADMIN — MIRTAZAPINE 15 MG: 15 TABLET, FILM COATED ORAL at 21:04

## 2023-03-19 RX ADMIN — DESONIDE: 0.5 CREAM TOPICAL at 13:22

## 2023-03-19 RX ADMIN — BACLOFEN 10 MG: 10 TABLET ORAL at 08:48

## 2023-03-19 RX ADMIN — METFORMIN HYDROCHLORIDE 850 MG: 850 TABLET, FILM COATED ORAL at 17:26

## 2023-03-19 RX ADMIN — HYDROXYZINE HYDROCHLORIDE 50 MG: 50 TABLET, FILM COATED ORAL at 21:04

## 2023-03-19 RX ADMIN — CLOTRIMAZOLE: 0.01 CREAM TOPICAL at 21:16

## 2023-03-19 RX ADMIN — ATORVASTATIN CALCIUM 20 MG: 20 TABLET, FILM COATED ORAL at 21:04

## 2023-03-19 RX ADMIN — CLONIDINE HYDROCHLORIDE 0.1 MG: 0.1 TABLET ORAL at 13:21

## 2023-03-19 ASSESSMENT — ACTIVITIES OF DAILY LIVING (ADL)
ADLS_ACUITY_SCORE: 56

## 2023-03-19 NOTE — PLAN OF CARE
PRIMARY DIAGNOSIS: Placement  OUTPATIENT/OBSERVATION GOALS TO BE MET BEFORE DISCHARGE:  1. ADLs back to baseline: Yes    2. Activity and level of assistance: Up with maximum assistance. Consider SW and/or PT evaluation.     3. Pain status: Pain free.    4. Return to near baseline physical activity: Yes     Discharge Planner Nurse   Safe discharge environment identified: Yes  Barriers to discharge: Yes       Entered by: Alda Victoria RN 03/19/2023    Pt A&Ox4. Pt is calm and cooperative. Will continue to monitor.    Please review provider order for any additional goals.   Nurse to notify provider when observation goals have been met and patient is ready for discharge

## 2023-03-19 NOTE — PLAN OF CARE
PRIMARY DIAGNOSIS: Placement   OUTPATIENT/OBSERVATION GOALS TO BE MET BEFORE DISCHARGE:  1. ADLs back to baseline: Yes    2. Activity and level of assistance: Two assist using lift     3. Pain status: Pain free.    4. Return to near baseline physical activity: Yes     Discharge Planner Nurse   Safe discharge environment identified: Yes  Barriers to discharge: Yes       Entered by: Chantel Spencer RN 03/19/2023 9:21 AM  BP (!) 149/102 (BP Location: Left arm, Patient Position: Semi-Toro's, Cuff Size: Adult Regular)   Pulse 66   Temp 97.3  F (36.3  C) (Oral)   Resp 18   Wt 99.9 kg (220 lb 4.8 oz)   SpO2 96%   Patient A&O x 3. One to two staff assist. Denies pain. Tolerating regular diet. SW working finding placement.   Please review provider order for any additional goals.   Nurse to notify provider when observation goals have been met and patient is ready for discharge.Goal Outcome Evaluation:

## 2023-03-19 NOTE — PLAN OF CARE
PRIMARY DIAGNOSIS: Placement   OUTPATIENT/OBSERVATION GOALS TO BE MET BEFORE DISCHARGE:  1. ADLs back to baseline: Yes    2. Activity and level of assistance: Two assist using lift     3. Pain status: Pain free.    4. Return to near baseline physical activity: Yes     Discharge Planner Nurse   Safe discharge environment identified: Yes  Barriers to discharge: Yes       Entered by: Chantel Spencer RN 03/19/2023 2:38 PM  /85   Pulse 66   Temp 97.3  F (36.3  C) (Oral)   Resp 18   Wt 99.9 kg (220 lb 4.8 oz)   SpO2 96%   Patient A&O x 3. One to two staff assist. Denies pain. Tolerating regular diet. Incontinent of bladder and bowel.SW working finding placement. Care ongoing.   Please review provider order for any additional goals.   Nurse to notify provider when observation goals have been met and patient is ready for discharge.Goal Outcome Evaluation:
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
## 2023-03-19 NOTE — PLAN OF CARE
PRIMARY DIAGNOSIS: Placement   OUTPATIENT/OBSERVATION GOALS TO BE MET BEFORE DISCHARGE:  1. ADLs back to baseline: Yes    2. Activity and level of assistance: Two assist using lift     3. Pain status: Pain free.    4. Return to near baseline physical activity: Yes     Discharge Planner Nurse   Safe discharge environment identified: Yes  Barriers to discharge: Yes       Entered by: Chantel Spencer RN 03/19/2023 4:39 PM  /85   Pulse 66   Temp 97.3  F (36.3  C) (Oral)   Resp 18   Wt 99.9 kg (220 lb 4.8 oz)   SpO2 96%   Patient A&O x 3. One to two staff assist. Denies pain. Tolerating regular diet. Incontinent of bladder and bowel.SW working finding placement. Care ongoing.   Please review provider order for any additional goals.   Nurse to notify provider when observation goals have been met and patient is ready for discharge.Goal Outcome Evaluation:

## 2023-03-19 NOTE — PLAN OF CARE
NOTE: 1930 - 2300  PRIMARY DIAGNOSIS: Placement  OBSERVATION GOALS TO BE MET BEFORE DISCHARGE:  1. ADLs back to baseline: Yes     2. Activity and level of assistance: Up with maximum assistance/lift      3. Pain status: Pain free.     4. Return to near baseline physical activity: Yes          Discharge Planner Nurse   Safe discharge environment identified: No  Barriers to discharge: Yes        Patient A & O x4, Lung sounds CTA, bowel sounds active. Last BM 3/17. Denies pain, N/V, SOB or chest pain. Turned and repositioned as needed. Pt is A 2 using lift, incontinent of bladder x 3. Tolerating regular diet and oral meds. No PIV. Medically cleared, SW following. Waiting for placement.    BP (!) 124/92 (BP Location: Left arm)   Pulse 93   Temp 97.5  F (36.4  C) (Oral)   Resp 16   Wt 99.9 kg (220 lb 4.8 oz)   SpO2 94%     Please review provider order for any additional goals.   Nurse to notify provider when observation goals have been met and patient is ready for discharge.

## 2023-03-20 LAB — GLUCOSE BLDC GLUCOMTR-MCNC: 87 MG/DL (ref 70–99)

## 2023-03-20 PROCEDURE — 250N000013 HC RX MED GY IP 250 OP 250 PS 637: Performed by: PHYSICIAN ASSISTANT

## 2023-03-20 PROCEDURE — G0378 HOSPITAL OBSERVATION PER HR: HCPCS

## 2023-03-20 PROCEDURE — 99207 PR NO CHARGE LOS: CPT | Performed by: NURSE PRACTITIONER

## 2023-03-20 PROCEDURE — 82962 GLUCOSE BLOOD TEST: CPT

## 2023-03-20 PROCEDURE — 250N000013 HC RX MED GY IP 250 OP 250 PS 637: Performed by: HOSPITALIST

## 2023-03-20 PROCEDURE — 250N000013 HC RX MED GY IP 250 OP 250 PS 637: Performed by: NURSE PRACTITIONER

## 2023-03-20 RX ADMIN — HYDROCORTISONE: 1 CREAM TOPICAL at 21:11

## 2023-03-20 RX ADMIN — Medication 25 MCG: at 09:16

## 2023-03-20 RX ADMIN — BACLOFEN 10 MG: 10 TABLET ORAL at 14:16

## 2023-03-20 RX ADMIN — HYDROXYZINE HYDROCHLORIDE 50 MG: 50 TABLET, FILM COATED ORAL at 09:16

## 2023-03-20 RX ADMIN — DIVALPROEX SODIUM 1000 MG: 500 TABLET, DELAYED RELEASE ORAL at 21:04

## 2023-03-20 RX ADMIN — EMPAGLIFLOZIN 10 MG: 10 TABLET, FILM COATED ORAL at 09:16

## 2023-03-20 RX ADMIN — POLYETHYLENE GLYCOL 3350 17 G: 17 POWDER, FOR SOLUTION ORAL at 09:17

## 2023-03-20 RX ADMIN — CARBOXYMETHYLCELLULOSE SODIUM 1 DROP: 0.5 SOLUTION/ DROPS OPHTHALMIC at 21:07

## 2023-03-20 RX ADMIN — CARBOXYMETHYLCELLULOSE SODIUM 1 DROP: 0.5 SOLUTION/ DROPS OPHTHALMIC at 09:16

## 2023-03-20 RX ADMIN — METFORMIN HYDROCHLORIDE 850 MG: 850 TABLET, FILM COATED ORAL at 09:16

## 2023-03-20 RX ADMIN — Medication 10 MG: at 21:06

## 2023-03-20 RX ADMIN — VENLAFAXINE HYDROCHLORIDE 150 MG: 150 CAPSULE, EXTENDED RELEASE ORAL at 21:04

## 2023-03-20 RX ADMIN — DIVALPROEX SODIUM 750 MG: 500 TABLET, DELAYED RELEASE ORAL at 09:15

## 2023-03-20 RX ADMIN — HYDROXYZINE HYDROCHLORIDE 50 MG: 50 TABLET, FILM COATED ORAL at 14:16

## 2023-03-20 RX ADMIN — KETOCONAZOLE: 20 CREAM TOPICAL at 21:11

## 2023-03-20 RX ADMIN — QUETIAPINE 200 MG: 200 TABLET, FILM COATED ORAL at 09:16

## 2023-03-20 RX ADMIN — QUETIAPINE FUMARATE 400 MG: 200 TABLET ORAL at 21:06

## 2023-03-20 RX ADMIN — METFORMIN HYDROCHLORIDE 850 MG: 850 TABLET, FILM COATED ORAL at 17:16

## 2023-03-20 RX ADMIN — ASPIRIN 81 MG CHEWABLE TABLET 81 MG: 81 TABLET CHEWABLE at 09:16

## 2023-03-20 RX ADMIN — CLOTRIMAZOLE: 0.01 CREAM TOPICAL at 21:11

## 2023-03-20 RX ADMIN — CLOTRIMAZOLE: 0.01 CREAM TOPICAL at 09:25

## 2023-03-20 RX ADMIN — CLONIDINE HYDROCHLORIDE 0.1 MG: 0.1 TABLET ORAL at 09:16

## 2023-03-20 RX ADMIN — LEVOTHYROXINE SODIUM 25 MCG: 0.03 TABLET ORAL at 09:16

## 2023-03-20 RX ADMIN — HYDROXYZINE HYDROCHLORIDE 50 MG: 50 TABLET, FILM COATED ORAL at 21:06

## 2023-03-20 RX ADMIN — CLONIDINE HYDROCHLORIDE 0.1 MG: 0.1 TABLET ORAL at 14:16

## 2023-03-20 RX ADMIN — QUETIAPINE 200 MG: 200 TABLET, FILM COATED ORAL at 14:16

## 2023-03-20 RX ADMIN — ATORVASTATIN CALCIUM 20 MG: 20 TABLET, FILM COATED ORAL at 21:06

## 2023-03-20 RX ADMIN — CARBOXYMETHYLCELLULOSE SODIUM 1 DROP: 0.5 SOLUTION/ DROPS OPHTHALMIC at 12:19

## 2023-03-20 RX ADMIN — MIRTAZAPINE 15 MG: 15 TABLET, FILM COATED ORAL at 21:06

## 2023-03-20 RX ADMIN — CLONIDINE HYDROCHLORIDE 0.1 MG: 0.1 TABLET ORAL at 21:07

## 2023-03-20 RX ADMIN — BACLOFEN 10 MG: 10 TABLET ORAL at 09:16

## 2023-03-20 RX ADMIN — BACLOFEN 10 MG: 10 TABLET ORAL at 21:06

## 2023-03-20 RX ADMIN — OLANZAPINE 2.5 MG: 2.5 TABLET, FILM COATED ORAL at 14:16

## 2023-03-20 RX ADMIN — METOPROLOL SUCCINATE 50 MG: 50 TABLET, EXTENDED RELEASE ORAL at 09:16

## 2023-03-20 RX ADMIN — VENLAFAXINE HYDROCHLORIDE 75 MG: 150 CAPSULE, EXTENDED RELEASE ORAL at 21:10

## 2023-03-20 ASSESSMENT — ACTIVITIES OF DAILY LIVING (ADL)
ADLS_ACUITY_SCORE: 54
ADLS_ACUITY_SCORE: 56
ADLS_ACUITY_SCORE: 54
ADLS_ACUITY_SCORE: 56
ADLS_ACUITY_SCORE: 54
ADLS_ACUITY_SCORE: 56
ADLS_ACUITY_SCORE: 54
ADLS_ACUITY_SCORE: 56
ADLS_ACUITY_SCORE: 51
ADLS_ACUITY_SCORE: 56

## 2023-03-20 NOTE — PLAN OF CARE
PRIMARY DIAGNOSIS: PLACEMENT  OUTPATIENT/OBSERVATION GOALS TO BE MET BEFORE DISCHARGE:  1. ADLs back to baseline: Yes  2. Activity and level of assistance: Up with maximum assistance  3. Pain status: Pain free.  4. Return to near baseline physical activity: Yes     Discharge Planner Nurse   Safe discharge environment identified: No  Barriers to discharge: Yes       Entered by: Dania Galdamez RN 03/20/2023 9:54 AM     Please review provider order for any additional goals. Nurse to notify provider when observation goals have been met and patient is ready for discharge.    Up with 2 assist and a ceiling lift. Denies pain, numbness, and tingling. No IV site. Incontinent. Awaiting placement at a group home.

## 2023-03-20 NOTE — PLAN OF CARE
PRIMARY DIAGNOSIS: PLACEMENT  OUTPATIENT/OBSERVATION GOALS TO BE MET BEFORE DISCHARGE:  1. ADLs back to baseline: Yes  2. Activity and level of assistance: Up with 2 assist and a ceiling lift  3. Pain status: Pain free  4. Return to near baseline physical activity: Yes     Discharge Planner Nurse   Safe discharge environment identified: No  Barriers to discharge: Yes       Entered by: Dania Galdamez RN 03/20/2023 4:09 PM     Please review provider order for any additional goals. Nurse to notify provider when observation goals have been met and patient is ready for discharge.    Up with 2 assist and a ceiling lift. Denies pain, numbness, and tingling. No IV site. Incontinent, slight redness in his buttocks area. Skin peeling on the bottom of his bilateral feet. Awaiting placement at a group home.

## 2023-03-20 NOTE — PLAN OF CARE
PRIMARY DIAGNOSIS: Placement   OUTPATIENT/OBSERVATION GOALS TO BE MET BEFORE DISCHARGE:  ADLs back to baseline: Yes    Activity and level of assistance: A X 2 with a lift     Pain status: Pain free.    Return to near baseline physical activity: Yes     Discharge Planner Nurse   Safe discharge environment identified: No  Barriers to discharge: Yes       Entered by: Monica Elder RN 03/19/2023 9:02 PM     Please review provider order for any additional goals.   Nurse to notify provider when observation goals have been met and patient is ready for discharge.

## 2023-03-20 NOTE — PLAN OF CARE
PRIMARY DIAGNOSIS: PLACEMENT  OUTPATIENT/OBSERVATION GOALS TO BE MET BEFORE DISCHARGE:  1. ADLs back to baseline: Yes  2. Activity and level of assistance: Up with 2 assist and a ceiling lift  3. Pain status: Pain free  4. Return to near baseline physical activity: Yes     Discharge Planner Nurse   Safe discharge environment identified: No  Barriers to discharge: Yes       Entered by: Dania Galdamez RN 03/20/2023 12:11 PM     Please review provider order for any additional goals. Nurse to notify provider when observation goals have been met and patient is ready for discharge.    Up with 2 assist and a ceiling lift. Denies pain, numbness, and tingling. No IV site. Incontinent, slight redness in his buttocks area. Skin peeling on the bottom of his bilateral feet. Awaiting placement at a group home.

## 2023-03-20 NOTE — PLAN OF CARE
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
## 2023-03-20 NOTE — PROGRESS NOTES
Care Management Follow Up    Expected Discharge Date: 04/30/2023     Concerns to be Addressed: Discharge planning      Patient plan of care discussed at interdisciplinary rounds: Yes    Anticipated Discharge Disposition:  Group Home    Additional Information:  SW placed follow up call to Relocation worker Misty, [phone], to request a call back to connect on any discharge planning/placement updates from [facility], [facility], or other  pending referrals she had sent prior. Awaiting call back or email with an update. Case continues to be escalated to /hospital leadership for further assistance. SW will continue to follow.     DANITA Obrien, [facility]   Inpatient Care Coordination  [facility]   [phone]

## 2023-03-21 LAB — GLUCOSE BLDC GLUCOMTR-MCNC: 93 MG/DL (ref 70–99)

## 2023-03-21 PROCEDURE — 250N000013 HC RX MED GY IP 250 OP 250 PS 637: Performed by: NURSE PRACTITIONER

## 2023-03-21 PROCEDURE — 250N000013 HC RX MED GY IP 250 OP 250 PS 637: Performed by: PHYSICIAN ASSISTANT

## 2023-03-21 PROCEDURE — 250N000013 HC RX MED GY IP 250 OP 250 PS 637: Performed by: HOSPITALIST

## 2023-03-21 PROCEDURE — 99207 PR NO CHARGE LOS: CPT | Performed by: NURSE PRACTITIONER

## 2023-03-21 PROCEDURE — 82962 GLUCOSE BLOOD TEST: CPT

## 2023-03-21 PROCEDURE — G0378 HOSPITAL OBSERVATION PER HR: HCPCS

## 2023-03-21 RX ADMIN — DIVALPROEX SODIUM 1000 MG: 500 TABLET, DELAYED RELEASE ORAL at 21:31

## 2023-03-21 RX ADMIN — MIRTAZAPINE 15 MG: 15 TABLET, FILM COATED ORAL at 21:32

## 2023-03-21 RX ADMIN — LEVOTHYROXINE SODIUM 25 MCG: 0.03 TABLET ORAL at 07:49

## 2023-03-21 RX ADMIN — CARBOXYMETHYLCELLULOSE SODIUM 1 DROP: 0.5 SOLUTION/ DROPS OPHTHALMIC at 20:25

## 2023-03-21 RX ADMIN — CLONIDINE HYDROCHLORIDE 0.1 MG: 0.1 TABLET ORAL at 20:26

## 2023-03-21 RX ADMIN — DIVALPROEX SODIUM 750 MG: 500 TABLET, DELAYED RELEASE ORAL at 07:49

## 2023-03-21 RX ADMIN — CLONIDINE HYDROCHLORIDE 0.1 MG: 0.1 TABLET ORAL at 07:49

## 2023-03-21 RX ADMIN — EMPAGLIFLOZIN 10 MG: 10 TABLET, FILM COATED ORAL at 07:48

## 2023-03-21 RX ADMIN — QUETIAPINE 200 MG: 200 TABLET, FILM COATED ORAL at 13:06

## 2023-03-21 RX ADMIN — KETOCONAZOLE: 20 CREAM TOPICAL at 20:30

## 2023-03-21 RX ADMIN — ASPIRIN 81 MG CHEWABLE TABLET 81 MG: 81 TABLET CHEWABLE at 07:48

## 2023-03-21 RX ADMIN — BACLOFEN 10 MG: 10 TABLET ORAL at 13:06

## 2023-03-21 RX ADMIN — CARBOXYMETHYLCELLULOSE SODIUM 1 DROP: 0.5 SOLUTION/ DROPS OPHTHALMIC at 07:52

## 2023-03-21 RX ADMIN — VENLAFAXINE HYDROCHLORIDE 225 MG: 150 CAPSULE, EXTENDED RELEASE ORAL at 21:29

## 2023-03-21 RX ADMIN — HYDROXYZINE HYDROCHLORIDE 50 MG: 50 TABLET, FILM COATED ORAL at 20:26

## 2023-03-21 RX ADMIN — METOPROLOL SUCCINATE 50 MG: 50 TABLET, EXTENDED RELEASE ORAL at 07:48

## 2023-03-21 RX ADMIN — OLANZAPINE 2.5 MG: 2.5 TABLET, FILM COATED ORAL at 13:07

## 2023-03-21 RX ADMIN — METFORMIN HYDROCHLORIDE 850 MG: 850 TABLET, FILM COATED ORAL at 07:49

## 2023-03-21 RX ADMIN — CARBOXYMETHYLCELLULOSE SODIUM 1 DROP: 0.5 SOLUTION/ DROPS OPHTHALMIC at 15:59

## 2023-03-21 RX ADMIN — HYDROCORTISONE: 1 CREAM TOPICAL at 20:30

## 2023-03-21 RX ADMIN — QUETIAPINE 200 MG: 200 TABLET, FILM COATED ORAL at 07:48

## 2023-03-21 RX ADMIN — CLOTRIMAZOLE: 0.01 CREAM TOPICAL at 20:30

## 2023-03-21 RX ADMIN — QUETIAPINE FUMARATE 400 MG: 200 TABLET ORAL at 21:31

## 2023-03-21 RX ADMIN — ATORVASTATIN CALCIUM 20 MG: 20 TABLET, FILM COATED ORAL at 20:25

## 2023-03-21 RX ADMIN — METFORMIN HYDROCHLORIDE 850 MG: 850 TABLET, FILM COATED ORAL at 18:42

## 2023-03-21 RX ADMIN — CLONIDINE HYDROCHLORIDE 0.1 MG: 0.1 TABLET ORAL at 13:06

## 2023-03-21 RX ADMIN — Medication 10 MG: at 21:31

## 2023-03-21 RX ADMIN — Medication 25 MCG: at 07:48

## 2023-03-21 RX ADMIN — VENLAFAXINE HYDROCHLORIDE 75 MG: 150 CAPSULE, EXTENDED RELEASE ORAL at 21:32

## 2023-03-21 RX ADMIN — BACLOFEN 10 MG: 10 TABLET ORAL at 20:26

## 2023-03-21 RX ADMIN — BACLOFEN 10 MG: 10 TABLET ORAL at 07:49

## 2023-03-21 RX ADMIN — HYDROXYZINE HYDROCHLORIDE 50 MG: 50 TABLET, FILM COATED ORAL at 13:07

## 2023-03-21 RX ADMIN — HYDROXYZINE HYDROCHLORIDE 50 MG: 50 TABLET, FILM COATED ORAL at 07:48

## 2023-03-21 ASSESSMENT — ACTIVITIES OF DAILY LIVING (ADL)
ADLS_ACUITY_SCORE: 51
ADLS_ACUITY_SCORE: 51
ADLS_ACUITY_SCORE: 54
ADLS_ACUITY_SCORE: 51
ADLS_ACUITY_SCORE: 54
ADLS_ACUITY_SCORE: 51
ADLS_ACUITY_SCORE: 51
ADLS_ACUITY_SCORE: 54
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
## 2023-03-21 NOTE — PLAN OF CARE
PRIMARY DIAGNOSIS: Placement   OUTPATIENT/OBSERVATION GOALS TO BE MET BEFORE DISCHARGE:  ADLs back to baseline: Yes    Activity and level of assistance: Ceiling lift assist of 2    Pain status: Pain free.    Return to near baseline physical activity: Yes     Discharge Planner Nurse   Safe discharge environment identified: No  Barriers to discharge: Yes       Entered by: Shama Garcia RN 03/20/2023 10:47 PM     Please review provider order for any additional goals.   Nurse to notify provider when observation goals have been met and patient is ready for discharge.Goal Outcome Evaluation:    Patient is in bed, oral hygiene set up was provided, patient denies pain, up with ceiling lift with 2 assist. Incontinent, no IV site, awaiting placement for patient.

## 2023-03-21 NOTE — PLAN OF CARE
NOTE: 1930 - 2300  PRIMARY DIAGNOSIS: Placement  OBSERVATION GOALS TO BE MET BEFORE DISCHARGE:  1. ADLs back to baseline: Yes     2. Activity and level of assistance: Up with maximum assistance/lift      3. Pain status: Pain free.     4. Return to near baseline physical activity: Yes          Discharge Planner Nurse   Safe discharge environment identified: No  Barriers to discharge: Yes        Patient A & O x4, Lung sounds CTA, bowel sounds active. Last BM 3/20. Denies pain, N/V, SOB or chest pain. Turned and repositioned as needed. Pt is A 2 using lift, incontinent of bladder x 2 . Tolerating regular diet and oral meds. No PIV. Medically cleared, SW following. Waiting for placement.    /87 (BP Location: Left arm)   Pulse 75   Temp 97.9  F (36.6  C) (Oral)   Resp 16   Wt 99.9 kg (220 lb 4.8 oz)   SpO2 94%     Please review provider order for any additional goals.   Nurse to notify provider when observation goals have been met and patient is ready for discharge.

## 2023-03-21 NOTE — PLAN OF CARE

## 2023-03-21 NOTE — PLAN OF CARE
PRIMARY DIAGNOSIS: Placement  OUTPATIENT/OBSERVATION GOALS TO BE MET BEFORE DISCHARGE:  ADLs back to baseline: Yes    Activity and level of assistance: assist x 2    Pain status: Pain free.    Return to near baseline physical activity: Yes     Discharge Planner Nurse   Safe discharge environment identified: No  Barriers to discharge: Yes       Entered by: Emily Garcia RN 03/21/2023 12:13 AM    Patient resting comfortably in bed, currently denies pain, SW following for placement       Please review provider order for any additional goals.   Nurse to notify provider when observation goals have been met and patient is ready for discharge.
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
## 2023-03-21 NOTE — PROGRESS NOTES
Care Management Follow Up    Length of Stay (days): 0    Expected Discharge Date: 04/30/2023     Concerns to be Addressed:       Patient plan of care discussed at interdisciplinary rounds: Yes    Anticipated Discharge Disposition:       Anticipated Discharge Services:    Anticipated Discharge DME:      Patient/family educated on Medicare website which has current facility and service quality ratings:    Education Provided on the Discharge Plan:    Patient/Family in Agreement with the Plan:      Referrals Placed by CM/SW:    Private pay costs discussed: Not applicable    Additional Information:    SW notified of GH opening at [facility] in the [facility]. Spoke with Tamara (P: [phone] F: [phone]) at [facility] and inquired about placement for pt who requires a lift and total cares. Tamara explained that their GH could potentially accommodate pts needs. Referral faxed per Tamara request. Tamara plans to visit pt/RN at the hospital at 1000 tomorrow 3/22 for an in person assessment. SW following.       Addendum    Received confirmation from Tamara that the referral was received.     DANITA Vaughn, ANALY  Inpatient Care Coordination  Ortho/Spine Unit  [phone]  Courtney Goldstein, PETER
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
## 2023-03-22 PROCEDURE — 250N000013 HC RX MED GY IP 250 OP 250 PS 637: Performed by: HOSPITALIST

## 2023-03-22 PROCEDURE — 99231 SBSQ HOSP IP/OBS SF/LOW 25: CPT | Performed by: PHYSICIAN ASSISTANT

## 2023-03-22 PROCEDURE — G0378 HOSPITAL OBSERVATION PER HR: HCPCS

## 2023-03-22 PROCEDURE — 250N000013 HC RX MED GY IP 250 OP 250 PS 637: Performed by: PHYSICIAN ASSISTANT

## 2023-03-22 PROCEDURE — 250N000013 HC RX MED GY IP 250 OP 250 PS 637: Performed by: NURSE PRACTITIONER

## 2023-03-22 RX ADMIN — Medication 10 MG: at 21:52

## 2023-03-22 RX ADMIN — HYDROXYZINE HYDROCHLORIDE 50 MG: 50 TABLET, FILM COATED ORAL at 14:33

## 2023-03-22 RX ADMIN — Medication 25 MCG: at 08:51

## 2023-03-22 RX ADMIN — CARBOXYMETHYLCELLULOSE SODIUM 1 DROP: 0.5 SOLUTION/ DROPS OPHTHALMIC at 12:20

## 2023-03-22 RX ADMIN — HYDROCORTISONE: 1 CREAM TOPICAL at 19:40

## 2023-03-22 RX ADMIN — CLOTRIMAZOLE: 0.01 CREAM TOPICAL at 19:39

## 2023-03-22 RX ADMIN — DIVALPROEX SODIUM 750 MG: 500 TABLET, DELAYED RELEASE ORAL at 08:51

## 2023-03-22 RX ADMIN — METFORMIN HYDROCHLORIDE 850 MG: 850 TABLET, FILM COATED ORAL at 08:51

## 2023-03-22 RX ADMIN — CLONIDINE HYDROCHLORIDE 0.1 MG: 0.1 TABLET ORAL at 08:51

## 2023-03-22 RX ADMIN — KETOCONAZOLE: 20 CREAM TOPICAL at 19:40

## 2023-03-22 RX ADMIN — DIVALPROEX SODIUM 1000 MG: 500 TABLET, DELAYED RELEASE ORAL at 21:52

## 2023-03-22 RX ADMIN — VENLAFAXINE HYDROCHLORIDE 75 MG: 150 CAPSULE, EXTENDED RELEASE ORAL at 21:52

## 2023-03-22 RX ADMIN — CLONIDINE HYDROCHLORIDE 0.1 MG: 0.1 TABLET ORAL at 19:40

## 2023-03-22 RX ADMIN — LEVOTHYROXINE SODIUM 25 MCG: 0.03 TABLET ORAL at 08:51

## 2023-03-22 RX ADMIN — ATORVASTATIN CALCIUM 20 MG: 20 TABLET, FILM COATED ORAL at 19:40

## 2023-03-22 RX ADMIN — BACLOFEN 10 MG: 10 TABLET ORAL at 14:33

## 2023-03-22 RX ADMIN — QUETIAPINE 200 MG: 200 TABLET, FILM COATED ORAL at 08:51

## 2023-03-22 RX ADMIN — MIRTAZAPINE 15 MG: 15 TABLET, FILM COATED ORAL at 21:53

## 2023-03-22 RX ADMIN — BACLOFEN 10 MG: 10 TABLET ORAL at 08:51

## 2023-03-22 RX ADMIN — HYDROXYZINE HYDROCHLORIDE 50 MG: 50 TABLET, FILM COATED ORAL at 19:40

## 2023-03-22 RX ADMIN — ASPIRIN 81 MG CHEWABLE TABLET 81 MG: 81 TABLET CHEWABLE at 08:51

## 2023-03-22 RX ADMIN — CARBOXYMETHYLCELLULOSE SODIUM 1 DROP: 0.5 SOLUTION/ DROPS OPHTHALMIC at 08:50

## 2023-03-22 RX ADMIN — METFORMIN HYDROCHLORIDE 850 MG: 850 TABLET, FILM COATED ORAL at 18:39

## 2023-03-22 RX ADMIN — CLONIDINE HYDROCHLORIDE 0.1 MG: 0.1 TABLET ORAL at 14:33

## 2023-03-22 RX ADMIN — BACLOFEN 10 MG: 10 TABLET ORAL at 19:40

## 2023-03-22 RX ADMIN — QUETIAPINE 200 MG: 200 TABLET, FILM COATED ORAL at 14:33

## 2023-03-22 RX ADMIN — CARBOXYMETHYLCELLULOSE SODIUM 1 DROP: 0.5 SOLUTION/ DROPS OPHTHALMIC at 19:40

## 2023-03-22 RX ADMIN — VENLAFAXINE HYDROCHLORIDE 150 MG: 150 CAPSULE, EXTENDED RELEASE ORAL at 21:53

## 2023-03-22 RX ADMIN — CARBOXYMETHYLCELLULOSE SODIUM 1 DROP: 0.5 SOLUTION/ DROPS OPHTHALMIC at 18:38

## 2023-03-22 RX ADMIN — METOPROLOL SUCCINATE 50 MG: 50 TABLET, EXTENDED RELEASE ORAL at 08:51

## 2023-03-22 RX ADMIN — EMPAGLIFLOZIN 10 MG: 10 TABLET, FILM COATED ORAL at 08:51

## 2023-03-22 RX ADMIN — HYDROXYZINE HYDROCHLORIDE 50 MG: 50 TABLET, FILM COATED ORAL at 08:53

## 2023-03-22 RX ADMIN — QUETIAPINE FUMARATE 400 MG: 200 TABLET ORAL at 21:53

## 2023-03-22 RX ADMIN — OLANZAPINE 2.5 MG: 2.5 TABLET, FILM COATED ORAL at 14:33

## 2023-03-22 RX ADMIN — POLYETHYLENE GLYCOL 3350 17 G: 17 POWDER, FOR SOLUTION ORAL at 08:49
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
## 2023-03-22 NOTE — PLAN OF CARE
PRIMARY DIAGNOSIS: PLACEMENT  OUTPATIENT/OBSERVATION GOALS TO BE MET BEFORE DISCHARGE:  1. ADLs back to baseline: Yes    2. Activity and level of assistance: Assist of 2 with lift    3. Pain status: Pain free.    4. Return to near baseline physical activity: Yes     Discharge Planner Nurse   Safe discharge environment identified: No  Barriers to discharge: Yes       Entered by: Opal Garcia RN 03/22/2023 3:55 AM     Vitals are Temp: 97.9  F (36.6  C) Temp src: Oral BP: 134/87 Pulse: 75   Resp: 16 SpO2: 94 %.  Pt sleeping. Plan: [facility] group home plan to visit pt today 3/22 at 1000 for in person assessment. GH could potentially accommodate lift/total cares.    Please review provider order for any additional goals.   Nurse to notify provider when observation goals have been met and patient is ready for discharge.

## 2023-03-22 NOTE — PROGRESS NOTES
Care Management Follow Up    Expected Discharge Date: 04/30/2023     Concerns to be Addressed: Discharge planning      Patient plan of care discussed at interdisciplinary rounds: Yes    Anticipated Discharge Disposition:  [facility]    Patient/Family in Agreement with the Plan:  Yes    Additional Information:  Pt was assessed by Tamara, nursing director at [facility], [phone], fax: [phone]. SW and RN assisted with answering questions facility had, and pt participated in assessment. Address for  is [address] [place] [address]. Facility reports that they feel they can clinically meet patient's needs. Pt's CADI CM will need to reach out to Tamara to coordinate rate sheet.     Pt will need the following DME: Hospital bed, electric meggan lift, and incontinence supplies.  usually uses [facility] for DME provider.     ANALY emailed pt's relocation worker Misty to update on the above information and coordinate next steps with rate sheet and DME.     ANALY will continue to follow.     DANITA Obrien, [facility]   Inpatient Care Coordination  [facility]   [phone]

## 2023-03-22 NOTE — PLAN OF CARE
PRIMARY DIAGNOSIS: PLACEMENT  OUTPATIENT/OBSERVATION GOALS TO BE MET BEFORE DISCHARGE:  ADLs back to baseline: Yes    Activity and level of assistance: Assist of 2 with lift    Pain status: Pain free.    Return to near baseline physical activity: Yes     Discharge Planner Nurse   Safe discharge environment identified: No  Barriers to discharge: Yes       Entered by: Opal Garcia RN 03/22/2023 2:10 AM     Vitals are Temp: 97.9  F (36.6  C) Temp src: Oral BP: 134/87 Pulse: 75   Resp: 16 SpO2: 94 %.  Pt Aox4. Denies pain. Minimal assist with bed mobility. Requires lift for transfers. Incontinent of bowel and bladder. Pt calls appropriately for assistance with brief changes. SW following for placement. Will continue to monitor and provide supportive cares.     Please review provider order for any additional goals.   Nurse to notify provider when observation goals have been met and patient is ready for discharge.

## 2023-03-23 LAB — GLUCOSE BLDC GLUCOMTR-MCNC: 109 MG/DL (ref 70–99)

## 2023-03-23 PROCEDURE — G0378 HOSPITAL OBSERVATION PER HR: HCPCS

## 2023-03-23 PROCEDURE — 250N000013 HC RX MED GY IP 250 OP 250 PS 637: Performed by: NURSE PRACTITIONER

## 2023-03-23 PROCEDURE — 250N000013 HC RX MED GY IP 250 OP 250 PS 637: Performed by: HOSPITALIST

## 2023-03-23 PROCEDURE — 99231 SBSQ HOSP IP/OBS SF/LOW 25: CPT | Performed by: PHYSICIAN ASSISTANT

## 2023-03-23 PROCEDURE — 250N000013 HC RX MED GY IP 250 OP 250 PS 637: Performed by: PHYSICIAN ASSISTANT

## 2023-03-23 PROCEDURE — 82962 GLUCOSE BLOOD TEST: CPT

## 2023-03-23 RX ADMIN — HYDROCORTISONE: 1 CREAM TOPICAL at 21:15

## 2023-03-23 RX ADMIN — CARBOXYMETHYLCELLULOSE SODIUM 1 DROP: 0.5 SOLUTION/ DROPS OPHTHALMIC at 21:14

## 2023-03-23 RX ADMIN — CARBOXYMETHYLCELLULOSE SODIUM 1 DROP: 0.5 SOLUTION/ DROPS OPHTHALMIC at 08:47

## 2023-03-23 RX ADMIN — HYDROXYZINE HYDROCHLORIDE 50 MG: 50 TABLET, FILM COATED ORAL at 14:33

## 2023-03-23 RX ADMIN — CLONIDINE HYDROCHLORIDE 0.1 MG: 0.1 TABLET ORAL at 21:14

## 2023-03-23 RX ADMIN — CLONIDINE HYDROCHLORIDE 0.1 MG: 0.1 TABLET ORAL at 08:47

## 2023-03-23 RX ADMIN — DESONIDE: 0.5 CREAM TOPICAL at 14:33

## 2023-03-23 RX ADMIN — DIVALPROEX SODIUM 1000 MG: 500 TABLET, DELAYED RELEASE ORAL at 21:40

## 2023-03-23 RX ADMIN — POLYETHYLENE GLYCOL 3350 17 G: 17 POWDER, FOR SOLUTION ORAL at 08:45

## 2023-03-23 RX ADMIN — BACLOFEN 10 MG: 10 TABLET ORAL at 08:46

## 2023-03-23 RX ADMIN — OLANZAPINE 2.5 MG: 2.5 TABLET, FILM COATED ORAL at 14:33

## 2023-03-23 RX ADMIN — QUETIAPINE FUMARATE 400 MG: 200 TABLET ORAL at 21:41

## 2023-03-23 RX ADMIN — ATORVASTATIN CALCIUM 20 MG: 20 TABLET, FILM COATED ORAL at 21:13

## 2023-03-23 RX ADMIN — CLOTRIMAZOLE: 0.01 CREAM TOPICAL at 08:47

## 2023-03-23 RX ADMIN — HYDROXYZINE HYDROCHLORIDE 50 MG: 50 TABLET, FILM COATED ORAL at 08:46

## 2023-03-23 RX ADMIN — METFORMIN HYDROCHLORIDE 850 MG: 850 TABLET, FILM COATED ORAL at 08:47

## 2023-03-23 RX ADMIN — BACLOFEN 10 MG: 10 TABLET ORAL at 14:33

## 2023-03-23 RX ADMIN — BACLOFEN 10 MG: 10 TABLET ORAL at 21:14

## 2023-03-23 RX ADMIN — LEVOTHYROXINE SODIUM 25 MCG: 0.03 TABLET ORAL at 08:46

## 2023-03-23 RX ADMIN — VENLAFAXINE HYDROCHLORIDE 225 MG: 150 CAPSULE, EXTENDED RELEASE ORAL at 21:39

## 2023-03-23 RX ADMIN — ASPIRIN 81 MG CHEWABLE TABLET 81 MG: 81 TABLET CHEWABLE at 08:47

## 2023-03-23 RX ADMIN — HYDROCORTISONE: 1 CREAM TOPICAL at 08:48

## 2023-03-23 RX ADMIN — Medication 10 MG: at 21:40

## 2023-03-23 RX ADMIN — KETOCONAZOLE: 20 CREAM TOPICAL at 08:48

## 2023-03-23 RX ADMIN — KETOCONAZOLE: 20 CREAM TOPICAL at 21:16

## 2023-03-23 RX ADMIN — CLOTRIMAZOLE: 0.01 CREAM TOPICAL at 21:14

## 2023-03-23 RX ADMIN — METOPROLOL SUCCINATE 50 MG: 50 TABLET, EXTENDED RELEASE ORAL at 08:45

## 2023-03-23 RX ADMIN — METFORMIN HYDROCHLORIDE 850 MG: 850 TABLET, FILM COATED ORAL at 17:27

## 2023-03-23 RX ADMIN — MIRTAZAPINE 15 MG: 15 TABLET, FILM COATED ORAL at 21:40

## 2023-03-23 RX ADMIN — QUETIAPINE 200 MG: 200 TABLET, FILM COATED ORAL at 14:32

## 2023-03-23 RX ADMIN — DIVALPROEX SODIUM 750 MG: 500 TABLET, DELAYED RELEASE ORAL at 08:46

## 2023-03-23 RX ADMIN — Medication 25 MCG: at 08:46

## 2023-03-23 RX ADMIN — EMPAGLIFLOZIN 10 MG: 10 TABLET, FILM COATED ORAL at 08:45

## 2023-03-23 RX ADMIN — QUETIAPINE 200 MG: 200 TABLET, FILM COATED ORAL at 08:46

## 2023-03-23 RX ADMIN — CARBOXYMETHYLCELLULOSE SODIUM 1 DROP: 0.5 SOLUTION/ DROPS OPHTHALMIC at 17:27

## 2023-03-23 RX ADMIN — CARBOXYMETHYLCELLULOSE SODIUM 1 DROP: 0.5 SOLUTION/ DROPS OPHTHALMIC at 14:32

## 2023-03-23 RX ADMIN — HYDROXYZINE HYDROCHLORIDE 50 MG: 50 TABLET, FILM COATED ORAL at 21:14

## 2023-03-23 RX ADMIN — CLONIDINE HYDROCHLORIDE 0.1 MG: 0.1 TABLET ORAL at 14:33

## 2023-03-23 RX ADMIN — VENLAFAXINE HYDROCHLORIDE 75 MG: 150 CAPSULE, EXTENDED RELEASE ORAL at 21:45

## 2023-03-23 ASSESSMENT — ACTIVITIES OF DAILY LIVING (ADL)
ADLS_ACUITY_SCORE: 54

## 2023-03-23 NOTE — PLAN OF CARE
PRIMARY DIAGNOSIS: Placement  OUTPATIENT/OBSERVATION GOALS TO BE MET BEFORE DISCHARGE:  ADLs back to baseline: Yes    Activity and level of assistance: assist x 1    Pain status: Pain free.    Return to near baseline physical activity: Yes     Discharge Planner Nurse   Safe discharge environment identified: No  Barriers to discharge: Yes       Entered by: Emily Garcia RN 03/22/2023 10:23 PM    Patient currently resting in bed, denies pain, incontinent of urine and stool, SW following for placement       Please review provider order for any additional goals.   Nurse to notify provider when observation goals have been met and patient is ready for discharge.

## 2023-03-23 NOTE — PROGRESS NOTES
Care Management Follow Up    Expected Discharge Date: 04/30/2023     Concerns to be Addressed: Discharge planning      Patient plan of care discussed at interdisciplinary rounds: Yes    Anticipated Discharge Disposition:  [facility]    Anticipated Discharge DME:  Hospital bed, electric meggan lift, incontinence supplies    Additional Information:  Pt's relocation worker Misty is working with Tamara from [facility] to coordinate funding for rate sheet and service agreement.     Pt will need a hospital bed and electric meggan lift delivered to  prior to discharge. Per relocation worker, pt has used [employer] previously. DME orders will need to be billed through insurance first, and if not covered by insurance will go on patient's waiver.     ANALY faxed DME order for hospital bed and electric meggan lift to [facility], [phone], (f) [phone].     ANLAY will continue to follow.     DANITA Obrien, [facility]   Inpatient Care Coordination  [facility]   [phone]

## 2023-03-23 NOTE — PLAN OF CARE
PRIMARY DIAGNOSIS: PLACEMENT  OUTPATIENT/OBSERVATION GOALS TO BE MET BEFORE DISCHARGE:  1. ADLs back to baseline: Yes    2. Activity and level of assistance: Up with maximum assistance. Lift at baseline     3. Pain status: Pain free.    4. Return to near baseline physical activity: Yes     Discharge Planner Nurse   Safe discharge environment identified: Yes  Barriers to discharge: Yes       Entered by: Chela Glover RN 03/23/2023 1:55 AM     Patient is Aox4, VSS, tolerating regular diet and oral medications, total care at baseline with lift device, no IV access at this time, incontinent of bowel and bladder, sleeping well, able to make needs known, will continue to monitor and provide cares.      Please review provider order for any additional goals.   Nurse to notify provider when observation goals have been met and patient is ready for discharge.Goal Outcome Evaluation:
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
## 2023-03-23 NOTE — PLAN OF CARE
PRIMARY DIAGNOSIS: Placement  OUTPATIENT/OBSERVATION GOALS TO BE MET BEFORE DISCHARGE:  1. ADLs back to baseline: Yes    2. Activity and level of assistance: Up with maximum assistance. Consider SW and/or PT evaluation.     3. Pain status: Pain free.    4. Return to near baseline physical activity: Yes     Discharge Planner Nurse   Safe discharge environment identified: Yes  Barriers to discharge: Yes       Entered by: Laura Dover RN 03/23/2023 11:50 AM     Please review provider order for any additional goals.   Nurse to notify provider when observation goals have been met and patient is ready for discharge.Goal Outcome Evaluation:

## 2023-03-23 NOTE — PLAN OF CARE
PRIMARY DIAGNOSIS: PLACEMENT  OUTPATIENT/OBSERVATION GOALS TO BE MET BEFORE DISCHARGE:  1. ADLs back to baseline: Yes    2. Activity and level of assistance: Up with maximum assistance. Lift at baseline     3. Pain status: Pain free.    4. Return to near baseline physical activity: Yes     Discharge Planner Nurse   Safe discharge environment identified: Yes  Barriers to discharge: Yes       Entered by: Chela Glover RN 03/23/2023 5:56 AM     Patient is Aox4, VSS, tolerating regular diet and oral medications, total care at baseline with lift device, no IV access at this time, incontinent of bowel and bladder, sleeping well, able to make needs known, will continue to monitor and provide cares.      Please review provider order for any additional goals.   Nurse to notify provider when observation goals have been met and patient is ready for discharge.Goal Outcome Evaluation:

## 2023-03-24 LAB — GLUCOSE BLDC GLUCOMTR-MCNC: 110 MG/DL (ref 70–99)

## 2023-03-24 PROCEDURE — 82962 GLUCOSE BLOOD TEST: CPT

## 2023-03-24 PROCEDURE — G0378 HOSPITAL OBSERVATION PER HR: HCPCS

## 2023-03-24 PROCEDURE — 250N000013 HC RX MED GY IP 250 OP 250 PS 637: Performed by: NURSE PRACTITIONER

## 2023-03-24 PROCEDURE — 250N000013 HC RX MED GY IP 250 OP 250 PS 637: Performed by: HOSPITALIST

## 2023-03-24 PROCEDURE — 250N000013 HC RX MED GY IP 250 OP 250 PS 637: Performed by: PHYSICIAN ASSISTANT

## 2023-03-24 PROCEDURE — 99207 PR NO CHARGE LOS: CPT | Performed by: NURSE PRACTITIONER

## 2023-03-24 RX ADMIN — CLOTRIMAZOLE: 0.01 CREAM TOPICAL at 08:22

## 2023-03-24 RX ADMIN — KETOCONAZOLE: 20 CREAM TOPICAL at 08:21

## 2023-03-24 RX ADMIN — Medication 10 MG: at 21:00

## 2023-03-24 RX ADMIN — EMPAGLIFLOZIN 10 MG: 10 TABLET, FILM COATED ORAL at 08:20

## 2023-03-24 RX ADMIN — DIVALPROEX SODIUM 750 MG: 500 TABLET, DELAYED RELEASE ORAL at 08:19

## 2023-03-24 RX ADMIN — CARBOXYMETHYLCELLULOSE SODIUM 1 DROP: 0.5 SOLUTION/ DROPS OPHTHALMIC at 08:20

## 2023-03-24 RX ADMIN — METOPROLOL SUCCINATE 50 MG: 50 TABLET, EXTENDED RELEASE ORAL at 08:19

## 2023-03-24 RX ADMIN — Medication 25 MCG: at 08:19

## 2023-03-24 RX ADMIN — CLONIDINE HYDROCHLORIDE 0.1 MG: 0.1 TABLET ORAL at 08:20

## 2023-03-24 RX ADMIN — BACLOFEN 10 MG: 10 TABLET ORAL at 14:22

## 2023-03-24 RX ADMIN — BACLOFEN 10 MG: 10 TABLET ORAL at 08:20

## 2023-03-24 RX ADMIN — METFORMIN HYDROCHLORIDE 850 MG: 850 TABLET, FILM COATED ORAL at 08:20

## 2023-03-24 RX ADMIN — CLOTRIMAZOLE: 0.01 CREAM TOPICAL at 21:04

## 2023-03-24 RX ADMIN — VENLAFAXINE HYDROCHLORIDE 75 MG: 150 CAPSULE, EXTENDED RELEASE ORAL at 21:02

## 2023-03-24 RX ADMIN — POLYETHYLENE GLYCOL 3350 17 G: 17 POWDER, FOR SOLUTION ORAL at 08:21

## 2023-03-24 RX ADMIN — BACLOFEN 10 MG: 10 TABLET ORAL at 20:58

## 2023-03-24 RX ADMIN — CARBOXYMETHYLCELLULOSE SODIUM 1 DROP: 0.5 SOLUTION/ DROPS OPHTHALMIC at 17:48

## 2023-03-24 RX ADMIN — OLANZAPINE 2.5 MG: 2.5 TABLET, FILM COATED ORAL at 14:22

## 2023-03-24 RX ADMIN — MIRTAZAPINE 15 MG: 15 TABLET, FILM COATED ORAL at 21:00

## 2023-03-24 RX ADMIN — CLONIDINE HYDROCHLORIDE 0.1 MG: 0.1 TABLET ORAL at 14:22

## 2023-03-24 RX ADMIN — ASPIRIN 81 MG CHEWABLE TABLET 81 MG: 81 TABLET CHEWABLE at 08:19

## 2023-03-24 RX ADMIN — HYDROXYZINE HYDROCHLORIDE 50 MG: 50 TABLET, FILM COATED ORAL at 08:19

## 2023-03-24 RX ADMIN — QUETIAPINE 200 MG: 200 TABLET, FILM COATED ORAL at 14:22

## 2023-03-24 RX ADMIN — CARBOXYMETHYLCELLULOSE SODIUM 1 DROP: 0.5 SOLUTION/ DROPS OPHTHALMIC at 21:00

## 2023-03-24 RX ADMIN — KETOCONAZOLE: 20 CREAM TOPICAL at 21:03

## 2023-03-24 RX ADMIN — CARBOXYMETHYLCELLULOSE SODIUM 1 DROP: 0.5 SOLUTION/ DROPS OPHTHALMIC at 11:33

## 2023-03-24 RX ADMIN — QUETIAPINE FUMARATE 400 MG: 200 TABLET ORAL at 21:00

## 2023-03-24 RX ADMIN — VENLAFAXINE HYDROCHLORIDE 150 MG: 150 CAPSULE, EXTENDED RELEASE ORAL at 21:02

## 2023-03-24 RX ADMIN — METFORMIN HYDROCHLORIDE 850 MG: 850 TABLET, FILM COATED ORAL at 17:48

## 2023-03-24 RX ADMIN — DIVALPROEX SODIUM 1000 MG: 500 TABLET, DELAYED RELEASE ORAL at 21:00

## 2023-03-24 RX ADMIN — CLONIDINE HYDROCHLORIDE 0.1 MG: 0.1 TABLET ORAL at 20:58

## 2023-03-24 RX ADMIN — HYDROXYZINE HYDROCHLORIDE 50 MG: 50 TABLET, FILM COATED ORAL at 20:58

## 2023-03-24 RX ADMIN — HYDROXYZINE HYDROCHLORIDE 50 MG: 50 TABLET, FILM COATED ORAL at 14:22

## 2023-03-24 RX ADMIN — HYDROCORTISONE: 1 CREAM TOPICAL at 08:21

## 2023-03-24 RX ADMIN — ATORVASTATIN CALCIUM 20 MG: 20 TABLET, FILM COATED ORAL at 20:57

## 2023-03-24 RX ADMIN — QUETIAPINE 200 MG: 200 TABLET, FILM COATED ORAL at 08:20

## 2023-03-24 RX ADMIN — LEVOTHYROXINE SODIUM 25 MCG: 0.03 TABLET ORAL at 08:20

## 2023-03-24 ASSESSMENT — ACTIVITIES OF DAILY LIVING (ADL)
ADLS_ACUITY_SCORE: 54

## 2023-03-24 NOTE — PLAN OF CARE
PRIMARY DIAGNOSIS: Placement  OUTPATIENT/OBSERVATION GOALS TO BE MET BEFORE DISCHARGE:  ADLs back to baseline: Yes     Activity and level of assistance: Up with maximum assistance.      Pain status: Pain free.     Return to near baseline physical activity: Yes          Discharge Planner Nurse   Safe discharge environment identified: Yes  Barriers to discharge: Yes       Entered by: Diane Du RN 03/24/2023 4PM   Pt A&Ox4. VSS, on RA. Denies pain, nausea, chest pain and SOB. Incontinence care provided. Repositioned as tolerated. Regular diet. No PIV access. Placement found, but waiting for equipment and supplies to be delivered.     Please review provider order for any additional goals.   Nurse to notify provider when observation goals have been met and patient is ready for discharge

## 2023-03-24 NOTE — PLAN OF CARE
PRIMARY DIAGNOSIS: Placement  OUTPATIENT/OBSERVATION GOALS TO BE MET BEFORE DISCHARGE:  ADLs back to baseline: Yes     Activity and level of assistance: Up with maximum assistance.      Pain status: Pain free.     Return to near baseline physical activity: Yes          Discharge Planner Nurse   Safe discharge environment identified: Yes  Barriers to discharge: Yes       Entered by: Diane Du RN 03/24/2023 12PM   Pt A&Ox4. VSS, on RA. Denies pain, nausea, chest pain and SOB. Incontinence care provided. Repositioned as tolerated. Regular diet. No PIV access. Placement found, but waiting for equipment and supplies to be delivered.   Please review provider order for any additional goals.   Nurse to notify provider when observation goals have been met and patient is ready for discharge
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
## 2023-03-24 NOTE — PLAN OF CARE
PRIMARY DIAGNOSIS: PLACEMENT  OUTPATIENT/OBSERVATION GOALS TO BE MET BEFORE DISCHARGE:  1. ADLs back to baseline: Yes    2. Activity and level of assistance: Up with maximum assistance. Lift at baseline.     3. Pain status: Pain free.    4. Return to near baseline physical activity: Yes     Discharge Planner Nurse   Safe discharge environment identified: Yes  Barriers to discharge: Yes       Entered by: Chela Glover RN 03/24/2023 4:43 AM     Patient is Aox4, VSS, tolerating regular diet and oral medications, total care at baseline with lift, no pain noted, incontinent of bowel and bladder, sleeping well, able to make needs known, will continue to monitor and provide cares.      Please review provider order for any additional goals.   Nurse to notify provider when observation goals have been met and patient is ready for discharge.Goal Outcome Evaluation:

## 2023-03-24 NOTE — PLAN OF CARE
PRIMARY DIAGNOSIS: Placement  OUTPATIENT/OBSERVATION GOALS TO BE MET BEFORE DISCHARGE:  ADLs back to baseline: Yes    Activity and level of assistance: Up with maximum assistance.     Pain status: Pain free.    Return to near baseline physical activity: Yes     Discharge Planner Nurse   Safe discharge environment identified: Yes  Barriers to discharge: Yes       Entered by: Diane Du RN 03/24/2023 9:34 AM   Pt A&Ox4. VSS, on RA. Denies pain, nausea, chest pain and SOB. Incontinence care provided. Repositioned as tolerated. Regular diet. No PIV access. Placement found, but waiting for equipment and supplies to be delivered.   Please review provider order for any additional goals.   Nurse to notify provider when observation goals have been met and patient is ready for discharge.

## 2023-03-24 NOTE — PLAN OF CARE
Care From: 1920 - 2300    PRIMARY DIAGNOSIS: Placement    OBSERVATION GOALS TO BE MET BEFORE DISCHARGE:  1. ADLs back to baseline: Yes     2. Activity and level of assistance: Up with maximum assistance/lift      3. Pain status: Pain free.     4. Return to near baseline physical activity: Yes          Discharge Planner Nurse   Safe discharge environment identified: No  Barriers to discharge: Yes        Patient A & O x4, Lung sounds CTA, bowel sounds active. Last BM 3/20. Denies pain, N/V, SOB or chest pain. Turned and repositioned as needed. Pt is A 2 using lift, incontinent of bladder x 2 . Tolerating regular diet and oral meds. No PIV. Medically cleared, SW following. Waiting for placement.    /71 (BP Location: Right arm)   Pulse 79   Temp 98.5  F (36.9  C) (Oral)   Resp 16   Wt 99.9 kg (220 lb 4.8 oz)   SpO2 98%     Please review provider order for any additional goals.   Nurse to notify provider when observation goals have been met and patient is ready for discharge.

## 2023-03-24 NOTE — PROGRESS NOTES
Care Management Follow Up    Length of Stay (days): 0    Expected Discharge Date: 03/31/2023     Concerns to be Addressed:       Patient plan of care discussed at interdisciplinary rounds: Yes    Anticipated Discharge Disposition:  [facility]    Anticipated Discharge Services:    Anticipated Discharge DME:      Patient/family educated on Medicare website which has current facility and service quality ratings:    Education Provided on the Discharge Plan:    Patient/Family in Agreement with the Plan:      Referrals Placed by CM/SW:    Private pay costs discussed: Not applicable    Additional Information:  ANALY spoke with representative at [facility] ([phone]) who shared patient is not currently in there system but that she would enter patient in. Representative shared that they are not currently processing hospital beds through Medicare. She voiced she is not able to confirm patient's waiver is open. She shared patient's waiver would need to be open & a county  would need to be assigned for them to send a quote for how much the electric meggan lift would cost for it to be approved before they could process the request. She shared patient's insurance would not cover the electric meggan lift & it would only be covered once they are able to verify patient's waiver & the  approves the cost for the lift.    ANALY left a voicemail for patient's relocation worker, Misty ([phone]) to update with the above information & inquire about the status of patient's waiver. ANALY requested a return phone call for assist with equipment for discharge planning.       AMELIA Ralph

## 2023-03-24 NOTE — PLAN OF CARE
PRIMARY DIAGNOSIS: PLACEMENT  OUTPATIENT/OBSERVATION GOALS TO BE MET BEFORE DISCHARGE:  1. ADLs back to baseline: Yes    2. Activity and level of assistance: Up with maximum assistance. Lift at baseline.     3. Pain status: Pain free.    4. Return to near baseline physical activity: Yes     Discharge Planner Nurse   Safe discharge environment identified: Yes  Barriers to discharge: Yes       Entered by: Chela Glover RN 03/24/2023 2:35 AM     Patient is Aox4, VSS, tolerating regular diet and oral medications, total care at baseline with lift, no pain noted, incontinent of bowel and bladder, sleeping well, able to make needs known, will continue to monitor and provide cares.      Please review provider order for any additional goals.   Nurse to notify provider when observation goals have been met and patient is ready for discharge.Goal Outcome Evaluation:

## 2023-03-25 LAB — GLUCOSE BLDC GLUCOMTR-MCNC: 131 MG/DL (ref 70–99)

## 2023-03-25 PROCEDURE — 99207 PR NO CHARGE LOS: CPT | Performed by: NURSE PRACTITIONER

## 2023-03-25 PROCEDURE — G0378 HOSPITAL OBSERVATION PER HR: HCPCS

## 2023-03-25 PROCEDURE — 250N000013 HC RX MED GY IP 250 OP 250 PS 637: Performed by: HOSPITALIST

## 2023-03-25 PROCEDURE — 250N000013 HC RX MED GY IP 250 OP 250 PS 637: Performed by: NURSE PRACTITIONER

## 2023-03-25 PROCEDURE — 250N000013 HC RX MED GY IP 250 OP 250 PS 637: Performed by: PHYSICIAN ASSISTANT

## 2023-03-25 PROCEDURE — 82962 GLUCOSE BLOOD TEST: CPT

## 2023-03-25 RX ADMIN — EMPAGLIFLOZIN 10 MG: 10 TABLET, FILM COATED ORAL at 08:04

## 2023-03-25 RX ADMIN — Medication 25 MCG: at 08:04

## 2023-03-25 RX ADMIN — KETOCONAZOLE: 20 CREAM TOPICAL at 19:59

## 2023-03-25 RX ADMIN — HYDROXYZINE HYDROCHLORIDE 50 MG: 50 TABLET, FILM COATED ORAL at 08:04

## 2023-03-25 RX ADMIN — OLANZAPINE 2.5 MG: 2.5 TABLET, FILM COATED ORAL at 13:51

## 2023-03-25 RX ADMIN — SENNOSIDES AND DOCUSATE SODIUM 1 TABLET: 50; 8.6 TABLET ORAL at 17:19

## 2023-03-25 RX ADMIN — ASPIRIN 81 MG CHEWABLE TABLET 81 MG: 81 TABLET CHEWABLE at 08:04

## 2023-03-25 RX ADMIN — METOPROLOL SUCCINATE 50 MG: 50 TABLET, EXTENDED RELEASE ORAL at 08:03

## 2023-03-25 RX ADMIN — MIRTAZAPINE 15 MG: 15 TABLET, FILM COATED ORAL at 21:12

## 2023-03-25 RX ADMIN — HYDROXYZINE HYDROCHLORIDE 50 MG: 50 TABLET, FILM COATED ORAL at 20:00

## 2023-03-25 RX ADMIN — CLOTRIMAZOLE: 0.01 CREAM TOPICAL at 19:59

## 2023-03-25 RX ADMIN — CARBOXYMETHYLCELLULOSE SODIUM 1 DROP: 0.5 SOLUTION/ DROPS OPHTHALMIC at 17:19

## 2023-03-25 RX ADMIN — LEVOTHYROXINE SODIUM 25 MCG: 0.03 TABLET ORAL at 08:02

## 2023-03-25 RX ADMIN — VENLAFAXINE HYDROCHLORIDE 150 MG: 150 CAPSULE, EXTENDED RELEASE ORAL at 21:12

## 2023-03-25 RX ADMIN — QUETIAPINE 200 MG: 200 TABLET, FILM COATED ORAL at 08:02

## 2023-03-25 RX ADMIN — METFORMIN HYDROCHLORIDE 850 MG: 850 TABLET, FILM COATED ORAL at 08:04

## 2023-03-25 RX ADMIN — BACLOFEN 10 MG: 10 TABLET ORAL at 20:00

## 2023-03-25 RX ADMIN — KETOCONAZOLE: 20 CREAM TOPICAL at 08:05

## 2023-03-25 RX ADMIN — Medication 10 MG: at 21:11

## 2023-03-25 RX ADMIN — VENLAFAXINE HYDROCHLORIDE 75 MG: 150 CAPSULE, EXTENDED RELEASE ORAL at 21:12

## 2023-03-25 RX ADMIN — CARBOXYMETHYLCELLULOSE SODIUM 1 DROP: 0.5 SOLUTION/ DROPS OPHTHALMIC at 11:18

## 2023-03-25 RX ADMIN — CLONIDINE HYDROCHLORIDE 0.1 MG: 0.1 TABLET ORAL at 13:51

## 2023-03-25 RX ADMIN — POLYETHYLENE GLYCOL 3350 17 G: 17 POWDER, FOR SOLUTION ORAL at 08:05

## 2023-03-25 RX ADMIN — CARBOXYMETHYLCELLULOSE SODIUM 1 DROP: 0.5 SOLUTION/ DROPS OPHTHALMIC at 19:59

## 2023-03-25 RX ADMIN — ATORVASTATIN CALCIUM 20 MG: 20 TABLET, FILM COATED ORAL at 19:59

## 2023-03-25 RX ADMIN — QUETIAPINE 200 MG: 200 TABLET, FILM COATED ORAL at 13:51

## 2023-03-25 RX ADMIN — BACLOFEN 10 MG: 10 TABLET ORAL at 08:04

## 2023-03-25 RX ADMIN — DIVALPROEX SODIUM 1000 MG: 500 TABLET, DELAYED RELEASE ORAL at 21:12

## 2023-03-25 RX ADMIN — CARBOXYMETHYLCELLULOSE SODIUM 1 DROP: 0.5 SOLUTION/ DROPS OPHTHALMIC at 08:02

## 2023-03-25 RX ADMIN — HYDROCORTISONE: 1 CREAM TOPICAL at 08:04

## 2023-03-25 RX ADMIN — DIVALPROEX SODIUM 750 MG: 500 TABLET, DELAYED RELEASE ORAL at 08:03

## 2023-03-25 RX ADMIN — METFORMIN HYDROCHLORIDE 850 MG: 850 TABLET, FILM COATED ORAL at 17:19

## 2023-03-25 RX ADMIN — CLOTRIMAZOLE: 0.01 CREAM TOPICAL at 08:05

## 2023-03-25 RX ADMIN — HYDROCORTISONE: 1 CREAM TOPICAL at 19:59

## 2023-03-25 RX ADMIN — CLONIDINE HYDROCHLORIDE 0.1 MG: 0.1 TABLET ORAL at 08:04

## 2023-03-25 RX ADMIN — QUETIAPINE FUMARATE 400 MG: 200 TABLET ORAL at 21:12

## 2023-03-25 RX ADMIN — HYDROXYZINE HYDROCHLORIDE 50 MG: 50 TABLET, FILM COATED ORAL at 13:51

## 2023-03-25 RX ADMIN — CLONIDINE HYDROCHLORIDE 0.1 MG: 0.1 TABLET ORAL at 20:00

## 2023-03-25 RX ADMIN — BACLOFEN 10 MG: 10 TABLET ORAL at 13:51

## 2023-03-25 ASSESSMENT — ACTIVITIES OF DAILY LIVING (ADL)
ADLS_ACUITY_SCORE: 54
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
## 2023-03-25 NOTE — PLAN OF CARE
PRIMARY DIAGNOSIS: Placement  OUTPATIENT/OBSERVATION GOALS TO BE MET BEFORE DISCHARGE:  ADLs back to baseline: Yes     Activity and level of assistance: Up with maximum assistance.      Pain status: Pain free.     Return to near baseline physical activity: Yes          Discharge Planner Nurse   Safe discharge environment identified: Yes  Barriers to discharge: Yes       Entered by: Diane Du RN 03/25/2023 12PM   Pt A&Ox4. VSS, on RA. Denies pain, nausea, chest pain and SOB. Incontinence care provided. Repositioned as tolerated. Regular diet. No PIV access. Placement found, but waiting for equipment and supplies to be delivered.      Please review provider order for any additional goals.   Nurse to notify provider when observation goals have been met and patient is ready for discharge

## 2023-03-25 NOTE — PROGRESS NOTES
PRIMARY DIAGNOSIS: Placement   OUTPATIENT/OBSERVATION GOALS TO BE MET BEFORE DISCHARGE:  1. ADLs back to baseline: Yes    2. Activity and level of assistance: Up with maximum assistance.     3. Pain status: Pain free.    4. Return to near baseline physical activity: Yes     Discharge Planner Nurse   Safe discharge environment identified: Yes  Barriers to discharge: Yes       Entered by: Alda Victoria RN 03/24/2023    Please review provider order for any additional goals.   Nurse to notify provider when observation goals have been met and patient is ready for discharge.

## 2023-03-25 NOTE — PLAN OF CARE
PRIMARY DIAGNOSIS: Placement  OUTPATIENT/OBSERVATION GOALS TO BE MET BEFORE DISCHARGE:  1. ADLs back to baseline: Yes     2. Activity and level of assistance: Up with maximum assistance.      3. Pain status: Pain free.     4. Return to near baseline physical activity: Yes          Discharge Planner Nurse   Safe discharge environment identified: Yes  Barriers to discharge: Yes       Entered by: Diane Du RN 03/25/2023 4PM   Pt A&Ox4. VSS, on RA. Denies pain, nausea, chest pain and SOB. Incontinence care provided. PRN senna given for constipation. Repositioned as tolerated. Regular diet. No PIV access. Placement found, but waiting for equipment and supplies to be delivered.      Please review provider order for any additional goals.   Nurse to notify provider when observation goals have been met and patient is ready for discharge

## 2023-03-25 NOTE — PROGRESS NOTES
PRIMARY DIAGNOSIS: Placement   OUTPATIENT/OBSERVATION GOALS TO BE MET BEFORE DISCHARGE:  1. ADLs back to baseline: Yes    2. Activity and level of assistance: Up with maximum assistance.     3. Pain status: Pain free.    4. Return to near baseline physical activity: Yes     Discharge Planner Nurse   Safe discharge environment identified: Yes  Barriers to discharge: Yes       Entered by: Alda Victoria RN 03/25/2023     Pt is A&O x4.  Calm and cooperative. Will continue to monitor.  Incontinence care provided.   Please review provider order for any additional goals.   Nurse to notify provider when observation goals have been met and patient is ready for discharge.

## 2023-03-25 NOTE — PLAN OF CARE
PRIMARY DIAGNOSIS: Placement  OUTPATIENT/OBSERVATION GOALS TO BE MET BEFORE DISCHARGE:  ADLs back to baseline: Yes     Activity and level of assistance: Up with maximum assistance.      Pain status: Pain free.     Return to near baseline physical activity: Yes          Discharge Planner Nurse   Safe discharge environment identified: Yes  Barriers to discharge: Yes       Entered by: Diane Du RN 03/25/2023 8AM   Pt A&Ox4. VSS, on RA. Denies pain, nausea, chest pain and SOB. Incontinence care provided. Repositioned as tolerated. Regular diet. No PIV access. Placement found, but waiting for equipment and supplies to be delivered.      Please review provider order for any additional goals.   Nurse to notify provider when observation goals have been met and patient is ready for discharge

## 2023-03-25 NOTE — PROGRESS NOTES
PRIMARY DIAGNOSIS: Placement   OUTPATIENT/OBSERVATION GOALS TO BE MET BEFORE DISCHARGE:  1. ADLs back to baseline: Yes    2. Activity and level of assistance: Up with maximum assistance.     3. Pain status: Pain free.    4. Return to near baseline physical activity: Yes     Discharge Planner Nurse   Safe discharge environment identified: Yes  Barriers to discharge: Yes       Entered by: Alda Victoria RN 03/25/2023     Pt is A&O x4.  Calm and cooperative. Will continue to monitor  Please review provider order for any additional goals.   Nurse to notify provider when observation goals have been met and patient is ready for discharge.

## 2023-03-26 LAB — GLUCOSE BLDC GLUCOMTR-MCNC: 167 MG/DL (ref 70–99)

## 2023-03-26 PROCEDURE — G0378 HOSPITAL OBSERVATION PER HR: HCPCS

## 2023-03-26 PROCEDURE — 250N000013 HC RX MED GY IP 250 OP 250 PS 637: Performed by: HOSPITALIST

## 2023-03-26 PROCEDURE — 99207 PR NO CHARGE LOS: CPT | Performed by: NURSE PRACTITIONER

## 2023-03-26 PROCEDURE — 250N000013 HC RX MED GY IP 250 OP 250 PS 637: Performed by: PHYSICIAN ASSISTANT

## 2023-03-26 PROCEDURE — 250N000013 HC RX MED GY IP 250 OP 250 PS 637: Performed by: NURSE PRACTITIONER

## 2023-03-26 PROCEDURE — 82962 GLUCOSE BLOOD TEST: CPT

## 2023-03-26 RX ADMIN — CLOTRIMAZOLE: 0.01 CREAM TOPICAL at 08:27

## 2023-03-26 RX ADMIN — VENLAFAXINE HYDROCHLORIDE 150 MG: 150 CAPSULE, EXTENDED RELEASE ORAL at 21:52

## 2023-03-26 RX ADMIN — MIRTAZAPINE 15 MG: 15 TABLET, FILM COATED ORAL at 21:52

## 2023-03-26 RX ADMIN — CARBOXYMETHYLCELLULOSE SODIUM 1 DROP: 0.5 SOLUTION/ DROPS OPHTHALMIC at 08:25

## 2023-03-26 RX ADMIN — LEVOTHYROXINE SODIUM 25 MCG: 0.03 TABLET ORAL at 08:27

## 2023-03-26 RX ADMIN — METOPROLOL SUCCINATE 50 MG: 50 TABLET, EXTENDED RELEASE ORAL at 08:26

## 2023-03-26 RX ADMIN — CLONIDINE HYDROCHLORIDE 0.1 MG: 0.1 TABLET ORAL at 19:49

## 2023-03-26 RX ADMIN — CARBOXYMETHYLCELLULOSE SODIUM 1 DROP: 0.5 SOLUTION/ DROPS OPHTHALMIC at 19:50

## 2023-03-26 RX ADMIN — BACLOFEN 10 MG: 10 TABLET ORAL at 08:28

## 2023-03-26 RX ADMIN — SENNOSIDES AND DOCUSATE SODIUM 1 TABLET: 50; 8.6 TABLET ORAL at 08:25

## 2023-03-26 RX ADMIN — CLONIDINE HYDROCHLORIDE 0.1 MG: 0.1 TABLET ORAL at 13:55

## 2023-03-26 RX ADMIN — QUETIAPINE 200 MG: 200 TABLET, FILM COATED ORAL at 08:25

## 2023-03-26 RX ADMIN — Medication 10 MG: at 21:51

## 2023-03-26 RX ADMIN — HYDROXYZINE HYDROCHLORIDE 50 MG: 50 TABLET, FILM COATED ORAL at 08:26

## 2023-03-26 RX ADMIN — KETOCONAZOLE: 20 CREAM TOPICAL at 08:27

## 2023-03-26 RX ADMIN — METFORMIN HYDROCHLORIDE 850 MG: 850 TABLET, FILM COATED ORAL at 08:28

## 2023-03-26 RX ADMIN — KETOCONAZOLE: 20 CREAM TOPICAL at 19:54

## 2023-03-26 RX ADMIN — EMPAGLIFLOZIN 10 MG: 10 TABLET, FILM COATED ORAL at 08:27

## 2023-03-26 RX ADMIN — DIVALPROEX SODIUM 750 MG: 500 TABLET, DELAYED RELEASE ORAL at 08:25

## 2023-03-26 RX ADMIN — QUETIAPINE 200 MG: 200 TABLET, FILM COATED ORAL at 13:55

## 2023-03-26 RX ADMIN — HYDROXYZINE HYDROCHLORIDE 50 MG: 50 TABLET, FILM COATED ORAL at 19:50

## 2023-03-26 RX ADMIN — ASPIRIN 81 MG CHEWABLE TABLET 81 MG: 81 TABLET CHEWABLE at 08:26

## 2023-03-26 RX ADMIN — BACLOFEN 10 MG: 10 TABLET ORAL at 19:49

## 2023-03-26 RX ADMIN — VENLAFAXINE HYDROCHLORIDE 75 MG: 150 CAPSULE, EXTENDED RELEASE ORAL at 21:51

## 2023-03-26 RX ADMIN — ATORVASTATIN CALCIUM 20 MG: 20 TABLET, FILM COATED ORAL at 19:50

## 2023-03-26 RX ADMIN — BACLOFEN 10 MG: 10 TABLET ORAL at 13:55

## 2023-03-26 RX ADMIN — HYDROXYZINE HYDROCHLORIDE 50 MG: 50 TABLET, FILM COATED ORAL at 13:55

## 2023-03-26 RX ADMIN — CLONIDINE HYDROCHLORIDE 0.1 MG: 0.1 TABLET ORAL at 08:27

## 2023-03-26 RX ADMIN — HYDROCORTISONE: 1 CREAM TOPICAL at 19:53

## 2023-03-26 RX ADMIN — OLANZAPINE 2.5 MG: 2.5 TABLET, FILM COATED ORAL at 13:55

## 2023-03-26 RX ADMIN — POLYETHYLENE GLYCOL 3350 17 G: 17 POWDER, FOR SOLUTION ORAL at 08:26

## 2023-03-26 RX ADMIN — CLOTRIMAZOLE: 0.01 CREAM TOPICAL at 19:53

## 2023-03-26 RX ADMIN — CARBOXYMETHYLCELLULOSE SODIUM 1 DROP: 0.5 SOLUTION/ DROPS OPHTHALMIC at 11:19

## 2023-03-26 RX ADMIN — DIVALPROEX SODIUM 1000 MG: 500 TABLET, DELAYED RELEASE ORAL at 21:51

## 2023-03-26 RX ADMIN — HYDROCORTISONE: 1 CREAM TOPICAL at 08:27

## 2023-03-26 RX ADMIN — CARBOXYMETHYLCELLULOSE SODIUM 1 DROP: 0.5 SOLUTION/ DROPS OPHTHALMIC at 18:50

## 2023-03-26 RX ADMIN — METFORMIN HYDROCHLORIDE 850 MG: 850 TABLET, FILM COATED ORAL at 18:50

## 2023-03-26 RX ADMIN — Medication 25 MCG: at 08:28

## 2023-03-26 RX ADMIN — QUETIAPINE FUMARATE 400 MG: 200 TABLET ORAL at 21:51

## 2023-03-26 ASSESSMENT — ACTIVITIES OF DAILY LIVING (ADL)
ADLS_ACUITY_SCORE: 50
ADLS_ACUITY_SCORE: 50
ADLS_ACUITY_SCORE: 52
ADLS_ACUITY_SCORE: 54
ADLS_ACUITY_SCORE: 52
ADLS_ACUITY_SCORE: 54
ADLS_ACUITY_SCORE: 50
ADLS_ACUITY_SCORE: 52
ADLS_ACUITY_SCORE: 52
ADLS_ACUITY_SCORE: 54
ADLS_ACUITY_SCORE: 54
ADLS_ACUITY_SCORE: 52
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
## 2023-03-26 NOTE — PLAN OF CARE
PRIMARY DIAGNOSIS: Placement   OUTPATIENT/OBSERVATION GOALS TO BE MET BEFORE DISCHARGE:  ADLs back to baseline: Yes    Activity and level of assistance: assist x 1     Pain status: Pain free.    Return to near baseline physical activity: Yes     Discharge Planner Nurse   Safe discharge environment identified: No  Barriers to discharge: Yes       Entered by: Emily Garcia RN 03/26/2023 4:40 AM    Patient is currently sleeping, SW following for placement       Please review provider order for any additional goals.   Nurse to notify provider when observation goals have been met and patient is ready for discharge

## 2023-03-26 NOTE — PLAN OF CARE
PRIMARY DIAGNOSIS: Placement  OUTPATIENT/OBSERVATION GOALS TO BE MET BEFORE DISCHARGE:  1. ADLs back to baseline: Yes     2. Activity and level of assistance: Up with maximum assistance.      3. Pain status: Pain free.     4. Return to near baseline physical activity: Yes          Discharge Planner Nurse   Safe discharge environment identified: Yes  Barriers to discharge: Yes       /84 (BP Location: Left arm)   Pulse 73   Temp 97.3  F (36.3  C) (Oral)   Resp 16   Wt 99.9 kg (220 lb 4.8 oz)   SpO2 95%      Please review provider order for any additional goals.   Nurse to notify provider when observation goals have been met and patient is ready for discharge

## 2023-03-26 NOTE — PLAN OF CARE
PRIMARY DIAGNOSIS: Placement  OUTPATIENT/OBSERVATION GOALS TO BE MET BEFORE DISCHARGE:  ADLs back to baseline: Yes     Activity and level of assistance: Up with maximum assistance.      Pain status: Pain free.     Return to near baseline physical activity: Yes          Discharge Planner Nurse   Safe discharge environment identified: Yes  Barriers to discharge: Yes       Entered by: Diane Du RN 03/26/2023 8AM   Pt A&Ox4. VSS, on RA. Denies pain, nausea, chest pain and SOB. Incontinence care provided. PRN senna given for constipation. Repositioned as tolerated. Regular diet. No PIV access. Placement found, but waiting for equipment and supplies to be delivered.      Please review provider order for any additional goals.   Nurse to notify provider when observation goals have been met and patient is ready for discharge

## 2023-03-26 NOTE — PLAN OF CARE
PRIMARY DIAGNOSIS: Placement  OUTPATIENT/OBSERVATION GOALS TO BE MET BEFORE DISCHARGE:  ADLs back to baseline: Yes     Activity and level of assistance: Up with maximum assistance.      Pain status: Pain free.     Return to near baseline physical activity: Yes          Discharge Planner Nurse   Safe discharge environment identified: Yes  Barriers to discharge: Yes       Entered by: Diane Du RN 03/26/2023 12PM   Pt A&Ox4. VSS, on RA. Denies pain, nausea, chest pain and SOB. Incontinence care provided. PRN senna given for constipation. Repositioned as tolerated. Regular diet. No PIV access. Placement found, but waiting for equipment and supplies to be delivered.      Please review provider order for any additional goals.   Nurse to notify provider when observation goals have been met and patient is ready for discharge

## 2023-03-26 NOTE — PLAN OF CARE
PRIMARY DIAGNOSIS: Placement  OUTPATIENT/OBSERVATION GOALS TO BE MET BEFORE DISCHARGE:  ADLs back to baseline: Yes    Activity and level of assistance: assist x 1    Pain status: Pain free.    Return to near baseline physical activity: Yes     Discharge Planner Nurse   Safe discharge environment identified: No  Barriers to discharge: Yes       Entered by: Emily Garcia RN 03/25/2023 9:07 PM    Patient resting comfortably in bed, currently denies pain, incontinent of urine and stool, SW following for placement, will continue to monitor        Please review provider order for any additional goals.   Nurse to notify provider when observation goals have been met and patient is ready for discharge.

## 2023-03-26 NOTE — PLAN OF CARE
PRIMARY DIAGNOSIS: Placement  OUTPATIENT/OBSERVATION GOALS TO BE MET BEFORE DISCHARGE:  ADLs back to baseline: Yes    Activity and level of assistance: assist x 1    Pain status: Pain free.    Return to near baseline physical activity: Yes     Discharge Planner Nurse   Safe discharge environment identified: No  Barriers to discharge: Yes       Entered by: Emily Garcia RN 03/26/2023 12:36 AM    Patient is currently sleeping, SW following for placement       Please review provider order for any additional goals.   Nurse to notify provider when observation goals have been met and patient is ready for discharge.

## 2023-03-27 LAB — GLUCOSE BLDC GLUCOMTR-MCNC: 108 MG/DL (ref 70–99)

## 2023-03-27 PROCEDURE — 99207 PR NO CHARGE LOS: CPT | Performed by: NURSE PRACTITIONER

## 2023-03-27 PROCEDURE — 250N000013 HC RX MED GY IP 250 OP 250 PS 637: Performed by: HOSPITALIST

## 2023-03-27 PROCEDURE — 250N000013 HC RX MED GY IP 250 OP 250 PS 637: Performed by: NURSE PRACTITIONER

## 2023-03-27 PROCEDURE — 250N000013 HC RX MED GY IP 250 OP 250 PS 637: Performed by: PHYSICIAN ASSISTANT

## 2023-03-27 PROCEDURE — G0378 HOSPITAL OBSERVATION PER HR: HCPCS

## 2023-03-27 PROCEDURE — 82962 GLUCOSE BLOOD TEST: CPT

## 2023-03-27 RX ADMIN — METFORMIN HYDROCHLORIDE 850 MG: 850 TABLET, FILM COATED ORAL at 08:35

## 2023-03-27 RX ADMIN — Medication 10 MG: at 21:39

## 2023-03-27 RX ADMIN — CLONIDINE HYDROCHLORIDE 0.1 MG: 0.1 TABLET ORAL at 21:39

## 2023-03-27 RX ADMIN — HYDROXYZINE HYDROCHLORIDE 50 MG: 50 TABLET, FILM COATED ORAL at 13:57

## 2023-03-27 RX ADMIN — HYDROCORTISONE: 1 CREAM TOPICAL at 19:57

## 2023-03-27 RX ADMIN — DIVALPROEX SODIUM 1000 MG: 500 TABLET, DELAYED RELEASE ORAL at 21:38

## 2023-03-27 RX ADMIN — CLOTRIMAZOLE: 0.01 CREAM TOPICAL at 19:53

## 2023-03-27 RX ADMIN — POLYETHYLENE GLYCOL 3350 17 G: 17 POWDER, FOR SOLUTION ORAL at 08:39

## 2023-03-27 RX ADMIN — CLOTRIMAZOLE: 0.01 CREAM TOPICAL at 14:05

## 2023-03-27 RX ADMIN — BACLOFEN 10 MG: 10 TABLET ORAL at 13:57

## 2023-03-27 RX ADMIN — BACLOFEN 10 MG: 10 TABLET ORAL at 08:36

## 2023-03-27 RX ADMIN — LEVOTHYROXINE SODIUM 25 MCG: 0.03 TABLET ORAL at 08:36

## 2023-03-27 RX ADMIN — Medication 25 MCG: at 08:36

## 2023-03-27 RX ADMIN — CLONIDINE HYDROCHLORIDE 0.1 MG: 0.1 TABLET ORAL at 13:57

## 2023-03-27 RX ADMIN — METFORMIN HYDROCHLORIDE 850 MG: 850 TABLET, FILM COATED ORAL at 18:18

## 2023-03-27 RX ADMIN — OLANZAPINE 2.5 MG: 2.5 TABLET, FILM COATED ORAL at 13:57

## 2023-03-27 RX ADMIN — BACLOFEN 10 MG: 10 TABLET ORAL at 21:39

## 2023-03-27 RX ADMIN — VENLAFAXINE HYDROCHLORIDE 75 MG: 150 CAPSULE, EXTENDED RELEASE ORAL at 21:39

## 2023-03-27 RX ADMIN — CARBOXYMETHYLCELLULOSE SODIUM 1 DROP: 0.5 SOLUTION/ DROPS OPHTHALMIC at 14:04

## 2023-03-27 RX ADMIN — QUETIAPINE FUMARATE 400 MG: 200 TABLET ORAL at 21:38

## 2023-03-27 RX ADMIN — DIVALPROEX SODIUM 750 MG: 500 TABLET, DELAYED RELEASE ORAL at 08:36

## 2023-03-27 RX ADMIN — METOPROLOL SUCCINATE 50 MG: 50 TABLET, EXTENDED RELEASE ORAL at 08:36

## 2023-03-27 RX ADMIN — QUETIAPINE 200 MG: 200 TABLET, FILM COATED ORAL at 08:36

## 2023-03-27 RX ADMIN — QUETIAPINE 200 MG: 200 TABLET, FILM COATED ORAL at 13:57

## 2023-03-27 RX ADMIN — MIRTAZAPINE 15 MG: 15 TABLET, FILM COATED ORAL at 21:38

## 2023-03-27 RX ADMIN — HYDROXYZINE HYDROCHLORIDE 50 MG: 50 TABLET, FILM COATED ORAL at 21:40

## 2023-03-27 RX ADMIN — ASPIRIN 81 MG CHEWABLE TABLET 81 MG: 81 TABLET CHEWABLE at 08:36

## 2023-03-27 RX ADMIN — ATORVASTATIN CALCIUM 20 MG: 20 TABLET, FILM COATED ORAL at 21:39

## 2023-03-27 RX ADMIN — HYDROXYZINE HYDROCHLORIDE 50 MG: 50 TABLET, FILM COATED ORAL at 08:36

## 2023-03-27 RX ADMIN — CLONIDINE HYDROCHLORIDE 0.1 MG: 0.1 TABLET ORAL at 08:36

## 2023-03-27 RX ADMIN — CARBOXYMETHYLCELLULOSE SODIUM 1 DROP: 0.5 SOLUTION/ DROPS OPHTHALMIC at 08:40

## 2023-03-27 RX ADMIN — EMPAGLIFLOZIN 10 MG: 10 TABLET, FILM COATED ORAL at 08:36

## 2023-03-27 RX ADMIN — VENLAFAXINE HYDROCHLORIDE 150 MG: 150 CAPSULE, EXTENDED RELEASE ORAL at 21:39

## 2023-03-27 RX ADMIN — DESONIDE: 0.5 CREAM TOPICAL at 14:06

## 2023-03-27 RX ADMIN — CARBOXYMETHYLCELLULOSE SODIUM 1 DROP: 0.5 SOLUTION/ DROPS OPHTHALMIC at 18:18

## 2023-03-27 RX ADMIN — CARBOXYMETHYLCELLULOSE SODIUM 1 DROP: 0.5 SOLUTION/ DROPS OPHTHALMIC at 21:40

## 2023-03-27 RX ADMIN — KETOCONAZOLE: 20 CREAM TOPICAL at 19:57

## 2023-03-27 ASSESSMENT — ACTIVITIES OF DAILY LIVING (ADL)
ADLS_ACUITY_SCORE: 54
ADLS_ACUITY_SCORE: 50
ADLS_ACUITY_SCORE: 52
ADLS_ACUITY_SCORE: 52
ADLS_ACUITY_SCORE: 54
ADLS_ACUITY_SCORE: 52
ADLS_ACUITY_SCORE: 52
ADLS_ACUITY_SCORE: 50
ADLS_ACUITY_SCORE: 54
ADLS_ACUITY_SCORE: 52
ADLS_ACUITY_SCORE: 54
ADLS_ACUITY_SCORE: 52

## 2023-03-27 NOTE — PLAN OF CARE
PRIMARY DIAGNOSIS: Placement  OUTPATIENT/OBSERVATION GOALS TO BE MET BEFORE DISCHARGE:  1. Pain Status: Pain free.    2. Return to near baseline physical activity: Yes; Bedbound    3. Cleared for discharge by consultants (if involved): N/A    Discharge Planner Nurse   Safe discharge environment identified: No  Barriers to discharge: Yes       Entered by: Shasha Sutton RN 03/27/2023    Pt A&Ox4, VSS on RA. Denies pain. Incontient of B&B, care provided. Ax1 with hygiene assistance, pt turns well with minimal assistance. Pulsate mattress. Turned, pillows in use. Able to make needs known. No IV access. Awaiting placement, SW following.       Please review provider order for any additional goals. Nurse to notify provider when observation goals have been met and patient is ready for discharge.

## 2023-03-27 NOTE — PLAN OF CARE
PRIMARY DIAGNOSIS: Placement  OUTPATIENT/OBSERVATION GOALS TO BE MET BEFORE DISCHARGE:  1. Pain Status: Pain free.    2. Return to near baseline physical activity: Yes; Bedbound    3. Cleared for discharge by consultants (if involved): N/A    Discharge Planner Nurse   Safe discharge environment identified: No  Barriers to discharge: Yes       Entered by: Shasha Sutton RN 03/27/2023    Pt A&Ox4, VSS on RA. Denies pain/SOB/CP/Nausea. Bowel sounds present, incontient of B&B. Pulsate mattress. Able to make needs known      Please review provider order for any additional goals. Nurse to notify provider when observation goals have been met and patient is ready for discharge.

## 2023-03-27 NOTE — PROGRESS NOTES
Care Management Follow Up    Expected Discharge Date: 03/31/2023     Concerns to be Addressed: Discharge planning      Patient plan of care discussed at interdisciplinary rounds: Yes    Anticipated Discharge Disposition:  [facility] once funding in place and DME delivered     Anticipated Discharge DME: Hospital bed, electric meggan lift, incontinence supplies    Patient/family educated on Medicare website which has current facility and service quality ratings:  Yes  Education Provided on the Discharge Plan:  Yes  Patient/Family in Agreement with the Plan:  Yes    Additional Information:  ANALY placed follow up call to [facility], [phone], to follow up on pending DME orders. Spoke with billing and customer rep who report that they are not currently billing hospital beds through Medicare d/t issues with reimbursement, and cannot skip the Medicare and bill through pt's [place] d/t Medicare being primary. Additionally, they do not carry electric lifts as insurance only typically reimburses for manual lifts. Cancelled referral with [facility].     ANALY placed call to [facility], [phone], spoke with customer representative. They do bill and supply hospital beds through Medicare. Face sheet and hospital bed DME orders faxed to (f) [phone].     ANALY placed call to [facility], [phone], to inquire if they carry electric meggan lifts. They do supply electric meggan lifts. Initial DME order faxed to (f) [phone].    ANALY sent follow up email to relocation worker Misty and ROSIO Ruff through [facility] to assist with follow up on rate sheet and approval to move forward with discharge to group home.     ANALY will continue to follow.     DANITA Obrien, [facility]   Inpatient Care Coordination  [facility]   [phone]

## 2023-03-27 NOTE — PLAN OF CARE
PRIMARY DIAGNOSIS: Placement   OUTPATIENT/OBSERVATION GOALS TO BE MET BEFORE DISCHARGE:  1. ADLs back to baseline: Yes    2. Activity and level of assistance: Up with maximum assistance. Consider SW and/or PT evaluation.     3. Pain status: Pain free.    4. Return to near baseline physical activity: Yes     Discharge Planner Nurse   Safe discharge environment identified: No  Barriers to discharge: Yes   A & O X 4. Lung sound clear bilaterally  to ausculation . Denies  pain, shortness of breath. Bowel sound present  to all quads. Incontinent of B & B . Medically stable to discharge . Awaiting for placement. On Pulsate mattress, Turn and reposition maintained and per patient request. Calls appropriately. CM/SW following for discharge planning.  /86 (BP Location: Left arm)   Pulse 72   Temp 97.6  F (36.4  C) (Oral)   Resp 18   Wt 99.9 kg (220 lb 4.8 oz)   SpO2 96%        Entered by: Monica Elder RN 03/27/2023 03:42     Please review provider order for any additional goals.   Nurse to notify provider when observation goals have been met and patient is ready for discharge.

## 2023-03-27 NOTE — PLAN OF CARE
PRIMARY DIAGNOSIS: Placement  OUTPATIENT/OBSERVATION GOALS TO BE MET BEFORE DISCHARGE:  1. ADLs back to baseline: Yes     2. Activity and level of assistance: Up with maximum assistance.      3. Pain status: Pain free.     4. Return to near baseline physical activity: Yes          Discharge Planner Nurse   Safe discharge environment identified: Yes  Barriers to discharge: Yes     /86 (BP Location: Left arm)   Pulse 72   Temp 97.6  F (36.4  C) (Oral)   Resp 18   Wt 99.9 kg (220 lb 4.8 oz)   SpO2 96%   Please review provider order for any additional goals.   Nurse to notify provider when observation goals have been met and patient is ready for discharge
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
## 2023-03-27 NOTE — PLAN OF CARE
PRIMARY DIAGNOSIS: Placement  OUTPATIENT/OBSERVATION GOALS TO BE MET BEFORE DISCHARGE:  1. Pain Status: Pain free.    2. Return to near baseline physical activity: Yes; Bedbound    3. Cleared for discharge by consultants (if involved): N/A    Discharge Planner Nurse   Safe discharge environment identified: No  Barriers to discharge: Yes       Entered by: Shasha Sutton RN 03/27/2023    Pt A&Ox4, VSS on RA. Denies pain/SOB/CP/Nausea. Bowel sounds present, LS clear. Incontient of B&B. Large stool, Ax1 with hygiene assistance, pt turns well with minimal assistance. Pulsate mattress. Turned, pillows in use Able to make needs known. Awaiting placement, SW following.       Please review provider order for any additional goals. Nurse to notify provider when observation goals have been met and patient is ready for discharge.

## 2023-03-27 NOTE — PLAN OF CARE
PRIMARY DIAGNOSIS: Placement   OUTPATIENT/OBSERVATION GOALS TO BE MET BEFORE DISCHARGE:  ADLs back to baseline: Yes    Activity and level of assistance: Up with maximum assistance. Consider SW and/or PT evaluation.     Pain status: Pain free.    Return to near baseline physical activity: Yes     Discharge Planner Nurse   Safe discharge environment identified: No  Barriers to discharge: Yes       Entered by: Monica Elder RN 03/27/2023 12:45 AM     Please review provider order for any additional goals.   Nurse to notify provider when observation goals have been met and patient is ready for discharge.

## 2023-03-28 PROCEDURE — 99207 PR NO CHARGE LOS: CPT | Performed by: NURSE PRACTITIONER

## 2023-03-28 PROCEDURE — 250N000013 HC RX MED GY IP 250 OP 250 PS 637: Performed by: PHYSICIAN ASSISTANT

## 2023-03-28 PROCEDURE — G0378 HOSPITAL OBSERVATION PER HR: HCPCS

## 2023-03-28 PROCEDURE — 250N000013 HC RX MED GY IP 250 OP 250 PS 637: Performed by: HOSPITALIST

## 2023-03-28 PROCEDURE — 250N000013 HC RX MED GY IP 250 OP 250 PS 637: Performed by: NURSE PRACTITIONER

## 2023-03-28 RX ADMIN — CARBOXYMETHYLCELLULOSE SODIUM 1 DROP: 0.5 SOLUTION/ DROPS OPHTHALMIC at 18:40

## 2023-03-28 RX ADMIN — HYDROXYZINE HYDROCHLORIDE 50 MG: 50 TABLET, FILM COATED ORAL at 16:26

## 2023-03-28 RX ADMIN — CARBOXYMETHYLCELLULOSE SODIUM 1 DROP: 0.5 SOLUTION/ DROPS OPHTHALMIC at 16:26

## 2023-03-28 RX ADMIN — QUETIAPINE 200 MG: 200 TABLET, FILM COATED ORAL at 16:26

## 2023-03-28 RX ADMIN — CLOTRIMAZOLE: 0.01 CREAM TOPICAL at 08:42

## 2023-03-28 RX ADMIN — HYDROCORTISONE: 1 CREAM TOPICAL at 21:11

## 2023-03-28 RX ADMIN — CLONIDINE HYDROCHLORIDE 0.1 MG: 0.1 TABLET ORAL at 16:26

## 2023-03-28 RX ADMIN — HYDROXYZINE HYDROCHLORIDE 50 MG: 50 TABLET, FILM COATED ORAL at 21:05

## 2023-03-28 RX ADMIN — DESONIDE: 0.5 CREAM TOPICAL at 16:29

## 2023-03-28 RX ADMIN — BACLOFEN 10 MG: 10 TABLET ORAL at 16:26

## 2023-03-28 RX ADMIN — BACLOFEN 10 MG: 10 TABLET ORAL at 21:07

## 2023-03-28 RX ADMIN — MIRTAZAPINE 15 MG: 15 TABLET, FILM COATED ORAL at 21:05

## 2023-03-28 RX ADMIN — OLANZAPINE 2.5 MG: 2.5 TABLET, FILM COATED ORAL at 16:26

## 2023-03-28 RX ADMIN — QUETIAPINE 200 MG: 200 TABLET, FILM COATED ORAL at 08:34

## 2023-03-28 RX ADMIN — VENLAFAXINE HYDROCHLORIDE 150 MG: 150 CAPSULE, EXTENDED RELEASE ORAL at 21:05

## 2023-03-28 RX ADMIN — CARBOXYMETHYLCELLULOSE SODIUM 1 DROP: 0.5 SOLUTION/ DROPS OPHTHALMIC at 08:34

## 2023-03-28 RX ADMIN — CLONIDINE HYDROCHLORIDE 0.1 MG: 0.1 TABLET ORAL at 21:06

## 2023-03-28 RX ADMIN — Medication 10 MG: at 21:06

## 2023-03-28 RX ADMIN — VENLAFAXINE HYDROCHLORIDE 75 MG: 150 CAPSULE, EXTENDED RELEASE ORAL at 21:08

## 2023-03-28 RX ADMIN — CLOTRIMAZOLE: 0.01 CREAM TOPICAL at 21:11

## 2023-03-28 RX ADMIN — Medication 25 MCG: at 08:33

## 2023-03-28 RX ADMIN — DIVALPROEX SODIUM 1000 MG: 500 TABLET, DELAYED RELEASE ORAL at 21:07

## 2023-03-28 RX ADMIN — CLONIDINE HYDROCHLORIDE 0.1 MG: 0.1 TABLET ORAL at 08:34

## 2023-03-28 RX ADMIN — HYDROXYZINE HYDROCHLORIDE 50 MG: 50 TABLET, FILM COATED ORAL at 08:34

## 2023-03-28 RX ADMIN — DIVALPROEX SODIUM 750 MG: 500 TABLET, DELAYED RELEASE ORAL at 08:33

## 2023-03-28 RX ADMIN — ATORVASTATIN CALCIUM 20 MG: 20 TABLET, FILM COATED ORAL at 21:07

## 2023-03-28 RX ADMIN — KETOCONAZOLE: 20 CREAM TOPICAL at 21:11

## 2023-03-28 RX ADMIN — QUETIAPINE FUMARATE 400 MG: 200 TABLET ORAL at 21:06

## 2023-03-28 RX ADMIN — POLYETHYLENE GLYCOL 3350 17 G: 17 POWDER, FOR SOLUTION ORAL at 08:33

## 2023-03-28 RX ADMIN — ASPIRIN 81 MG CHEWABLE TABLET 81 MG: 81 TABLET CHEWABLE at 08:34

## 2023-03-28 RX ADMIN — METFORMIN HYDROCHLORIDE 850 MG: 850 TABLET, FILM COATED ORAL at 08:34

## 2023-03-28 RX ADMIN — KETOCONAZOLE: 20 CREAM TOPICAL at 08:42

## 2023-03-28 RX ADMIN — METFORMIN HYDROCHLORIDE 850 MG: 850 TABLET, FILM COATED ORAL at 18:41

## 2023-03-28 RX ADMIN — CARBOXYMETHYLCELLULOSE SODIUM 1 DROP: 0.5 SOLUTION/ DROPS OPHTHALMIC at 21:06

## 2023-03-28 RX ADMIN — LEVOTHYROXINE SODIUM 25 MCG: 0.03 TABLET ORAL at 08:34

## 2023-03-28 RX ADMIN — EMPAGLIFLOZIN 10 MG: 10 TABLET, FILM COATED ORAL at 08:34

## 2023-03-28 RX ADMIN — BACLOFEN 10 MG: 10 TABLET ORAL at 08:34

## 2023-03-28 RX ADMIN — METOPROLOL SUCCINATE 50 MG: 50 TABLET, EXTENDED RELEASE ORAL at 08:34

## 2023-03-28 ASSESSMENT — ACTIVITIES OF DAILY LIVING (ADL)
ADLS_ACUITY_SCORE: 54
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
## 2023-03-28 NOTE — PLAN OF CARE
PRIMARY DIAGNOSIS: Placement  OUTPATIENT/OBSERVATION GOALS TO BE MET BEFORE DISCHARGE:  1. Pain Status: Pain free.     2. Return to near baseline physical activity: Yes; baseline total care/lift    3. Cleared for discharge by consultants (if involved): N/A    Discharge Planner Nurse   Safe discharge environment identified: Yes  Barriers to discharge: Yes       Entered by: Shasha Sutton RN 03/28/2023    Pt A&Ox3, Up Ax2-lift. Cares in bed Ax1, rolls well side to side. Denies pain. Weight shifting in bed. Incontinent of B&B. Resting in bed listening to phone music. T/R as tolerated. SW following for discharge planning.       Please review provider order for any additional goals. Nurse to notify provider when observation goals have been met and patient is ready for discharge.

## 2023-03-28 NOTE — PLAN OF CARE
PRIMARY DIAGNOSIS: Placement  OUTPATIENT/OBSERVATION GOALS TO BE MET BEFORE DISCHARGE:  ADLs back to baseline: Yes     Activity and level of assistance: Ax2 with lift, Ax1 with cares in bed     Pain status: Pain free.     Return to near baseline physical activity: Yes          Discharge Planner Nurse   Safe discharge environment identified: No  Barriers to discharge: Yes       Entered by: Waleska Leon RN 01/07/2023 5:26 PM  Please review provider order for any additional goals.   Nurse to notify provider when observation goals have been met and patient is ready for discharge.     Temp: 97.9  F (36.6  C) Temp src: Oral BP: (!) 140/90 Pulse: 73   Resp: 17 SpO2: 94 % O2 Device: None (Room air)              A&O. Up Ax2 with lift/ Ax1 with rolling in bed. Denies pain. Pleasant and cooperative. Pulsating mattress in place. Pt able to make needs known. Incontinent. Plan- SW following for placement- pt accepted at [facility] once funding in place and hospital bed/electric meggan/incontinence supplies in place, encourage oob, daily blood sugar checks, vital signs daily.

## 2023-03-28 NOTE — PLAN OF CARE
PRIMARY DIAGNOSIS: Placement  OUTPATIENT/OBSERVATION GOALS TO BE MET BEFORE DISCHARGE:  1. Pain Status: Pain free.     2. Return to near baseline physical activity: Yes; baseline total care/lift    3. Cleared for discharge by consultants (if involved): N/A    Discharge Planner Nurse   Safe discharge environment identified: Yes  Barriers to discharge: Yes       Entered by: Shasha Sutton RN 03/28/2023    Pt A&Ox3, Up Ax2-lift. Cares in bed Ax1, rolls well side to side. Denies pain. Weight shifting in bed. Incontinent of B&B. T/R as tolerated. SW following for discharge planning.       Please review provider order for any additional goals. Nurse to notify provider when observation goals have been met and patient is ready for discharge.

## 2023-03-28 NOTE — PLAN OF CARE
PRIMARY DIAGNOSIS: Placement  OUTPATIENT/OBSERVATION GOALS TO BE MET BEFORE DISCHARGE:  ADLs back to baseline: Yes    Activity and level of assistance: assist x 1    Pain status: Pain free.    Return to near baseline physical activity: Yes     Discharge Planner Nurse   Safe discharge environment identified: No  Barriers to discharge: Yes       Entered by: Emily Garcia RN 03/28/2023 12:10 AM    Patient is resting comfortably in bed, currently denies pain, SW following for placement       Please review provider order for any additional goals.   Nurse to notify provider when observation goals have been met and patient is ready for discharge.

## 2023-03-28 NOTE — PLAN OF CARE
PRIMARY DIAGNOSIS: ACUTE PAIN  OUTPATIENT/OBSERVATION GOALS TO BE MET BEFORE DISCHARGE:  1. Pain Status: Pain free.    2. Return to near baseline physical activity: Yes; baseline     3. Cleared for discharge by consultants (if involved): N/A    Discharge Planner Nurse   Safe discharge environment identified: Yes  Barriers to discharge: Yes       Entered by: Shasha Sutton RN 03/28/2023    Pt A&Ox3, Up Ax2-lift. Cares in bed Ax1, rolls well side to side. Denies pain/nausea/CP/SOB. Sitting up in bed for breakfast, tolerting meals well. SW following for discharge planning.       Please review provider order for any additional goals. Nurse to notify provider when observation goals have been met and patient is ready for discharge.

## 2023-03-28 NOTE — PLAN OF CARE
PRIMARY DIAGNOSIS: Placement  OUTPATIENT/OBSERVATION GOALS TO BE MET BEFORE DISCHARGE:  ADLs back to baseline: Yes    Activity and level of assistance: assist x 1    Pain status: Pain free.    Return to near baseline physical activity: Yes     Discharge Planner Nurse   Safe discharge environment identified: No  Barriers to discharge: Yes       Entered by: Emily Garcia RN 03/28/2023 4:21 AM    Patient currently sleeping, SW following for placement       Please review provider order for any additional goals.   Nurse to notify provider when observation goals have been met and patient is ready for discharge.

## 2023-03-28 NOTE — PROGRESS NOTES
Addendum:  The patient requires the use of a Trisha lift for all transfers (between bed & chair, wheelchair, or commode). Without the lift, the patient would be bed confined.     LUIS Spangler CNP

## 2023-03-28 NOTE — PROGRESS NOTES
Care Management Follow Up    Expected Discharge Date: 03/31/2023     Concerns to be Addressed: Discharge planning      Patient plan of care discussed at interdisciplinary rounds: Yes    Anticipated Discharge Disposition:  [facility] once funding in place and DME delivered     Anticipated Discharge DME:  Hospital bed, electric meggan lift,     Additional Information:  ANALY confirmed with relocation worker and [facility] that they are reviewing and working on revising rate sheet/6790 for Group Home payment approval.     ANALY placed call to [facility], [phone], spoke with customer representative to follow up on hospital bed order. They sent a fax with additional medical documentation needed. Completed by provider. This was faxed at 1500 to (f) [phone]. Once [facility] receives fax they can process and deliver in 1-3 days.     ANALY placed call to [facility], [phone], to follow up on electric meggan lift order initiated 3/27. They faxed a form with additional clinical documentation needed to be faxed to (f) [phone].    ANALY will continue to follow.     1615: Clinical documentation for meggan lift faxed to [facility] at (X) [phone].     DANITA Obrien, [facility]   Inpatient Care Coordination  [facility]   [phone]

## 2023-03-29 PROCEDURE — 250N000013 HC RX MED GY IP 250 OP 250 PS 637: Performed by: HOSPITALIST

## 2023-03-29 PROCEDURE — 250N000013 HC RX MED GY IP 250 OP 250 PS 637: Performed by: NURSE PRACTITIONER

## 2023-03-29 PROCEDURE — 250N000013 HC RX MED GY IP 250 OP 250 PS 637: Performed by: PHYSICIAN ASSISTANT

## 2023-03-29 PROCEDURE — G0378 HOSPITAL OBSERVATION PER HR: HCPCS

## 2023-03-29 RX ADMIN — QUETIAPINE 200 MG: 200 TABLET, FILM COATED ORAL at 08:49

## 2023-03-29 RX ADMIN — BACLOFEN 10 MG: 10 TABLET ORAL at 08:50

## 2023-03-29 RX ADMIN — BACLOFEN 10 MG: 10 TABLET ORAL at 14:49

## 2023-03-29 RX ADMIN — METFORMIN HYDROCHLORIDE 850 MG: 850 TABLET, FILM COATED ORAL at 08:50

## 2023-03-29 RX ADMIN — CLOTRIMAZOLE: 0.01 CREAM TOPICAL at 21:17

## 2023-03-29 RX ADMIN — MIRTAZAPINE 15 MG: 15 TABLET, FILM COATED ORAL at 21:16

## 2023-03-29 RX ADMIN — DIVALPROEX SODIUM 1000 MG: 500 TABLET, DELAYED RELEASE ORAL at 21:14

## 2023-03-29 RX ADMIN — CLONIDINE HYDROCHLORIDE 0.1 MG: 0.1 TABLET ORAL at 08:49

## 2023-03-29 RX ADMIN — HYDROXYZINE HYDROCHLORIDE 50 MG: 50 TABLET, FILM COATED ORAL at 14:48

## 2023-03-29 RX ADMIN — OLANZAPINE 2.5 MG: 2.5 TABLET, FILM COATED ORAL at 14:49

## 2023-03-29 RX ADMIN — DESONIDE: 0.5 CREAM TOPICAL at 14:51

## 2023-03-29 RX ADMIN — CARBOXYMETHYLCELLULOSE SODIUM 1 DROP: 0.5 SOLUTION/ DROPS OPHTHALMIC at 14:48

## 2023-03-29 RX ADMIN — EMPAGLIFLOZIN 10 MG: 10 TABLET, FILM COATED ORAL at 08:49

## 2023-03-29 RX ADMIN — CLOTRIMAZOLE: 0.01 CREAM TOPICAL at 08:53

## 2023-03-29 RX ADMIN — HYDROXYZINE HYDROCHLORIDE 50 MG: 50 TABLET, FILM COATED ORAL at 21:16

## 2023-03-29 RX ADMIN — VENLAFAXINE HYDROCHLORIDE 150 MG: 150 CAPSULE, EXTENDED RELEASE ORAL at 21:16

## 2023-03-29 RX ADMIN — DIVALPROEX SODIUM 750 MG: 500 TABLET, DELAYED RELEASE ORAL at 08:49

## 2023-03-29 RX ADMIN — HYDROXYZINE HYDROCHLORIDE 50 MG: 50 TABLET, FILM COATED ORAL at 08:49

## 2023-03-29 RX ADMIN — CLONIDINE HYDROCHLORIDE 0.1 MG: 0.1 TABLET ORAL at 14:49

## 2023-03-29 RX ADMIN — Medication 10 MG: at 21:15

## 2023-03-29 RX ADMIN — QUETIAPINE 200 MG: 200 TABLET, FILM COATED ORAL at 14:49

## 2023-03-29 RX ADMIN — QUETIAPINE FUMARATE 400 MG: 200 TABLET ORAL at 21:15

## 2023-03-29 RX ADMIN — LEVOTHYROXINE SODIUM 25 MCG: 0.03 TABLET ORAL at 08:49

## 2023-03-29 RX ADMIN — CARBOXYMETHYLCELLULOSE SODIUM 1 DROP: 0.5 SOLUTION/ DROPS OPHTHALMIC at 21:16

## 2023-03-29 RX ADMIN — POLYETHYLENE GLYCOL 3350 17 G: 17 POWDER, FOR SOLUTION ORAL at 08:52

## 2023-03-29 RX ADMIN — HYDROCORTISONE: 1 CREAM TOPICAL at 21:18

## 2023-03-29 RX ADMIN — CARBOXYMETHYLCELLULOSE SODIUM 1 DROP: 0.5 SOLUTION/ DROPS OPHTHALMIC at 08:49

## 2023-03-29 RX ADMIN — CLONIDINE HYDROCHLORIDE 0.1 MG: 0.1 TABLET ORAL at 21:16

## 2023-03-29 RX ADMIN — KETOCONAZOLE: 20 CREAM TOPICAL at 21:17

## 2023-03-29 RX ADMIN — CARBOXYMETHYLCELLULOSE SODIUM 1 DROP: 0.5 SOLUTION/ DROPS OPHTHALMIC at 18:19

## 2023-03-29 RX ADMIN — HYDROCORTISONE: 1 CREAM TOPICAL at 08:53

## 2023-03-29 RX ADMIN — ASPIRIN 81 MG CHEWABLE TABLET 81 MG: 81 TABLET CHEWABLE at 08:49

## 2023-03-29 RX ADMIN — Medication 25 MCG: at 08:50

## 2023-03-29 RX ADMIN — ATORVASTATIN CALCIUM 20 MG: 20 TABLET, FILM COATED ORAL at 21:16

## 2023-03-29 RX ADMIN — METOPROLOL SUCCINATE 50 MG: 50 TABLET, EXTENDED RELEASE ORAL at 08:49

## 2023-03-29 RX ADMIN — VENLAFAXINE HYDROCHLORIDE 75 MG: 150 CAPSULE, EXTENDED RELEASE ORAL at 21:17

## 2023-03-29 RX ADMIN — BACLOFEN 10 MG: 10 TABLET ORAL at 21:15

## 2023-03-29 RX ADMIN — KETOCONAZOLE: 20 CREAM TOPICAL at 08:53

## 2023-03-29 RX ADMIN — METFORMIN HYDROCHLORIDE 850 MG: 850 TABLET, FILM COATED ORAL at 18:19

## 2023-03-29 ASSESSMENT — ACTIVITIES OF DAILY LIVING (ADL)
ADLS_ACUITY_SCORE: 54

## 2023-03-29 NOTE — PLAN OF CARE
PRIMARY DIAGNOSIS: Placement  OUTPATIENT/OBSERVATION GOALS TO BE MET BEFORE DISCHARGE:  1. Pain Status: Pain free.    2. Return to near baseline physical activity: Yes; total cares Ax2/Lift    3. Cleared for discharge by consultants (if involved): Yes    Discharge Planner Nurse   Safe discharge environment identified: Yes  Barriers to discharge: Yes       Entered by: Shasha Sutton RN 03/29/2023    Pt A&Ox4, VSS on RA. Denies CP/SOB/Pain. Alert and calm, sitting up in bed for breakfast. Ax1 with hygiene care, pt turns well in bed. Call light within reach, able to make needs known.       Please review provider order for any additional goals. Nurse to notify provider when observation goals have been met and patient is ready for discharge.

## 2023-03-29 NOTE — PLAN OF CARE
PRIMARY DIAGNOSIS: Placement   OUTPATIENT/OBSERVATION GOALS TO BE MET BEFORE DISCHARGE:  1. ADLs back to baseline: Yes    2. Activity and level of assistance: Up with maximum assistance. Consider SW and/or PT evaluation.     3. Pain status: Pain free.    4. Return to near baseline physical activity: Yes     Discharge Planner Nurse   Safe discharge environment identified: No  Barriers to discharge: Yes       Entered by: Naomy Buckley RN 03/29/2023 4:53 AM      Pt is resting in bed with eyes closed. Respirations regular and unlabored. Assist x1 with cares. Able to turn side to side independently. Denies pain. Call light within reach and bed in low position.  Please review provider order for any additional goals.   Nurse to notify provider when observation goals have been met and patient is ready for discharge.

## 2023-03-29 NOTE — PLAN OF CARE
PRIMARY DIAGNOSIS: Placement   OUTPATIENT/OBSERVATION GOALS TO BE MET BEFORE DISCHARGE:  1. ADLs back to baseline: Yes    2. Activity and level of assistance: Up with maximum assistance. Consider SW and/or PT evaluation.     3. Pain status: Pain free.    4. Return to near baseline physical activity: Yes     Discharge Planner Nurse   Safe discharge environment identified: No  Barriers to discharge: Yes       Entered by: Naomy Buckley RN 03/28/2023 9:33 PM      Pt is alert and awake in bed. Assist x1 with cares. Able to turn side to side independently. Denies pain. Call light within reach and bed in low position.  Please review provider order for any additional goals.   Nurse to notify provider when observation goals have been met and patient is ready for discharge.
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
## 2023-03-29 NOTE — PLAN OF CARE
PRIMARY DIAGNOSIS: Placement  OUTPATIENT/OBSERVATION GOALS TO BE MET BEFORE DISCHARGE:  1. Pain Status: Pain free.    2. Return to near baseline physical activity: Yes; total cares Ax2/Lift    3. Cleared for discharge by consultants (if involved): Yes, medically ready to discharge    Discharge Planner Nurse   Safe discharge environment identified: Yes  Barriers to discharge: Yes       Entered by: Shasha Sutton RN 03/29/2023    Pt A&Ox4. Denies Pain. Alert and calm.  Ax1 with hygiene care, pt turns well in bed. Tolerating regular diet. Incontinent of urine, no BM yet. Call light within reach, able to make needs known.       Please review provider order for any additional goals. Nurse to notify provider when observation goals have been met and patient is ready for discharge.

## 2023-03-29 NOTE — PLAN OF CARE
PRIMARY DIAGNOSIS: Placement   OUTPATIENT/OBSERVATION GOALS TO BE MET BEFORE DISCHARGE:  1. ADLs back to baseline: Yes    2. Activity and level of assistance: Up with maximum assistance. Consider SW and/or PT evaluation.     3. Pain status: Pain free.    4. Return to near baseline physical activity: Yes     Discharge Planner Nurse   Safe discharge environment identified: No  Barriers to discharge: Yes       Entered by: Naomy Buckley RN 03/29/2023 12:22 AM      Pt is alert and awake in bed. Assist x1 with cares. Able to turn side to side independently. Denies pain. Call light within reach and bed in low position.  Please review provider order for any additional goals.   Nurse to notify provider when observation goals have been met and patient is ready for discharge.

## 2023-03-30 PROCEDURE — 250N000013 HC RX MED GY IP 250 OP 250 PS 637: Performed by: PHYSICIAN ASSISTANT

## 2023-03-30 PROCEDURE — 250N000013 HC RX MED GY IP 250 OP 250 PS 637: Performed by: HOSPITALIST

## 2023-03-30 PROCEDURE — G0378 HOSPITAL OBSERVATION PER HR: HCPCS

## 2023-03-30 PROCEDURE — 250N000013 HC RX MED GY IP 250 OP 250 PS 637: Performed by: NURSE PRACTITIONER

## 2023-03-30 RX ADMIN — QUETIAPINE 200 MG: 200 TABLET, FILM COATED ORAL at 08:44

## 2023-03-30 RX ADMIN — CLONIDINE HYDROCHLORIDE 0.1 MG: 0.1 TABLET ORAL at 08:44

## 2023-03-30 RX ADMIN — HYDROCORTISONE: 1 CREAM TOPICAL at 08:49

## 2023-03-30 RX ADMIN — DIVALPROEX SODIUM 750 MG: 500 TABLET, DELAYED RELEASE ORAL at 08:43

## 2023-03-30 RX ADMIN — CARBOXYMETHYLCELLULOSE SODIUM 1 DROP: 0.5 SOLUTION/ DROPS OPHTHALMIC at 08:44

## 2023-03-30 RX ADMIN — METFORMIN HYDROCHLORIDE 850 MG: 850 TABLET, FILM COATED ORAL at 17:53

## 2023-03-30 RX ADMIN — VENLAFAXINE HYDROCHLORIDE 150 MG: 150 CAPSULE, EXTENDED RELEASE ORAL at 21:17

## 2023-03-30 RX ADMIN — CLONIDINE HYDROCHLORIDE 0.1 MG: 0.1 TABLET ORAL at 20:07

## 2023-03-30 RX ADMIN — METFORMIN HYDROCHLORIDE 850 MG: 850 TABLET, FILM COATED ORAL at 08:44

## 2023-03-30 RX ADMIN — DIVALPROEX SODIUM 1000 MG: 500 TABLET, DELAYED RELEASE ORAL at 21:15

## 2023-03-30 RX ADMIN — BACLOFEN 10 MG: 10 TABLET ORAL at 13:16

## 2023-03-30 RX ADMIN — KETOCONAZOLE: 20 CREAM TOPICAL at 20:10

## 2023-03-30 RX ADMIN — CARBOXYMETHYLCELLULOSE SODIUM 1 DROP: 0.5 SOLUTION/ DROPS OPHTHALMIC at 20:08

## 2023-03-30 RX ADMIN — CARBOXYMETHYLCELLULOSE SODIUM 1 DROP: 0.5 SOLUTION/ DROPS OPHTHALMIC at 17:53

## 2023-03-30 RX ADMIN — HYDROXYZINE HYDROCHLORIDE 50 MG: 50 TABLET, FILM COATED ORAL at 13:16

## 2023-03-30 RX ADMIN — BACLOFEN 10 MG: 10 TABLET ORAL at 08:44

## 2023-03-30 RX ADMIN — CARBOXYMETHYLCELLULOSE SODIUM 1 DROP: 0.5 SOLUTION/ DROPS OPHTHALMIC at 11:24

## 2023-03-30 RX ADMIN — POLYETHYLENE GLYCOL 3350 17 G: 17 POWDER, FOR SOLUTION ORAL at 08:45

## 2023-03-30 RX ADMIN — CLOTRIMAZOLE: 0.01 CREAM TOPICAL at 20:10

## 2023-03-30 RX ADMIN — HYDROCORTISONE: 1 CREAM TOPICAL at 20:10

## 2023-03-30 RX ADMIN — CLONIDINE HYDROCHLORIDE 0.1 MG: 0.1 TABLET ORAL at 13:16

## 2023-03-30 RX ADMIN — MIRTAZAPINE 15 MG: 15 TABLET, FILM COATED ORAL at 21:15

## 2023-03-30 RX ADMIN — HYDROXYZINE HYDROCHLORIDE 50 MG: 50 TABLET, FILM COATED ORAL at 08:44

## 2023-03-30 RX ADMIN — EMPAGLIFLOZIN 10 MG: 10 TABLET, FILM COATED ORAL at 08:44

## 2023-03-30 RX ADMIN — VENLAFAXINE HYDROCHLORIDE 75 MG: 150 CAPSULE, EXTENDED RELEASE ORAL at 21:15

## 2023-03-30 RX ADMIN — KETOCONAZOLE: 20 CREAM TOPICAL at 08:49

## 2023-03-30 RX ADMIN — CLOTRIMAZOLE: 0.01 CREAM TOPICAL at 08:49

## 2023-03-30 RX ADMIN — BACLOFEN 10 MG: 10 TABLET ORAL at 20:08

## 2023-03-30 RX ADMIN — ATORVASTATIN CALCIUM 20 MG: 20 TABLET, FILM COATED ORAL at 20:08

## 2023-03-30 RX ADMIN — Medication 10 MG: at 21:16

## 2023-03-30 RX ADMIN — ASPIRIN 81 MG CHEWABLE TABLET 81 MG: 81 TABLET CHEWABLE at 08:44

## 2023-03-30 RX ADMIN — Medication 25 MCG: at 08:44

## 2023-03-30 RX ADMIN — LEVOTHYROXINE SODIUM 25 MCG: 0.03 TABLET ORAL at 08:44

## 2023-03-30 RX ADMIN — HYDROXYZINE HYDROCHLORIDE 50 MG: 50 TABLET, FILM COATED ORAL at 20:07

## 2023-03-30 RX ADMIN — QUETIAPINE FUMARATE 400 MG: 200 TABLET ORAL at 21:15

## 2023-03-30 RX ADMIN — METOPROLOL SUCCINATE 50 MG: 50 TABLET, EXTENDED RELEASE ORAL at 08:45

## 2023-03-30 RX ADMIN — OLANZAPINE 2.5 MG: 2.5 TABLET, FILM COATED ORAL at 13:16

## 2023-03-30 RX ADMIN — QUETIAPINE 200 MG: 200 TABLET, FILM COATED ORAL at 13:16

## 2023-03-30 ASSESSMENT — ACTIVITIES OF DAILY LIVING (ADL)
ADLS_ACUITY_SCORE: 54

## 2023-03-30 NOTE — PLAN OF CARE
PRIMARY DIAGNOSIS: Placement   OUTPATIENT/OBSERVATION GOALS TO BE MET BEFORE DISCHARGE:  1. ADLs back to baseline: Yes    2. Activity and level of assistance: Up with maximum assistance. Consider SW and/or PT evaluation.     3. Pain status: Pain free.    4. Return to near baseline physical activity: Yes     Discharge Planner Nurse   Safe discharge environment identified: No  Barriers to discharge: Yes       Entered by: Naomy Buckley RN 03/30/2023 3:01 AM    Blood pressure (!) 126/94, pulse 82, temperature 97.9  F (36.6  C), temperature source Oral, resp. rate 19, weight 99.9 kg (220 lb 4.8 oz), SpO2 95 %.    Pt pleasantly interacted with staff. Able to make his needs known and is call light appropriate. Assist x1 with cares. Able to turn side to side independently. Denies pain. Call light within reach and bed in low position.  Please review provider order for any additional goals.   Nurse to notify provider when observation goals have been met and patient is ready for discharge.

## 2023-03-30 NOTE — PLAN OF CARE
PRIMARY DIAGNOSIS: Placement   OUTPATIENT/OBSERVATION GOALS TO BE MET BEFORE DISCHARGE:  1. ADLs back to baseline: Yes    2. Activity and level of assistance: Up with maximum assistance. Consider SW and/or PT evaluation.     3. Pain status: Pain free.    4. Return to near baseline physical activity: Yes     Discharge Planner Nurse   Safe discharge environment identified: No  Barriers to discharge: Yes       Entered by: Naomy Buckley RN 03/29/2023 10:09 PM    Blood pressure (!) 126/94, pulse 82, temperature 97.9  F (36.6  C), temperature source Oral, resp. rate 19, weight 99.9 kg (220 lb 4.8 oz), SpO2 95 %.    Pt is alert and awake in bed. Assist x1 with cares. Able to turn side to side independently. Denies pain. Call light within reach and bed in low position.  Please review provider order for any additional goals.   Nurse to notify provider when observation goals have been met and patient is ready for discharge.

## 2023-03-30 NOTE — PLAN OF CARE
PRIMARY DIAGNOSIS: PLACEMENT   OUTPATIENT/OBSERVATION GOALS TO BE MET BEFORE DISCHARGE:  1. ADLs back to baseline: Yes    2. Activity and level of assistance:  Lift     3. Pain status: Pain free.    4. Return to near baseline physical activity: Yes     Discharge Planner Nurse   Safe discharge environment identified: Yes  Barriers to discharge: Yes       Entered by: Chantel Spencer RN 03/30/2023   Patient is A&O x3. Denies pain. Tolerating regular diet. Incontinent of bladder and bowel. SW is following for placement.     /80   Pulse 80   Temp 98.1  F (36.7  C) (Oral)   Resp 19   Wt 99.9 kg (220 lb 4.8 oz)   SpO2 93%     Please review provider order for any additional goals.   Nurse to notify provider when observation goals have been met and patient is ready for discharge.Goal Outcome Evaluation:

## 2023-03-30 NOTE — PLAN OF CARE
PRIMARY DIAGNOSIS: Placement   OUTPATIENT/OBSERVATION GOALS TO BE MET BEFORE DISCHARGE:  1. ADLs back to baseline: Yes    2. Activity and level of assistance: Up with maximum assistance. Consider SW and/or PT evaluation.     3. Pain status: Pain free.    4. Return to near baseline physical activity: Yes     Discharge Planner Nurse   Safe discharge environment identified: No  Barriers to discharge: Yes       Entered by: Naomy Buckley RN 03/30/2023 6:12 AM    Blood pressure (!) 126/94, pulse 82, temperature 97.9  F (36.6  C), temperature source Oral, resp. rate 19, weight 99.9 kg (220 lb 4.8 oz), SpO2 95 %.     Assist x1 with cares. Able to turn side to side independently. Denies pain. Call light within reach and bed in low position.  Please review provider order for any additional goals.   Nurse to notify provider when observation goals have been met and patient is ready for discharge.

## 2023-03-30 NOTE — PROGRESS NOTES
The patient requires the use of a Trisha Lift for all transfers (between bed & chair, wheelchair, or commode). Without the lift, the patient would be bed confined.    ICD10 G82.2 diagnosis of paraplegia     SIMEON Pugh PA-C on 3/30/2023 at 3:47 PM
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
## 2023-03-30 NOTE — PLAN OF CARE
PRIMARY DIAGNOSIS: Placement  OUTPATIENT/OBSERVATION GOALS TO BE MET BEFORE DISCHARGE:  1. Pain Status: Pain free.    2. Return to near baseline physical activity: Yes; total cares Ax2/Lift    3. Cleared for discharge by consultants (if involved): Yes, medically ready to discharge    Discharge Planner Nurse   Safe discharge environment identified: Yes  Barriers to discharge: Yes       Entered by: Shasha Sutton RN 03/29/2023    Pt A&Ox4. Denies Pain. Alert and calm.  Ax1 with hygiene care, pt turns well in bed. Tolerating regular diet. Incontinent of B&B. Call light within reach, able to make needs known.       Please review provider order for any additional goals. Nurse to notify provider when observation goals have been met and patient is ready for discharge.

## 2023-03-30 NOTE — PLAN OF CARE
PRIMARY DIAGNOSIS: PLACEMENT   OUTPATIENT/OBSERVATION GOALS TO BE MET BEFORE DISCHARGE:  1. ADLs back to baseline: Yes    2. Activity and level of assistance:  Lift     3. Pain status: Pain free.    4. Return to near baseline physical activity: Yes     Discharge Planner Nurse   Safe discharge environment identified: Yes  Barriers to discharge: Yes       Entered by: Chantel Spencer RN 03/30/2023       BP (!) 121/90 (BP Location: Left arm)   Pulse 80   Temp 98.1  F (36.7  C) (Oral)   Resp 19   Wt 99.9 kg (220 lb 4.8 oz)   SpO2 93%     Please review provider order for any additional goals.   Nurse to notify provider when observation goals have been met and patient is ready for discharge.Goal Outcome Evaluation:

## 2023-03-30 NOTE — PROGRESS NOTES
"Care Management Follow Up    Expected Discharge Date: 04/05/2023     Concerns to be Addressed: Discharge planning     Patient plan of care discussed at interdisciplinary rounds: Yes    Anticipated Discharge Disposition: [facility]     Referrals Placed by ROSIO/ANALY: DME, Skilled Nursing Facility   Private pay costs discussed: Not applicable    Additional Information:  ANALY received fax from [employer] stating that the prescription must be dated on or after the date of the chart notes for the electric meggan lift. ANALY faxed updated electric meggan lift order and progress note for medical necessity dated 3/30 to [employer] [phone].     Hospital bed being processed through [facility]. Relocation worker working with PCP to obtain incontinence supply order.     ANALY received email update from relocation worker  3/29: \"I am waiting for the revised rate sheet from [facility] after sending them feedback. Shanta from [facility] needs to complete an eligibility update with Chris this week for his waiver to be able to open upon discharge so I would say it would be next week that he would discharge depending when she can meet with him and how long the rate and approval take.\"    ANALY then received email from ROSIO Ruff with [facility]: \"I was told by our financial worker unit that his case is being managed by [facility]. So they are the ones that need to approve the MA-long term care for him. So whoever did his [employer] is going to want to make sure they update [facility]. Once he s approved for MA-long term care by [facility] then then we here at [facility] would be the ones to put him on the waiver since [place] is [facility] of financial responsibility) for him. This all gets so messy and confusing when there is more than one county involved because he s living outside [facility]. There is  a process for this and it may not happen as quickly as everyone would like but fingers crossed that it goes smoothly and we can " "get him moved very soon.\"     Plan: Discharge to group home next week pending county/rate sheet approval and DME being delivered.     SW will continue to follow.     DANITA Obrien, [facility]   Inpatient Care Coordination  [facility]   [phone]        "

## 2023-03-30 NOTE — PLAN OF CARE
PRIMARY DIAGNOSIS: PLACEMENT   OUTPATIENT/OBSERVATION GOALS TO BE MET BEFORE DISCHARGE:  1. ADLs back to baseline: Yes    2. Activity and level of assistance:  Lift     3. Pain status: Pain free.    4. Return to near baseline physical activity: Yes     Discharge Planner Nurse   Safe discharge environment identified: Yes  Barriers to discharge: Yes       Entered by: Chantel Spencer RN 03/30/2023   Patient is A&O x3. Denies pain. Tolerating regular diet. Incontinent of bladder and bowel. SW is following for placement.     BP (!) 121/90 (BP Location: Left arm)   Pulse 80   Temp 98.1  F (36.7  C) (Oral)   Resp 19   Wt 99.9 kg (220 lb 4.8 oz)   SpO2 93%     Please review provider order for any additional goals.   Nurse to notify provider when observation goals have been met and patient is ready for discharge.Goal Outcome Evaluation:

## 2023-03-31 PROCEDURE — G0378 HOSPITAL OBSERVATION PER HR: HCPCS

## 2023-03-31 PROCEDURE — 250N000013 HC RX MED GY IP 250 OP 250 PS 637: Performed by: PHYSICIAN ASSISTANT

## 2023-03-31 PROCEDURE — 99207 PR NO CHARGE LOS: CPT | Performed by: NURSE PRACTITIONER

## 2023-03-31 PROCEDURE — 250N000013 HC RX MED GY IP 250 OP 250 PS 637: Performed by: HOSPITALIST

## 2023-03-31 PROCEDURE — 250N000013 HC RX MED GY IP 250 OP 250 PS 637: Performed by: NURSE PRACTITIONER

## 2023-03-31 RX ADMIN — MIRTAZAPINE 15 MG: 15 TABLET, FILM COATED ORAL at 22:03

## 2023-03-31 RX ADMIN — QUETIAPINE FUMARATE 400 MG: 200 TABLET ORAL at 22:03

## 2023-03-31 RX ADMIN — LEVOTHYROXINE SODIUM 25 MCG: 0.03 TABLET ORAL at 09:01

## 2023-03-31 RX ADMIN — VENLAFAXINE HYDROCHLORIDE 150 MG: 150 CAPSULE, EXTENDED RELEASE ORAL at 22:03

## 2023-03-31 RX ADMIN — HYDROCORTISONE: 1 CREAM TOPICAL at 21:29

## 2023-03-31 RX ADMIN — ATORVASTATIN CALCIUM 20 MG: 20 TABLET, FILM COATED ORAL at 21:25

## 2023-03-31 RX ADMIN — QUETIAPINE 200 MG: 200 TABLET, FILM COATED ORAL at 09:02

## 2023-03-31 RX ADMIN — Medication 10 MG: at 22:03

## 2023-03-31 RX ADMIN — BACLOFEN 10 MG: 10 TABLET ORAL at 21:25

## 2023-03-31 RX ADMIN — DIVALPROEX SODIUM 750 MG: 500 TABLET, DELAYED RELEASE ORAL at 09:01

## 2023-03-31 RX ADMIN — CLONIDINE HYDROCHLORIDE 0.1 MG: 0.1 TABLET ORAL at 09:01

## 2023-03-31 RX ADMIN — KETOCONAZOLE: 20 CREAM TOPICAL at 21:29

## 2023-03-31 RX ADMIN — HYDROXYZINE HYDROCHLORIDE 50 MG: 50 TABLET, FILM COATED ORAL at 14:52

## 2023-03-31 RX ADMIN — METFORMIN HYDROCHLORIDE 850 MG: 850 TABLET, FILM COATED ORAL at 17:49

## 2023-03-31 RX ADMIN — METFORMIN HYDROCHLORIDE 850 MG: 850 TABLET, FILM COATED ORAL at 09:01

## 2023-03-31 RX ADMIN — ASPIRIN 81 MG CHEWABLE TABLET 81 MG: 81 TABLET CHEWABLE at 09:01

## 2023-03-31 RX ADMIN — CARBOXYMETHYLCELLULOSE SODIUM 1 DROP: 0.5 SOLUTION/ DROPS OPHTHALMIC at 17:48

## 2023-03-31 RX ADMIN — Medication 25 MCG: at 09:02

## 2023-03-31 RX ADMIN — HYDROXYZINE HYDROCHLORIDE 50 MG: 50 TABLET, FILM COATED ORAL at 09:01

## 2023-03-31 RX ADMIN — EMPAGLIFLOZIN 10 MG: 10 TABLET, FILM COATED ORAL at 09:02

## 2023-03-31 RX ADMIN — CARBOXYMETHYLCELLULOSE SODIUM 1 DROP: 0.5 SOLUTION/ DROPS OPHTHALMIC at 21:25

## 2023-03-31 RX ADMIN — METOPROLOL SUCCINATE 50 MG: 50 TABLET, EXTENDED RELEASE ORAL at 09:01

## 2023-03-31 RX ADMIN — CARBOXYMETHYLCELLULOSE SODIUM 1 DROP: 0.5 SOLUTION/ DROPS OPHTHALMIC at 09:13

## 2023-03-31 RX ADMIN — OLANZAPINE 2.5 MG: 2.5 TABLET, FILM COATED ORAL at 14:52

## 2023-03-31 RX ADMIN — QUETIAPINE 200 MG: 200 TABLET, FILM COATED ORAL at 14:52

## 2023-03-31 RX ADMIN — VENLAFAXINE HYDROCHLORIDE 75 MG: 150 CAPSULE, EXTENDED RELEASE ORAL at 22:04

## 2023-03-31 RX ADMIN — BACLOFEN 10 MG: 10 TABLET ORAL at 09:01

## 2023-03-31 RX ADMIN — CLONIDINE HYDROCHLORIDE 0.1 MG: 0.1 TABLET ORAL at 21:25

## 2023-03-31 RX ADMIN — HYDROXYZINE HYDROCHLORIDE 50 MG: 50 TABLET, FILM COATED ORAL at 21:26

## 2023-03-31 RX ADMIN — CLOTRIMAZOLE: 0.01 CREAM TOPICAL at 21:29

## 2023-03-31 RX ADMIN — DIVALPROEX SODIUM 1000 MG: 500 TABLET, DELAYED RELEASE ORAL at 22:03

## 2023-03-31 RX ADMIN — BACLOFEN 10 MG: 10 TABLET ORAL at 14:52

## 2023-03-31 RX ADMIN — CLONIDINE HYDROCHLORIDE 0.1 MG: 0.1 TABLET ORAL at 14:52

## 2023-03-31 ASSESSMENT — ACTIVITIES OF DAILY LIVING (ADL)
ADLS_ACUITY_SCORE: 54

## 2023-03-31 NOTE — PLAN OF CARE
PRIMARY DIAGNOSIS: Behavioral/Awaiting placement.  OUTPATIENT/OBSERVATION GOALS TO BE MET BEFORE DISCHARGE:  1. ADLs back to baseline: Yes    2. Activity and level of assistance: Up with maximum assistance. Consider SW and/or PT evaluation.     3. Pain status: Pain free.    4. Return to near baseline physical activity: Yes     Discharge Planner Nurse   Safe discharge environment identified: Yes  Barriers to discharge: No  Pt is calm. He slept throughout the night. VSS. Continue to monitor as he awaits placement.  Blood pressure 113/80, pulse 80, temperature 98.1  F (36.7  C), temperature source Oral, resp. rate 19, weight 99.9 kg (220 lb 4.8 oz), SpO2 93 %.        Entered by: Chato Aguirre RN 03/31/2023 6:35 AM     Please review provider order for any additional goals.   Nurse to notify provider when observation goals have been met and patient is ready for discharge.Goal Outcome Evaluation:

## 2023-03-31 NOTE — PLAN OF CARE
Care From: 1900 - 2300    PRIMARY DIAGNOSIS: Placement    OBSERVATION GOALS TO BE MET BEFORE DISCHARGE:  1. ADLs back to baseline: Yes     2. Activity and level of assistance: Up with maximum assistance/lift      3. Pain status: Pain free.     4. Return to near baseline physical activity: Yes          Discharge Planner Nurse   Safe discharge environment identified: No  Barriers to discharge: Yes        Patient A & O x4, Lung sounds CTA, bowel sounds active. Denies pain, N/V, SOB or chest pain. Turned and repositioned as needed. Pt is A 2 using lift, incontinent of bladder x 1. Tolerating regular diet and oral meds. No PIV. Medically cleared, SW following. Waiting for placement.    /80   Pulse 80   Temp 98.1  F (36.7  C) (Oral)   Resp 19   Wt 99.9 kg (220 lb 4.8 oz)   SpO2 93%     Please review provider order for any additional goals.   Nurse to notify provider when observation goals have been met and patient is ready for discharge.

## 2023-03-31 NOTE — PROGRESS NOTES
Care Management Follow Up    Expected Discharge Date: 04/07/2023     Concerns to be Addressed: Discharge planning      Patient plan of care discussed at interdisciplinary rounds: Yes    Anticipated Discharge Disposition:  [facility]  ([id number] [facility] [id number]) once funding approved and DME delivered.  Phone: [phone], Fax: [phone]     Anticipated Discharge DME:  Hospital bed, electric meggan lift, incontinence supplies (wipes & XL briefs)     Patient/Family in Agreement with the Plan:  Yes    Additional Information:  Awaiting confirmation/update from relocation worker Mitsy [phone] on [facility] approval/funding to proceed with admission to [facility].     SW placed call to [facility], [phone], to follow up on hospital bed order. SW was informed that the medical necessity from progress note 3/28 was not sufficient as it was electronically signed. They stated that provider has to hand sign and date for it to be valid. Provider updated and signed, and faxed to (Y) [phone].     SW was transferred to [facility] department for incontinence supplies, requested return call to initiate order for wipes and XL briefs, awaiting return call.     SW placed call to [facility], [phone], to follow up on meggan lift order. Waited on hold for 15 minutes and then received a busy dial tone. Unable to reach person in customer service.     SW will continue to follow.     DANITA Obrein, [facility]   Inpatient Care Coordination  [facility]   [phone]

## 2023-03-31 NOTE — PLAN OF CARE
PRIMARY DIAGNOSIS: Behavioral/Awaiting placement.  OUTPATIENT/OBSERVATION GOALS TO BE MET BEFORE DISCHARGE:  1. ADLs back to baseline: Yes    2. Activity and level of assistance: Up with maximum assistance. Consider SW and/or PT evaluation.     3. Pain status: Pain free.    4. Return to near baseline physical activity: Yes     Discharge Planner Nurse   Safe discharge environment identified: Yes  Barriers to discharge: No    Pt is A&Ox4, VSS, LCTA, BS active. He is awaiting his breakfast tray at this time. Pending discharge for 4/05, waiting for [facility] to go through.     Blood pressure 117/80, pulse 80, temperature 98.2  F (36.8  C), temperature source Oral, resp. rate 18, weight 99.9 kg (220 lb 4.8 oz), SpO2 93 %.        Entered by: Waleska Jin RN 03/31/2023 10:02 AM     Please review provider order for any additional goals.   Nurse to notify provider when observation goals have been met and patient is ready for discharge.Goal Outcome Evaluation:

## 2023-03-31 NOTE — PLAN OF CARE
PRIMARY DIAGNOSIS: Behavioral/Awaiting placement.  OUTPATIENT/OBSERVATION GOALS TO BE MET BEFORE DISCHARGE:  1. ADLs back to baseline: Yes    2. Activity and level of assistance: Up with maximum assistance. Consider SW and/or PT evaluation.     3. Pain status: Pain free.    4. Return to near baseline physical activity: Yes     Discharge Planner Nurse   Safe discharge environment identified: Yes  Barriers to discharge: No    Pt is A&Ox4, VSS, LCTA, BS active.Pending discharge for 4/05, waiting for [facility] to go through.     Blood pressure 117/80, pulse 80, temperature 98.2  F (36.8  C), temperature source Oral, resp. rate 18, weight 99.9 kg (220 lb 4.8 oz), SpO2 93 %.        Entered by: Waleska Jin RN 03/31/2023 2:58 PM     Please review provider order for any additional goals.   Nurse to notify provider when observation goals have been met and patient is ready for discharge.Goal Outcome Evaluation:

## 2023-03-31 NOTE — PLAN OF CARE
PRIMARY DIAGNOSIS: Placement    OBSERVATION GOALS TO BE MET BEFORE DISCHARGE:  1. ADLs back to baseline: Yes     2. Activity and level of assistance: Up with maximum assistance/lift      3. Pain status: Pain free.     4. Return to near baseline physical activity: Yes          Discharge Planner Nurse   Safe discharge environment identified: No  Barriers to discharge: Yes        Patient A & O x4, Lung sounds CTA, bowel sounds active, LBM 3/31. Denies pain, N/V, SOB or chest pain. Turned and repositioned as needed. Pt is A 2 using lift, incontinent of bladder x 1. Tolerating regular diet and oral meds. No PIV. Medically cleared, pending discharge on 4/5. SW following.    /89 (BP Location: Left arm)   Pulse 79   Temp 98.5  F (36.9  C) (Oral)   Resp 16   Wt 99.9 kg (220 lb 4.8 oz)   SpO2 96%     Please review provider order for any additional goals.   Nurse to notify provider when observation goals have been met and patient is ready for discharge.
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
## 2023-04-01 PROCEDURE — 99207 PR NO CHARGE LOS: CPT | Performed by: NURSE PRACTITIONER

## 2023-04-01 PROCEDURE — G0378 HOSPITAL OBSERVATION PER HR: HCPCS

## 2023-04-01 PROCEDURE — 250N000013 HC RX MED GY IP 250 OP 250 PS 637: Performed by: HOSPITALIST

## 2023-04-01 PROCEDURE — 250N000013 HC RX MED GY IP 250 OP 250 PS 637: Performed by: PHYSICIAN ASSISTANT

## 2023-04-01 PROCEDURE — 250N000013 HC RX MED GY IP 250 OP 250 PS 637: Performed by: NURSE PRACTITIONER

## 2023-04-01 RX ADMIN — CLONIDINE HYDROCHLORIDE 0.1 MG: 0.1 TABLET ORAL at 13:57

## 2023-04-01 RX ADMIN — BACLOFEN 10 MG: 10 TABLET ORAL at 08:21

## 2023-04-01 RX ADMIN — POLYETHYLENE GLYCOL 3350 17 G: 17 POWDER, FOR SOLUTION ORAL at 08:20

## 2023-04-01 RX ADMIN — VENLAFAXINE HYDROCHLORIDE 75 MG: 150 CAPSULE, EXTENDED RELEASE ORAL at 22:14

## 2023-04-01 RX ADMIN — CARBOXYMETHYLCELLULOSE SODIUM 1 DROP: 0.5 SOLUTION/ DROPS OPHTHALMIC at 21:13

## 2023-04-01 RX ADMIN — METOPROLOL SUCCINATE 50 MG: 50 TABLET, EXTENDED RELEASE ORAL at 08:21

## 2023-04-01 RX ADMIN — KETOCONAZOLE: 20 CREAM TOPICAL at 21:15

## 2023-04-01 RX ADMIN — LEVOTHYROXINE SODIUM 25 MCG: 0.03 TABLET ORAL at 08:21

## 2023-04-01 RX ADMIN — QUETIAPINE FUMARATE 400 MG: 200 TABLET ORAL at 22:14

## 2023-04-01 RX ADMIN — CARBOXYMETHYLCELLULOSE SODIUM 1 DROP: 0.5 SOLUTION/ DROPS OPHTHALMIC at 13:56

## 2023-04-01 RX ADMIN — Medication 10 MG: at 22:15

## 2023-04-01 RX ADMIN — ATORVASTATIN CALCIUM 20 MG: 20 TABLET, FILM COATED ORAL at 21:13

## 2023-04-01 RX ADMIN — BACLOFEN 10 MG: 10 TABLET ORAL at 13:57

## 2023-04-01 RX ADMIN — QUETIAPINE 200 MG: 200 TABLET, FILM COATED ORAL at 13:57

## 2023-04-01 RX ADMIN — HYDROXYZINE HYDROCHLORIDE 50 MG: 50 TABLET, FILM COATED ORAL at 21:14

## 2023-04-01 RX ADMIN — MIRTAZAPINE 15 MG: 15 TABLET, FILM COATED ORAL at 22:13

## 2023-04-01 RX ADMIN — CLONIDINE HYDROCHLORIDE 0.1 MG: 0.1 TABLET ORAL at 21:13

## 2023-04-01 RX ADMIN — METFORMIN HYDROCHLORIDE 850 MG: 850 TABLET, FILM COATED ORAL at 17:27

## 2023-04-01 RX ADMIN — OLANZAPINE 2.5 MG: 2.5 TABLET, FILM COATED ORAL at 13:57

## 2023-04-01 RX ADMIN — QUETIAPINE 200 MG: 200 TABLET, FILM COATED ORAL at 08:22

## 2023-04-01 RX ADMIN — DIVALPROEX SODIUM 750 MG: 500 TABLET, DELAYED RELEASE ORAL at 08:21

## 2023-04-01 RX ADMIN — HYDROXYZINE HYDROCHLORIDE 50 MG: 50 TABLET, FILM COATED ORAL at 08:21

## 2023-04-01 RX ADMIN — Medication 25 MCG: at 08:21

## 2023-04-01 RX ADMIN — CARBOXYMETHYLCELLULOSE SODIUM 1 DROP: 0.5 SOLUTION/ DROPS OPHTHALMIC at 17:27

## 2023-04-01 RX ADMIN — BACLOFEN 10 MG: 10 TABLET ORAL at 21:14

## 2023-04-01 RX ADMIN — HYDROCORTISONE: 1 CREAM TOPICAL at 21:14

## 2023-04-01 RX ADMIN — HYDROXYZINE HYDROCHLORIDE 50 MG: 50 TABLET, FILM COATED ORAL at 13:57

## 2023-04-01 RX ADMIN — VENLAFAXINE HYDROCHLORIDE 150 MG: 150 CAPSULE, EXTENDED RELEASE ORAL at 22:14

## 2023-04-01 RX ADMIN — EMPAGLIFLOZIN 10 MG: 10 TABLET, FILM COATED ORAL at 08:21

## 2023-04-01 RX ADMIN — CLONIDINE HYDROCHLORIDE 0.1 MG: 0.1 TABLET ORAL at 08:21

## 2023-04-01 RX ADMIN — DIVALPROEX SODIUM 1000 MG: 500 TABLET, DELAYED RELEASE ORAL at 22:13

## 2023-04-01 RX ADMIN — CARBOXYMETHYLCELLULOSE SODIUM 1 DROP: 0.5 SOLUTION/ DROPS OPHTHALMIC at 08:20

## 2023-04-01 RX ADMIN — ASPIRIN 81 MG CHEWABLE TABLET 81 MG: 81 TABLET CHEWABLE at 08:21

## 2023-04-01 RX ADMIN — METFORMIN HYDROCHLORIDE 850 MG: 850 TABLET, FILM COATED ORAL at 08:21

## 2023-04-01 RX ADMIN — CLOTRIMAZOLE: 0.01 CREAM TOPICAL at 21:14

## 2023-04-01 ASSESSMENT — ACTIVITIES OF DAILY LIVING (ADL)
ADLS_ACUITY_SCORE: 54
ADLS_ACUITY_SCORE: 56
ADLS_ACUITY_SCORE: 54
ADLS_ACUITY_SCORE: 56
ADLS_ACUITY_SCORE: 54
ADLS_ACUITY_SCORE: 56
ADLS_ACUITY_SCORE: 54
ADLS_ACUITY_SCORE: 56

## 2023-04-01 NOTE — PLAN OF CARE
PRIMARY DIAGNOSIS: Placement  OUTPATIENT/OBSERVATION GOALS TO BE MET BEFORE DISCHARGE:  ADLs back to baseline: Yes    Activity and level of assistance: Up with maximum assistance. Consider SW and/or PT evaluation.     Pain status: Pain free.    Return to near baseline physical activity: Yes     Discharge Planner Nurse   Safe discharge environment identified: Yes  Barriers to discharge: Yes       Entered by: Helene Oreilly RN 04/01/2023   Pt AO x4. LS clear, BS active. Pt is tolerating regular diet. No PIV. Discharge pending [facility] and DME to be delivered. Will continue to provide supportive cares.  Please review provider order for any additional goals.   Nurse to notify provider when observation goals have been met and patient is ready for discharge.

## 2023-04-01 NOTE — PLAN OF CARE
PRIMARY DIAGNOSIS: PLACEMENT  OUTPATIENT/OBSERVATION GOALS TO BE MET BEFORE DISCHARGE:  ADLs back to baseline: Yes  Activity and level of assistance: Up with maximum assistance  Pain status: Pain free  Return to near baseline physical activity: Yes     Discharge Planner Nurse   Safe discharge environment identified: No  Barriers to discharge: Yes       Entered by: Dania Galdamez RN 04/01/2023 9:34 AM     Please review provider order for any additional goals. Nurse to notify provider when observation goals have been met and patient is ready for discharge.    Alert and oriented. Paraplegic. Up with 2, able to change his incontinence pad with 1. Denies pain, numbness, and tingling. No IV site. Will return to a group home when able!
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
## 2023-04-01 NOTE — PLAN OF CARE
PRIMARY DIAGNOSIS: Placement  OUTPATIENT/OBSERVATION GOALS TO BE MET BEFORE DISCHARGE:  1. ADLs back to baseline: Yes    2. Activity and level of assistance: Up with maximum assistance. Consider SW and/or PT evaluation.     3. Pain status: Pain free.    4. Return to near baseline physical activity: Yes     Discharge Planner Nurse   Safe discharge environment identified: Yes  Barriers to discharge: Yes       Entered by: Helene Oreilly RN 04/01/2023 4:04 AM  Pt AO x4. LS clear, BS active. Pt is tolerating regular diet. No PIV. Discharge pending [facility] funding and DME to be delivered. Will continue to provide supportive cares.  Please review provider order for any additional goals.   Nurse to notify provider when observation goals have been met and patient is ready for discharge.

## 2023-04-01 NOTE — PLAN OF CARE
PRIMARY DIAGNOSIS: PLACEMENT  OUTPATIENT/OBSERVATION GOALS TO BE MET BEFORE DISCHARGE:  1. ADLs back to baseline: Yes  2. Activity and level of assistance: Up with 2 assist and a ceiling lift   3. Pain status: Pain free  4. Return to near baseline physical activity: Yes     Discharge Planner Nurse   Safe discharge environment identified: No  Barriers to discharge: Yes       Entered by: Dania Galdamez RN 04/01/2023 2:14 PM     Please review provider order for any additional goals. Nurse to notify provider when observation goals have been met and patient is ready for discharge.    Alert and oriented. Paraplegic. Up with 2, able to change his incontinence pad with 1. Denies pain, numbness, and tingling. No IV site. Will return to a group home when able!

## 2023-04-01 NOTE — PLAN OF CARE
PRIMARY DIAGNOSIS: PLACEMENT  OUTPATIENT/OBSERVATION GOALS TO BE MET BEFORE DISCHARGE:  1. ADLs back to baseline: Yes  2. Activity and level of assistance: Up with 2 assist and a ceiling lift   3. Pain status: Pain free  4. Return to near baseline physical activity: Yes     Discharge Planner Nurse   Safe discharge environment identified: No  Barriers to discharge: Yes       Entered by: Dania Galdamez RN 04/01/2023 4:16 PM     Please review provider order for any additional goals. Nurse to notify provider when observation goals have been met and patient is ready for discharge.    Alert and oriented. Paraplegic. Up with 2, able to change his incontinence pad with 1. Will return to a group home when able!

## 2023-04-02 LAB
ALBUMIN SERPL BCG-MCNC: 3.6 G/DL (ref 3.5–5.2)
ALP SERPL-CCNC: 66 U/L (ref 40–129)
ALT SERPL W P-5'-P-CCNC: 66 U/L (ref 10–50)
ANION GAP SERPL CALCULATED.3IONS-SCNC: 11 MMOL/L (ref 7–15)
AST SERPL W P-5'-P-CCNC: 40 U/L (ref 10–50)
BILIRUB SERPL-MCNC: 0.3 MG/DL
BUN SERPL-MCNC: 17.4 MG/DL (ref 6–20)
CALCIUM SERPL-MCNC: 9 MG/DL (ref 8.6–10)
CHLORIDE SERPL-SCNC: 103 MMOL/L (ref 98–107)
CHOLEST SERPL-MCNC: 118 MG/DL
CREAT SERPL-MCNC: 0.63 MG/DL (ref 0.67–1.17)
DEPRECATED HCO3 PLAS-SCNC: 26 MMOL/L (ref 22–29)
ERYTHROCYTE [DISTWIDTH] IN BLOOD BY AUTOMATED COUNT: 14.4 % (ref 10–15)
GFR SERPL CREATININE-BSD FRML MDRD: >90 ML/MIN/1.73M2
GLUCOSE SERPL-MCNC: 96 MG/DL (ref 70–99)
HBA1C MFR BLD: 5.9 %
HCT VFR BLD AUTO: 49.2 % (ref 40–53)
HDLC SERPL-MCNC: 33 MG/DL
HGB BLD-MCNC: 16 G/DL (ref 13.3–17.7)
LDLC SERPL CALC-MCNC: 68 MG/DL
MCH RBC QN AUTO: 29.7 PG (ref 26.5–33)
MCHC RBC AUTO-ENTMCNC: 32.5 G/DL (ref 31.5–36.5)
MCV RBC AUTO: 91 FL (ref 78–100)
NONHDLC SERPL-MCNC: 85 MG/DL
PLATELET # BLD AUTO: 148 10E3/UL (ref 150–450)
POTASSIUM SERPL-SCNC: 4.1 MMOL/L (ref 3.4–5.3)
PROT SERPL-MCNC: 6.1 G/DL (ref 6.4–8.3)
RBC # BLD AUTO: 5.39 10E6/UL (ref 4.4–5.9)
SODIUM SERPL-SCNC: 140 MMOL/L (ref 136–145)
TRIGL SERPL-MCNC: 87 MG/DL
TSH SERPL DL<=0.005 MIU/L-ACNC: 2.92 UIU/ML (ref 0.3–4.2)
WBC # BLD AUTO: 6.7 10E3/UL (ref 4–11)

## 2023-04-02 PROCEDURE — 85027 COMPLETE CBC AUTOMATED: CPT | Performed by: NURSE PRACTITIONER

## 2023-04-02 PROCEDURE — 250N000013 HC RX MED GY IP 250 OP 250 PS 637: Performed by: HOSPITALIST

## 2023-04-02 PROCEDURE — 99207 PR NO CHARGE LOS: CPT | Performed by: NURSE PRACTITIONER

## 2023-04-02 PROCEDURE — 80061 LIPID PANEL: CPT | Performed by: NURSE PRACTITIONER

## 2023-04-02 PROCEDURE — 84443 ASSAY THYROID STIM HORMONE: CPT | Performed by: NURSE PRACTITIONER

## 2023-04-02 PROCEDURE — 250N000013 HC RX MED GY IP 250 OP 250 PS 637: Performed by: PHYSICIAN ASSISTANT

## 2023-04-02 PROCEDURE — 80053 COMPREHEN METABOLIC PANEL: CPT | Performed by: NURSE PRACTITIONER

## 2023-04-02 PROCEDURE — 36415 COLL VENOUS BLD VENIPUNCTURE: CPT | Performed by: NURSE PRACTITIONER

## 2023-04-02 PROCEDURE — 250N000013 HC RX MED GY IP 250 OP 250 PS 637: Performed by: NURSE PRACTITIONER

## 2023-04-02 PROCEDURE — 83036 HEMOGLOBIN GLYCOSYLATED A1C: CPT | Performed by: NURSE PRACTITIONER

## 2023-04-02 PROCEDURE — G0378 HOSPITAL OBSERVATION PER HR: HCPCS

## 2023-04-02 RX ADMIN — DIVALPROEX SODIUM 750 MG: 500 TABLET, DELAYED RELEASE ORAL at 10:34

## 2023-04-02 RX ADMIN — QUETIAPINE 200 MG: 200 TABLET, FILM COATED ORAL at 14:34

## 2023-04-02 RX ADMIN — CLOTRIMAZOLE: 0.01 CREAM TOPICAL at 21:08

## 2023-04-02 RX ADMIN — QUETIAPINE 200 MG: 200 TABLET, FILM COATED ORAL at 10:33

## 2023-04-02 RX ADMIN — METFORMIN HYDROCHLORIDE 850 MG: 850 TABLET, FILM COATED ORAL at 18:11

## 2023-04-02 RX ADMIN — LEVOTHYROXINE SODIUM 25 MCG: 0.03 TABLET ORAL at 10:32

## 2023-04-02 RX ADMIN — CLOTRIMAZOLE: 0.01 CREAM TOPICAL at 10:47

## 2023-04-02 RX ADMIN — POLYETHYLENE GLYCOL 3350 17 G: 17 POWDER, FOR SOLUTION ORAL at 10:57

## 2023-04-02 RX ADMIN — CLONIDINE HYDROCHLORIDE 0.1 MG: 0.1 TABLET ORAL at 10:53

## 2023-04-02 RX ADMIN — CLONIDINE HYDROCHLORIDE 0.1 MG: 0.1 TABLET ORAL at 14:34

## 2023-04-02 RX ADMIN — CARBOXYMETHYLCELLULOSE SODIUM 1 DROP: 0.5 SOLUTION/ DROPS OPHTHALMIC at 14:34

## 2023-04-02 RX ADMIN — QUETIAPINE FUMARATE 400 MG: 200 TABLET ORAL at 21:04

## 2023-04-02 RX ADMIN — MIRTAZAPINE 15 MG: 15 TABLET, FILM COATED ORAL at 21:04

## 2023-04-02 RX ADMIN — KETOCONAZOLE: 20 CREAM TOPICAL at 21:08

## 2023-04-02 RX ADMIN — ATORVASTATIN CALCIUM 20 MG: 20 TABLET, FILM COATED ORAL at 21:04

## 2023-04-02 RX ADMIN — CARBOXYMETHYLCELLULOSE SODIUM 1 DROP: 0.5 SOLUTION/ DROPS OPHTHALMIC at 21:05

## 2023-04-02 RX ADMIN — KETOCONAZOLE: 20 CREAM TOPICAL at 10:55

## 2023-04-02 RX ADMIN — Medication 25 MCG: at 10:32

## 2023-04-02 RX ADMIN — CARBOXYMETHYLCELLULOSE SODIUM 1 DROP: 0.5 SOLUTION/ DROPS OPHTHALMIC at 10:31

## 2023-04-02 RX ADMIN — METFORMIN HYDROCHLORIDE 850 MG: 850 TABLET, FILM COATED ORAL at 10:33

## 2023-04-02 RX ADMIN — BACLOFEN 10 MG: 10 TABLET ORAL at 14:34

## 2023-04-02 RX ADMIN — HYDROCORTISONE: 1 CREAM TOPICAL at 21:08

## 2023-04-02 RX ADMIN — METOPROLOL SUCCINATE 50 MG: 50 TABLET, EXTENDED RELEASE ORAL at 10:33

## 2023-04-02 RX ADMIN — ASPIRIN 81 MG CHEWABLE TABLET 81 MG: 81 TABLET CHEWABLE at 10:34

## 2023-04-02 RX ADMIN — CLONIDINE HYDROCHLORIDE 0.1 MG: 0.1 TABLET ORAL at 21:04

## 2023-04-02 RX ADMIN — HYDROXYZINE HYDROCHLORIDE 50 MG: 50 TABLET, FILM COATED ORAL at 14:35

## 2023-04-02 RX ADMIN — VENLAFAXINE HYDROCHLORIDE 75 MG: 150 CAPSULE, EXTENDED RELEASE ORAL at 21:08

## 2023-04-02 RX ADMIN — OLANZAPINE 2.5 MG: 2.5 TABLET, FILM COATED ORAL at 14:35

## 2023-04-02 RX ADMIN — Medication 10 MG: at 21:04

## 2023-04-02 RX ADMIN — DIVALPROEX SODIUM 1000 MG: 500 TABLET, DELAYED RELEASE ORAL at 21:04

## 2023-04-02 RX ADMIN — CARBOXYMETHYLCELLULOSE SODIUM 1 DROP: 0.5 SOLUTION/ DROPS OPHTHALMIC at 18:11

## 2023-04-02 RX ADMIN — HYDROCORTISONE: 1 CREAM TOPICAL at 10:48

## 2023-04-02 RX ADMIN — BACLOFEN 10 MG: 10 TABLET ORAL at 10:32

## 2023-04-02 RX ADMIN — VENLAFAXINE HYDROCHLORIDE 150 MG: 150 CAPSULE, EXTENDED RELEASE ORAL at 21:04

## 2023-04-02 RX ADMIN — HYDROXYZINE HYDROCHLORIDE 50 MG: 50 TABLET, FILM COATED ORAL at 21:05

## 2023-04-02 RX ADMIN — EMPAGLIFLOZIN 10 MG: 10 TABLET, FILM COATED ORAL at 10:32

## 2023-04-02 RX ADMIN — BACLOFEN 10 MG: 10 TABLET ORAL at 21:05

## 2023-04-02 RX ADMIN — HYDROXYZINE HYDROCHLORIDE 50 MG: 50 TABLET, FILM COATED ORAL at 10:33

## 2023-04-02 ASSESSMENT — ACTIVITIES OF DAILY LIVING (ADL)
ADLS_ACUITY_SCORE: 56

## 2023-04-02 NOTE — PLAN OF CARE
PRIMARY DIAGNOSIS: Placement  OUTPATIENT/OBSERVATION GOALS TO BE MET BEFORE DISCHARGE:  ADLs back to baseline: Yes    Activity and level of assistance: Up with maximum assistance. Consider SW and/or PT evaluation.     Pain status: Pain free.    Return to near baseline physical activity: Yes     Discharge Planner Nurse   Safe discharge environment identified: Yes  Barriers to discharge: Yes       Entered by: Helene Oreilly RN 04/02/2023   Pt AO x4. LS clear, BS active. Pt is tolerating regular diet. No PIV. Up with A2 with lift device. Discharge pending [facility] and DME to be delivered. Will continue to provide supportive cares.  Please review provider order for any additional goals.   Nurse to notify provider when observation goals have been met and patient is ready for discharge.

## 2023-04-02 NOTE — PLAN OF CARE
"  PRIMARY DIAGNOSIS: \"GENERIC\" NURSING  OUTPATIENT/OBSERVATION GOALS TO BE MET BEFORE DISCHARGE:  1. ADLs back to baseline: Yes    2. Activity and level of assistance: Ax2 /ceiling lift    3. Pain status: Pain free.    4. Return to near baseline physical activity: Yes     Discharge Planner Nurse   Safe discharge environment identified: Yes  Barriers to discharge: Yes          Entered by: Ciara Quiors RN 04/01/2023 9:41 PM     Please review provider order for any additional goals.   Nurse to notify provider when observation goals have been met and patient is ready for discharge.    Pt no concerns raised.  "

## 2023-04-02 NOTE — PLAN OF CARE
PRIMARY DIAGNOSIS: Placement  OUTPATIENT/OBSERVATION GOALS TO BE MET BEFORE DISCHARGE:  1. ADLs back to baseline: Yes    2. Activity and level of assistance: Up with maximum assistance and lift    3. Pain status: Pain free.    4. Return to near baseline physical activity: Yes     Discharge Planner Nurse   Safe discharge environment identified: Yes  Barriers to discharge: No       Entered by: Remington Cheek RN 04/02/2023     Patient Alert and oriented to self and place only. VSS on RA. Patient has no lines nor drains in place.    Urine and fecal incontinent and assist of 2 with lift.      On regular diet.     Awaiting [facility] paperwork and [facility] orders for group home placement.   Please review provider order for any additional goals.   Nurse to notify provider when observation goals have been met and patient is ready for discharge.

## 2023-04-02 NOTE — PLAN OF CARE
PRIMARY DIAGNOSIS: Placement  OUTPATIENT/OBSERVATION GOALS TO BE MET BEFORE DISCHARGE:  1. ADLs back to baseline: Yes    2. Activity and level of assistance: Up with maximum assistance and lift    3. Pain status: Pain free.    4. Return to near baseline physical activity: Yes     Discharge Planner Nurse   Safe discharge environment identified: Yes  Barriers to discharge: No       Entered by: Remington Cheek RN 04/02/2023     Patient resting at this time. Awaiting [facility] paperwork and DME orders for group home placement.   Please review provider order for any additional goals.   Nurse to notify provider when observation goals have been met and patient is ready for discharge.
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
## 2023-04-02 NOTE — PLAN OF CARE
PRIMARY DIAGNOSIS: Placement  OUTPATIENT/OBSERVATION GOALS TO BE MET BEFORE DISCHARGE:  ADLs back to baseline: Yes    Activity and level of assistance: Up with maximum assistance. Consider SW and/or PT evaluation.     Pain status: Pain free.    Return to near baseline physical activity: Yes     Discharge Planner Nurse   Safe discharge environment identified: Yes  Barriers to discharge: Yes       Entered by: Helene Oreilly RN 04/02/2023   Pt AO x4. LS clear, BS active. Pt is tolerating regular diet. No PIV. Up with A2 with lift device. Discharge pending [facility] and DME to be delivered. Will continue to provide supportive cares.  Please review provider order for any additional goals.   Nurse to notify provider when observation goals have been met and patient is ready for discharge.

## 2023-04-03 PROCEDURE — G0378 HOSPITAL OBSERVATION PER HR: HCPCS

## 2023-04-03 PROCEDURE — 250N000013 HC RX MED GY IP 250 OP 250 PS 637: Performed by: PHYSICIAN ASSISTANT

## 2023-04-03 PROCEDURE — 250N000013 HC RX MED GY IP 250 OP 250 PS 637: Performed by: NURSE PRACTITIONER

## 2023-04-03 PROCEDURE — 250N000013 HC RX MED GY IP 250 OP 250 PS 637: Performed by: HOSPITALIST

## 2023-04-03 PROCEDURE — 99231 SBSQ HOSP IP/OBS SF/LOW 25: CPT | Performed by: NURSE PRACTITIONER

## 2023-04-03 RX ADMIN — CLONIDINE HYDROCHLORIDE 0.1 MG: 0.1 TABLET ORAL at 13:17

## 2023-04-03 RX ADMIN — KETOCONAZOLE: 20 CREAM TOPICAL at 21:42

## 2023-04-03 RX ADMIN — BACLOFEN 10 MG: 10 TABLET ORAL at 13:17

## 2023-04-03 RX ADMIN — EMPAGLIFLOZIN 10 MG: 10 TABLET, FILM COATED ORAL at 09:37

## 2023-04-03 RX ADMIN — QUETIAPINE FUMARATE 400 MG: 200 TABLET ORAL at 21:43

## 2023-04-03 RX ADMIN — Medication 25 MCG: at 09:37

## 2023-04-03 RX ADMIN — METFORMIN HYDROCHLORIDE 850 MG: 850 TABLET, FILM COATED ORAL at 09:36

## 2023-04-03 RX ADMIN — QUETIAPINE 200 MG: 200 TABLET, FILM COATED ORAL at 13:16

## 2023-04-03 RX ADMIN — ATORVASTATIN CALCIUM 20 MG: 20 TABLET, FILM COATED ORAL at 19:54

## 2023-04-03 RX ADMIN — METFORMIN HYDROCHLORIDE 850 MG: 850 TABLET, FILM COATED ORAL at 17:57

## 2023-04-03 RX ADMIN — POLYETHYLENE GLYCOL 3350 17 G: 17 POWDER, FOR SOLUTION ORAL at 09:40

## 2023-04-03 RX ADMIN — ASPIRIN 81 MG CHEWABLE TABLET 81 MG: 81 TABLET CHEWABLE at 09:37

## 2023-04-03 RX ADMIN — HYDROXYZINE HYDROCHLORIDE 50 MG: 50 TABLET, FILM COATED ORAL at 09:36

## 2023-04-03 RX ADMIN — HYDROCORTISONE: 1 CREAM TOPICAL at 21:42

## 2023-04-03 RX ADMIN — BACLOFEN 10 MG: 10 TABLET ORAL at 09:37

## 2023-04-03 RX ADMIN — LEVOTHYROXINE SODIUM 25 MCG: 0.03 TABLET ORAL at 09:36

## 2023-04-03 RX ADMIN — METOPROLOL SUCCINATE 50 MG: 50 TABLET, EXTENDED RELEASE ORAL at 09:35

## 2023-04-03 RX ADMIN — CLOTRIMAZOLE: 0.01 CREAM TOPICAL at 21:42

## 2023-04-03 RX ADMIN — CLOTRIMAZOLE: 0.01 CREAM TOPICAL at 09:41

## 2023-04-03 RX ADMIN — CARBOXYMETHYLCELLULOSE SODIUM 1 DROP: 0.5 SOLUTION/ DROPS OPHTHALMIC at 19:54

## 2023-04-03 RX ADMIN — CARBOXYMETHYLCELLULOSE SODIUM 1 DROP: 0.5 SOLUTION/ DROPS OPHTHALMIC at 09:38

## 2023-04-03 RX ADMIN — Medication 10 MG: at 21:43

## 2023-04-03 RX ADMIN — CLONIDINE HYDROCHLORIDE 0.1 MG: 0.1 TABLET ORAL at 09:37

## 2023-04-03 RX ADMIN — DIVALPROEX SODIUM 1000 MG: 500 TABLET, DELAYED RELEASE ORAL at 21:42

## 2023-04-03 RX ADMIN — CARBOXYMETHYLCELLULOSE SODIUM 1 DROP: 0.5 SOLUTION/ DROPS OPHTHALMIC at 17:57

## 2023-04-03 RX ADMIN — CLONIDINE HYDROCHLORIDE 0.1 MG: 0.1 TABLET ORAL at 19:54

## 2023-04-03 RX ADMIN — BACLOFEN 10 MG: 10 TABLET ORAL at 19:54

## 2023-04-03 RX ADMIN — DIVALPROEX SODIUM 750 MG: 500 TABLET, DELAYED RELEASE ORAL at 09:35

## 2023-04-03 RX ADMIN — VENLAFAXINE HYDROCHLORIDE 75 MG: 150 CAPSULE, EXTENDED RELEASE ORAL at 21:43

## 2023-04-03 RX ADMIN — HYDROXYZINE HYDROCHLORIDE 50 MG: 50 TABLET, FILM COATED ORAL at 13:17

## 2023-04-03 RX ADMIN — HYDROXYZINE HYDROCHLORIDE 50 MG: 50 TABLET, FILM COATED ORAL at 19:54

## 2023-04-03 RX ADMIN — QUETIAPINE 200 MG: 200 TABLET, FILM COATED ORAL at 09:36

## 2023-04-03 RX ADMIN — VENLAFAXINE HYDROCHLORIDE 150 MG: 150 CAPSULE, EXTENDED RELEASE ORAL at 21:44

## 2023-04-03 RX ADMIN — MIRTAZAPINE 15 MG: 15 TABLET, FILM COATED ORAL at 21:43

## 2023-04-03 RX ADMIN — CARBOXYMETHYLCELLULOSE SODIUM 1 DROP: 0.5 SOLUTION/ DROPS OPHTHALMIC at 13:18

## 2023-04-03 RX ADMIN — OLANZAPINE 2.5 MG: 2.5 TABLET, FILM COATED ORAL at 13:16

## 2023-04-03 ASSESSMENT — ACTIVITIES OF DAILY LIVING (ADL)
ADLS_ACUITY_SCORE: 56

## 2023-04-03 NOTE — PROGRESS NOTES
Pt requested to have his face shaved. No electric razor on unit per charge. Other units called to locate an electric razor w/o success. Pt notified.     Razor located, pt shaved.

## 2023-04-03 NOTE — PLAN OF CARE
PRIMARY DIAGNOSIS: Placement  OUTPATIENT/OBSERVATION GOALS TO BE MET BEFORE DISCHARGE:  ADLs back to baseline: Yes    Activity and level of assistance: Up with maximum assistance. Consider SW and/or PT evaluation.     Pain status: Pain free.    Return to near baseline physical activity: Yes     Discharge Planner Nurse   Safe discharge environment identified: Yes  Barriers to discharge: Yes       Entered by: Helene Oreilly RN 04/03/2023   Pt AO x4. LS clear, BS active. Pt is tolerating regular diet. No PIV. Up with A2 with lift device. Discharge pending [facility] and DME to be delivered. Will continue to provide supportive cares.  Please review provider order for any additional goals.   Nurse to notify provider when observation goals have been met and patient is ready for discharge.

## 2023-04-03 NOTE — PLAN OF CARE
PRIMARY DIAGNOSIS: Placement  OUTPATIENT/OBSERVATION GOALS TO BE MET BEFORE DISCHARGE:  1. ADLs back to baseline: Yes    2. Activity and level of assistance: Up with maximum assistance. Consider SW and/or PT evaluation.     3. Pain status: Pain free.    4. Return to near baseline physical activity: Yes     Discharge Planner Nurse   Safe discharge environment identified: Yes  Barriers to discharge: Yes       Entered by: Helene Oreilly RN 04/03/2023   Pt AO x4. LS clear, BS active. Pt is tolerating regular diet. No PIV. Up with A2 with lift device. Discharge pending [facility] and DME to be delivered. Will continue to provide supportive cares.  Please review provider order for any additional goals.   Nurse to notify provider when observation goals have been met and patient is ready for discharge.
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
## 2023-04-03 NOTE — PROGRESS NOTES
PRIMARY DIAGNOSIS: Placement   OUTPATIENT/OBSERVATION GOALS TO BE MET BEFORE DISCHARGE:  1. ADLs back to baseline: Yes    2. Activity and level of assistance: Up with maximum assistance. Consider SW and/or PT evaluation.     3. Pain status: Pain free.    4. Return to near baseline physical activity: Yes     Discharge Planner Nurse   Safe discharge environment identified: Yes  Barriers to discharge: No       Entered by: Georgie Rodriguez 04/03/2023 9:59 AM   Pt is calm and cooperative this morning, AxO4, VSS, denies N/V and pain. LS clear. Pt is up with A2 and lift device which is baseline for pt. Pt paraplegic, able to roll left and right. Incontinent of B&B. Pt calls for cares. Discharge on Friday to [facility] in [place] pending funding approval.   Please review provider order for any additional goals.   Nurse to notify provider when observation goals have been met and patient is ready for discharge.

## 2023-04-03 NOTE — PROGRESS NOTES
PRIMARY DIAGNOSIS: Placement   OUTPATIENT/OBSERVATION GOALS TO BE MET BEFORE DISCHARGE:  1. ADLs back to baseline: Yes    2. Activity and level of assistance: Up with maximum assistance. Consider SW and/or PT evaluation.     3. Pain status: Pain free.    4. Return to near baseline physical activity: Yes     Discharge Planner Nurse   Safe discharge environment identified: Yes  Barriers to discharge: No       Entered by: Georgie Rodriguez 04/03/2023 2:34 PM   Pt is calm and cooperative, AxO4, VSS, denies N/V and pain. LS clear. Pt is up with A2 and lift device which is baseline for pt. Pt paraplegic, able to roll left and right. Incontinent of B&B. Pt calls for cares. Pt shaved and incontinent care provided.  Discharge on Friday to [facility] in [place] pending funding approval.   Please review provider order for any additional goals.   Nurse to notify provider when observation goals have been met and patient is ready for discharge.

## 2023-04-03 NOTE — PROGRESS NOTES
PRIMARY DIAGNOSIS: Placement   OUTPATIENT/OBSERVATION GOALS TO BE MET BEFORE DISCHARGE:  1. ADLs back to baseline: Yes    2. Activity and level of assistance: Up with maximum assistance. Consider SW and/or PT evaluation.     3. Pain status: Pain free.    4. Return to near baseline physical activity: Yes     Discharge Planner Nurse   Safe discharge environment identified: Yes  Barriers to discharge: No       Entered by: Georgie Rodriguez 04/03/2023 11:39 AM   Pt is calm and cooperative this morning, AxO4, VSS, denies N/V and pain. LS clear. Pt is up with A2 and lift device which is baseline for pt. Pt paraplegic, able to roll left and right. Incontinent of B&B. Pt calls for cares. Discharge on Friday to [facility] in [place] pending funding approval.   Please review provider order for any additional goals.   Nurse to notify provider when observation goals have been met and patient is ready for discharge.

## 2023-04-03 NOTE — PLAN OF CARE
PRIMARY DIAGNOSIS: Placement  OUTPATIENT/OBSERVATION GOALS TO BE MET BEFORE DISCHARGE:  ADLs back to baseline: Yes    Activity and level of assistance: Up with maximum assistance. Consider SW and/or PT evaluation.     Pain status: Pain free.    Return to near baseline physical activity: Yes     Discharge Planner Nurse   Safe discharge environment identified: Yes  Barriers to discharge: Yes       Entered by: Helene Oreilly, RN 4/2/2023  Pt AO x4. LS clear, BS active. Pt is tolerating regular diet. No PIV. Up with A2 with lift device. Discharge pending [facility] and DME to be delivered. Will continue to provide supportive cares.  Please review provider order for any additional goals.   Nurse to notify provider when observation goals have been met and patient is ready for discharge.

## 2023-04-04 PROCEDURE — 250N000013 HC RX MED GY IP 250 OP 250 PS 637: Performed by: HOSPITALIST

## 2023-04-04 PROCEDURE — 250N000013 HC RX MED GY IP 250 OP 250 PS 637: Performed by: PHYSICIAN ASSISTANT

## 2023-04-04 PROCEDURE — G0378 HOSPITAL OBSERVATION PER HR: HCPCS

## 2023-04-04 PROCEDURE — 99207 PR NO CHARGE LOS: CPT | Performed by: PHYSICIAN ASSISTANT

## 2023-04-04 PROCEDURE — 250N000013 HC RX MED GY IP 250 OP 250 PS 637: Performed by: NURSE PRACTITIONER

## 2023-04-04 RX ADMIN — CARBOXYMETHYLCELLULOSE SODIUM 1 DROP: 0.5 SOLUTION/ DROPS OPHTHALMIC at 14:28

## 2023-04-04 RX ADMIN — LEVOTHYROXINE SODIUM 25 MCG: 0.03 TABLET ORAL at 08:29

## 2023-04-04 RX ADMIN — BACLOFEN 10 MG: 10 TABLET ORAL at 19:57

## 2023-04-04 RX ADMIN — OLANZAPINE 2.5 MG: 2.5 TABLET, FILM COATED ORAL at 14:28

## 2023-04-04 RX ADMIN — CLONIDINE HYDROCHLORIDE 0.1 MG: 0.1 TABLET ORAL at 08:29

## 2023-04-04 RX ADMIN — METOPROLOL SUCCINATE 50 MG: 50 TABLET, EXTENDED RELEASE ORAL at 08:29

## 2023-04-04 RX ADMIN — BACLOFEN 10 MG: 10 TABLET ORAL at 08:29

## 2023-04-04 RX ADMIN — VENLAFAXINE HYDROCHLORIDE 75 MG: 150 CAPSULE, EXTENDED RELEASE ORAL at 21:48

## 2023-04-04 RX ADMIN — EMPAGLIFLOZIN 10 MG: 10 TABLET, FILM COATED ORAL at 08:28

## 2023-04-04 RX ADMIN — CARBOXYMETHYLCELLULOSE SODIUM 1 DROP: 0.5 SOLUTION/ DROPS OPHTHALMIC at 08:28

## 2023-04-04 RX ADMIN — METFORMIN HYDROCHLORIDE 850 MG: 850 TABLET, FILM COATED ORAL at 08:29

## 2023-04-04 RX ADMIN — CLOTRIMAZOLE: 0.01 CREAM TOPICAL at 08:28

## 2023-04-04 RX ADMIN — DIVALPROEX SODIUM 1000 MG: 500 TABLET, DELAYED RELEASE ORAL at 21:48

## 2023-04-04 RX ADMIN — KETOCONAZOLE: 20 CREAM TOPICAL at 22:22

## 2023-04-04 RX ADMIN — CARBOXYMETHYLCELLULOSE SODIUM 1 DROP: 0.5 SOLUTION/ DROPS OPHTHALMIC at 17:10

## 2023-04-04 RX ADMIN — CARBOXYMETHYLCELLULOSE SODIUM 1 DROP: 0.5 SOLUTION/ DROPS OPHTHALMIC at 19:58

## 2023-04-04 RX ADMIN — Medication 10 MG: at 21:49

## 2023-04-04 RX ADMIN — CLONIDINE HYDROCHLORIDE 0.1 MG: 0.1 TABLET ORAL at 14:28

## 2023-04-04 RX ADMIN — CLONIDINE HYDROCHLORIDE 0.1 MG: 0.1 TABLET ORAL at 19:58

## 2023-04-04 RX ADMIN — QUETIAPINE 200 MG: 200 TABLET, FILM COATED ORAL at 14:28

## 2023-04-04 RX ADMIN — POLYETHYLENE GLYCOL 3350 17 G: 17 POWDER, FOR SOLUTION ORAL at 08:28

## 2023-04-04 RX ADMIN — Medication 25 MCG: at 08:29

## 2023-04-04 RX ADMIN — HYDROCORTISONE: 1 CREAM TOPICAL at 19:59

## 2023-04-04 RX ADMIN — QUETIAPINE 200 MG: 200 TABLET, FILM COATED ORAL at 08:28

## 2023-04-04 RX ADMIN — QUETIAPINE FUMARATE 400 MG: 200 TABLET ORAL at 21:48

## 2023-04-04 RX ADMIN — HYDROXYZINE HYDROCHLORIDE 50 MG: 50 TABLET, FILM COATED ORAL at 14:28

## 2023-04-04 RX ADMIN — BACLOFEN 10 MG: 10 TABLET ORAL at 14:28

## 2023-04-04 RX ADMIN — HYDROXYZINE HYDROCHLORIDE 50 MG: 50 TABLET, FILM COATED ORAL at 19:57

## 2023-04-04 RX ADMIN — ATORVASTATIN CALCIUM 20 MG: 20 TABLET, FILM COATED ORAL at 19:57

## 2023-04-04 RX ADMIN — METFORMIN HYDROCHLORIDE 850 MG: 850 TABLET, FILM COATED ORAL at 17:10

## 2023-04-04 RX ADMIN — MIRTAZAPINE 15 MG: 15 TABLET, FILM COATED ORAL at 21:49

## 2023-04-04 RX ADMIN — DIVALPROEX SODIUM 750 MG: 500 TABLET, DELAYED RELEASE ORAL at 08:29

## 2023-04-04 RX ADMIN — HYDROXYZINE HYDROCHLORIDE 50 MG: 50 TABLET, FILM COATED ORAL at 08:28

## 2023-04-04 RX ADMIN — CLOTRIMAZOLE: 0.01 CREAM TOPICAL at 19:59

## 2023-04-04 RX ADMIN — ASPIRIN 81 MG CHEWABLE TABLET 81 MG: 81 TABLET CHEWABLE at 08:29

## 2023-04-04 RX ADMIN — VENLAFAXINE HYDROCHLORIDE 150 MG: 150 CAPSULE, EXTENDED RELEASE ORAL at 21:49

## 2023-04-04 ASSESSMENT — ACTIVITIES OF DAILY LIVING (ADL)
ADLS_ACUITY_SCORE: 56
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
## 2023-04-04 NOTE — PROGRESS NOTES
"Care Management Follow Up    Expected Discharge Date: 04/07/2023     Concerns to be Addressed: Discharge planning      Patient plan of care discussed at interdisciplinary rounds: Yes    Anticipated Discharge Disposition:  [facility] once funding in place and DME delivered     Anticipated Discharge DME:  Hospital bed, meggan lift    Patient/Family in Agreement with the Plan:  Yes    Referrals Placed by CM/SW:    Private pay costs discussed: Not applicable    Additional Information:  ANALY emailed relocation work and [facility] requesting an update on funding approval, awaiting response. SW received email from , updated that we are awaiting response from the Pending sale to [facility].     ANALY placed call to [facility], [phone], to follow up on hospital bed order. They confirmed they received all necessary paperwork. They will call  owner today to arrange delivery, anticipate tomorrow.     ANALY placed call to [facility], [phone], to follow up on electric meggan lift order. They sent a fax stating \"we need complete chart notes not just the criteria\". ANALY spoke with  as the order included the order, face to face, and a progress note including ICD 10 and medical necessity for lift. They will proceed with all documentation they have to process with delivery. They will call  to arrange.     SW will continue to follow.     1400: [facility] can use their lift until pt's own lift is delivered from [facility] if needed. Awaiting response from Pending sale to [facility] re: rate sheet and funding approval     DANITA Obrien, [facility]   Inpatient Care Coordination  [facility]   [phone]        "

## 2023-04-04 NOTE — PLAN OF CARE
PRIMARY DIAGNOSIS: Placement  OUTPATIENT/OBSERVATION GOALS TO BE MET BEFORE DISCHARGE:  ADLs back to baseline: Yes    Activity and level of assistance: assist x 1    Pain status: Pain free.    Return to near baseline physical activity: Yes     Discharge Planner Nurse   Safe discharge environment identified: No  Barriers to discharge: Yes       Entered by: Emily Garcia RN 04/04/2023 12:07 AM    Patient is resting comfortably in bed, currently denies pain, SW following for placement       Please review provider order for any additional goals.   Nurse to notify provider when observation goals have been met and patient is ready for discharge.

## 2023-04-04 NOTE — PROGRESS NOTES
PRIMARY DIAGNOSIS: Placement   OUTPATIENT/OBSERVATION GOALS TO BE MET BEFORE DISCHARGE:  1. ADLs back to baseline: Yes     2. Activity and level of assistance: Up with maximum assistance. Consider SW and/or PT evaluation.      3. Pain status: Pain free.     4. Return to near baseline physical activity: Yes             Discharge Planner Nurse   Safe discharge environment identified: Yes  Barriers to discharge: No            Pt is calm and cooperative, AxO4; flat affect. VSS, denies N/V and pain. LS clear.[place]  A2 and lift device d/t baseline paraplegic, able to roll left and right. Incontinent of B&B. Pt calls for cares. Discharge on Friday to [facility] in [place] pending funding approval.      Please review provider order for any additional goals.   Nurse to notify provider when observation goals have been met and patient is ready for discharge.

## 2023-04-04 NOTE — PLAN OF CARE
PRIMARY DIAGNOSIS: Placement  OUTPATIENT/OBSERVATION GOALS TO BE MET BEFORE DISCHARGE:  ADLs back to baseline: Yes    Activity and level of assistance: assist x 1    Pain status: Pain free.    Return to near baseline physical activity: Yes     Discharge Planner Nurse   Safe discharge environment identified: No  Barriers to discharge: Yes       Entered by: Emily Garcia RN 04/04/2023 4:28 AM    Patient currently sleeping, incontinent of urine and stool, SW following for placment       Please review provider order for any additional goals.   Nurse to notify provider when observation goals have been met and patient is ready for discharge.

## 2023-04-04 NOTE — PROGRESS NOTES
PRIMARY DIAGNOSIS: Placement   OUTPATIENT/OBSERVATION GOALS TO BE MET BEFORE DISCHARGE:  1. ADLs back to baseline: Yes     2. Activity and level of assistance: Up with maximum assistance. Consider SW and/or PT evaluation.      3. Pain status: Pain free.     4. Return to near baseline physical activity: Yes             Discharge Planner Nurse   Safe discharge environment identified: Yes  Barriers to discharge: No       Entered by: Georgie Rodriguez 04/03/2023 2:34 PM     Pt is calm and cooperative, AxO4, VSS, denies N/V and pain. LS clear.[place]oine  A2 and lift device d/t baseline paraplegic, able to roll left and right. Incontinent of B&B. Pt calls for cares. Discharge on Friday to [facility] in [place] pending funding approval.     Please review provider order for any additional goals.   Nurse to notify provider when observation goals have been met and patient is ready for discharge.

## 2023-04-04 NOTE — PROGRESS NOTES
PRIMARY DIAGNOSIS: Placement   OUTPATIENT/OBSERVATION GOALS TO BE MET BEFORE DISCHARGE:  1. ADLs back to baseline: Yes    2. Activity and level of assistance: Up with maximum assistance. Consider SW and/or PT evaluation.     3. Pain status: Pain free.    4. Return to near baseline physical activity: Yes     Discharge Planner Nurse   Safe discharge environment identified: Yes  Barriers to discharge: No       Entered by: Bhavik Thomas RN     Pt  A&O x 4, VSS, Pt is up with A2 and lift device.  /83 (BP Location: Left arm)   Pulse 65   Temp 97.8  F (36.6  C) (Oral)   Resp 18   Wt 99.9 kg (220 lb 4.8 oz)   SpO2 95%   Please review provider order for any additional goals.   Nurse to notify provider when observation goals have been met and patient is ready for discharge.

## 2023-04-04 NOTE — PROGRESS NOTES
PRIMARY DIAGNOSIS: Placement   OUTPATIENT/OBSERVATION GOALS TO BE MET BEFORE DISCHARGE:  1. ADLs back to baseline: Yes     2. Activity and level of assistance: Up with maximum assistance. Consider SW and/or PT evaluation.      3. Pain status: Pain free.     4. Return to near baseline physical activity: Yes             Discharge Planner Nurse   Safe discharge environment identified: Yes  Barriers to discharge: No            Pt is calm and cooperative, AxO4; flat affect. VSS, denies N/V and pain. LS clear.Baseoine  A2 and lift device d/t baseline paraplegic, able to roll left and right. Incontinent of B&B. Pt calls for cares. Discharge on Friday to [facility] in [place] pending funding approval.      Please review provider order for any additional goals.   Nurse to notify provider when observation goals have been met and patient is ready for discharge

## 2023-04-05 PROCEDURE — 250N000013 HC RX MED GY IP 250 OP 250 PS 637: Performed by: HOSPITALIST

## 2023-04-05 PROCEDURE — 250N000013 HC RX MED GY IP 250 OP 250 PS 637: Performed by: NURSE PRACTITIONER

## 2023-04-05 PROCEDURE — G0378 HOSPITAL OBSERVATION PER HR: HCPCS

## 2023-04-05 PROCEDURE — 99231 SBSQ HOSP IP/OBS SF/LOW 25: CPT | Performed by: PHYSICIAN ASSISTANT

## 2023-04-05 PROCEDURE — 250N000013 HC RX MED GY IP 250 OP 250 PS 637: Performed by: PHYSICIAN ASSISTANT

## 2023-04-05 RX ADMIN — CLOTRIMAZOLE: 0.01 CREAM TOPICAL at 11:12

## 2023-04-05 RX ADMIN — CLONIDINE HYDROCHLORIDE 0.1 MG: 0.1 TABLET ORAL at 14:02

## 2023-04-05 RX ADMIN — CARBOXYMETHYLCELLULOSE SODIUM 1 DROP: 0.5 SOLUTION/ DROPS OPHTHALMIC at 17:41

## 2023-04-05 RX ADMIN — HYDROXYZINE HYDROCHLORIDE 50 MG: 50 TABLET, FILM COATED ORAL at 20:11

## 2023-04-05 RX ADMIN — CARBOXYMETHYLCELLULOSE SODIUM 1 DROP: 0.5 SOLUTION/ DROPS OPHTHALMIC at 08:51

## 2023-04-05 RX ADMIN — MIRTAZAPINE 15 MG: 15 TABLET, FILM COATED ORAL at 22:16

## 2023-04-05 RX ADMIN — BACLOFEN 10 MG: 10 TABLET ORAL at 20:11

## 2023-04-05 RX ADMIN — CLONIDINE HYDROCHLORIDE 0.1 MG: 0.1 TABLET ORAL at 08:50

## 2023-04-05 RX ADMIN — CARBOXYMETHYLCELLULOSE SODIUM 1 DROP: 0.5 SOLUTION/ DROPS OPHTHALMIC at 14:01

## 2023-04-05 RX ADMIN — METFORMIN HYDROCHLORIDE 850 MG: 850 TABLET, FILM COATED ORAL at 08:51

## 2023-04-05 RX ADMIN — CLOTRIMAZOLE: 0.01 CREAM TOPICAL at 20:14

## 2023-04-05 RX ADMIN — CARBOXYMETHYLCELLULOSE SODIUM 1 DROP: 0.5 SOLUTION/ DROPS OPHTHALMIC at 20:11

## 2023-04-05 RX ADMIN — METFORMIN HYDROCHLORIDE 850 MG: 850 TABLET, FILM COATED ORAL at 17:41

## 2023-04-05 RX ADMIN — QUETIAPINE 200 MG: 200 TABLET, FILM COATED ORAL at 14:02

## 2023-04-05 RX ADMIN — METOPROLOL SUCCINATE 50 MG: 50 TABLET, EXTENDED RELEASE ORAL at 08:51

## 2023-04-05 RX ADMIN — CLONIDINE HYDROCHLORIDE 0.1 MG: 0.1 TABLET ORAL at 20:11

## 2023-04-05 RX ADMIN — HYDROCORTISONE: 1 CREAM TOPICAL at 20:13

## 2023-04-05 RX ADMIN — EMPAGLIFLOZIN 10 MG: 10 TABLET, FILM COATED ORAL at 08:51

## 2023-04-05 RX ADMIN — VENLAFAXINE HYDROCHLORIDE 150 MG: 150 CAPSULE, EXTENDED RELEASE ORAL at 22:17

## 2023-04-05 RX ADMIN — Medication 10 MG: at 22:17

## 2023-04-05 RX ADMIN — Medication 25 MCG: at 08:50

## 2023-04-05 RX ADMIN — BACLOFEN 10 MG: 10 TABLET ORAL at 08:51

## 2023-04-05 RX ADMIN — POLYETHYLENE GLYCOL 3350 17 G: 17 POWDER, FOR SOLUTION ORAL at 08:52

## 2023-04-05 RX ADMIN — QUETIAPINE FUMARATE 400 MG: 200 TABLET ORAL at 22:17

## 2023-04-05 RX ADMIN — ATORVASTATIN CALCIUM 20 MG: 20 TABLET, FILM COATED ORAL at 20:11

## 2023-04-05 RX ADMIN — DIVALPROEX SODIUM 750 MG: 500 TABLET, DELAYED RELEASE ORAL at 08:50

## 2023-04-05 RX ADMIN — HYDROXYZINE HYDROCHLORIDE 50 MG: 50 TABLET, FILM COATED ORAL at 08:50

## 2023-04-05 RX ADMIN — OLANZAPINE 2.5 MG: 2.5 TABLET, FILM COATED ORAL at 14:01

## 2023-04-05 RX ADMIN — LEVOTHYROXINE SODIUM 25 MCG: 0.03 TABLET ORAL at 08:50

## 2023-04-05 RX ADMIN — QUETIAPINE 200 MG: 200 TABLET, FILM COATED ORAL at 08:51

## 2023-04-05 RX ADMIN — DIVALPROEX SODIUM 1000 MG: 500 TABLET, DELAYED RELEASE ORAL at 22:16

## 2023-04-05 RX ADMIN — BACLOFEN 10 MG: 10 TABLET ORAL at 14:02

## 2023-04-05 RX ADMIN — ASPIRIN 81 MG CHEWABLE TABLET 81 MG: 81 TABLET CHEWABLE at 08:51

## 2023-04-05 RX ADMIN — VENLAFAXINE HYDROCHLORIDE 75 MG: 150 CAPSULE, EXTENDED RELEASE ORAL at 22:17

## 2023-04-05 RX ADMIN — HYDROXYZINE HYDROCHLORIDE 50 MG: 50 TABLET, FILM COATED ORAL at 14:02

## 2023-04-05 RX ADMIN — KETOCONAZOLE: 20 CREAM TOPICAL at 20:13

## 2023-04-05 ASSESSMENT — ACTIVITIES OF DAILY LIVING (ADL)
ADLS_ACUITY_SCORE: 56

## 2023-04-05 NOTE — PLAN OF CARE
PRIMARY DIAGNOSIS: Placement  OUTPATIENT/OBSERVATION GOALS TO BE MET BEFORE DISCHARGE:  ADLs back to baseline: Yes    Activity and level of assistance: assist x 1    Pain status: Pain free.    Return to near baseline physical activity: Yes     Discharge Planner Nurse   Safe discharge environment identified: No  Barriers to discharge: Yes       Entered by: Emily Garcia RN 04/05/2023 5:29 AM    Patient currently sleeping, SW following for placement       Please review provider order for any additional goals.   Nurse to notify provider when observation goals have been met and patient is ready for discharge.

## 2023-04-05 NOTE — PROGRESS NOTES
PRIMARY DIAGNOSIS: Placement   OUTPATIENT/OBSERVATION GOALS TO BE MET BEFORE DISCHARGE:  1. ADLs back to baseline: Yes     2. Activity and level of assistance: Up with maximum assistance. Consider SW and/or PT evaluation.      3. Pain status: Pain free.     4. Return to near baseline physical activity: Yes             Discharge Planner Nurse   Safe discharge environment identified: Yes  Barriers to discharge: No       /76 (BP Location: Left arm)   Pulse 77   Temp 97.9  F (36.6  C) (Oral)   Resp 18   Wt 99.9 kg (220 lb 4.8 oz)   SpO2 98%      Pt alert and oriented x 4.VSS.  Please review provider order for any additional goals.   Nurse to notify provider when observation goals have been met and patient is ready for discharge.

## 2023-04-05 NOTE — PROGRESS NOTES
Care Management Follow Up    Expected Discharge Date: 04/07/2023     Concerns to be Addressed: Discharge planning     Patient plan of care discussed at interdisciplinary rounds: Yes    Anticipated Discharge Disposition:  [facility]     Patient/family educated on Medicare website which has current facility and service quality ratings:  [facility]  Education Provided on the Discharge Plan:  Yes  Patient/Family in Agreement with the Plan:  Yes    Additional Information:  SW received confirmation that hospital bed was delivered to patient's group home today. [facility] will arrange meggan lift delivery with  manager by the end of the week.  has a lift to use until that is delivered. Patient will need a small supply of XL briefs and wipes sent with as relocation worker/CM is unable to order through waiver until discharge.     SW received confirmation from [facility] for discharge date 4/7. Funding will be in place and approved by tomorrow. ANALY received email from [facility] with several DHS forms needing completion. ANALY completed with patient, patient and provider signed, emailed back to [facility].     Orders to be faxed to  at (f) [phone]. Phone # for group home: [phone]. All meds and freestyle jose 2 blood glucose sensor and reader to be sent to [facility] to be filled.     Reviewed out of pocket cost for [facility] [place] stretcher transport, $1117.00 for base rate and $26.06 per mile to the destination. Pt/family expressed understanding and are agreeable to this.      Patient requires stretcher transportation due to paraplegia    Stretcher transportation has been arranged for 11:00-11:50am Friday 4/7. Patient and bedside nurse notified of transportation time. Facility notified of transport time.     Ambulance PCS form completed and placed in patient chart. Ambulance PCS form should be given to transportation team.    SW will continue to follow.     DANITA Obrien, LICSW    Inpatient Care Coordination  [facility]   [phone]

## 2023-04-05 NOTE — PLAN OF CARE
PRIMARY DIAGNOSIS: Placement  OUTPATIENT/OBSERVATION GOALS TO BE MET BEFORE DISCHARGE:  ADLs back to baseline: Yes    Activity and level of assistance: assist x 1    Pain status: Pain free.    Return to near baseline physical activity: Yes     Discharge Planner Nurse   Safe discharge environment identified: No  Barriers to discharge: Yes       Entered by: Emily Garcia RN 04/05/2023 12:12 AM    Patient resting in bed, currently denies pain, SW following for placement       Please review provider order for any additional goals.   Nurse to notify provider when observation goals have been met and patient is ready for discharge.
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
## 2023-04-06 PROCEDURE — 250N000013 HC RX MED GY IP 250 OP 250 PS 637: Performed by: HOSPITALIST

## 2023-04-06 PROCEDURE — 99231 SBSQ HOSP IP/OBS SF/LOW 25: CPT | Performed by: PHYSICIAN ASSISTANT

## 2023-04-06 PROCEDURE — 250N000013 HC RX MED GY IP 250 OP 250 PS 637: Performed by: PHYSICIAN ASSISTANT

## 2023-04-06 PROCEDURE — G0378 HOSPITAL OBSERVATION PER HR: HCPCS

## 2023-04-06 PROCEDURE — 250N000013 HC RX MED GY IP 250 OP 250 PS 637: Performed by: NURSE PRACTITIONER

## 2023-04-06 RX ORDER — CLONIDINE HYDROCHLORIDE 0.1 MG/1
0.1 TABLET ORAL 2 TIMES DAILY
Qty: 30 TABLET | Refills: 0 | Status: SHIPPED | OUTPATIENT
Start: 2023-04-06 | End: 2023-04-08

## 2023-04-06 RX ORDER — VENLAFAXINE HYDROCHLORIDE 150 MG/1
150 CAPSULE, EXTENDED RELEASE ORAL AT BEDTIME
Qty: 30 CAPSULE | Refills: 0 | Status: SHIPPED | OUTPATIENT
Start: 2023-04-06 | End: 2023-04-08

## 2023-04-06 RX ORDER — IPRATROPIUM BROMIDE AND ALBUTEROL SULFATE 2.5; .5 MG/3ML; MG/3ML
1 SOLUTION RESPIRATORY (INHALATION) EVERY 4 HOURS PRN
Qty: 90 ML | Refills: 0 | Status: SHIPPED | OUTPATIENT
Start: 2023-04-06

## 2023-04-06 RX ORDER — AMOXICILLIN 250 MG
1 CAPSULE ORAL DAILY
Qty: 30 TABLET | Refills: 0 | Status: SHIPPED | OUTPATIENT
Start: 2023-04-06

## 2023-04-06 RX ORDER — AMOXICILLIN 250 MG
1 CAPSULE ORAL DAILY PRN
Qty: 30 TABLET | Refills: 0 | Status: SHIPPED | OUTPATIENT
Start: 2023-04-06 | End: 2023-04-08

## 2023-04-06 RX ORDER — GUAIFENESIN 600 MG/1
600 TABLET, EXTENDED RELEASE ORAL EVERY 12 HOURS PRN
Qty: 30 TABLET | Refills: 0 | Status: SHIPPED | OUTPATIENT
Start: 2023-04-06 | End: 2023-04-08

## 2023-04-06 RX ORDER — NYSTATIN 100000 [USP'U]/G
POWDER TOPICAL 2 TIMES DAILY PRN
Qty: 30 G | Refills: 0 | Status: SHIPPED | OUTPATIENT
Start: 2023-04-06

## 2023-04-06 RX ORDER — ATORVASTATIN CALCIUM 20 MG/1
20 TABLET, FILM COATED ORAL DAILY
Qty: 30 TABLET | Refills: 0 | Status: SHIPPED | OUTPATIENT
Start: 2023-04-06

## 2023-04-06 RX ORDER — VITAMIN B COMPLEX
25 TABLET ORAL DAILY
Qty: 30 TABLET | Refills: 0 | Status: SHIPPED | OUTPATIENT
Start: 2023-04-07

## 2023-04-06 RX ORDER — DIVALPROEX SODIUM 250 MG/1
250 TABLET, DELAYED RELEASE ORAL EVERY MORNING
Qty: 30 TABLET | Refills: 0 | Status: SHIPPED | OUTPATIENT
Start: 2023-04-06 | End: 2023-04-08

## 2023-04-06 RX ORDER — BACLOFEN 10 MG/1
10 TABLET ORAL 3 TIMES DAILY
Qty: 90 TABLET | Refills: 0 | Status: SHIPPED | OUTPATIENT
Start: 2023-04-06 | End: 2023-04-08

## 2023-04-06 RX ORDER — ALBUTEROL SULFATE 90 UG/1
2 AEROSOL, METERED RESPIRATORY (INHALATION) EVERY 4 HOURS PRN
Qty: 18 G | Refills: 0 | Status: SHIPPED | OUTPATIENT
Start: 2023-04-06 | End: 2023-04-08

## 2023-04-06 RX ORDER — DIVALPROEX SODIUM 500 MG/1
500 TABLET, DELAYED RELEASE ORAL EVERY MORNING
Qty: 30 TABLET | Refills: 0 | Status: SHIPPED | OUTPATIENT
Start: 2023-04-06 | End: 2023-04-08

## 2023-04-06 RX ORDER — QUETIAPINE FUMARATE 200 MG/1
200 TABLET, FILM COATED ORAL 2 TIMES DAILY
Qty: 60 TABLET | Refills: 0 | Status: SHIPPED | OUTPATIENT
Start: 2023-04-06 | End: 2023-04-08

## 2023-04-06 RX ORDER — PYRITHIONE ZINC 0.01 G/ML
1 SHAMPOO TOPICAL
Qty: 240 ML | Refills: 0 | Status: SHIPPED | OUTPATIENT
Start: 2023-04-06

## 2023-04-06 RX ORDER — MELATONIN 10 MG
10 CAPSULE ORAL AT BEDTIME
Qty: 30 CAPSULE | Refills: 0 | Status: SHIPPED | OUTPATIENT
Start: 2023-04-06

## 2023-04-06 RX ORDER — ASPIRIN 81 MG/1
81 TABLET ORAL DAILY
Qty: 30 TABLET | Refills: 0 | Status: SHIPPED | OUTPATIENT
Start: 2023-04-06

## 2023-04-06 RX ORDER — OLANZAPINE 2.5 MG/1
2.5 TABLET, FILM COATED ORAL
Qty: 30 TABLET | Refills: 0 | Status: SHIPPED | OUTPATIENT
Start: 2023-04-06 | End: 2023-04-08

## 2023-04-06 RX ORDER — ACETAMINOPHEN 325 MG/1
650 TABLET ORAL EVERY 4 HOURS PRN
COMMUNITY
Start: 2023-04-06

## 2023-04-06 RX ORDER — MIRTAZAPINE 15 MG/1
15 TABLET, FILM COATED ORAL AT BEDTIME
Qty: 30 TABLET | Refills: 0 | Status: SHIPPED | OUTPATIENT
Start: 2023-04-06

## 2023-04-06 RX ORDER — LEVOTHYROXINE SODIUM 25 UG/1
25 TABLET ORAL DAILY
Qty: 30 TABLET | Refills: 0 | Status: SHIPPED | OUTPATIENT
Start: 2023-04-06

## 2023-04-06 RX ORDER — DIVALPROEX SODIUM 500 MG/1
1000 TABLET, DELAYED RELEASE ORAL AT BEDTIME
Qty: 30 TABLET | Refills: 0 | Status: SHIPPED | OUTPATIENT
Start: 2023-04-06 | End: 2023-04-08

## 2023-04-06 RX ORDER — HYDROXYZINE PAMOATE 50 MG/1
50 CAPSULE ORAL 3 TIMES DAILY
Qty: 90 CAPSULE | Refills: 0 | Status: SHIPPED | OUTPATIENT
Start: 2023-04-06 | End: 2023-04-08

## 2023-04-06 RX ORDER — METOPROLOL SUCCINATE 50 MG/1
50 TABLET, EXTENDED RELEASE ORAL DAILY
Qty: 30 TABLET | Refills: 0 | Status: SHIPPED | OUTPATIENT
Start: 2023-04-06

## 2023-04-06 RX ORDER — FAMOTIDINE 20 MG
25 TABLET ORAL DAILY
Qty: 30 CAPSULE | Refills: 0 | Status: SHIPPED | OUTPATIENT
Start: 2023-04-06 | End: 2023-04-06

## 2023-04-06 RX ORDER — QUETIAPINE FUMARATE 400 MG/1
400 TABLET, FILM COATED ORAL AT BEDTIME
Qty: 30 TABLET | Refills: 0 | Status: SHIPPED | OUTPATIENT
Start: 2023-04-06 | End: 2023-04-08

## 2023-04-06 RX ORDER — VENLAFAXINE HYDROCHLORIDE 75 MG/1
75 CAPSULE, EXTENDED RELEASE ORAL AT BEDTIME
Qty: 30 CAPSULE | Refills: 0 | Status: SHIPPED | OUTPATIENT
Start: 2023-04-06 | End: 2023-04-08

## 2023-04-06 RX ADMIN — ATORVASTATIN CALCIUM 20 MG: 20 TABLET, FILM COATED ORAL at 21:35

## 2023-04-06 RX ADMIN — HYDROXYZINE HYDROCHLORIDE 50 MG: 50 TABLET, FILM COATED ORAL at 14:55

## 2023-04-06 RX ADMIN — CLOTRIMAZOLE: 0.01 CREAM TOPICAL at 21:37

## 2023-04-06 RX ADMIN — LEVOTHYROXINE SODIUM 25 MCG: 0.03 TABLET ORAL at 09:19

## 2023-04-06 RX ADMIN — Medication 10 MG: at 21:36

## 2023-04-06 RX ADMIN — EMPAGLIFLOZIN 10 MG: 10 TABLET, FILM COATED ORAL at 09:22

## 2023-04-06 RX ADMIN — CLONIDINE HYDROCHLORIDE 0.1 MG: 0.1 TABLET ORAL at 09:20

## 2023-04-06 RX ADMIN — Medication 25 MCG: at 09:22

## 2023-04-06 RX ADMIN — CARBOXYMETHYLCELLULOSE SODIUM 1 DROP: 0.5 SOLUTION/ DROPS OPHTHALMIC at 21:36

## 2023-04-06 RX ADMIN — QUETIAPINE FUMARATE 400 MG: 200 TABLET ORAL at 21:35

## 2023-04-06 RX ADMIN — DIVALPROEX SODIUM 750 MG: 500 TABLET, DELAYED RELEASE ORAL at 09:19

## 2023-04-06 RX ADMIN — HYDROXYZINE HYDROCHLORIDE 50 MG: 50 TABLET, FILM COATED ORAL at 09:22

## 2023-04-06 RX ADMIN — POLYETHYLENE GLYCOL 3350 17 G: 17 POWDER, FOR SOLUTION ORAL at 09:19

## 2023-04-06 RX ADMIN — CLOTRIMAZOLE: 0.01 CREAM TOPICAL at 09:23

## 2023-04-06 RX ADMIN — CARBOXYMETHYLCELLULOSE SODIUM 1 DROP: 0.5 SOLUTION/ DROPS OPHTHALMIC at 18:44

## 2023-04-06 RX ADMIN — CLONIDINE HYDROCHLORIDE 0.1 MG: 0.1 TABLET ORAL at 21:36

## 2023-04-06 RX ADMIN — HYDROCORTISONE: 1 CREAM TOPICAL at 21:37

## 2023-04-06 RX ADMIN — KETOCONAZOLE: 20 CREAM TOPICAL at 21:37

## 2023-04-06 RX ADMIN — BACLOFEN 10 MG: 10 TABLET ORAL at 21:35

## 2023-04-06 RX ADMIN — VENLAFAXINE HYDROCHLORIDE 75 MG: 150 CAPSULE, EXTENDED RELEASE ORAL at 21:36

## 2023-04-06 RX ADMIN — CARBOXYMETHYLCELLULOSE SODIUM 1 DROP: 0.5 SOLUTION/ DROPS OPHTHALMIC at 09:19

## 2023-04-06 RX ADMIN — CLONIDINE HYDROCHLORIDE 0.1 MG: 0.1 TABLET ORAL at 14:56

## 2023-04-06 RX ADMIN — ASPIRIN 81 MG CHEWABLE TABLET 81 MG: 81 TABLET CHEWABLE at 09:19

## 2023-04-06 RX ADMIN — VENLAFAXINE HYDROCHLORIDE 150 MG: 150 CAPSULE, EXTENDED RELEASE ORAL at 21:34

## 2023-04-06 RX ADMIN — METFORMIN HYDROCHLORIDE 850 MG: 850 TABLET, FILM COATED ORAL at 18:44

## 2023-04-06 RX ADMIN — METFORMIN HYDROCHLORIDE 850 MG: 850 TABLET, FILM COATED ORAL at 09:20

## 2023-04-06 RX ADMIN — OLANZAPINE 2.5 MG: 2.5 TABLET, FILM COATED ORAL at 14:56

## 2023-04-06 RX ADMIN — HYDROXYZINE HYDROCHLORIDE 50 MG: 50 TABLET, FILM COATED ORAL at 21:35

## 2023-04-06 RX ADMIN — BACLOFEN 10 MG: 10 TABLET ORAL at 09:22

## 2023-04-06 RX ADMIN — MIRTAZAPINE 15 MG: 15 TABLET, FILM COATED ORAL at 21:36

## 2023-04-06 RX ADMIN — CARBOXYMETHYLCELLULOSE SODIUM 1 DROP: 0.5 SOLUTION/ DROPS OPHTHALMIC at 14:56

## 2023-04-06 RX ADMIN — BACLOFEN 10 MG: 10 TABLET ORAL at 14:55

## 2023-04-06 RX ADMIN — QUETIAPINE 200 MG: 200 TABLET, FILM COATED ORAL at 09:22

## 2023-04-06 RX ADMIN — METOPROLOL SUCCINATE 50 MG: 50 TABLET, EXTENDED RELEASE ORAL at 09:22

## 2023-04-06 RX ADMIN — QUETIAPINE 200 MG: 200 TABLET, FILM COATED ORAL at 14:56

## 2023-04-06 RX ADMIN — DIVALPROEX SODIUM 1000 MG: 500 TABLET, DELAYED RELEASE ORAL at 21:35

## 2023-04-06 ASSESSMENT — ACTIVITIES OF DAILY LIVING (ADL)
ADLS_ACUITY_SCORE: 56
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
## 2023-04-06 NOTE — PLAN OF CARE
PRIMARY DIAGNOSIS: PLACEMENT  OUTPATIENT/OBSERVATION GOALS TO BE MET BEFORE DISCHARGE:  1. ADLs back to baseline: Yes    2. Activity and level of assistance: Ax1    3. Pain status: Pain free.    4. Return to near baseline physical activity: Yes     Discharge Planner Nurse   Safe discharge environment identified: Yes  Barriers to discharge: No       Entered by: Annalee Palomares RN 04/06/2023 2:42 PM    Pt A/O x4 and makes needs known to staff. Ax1 with bed mobility and incontinence changes. Denies pain. Tolerating a regular diet. Topical applied to the groin area per orders. VSS. Pt will be discharging on 4/7/23 via stretcher transport.  Vital signs:      BP: 131/87 Pulse: 78     SpO2: 95 % O2 Device: None (Room air)     Weight: 99.9 kg (220 lb 4.8 oz)  There is no height or weight on file to calculate BMI.    Please review provider order for any additional goals.   Nurse to notify provider when observation goals have been met and patient is ready for discharge.

## 2023-04-06 NOTE — PROGRESS NOTES
Care Management Discharge Note    Discharge Date: 04/07/2023     Discharge Disposition:  [facility]    Discharge Transportation: Stretcher  4/7    Private pay costs discussed: Not applicable    Patient/Family in Agreement with the Plan:  Yes    Handoff Referral Completed: No    Additional Information:  Plan: Discharge 4/7 to [facility]. Stretcher transport arranged from  4/7.     Meds sent to [facility] to be filled.     Orders to be faxed to  at (f) [phone].  Manager Tamara updated on transport time and discharge plans for tomorrow.     [facility] confirmed funding approval for discharge date 4/7.     SW placed call to Social Security Office, [phone], with patient on the line to update his address on file for social security benefits.     ANALY will continue to follow.     DANITA Obrien, [facility]   Inpatient Care Coordination  [facility]   [phone]

## 2023-04-06 NOTE — PLAN OF CARE
PRIMARY DIAGNOSIS: PLACEMENT  OUTPATIENT/OBSERVATION GOALS TO BE MET BEFORE DISCHARGE:  1. ADLs back to baseline: Yes    2. Activity and level of assistance: Ax1    3. Pain status: Pain free.    4. Return to near baseline physical activity: Yes     Discharge Planner Nurse   Safe discharge environment identified: Yes  Barriers to discharge: No       Entered by: Annalee Palomares RN 04/05/2023 8:22 PM    Pt A/O x4 and makes needs known to staff. Ax1 with bed mobility and incontinence changes. Denies pain. Tolerating a regular diet. Topical applied to the groin area per orders. VSS. Pt will be discharging on 4/7/23 via stretcher transport.  Vital signs:  Temp: 97.5  F (36.4  C) Temp src: Oral BP: 128/89 Pulse: 78   Resp: 16 SpO2: 95 % O2 Device: None (Room air)     Weight: 99.9 kg (220 lb 4.8 oz)  There is no height or weight on file to calculate BMI.    Please review provider order for any additional goals.   Nurse to notify provider when observation goals have been met and patient is ready for discharge.

## 2023-04-06 NOTE — PLAN OF CARE
PRIMARY DIAGNOSIS: PLACEMENT  OUTPATIENT/OBSERVATION GOALS TO BE MET BEFORE DISCHARGE:  ADLs back to baseline: No    Activity and level of assistance: Up with maximum assistance.    Pain status: Pain free.    Return to near baseline physical activity: No     Discharge Planner Nurse   Safe discharge environment identified: No  Barriers to discharge: No  Entered by: Ana Castro RN 04/05/2023   /89 (BP Location: Left arm, Cuff Size: Adult Regular)   Pulse 78   Temp 97.5  F (36.4  C) (Oral)   Resp 16   Wt 99.9 kg (220 lb 4.8 oz)   SpO2 95%    Pt A & O X 4. On regular diet. Assist of one with cares and 2 with transfers using lift device. Denies any pain. Calm and cooperative. Incontinent of bowel and bladder. Medically cleared for discharge. Awaiting placement. Will continue to provide supportive cares.   Please review provider order for any additional goals.   Nurse to notify provider when observation goals have been met and patient is ready for discharge.

## 2023-04-06 NOTE — PLAN OF CARE
PRIMARY DIAGNOSIS: placement  OUTPATIENT/OBSERVATION GOALS TO BE MET BEFORE DISCHARGE:  ADLs back to baseline: Yes    Activity and level of assistance: Up with maximum assistance. Consider SW and/or PT evaluation.     Pain status: Pain free.    Return to near baseline physical activity: Yes     Discharge Planner Nurse   Safe discharge environment identified: Yes  Barriers to discharge: Yes       Entered by: Katheryn Duran RN 04/06/2023 1:46 AM   Pt A&O, able to make needs known. SW assisting w/ discharge, probable discharge to group home on Friday.   Please review provider order for any additional goals.   Nurse to notify provider when observation goals have been met and patient is ready for discharge.

## 2023-04-06 NOTE — PLAN OF CARE
PRIMARY DIAGNOSIS: placement  OUTPATIENT/OBSERVATION GOALS TO BE MET BEFORE DISCHARGE:  1. ADLs back to baseline: Yes    2. Activity and level of assistance: Up with maximum assistance. Consider SW and/or PT evaluation.     3. Pain status: Pain free.    4. Return to near baseline physical activity: Yes     Discharge Planner Nurse   Safe discharge environment identified: Yes  Barriers to discharge: Yes       Entered by: Katheryn Duran RN 04/06/2023 5:26 AM   Pt A&O, able to make needs known. SW assisting w/ discharge, probable discharge to group home on Friday.   Please review provider order for any additional goals.   Nurse to notify provider when observation goals have been met and patient is ready for discharge.

## 2023-04-06 NOTE — PLAN OF CARE
PRIMARY DIAGNOSIS: PLACEMENT  OUTPATIENT/OBSERVATION GOALS TO BE MET BEFORE DISCHARGE:  ADLs back to baseline: No     Activity and level of assistance: Up with maximum assistance.     Pain status: Pain free.     Return to near baseline physical activity: No          Discharge Planner Nurse   Safe discharge environment identified: Yes  Barriers to discharge: No  Entered by: Ana Castro RN 04/06/2023     /89 (BP Location: Left arm)   Pulse 89   Temp 97.6  F (36.4  C) (Oral)   Resp 16   Wt 99.9 kg (220 lb 4.8 oz)   SpO2 98%      Pt A & O X 4. On regular diet. Assist of one with cares and 2 with transfers using lift device. Denies any pain. Calm and cooperative. Incontinent of bowel and bladder. Medically cleared for discharge. Pt will be discharging to a group home tomorrow by 11.15 am. Will continue to provide supportive cares.     Please review provider order for any additional goals.   Nurse to notify provider when observation goals have been met and patient is ready for discharge.

## 2023-04-06 NOTE — PLAN OF CARE
PRIMARY DIAGNOSIS: PLACEMENT  OUTPATIENT/OBSERVATION GOALS TO BE MET BEFORE DISCHARGE:  ADLs back to baseline: Yes    Activity and level of assistance: Ax1    Pain status: Pain free.    Return to near baseline physical activity: Yes     Discharge Planner Nurse   Safe discharge environment identified: Yes  Barriers to discharge: No       Entered by: Annalee Palomares RN 04/05/2023 8:19 PM    Pt A/O x4 and makes needs known to staff. Ax1 with bed mobility and incontinence changes. Denies pain. Tolerating a regular diet. Topical applied to the groin area per orders. VSS. Pt will be discharging on 4/7/23 via stretcher transport.  Vital signs:  Temp: 97.5  F (36.4  C) Temp src: Oral BP: 128/89 Pulse: 78   Resp: 16 SpO2: 95 % O2 Device: None (Room air)     Weight: 99.9 kg (220 lb 4.8 oz)  There is no height or weight on file to calculate BMI.    Please review provider order for any additional goals.   Nurse to notify provider when observation goals have been met and patient is ready for discharge.

## 2023-04-06 NOTE — PLAN OF CARE
PRIMARY DIAGNOSIS: PLACEMENT  OUTPATIENT/OBSERVATION GOALS TO BE MET BEFORE DISCHARGE:  1. ADLs back to baseline: Yes    2. Activity and level of assistance: Ax1    3. Pain status: Pain free.    4. Return to near baseline physical activity: Yes     Discharge Planner Nurse   Safe discharge environment identified: Yes  Barriers to discharge: No       Entered by: Annalee Palomares RN 04/05/2023 8:23 PM    Pt A/O x4 and makes needs known to staff. Ax1 with bed mobility and incontinence changes. Denies pain. Tolerating a regular diet. Topical applied to the groin area per orders. VSS. Pt will be discharging on 4/7/23 via stretcher transport.  Vital signs:  Temp: 97.5  F (36.4  C) Temp src: Oral BP: 128/89 Pulse: 78   Resp: 16 SpO2: 95 % O2 Device: None (Room air)     Weight: 99.9 kg (220 lb 4.8 oz)  There is no height or weight on file to calculate BMI.    Please review provider order for any additional goals.   Nurse to notify provider when observation goals have been met and patient is ready for discharge.

## 2023-04-07 VITALS
WEIGHT: 220.3 LBS | RESPIRATION RATE: 16 BRPM | SYSTOLIC BLOOD PRESSURE: 128 MMHG | DIASTOLIC BLOOD PRESSURE: 87 MMHG | TEMPERATURE: 97.9 F | HEART RATE: 72 BPM | OXYGEN SATURATION: 95 %

## 2023-04-07 PROCEDURE — 250N000013 HC RX MED GY IP 250 OP 250 PS 637: Performed by: HOSPITALIST

## 2023-04-07 PROCEDURE — 250N000013 HC RX MED GY IP 250 OP 250 PS 637: Performed by: PHYSICIAN ASSISTANT

## 2023-04-07 PROCEDURE — 250N000013 HC RX MED GY IP 250 OP 250 PS 637: Performed by: NURSE PRACTITIONER

## 2023-04-07 PROCEDURE — 99239 HOSP IP/OBS DSCHRG MGMT >30: CPT | Performed by: PHYSICIAN ASSISTANT

## 2023-04-07 PROCEDURE — G0378 HOSPITAL OBSERVATION PER HR: HCPCS

## 2023-04-07 RX ADMIN — CARBOXYMETHYLCELLULOSE SODIUM 1 DROP: 0.5 SOLUTION/ DROPS OPHTHALMIC at 07:51

## 2023-04-07 RX ADMIN — HYDROCORTISONE: 1 CREAM TOPICAL at 10:26

## 2023-04-07 RX ADMIN — METOPROLOL SUCCINATE 50 MG: 50 TABLET, EXTENDED RELEASE ORAL at 07:53

## 2023-04-07 RX ADMIN — METFORMIN HYDROCHLORIDE 850 MG: 850 TABLET, FILM COATED ORAL at 07:52

## 2023-04-07 RX ADMIN — CLOTRIMAZOLE: 0.01 CREAM TOPICAL at 10:26

## 2023-04-07 RX ADMIN — ASPIRIN 81 MG CHEWABLE TABLET 81 MG: 81 TABLET CHEWABLE at 07:52

## 2023-04-07 RX ADMIN — POLYETHYLENE GLYCOL 3350 17 G: 17 POWDER, FOR SOLUTION ORAL at 07:50

## 2023-04-07 RX ADMIN — LEVOTHYROXINE SODIUM 25 MCG: 0.03 TABLET ORAL at 07:52

## 2023-04-07 RX ADMIN — DIVALPROEX SODIUM 750 MG: 500 TABLET, DELAYED RELEASE ORAL at 07:52

## 2023-04-07 RX ADMIN — CLONIDINE HYDROCHLORIDE 0.1 MG: 0.1 TABLET ORAL at 07:53

## 2023-04-07 RX ADMIN — HYDROXYZINE HYDROCHLORIDE 50 MG: 50 TABLET, FILM COATED ORAL at 07:53

## 2023-04-07 RX ADMIN — Medication 25 MCG: at 07:52

## 2023-04-07 RX ADMIN — QUETIAPINE 200 MG: 200 TABLET, FILM COATED ORAL at 07:53

## 2023-04-07 RX ADMIN — BACLOFEN 10 MG: 10 TABLET ORAL at 07:52

## 2023-04-07 RX ADMIN — KETOCONAZOLE: 20 CREAM TOPICAL at 10:26

## 2023-04-07 RX ADMIN — EMPAGLIFLOZIN 10 MG: 10 TABLET, FILM COATED ORAL at 07:56

## 2023-04-07 ASSESSMENT — ACTIVITIES OF DAILY LIVING (ADL)
ADLS_ACUITY_SCORE: 56

## 2023-04-07 NOTE — PROGRESS NOTES
Care Management Discharge Note     Discharge Date: 04/07/2023     Discharge Disposition:  [facility]     Discharge Transportation: Stretcher 11:00-11:50 4/7     Private pay costs discussed: Transportation     Patient/Family in Agreement with the Plan:  Yes     Handoff Referral Completed: No     Additional Information:  Plan: Discharge today to [facility]. Stretcher transport arranged from  4/7. PCS form in chart.      Meds and freestyle jose sent to [facility] to be filled and delivered to group [place].      Orders faxed to  at (f) [phone].  Manager Tamara updated on transport time and discharge plans.    [facility] confirmed funding approval for discharge date 4/7.     Hospital bed and lift delivered to Group Home. Briefs and wipes sent with pt for a few days, CM to order monthly delivery of incontinence supplies.     Patient and his mother updated on transport time for today.     SW will remain available for any further needs.      DANITA Obrien, [facility]   Inpatient Care Coordination  [facility]   [phone]

## 2023-04-07 NOTE — PLAN OF CARE
PRIMARY DIAGNOSIS: PLACEMENT  OUTPATIENT/OBSERVATION GOALS TO BE MET BEFORE DISCHARGE:  1. ADLs back to baseline: Yes    Activity and level of assistance: Up with maximum assistance. Baseline for pt.   2. Pain status: Pain free.    3. Return to near baseline physical activity: Yes     Discharge Planner Nurse   Safe discharge environment identified: Yes  Barriers to discharge: No       Entered by: Chantel Spencer RN 04/07/2023   /87 (Cuff Size: Adult Regular)   Pulse 72   Temp 97.9  F (36.6  C) (Oral)   Resp 16   Wt 99.9 kg (220 lb 4.8 oz)   SpO2 95%   Patient is discharging today to [facility] via Stretcher.   Please review provider order for any additional goals.   Nurse to notify provider when observation goals have been met and patient is ready for discharge.    Pt. Discharging to [facility] today 4/7/2023 11:15am stretcher transport.
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
## 2023-04-07 NOTE — PLAN OF CARE
Patient's After Visit Summary was reviewed with patient   Patient verbalized understanding of After Visit Summary, recommended follow up and was given an opportunity to ask questions.   Discharge medications sent home with patient/family: No   Discharged with transport tech      OBSERVATION patient END time: 11:50

## 2023-04-07 NOTE — PLAN OF CARE
PRIMARY DIAGNOSIS: PLACEMENT  OUTPATIENT/OBSERVATION GOALS TO BE MET BEFORE DISCHARGE:  1. ADLs back to baseline: Yes    Activity and level of assistance: Up with maximum assistance. Baseline for pt.   2. Pain status: Pain free.    3. Return to near baseline physical activity: Yes     Discharge Planner Nurse   Safe discharge environment identified: Yes  Barriers to discharge: No       Entered by: Ted Morris RN 04/07/2023 6:44 AM     Please review provider order for any additional goals.   Nurse to notify provider when observation goals have been met and patient is ready for discharge.    Pt. Discharging to [facility] today 4/7/2023 11:15am stretcher transport.

## 2023-04-07 NOTE — PLAN OF CARE
PRIMARY DIAGNOSIS: PLACEMENT  OUTPATIENT/OBSERVATION GOALS TO BE MET BEFORE DISCHARGE:  ADLs back to baseline: No     Activity and level of assistance: Up with maximum assistance.     Pain status: Pain free.     Return to near baseline physical activity: No          Discharge Planner Nurse   Safe discharge environment identified: Yes  Barriers to discharge: No  Entered by: Ana Castro RN 04/06/2023      /88   Pulse 89   Temp 97.6  F (36.4  C) (Oral)   Resp 16   Wt 99.9 kg (220 lb 4.8 oz)   SpO2 98%       Pt A & O X 4. On regular diet. Assist of one with cares and 2 with transfers using lift device. Denies any pain. Calm and cooperative. Incontinent of bowel and bladder. Medically cleared for discharge. Pt will be discharging to a group home tomorrow by 11.15 am. Will continue to provide supportive cares.      Please review provider order for any additional goals.   Nurse to notify provider when observation goals have been met and patient is ready for discharge.

## 2023-04-08 ENCOUNTER — TELEPHONE (OUTPATIENT)
Dept: FAMILY MEDICINE | Facility: CLINIC | Age: 33
End: 2023-04-08
Payer: MEDICARE

## 2023-04-08 ENCOUNTER — APPOINTMENT (OUTPATIENT)
Dept: GENERAL RADIOLOGY | Facility: CLINIC | Age: 33
End: 2023-04-08
Attending: EMERGENCY MEDICINE
Payer: MEDICARE

## 2023-04-08 ENCOUNTER — HOSPITAL ENCOUNTER (EMERGENCY)
Facility: CLINIC | Age: 33
Discharge: NURSING, REHAB OR LONG-TERM CARE | End: 2023-04-09
Attending: EMERGENCY MEDICINE | Admitting: EMERGENCY MEDICINE
Payer: MEDICARE

## 2023-04-08 ENCOUNTER — APPOINTMENT (OUTPATIENT)
Dept: CT IMAGING | Facility: CLINIC | Age: 33
End: 2023-04-08
Attending: EMERGENCY MEDICINE
Payer: MEDICARE

## 2023-04-08 DIAGNOSIS — I10 BENIGN ESSENTIAL HYPERTENSION: ICD-10-CM

## 2023-04-08 DIAGNOSIS — F63.81 INTERMITTENT EXPLOSIVE DISORDER IN ADULT: ICD-10-CM

## 2023-04-08 DIAGNOSIS — K59.00 CONSTIPATION, UNSPECIFIED CONSTIPATION TYPE: ICD-10-CM

## 2023-04-08 DIAGNOSIS — E11.9 TYPE 2 DIABETES MELLITUS WITHOUT COMPLICATION, WITHOUT LONG-TERM CURRENT USE OF INSULIN (H): ICD-10-CM

## 2023-04-08 DIAGNOSIS — R11.2 NAUSEA AND VOMITING, UNSPECIFIED VOMITING TYPE: ICD-10-CM

## 2023-04-08 DIAGNOSIS — R05.9 COUGH, UNSPECIFIED TYPE: ICD-10-CM

## 2023-04-08 DIAGNOSIS — J45.909 MILD REACTIVE AIRWAYS DISEASE, UNSPECIFIED WHETHER PERSISTENT: ICD-10-CM

## 2023-04-08 DIAGNOSIS — G82.20 PARAPLEGIA (H): ICD-10-CM

## 2023-04-08 LAB
BASOPHILS # BLD AUTO: 0 10E3/UL (ref 0–0.2)
BASOPHILS NFR BLD AUTO: 0 %
EOSINOPHIL # BLD AUTO: 0 10E3/UL (ref 0–0.7)
EOSINOPHIL NFR BLD AUTO: 0 %
ERYTHROCYTE [DISTWIDTH] IN BLOOD BY AUTOMATED COUNT: 15 % (ref 10–15)
GLUCOSE BLDC GLUCOMTR-MCNC: 178 MG/DL (ref 70–99)
HCT VFR BLD AUTO: 57.5 % (ref 40–53)
HGB BLD-MCNC: 19.3 G/DL (ref 13.3–17.7)
HOLD SPECIMEN: NORMAL
HOLD SPECIMEN: NORMAL
IMM GRANULOCYTES # BLD: 0.1 10E3/UL
IMM GRANULOCYTES NFR BLD: 1 %
LYMPHOCYTES # BLD AUTO: 1 10E3/UL (ref 0.8–5.3)
LYMPHOCYTES NFR BLD AUTO: 10 %
MCH RBC QN AUTO: 30.1 PG (ref 26.5–33)
MCHC RBC AUTO-ENTMCNC: 33.6 G/DL (ref 31.5–36.5)
MCV RBC AUTO: 90 FL (ref 78–100)
MONOCYTES # BLD AUTO: 1.3 10E3/UL (ref 0–1.3)
MONOCYTES NFR BLD AUTO: 12 %
NEUTROPHILS # BLD AUTO: 8 10E3/UL (ref 1.6–8.3)
NEUTROPHILS NFR BLD AUTO: 77 %
NRBC # BLD AUTO: 0 10E3/UL
NRBC BLD AUTO-RTO: 0 /100
PLATELET # BLD AUTO: 178 10E3/UL (ref 150–450)
RBC # BLD AUTO: 6.42 10E6/UL (ref 4.4–5.9)
WBC # BLD AUTO: 10.3 10E3/UL (ref 4–11)

## 2023-04-08 PROCEDURE — 80053 COMPREHEN METABOLIC PANEL: CPT | Performed by: EMERGENCY MEDICINE

## 2023-04-08 PROCEDURE — 71045 X-RAY EXAM CHEST 1 VIEW: CPT

## 2023-04-08 PROCEDURE — 36415 COLL VENOUS BLD VENIPUNCTURE: CPT | Performed by: EMERGENCY MEDICINE

## 2023-04-08 PROCEDURE — 250N000011 HC RX IP 250 OP 636: Performed by: EMERGENCY MEDICINE

## 2023-04-08 PROCEDURE — 250N000009 HC RX 250: Performed by: EMERGENCY MEDICINE

## 2023-04-08 PROCEDURE — C9803 HOPD COVID-19 SPEC COLLECT: HCPCS

## 2023-04-08 PROCEDURE — 87637 SARSCOV2&INF A&B&RSV AMP PRB: CPT | Performed by: EMERGENCY MEDICINE

## 2023-04-08 PROCEDURE — 85025 COMPLETE CBC W/AUTO DIFF WBC: CPT | Performed by: EMERGENCY MEDICINE

## 2023-04-08 PROCEDURE — 82962 GLUCOSE BLOOD TEST: CPT

## 2023-04-08 PROCEDURE — 99285 EMERGENCY DEPT VISIT HI MDM: CPT | Mod: CS,25

## 2023-04-08 PROCEDURE — 258N000003 HC RX IP 258 OP 636: Performed by: EMERGENCY MEDICINE

## 2023-04-08 PROCEDURE — G1010 CDSM STANSON: HCPCS

## 2023-04-08 PROCEDURE — 96361 HYDRATE IV INFUSION ADD-ON: CPT

## 2023-04-08 PROCEDURE — 83690 ASSAY OF LIPASE: CPT | Performed by: EMERGENCY MEDICINE

## 2023-04-08 PROCEDURE — 96374 THER/PROPH/DIAG INJ IV PUSH: CPT | Mod: 59

## 2023-04-08 PROCEDURE — 82248 BILIRUBIN DIRECT: CPT | Performed by: EMERGENCY MEDICINE

## 2023-04-08 PROCEDURE — 96375 TX/PRO/DX INJ NEW DRUG ADDON: CPT

## 2023-04-08 RX ORDER — CLONIDINE HYDROCHLORIDE 0.1 MG/1
0.1 TABLET ORAL 2 TIMES DAILY
Qty: 30 TABLET | Refills: 0 | Status: SHIPPED | OUTPATIENT
Start: 2023-04-08

## 2023-04-08 RX ORDER — QUETIAPINE FUMARATE 200 MG/1
200 TABLET, FILM COATED ORAL 2 TIMES DAILY
Qty: 60 TABLET | Refills: 0 | Status: SHIPPED | OUTPATIENT
Start: 2023-04-08

## 2023-04-08 RX ORDER — DIVALPROEX SODIUM 500 MG/1
500 TABLET, DELAYED RELEASE ORAL EVERY MORNING
Qty: 30 TABLET | Refills: 0 | Status: SHIPPED | OUTPATIENT
Start: 2023-04-08

## 2023-04-08 RX ORDER — OLANZAPINE 2.5 MG/1
2.5 TABLET, FILM COATED ORAL
Qty: 30 TABLET | Refills: 0 | Status: SHIPPED | OUTPATIENT
Start: 2023-04-08

## 2023-04-08 RX ORDER — HYDROXYZINE PAMOATE 50 MG/1
50 CAPSULE ORAL 3 TIMES DAILY
Qty: 90 CAPSULE | Refills: 0 | Status: SHIPPED | OUTPATIENT
Start: 2023-04-08

## 2023-04-08 RX ORDER — ONDANSETRON 2 MG/ML
4 INJECTION INTRAMUSCULAR; INTRAVENOUS EVERY 30 MIN PRN
Status: DISCONTINUED | OUTPATIENT
Start: 2023-04-08 | End: 2023-04-09 | Stop reason: HOSPADM

## 2023-04-08 RX ORDER — IOPAMIDOL 755 MG/ML
500 INJECTION, SOLUTION INTRAVASCULAR ONCE
Status: COMPLETED | OUTPATIENT
Start: 2023-04-09 | End: 2023-04-08

## 2023-04-08 RX ORDER — DIVALPROEX SODIUM 250 MG/1
250 TABLET, DELAYED RELEASE ORAL EVERY MORNING
Qty: 30 TABLET | Refills: 0 | Status: SHIPPED | OUTPATIENT
Start: 2023-04-08

## 2023-04-08 RX ORDER — QUETIAPINE FUMARATE 400 MG/1
400 TABLET, FILM COATED ORAL AT BEDTIME
Qty: 30 TABLET | Refills: 0 | Status: SHIPPED | OUTPATIENT
Start: 2023-04-08

## 2023-04-08 RX ORDER — ALBUTEROL SULFATE 90 UG/1
2 AEROSOL, METERED RESPIRATORY (INHALATION) EVERY 4 HOURS PRN
Qty: 18 G | Refills: 0 | Status: SHIPPED | OUTPATIENT
Start: 2023-04-08

## 2023-04-08 RX ORDER — GUAIFENESIN 600 MG/1
600 TABLET, EXTENDED RELEASE ORAL EVERY 12 HOURS PRN
Qty: 30 TABLET | Refills: 0 | Status: SHIPPED | OUTPATIENT
Start: 2023-04-08

## 2023-04-08 RX ORDER — VENLAFAXINE HYDROCHLORIDE 150 MG/1
150 CAPSULE, EXTENDED RELEASE ORAL AT BEDTIME
Qty: 30 CAPSULE | Refills: 0 | Status: SHIPPED | OUTPATIENT
Start: 2023-04-08

## 2023-04-08 RX ORDER — BACLOFEN 10 MG/1
10 TABLET ORAL 3 TIMES DAILY
Qty: 90 TABLET | Refills: 0 | Status: SHIPPED | OUTPATIENT
Start: 2023-04-08

## 2023-04-08 RX ORDER — AMOXICILLIN 250 MG
1 CAPSULE ORAL DAILY PRN
Qty: 30 TABLET | Refills: 0 | Status: SHIPPED | OUTPATIENT
Start: 2023-04-08

## 2023-04-08 RX ORDER — DIVALPROEX SODIUM 500 MG/1
1000 TABLET, DELAYED RELEASE ORAL AT BEDTIME
Qty: 30 TABLET | Refills: 0 | Status: SHIPPED | OUTPATIENT
Start: 2023-04-08

## 2023-04-08 RX ORDER — VENLAFAXINE HYDROCHLORIDE 75 MG/1
75 CAPSULE, EXTENDED RELEASE ORAL AT BEDTIME
Qty: 30 CAPSULE | Refills: 0 | Status: SHIPPED | OUTPATIENT
Start: 2023-04-08

## 2023-04-08 RX ADMIN — ONDANSETRON 4 MG: 2 INJECTION INTRAMUSCULAR; INTRAVENOUS at 23:16

## 2023-04-08 RX ADMIN — SODIUM CHLORIDE 1000 ML: 9 INJECTION, SOLUTION INTRAVENOUS at 23:16

## 2023-04-08 RX ADMIN — SODIUM CHLORIDE 65 ML: 9 INJECTION, SOLUTION INTRAVENOUS at 23:58

## 2023-04-08 RX ADMIN — IOPAMIDOL 100 ML: 755 INJECTION, SOLUTION INTRAVENOUS at 23:58

## 2023-04-08 ASSESSMENT — ACTIVITIES OF DAILY LIVING (ADL): ADLS_ACUITY_SCORE: 35

## 2023-04-08 ASSESSMENT — ENCOUNTER SYMPTOMS
ABDOMINAL PAIN: 1
DYSURIA: 0
FEVER: 0
SHORTNESS OF BREATH: 1
NAUSEA: 1
VOMITING: 1

## 2023-04-08 NOTE — TELEPHONE ENCOUNTER
Essential medications sent to [facility] in [place] per group home request.  Orders sent to [facility] at time of discharge.      LUIS Spangler CNP

## 2023-04-09 VITALS
OXYGEN SATURATION: 94 % | HEART RATE: 110 BPM | TEMPERATURE: 96.8 F | SYSTOLIC BLOOD PRESSURE: 134 MMHG | DIASTOLIC BLOOD PRESSURE: 107 MMHG | RESPIRATION RATE: 20 BRPM

## 2023-04-09 LAB
ALBUMIN SERPL BCG-MCNC: 5 G/DL (ref 3.5–5.2)
ALP SERPL-CCNC: 97 U/L (ref 40–129)
ALT SERPL W P-5'-P-CCNC: 112 U/L (ref 10–50)
ANION GAP SERPL CALCULATED.3IONS-SCNC: 20 MMOL/L (ref 7–15)
AST SERPL W P-5'-P-CCNC: 60 U/L (ref 10–50)
BILIRUB DIRECT SERPL-MCNC: <0.2 MG/DL (ref 0–0.3)
BILIRUB SERPL-MCNC: 0.5 MG/DL
BUN SERPL-MCNC: 14.5 MG/DL (ref 6–20)
CALCIUM SERPL-MCNC: 10 MG/DL (ref 8.6–10)
CHLORIDE SERPL-SCNC: 98 MMOL/L (ref 98–107)
CREAT SERPL-MCNC: 0.69 MG/DL (ref 0.67–1.17)
DEPRECATED HCO3 PLAS-SCNC: 21 MMOL/L (ref 22–29)
FLUAV RNA SPEC QL NAA+PROBE: NEGATIVE
FLUBV RNA RESP QL NAA+PROBE: NEGATIVE
GFR SERPL CREATININE-BSD FRML MDRD: >90 ML/MIN/1.73M2
GLUCOSE SERPL-MCNC: 192 MG/DL (ref 70–99)
LIPASE SERPL-CCNC: 17 U/L (ref 13–60)
POTASSIUM SERPL-SCNC: 4.3 MMOL/L (ref 3.4–5.3)
PROT SERPL-MCNC: 8.4 G/DL (ref 6.4–8.3)
RSV RNA SPEC NAA+PROBE: NEGATIVE
SARS-COV-2 RNA RESP QL NAA+PROBE: NEGATIVE
SODIUM SERPL-SCNC: 139 MMOL/L (ref 136–145)

## 2023-04-09 PROCEDURE — 258N000003 HC RX IP 258 OP 636: Performed by: EMERGENCY MEDICINE

## 2023-04-09 PROCEDURE — 93005 ELECTROCARDIOGRAM TRACING: CPT | Mod: RTG

## 2023-04-09 PROCEDURE — 250N000011 HC RX IP 250 OP 636: Performed by: EMERGENCY MEDICINE

## 2023-04-09 PROCEDURE — 96375 TX/PRO/DX INJ NEW DRUG ADDON: CPT

## 2023-04-09 PROCEDURE — 96361 HYDRATE IV INFUSION ADD-ON: CPT

## 2023-04-09 PROCEDURE — 96376 TX/PRO/DX INJ SAME DRUG ADON: CPT

## 2023-04-09 RX ORDER — METOCLOPRAMIDE 10 MG/1
10 TABLET ORAL 3 TIMES DAILY PRN
Qty: 12 TABLET | Refills: 0 | Status: SHIPPED | OUTPATIENT
Start: 2023-04-09 | End: 2023-04-12

## 2023-04-09 RX ORDER — METOCLOPRAMIDE HYDROCHLORIDE 5 MG/ML
10 INJECTION INTRAMUSCULAR; INTRAVENOUS ONCE
Status: COMPLETED | OUTPATIENT
Start: 2023-04-09 | End: 2023-04-09

## 2023-04-09 RX ORDER — DIPHENHYDRAMINE HYDROCHLORIDE 50 MG/ML
25 INJECTION INTRAMUSCULAR; INTRAVENOUS ONCE
Status: COMPLETED | OUTPATIENT
Start: 2023-04-09 | End: 2023-04-09

## 2023-04-09 RX ADMIN — DIPHENHYDRAMINE HYDROCHLORIDE 25 MG: 50 INJECTION, SOLUTION INTRAMUSCULAR; INTRAVENOUS at 02:52

## 2023-04-09 RX ADMIN — METOCLOPRAMIDE HYDROCHLORIDE 10 MG: 5 INJECTION INTRAMUSCULAR; INTRAVENOUS at 02:52

## 2023-04-09 RX ADMIN — SODIUM CHLORIDE 1000 ML: 9 INJECTION, SOLUTION INTRAVENOUS at 01:14

## 2023-04-09 RX ADMIN — ONDANSETRON 4 MG: 2 INJECTION INTRAMUSCULAR; INTRAVENOUS at 01:22

## 2023-04-09 ASSESSMENT — ACTIVITIES OF DAILY LIVING (ADL)
ADLS_ACUITY_SCORE: 35

## 2023-04-09 NOTE — ED PROVIDER NOTES
History     Chief Complaint:  Vomiting       HPI   Chris aGbriel is a 32 year old male with a history of TBI with paraplegia, type 2 diabetes, and hypertension who presents with vomiting. The patient reports he developed epigastric pain and vomiting today. He endorses chest pain and shortness of breath with his symptoms. The patient denies dysuria or fevers.    Independent Historian:   None - Patient Only    Review of External Notes:   Reviewed discharge summary from admission 4/7/23    ROS:  Review of Systems   Constitutional: Negative for fever.   Respiratory: Positive for shortness of breath.    Cardiovascular: Positive for chest pain.   Gastrointestinal: Positive for abdominal pain, nausea and vomiting.   Genitourinary: Negative for dysuria.   All other systems reviewed and are negative.    Allergies:  No Known Allergies     Medications:    Albuterol  Lipitor  Lioresal  Carapres  Depakote  Jardiance  Mucinex  Vistaril  Synthroid  Metformin  Toprol   Remeron  Mycostatin  Zyprexa  Seroquel  Senokot  Effexor    Past Medical History:    Hypothyroidism  Hypertension  Hyperlipidemia  Type 2 Diabetes    Past Surgical History:    No past surgical history.    Family History:    family history is not on file.    Social History:  The patient lives in a group home.  The patient presents alone.  PCP: Carol Escamilla     Physical Exam     Patient Vitals for the past 24 hrs:   BP Temp Temp src Pulse Resp SpO2   04/09/23 0315 (!) 145/117 -- -- 105 -- 91 %   04/09/23 0300 (!) 142/108 -- -- 113 -- 93 %   04/09/23 0240 (!) 130/99 -- -- -- -- 95 %   04/09/23 0230 (!) 152/116 -- -- 112 -- 95 %   04/09/23 0220 (!) 148/102 -- -- -- -- 94 %   04/09/23 0210 -- -- -- -- -- 93 %   04/09/23 0200 (!) 141/98 -- -- 111 -- 94 %   04/09/23 0150 (!) 141/93 -- -- -- -- 95 %   04/09/23 0130 (!) 137/100 -- -- 111 -- 94 %   04/09/23 0100 (!) 154/101 -- -- 107 -- --   04/09/23 0040 -- -- -- -- -- 97 %   04/09/23 0030 (!) 138/94 -- -- 114 -- 94 %    04/08/23 2350 (!) 141/95 -- -- -- -- --   04/08/23 2330 (!) 144/106 -- -- 110 -- --   04/08/23 2300 (!) 153/102 -- -- 109 -- 91 %   04/08/23 2245 (!) 126/110 -- -- 108 -- 91 %   04/08/23 2230 (!) 134/108 -- -- 110 -- 92 %   04/08/23 2215 (!) 145/95 -- -- 111 -- 94 %   04/08/23 2200 (!) 131/122 -- -- 99 -- 94 %   04/08/23 2150 (!) 123/104 -- -- -- -- 95 %   04/08/23 2140 (!) 142/89 -- -- 117 -- 97 %   04/08/23 2131 (!) 166/115 96.8  F (36  C) Temporal 115 20 96 %      Physical Exam  General: Patient is awake, alert. Retching on exam   Head: The scalp, face, and head appear normal  Eyes: The pupils are equal, round, and reactive to light. Conjunctivae and sclerae are normal  ENT: External acoustic canals are normal. The oropharynx is normal without erythema. Uvula is in the midline  Neck: Normal range of motion.   CV: tachycardiac but regular   Resp: Lungs are clear without wheezes or rales. No respiratory distress.   GI: Abdomen is soft, no rigidity, guarding, or rebound. No distension. No tenderness to palpation in any quadrant.     MS: Joints grossly normal without effusions. No asymmetric leg swelling, calf or thigh tenderness.    Skin: No rash or lesions noted. Normal capillary refill noted  Neuro: paraplegia, able to move upper extremities. Speech is normal and fluent. Face is symmetric.   Psych:  flat affect.  Appropriate interactions.    Emergency Department Course   ECG  ECG taken at 0054, ECG read at 0057  Sinus Tachycardia. Nonspecific T wave abnormality.   Rate 110 bpm. WI interval 166 ms. QRS duration 84 ms. QT/QTc 334/452 ms. P-R-T axes 56 -24 93.     Imaging:  CT Abdomen Pelvis w Contrast   Final Result   IMPRESSION:    1.  No acute process.   2.  Mild splenomegaly.   3.  Nonobstructing right renal stone.   4.  Moderate stool retention.   5.  Left hemifusion thoracolumbar spine.      XR Chest Port 1 View   Final Result   IMPRESSION: Shallow inspiration with segmental elevation right hemidiaphragm.  Perihilar interstitial change felt to be due to low level of inspiration. Postsurgical changes thoracolumbar junction.         Report per radiology    Laboratory:  Labs Ordered and Resulted from Time of ED Arrival to Time of ED Departure   BASIC METABOLIC PANEL - Abnormal       Result Value    Sodium 139      Potassium 4.3      Chloride 98      Carbon Dioxide (CO2) 21 (*)     Anion Gap 20 (*)     Urea Nitrogen 14.5      Creatinine 0.69      Calcium 10.0      Glucose 192 (*)     GFR Estimate >90     CBC WITH PLATELETS AND DIFFERENTIAL - Abnormal    WBC Count 10.3      RBC Count 6.42 (*)     Hemoglobin 19.3 (*)     Hematocrit 57.5 (*)     MCV 90      MCH 30.1      MCHC 33.6      RDW 15.0      Platelet Count 178      % Neutrophils 77      % Lymphocytes 10      % Monocytes 12      % Eosinophils 0      % Basophils 0      % Immature Granulocytes 1      NRBCs per 100 WBC 0      Absolute Neutrophils 8.0      Absolute Lymphocytes 1.0      Absolute Monocytes 1.3      Absolute Eosinophils 0.0      Absolute Basophils 0.0      Absolute Immature Granulocytes 0.1      Absolute NRBCs 0.0     HEPATIC FUNCTION PANEL - Abnormal    Protein Total 8.4 (*)     Albumin 5.0      Bilirubin Total 0.5      Alkaline Phosphatase 97      AST 60 (*)      (*)     Bilirubin Direct <0.20     GLUCOSE BY METER - Abnormal    GLUCOSE BY METER POCT 178 (*)    LIPASE - Normal    Lipase 17     INFLUENZA A/B, RSV, & SARS-COV2 PCR - Normal    Influenza A PCR Negative      Influenza B PCR Negative      RSV PCR Negative      SARS CoV2 PCR Negative         Emergency Department Course & Assessments:     Interventions:  Medications   ondansetron (ZOFRAN) injection 4 mg (4 mg Intravenous $Given 4/9/23 0122)   0.9% sodium chloride BOLUS (0 mLs Intravenous Stopped 4/9/23 0057)   iopamidol (ISOVUE-370) solution 500 mL (100 mLs Intravenous $Given 4/8/23 1572)   Sodium Chloride for CT Scan Flush Use (65 mLs Intravenous $Given 4/8/23 9634)   0.9% sodium chloride  BOLUS (0 mLs Intravenous Stopped 4/9/23 0217)   metoclopramide (REGLAN) injection 10 mg (10 mg Intravenous $Given 4/9/23 0252)   diphenhydrAMINE (BENADRYL) injection 25 mg (25 mg Intravenous $Given 4/9/23 0252)           Social Determinants of Health affecting care:   Social Connections/Isolation    Disposition:  The patient was discharged to home.     Impression & Plan      Medical Decision Making:  Chris Gabriel is a 32 year old male with a history of TBI with paraplegia, type 2 diabetes, and hypertension who presents with vomiting.  On physical exam he looks uncomfortable and is actively retching.  Vitally he has mild tachycardia but is otherwise hemodynamically stable.  He is afebrile.  His abdominal exam is fairly benign.  However given his complex medical history a CT scan was performed to rule out significant surgical pathology.  This was negative for any surgical pathology or clear etiology of the patient's vomiting.  The remainder of his blood work was fairly reassuring.  COVID, influenza and RSV were negative.  Patient had some continuing vomiting after multiple doses of Zofran.  However he had significant improvement of his symptoms after Reglan and Benadryl.  He was able to tolerate p.o. and desires to be discharged back to his group home.  I feel that this is reasonable at this time.  He can return to the emergency department any new or worsening symptoms.    Diagnosis:    ICD-10-CM    1. Nausea and vomiting, unspecified vomiting type  R11.2            Discharge Medications:  New Prescriptions    METOCLOPRAMIDE (REGLAN) 10 MG TABLET    Take 1 tablet (10 mg) by mouth 3 times daily as needed      Scribe Disclosure:  I, Agustín Robles, am serving as a scribe at 11:20 PM on 4/8/2023 to document services personally performed by Chauncey Ruiz MD based on my observations and the provider's statements to me.   4/8/2023   Chauncey Ruiz MD Battista, Christopher Joseph,  MD  04/09/23 0551
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
## 2023-04-09 NOTE — ED TRIAGE NOTES
Pt presents to ED from [facility] via EMS. Staff at [facility] report 1 episode of vomiting today. Pt vitally stable per EMS.

## 2023-04-09 NOTE — ED NOTES
Bed: ASHOKRUBY  Expected date: 4/8/23  Expected time: 9:18 PM  Means of arrival:   Comments:   2-A [place]

## 2023-04-09 NOTE — ED NOTES
Bed: ED11  Expected date:   Expected time:   Means of arrival:   Comments:  Hallway pt - room needs cleaning

## 2023-04-10 LAB
ATRIAL RATE - MUSE: 110 BPM
DIASTOLIC BLOOD PRESSURE - MUSE: NORMAL MMHG
INTERPRETATION ECG - MUSE: NORMAL
P AXIS - MUSE: 56 DEGREES
PR INTERVAL - MUSE: 166 MS
QRS DURATION - MUSE: 84 MS
QT - MUSE: 334 MS
QTC - MUSE: 452 MS
R AXIS - MUSE: -24 DEGREES
SYSTOLIC BLOOD PRESSURE - MUSE: NORMAL MMHG
T AXIS - MUSE: 93 DEGREES
VENTRICULAR RATE- MUSE: 110 BPM

## 2023-04-11 ENCOUNTER — TELEPHONE (OUTPATIENT)
Dept: SCHEDULING | Facility: CLINIC | Age: 33
End: 2023-04-11
Payer: MEDICARE

## 2023-04-11 NOTE — TELEPHONE ENCOUNTER
FYI - Status Update    Who is Calling: nurseTamara    Update: Called to schedule hospital follow up visit. Soonest at location was 5/3. Pt needs to be seen sooner if possible.     Does caller want a call/response back: Yes     Okay to leave a detailed message?: Yes at Other phone number:  [phone], Nurses number

## 2023-06-15 NOTE — PLAN OF CARE
PRIMARY DIAGNOSIS: PLACEMENT  OUTPATIENT/OBSERVATION GOALS TO BE MET BEFORE DISCHARGE:  1. ADLs back to baseline: Yes    2. Activity and level of assistance: A X 2    3. Pain status: Pain free.    4. Return to near baseline physical activity: Yes     Discharge Planner Nurse   Safe discharge environment identified: Yes  Barriers to discharge: No       Entered by: Preston Arango RN 10/29/2022 3:42 AM  Pt AO x4, A x 2 with lift device but not OOB this shift. Pt incontinent of bowel and bladder, calls appropriately, SW following with  safe disposition  Please review provider order for any additional goals.   Nurse to notify provider when observation goals have been met and patient is ready for discharge.Goal Outcome Evaluation:                         No

## 2023-10-25 ENCOUNTER — TRANSFERRED RECORDS (OUTPATIENT)
Dept: EMERGENCY MEDICINE | Facility: CLINIC | Age: 33
End: 2023-10-25

## 2023-10-25 ENCOUNTER — HOSPITAL ENCOUNTER (EMERGENCY)
Facility: CLINIC | Age: 33
Discharge: HOME | End: 2023-10-25
Attending: EMERGENCY MEDICINE | Admitting: EMERGENCY MEDICINE
Payer: MEDICARE

## 2023-10-25 VITALS
TEMPERATURE: 98.1 F | OXYGEN SATURATION: 95 % | RESPIRATION RATE: 18 BRPM | SYSTOLIC BLOOD PRESSURE: 138 MMHG | HEART RATE: 84 BPM | DIASTOLIC BLOOD PRESSURE: 97 MMHG
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
## 2023-10-25 DIAGNOSIS — S01.512A LACERATION OF TONGUE, INITIAL ENCOUNTER: ICD-10-CM

## 2023-10-25 PROCEDURE — 99283 EMERGENCY DEPT VISIT LOW MDM: CPT

## 2023-10-25 ASSESSMENT — ACTIVITIES OF DAILY LIVING (ADL)
ADLS_ACUITY_SCORE: 35
ADLS_ACUITY_SCORE: 35
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
## 2023-10-25 NOTE — ED NOTES
Report given to [facility] RN @ [phone]   Requested Prescriptions     Pending Prescriptions Disp Refills    glipiZIDE (GLUCOTROL) 10 mg tablet 30 Tab 5     Sig: TAKE ONE TABLET BY MOUTH DAILY BEFORE BREAKFAST    dulaglutide (Trulicity) 1.5 JQ/5.0 mL sub-q pen 2 Syringe 4     Sig: INJECT 0.5 ML UNDER THE SKIN ONCE WEEKLY     Future Appointments:  No future appointments.      Last Appointment With Me:  Visit date not found     Requested Prescriptions     Pending Prescriptions Disp Refills    glipiZIDE (GLUCOTROL) 10 mg tablet 30 Tab 5     Sig: TAKE ONE TABLET BY MOUTH DAILY BEFORE BREAKFAST    dulaglutide (Trulicity) 1.5 DL/8.3 mL sub-q pen 2 Syringe 4     Sig: INJECT 0.5 ML UNDER THE SKIN ONCE WEEKLY
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
## 2023-10-25 NOTE — DISCHARGE INSTRUCTIONS
Your tongue laceration will heal very well on its own.  There is no need for stitches.  You can eat and drink as you normally would.  We recommend you continue taking your medications as prescribed.  Please return the emergency department if you have any new or concerning symptoms.

## 2023-12-11 NOTE — PROGRESS NOTES
[facility] ED NOTE    Patient : Mary Lou Lafleur Age: 24 year old Sex: female   MRN: [id number] Encounter Date: 2023    History of Present Illness      Chief Complaint   Patient presents with    Headache Recurrent or Known DX Migraines     HPI    Patient is a 24-year-old otherwise healthy female presenting to the emergency department for evaluation of migraine-like headaches for the past year.  Patient states that she gets migraines once to twice a month.  States that she saw PCP who prescribed migraine medication but has not been taking secondary to side effects.  States that she has referral to her neurologist but not until March.  Patient states that she takes Tylenol with some relief.  States that it helps for 1 to 2 hours but returned.  Patient noting pain to be 8 out of 10.  Endorsing photophobia.  Patient endorsing nausea without vomiting.  Patient has pain to the right temporal/occipital region.  Patient denying any head injury.  Denying any visual changes.    Past Medical History     No Known Allergies    Past Medical History:   Diagnosis Date    No known problems        Past Surgical History:   Procedure Laterality Date     SECTION, LOW TRANSVERSE      CHOLECYSTECTOMY         No family history on file.    Social History     Tobacco Use    Smoking status: Never    Smokeless tobacco: Never   Vaping Use    Vaping Use: never used   Substance Use Topics    Alcohol use: Not Currently    Drug use: Never       Physical Exam     ED Triage Vitals [23 1533]   ED Triage Vitals Group      Temp 97.7 °F (36.5 °C)      Heart Rate (!) 108      Resp 18      /88      SpO2 100 %      EtCO2 mmHg       Height 5' (1.524 m)      Weight 190 lb 11.2 oz (86.5 kg)      Weight Scale Used Standing scale      BMI (Calculated) 37.24      IBW/kg (Calculated) 45.5       Physical Exam  Vitals and nursing note reviewed.   Constitutional:       General: She is awake. She is not in acute distress.      PRIMARY DIAGNOSIS:Placement  OUTPATIENT/OBSERVATION GOALS TO BE MET BEFORE DISCHARGE:  1. ADLs back to baseline: Yes    Activity and level of assistance: A1 for incontinence cares- A2 lift up to chair  2. Pain status: Pain free.    3. Return to near baseline physical activity: Yes     Discharge Planner Nurse   Safe discharge environment identified: No  Barriers to discharge:Yes       Entered by: Taniya Brumfield RN 12/05/2022        Please review provider order for any additional goals.   Nurse to notify provider when observation goals have been met and patient is ready for discharge.    Alert and calm/cooperative. - 1 unit novolog given per sliding scale insulin orders.  SW continuing to follow for safe dispo planning.    Appearance: Normal appearance. She is not ill-appearing.   HENT:      Head: Normocephalic and atraumatic.      Right Ear: External ear normal.      Left Ear: External ear normal.      Neck: Normal range of motion.   Eyes:      Extraocular Movements: Extraocular movements intact.      Conjunctiva/sclera: Conjunctivae normal.   Cardiovascular:      Rate and Rhythm: Normal rate and regular rhythm.   Pulmonary:      Effort: Pulmonary effort is normal.      Breath sounds: Normal breath sounds and air entry.   Abdominal:      General: Abdomen is flat.      Tenderness: There is no abdominal tenderness.   Musculoskeletal:         General: Normal range of motion.   Skin:     General: Skin is warm.      Capillary Refill: Capillary refill takes less than 2 seconds.   Neurological:      General: No focal deficit present.      Mental Status: She is alert and oriented to person, place, and time.      Cranial Nerves: Cranial nerves 2-12 are intact.      Sensory: Sensation is intact.      Motor: Motor function is intact.      Coordination: Coordination is intact.      Gait: Gait is intact. Gait normal.   Psychiatric:         Mood and Affect: Mood normal.         Speech: Speech normal.         Behavior: Behavior normal.         Procedures    ED Medication Orders (From admission, onward)      Ordered Start     Status Ordering Provider    12/11/23 1616 12/11/23 1630  diphenhydrAMINE (BENADRYL) injection 25 mg  ONCE         Last MAR action: Given BRIDGER FINNEY    12/11/23 1614 12/11/23 1615  sodium chloride (NORMAL SALINE) 0.9 % bolus 500 mL  ONCE         Last MAR action: Completed BRIDGER FINNEY    12/11/23 1614 12/11/23 1615  metoCLOPramide (REGLAN) injection 10 mg  ONCE         Last MAR action: Given BRIDGER FINNEY    12/11/23 1614 12/11/23 1615  ketorolac (TORADOL) injection 15 mg  ONCE         Last MAR action: Given BRIDGER FINNEY            Vitals:    12/11/23 1721 12/11/23 1745 12/11/23 1800 12/11/23  1845   BP: (!) 142/98 105/89 121/81 118/89   BP Location: LUE - Left upper extremity   LUE - Left upper extremity   Patient Position: Sitting/High-Nash's   Sitting/High-Nash's   Pulse: 85   84   Resp: 18   16   Temp:       SpO2: 100% 98% 98% 98%   Weight:       Height:           Lab Results     Results for orders placed or performed during the hospital encounter of 12/11/23   Comprehensive Metabolic Panel   Result Value Ref Range    Fasting Status      Sodium 143 135 - 145 mmol/L    Potassium 3.7 3.4 - 5.1 mmol/L    Chloride 105 97 - 110 mmol/L    Carbon Dioxide 28 21 - 32 mmol/L    Anion Gap 14 7 - 19 mmol/L    Glucose 131 (H) 70 - 99 mg/dL    BUN 14 6 - 20 mg/dL    Creatinine 0.55 0.51 - 0.95 mg/dL    Glomerular Filtration Rate >90 >=60    BUN/Cr 25 7 - 25    Calcium 8.7 8.4 - 10.2 mg/dL    Bilirubin, Total 0.2 0.2 - 1.0 mg/dL    GOT/AST 9 <=37 Units/L    GPT/ALT 18 <64 Units/L    Alkaline Phosphatase 85 45 - 117 Units/L    Albumin 3.4 (L) 3.6 - 5.1 g/dL    Protein, Total 7.6 6.4 - 8.2 g/dL    Globulin 4.2 (H) 2.0 - 4.0 g/dL    A/G Ratio 0.8 (L) 1.0 - 2.4   Urinalysis & Reflex Microscopy With Culture If Indicated   Result Value Ref Range    COLOR, URINALYSIS Straw     APPEARANCE, URINALYSIS Clear     GLUCOSE, URINALYSIS Negative Negative mg/dL    BILIRUBIN, URINALYSIS Negative Negative    KETONES, URINALYSIS Negative Negative mg/dL    SPECIFIC GRAVITY, URINALYSIS 1.025 1.005 - 1.030    OCCULT BLOOD, URINALYSIS Moderate (A) Negative    PH, URINALYSIS 6.0 5.0 - 7.0    PROTEIN, URINALYSIS Negative Negative mg/dL    UROBILINOGEN, URINALYSIS 0.2 0.2, 1.0 mg/dL    NITRITE, URINALYSIS Negative Negative    LEUKOCYTE ESTERASE, URINALYSIS Negative Negative    SQUAMOUS EPITHELIAL, URINALYSIS 6 to 10 (A) None Seen, 1 to 5 /hpf    ERYTHROCYTES, URINALYSIS 11 to 25 (A) None Seen, 1 to 2 /hpf    LEUKOCYTES, URINALYSIS 1 to 5 None Seen, 1 to 5 /hpf    BACTERIA, URINALYSIS Few (A) None Seen /hpf    HYALINE CASTS, URINALYSIS  None Seen None Seen, 1 to 5 /lpf    MUCUS Present    CBC with Automated Differential (performable only)   Result Value Ref Range    WBC 13.2 (H) 4.2 - 11.0 K/mcL    RBC 4.71 4.00 - 5.20 mil/mcL    HGB 14.1 12.0 - 15.5 g/dL    HCT 41.7 36.0 - 46.5 %    MCV 88.5 78.0 - 100.0 fl    MCH 29.9 26.0 - 34.0 pg    MCHC 33.8 32.0 - 36.5 g/dL    RDW-CV 12.7 11.0 - 15.0 %    RDW-SD 41.1 39.0 - 50.0 fL     140 - 450 K/mcL    NRBC 0 <=0 /100 WBC    Neutrophil, Percent 71 %    Lymphocytes, Percent 21 %    Mono, Percent 5 %    Eosinophils, Percent 1 %    Basophils, Percent 1 %    Immature Granulocytes 1 %    Absolute Neutrophils 9.6 (H) 1.8 - 7.7 K/mcL    Absolute Lymphocytes 2.7 1.0 - 4.8 K/mcL    Absolute Monocytes 0.6 0.3 - 0.9 K/mcL    Absolute Eosinophils  0.2 0.0 - 0.5 K/mcL    Absolute Basophils 0.1 0.0 - 0.3 K/mcL    Absolute Immature Granulocytes 0.1 0.0 - 0.2 K/mcL   HCG POC   Result Value Ref Range    HCG, URINE - POINT OF CARE Negative Negative       Imaging Results              CT HEAD WO CONTRAST (Final result)  Result time 12/11/23 17:25:25      Final result                   Impression:    No acute intracranial process. Acute maxillary sinusitis.            Electronically Signed by: CARLA ISBELL MD   Signed on: 12/11/2023 5:25 PM   Workstation ID: [id number]               Narrative:    EXAMINATION: CT HEAD WO CONTRAST.    CLINICAL INDICATION: New onset migraine.    COMPARISON: None.    Axial images of the head were obtained without administration of contrast  material.  Adjustment of the mA and/or kV was done based on the patient's size.    Ventricles are normal for age. Basal cisterns and sulci over the  convexities are within normal limits. No bleed, no shift and no mass effect  is seen. No acute cortical infarct is appreciated. There is evidence of  acute sinusitis involving both maxillary sinuses.                                      Medical Decision Making      Medical Decision Making            24 year old female presents to ED complaining of headache since Friday.  Patient states that she believes she has migraines.  States that she has been having migraine-like headache for the past year.  Has referral to neurologist in March.. Multiple medical diagnosis were considered. HPI and physical exam as documented above.  In the ED patient appeared afebrile, nontoxic, and in no acute distress.  Upon arrival to the ED, patient is tachycardic at 108.  Patient started IV fluids, given Reglan, Toradol, benadryl.     Labs initiated.  CBC: Leukocytosis of 13.2, absolute neutrophils 9.6  CMP: Hyperglycemia 131  UA: Male with squamous cells, will send for culture  hCG POC negative    CT head showing acute sinusitis involving both maxillary sinuses.    On reexamination, patient noting improvement in symptoms. No longer having any pain. Labs and imaging printed and discussed with patient in detail.  Discussed following up with neurologist for further evaluation.  Neurology contact given.  Discussed sinusitis may be possible cause of headache, Augmentin sent to patient's pharmacy along with fluticasone.  Patient advised to use as directed. Fiorocet sent to patients pharmacy, advised to take as needed for further headached. Discussed following up with PCP in 2 days for reassessment.  Strict return precautions were given.  Patient advised to return to the ED for any new or worsening symptoms including worsening headache, visual changes, intractable nausea and vomiting. Patient verbalized understanding and was in agreement with the plan. All questions were answered.   Patient was discharged home in improved condition.  Supportive care discussed with patient in detail.  Patient advised to follow-up with PCP as directed.  Strict return precautions discussed with patient in detail. Return to ED if new or worsening symptoms.    Patient is accompanied by self, who is aiding in history.    Limitations to history/exam/care:  none  Co-morbidities impacting care: none/denies  Social factors impacting care: none known   External records reviewed: none available  Tests considered but not performed: N/A  Consults:    Clinical Impression     ED Diagnosis   1. Acute maxillary sinusitis, recurrence not specified            Disposition        Discharge 12/11/2023  6:30 PM  Mary Lou Kamron Lafleur discharge to home/self care.          Luci Washington PA-C   12/11/2023 4:10 PM     Discharge Medication List as of 12/11/2023  6:32 PM        START taking these medications    Details   amoxicillin-clavulanate (AUGMENTIN) 875-125 MG per tablet Take 1 tablet by mouth every 12 hours for 7 days.Eprescribe, Disp-14 tablet, R-0      fluticasone (FLONASE) 50 MCG/ACT nasal spray Spray 2 sprays in each nostril daily.Eprescribe, Disp-16 g, R-1      butalbital-acetaminophen-caffeine (Fioricet) -40 MG capsule Take 1 capsule by mouth every 4 hours as needed (For Headache.).Eprescribe, Disp-9 capsule, R-0             Employed shared decision making with the patient and all questions answered in my usual fashion. The patient and/or family was counseled on the preliminary diagnosis, treatment, prescription medications (especially for any sedating medications), and instructed on concerning signs and symptoms to return to the ED for further evaluation. Involved parties verbalized their understanding of the instructions given.    The 21st Century Cures Act makes medical notes like these available to patients in the interest of transparency. However, be advised this is a medical document. It is intended as peer to peer communication. It is written in medical language and may contain abbreviations, jargon, and other verbiage that could be misleading or confusing to lay persons. It may appear blunt or direct. Medical documents are intended to carry relevant information, facts as evident, and the clinical opinion of the medical provider.    This note was made  using voice dictation and may include inadvertent errors due to the dictation software. Please contact for clarification regarding any noted discrepancies.         Luci Washnigton PA-C  12/11/23 1950

## 2024-02-09 ENCOUNTER — MEDICAL CORRESPONDENCE (OUTPATIENT)
Dept: HEALTH INFORMATION MANAGEMENT | Facility: CLINIC | Age: 34
End: 2024-02-09
Payer: MEDICARE

## 2024-02-25 ENCOUNTER — APPOINTMENT (OUTPATIENT)
Dept: GENERAL RADIOLOGY | Facility: CLINIC | Age: 34
End: 2024-02-25
Attending: EMERGENCY MEDICINE
Payer: MEDICARE

## 2024-02-25 ENCOUNTER — HOSPITAL ENCOUNTER (EMERGENCY)
Facility: CLINIC | Age: 34
Discharge: NURSING, REHAB OR LONG-TERM CARE | End: 2024-02-25
Attending: EMERGENCY MEDICINE | Admitting: EMERGENCY MEDICINE
Payer: MEDICARE

## 2024-02-25 VITALS
HEART RATE: 80 BPM | OXYGEN SATURATION: 94 % | DIASTOLIC BLOOD PRESSURE: 81 MMHG | TEMPERATURE: 98.1 F | SYSTOLIC BLOOD PRESSURE: 124 MMHG

## 2024-02-25 DIAGNOSIS — S82.301A CLOSED EXTRA-ARTICULAR FRACTURE OF DISTAL TIBIA, RIGHT, INITIAL ENCOUNTER: ICD-10-CM

## 2024-02-25 PROCEDURE — 73630 X-RAY EXAM OF FOOT: CPT | Mod: RT

## 2024-02-25 PROCEDURE — 73610 X-RAY EXAM OF ANKLE: CPT | Mod: RT

## 2024-02-25 PROCEDURE — 73590 X-RAY EXAM OF LOWER LEG: CPT | Mod: RT

## 2024-02-25 PROCEDURE — 250N000013 HC RX MED GY IP 250 OP 250 PS 637: Performed by: EMERGENCY MEDICINE

## 2024-02-25 PROCEDURE — 27808 TREATMENT OF ANKLE FRACTURE: CPT | Mod: RT

## 2024-02-25 PROCEDURE — 99284 EMERGENCY DEPT VISIT MOD MDM: CPT | Mod: 25

## 2024-02-25 RX ORDER — HYDROCODONE BITARTRATE AND ACETAMINOPHEN 5; 325 MG/1; MG/1
1 TABLET ORAL EVERY 6 HOURS PRN
Qty: 10 TABLET | Refills: 0 | Status: SHIPPED | OUTPATIENT
Start: 2024-02-25

## 2024-02-25 RX ORDER — IBUPROFEN 600 MG/1
600 TABLET, FILM COATED ORAL ONCE
Status: COMPLETED | OUTPATIENT
Start: 2024-02-25 | End: 2024-02-25

## 2024-02-25 RX ORDER — HYDROCODONE BITARTRATE AND ACETAMINOPHEN 5; 325 MG/1; MG/1
1 TABLET ORAL ONCE
Status: COMPLETED | OUTPATIENT
Start: 2024-02-25 | End: 2024-02-25

## 2024-02-25 RX ORDER — IBUPROFEN 600 MG/1
600 TABLET, FILM COATED ORAL EVERY 6 HOURS PRN
Qty: 30 TABLET | Refills: 1 | Status: SHIPPED | OUTPATIENT
Start: 2024-02-25

## 2024-02-25 RX ADMIN — IBUPROFEN 600 MG: 600 TABLET, FILM COATED ORAL at 07:40

## 2024-02-25 RX ADMIN — HYDROCODONE BITARTRATE AND ACETAMINOPHEN 1 TABLET: 5; 325 TABLET ORAL at 07:40

## 2024-02-25 ASSESSMENT — COLUMBIA-SUICIDE SEVERITY RATING SCALE - C-SSRS
6. HAVE YOU EVER DONE ANYTHING, STARTED TO DO ANYTHING, OR PREPARED TO DO ANYTHING TO END YOUR LIFE?: NO
1. IN THE PAST MONTH, HAVE YOU WISHED YOU WERE DEAD OR WISHED YOU COULD GO TO SLEEP AND NOT WAKE UP?: NO
2. HAVE YOU ACTUALLY HAD ANY THOUGHTS OF KILLING YOURSELF IN THE PAST MONTH?: NO

## 2024-02-25 ASSESSMENT — ACTIVITIES OF DAILY LIVING (ADL)
ADLS_ACUITY_SCORE: 38

## 2024-02-25 NOTE — ED PROVIDER NOTES
History     Chief Complaint:  Fall, Right Ankle    HPI   Chris Gabriel is a 33 year old male with a history of diabetes, asthma and hypercholesterolemia arriving from a group home who presents for evaluation of a fall with involvement of right ankle. The fall occurred after attempting to stand when normally wheelchair bound. The patient thinks it might be broken, as he has had broken bones before and feels similar, noting his big toe as being particularly painful. He notes that a staff member saw him fall. Chris denies additional sites of injury or pain.    Independent Historian:   None - Patient Only    Review of External Notes:       Medications:    Atarax  Senna  Metformin  Zyprexa  Lipitor  Proair  Lioresal  Catapres  Depakote  Jardiance  Mucinex  Vistaril  Albuterol  Synthroid  Toprol  Remeron  Seroquel  Senokot  Effexor    Past Medical History:    Diabetes  Asthma  Hypercholesterolemia    Past Surgical History:    No surgical history to report    Physical Exam   Patient Vitals for the past 24 hrs:   BP Temp Temp src Pulse SpO2   02/25/24 0930 (!) 135/93 -- -- 80 97 %   02/25/24 0915 (!) 132/99 -- -- 73 97 %   02/25/24 0830 (!) 139/104 -- -- 81 97 %   02/25/24 0815 (!) 127/94 -- -- 72 97 %   02/25/24 0745 (!) 146/102 -- -- 78 96 %   02/25/24 0740 (!) 146/102 -- -- -- 96 %   02/25/24 0730 (!) 136/94 -- -- -- 96 %   02/25/24 0727 (!) 136/94 98.1  F (36.7  C) Oral 81 97 %        Physical Exam  Vitals reviewed.   Eyes:      Pupils: Pupils are equal, round, and reactive to light.   Cardiovascular:      Rate and Rhythm: Normal rate.   Pulmonary:      Effort: Pulmonary effort is normal.   Abdominal:      General: Abdomen is flat.   Musculoskeletal:      Comments: There is obvious swelling about the right ankle.  There is slight ecchymosis around the right toe.  There is no open wound.  Normal DP and PT pulse   Skin:     Capillary Refill: Capillary refill takes less than 2 seconds.   Neurological:      Mental Status:  He is alert. Mental status is at baseline.   Psychiatric:         Mood and Affect: Mood normal.           Emergency Department Course     Imaging:  XR Tibia and Fibula Right 2 Views   Final Result   IMPRESSION: No interval change in alignment of the displaced spiral-type fracture distal right tibial metadiaphysis. Subtle nondisplaced distal fibula fracture suspected. Anatomic alignment tibia and fibula. No proximal or mid tibial or fibula fracture    identified. Distal predominant bone demineralization. Distal leg and ankle soft tissue swelling. Previous nailing of the right femur. No right knee joint effusion. Degenerative change right knee.         Foot  XR, G/E 3 views, right   Final Result   IMPRESSION: Displaced spiral-type fracture distal right tibial metadiaphysis. Associated cortical buckling of the anterior distal tibia. Diffuse bone demineralization. Subtle nondisplaced distal fibula fracture suspected. Mild ankle and distal leg soft    tissue swelling. Inferomedial malleolar spur. No advanced ankle or hindfoot joint space narrowing.      Hallux valgus deformity with bunion. No acute displaced right foot fracture is identified. Diffuse bone demineralization. Mild degenerative change at the first MTP joint. Mild degenerative change in the midfoot.      NOTE: ABNORMAL REPORT      THE DICTATION ABOVE DESCRIBES AN ABNORMALITY FOR WHICH FOLLOW-UP IS NEEDED.             Ankle XR, G/E 3 views, right   Final Result   IMPRESSION: Displaced spiral-type fracture distal right tibial metadiaphysis. Associated cortical buckling of the anterior distal tibia. Diffuse bone demineralization. Subtle nondisplaced distal fibula fracture suspected. Mild ankle and distal leg soft    tissue swelling. Inferomedial malleolar spur. No advanced ankle or hindfoot joint space narrowing.      Hallux valgus deformity with bunion. No acute displaced right foot fracture is identified. Diffuse bone demineralization. Mild degenerative change  at the first MTP joint. Mild degenerative change in the midfoot.      NOTE: ABNORMAL REPORT      THE DICTATION ABOVE DESCRIBES AN ABNORMALITY FOR WHICH FOLLOW-UP IS NEEDED.                  Procedures   Patient placed in cam boot by the ER tech reviewed by me in good position    Emergency Department Course & Assessments:    Interventions:  Medications   ibuprofen (ADVIL/MOTRIN) tablet 600 mg (600 mg Oral $Given 2/25/24 0740)   HYDROcodone-acetaminophen (NORCO) 5-325 MG per tablet 1 tablet (1 tablet Oral $Given 2/25/24 0740)        Independent Interpretation (X-rays, CTs, rhythm strip):  X-ray: Acute distal tibia fracture    Assessments/Consultations/Discussion of Management or Tests:  ED Course as of 02/25/24 0950   Sun Feb 25, 2024   0728 I saw this patient for an initial evaluation and exam.       Social Determinants of Health affecting care:   None    Disposition:  The patient was discharged to home.     Impression & Plan      Medical Decision Making:  Patient is nonambulatory but apparently somehow fell.  Reasons for this are unclear.  Has a clear evidence of fracture by x-ray.  Recommend follow-up with orthopedics no need for emergency surgery due to patient's lack of ability to weight-bear and patient living in the facility.  Patient is discharged with cam boot recommended nonambulatory status to follow-up with orthopedics    Diagnosis:    ICD-10-CM    1. Closed extra-articular fracture of distal tibia, right, initial encounter  S82.301A            Discharge Medications:  New Prescriptions    HYDROCODONE-ACETAMINOPHEN (NORCO) 5-325 MG TABLET    Take 1 tablet by mouth every 6 hours as needed for pain    IBUPROFEN (ADVIL/MOTRIN) 600 MG TABLET    Take 1 tablet (600 mg) by mouth every 6 hours as needed for moderate pain          Scribe Disclosure:  Juan David REES, am serving as a scribe at 7:17 AM on 2/25/2024 to document services personally performed by Feliberto Mcmahon MD based on my observations and the  provider's statements to me.        Feliberto Mcmahon MD  02/28/24 9623

## 2024-02-25 NOTE — ED NOTES
Bed: ASHOK[facility]  Expected date:   Expected time:   Means of arrival:   Comments:  [facility] 33 M

## 2024-02-25 NOTE — DISCHARGE INSTRUCTIONS
We have placed a cam boot to support the right ankle.  There is evidence of a distal tibia fracture as well as possibly a distal fibular fracture.  Please do not bear weight or stand on your right leg until follow-up with orthopedics.  Use pain medication when needed.

## 2024-02-25 NOTE — ED NOTES
Pt was getting help from staff yesterday on lift when he fell and hurt his leg. This morning pt woke up and had an increase in pain so was brought in by EMS for evaluation. Pain in right ankle. Right big toe is bruised. Broke bilateral legs years ago. N/T in right leg. Lift at baseline into WC. From group home. Pt alert and oriented.

## 2024-02-25 NOTE — ED NOTES
Gave report to Tamara,  staff. Unable to send Norco electronically due to script being narcotic. Attempted to call and update Tamara but called and got VM. Ibuprofen and Bomont paper scripts will be sent to  with AVS.  is [facility] in [place]. Address on file.

## 2024-03-06 ENCOUNTER — DOCUMENTATION ONLY (OUTPATIENT)
Facility: CLINIC | Age: 34
End: 2024-03-06
Payer: MEDICARE

## 2024-03-06 DIAGNOSIS — S82.301A CLOSED EXTRA-ARTICULAR FRACTURE OF DISTAL END OF RIGHT TIBIA, INITIAL ENCOUNTER: Primary | ICD-10-CM

## 2024-07-10 ENCOUNTER — LAB REQUISITION (OUTPATIENT)
Dept: LAB | Facility: CLINIC | Age: 34
End: 2024-07-10
Payer: MEDICARE

## 2024-07-10 DIAGNOSIS — E11.9 TYPE 2 DIABETES MELLITUS WITHOUT COMPLICATIONS (H): ICD-10-CM

## 2024-07-10 LAB
ALBUMIN SERPL BCG-MCNC: 4.1 G/DL (ref 3.5–5.2)
ALP SERPL-CCNC: 79 U/L (ref 40–150)
ALT SERPL W P-5'-P-CCNC: 73 U/L (ref 0–70)
ANION GAP SERPL CALCULATED.3IONS-SCNC: 10 MMOL/L (ref 7–15)
AST SERPL W P-5'-P-CCNC: 38 U/L (ref 0–45)
BASOPHILS # BLD AUTO: 0.1 10E3/UL (ref 0–0.2)
BASOPHILS NFR BLD AUTO: 1 %
BILIRUB SERPL-MCNC: 0.5 MG/DL
BUN SERPL-MCNC: 12 MG/DL (ref 6–20)
CALCIUM SERPL-MCNC: 9.2 MG/DL (ref 8.6–10)
CHLORIDE SERPL-SCNC: 102 MMOL/L (ref 98–107)
CREAT SERPL-MCNC: 0.82 MG/DL (ref 0.67–1.17)
DEPRECATED HCO3 PLAS-SCNC: 27 MMOL/L (ref 22–29)
EGFRCR SERPLBLD CKD-EPI 2021: >90 ML/MIN/1.73M2
EOSINOPHIL # BLD AUTO: 0.1 10E3/UL (ref 0–0.7)
EOSINOPHIL NFR BLD AUTO: 1 %
ERYTHROCYTE [DISTWIDTH] IN BLOOD BY AUTOMATED COUNT: 14.9 % (ref 10–15)
GLUCOSE SERPL-MCNC: 112 MG/DL (ref 70–99)
HBA1C MFR BLD: 5.7 %
HCT VFR BLD AUTO: 53.9 % (ref 40–53)
HGB BLD-MCNC: 18 G/DL (ref 13.3–17.7)
IMM GRANULOCYTES # BLD: 0 10E3/UL
IMM GRANULOCYTES NFR BLD: 1 %
LYMPHOCYTES # BLD AUTO: 2.8 10E3/UL (ref 0.8–5.3)
LYMPHOCYTES NFR BLD AUTO: 42 %
MCH RBC QN AUTO: 30.2 PG (ref 26.5–33)
MCHC RBC AUTO-ENTMCNC: 33.4 G/DL (ref 31.5–36.5)
MCV RBC AUTO: 90 FL (ref 78–100)
MONOCYTES # BLD AUTO: 0.9 10E3/UL (ref 0–1.3)
MONOCYTES NFR BLD AUTO: 13 %
NEUTROPHILS # BLD AUTO: 2.9 10E3/UL (ref 1.6–8.3)
NEUTROPHILS NFR BLD AUTO: 43 %
NRBC # BLD AUTO: 0 10E3/UL
NRBC BLD AUTO-RTO: 0 /100
PLATELET # BLD AUTO: 150 10E3/UL (ref 150–450)
POTASSIUM SERPL-SCNC: 4.5 MMOL/L (ref 3.4–5.3)
PROT SERPL-MCNC: 6.9 G/DL (ref 6.4–8.3)
RBC # BLD AUTO: 5.97 10E6/UL (ref 4.4–5.9)
SODIUM SERPL-SCNC: 139 MMOL/L (ref 135–145)
WBC # BLD AUTO: 6.7 10E3/UL (ref 4–11)

## 2024-07-10 PROCEDURE — 80053 COMPREHEN METABOLIC PANEL: CPT | Mod: ORL

## 2024-07-10 PROCEDURE — 83036 HEMOGLOBIN GLYCOSYLATED A1C: CPT | Mod: ORL

## 2024-07-10 PROCEDURE — 85025 COMPLETE CBC W/AUTO DIFF WBC: CPT | Mod: ORL

## 2025-02-18 ENCOUNTER — LAB REQUISITION (OUTPATIENT)
Dept: LAB | Facility: CLINIC | Age: 35
End: 2025-02-18
Payer: MEDICARE

## 2025-02-18 DIAGNOSIS — E11.9 TYPE 2 DIABETES MELLITUS WITHOUT COMPLICATIONS (H): ICD-10-CM

## 2025-02-18 DIAGNOSIS — E03.9 HYPOTHYROIDISM, UNSPECIFIED: ICD-10-CM

## 2025-02-18 LAB
ANION GAP SERPL CALCULATED.3IONS-SCNC: 9 MMOL/L (ref 7–15)
BASOPHILS # BLD AUTO: 0 10E3/UL (ref 0–0.2)
BASOPHILS NFR BLD AUTO: 1 %
BUN SERPL-MCNC: 11.5 MG/DL (ref 6–20)
CALCIUM SERPL-MCNC: 9.2 MG/DL (ref 8.8–10.4)
CHLORIDE SERPL-SCNC: 103 MMOL/L (ref 98–107)
CREAT SERPL-MCNC: 0.73 MG/DL (ref 0.67–1.17)
EGFRCR SERPLBLD CKD-EPI 2021: >90 ML/MIN/1.73M2
EOSINOPHIL # BLD AUTO: 0.1 10E3/UL (ref 0–0.7)
EOSINOPHIL NFR BLD AUTO: 1 %
ERYTHROCYTE [DISTWIDTH] IN BLOOD BY AUTOMATED COUNT: 14.3 % (ref 10–15)
EST. AVERAGE GLUCOSE BLD GHB EST-MCNC: 134 MG/DL
GLUCOSE SERPL-MCNC: 111 MG/DL (ref 70–99)
HBA1C MFR BLD: 6.3 %
HCO3 SERPL-SCNC: 30 MMOL/L (ref 22–29)
HCT VFR BLD AUTO: 52.5 % (ref 40–53)
HGB BLD-MCNC: 17.9 G/DL (ref 13.3–17.7)
IMM GRANULOCYTES # BLD: 0 10E3/UL
IMM GRANULOCYTES NFR BLD: 1 %
LYMPHOCYTES # BLD AUTO: 2.6 10E3/UL (ref 0.8–5.3)
LYMPHOCYTES NFR BLD AUTO: 41 %
MCH RBC QN AUTO: 31.8 PG (ref 26.5–33)
MCHC RBC AUTO-ENTMCNC: 34.1 G/DL (ref 31.5–36.5)
MCV RBC AUTO: 93 FL (ref 78–100)
MONOCYTES # BLD AUTO: 0.7 10E3/UL (ref 0–1.3)
MONOCYTES NFR BLD AUTO: 11 %
NEUTROPHILS # BLD AUTO: 2.8 10E3/UL (ref 1.6–8.3)
NEUTROPHILS NFR BLD AUTO: 46 %
NRBC # BLD AUTO: 0 10E3/UL
NRBC BLD AUTO-RTO: 0 /100
PLATELET # BLD AUTO: 136 10E3/UL (ref 150–450)
POTASSIUM SERPL-SCNC: 4.4 MMOL/L (ref 3.4–5.3)
RBC # BLD AUTO: 5.63 10E6/UL (ref 4.4–5.9)
SODIUM SERPL-SCNC: 142 MMOL/L (ref 135–145)
TSH SERPL DL<=0.005 MIU/L-ACNC: 2.41 UIU/ML (ref 0.3–4.2)
WBC # BLD AUTO: 6.2 10E3/UL (ref 4–11)

## 2025-04-23 ENCOUNTER — HOSPITAL ENCOUNTER (EMERGENCY)
Facility: CLINIC | Age: 35
Discharge: HOME | End: 2025-04-23
Attending: EMERGENCY MEDICINE | Admitting: EMERGENCY MEDICINE
Payer: MEDICARE

## 2025-04-23 VITALS
HEART RATE: 81 BPM | TEMPERATURE: 97.7 F | DIASTOLIC BLOOD PRESSURE: 92 MMHG | OXYGEN SATURATION: 95 % | SYSTOLIC BLOOD PRESSURE: 129 MMHG | RESPIRATION RATE: 18 BRPM

## 2025-04-23 DIAGNOSIS — S09.90XA TRAUMATIC INJURY OF HEAD, INITIAL ENCOUNTER: ICD-10-CM
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
## 2025-04-23 PROCEDURE — 250N000013 HC RX MED GY IP 250 OP 250 PS 637: Performed by: EMERGENCY MEDICINE

## 2025-04-23 PROCEDURE — 99284 EMERGENCY DEPT VISIT MOD MDM: CPT | Mod: 25

## 2025-04-23 RX ORDER — ACETAMINOPHEN 500 MG
500 TABLET ORAL EVERY 4 HOURS PRN
Status: DISCONTINUED | OUTPATIENT
Start: 2025-04-23 | End: 2025-04-23 | Stop reason: HOSPADM

## 2025-04-23 RX ADMIN — ACETAMINOPHEN 500 MG: 500 TABLET ORAL at 21:01

## 2025-04-23 ASSESSMENT — COLUMBIA-SUICIDE SEVERITY RATING SCALE - C-SSRS
2. HAVE YOU ACTUALLY HAD ANY THOUGHTS OF KILLING YOURSELF IN THE PAST MONTH?: NO
6. HAVE YOU EVER DONE ANYTHING, STARTED TO DO ANYTHING, OR PREPARED TO DO ANYTHING TO END YOUR LIFE?: NO
1. IN THE PAST MONTH, HAVE YOU WISHED YOU WERE DEAD OR WISHED YOU COULD GO TO SLEEP AND NOT WAKE UP?: NO

## 2025-04-23 ASSESSMENT — ACTIVITIES OF DAILY LIVING (ADL)
ADLS_ACUITY_SCORE: 56

## 2025-04-23 NOTE — ED PROVIDER NOTES
Emergency Department Note      History of Present Illness     Chief Complaint   Fall      HPI   Chris Gabriel is a 34 year old male with a history of type 2 diabetes, hypertension, and TBI presenting to the Emergency Department via EMS for evaluation of fall. EMS reports responding to a call in which patient fell backwards in his wheelchair while driving it up a moderate slope on the driveway of his group living facility. Patient hit the back of his head on the ground during the incident. Vitals stable per EMS. Cervical collar was placed per protocol. Patient reports pain in the back of his head. No neck or back pain. No radiation of pain. Per patient, sensation and movement in the extremities is normal for his baseline. Patient is responsive but unable to accurately answer question such as what season is it. Cognitive baseline is uncertain. Patient makes his own medical decisions. Uses a wheelchair at baseline. He is not on blood thinners.     Independent Historian   EMS as detailed above.    Review of External Notes       Past Medical History     Medical History and Problem List   No pertinent surgical history available.    Medications   Albuterol  Atorvastatin  Jardiance  Norco  Vistaril  DuoNeb  Metformin  Synthroid  Toprol  Remeron  Zyprexa  Seroquel  Senna-docusate    Surgical History   No pertinent surgical history available.    Physical Exam     Patient Vitals for the past 24 hrs:   BP Temp Temp src Pulse Resp SpO2   04/23/25 2140 -- -- -- -- -- 95 %   04/23/25 2139 -- -- -- -- -- 96 %   04/23/25 2138 -- -- -- -- -- 97 %   04/23/25 2130 (!) 129/92 -- -- 81 -- --   04/23/25 2100 134/82 -- -- 73 -- --   04/23/25 2045 (!) 143/125 -- -- 79 -- --   04/23/25 2030 (!) 139/109 -- -- 82 -- --   04/23/25 2025 -- -- -- -- -- 96 %   04/23/25 2020 -- -- -- -- -- 97 %   04/23/25 2015 129/86 -- -- 65 -- --   04/23/25 1905 -- -- -- -- -- 93 %   04/23/25 1900 132/89 -- -- 73 -- 95 %   04/23/25 1816 113/82 -- -- 67 -- 94 %    04/23/25 1800 124/86 -- -- 80 -- 97 %   04/23/25 1758 (!) 134/93 97.7  F (36.5  C) Oral 83 18 98 %     Physical Exam  General: The patient is alert, in no respiratory distress.    HENT: Mucous membranes moist. abrasion to back of head. Patient in a cervical collar.    Cardiovascular: Regular rate and rhythm. Good pulses in all four extremities. Normal capillary refill and skin turgor.     Respiratory: Lungs are clear. No nasal flaring. No retractions. No wheezing, no crackles.    Gastrointestinal: Abdomen soft. No guarding, no rebound. No palpable hernias.     Musculoskeletal: No gross deformity.     Skin: No rashes or petechiae.     Neurologic: The patient is alert and oriented x3. GCS 15. Conversant and has some movement of all extremities.     Lymphatic: No cervical adenopathy. No lower extremity swelling.    Psychiatric: The patient is non-tearful.     Diagnostics     Lab Results   Labs Ordered and Resulted from Time of ED Arrival to Time of ED Departure - No data to display    Imaging   CT Cervical Spine w/o Contrast   Final Result   IMPRESSION:   1.  No evidence of acute fracture or subluxation of the cervical spine by CT imaging.      CT Head w/o Contrast   Final Result   IMPRESSION:     1.  No evidence of acute intracranial hemorrhage or mass effect.   2.  Multifocal encephalomalacia throughout the cerebral hemispheres bilaterally.          EKG       Independent Interpretation   CT Head: Negative.    ED Course      Medications Administered   Medications   acetaminophen (TYLENOL) tablet 500 mg (500 mg Oral $Given 4/23/25 2101)       Procedures   Procedures     Discussion of Management   None    ED Course   ED Course as of 04/23/25 2245 Wed Apr 23, 2025 1755 I obtained history and examined the patient as noted above.     1949 Reviewed Head CT. Negative by my read.   2029 I rechecked and updated the patient.         Additional Documentation  None    Medical Decision Making / Diagnosis     CMS Diagnoses:  None    MIPS       None    [facility]   Chris NOE Harvey is a 34 year old male the patient had suffered a fall in a wheelchair there is no signs of associated injury I did image his head and neck because of his paraplegia there is no signs an abrasion no bleed and the patient was feeling at baseline and was discharged home in good condition.    Disposition   The patient was discharged.     Diagnosis     ICD-10-CM    1. Fall, initial encounter  W19.XXXA       2. Traumatic injury of head, initial encounter  S09.90XA            Discharge Medications   Discharge Medication List as of 4/23/2025  9:26 PM            Scribe Disclosure:  I, Jordi Payton, am serving as a scribe at 6:03 PM on 4/23/2025 to document services personally performed by Chauncey Gamez MD based on my observations and the provider's statements to me.        Chauncey Gamez MD  04/23/25 6930

## 2025-04-23 NOTE — ED TRIAGE NOTES
Patient arrives via EMS. Patient is wheelchair bound and was coming back up the driveway of his group home and his wheelchair tipped backwards causing him to fall and hit his head.  Patient denies LOC.       Triage Assessment (Adult)       Row Name 04/23/25 1754          Triage Assessment    Airway WDL WDL        Respiratory WDL    Respiratory WDL WDL        Peripheral/Neurovascular WDL    Peripheral Neurovascular WDL WDL        Cognitive/Neuro/Behavioral WDL    Cognitive/Neuro/Behavioral WDL WDL

## 2025-04-24 NOTE — ED NOTES
No open wound found on back of head. No bleeding on back of head. Patient provided with turkey box lunch and 7up.

## 2025-04-24 NOTE — DISCHARGE INSTRUCTIONS
Discharge Instructions  Trauma    You were seen today for an injury due to some kind of trauma (crash, fall, etc.).  Some injuries may not show up until after you leave the Emergency Department.  It is important that you pay attention to these instructions and follow-up with your regular doctor as instructed.    Return to the Emergency Department right away if:  You have abdominal pain or bruises, chest pain, pain in a new area, or pain that is getting worse.  You get short of breath.  You develop a fever over 101 degrees.  You have weakness in your arms or legs.  You faint or you are very lightheaded.  You have any new symptoms, you are feeling weak or unusually ill, or something worries you.   Injuries to the brain are possible with any accident.  Return right away if you have confusion, vomiting more than once, difficulty walking or a headache that is getting worse. Bring a child or a person who can t talk back if they seem to be behaving in an abnormal way.      MORE INFORMATION:    General Injuries:  Aches and pains are usually worse the day after your accident, but should not be severe, and should start getting better after that. Aches and pains are common in the neck and back.  Injuries from your accident may prevent you from working.  Follow-up with your regular doctor to get a work note and to find out how long you will not be able to work.  Pain medications or your injuries may make it unsafe for you to drive or operate machinery.  Use ice to injured areas for the first one or two days. Apply a bag of ice wrapped in a cloth for about 15 minutes at a time. You can do this as often as once an hour. Do not sleep with an ice pack, since it can burn you.   You can use non-prescription pain medicine, like Tylenol  (acetaminophen), Advil  (ibuprofen), Motrin  (ibuprofen), Nuprin  (ibuprofen) if your emergency doctor or your own doctor told you this is okay. Tylenol  (acetaminophen) is in many prescription  medicines and non-prescription medicines--check all of your medicines to be sure you aren t taking more than 3000 mg per day.  Limit your activity for at least one or two days. Avoid doing things that hurt.  You need to see your doctor if any injured area is not back to normal in 1 week.    Car Accident:  If you have been on a backboard or had a neck collar on, this may make you stiff and sore.  This should get better in 1-2 days.  Return to the Emergency Department if the pain or discomfort is severe or gets worse.  Be careful of shards of glass on your body or in your belongings.    Fractures, Sprains, and Strains:  Return to the Emergency Department right away if your injured area gets more painful, if the splint or dressing seems to be too tight, if it gets numb or tingly past the injury, or if the area past the injury gets pale, blue, or cold.  Use your crutches if you were given them today. Don t put weight on the injured area until the pain is gone.  Keep the injured area above the level of your heart while laying or sitting down.  This well help lessen the swelling (puffiness) and the pain.  You may use an elastic bandage (Ace  Wrap) if it makes you more comfortable. Wrap it just tight enough to provide mild compression, and loosen it if you get swelling past the bandage.    Note about X-rays: If you had x-rays done today, they were read by your emergency physician. They will also be read later by a radiologist. We will contact you if the radiologist thinks they show something different than the emergency physician did. Remember that there are some fractures (breaks in the bone) that can t be seen right away. Even if your x-rays today were normal, you must see your doctor in clinic to re-check.     Splints:  A splint put on in the Emergency Department is temporary. Your regular doctor or orthopedic doctor will remove it, and replace it with a cast or boot if needed.  Keep the splint dry. Cover it with a  plastic bag when you wash. Even with a plastic bag, you still can t get in water or let water get right on it. If it does get wet, you should come back or see your doctor to have it replaced.  Do not put objects inside the splint to scratch.  If there is an elastic bandage (Ace  Wrap) holding the splint on this may be loosened a little to relieve pressure or pain.  If pain continues return to the Emergency Department right away.  Return if the splint starts cutting into your skin.  Do not remove your splint by yourself unless told to by your doctor. You can t take it off and put it back on again.     Wounds:  Infections can follow many injuries. Watch for fevers, redness spreading from the wound, pus or stitches that open up. Return here or see your doctor if these happen.  There can always be glass, wood, dirt or other things in any wound.  They won t always show up even on x-rays.  If a wound doesn t heal, this may be why, and it is important to follow-up with your regular doctor. Small pieces of glass or other materials may work their way out on their own.  Cuts or scrapes may start to bleed after leaving the Emergency Department.  If this happens, hold pressure on the bleeding area with a clean cloth or put pressure over the bandage.  If the bleeding doesn t stop after you use constant pressure for   hour, you should return to the Emergency Department for further treatment.  Any bandage or dressing put on here should be removed in 12-24 hours, or as your doctor instructs. Remove the dressing sooner if it seems too tight or painful, or if it is getting numb, tingly, or pale past the dressing.  After you take off the dressing, wash the cut or scrape with soap and water once or twice a day.  Apply ointment like Bacitracin  (polypeptide antibiotic) to scrapes or cuts, and keep them covered with a Band-Aid  or gauze if possible, until they heal up or until your stitches are taken out.  Dermabond  or Steri-Strips   should be left alone and will come off by themselves.  Dissolving stitches should go away or fall out within about a week.  Regular stitches need to be taken out by your doctor in clinic.  Call today and schedule an appointment.  Leave your stitches in for as long as you were told today.    Most injuries are preventable!  As your local emergency physicians, we encourage you to:  Wear your seat belt.  Do not talk on your cell phone while driving.  Do not read or send text messages while driving.  Wear a bike or motorcycle helmet.  Wear a helmet while skiing and snowboarding.  Wear personal flotation devices at all times while on the water.  Always have your child in a car seat.  Do not allow children less than 12 years old to ride in the front seat.  Go to the CDC website to find more information on preventing injures:  http://www.cdc.gov/injury/index.html    If you were given a prescription for medicine here today, be sure to read all of the information (including the package insert) that comes with your prescription.  This will include important information about the medicine, its side effects, and any warnings that you need to know about.  The pharmacist who fills the prescription can provide more information and answer questions you may have about the medicine.  If you have questions or concerns that the pharmacist cannot address, please call or return to the Emergency Department.     Opioid Medication Information    Pain medications are among the most commonly prescribed medicines, so we are including this information for all our patients. If you did not receive pain medication or get a prescription for pain medicine, you can ignore it.     You may have been given a prescription for an opioid (narcotic) pain medicine and/or have received a pain medicine while here in the Emergency Department. These medicines can make you drowsy or impaired. You must not drive, operate dangerous equipment, or engage in any other  dangerous activities while taking these medications. If you drive while taking these medications, you could be arrested for DUI, or driving under the influence. Do not drink any alcohol while you are taking these medications.     Opioid pain medications can cause addiction. If you have a history of chemical dependency of any type, you are at a higher risk of becoming addicted to pain medications.  Only take these prescribed medications to treat your pain when all other options have been tried. Take it for as short a time and as few doses as possible. Store your pain pills in a secure place, as they are frequently stolen and provide a dangerous opportunity for children or visitors in your house to start abusing these powerful medications. We will not replace any lost or stolen medicine.  As soon as your pain is better, you should flush all your remaining medication.     Many prescription pain medications contain Tylenol  (acetaminophen), including Vicodin , Tylenol #3 , Norco , Lortab , and Percocet .  You should not take any extra pills of Tylenol  if you are using these prescription medications or you can get very sick.  Do not ever take more than 3000 mg of acetaminophen in any 24 hour period.    All opioids tend to cause constipation. Drink plenty of water and eat foods that have a lot of fiber, such as fruits, vegetables, prune juice, apple juice and high fiber cereal.  Take a laxative if you don t move your bowels at least every other day. Miralax , Milk of Magnesia, Colace , or Senna  can be used to keep you regular.      Remember that you can always come back to the Emergency Department if you are not able to see your regular doctor in the amount of time listed above, if you get any new symptoms, or if there is anything that worries you.       movement